# Patient Record
Sex: FEMALE | Race: WHITE | Employment: OTHER | URBAN - NONMETROPOLITAN AREA
[De-identification: names, ages, dates, MRNs, and addresses within clinical notes are randomized per-mention and may not be internally consistent; named-entity substitution may affect disease eponyms.]

---

## 2017-01-03 RX ORDER — IBUPROFEN 400 MG/1
400 TABLET ORAL EVERY 6 HOURS PRN
Qty: 120 TABLET | Refills: 3 | Status: SHIPPED | OUTPATIENT
Start: 2017-01-03 | End: 2017-01-03 | Stop reason: SDUPTHER

## 2017-01-03 RX ORDER — IBUPROFEN 400 MG/1
400 TABLET ORAL EVERY 6 HOURS PRN
Qty: 120 TABLET | Refills: 3 | Status: SHIPPED | OUTPATIENT
Start: 2017-01-03 | End: 2017-05-26 | Stop reason: SDUPTHER

## 2017-01-11 DIAGNOSIS — G62.9 PERIPHERAL POLYNEUROPATHY: ICD-10-CM

## 2017-01-11 DIAGNOSIS — Z79.4 TYPE 2 DIABETES MELLITUS WITH DIABETIC POLYNEUROPATHY, WITH LONG-TERM CURRENT USE OF INSULIN (HCC): Primary | ICD-10-CM

## 2017-01-11 DIAGNOSIS — I25.83 CORONARY ARTERY DISEASE DUE TO LIPID RICH PLAQUE: ICD-10-CM

## 2017-01-11 DIAGNOSIS — I25.10 CORONARY ARTERY DISEASE DUE TO LIPID RICH PLAQUE: ICD-10-CM

## 2017-01-11 DIAGNOSIS — R53.82 CHRONIC FATIGUE: ICD-10-CM

## 2017-01-11 DIAGNOSIS — E11.42 TYPE 2 DIABETES MELLITUS WITH DIABETIC POLYNEUROPATHY, WITH LONG-TERM CURRENT USE OF INSULIN (HCC): Primary | ICD-10-CM

## 2017-01-11 PROCEDURE — G0179 MD RECERTIFICATION HHA PT: HCPCS | Performed by: INTERNAL MEDICINE

## 2017-01-11 RX ORDER — ALBUTEROL SULFATE 90 UG/1
2 AEROSOL, METERED RESPIRATORY (INHALATION) EVERY 4 HOURS PRN
Qty: 3 INHALER | Refills: 3 | Status: SHIPPED | OUTPATIENT
Start: 2017-01-11 | End: 2017-01-24 | Stop reason: CLARIF

## 2017-01-11 RX ORDER — ZINC GLUCONATE 50 MG
50 TABLET ORAL DAILY
COMMUNITY

## 2017-01-12 ENCOUNTER — OFFICE VISIT (OUTPATIENT)
Dept: INTERNAL MEDICINE | Age: 67
End: 2017-01-12

## 2017-01-12 VITALS
HEART RATE: 56 BPM | DIASTOLIC BLOOD PRESSURE: 66 MMHG | HEIGHT: 64 IN | WEIGHT: 265 LBS | RESPIRATION RATE: 20 BRPM | BODY MASS INDEX: 45.24 KG/M2 | SYSTOLIC BLOOD PRESSURE: 100 MMHG

## 2017-01-12 DIAGNOSIS — I25.10 CORONARY ARTERY DISEASE DUE TO LIPID RICH PLAQUE: ICD-10-CM

## 2017-01-12 DIAGNOSIS — Z23 NEED FOR PNEUMOCOCCAL VACCINATION: ICD-10-CM

## 2017-01-12 DIAGNOSIS — Z79.4 TYPE 2 DIABETES MELLITUS WITH DIABETIC POLYNEUROPATHY, WITH LONG-TERM CURRENT USE OF INSULIN (HCC): Primary | ICD-10-CM

## 2017-01-12 DIAGNOSIS — M25.551 PAIN OF RIGHT HIP JOINT: ICD-10-CM

## 2017-01-12 DIAGNOSIS — E11.42 TYPE 2 DIABETES MELLITUS WITH DIABETIC POLYNEUROPATHY, WITH LONG-TERM CURRENT USE OF INSULIN (HCC): Primary | ICD-10-CM

## 2017-01-12 DIAGNOSIS — M79.10 MUSCLE ACHE: ICD-10-CM

## 2017-01-12 DIAGNOSIS — Z11.59 NEED FOR HEPATITIS C SCREENING TEST: ICD-10-CM

## 2017-01-12 DIAGNOSIS — I25.83 CORONARY ARTERY DISEASE DUE TO LIPID RICH PLAQUE: ICD-10-CM

## 2017-01-12 DIAGNOSIS — R60.0 BILATERAL LEG EDEMA: ICD-10-CM

## 2017-01-12 DIAGNOSIS — G47.33 OBSTRUCTIVE SLEEP APNEA: ICD-10-CM

## 2017-01-12 PROCEDURE — 99214 OFFICE O/P EST MOD 30 MIN: CPT | Performed by: INTERNAL MEDICINE

## 2017-01-12 PROCEDURE — G0009 ADMIN PNEUMOCOCCAL VACCINE: HCPCS | Performed by: INTERNAL MEDICINE

## 2017-01-12 PROCEDURE — 90670 PCV13 VACCINE IM: CPT | Performed by: INTERNAL MEDICINE

## 2017-01-12 ASSESSMENT — ENCOUNTER SYMPTOMS
SHORTNESS OF BREATH: 0
DIARRHEA: 0
EYE PAIN: 0
BACK PAIN: 0
BLOOD IN STOOL: 0
CONSTIPATION: 0
COUGH: 0
VOMITING: 0
NAUSEA: 0
ABDOMINAL PAIN: 0

## 2017-01-13 ENCOUNTER — TELEPHONE (OUTPATIENT)
Dept: INTERNAL MEDICINE | Age: 67
End: 2017-01-13

## 2017-01-13 DIAGNOSIS — E66.01 MORBID OBESITY, UNSPECIFIED OBESITY TYPE (HCC): ICD-10-CM

## 2017-01-13 DIAGNOSIS — R53.82 CHRONIC FATIGUE: ICD-10-CM

## 2017-01-13 DIAGNOSIS — G47.33 OBSTRUCTIVE SLEEP APNEA: Primary | ICD-10-CM

## 2017-01-16 ENCOUNTER — TELEPHONE (OUTPATIENT)
Dept: INTERNAL MEDICINE | Age: 67
End: 2017-01-16

## 2017-01-23 ENCOUNTER — EVALUATION (OUTPATIENT)
Dept: PHYSICAL THERAPY | Age: 67
End: 2017-01-23

## 2017-01-23 DIAGNOSIS — M25.551 RIGHT HIP PAIN: Primary | ICD-10-CM

## 2017-01-23 DIAGNOSIS — M79.10 MUSCLE ACHE: ICD-10-CM

## 2017-01-23 PROCEDURE — 97110 THERAPEUTIC EXERCISES: CPT | Performed by: PHYSICAL THERAPIST

## 2017-01-23 ASSESSMENT — ACTIVITIES OF DAILY LIVING (ADL): EFFECT OF PAIN ON DAILY ACTIVITIES: DECREASED EASE WITH AMBULATION AND ADL

## 2017-01-23 ASSESSMENT — PAIN DESCRIPTION - PROGRESSION: CLINICAL_PROGRESSION: GRADUALLY IMPROVING

## 2017-01-23 ASSESSMENT — PAIN DESCRIPTION - PAIN TYPE: TYPE: CHRONIC PAIN

## 2017-01-23 ASSESSMENT — PAIN DESCRIPTION - DESCRIPTORS: DESCRIPTORS: ACHING;BURNING;DULL;SHARP

## 2017-01-23 ASSESSMENT — PAIN DESCRIPTION - FREQUENCY: FREQUENCY: INTERMITTENT

## 2017-01-23 ASSESSMENT — PAIN DESCRIPTION - ONSET: ONSET: ON-GOING

## 2017-01-23 ASSESSMENT — PAIN SCALES - GENERAL: PAINLEVEL_OUTOF10: 6

## 2017-01-23 ASSESSMENT — PAIN DESCRIPTION - LOCATION: LOCATION: BACK;HIP

## 2017-01-30 ENCOUNTER — TREATMENT (OUTPATIENT)
Dept: PHYSICAL THERAPY | Age: 67
End: 2017-01-30

## 2017-01-30 DIAGNOSIS — M79.10 MUSCLE ACHE: ICD-10-CM

## 2017-01-30 DIAGNOSIS — M25.551 RIGHT HIP PAIN: Primary | ICD-10-CM

## 2017-01-30 PROCEDURE — 97110 THERAPEUTIC EXERCISES: CPT | Performed by: PHYSICAL THERAPIST

## 2017-02-01 ENCOUNTER — OFFICE VISIT (OUTPATIENT)
Dept: PODIATRY | Age: 67
End: 2017-02-01

## 2017-02-01 VITALS
HEART RATE: 80 BPM | BODY MASS INDEX: 45.24 KG/M2 | SYSTOLIC BLOOD PRESSURE: 128 MMHG | RESPIRATION RATE: 20 BRPM | HEIGHT: 64 IN | WEIGHT: 265 LBS | DIASTOLIC BLOOD PRESSURE: 60 MMHG

## 2017-02-01 DIAGNOSIS — Z79.4 TYPE 2 DIABETES MELLITUS WITH DIABETIC POLYNEUROPATHY, WITH LONG-TERM CURRENT USE OF INSULIN (HCC): Primary | ICD-10-CM

## 2017-02-01 DIAGNOSIS — E11.42 TYPE 2 DIABETES MELLITUS WITH DIABETIC POLYNEUROPATHY, WITH LONG-TERM CURRENT USE OF INSULIN (HCC): Primary | ICD-10-CM

## 2017-02-01 PROCEDURE — 11720 DEBRIDE NAIL 1-5: CPT | Performed by: PODIATRIST

## 2017-02-01 PROCEDURE — 1036F TOBACCO NON-USER: CPT | Performed by: PODIATRIST

## 2017-02-02 ENCOUNTER — TREATMENT (OUTPATIENT)
Dept: PHYSICAL THERAPY | Age: 67
End: 2017-02-02

## 2017-02-02 DIAGNOSIS — M79.10 MUSCLE ACHE: ICD-10-CM

## 2017-02-02 DIAGNOSIS — M25.551 RIGHT HIP PAIN: Primary | ICD-10-CM

## 2017-02-02 PROCEDURE — 97110 THERAPEUTIC EXERCISES: CPT | Performed by: PHYSICAL THERAPIST

## 2017-02-07 ENCOUNTER — TREATMENT (OUTPATIENT)
Dept: PHYSICAL THERAPY | Age: 67
End: 2017-02-07

## 2017-02-07 DIAGNOSIS — M79.10 MUSCLE ACHE: ICD-10-CM

## 2017-02-07 DIAGNOSIS — M25.551 RIGHT HIP PAIN: Primary | ICD-10-CM

## 2017-02-07 PROCEDURE — 97112 NEUROMUSCULAR REEDUCATION: CPT | Performed by: PHYSICAL THERAPIST

## 2017-02-07 PROCEDURE — 97110 THERAPEUTIC EXERCISES: CPT | Performed by: PHYSICAL THERAPIST

## 2017-02-08 ENCOUNTER — TREATMENT (OUTPATIENT)
Dept: PHYSICAL THERAPY | Age: 67
End: 2017-02-08

## 2017-02-08 DIAGNOSIS — M79.10 MUSCLE ACHE: ICD-10-CM

## 2017-02-08 DIAGNOSIS — M25.551 RIGHT HIP PAIN: Primary | ICD-10-CM

## 2017-02-08 PROCEDURE — 97110 THERAPEUTIC EXERCISES: CPT | Performed by: PHYSICAL THERAPIST

## 2017-02-08 PROCEDURE — 97112 NEUROMUSCULAR REEDUCATION: CPT | Performed by: PHYSICAL THERAPIST

## 2017-02-09 ENCOUNTER — HOSPITAL ENCOUNTER (OUTPATIENT)
Dept: SLEEP CENTER | Age: 67
Discharge: HOME | End: 2017-02-09
Payer: MEDICARE

## 2017-02-09 VITALS
DIASTOLIC BLOOD PRESSURE: 68 MMHG | BODY MASS INDEX: 44.56 KG/M2 | TEMPERATURE: 98.5 F | SYSTOLIC BLOOD PRESSURE: 125 MMHG | HEIGHT: 64 IN | HEART RATE: 62 BPM | WEIGHT: 261 LBS | RESPIRATION RATE: 16 BRPM

## 2017-02-09 DIAGNOSIS — G47.33 OBSTRUCTIVE SLEEP APNEA: Primary | ICD-10-CM

## 2017-02-09 DIAGNOSIS — G47.33 OSA ON CPAP: Primary | ICD-10-CM

## 2017-02-09 DIAGNOSIS — R53.82 CHRONIC FATIGUE: ICD-10-CM

## 2017-02-09 DIAGNOSIS — Z99.89 OSA ON CPAP: Primary | ICD-10-CM

## 2017-02-09 PROCEDURE — 95811 POLYSOM 6/>YRS CPAP 4/> PARM: CPT

## 2017-02-13 ENCOUNTER — TREATMENT (OUTPATIENT)
Dept: PHYSICAL THERAPY | Age: 67
End: 2017-02-13

## 2017-02-13 DIAGNOSIS — M79.10 MUSCLE ACHE: ICD-10-CM

## 2017-02-13 DIAGNOSIS — M25.551 RIGHT HIP PAIN: Primary | ICD-10-CM

## 2017-02-15 DIAGNOSIS — R53.82 CHRONIC FATIGUE: ICD-10-CM

## 2017-02-15 DIAGNOSIS — G47.33 OBSTRUCTIVE SLEEP APNEA: ICD-10-CM

## 2017-02-15 RX ORDER — LANCETS 28 GAUGE
1 EACH MISCELLANEOUS 4 TIMES DAILY
COMMUNITY
End: 2018-03-06 | Stop reason: SDUPTHER

## 2017-02-15 RX ORDER — LANCETS 28 GAUGE
1 EACH MISCELLANEOUS 3 TIMES DAILY
Qty: 100 EACH | Refills: 11 | Status: SHIPPED | OUTPATIENT
Start: 2017-02-15 | End: 2017-02-15

## 2017-03-06 RX ORDER — BLOOD SUGAR DIAGNOSTIC
1 STRIP MISCELLANEOUS 4 TIMES DAILY
Qty: 200 EACH | Refills: 3 | Status: SHIPPED | OUTPATIENT
Start: 2017-03-06 | End: 2017-10-03 | Stop reason: SDUPTHER

## 2017-03-08 ENCOUNTER — TELEPHONE (OUTPATIENT)
Dept: INTERNAL MEDICINE | Age: 67
End: 2017-03-08

## 2017-03-15 DIAGNOSIS — K74.60 CIRRHOSIS OF LIVER WITHOUT ASCITES, UNSPECIFIED HEPATIC CIRRHOSIS TYPE (HCC): Primary | ICD-10-CM

## 2017-03-15 DIAGNOSIS — G47.33 OBSTRUCTIVE SLEEP APNEA: ICD-10-CM

## 2017-03-15 DIAGNOSIS — E11.42 TYPE 2 DIABETES MELLITUS WITH DIABETIC POLYNEUROPATHY, WITH LONG-TERM CURRENT USE OF INSULIN (HCC): ICD-10-CM

## 2017-03-15 DIAGNOSIS — R60.0 BILATERAL LEG EDEMA: ICD-10-CM

## 2017-03-15 DIAGNOSIS — I25.83 CORONARY ARTERY DISEASE DUE TO LIPID RICH PLAQUE: ICD-10-CM

## 2017-03-15 DIAGNOSIS — G62.9 PERIPHERAL POLYNEUROPATHY: ICD-10-CM

## 2017-03-15 DIAGNOSIS — Z79.4 TYPE 2 DIABETES MELLITUS WITH DIABETIC POLYNEUROPATHY, WITH LONG-TERM CURRENT USE OF INSULIN (HCC): ICD-10-CM

## 2017-03-15 DIAGNOSIS — I25.10 CORONARY ARTERY DISEASE DUE TO LIPID RICH PLAQUE: ICD-10-CM

## 2017-03-15 PROCEDURE — G0179 MD RECERTIFICATION HHA PT: HCPCS | Performed by: INTERNAL MEDICINE

## 2017-03-24 ENCOUNTER — TELEPHONE (OUTPATIENT)
Dept: INTERNAL MEDICINE | Age: 67
End: 2017-03-24

## 2017-03-24 DIAGNOSIS — J34.89 NASAL DRAINAGE: Primary | ICD-10-CM

## 2017-04-05 ENCOUNTER — OFFICE VISIT (OUTPATIENT)
Dept: OTOLARYNGOLOGY | Age: 67
End: 2017-04-05
Payer: MEDICARE

## 2017-04-05 VITALS
TEMPERATURE: 98.3 F | HEIGHT: 64 IN | SYSTOLIC BLOOD PRESSURE: 124 MMHG | DIASTOLIC BLOOD PRESSURE: 78 MMHG | WEIGHT: 264.6 LBS | BODY MASS INDEX: 45.17 KG/M2 | HEART RATE: 64 BPM

## 2017-04-05 DIAGNOSIS — J01.90 SUBACUTE SINUSITIS, UNSPECIFIED LOCATION: Primary | ICD-10-CM

## 2017-04-05 DIAGNOSIS — J30.89 PERENNIAL ALLERGIC RHINITIS, UNSPECIFIED ALLERGIC RHINITIS TRIGGER: ICD-10-CM

## 2017-04-05 PROCEDURE — G8427 DOCREV CUR MEDS BY ELIG CLIN: HCPCS | Performed by: OTOLARYNGOLOGY

## 2017-04-05 PROCEDURE — 99203 OFFICE O/P NEW LOW 30 MIN: CPT | Performed by: OTOLARYNGOLOGY

## 2017-04-05 PROCEDURE — 1036F TOBACCO NON-USER: CPT | Performed by: OTOLARYNGOLOGY

## 2017-04-05 PROCEDURE — G8599 NO ASA/ANTIPLAT THER USE RNG: HCPCS | Performed by: OTOLARYNGOLOGY

## 2017-04-05 PROCEDURE — 1090F PRES/ABSN URINE INCON ASSESS: CPT | Performed by: OTOLARYNGOLOGY

## 2017-04-05 PROCEDURE — 3017F COLORECTAL CA SCREEN DOC REV: CPT | Performed by: OTOLARYNGOLOGY

## 2017-04-05 PROCEDURE — G8400 PT W/DXA NO RESULTS DOC: HCPCS | Performed by: OTOLARYNGOLOGY

## 2017-04-05 PROCEDURE — 3014F SCREEN MAMMO DOC REV: CPT | Performed by: OTOLARYNGOLOGY

## 2017-04-05 PROCEDURE — 1123F ACP DISCUSS/DSCN MKR DOCD: CPT | Performed by: OTOLARYNGOLOGY

## 2017-04-05 PROCEDURE — G8419 CALC BMI OUT NRM PARAM NOF/U: HCPCS | Performed by: OTOLARYNGOLOGY

## 2017-04-05 PROCEDURE — 4040F PNEUMOC VAC/ADMIN/RCVD: CPT | Performed by: OTOLARYNGOLOGY

## 2017-04-12 ENCOUNTER — OFFICE VISIT (OUTPATIENT)
Dept: PODIATRY | Age: 67
End: 2017-04-12
Payer: MEDICARE

## 2017-04-12 VITALS
RESPIRATION RATE: 20 BRPM | BODY MASS INDEX: 44.69 KG/M2 | HEIGHT: 64 IN | WEIGHT: 261.8 LBS | SYSTOLIC BLOOD PRESSURE: 128 MMHG | HEART RATE: 64 BPM | DIASTOLIC BLOOD PRESSURE: 70 MMHG

## 2017-04-12 DIAGNOSIS — B35.1 DERMATOPHYTOSIS OF NAIL: ICD-10-CM

## 2017-04-12 DIAGNOSIS — E11.42 TYPE 2 DIABETES MELLITUS WITH DIABETIC POLYNEUROPATHY, WITH LONG-TERM CURRENT USE OF INSULIN (HCC): Primary | ICD-10-CM

## 2017-04-12 DIAGNOSIS — Z79.4 TYPE 2 DIABETES MELLITUS WITH DIABETIC POLYNEUROPATHY, WITH LONG-TERM CURRENT USE OF INSULIN (HCC): Primary | ICD-10-CM

## 2017-04-12 PROCEDURE — 11720 DEBRIDE NAIL 1-5: CPT | Performed by: PODIATRIST

## 2017-04-12 PROCEDURE — 1036F TOBACCO NON-USER: CPT | Performed by: PODIATRIST

## 2017-04-24 RX ORDER — SPIRONOLACTONE 50 MG/1
50 TABLET, FILM COATED ORAL 2 TIMES DAILY
Qty: 180 TABLET | Refills: 3 | Status: SHIPPED | OUTPATIENT
Start: 2017-04-24 | End: 2017-05-01 | Stop reason: SDUPTHER

## 2017-05-01 ENCOUNTER — OFFICE VISIT (OUTPATIENT)
Dept: CARDIOLOGY | Age: 67
End: 2017-05-01
Payer: MEDICARE

## 2017-05-01 ENCOUNTER — TELEPHONE (OUTPATIENT)
Dept: CARDIOLOGY | Age: 67
End: 2017-05-01

## 2017-05-01 VITALS
SYSTOLIC BLOOD PRESSURE: 84 MMHG | BODY MASS INDEX: 44.22 KG/M2 | DIASTOLIC BLOOD PRESSURE: 42 MMHG | RESPIRATION RATE: 18 BRPM | HEIGHT: 64 IN | WEIGHT: 259 LBS | HEART RATE: 49 BPM

## 2017-05-01 DIAGNOSIS — I25.10 CORONARY ARTERY DISEASE INVOLVING NATIVE CORONARY ARTERY OF NATIVE HEART WITHOUT ANGINA PECTORIS: Primary | ICD-10-CM

## 2017-05-01 DIAGNOSIS — R79.9 ELEVATED BUN: Primary | ICD-10-CM

## 2017-05-01 DIAGNOSIS — E78.5 HYPERLIPIDEMIA, UNSPECIFIED HYPERLIPIDEMIA TYPE: ICD-10-CM

## 2017-05-01 DIAGNOSIS — R79.89 ELEVATED SERUM CREATININE: ICD-10-CM

## 2017-05-01 DIAGNOSIS — R42 DIZZINESS: ICD-10-CM

## 2017-05-01 DIAGNOSIS — I10 ESSENTIAL HYPERTENSION: ICD-10-CM

## 2017-05-01 PROCEDURE — 3017F COLORECTAL CA SCREEN DOC REV: CPT | Performed by: INTERNAL MEDICINE

## 2017-05-01 PROCEDURE — 1036F TOBACCO NON-USER: CPT | Performed by: INTERNAL MEDICINE

## 2017-05-01 PROCEDURE — 1090F PRES/ABSN URINE INCON ASSESS: CPT | Performed by: INTERNAL MEDICINE

## 2017-05-01 PROCEDURE — G8427 DOCREV CUR MEDS BY ELIG CLIN: HCPCS | Performed by: INTERNAL MEDICINE

## 2017-05-01 PROCEDURE — 93000 ELECTROCARDIOGRAM COMPLETE: CPT | Performed by: INTERNAL MEDICINE

## 2017-05-01 PROCEDURE — 4040F PNEUMOC VAC/ADMIN/RCVD: CPT | Performed by: INTERNAL MEDICINE

## 2017-05-01 PROCEDURE — G8599 NO ASA/ANTIPLAT THER USE RNG: HCPCS | Performed by: INTERNAL MEDICINE

## 2017-05-01 PROCEDURE — 1123F ACP DISCUSS/DSCN MKR DOCD: CPT | Performed by: INTERNAL MEDICINE

## 2017-05-01 PROCEDURE — G8417 CALC BMI ABV UP PARAM F/U: HCPCS | Performed by: INTERNAL MEDICINE

## 2017-05-01 PROCEDURE — G8400 PT W/DXA NO RESULTS DOC: HCPCS | Performed by: INTERNAL MEDICINE

## 2017-05-01 PROCEDURE — 3014F SCREEN MAMMO DOC REV: CPT | Performed by: INTERNAL MEDICINE

## 2017-05-01 PROCEDURE — 99213 OFFICE O/P EST LOW 20 MIN: CPT | Performed by: INTERNAL MEDICINE

## 2017-05-01 RX ORDER — SPIRONOLACTONE 50 MG/1
50 TABLET, FILM COATED ORAL DAILY
Qty: 180 TABLET | Refills: 3 | Status: SHIPPED | OUTPATIENT
Start: 2017-05-01 | End: 2017-05-10

## 2017-05-01 RX ORDER — LISINOPRIL 10 MG/1
10 TABLET ORAL DAILY
Qty: 30 TABLET | Refills: 6 | Status: SHIPPED | OUTPATIENT
Start: 2017-05-01 | End: 2017-05-16 | Stop reason: ALTCHOICE

## 2017-05-03 ENCOUNTER — OFFICE VISIT (OUTPATIENT)
Dept: PRIMARY CARE CLINIC | Age: 67
End: 2017-05-03
Payer: MEDICARE

## 2017-05-03 ENCOUNTER — OFFICE VISIT (OUTPATIENT)
Dept: OTOLARYNGOLOGY | Age: 67
End: 2017-05-03
Payer: MEDICARE

## 2017-05-03 VITALS
DIASTOLIC BLOOD PRESSURE: 60 MMHG | SYSTOLIC BLOOD PRESSURE: 110 MMHG | HEIGHT: 64 IN | HEART RATE: 50 BPM | BODY MASS INDEX: 44.8 KG/M2 | OXYGEN SATURATION: 96 % | WEIGHT: 262.4 LBS

## 2017-05-03 VITALS
WEIGHT: 261 LBS | DIASTOLIC BLOOD PRESSURE: 84 MMHG | BODY MASS INDEX: 44.56 KG/M2 | HEIGHT: 64 IN | SYSTOLIC BLOOD PRESSURE: 142 MMHG | TEMPERATURE: 98 F | HEART RATE: 78 BPM

## 2017-05-03 DIAGNOSIS — G44.229 CHRONIC TENSION-TYPE HEADACHE, NOT INTRACTABLE: Primary | ICD-10-CM

## 2017-05-03 DIAGNOSIS — L74.0 HEAT RASH: Primary | ICD-10-CM

## 2017-05-03 DIAGNOSIS — B37.2 SKIN YEAST INFECTION: ICD-10-CM

## 2017-05-03 DIAGNOSIS — R51.9 PERSISTENT HEADACHES: ICD-10-CM

## 2017-05-03 PROCEDURE — G8599 NO ASA/ANTIPLAT THER USE RNG: HCPCS | Performed by: NURSE PRACTITIONER

## 2017-05-03 PROCEDURE — 99213 OFFICE O/P EST LOW 20 MIN: CPT | Performed by: OTOLARYNGOLOGY

## 2017-05-03 PROCEDURE — 3017F COLORECTAL CA SCREEN DOC REV: CPT | Performed by: NURSE PRACTITIONER

## 2017-05-03 PROCEDURE — 1123F ACP DISCUSS/DSCN MKR DOCD: CPT | Performed by: NURSE PRACTITIONER

## 2017-05-03 PROCEDURE — 99213 OFFICE O/P EST LOW 20 MIN: CPT | Performed by: NURSE PRACTITIONER

## 2017-05-03 PROCEDURE — 3014F SCREEN MAMMO DOC REV: CPT | Performed by: NURSE PRACTITIONER

## 2017-05-03 PROCEDURE — G8427 DOCREV CUR MEDS BY ELIG CLIN: HCPCS | Performed by: NURSE PRACTITIONER

## 2017-05-03 PROCEDURE — 3017F COLORECTAL CA SCREEN DOC REV: CPT | Performed by: OTOLARYNGOLOGY

## 2017-05-03 PROCEDURE — G8417 CALC BMI ABV UP PARAM F/U: HCPCS | Performed by: OTOLARYNGOLOGY

## 2017-05-03 PROCEDURE — 4040F PNEUMOC VAC/ADMIN/RCVD: CPT | Performed by: NURSE PRACTITIONER

## 2017-05-03 PROCEDURE — 1123F ACP DISCUSS/DSCN MKR DOCD: CPT | Performed by: OTOLARYNGOLOGY

## 2017-05-03 PROCEDURE — 1036F TOBACCO NON-USER: CPT | Performed by: OTOLARYNGOLOGY

## 2017-05-03 PROCEDURE — G8417 CALC BMI ABV UP PARAM F/U: HCPCS | Performed by: NURSE PRACTITIONER

## 2017-05-03 PROCEDURE — G8400 PT W/DXA NO RESULTS DOC: HCPCS | Performed by: OTOLARYNGOLOGY

## 2017-05-03 PROCEDURE — 4040F PNEUMOC VAC/ADMIN/RCVD: CPT | Performed by: OTOLARYNGOLOGY

## 2017-05-03 PROCEDURE — 3014F SCREEN MAMMO DOC REV: CPT | Performed by: OTOLARYNGOLOGY

## 2017-05-03 PROCEDURE — 1090F PRES/ABSN URINE INCON ASSESS: CPT | Performed by: NURSE PRACTITIONER

## 2017-05-03 PROCEDURE — G8427 DOCREV CUR MEDS BY ELIG CLIN: HCPCS | Performed by: OTOLARYNGOLOGY

## 2017-05-03 PROCEDURE — 1090F PRES/ABSN URINE INCON ASSESS: CPT | Performed by: OTOLARYNGOLOGY

## 2017-05-03 PROCEDURE — G8400 PT W/DXA NO RESULTS DOC: HCPCS | Performed by: NURSE PRACTITIONER

## 2017-05-03 PROCEDURE — G8599 NO ASA/ANTIPLAT THER USE RNG: HCPCS | Performed by: OTOLARYNGOLOGY

## 2017-05-03 PROCEDURE — 1036F TOBACCO NON-USER: CPT | Performed by: NURSE PRACTITIONER

## 2017-05-03 RX ORDER — FLUCONAZOLE 150 MG/1
150 TABLET ORAL ONCE
Qty: 1 TABLET | Refills: 0 | Status: SHIPPED | OUTPATIENT
Start: 2017-05-03 | End: 2017-05-03

## 2017-05-03 ASSESSMENT — ENCOUNTER SYMPTOMS
SINUS PRESSURE: 0
DIARRHEA: 0
NAUSEA: 0
ABDOMINAL PAIN: 0
TROUBLE SWALLOWING: 0
VOMITING: 0
COUGH: 0
EYES NEGATIVE: 1
SHORTNESS OF BREATH: 0
ALLERGIC/IMMUNOLOGIC NEGATIVE: 1
GASTROINTESTINAL NEGATIVE: 1
CHEST TIGHTNESS: 0
RESPIRATORY NEGATIVE: 1
CONSTIPATION: 0

## 2017-05-05 ENCOUNTER — TELEPHONE (OUTPATIENT)
Dept: INTERNAL MEDICINE | Age: 67
End: 2017-05-05

## 2017-05-05 DIAGNOSIS — R51.9 SINUS HEADACHE: Primary | ICD-10-CM

## 2017-05-10 ENCOUNTER — OFFICE VISIT (OUTPATIENT)
Dept: CARDIOLOGY | Age: 67
End: 2017-05-10
Payer: MEDICARE

## 2017-05-10 VITALS
SYSTOLIC BLOOD PRESSURE: 104 MMHG | HEIGHT: 67 IN | BODY MASS INDEX: 41.91 KG/M2 | HEART RATE: 56 BPM | DIASTOLIC BLOOD PRESSURE: 60 MMHG | WEIGHT: 267 LBS

## 2017-05-10 DIAGNOSIS — E11.42 TYPE 2 DIABETES MELLITUS WITH DIABETIC POLYNEUROPATHY, WITH LONG-TERM CURRENT USE OF INSULIN (HCC): ICD-10-CM

## 2017-05-10 DIAGNOSIS — Z79.4 TYPE 2 DIABETES MELLITUS WITH DIABETIC POLYNEUROPATHY, WITH LONG-TERM CURRENT USE OF INSULIN (HCC): ICD-10-CM

## 2017-05-10 DIAGNOSIS — R53.82 CHRONIC FATIGUE: ICD-10-CM

## 2017-05-10 DIAGNOSIS — I25.83 CORONARY ARTERY DISEASE DUE TO LIPID RICH PLAQUE: ICD-10-CM

## 2017-05-10 DIAGNOSIS — G62.9 PERIPHERAL POLYNEUROPATHY: ICD-10-CM

## 2017-05-10 DIAGNOSIS — G47.33 OBSTRUCTIVE SLEEP APNEA: Primary | ICD-10-CM

## 2017-05-10 DIAGNOSIS — I25.10 CORONARY ARTERY DISEASE DUE TO LIPID RICH PLAQUE: ICD-10-CM

## 2017-05-10 PROCEDURE — G8428 CUR MEDS NOT DOCUMENT: HCPCS | Performed by: INTERNAL MEDICINE

## 2017-05-10 PROCEDURE — 1036F TOBACCO NON-USER: CPT | Performed by: INTERNAL MEDICINE

## 2017-05-10 PROCEDURE — 99213 OFFICE O/P EST LOW 20 MIN: CPT | Performed by: INTERNAL MEDICINE

## 2017-05-10 PROCEDURE — G8417 CALC BMI ABV UP PARAM F/U: HCPCS | Performed by: INTERNAL MEDICINE

## 2017-05-10 PROCEDURE — 1090F PRES/ABSN URINE INCON ASSESS: CPT | Performed by: INTERNAL MEDICINE

## 2017-05-10 PROCEDURE — 4040F PNEUMOC VAC/ADMIN/RCVD: CPT | Performed by: INTERNAL MEDICINE

## 2017-05-10 PROCEDURE — 3017F COLORECTAL CA SCREEN DOC REV: CPT | Performed by: INTERNAL MEDICINE

## 2017-05-10 PROCEDURE — G8599 NO ASA/ANTIPLAT THER USE RNG: HCPCS | Performed by: INTERNAL MEDICINE

## 2017-05-10 PROCEDURE — G8400 PT W/DXA NO RESULTS DOC: HCPCS | Performed by: INTERNAL MEDICINE

## 2017-05-10 PROCEDURE — 1123F ACP DISCUSS/DSCN MKR DOCD: CPT | Performed by: INTERNAL MEDICINE

## 2017-05-10 PROCEDURE — 3014F SCREEN MAMMO DOC REV: CPT | Performed by: INTERNAL MEDICINE

## 2017-05-10 PROCEDURE — 3044F HG A1C LEVEL LT 7.0%: CPT | Performed by: INTERNAL MEDICINE

## 2017-05-12 DIAGNOSIS — I25.83 CORONARY ARTERY DISEASE DUE TO LIPID RICH PLAQUE: ICD-10-CM

## 2017-05-12 DIAGNOSIS — Z79.4 TYPE 2 DIABETES MELLITUS WITH DIABETIC POLYNEUROPATHY, WITH LONG-TERM CURRENT USE OF INSULIN (HCC): Primary | ICD-10-CM

## 2017-05-12 DIAGNOSIS — R53.82 CHRONIC FATIGUE: ICD-10-CM

## 2017-05-12 DIAGNOSIS — I25.10 CORONARY ARTERY DISEASE DUE TO LIPID RICH PLAQUE: ICD-10-CM

## 2017-05-12 DIAGNOSIS — G47.33 OBSTRUCTIVE SLEEP APNEA: ICD-10-CM

## 2017-05-12 DIAGNOSIS — E11.42 TYPE 2 DIABETES MELLITUS WITH DIABETIC POLYNEUROPATHY, WITH LONG-TERM CURRENT USE OF INSULIN (HCC): Primary | ICD-10-CM

## 2017-05-12 PROCEDURE — G0179 MD RECERTIFICATION HHA PT: HCPCS | Performed by: INTERNAL MEDICINE

## 2017-05-16 ENCOUNTER — HOSPITAL ENCOUNTER (OUTPATIENT)
Age: 67
Setting detail: SPECIMEN
Discharge: HOME | End: 2017-05-16
Payer: MEDICARE

## 2017-05-16 ENCOUNTER — OFFICE VISIT (OUTPATIENT)
Dept: INTERNAL MEDICINE | Age: 67
End: 2017-05-16
Payer: MEDICARE

## 2017-05-16 VITALS
WEIGHT: 262 LBS | BODY MASS INDEX: 44.73 KG/M2 | RESPIRATION RATE: 20 BRPM | SYSTOLIC BLOOD PRESSURE: 96 MMHG | HEIGHT: 64 IN | HEART RATE: 50 BPM | DIASTOLIC BLOOD PRESSURE: 64 MMHG

## 2017-05-16 DIAGNOSIS — Z12.31 ENCOUNTER FOR SCREENING MAMMOGRAM FOR MALIGNANT NEOPLASM OF BREAST: ICD-10-CM

## 2017-05-16 DIAGNOSIS — Z79.4 TYPE 2 DIABETES MELLITUS WITH DIABETIC NEUROPATHY, WITH LONG-TERM CURRENT USE OF INSULIN (HCC): Primary | ICD-10-CM

## 2017-05-16 DIAGNOSIS — I25.83 CORONARY ARTERY DISEASE DUE TO LIPID RICH PLAQUE: ICD-10-CM

## 2017-05-16 DIAGNOSIS — E55.9 VITAMIN D DEFICIENCY: ICD-10-CM

## 2017-05-16 DIAGNOSIS — R11.0 NAUSEA: ICD-10-CM

## 2017-05-16 DIAGNOSIS — I25.10 CORONARY ARTERY DISEASE DUE TO LIPID RICH PLAQUE: ICD-10-CM

## 2017-05-16 DIAGNOSIS — E11.40 TYPE 2 DIABETES MELLITUS WITH DIABETIC NEUROPATHY, WITH LONG-TERM CURRENT USE OF INSULIN (HCC): Primary | ICD-10-CM

## 2017-05-16 DIAGNOSIS — D64.9 ANEMIA, UNSPECIFIED TYPE: ICD-10-CM

## 2017-05-16 DIAGNOSIS — R60.0 BILATERAL LEG EDEMA: ICD-10-CM

## 2017-05-16 DIAGNOSIS — R53.82 CHRONIC FATIGUE: ICD-10-CM

## 2017-05-16 PROCEDURE — G8417 CALC BMI ABV UP PARAM F/U: HCPCS | Performed by: INTERNAL MEDICINE

## 2017-05-16 PROCEDURE — 1036F TOBACCO NON-USER: CPT | Performed by: INTERNAL MEDICINE

## 2017-05-16 PROCEDURE — G8599 NO ASA/ANTIPLAT THER USE RNG: HCPCS | Performed by: INTERNAL MEDICINE

## 2017-05-16 PROCEDURE — 3014F SCREEN MAMMO DOC REV: CPT | Performed by: INTERNAL MEDICINE

## 2017-05-16 PROCEDURE — G8427 DOCREV CUR MEDS BY ELIG CLIN: HCPCS | Performed by: INTERNAL MEDICINE

## 2017-05-16 PROCEDURE — 3017F COLORECTAL CA SCREEN DOC REV: CPT | Performed by: INTERNAL MEDICINE

## 2017-05-16 PROCEDURE — 3044F HG A1C LEVEL LT 7.0%: CPT | Performed by: INTERNAL MEDICINE

## 2017-05-16 PROCEDURE — 1090F PRES/ABSN URINE INCON ASSESS: CPT | Performed by: INTERNAL MEDICINE

## 2017-05-16 PROCEDURE — 99214 OFFICE O/P EST MOD 30 MIN: CPT | Performed by: INTERNAL MEDICINE

## 2017-05-16 PROCEDURE — 1123F ACP DISCUSS/DSCN MKR DOCD: CPT | Performed by: INTERNAL MEDICINE

## 2017-05-16 PROCEDURE — 4040F PNEUMOC VAC/ADMIN/RCVD: CPT | Performed by: INTERNAL MEDICINE

## 2017-05-16 PROCEDURE — G8400 PT W/DXA NO RESULTS DOC: HCPCS | Performed by: INTERNAL MEDICINE

## 2017-05-16 RX ORDER — ASCORBIC ACID 500 MG
500 TABLET ORAL 2 TIMES DAILY
Qty: 30 TABLET | Refills: 11 | Status: SHIPPED | OUTPATIENT
Start: 2017-05-16 | End: 2018-05-29 | Stop reason: SDUPTHER

## 2017-05-16 RX ORDER — LISINOPRIL 10 MG/1
10 TABLET ORAL DAILY
COMMUNITY
End: 2017-08-16 | Stop reason: SDUPTHER

## 2017-05-16 RX ORDER — ONDANSETRON 4 MG/1
4 TABLET, FILM COATED ORAL EVERY 8 HOURS PRN
Qty: 30 TABLET | Refills: 3 | Status: SHIPPED | OUTPATIENT
Start: 2017-05-16 | End: 2019-04-18 | Stop reason: SDUPTHER

## 2017-05-16 RX ORDER — MAGNESIUM OXIDE 400 MG/1
400 TABLET ORAL DAILY
Qty: 30 TABLET | Refills: 11 | Status: SHIPPED | OUTPATIENT
Start: 2017-05-16 | End: 2018-05-29 | Stop reason: SDUPTHER

## 2017-05-16 ASSESSMENT — ENCOUNTER SYMPTOMS
VOMITING: 0
ABDOMINAL PAIN: 0
CONSTIPATION: 0
BACK PAIN: 0
COUGH: 0
EYE PAIN: 0
BLOOD IN STOOL: 0
NAUSEA: 0
SHORTNESS OF BREATH: 0
DIARRHEA: 0

## 2017-05-16 ASSESSMENT — PATIENT HEALTH QUESTIONNAIRE - PHQ9
SUM OF ALL RESPONSES TO PHQ9 QUESTIONS 1 & 2: 1
2. FEELING DOWN, DEPRESSED OR HOPELESS: 0
1. LITTLE INTEREST OR PLEASURE IN DOING THINGS: 1
SUM OF ALL RESPONSES TO PHQ QUESTIONS 1-9: 1

## 2017-05-17 LAB — VITAMIN B-12: >2000 PG/ML (ref 211–946)

## 2017-05-18 ENCOUNTER — TELEPHONE (OUTPATIENT)
Dept: CARDIOLOGY | Age: 67
End: 2017-05-18

## 2017-05-23 ENCOUNTER — OFFICE VISIT (OUTPATIENT)
Dept: CARDIOLOGY | Age: 67
End: 2017-05-23
Payer: MEDICARE

## 2017-05-23 VITALS
HEART RATE: 52 BPM | HEIGHT: 64 IN | SYSTOLIC BLOOD PRESSURE: 98 MMHG | WEIGHT: 262 LBS | DIASTOLIC BLOOD PRESSURE: 60 MMHG | BODY MASS INDEX: 44.73 KG/M2

## 2017-05-23 DIAGNOSIS — R60.0 BILATERAL LEG EDEMA: Primary | ICD-10-CM

## 2017-05-23 DIAGNOSIS — I25.10 CORONARY ARTERY DISEASE DUE TO LIPID RICH PLAQUE: ICD-10-CM

## 2017-05-23 DIAGNOSIS — I25.83 CORONARY ARTERY DISEASE DUE TO LIPID RICH PLAQUE: ICD-10-CM

## 2017-05-23 PROCEDURE — 4040F PNEUMOC VAC/ADMIN/RCVD: CPT | Performed by: INTERNAL MEDICINE

## 2017-05-23 PROCEDURE — G8427 DOCREV CUR MEDS BY ELIG CLIN: HCPCS | Performed by: INTERNAL MEDICINE

## 2017-05-23 PROCEDURE — 1123F ACP DISCUSS/DSCN MKR DOCD: CPT | Performed by: INTERNAL MEDICINE

## 2017-05-23 PROCEDURE — G8599 NO ASA/ANTIPLAT THER USE RNG: HCPCS | Performed by: INTERNAL MEDICINE

## 2017-05-23 PROCEDURE — G8417 CALC BMI ABV UP PARAM F/U: HCPCS | Performed by: INTERNAL MEDICINE

## 2017-05-23 PROCEDURE — G8400 PT W/DXA NO RESULTS DOC: HCPCS | Performed by: INTERNAL MEDICINE

## 2017-05-23 PROCEDURE — 99212 OFFICE O/P EST SF 10 MIN: CPT | Performed by: INTERNAL MEDICINE

## 2017-05-23 PROCEDURE — 3014F SCREEN MAMMO DOC REV: CPT | Performed by: INTERNAL MEDICINE

## 2017-05-23 PROCEDURE — 3017F COLORECTAL CA SCREEN DOC REV: CPT | Performed by: INTERNAL MEDICINE

## 2017-05-23 PROCEDURE — 1090F PRES/ABSN URINE INCON ASSESS: CPT | Performed by: INTERNAL MEDICINE

## 2017-05-23 PROCEDURE — 1036F TOBACCO NON-USER: CPT | Performed by: INTERNAL MEDICINE

## 2017-05-23 RX ORDER — FUROSEMIDE 40 MG/1
40 TABLET ORAL DAILY
COMMUNITY
Start: 2017-03-11 | End: 2017-07-19 | Stop reason: SDUPTHER

## 2017-05-26 RX ORDER — IBUPROFEN 400 MG/1
TABLET ORAL
Qty: 120 TABLET | Refills: 5 | Status: SHIPPED | OUTPATIENT
Start: 2017-05-26 | End: 2017-12-18 | Stop reason: SDUPTHER

## 2017-05-31 ENCOUNTER — TELEPHONE (OUTPATIENT)
Dept: CARDIOLOGY | Age: 67
End: 2017-05-31

## 2017-06-07 ENCOUNTER — OFFICE VISIT (OUTPATIENT)
Dept: PULMONOLOGY | Age: 67
End: 2017-06-07
Payer: MEDICARE

## 2017-06-07 VITALS
SYSTOLIC BLOOD PRESSURE: 128 MMHG | OXYGEN SATURATION: 93 % | DIASTOLIC BLOOD PRESSURE: 80 MMHG | HEIGHT: 64 IN | RESPIRATION RATE: 18 BRPM | WEIGHT: 264 LBS | HEART RATE: 48 BPM | BODY MASS INDEX: 45.07 KG/M2

## 2017-06-07 DIAGNOSIS — J47.9 BRONCHIECTASIS WITHOUT COMPLICATION (HCC): Primary | ICD-10-CM

## 2017-06-07 DIAGNOSIS — G47.33 OBSTRUCTIVE SLEEP APNEA SYNDROME, MILD: ICD-10-CM

## 2017-06-07 DIAGNOSIS — R51.9 HEADACHE, UNSPECIFIED HEADACHE TYPE: ICD-10-CM

## 2017-06-07 PROCEDURE — G8400 PT W/DXA NO RESULTS DOC: HCPCS | Performed by: INTERNAL MEDICINE

## 2017-06-07 PROCEDURE — G8417 CALC BMI ABV UP PARAM F/U: HCPCS | Performed by: INTERNAL MEDICINE

## 2017-06-07 PROCEDURE — G8599 NO ASA/ANTIPLAT THER USE RNG: HCPCS | Performed by: INTERNAL MEDICINE

## 2017-06-07 PROCEDURE — 3017F COLORECTAL CA SCREEN DOC REV: CPT | Performed by: INTERNAL MEDICINE

## 2017-06-07 PROCEDURE — 1036F TOBACCO NON-USER: CPT | Performed by: INTERNAL MEDICINE

## 2017-06-07 PROCEDURE — 3014F SCREEN MAMMO DOC REV: CPT | Performed by: INTERNAL MEDICINE

## 2017-06-07 PROCEDURE — 1090F PRES/ABSN URINE INCON ASSESS: CPT | Performed by: INTERNAL MEDICINE

## 2017-06-07 PROCEDURE — G8427 DOCREV CUR MEDS BY ELIG CLIN: HCPCS | Performed by: INTERNAL MEDICINE

## 2017-06-07 PROCEDURE — 4040F PNEUMOC VAC/ADMIN/RCVD: CPT | Performed by: INTERNAL MEDICINE

## 2017-06-07 PROCEDURE — 1123F ACP DISCUSS/DSCN MKR DOCD: CPT | Performed by: INTERNAL MEDICINE

## 2017-06-07 PROCEDURE — 99214 OFFICE O/P EST MOD 30 MIN: CPT | Performed by: INTERNAL MEDICINE

## 2017-06-13 RX ORDER — SPIRONOLACTONE 50 MG/1
50 TABLET, FILM COATED ORAL 2 TIMES DAILY
COMMUNITY
End: 2018-01-29 | Stop reason: SDUPTHER

## 2017-06-19 ENCOUNTER — TELEPHONE (OUTPATIENT)
Dept: INTERNAL MEDICINE | Age: 67
End: 2017-06-19

## 2017-06-21 ENCOUNTER — HOSPITAL ENCOUNTER (OUTPATIENT)
Age: 67
Setting detail: SPECIMEN
Discharge: HOME | End: 2017-06-21
Payer: MEDICARE

## 2017-06-21 ENCOUNTER — OFFICE VISIT (OUTPATIENT)
Dept: OBGYN | Age: 67
End: 2017-06-21
Payer: MEDICARE

## 2017-06-21 ENCOUNTER — OFFICE VISIT (OUTPATIENT)
Dept: PODIATRY | Age: 67
End: 2017-06-21
Payer: MEDICARE

## 2017-06-21 VITALS
BODY MASS INDEX: 45.24 KG/M2 | DIASTOLIC BLOOD PRESSURE: 76 MMHG | WEIGHT: 265 LBS | SYSTOLIC BLOOD PRESSURE: 120 MMHG | HEART RATE: 66 BPM | HEIGHT: 64 IN

## 2017-06-21 VITALS
WEIGHT: 263 LBS | BODY MASS INDEX: 44.9 KG/M2 | DIASTOLIC BLOOD PRESSURE: 76 MMHG | HEART RATE: 66 BPM | HEIGHT: 64 IN | SYSTOLIC BLOOD PRESSURE: 120 MMHG

## 2017-06-21 DIAGNOSIS — Z85.41 HISTORY OF CERVICAL CANCER: Primary | ICD-10-CM

## 2017-06-21 DIAGNOSIS — Z12.72 ENCOUNTER FOR SCREENING FOR MALIGNANT NEOPLASM OF VAGINA: ICD-10-CM

## 2017-06-21 DIAGNOSIS — E11.49 DM TYPE 2 CAUSING NEUROLOGICAL DISEASE (HCC): Primary | ICD-10-CM

## 2017-06-21 DIAGNOSIS — B35.1 DERMATOPHYTOSIS OF NAIL: ICD-10-CM

## 2017-06-21 DIAGNOSIS — Z92.89 PERSONAL HISTORY OF OTHER MEDICAL TREATMENT: ICD-10-CM

## 2017-06-21 DIAGNOSIS — N95.0 POSTMENOPAUSAL VAGINAL BLEEDING: ICD-10-CM

## 2017-06-21 PROCEDURE — 1036F TOBACCO NON-USER: CPT | Performed by: PODIATRIST

## 2017-06-21 PROCEDURE — 11720 DEBRIDE NAIL 1-5: CPT | Performed by: PODIATRIST

## 2017-06-21 PROCEDURE — 3017F COLORECTAL CA SCREEN DOC REV: CPT | Performed by: OBSTETRICS & GYNECOLOGY

## 2017-06-21 PROCEDURE — 4040F PNEUMOC VAC/ADMIN/RCVD: CPT | Performed by: OBSTETRICS & GYNECOLOGY

## 2017-06-21 PROCEDURE — 99213 OFFICE O/P EST LOW 20 MIN: CPT | Performed by: OBSTETRICS & GYNECOLOGY

## 2017-06-21 PROCEDURE — G8599 NO ASA/ANTIPLAT THER USE RNG: HCPCS | Performed by: OBSTETRICS & GYNECOLOGY

## 2017-06-21 PROCEDURE — 1123F ACP DISCUSS/DSCN MKR DOCD: CPT | Performed by: OBSTETRICS & GYNECOLOGY

## 2017-06-21 PROCEDURE — G8400 PT W/DXA NO RESULTS DOC: HCPCS | Performed by: OBSTETRICS & GYNECOLOGY

## 2017-06-21 PROCEDURE — 1090F PRES/ABSN URINE INCON ASSESS: CPT | Performed by: OBSTETRICS & GYNECOLOGY

## 2017-06-21 PROCEDURE — 1036F TOBACCO NON-USER: CPT | Performed by: OBSTETRICS & GYNECOLOGY

## 2017-06-21 PROCEDURE — G8427 DOCREV CUR MEDS BY ELIG CLIN: HCPCS | Performed by: OBSTETRICS & GYNECOLOGY

## 2017-06-21 PROCEDURE — 3014F SCREEN MAMMO DOC REV: CPT | Performed by: OBSTETRICS & GYNECOLOGY

## 2017-06-21 PROCEDURE — G8417 CALC BMI ABV UP PARAM F/U: HCPCS | Performed by: OBSTETRICS & GYNECOLOGY

## 2017-06-22 LAB — CYTOLOGY REPORT: NORMAL

## 2017-07-17 DIAGNOSIS — Z79.4 TYPE 2 DIABETES MELLITUS WITH DIABETIC POLYNEUROPATHY, WITH LONG-TERM CURRENT USE OF INSULIN (HCC): ICD-10-CM

## 2017-07-17 DIAGNOSIS — I25.10 ATHEROSCLEROSIS OF NATIVE CORONARY ARTERY OF NATIVE HEART WITHOUT ANGINA PECTORIS: ICD-10-CM

## 2017-07-17 DIAGNOSIS — E11.42 TYPE 2 DIABETES MELLITUS WITH DIABETIC POLYNEUROPATHY, WITH LONG-TERM CURRENT USE OF INSULIN (HCC): ICD-10-CM

## 2017-07-17 DIAGNOSIS — R60.0 BILATERAL LEG EDEMA: ICD-10-CM

## 2017-07-17 DIAGNOSIS — I25.83 CORONARY ARTERY DISEASE DUE TO LIPID RICH PLAQUE: ICD-10-CM

## 2017-07-17 DIAGNOSIS — K74.60 HEPATIC CIRRHOSIS, UNSPECIFIED HEPATIC CIRRHOSIS TYPE (HCC): Primary | ICD-10-CM

## 2017-07-17 DIAGNOSIS — G62.9 PERIPHERAL POLYNEUROPATHY: ICD-10-CM

## 2017-07-17 DIAGNOSIS — I25.10 CORONARY ARTERY DISEASE DUE TO LIPID RICH PLAQUE: ICD-10-CM

## 2017-07-17 PROCEDURE — G0179 MD RECERTIFICATION HHA PT: HCPCS | Performed by: INTERNAL MEDICINE

## 2017-07-19 DIAGNOSIS — J01.00 ACUTE NON-RECURRENT MAXILLARY SINUSITIS: ICD-10-CM

## 2017-07-19 RX ORDER — PEN NEEDLE, DIABETIC 31 GX5/16"
NEEDLE, DISPOSABLE MISCELLANEOUS
Qty: 180 EACH | Refills: 3 | Status: SHIPPED | OUTPATIENT
Start: 2017-07-19 | End: 2018-09-12 | Stop reason: SDUPTHER

## 2017-07-19 RX ORDER — PANTOPRAZOLE SODIUM 40 MG/1
TABLET, DELAYED RELEASE ORAL
Qty: 90 TABLET | Refills: 3 | Status: SHIPPED | OUTPATIENT
Start: 2017-07-19 | End: 2018-09-12 | Stop reason: SDUPTHER

## 2017-07-19 RX ORDER — FLUTICASONE PROPIONATE 50 MCG
SPRAY, SUSPENSION (ML) NASAL
Qty: 48 G | Refills: 3 | Status: SHIPPED | OUTPATIENT
Start: 2017-07-19 | End: 2018-09-12 | Stop reason: SDUPTHER

## 2017-07-19 RX ORDER — LISINOPRIL 20 MG/1
TABLET ORAL
Qty: 90 TABLET | Refills: 3 | Status: SHIPPED | OUTPATIENT
Start: 2017-07-19 | End: 2017-07-24 | Stop reason: DRUGHIGH

## 2017-07-19 RX ORDER — FUROSEMIDE 40 MG/1
TABLET ORAL
Qty: 180 TABLET | Refills: 3 | Status: SHIPPED | OUTPATIENT
Start: 2017-07-19 | End: 2019-01-28

## 2017-07-19 RX ORDER — INSULIN ASPART 100 [IU]/ML
INJECTION, SOLUTION INTRAVENOUS; SUBCUTANEOUS
Qty: 15 ML | Refills: 3 | Status: SHIPPED | OUTPATIENT
Start: 2017-07-19 | End: 2018-09-12 | Stop reason: SDUPTHER

## 2017-07-19 RX ORDER — BUPROPION HYDROCHLORIDE 75 MG/1
TABLET ORAL
Qty: 180 TABLET | Refills: 3 | Status: SHIPPED | OUTPATIENT
Start: 2017-07-19 | End: 2018-05-30 | Stop reason: SDUPTHER

## 2017-07-19 RX ORDER — GABAPENTIN 300 MG/1
CAPSULE ORAL
Qty: 360 CAPSULE | Refills: 2 | Status: SHIPPED | OUTPATIENT
Start: 2017-07-19 | End: 2018-09-12 | Stop reason: SDUPTHER

## 2017-07-24 ENCOUNTER — OFFICE VISIT (OUTPATIENT)
Dept: NEUROLOGY | Age: 67
End: 2017-07-24
Payer: MEDICARE

## 2017-07-24 ENCOUNTER — HOSPITAL ENCOUNTER (OUTPATIENT)
Age: 67
Setting detail: SPECIMEN
Discharge: HOME | End: 2017-07-24
Payer: MEDICARE

## 2017-07-24 VITALS
SYSTOLIC BLOOD PRESSURE: 130 MMHG | WEIGHT: 260.4 LBS | HEART RATE: 59 BPM | BODY MASS INDEX: 44.46 KG/M2 | HEIGHT: 64 IN | DIASTOLIC BLOOD PRESSURE: 74 MMHG

## 2017-07-24 DIAGNOSIS — G45.9 TRANSIENT CEREBRAL ISCHEMIA, UNSPECIFIED TYPE: ICD-10-CM

## 2017-07-24 DIAGNOSIS — G44.211 INTRACTABLE EPISODIC TENSION-TYPE HEADACHE: ICD-10-CM

## 2017-07-24 DIAGNOSIS — F32.A ANXIETY AND DEPRESSION: ICD-10-CM

## 2017-07-24 DIAGNOSIS — E11.42 TYPE 2 DIABETES MELLITUS WITH DIABETIC POLYNEUROPATHY, WITH LONG-TERM CURRENT USE OF INSULIN (HCC): ICD-10-CM

## 2017-07-24 DIAGNOSIS — R26.9 GAIT ABNORMALITY: ICD-10-CM

## 2017-07-24 DIAGNOSIS — R42 DIZZINESS: Primary | ICD-10-CM

## 2017-07-24 DIAGNOSIS — R26.89 BALANCE PROBLEM: ICD-10-CM

## 2017-07-24 DIAGNOSIS — I25.10 CORONARY ARTERY DISEASE DUE TO LIPID RICH PLAQUE: ICD-10-CM

## 2017-07-24 DIAGNOSIS — G47.33 OBSTRUCTIVE SLEEP APNEA: ICD-10-CM

## 2017-07-24 DIAGNOSIS — R41.3 MEMORY PROBLEM: ICD-10-CM

## 2017-07-24 DIAGNOSIS — I63.6 CEREBRAL INFARCTION DUE TO CEREBRAL VENOUS THROMBOSIS, NONPYOGENIC (HCC): ICD-10-CM

## 2017-07-24 DIAGNOSIS — G25.0 ESSENTIAL TREMOR: ICD-10-CM

## 2017-07-24 DIAGNOSIS — F41.9 ANXIETY AND DEPRESSION: ICD-10-CM

## 2017-07-24 DIAGNOSIS — I67.82 CHRONIC CEREBRAL ISCHEMIA: ICD-10-CM

## 2017-07-24 DIAGNOSIS — Z79.4 TYPE 2 DIABETES MELLITUS WITH DIABETIC POLYNEUROPATHY, WITH LONG-TERM CURRENT USE OF INSULIN (HCC): ICD-10-CM

## 2017-07-24 DIAGNOSIS — I25.83 CORONARY ARTERY DISEASE DUE TO LIPID RICH PLAQUE: ICD-10-CM

## 2017-07-24 DIAGNOSIS — G62.9 PERIPHERAL POLYNEUROPATHY: ICD-10-CM

## 2017-07-24 DIAGNOSIS — M79.10 MUSCLE ACHE: ICD-10-CM

## 2017-07-24 DIAGNOSIS — R60.0 BILATERAL LEG EDEMA: ICD-10-CM

## 2017-07-24 DIAGNOSIS — Z91.81 H/O FALLING: ICD-10-CM

## 2017-07-24 DIAGNOSIS — R53.82 CHRONIC FATIGUE: ICD-10-CM

## 2017-07-24 DIAGNOSIS — Z86.69 HX OF BELL'S PALSY: ICD-10-CM

## 2017-07-24 DIAGNOSIS — R73.09 ELEVATED HEMOGLOBIN A1C: ICD-10-CM

## 2017-07-24 DIAGNOSIS — G45.0 VBI (VERTEBROBASILAR INSUFFICIENCY): ICD-10-CM

## 2017-07-24 DIAGNOSIS — E55.9 VITAMIN D DEFICIENCY: ICD-10-CM

## 2017-07-24 DIAGNOSIS — M25.551 RIGHT HIP PAIN: ICD-10-CM

## 2017-07-24 PROCEDURE — 1123F ACP DISCUSS/DSCN MKR DOCD: CPT | Performed by: PSYCHIATRY & NEUROLOGY

## 2017-07-24 PROCEDURE — G8598 ASA/ANTIPLAT THER USED: HCPCS | Performed by: PSYCHIATRY & NEUROLOGY

## 2017-07-24 PROCEDURE — G8427 DOCREV CUR MEDS BY ELIG CLIN: HCPCS | Performed by: PSYCHIATRY & NEUROLOGY

## 2017-07-24 PROCEDURE — 3014F SCREEN MAMMO DOC REV: CPT | Performed by: PSYCHIATRY & NEUROLOGY

## 2017-07-24 PROCEDURE — 4040F PNEUMOC VAC/ADMIN/RCVD: CPT | Performed by: PSYCHIATRY & NEUROLOGY

## 2017-07-24 PROCEDURE — 3046F HEMOGLOBIN A1C LEVEL >9.0%: CPT | Performed by: PSYCHIATRY & NEUROLOGY

## 2017-07-24 PROCEDURE — 99205 OFFICE O/P NEW HI 60 MIN: CPT | Performed by: PSYCHIATRY & NEUROLOGY

## 2017-07-24 PROCEDURE — G8417 CALC BMI ABV UP PARAM F/U: HCPCS | Performed by: PSYCHIATRY & NEUROLOGY

## 2017-07-24 PROCEDURE — 1036F TOBACCO NON-USER: CPT | Performed by: PSYCHIATRY & NEUROLOGY

## 2017-07-24 PROCEDURE — 3017F COLORECTAL CA SCREEN DOC REV: CPT | Performed by: PSYCHIATRY & NEUROLOGY

## 2017-07-24 PROCEDURE — G8400 PT W/DXA NO RESULTS DOC: HCPCS | Performed by: PSYCHIATRY & NEUROLOGY

## 2017-07-24 PROCEDURE — 1090F PRES/ABSN URINE INCON ASSESS: CPT | Performed by: PSYCHIATRY & NEUROLOGY

## 2017-07-24 RX ORDER — CLOPIDOGREL BISULFATE 75 MG/1
75 TABLET ORAL DAILY
Qty: 30 TABLET | Refills: 2 | Status: SHIPPED | OUTPATIENT
Start: 2017-07-24 | End: 2017-10-13 | Stop reason: SDUPTHER

## 2017-07-24 ASSESSMENT — ENCOUNTER SYMPTOMS
FACIAL SWEATING: 0
SINUS PRESSURE: 1
VOICE CHANGE: 0
ABDOMINAL DISTENTION: 0
COLOR CHANGE: 0
NAUSEA: 0
SWOLLEN GLANDS: 0
BLURRED VISION: 0
RHINORRHEA: 0
CONSTIPATION: 0
CHEST TIGHTNESS: 0
EYE PAIN: 0
BACK PAIN: 1
ABDOMINAL PAIN: 0
EYE REDNESS: 0
EYE WATERING: 0
VISUAL CHANGE: 0
SHORTNESS OF BREATH: 1
APNEA: 0
SORE THROAT: 0
BOWEL INCONTINENCE: 0
DIARRHEA: 0
VOMITING: 0
EYE DISCHARGE: 0
FACIAL SWELLING: 0
EYE ITCHING: 0
BLOOD IN STOOL: 0
TROUBLE SWALLOWING: 0
WHEEZING: 0
COUGH: 0
CHOKING: 0
PHOTOPHOBIA: 1
SCALP TENDERNESS: 0

## 2017-07-25 ENCOUNTER — TELEPHONE (OUTPATIENT)
Dept: GENERAL RADIOLOGY | Age: 67
End: 2017-07-25

## 2017-07-25 DIAGNOSIS — R41.3 MEMORY PROBLEM: ICD-10-CM

## 2017-07-25 DIAGNOSIS — R42 DIZZINESS: ICD-10-CM

## 2017-07-25 DIAGNOSIS — I25.10 CORONARY ARTERY DISEASE DUE TO LIPID RICH PLAQUE: Primary | ICD-10-CM

## 2017-07-25 DIAGNOSIS — Z91.81 H/O FALLING: ICD-10-CM

## 2017-07-25 DIAGNOSIS — R26.89 BALANCE PROBLEM: ICD-10-CM

## 2017-07-25 DIAGNOSIS — G44.211 INTRACTABLE EPISODIC TENSION-TYPE HEADACHE: ICD-10-CM

## 2017-07-25 DIAGNOSIS — R26.9 GAIT ABNORMALITY: ICD-10-CM

## 2017-07-25 DIAGNOSIS — G45.0 VBI (VERTEBROBASILAR INSUFFICIENCY): ICD-10-CM

## 2017-07-25 DIAGNOSIS — Z86.69 HX OF BELL'S PALSY: ICD-10-CM

## 2017-07-25 DIAGNOSIS — G25.0 ESSENTIAL TREMOR: ICD-10-CM

## 2017-07-25 DIAGNOSIS — G45.9 TRANSIENT CEREBRAL ISCHEMIA, UNSPECIFIED TYPE: ICD-10-CM

## 2017-07-25 DIAGNOSIS — I25.83 CORONARY ARTERY DISEASE DUE TO LIPID RICH PLAQUE: Primary | ICD-10-CM

## 2017-07-26 ENCOUNTER — OFFICE VISIT (OUTPATIENT)
Dept: PRIMARY CARE CLINIC | Age: 67
End: 2017-07-26
Payer: MEDICARE

## 2017-07-26 VITALS
WEIGHT: 257 LBS | RESPIRATION RATE: 16 BRPM | BODY MASS INDEX: 43.87 KG/M2 | DIASTOLIC BLOOD PRESSURE: 78 MMHG | HEART RATE: 50 BPM | TEMPERATURE: 98 F | HEIGHT: 64 IN | OXYGEN SATURATION: 97 % | SYSTOLIC BLOOD PRESSURE: 130 MMHG

## 2017-07-26 DIAGNOSIS — R51.9 CHRONIC NONINTRACTABLE HEADACHE, UNSPECIFIED HEADACHE TYPE: ICD-10-CM

## 2017-07-26 DIAGNOSIS — G89.29 CHRONIC NONINTRACTABLE HEADACHE, UNSPECIFIED HEADACHE TYPE: ICD-10-CM

## 2017-07-26 DIAGNOSIS — L60.0 INGROWN LEFT BIG TOENAIL: ICD-10-CM

## 2017-07-26 DIAGNOSIS — L03.032 PARONYCHIA OF GREAT TOE OF LEFT FOOT: Primary | ICD-10-CM

## 2017-07-26 LAB
FOLATE: >20 NG/ML
VITAMIN B-12: >2000 PG/ML (ref 211–946)

## 2017-07-26 PROCEDURE — 99214 OFFICE O/P EST MOD 30 MIN: CPT | Performed by: FAMILY MEDICINE

## 2017-07-26 PROCEDURE — 1036F TOBACCO NON-USER: CPT | Performed by: FAMILY MEDICINE

## 2017-07-26 PROCEDURE — G8417 CALC BMI ABV UP PARAM F/U: HCPCS | Performed by: FAMILY MEDICINE

## 2017-07-26 PROCEDURE — G8598 ASA/ANTIPLAT THER USED: HCPCS | Performed by: FAMILY MEDICINE

## 2017-07-26 PROCEDURE — 1123F ACP DISCUSS/DSCN MKR DOCD: CPT | Performed by: FAMILY MEDICINE

## 2017-07-26 PROCEDURE — G8400 PT W/DXA NO RESULTS DOC: HCPCS | Performed by: FAMILY MEDICINE

## 2017-07-26 PROCEDURE — 4040F PNEUMOC VAC/ADMIN/RCVD: CPT | Performed by: FAMILY MEDICINE

## 2017-07-26 PROCEDURE — 1090F PRES/ABSN URINE INCON ASSESS: CPT | Performed by: FAMILY MEDICINE

## 2017-07-26 PROCEDURE — 3014F SCREEN MAMMO DOC REV: CPT | Performed by: FAMILY MEDICINE

## 2017-07-26 PROCEDURE — 3017F COLORECTAL CA SCREEN DOC REV: CPT | Performed by: FAMILY MEDICINE

## 2017-07-26 PROCEDURE — G8427 DOCREV CUR MEDS BY ELIG CLIN: HCPCS | Performed by: FAMILY MEDICINE

## 2017-07-26 RX ORDER — CEPHALEXIN 500 MG/1
500 CAPSULE ORAL 3 TIMES DAILY
Qty: 21 CAPSULE | Refills: 0 | Status: SHIPPED | OUTPATIENT
Start: 2017-07-26 | End: 2017-09-14 | Stop reason: ALTCHOICE

## 2017-07-26 RX ORDER — AMITRIPTYLINE HYDROCHLORIDE 25 MG/1
25 TABLET, FILM COATED ORAL NIGHTLY
Qty: 30 TABLET | Refills: 2 | Status: SHIPPED | OUTPATIENT
Start: 2017-07-26 | End: 2017-10-25 | Stop reason: SDUPTHER

## 2017-07-26 ASSESSMENT — ENCOUNTER SYMPTOMS
RESPIRATORY NEGATIVE: 1
VISUAL CHANGE: 0
EYES NEGATIVE: 1
NAUSEA: 0
COLOR CHANGE: 1
GASTROINTESTINAL NEGATIVE: 1
VOMITING: 0
DIARRHEA: 0

## 2017-08-08 RX ORDER — POTASSIUM CHLORIDE 750 MG/1
CAPSULE, EXTENDED RELEASE ORAL
Qty: 180 CAPSULE | Refills: 3 | Status: SHIPPED | OUTPATIENT
Start: 2017-08-08 | End: 2018-11-15 | Stop reason: SDUPTHER

## 2017-08-08 RX ORDER — CITALOPRAM 10 MG/1
TABLET ORAL
Qty: 90 TABLET | Refills: 3 | Status: SHIPPED | OUTPATIENT
Start: 2017-08-08 | End: 2018-09-12 | Stop reason: SDUPTHER

## 2017-08-16 ENCOUNTER — TELEPHONE (OUTPATIENT)
Dept: INTERNAL MEDICINE | Age: 67
End: 2017-08-16

## 2017-08-16 RX ORDER — POTASSIUM CHLORIDE 750 MG/1
CAPSULE, EXTENDED RELEASE ORAL
Qty: 180 CAPSULE | Refills: 3 | Status: CANCELLED | OUTPATIENT
Start: 2017-08-16

## 2017-08-17 ENCOUNTER — HOSPITAL ENCOUNTER (OUTPATIENT)
Dept: NEUROLOGY | Age: 67
Discharge: HOME | End: 2017-08-17
Payer: MEDICARE

## 2017-08-17 DIAGNOSIS — R41.3 MEMORY PROBLEM: ICD-10-CM

## 2017-08-17 DIAGNOSIS — Z91.81 H/O FALLING: ICD-10-CM

## 2017-08-17 DIAGNOSIS — G25.0 ESSENTIAL TREMOR: ICD-10-CM

## 2017-08-17 DIAGNOSIS — R26.9 GAIT ABNORMALITY: ICD-10-CM

## 2017-08-17 DIAGNOSIS — I63.6 CEREBRAL INFARCTION DUE TO CEREBRAL VENOUS THROMBOSIS, NONPYOGENIC (HCC): ICD-10-CM

## 2017-08-17 DIAGNOSIS — I25.10 CORONARY ARTERY DISEASE DUE TO LIPID RICH PLAQUE: ICD-10-CM

## 2017-08-17 DIAGNOSIS — Z86.69 HX OF BELL'S PALSY: ICD-10-CM

## 2017-08-17 DIAGNOSIS — R73.09 ELEVATED HEMOGLOBIN A1C: ICD-10-CM

## 2017-08-17 DIAGNOSIS — G62.9 PERIPHERAL POLYNEUROPATHY: ICD-10-CM

## 2017-08-17 DIAGNOSIS — R53.82 CHRONIC FATIGUE: ICD-10-CM

## 2017-08-17 DIAGNOSIS — M25.551 RIGHT HIP PAIN: ICD-10-CM

## 2017-08-17 DIAGNOSIS — E11.42 TYPE 2 DIABETES MELLITUS WITH DIABETIC POLYNEUROPATHY, WITH LONG-TERM CURRENT USE OF INSULIN (HCC): ICD-10-CM

## 2017-08-17 DIAGNOSIS — R42 DIZZINESS: ICD-10-CM

## 2017-08-17 DIAGNOSIS — I25.83 CORONARY ARTERY DISEASE DUE TO LIPID RICH PLAQUE: ICD-10-CM

## 2017-08-17 DIAGNOSIS — G47.33 OBSTRUCTIVE SLEEP APNEA: ICD-10-CM

## 2017-08-17 DIAGNOSIS — Z79.4 TYPE 2 DIABETES MELLITUS WITH DIABETIC POLYNEUROPATHY, WITH LONG-TERM CURRENT USE OF INSULIN (HCC): ICD-10-CM

## 2017-08-17 DIAGNOSIS — M79.10 MUSCLE ACHE: ICD-10-CM

## 2017-08-17 DIAGNOSIS — F41.9 ANXIETY AND DEPRESSION: ICD-10-CM

## 2017-08-17 DIAGNOSIS — G44.211 INTRACTABLE EPISODIC TENSION-TYPE HEADACHE: ICD-10-CM

## 2017-08-17 DIAGNOSIS — E55.9 VITAMIN D DEFICIENCY: ICD-10-CM

## 2017-08-17 DIAGNOSIS — F32.A ANXIETY AND DEPRESSION: ICD-10-CM

## 2017-08-17 DIAGNOSIS — R26.89 BALANCE PROBLEM: ICD-10-CM

## 2017-08-17 DIAGNOSIS — R60.0 BILATERAL LEG EDEMA: ICD-10-CM

## 2017-08-17 PROCEDURE — 95813 EEG EXTND MNTR 61-119 MIN: CPT

## 2017-08-17 PROCEDURE — 93886 INTRACRANIAL COMPLETE STUDY: CPT

## 2017-08-17 RX ORDER — AMLODIPINE BESYLATE 5 MG/1
5 TABLET ORAL DAILY
Qty: 90 TABLET | Refills: 3 | Status: SHIPPED | OUTPATIENT
Start: 2017-08-17 | End: 2018-09-12 | Stop reason: SDUPTHER

## 2017-08-17 RX ORDER — LISINOPRIL 10 MG/1
10 TABLET ORAL DAILY
Qty: 30 TABLET | Refills: 11 | Status: SHIPPED | OUTPATIENT
Start: 2017-08-17 | End: 2018-09-12 | Stop reason: SDUPTHER

## 2017-08-29 ENCOUNTER — TELEPHONE (OUTPATIENT)
Dept: INTERNAL MEDICINE | Age: 67
End: 2017-08-29

## 2017-08-29 RX ORDER — TRIAMCINOLONE ACETONIDE 1 MG/G
CREAM TOPICAL
Qty: 80 G | Refills: 5 | Status: SHIPPED | OUTPATIENT
Start: 2017-08-29 | End: 2018-09-18 | Stop reason: SDUPTHER

## 2017-08-30 ENCOUNTER — PROCEDURE VISIT (OUTPATIENT)
Dept: PODIATRY | Age: 67
End: 2017-08-30
Payer: MEDICARE

## 2017-08-30 VITALS
DIASTOLIC BLOOD PRESSURE: 70 MMHG | WEIGHT: 260.4 LBS | SYSTOLIC BLOOD PRESSURE: 128 MMHG | HEART RATE: 68 BPM | BODY MASS INDEX: 44.46 KG/M2 | HEIGHT: 64 IN

## 2017-08-30 DIAGNOSIS — B35.1 DERMATOPHYTOSIS OF NAIL: ICD-10-CM

## 2017-08-30 DIAGNOSIS — Z79.4 TYPE 2 DIABETES MELLITUS WITH DIABETIC POLYNEUROPATHY, WITH LONG-TERM CURRENT USE OF INSULIN (HCC): Primary | ICD-10-CM

## 2017-08-30 DIAGNOSIS — E11.42 TYPE 2 DIABETES MELLITUS WITH DIABETIC POLYNEUROPATHY, WITH LONG-TERM CURRENT USE OF INSULIN (HCC): Primary | ICD-10-CM

## 2017-08-30 PROCEDURE — 11720 DEBRIDE NAIL 1-5: CPT | Performed by: PODIATRIST

## 2017-08-30 PROCEDURE — 1036F TOBACCO NON-USER: CPT | Performed by: PODIATRIST

## 2017-09-13 ENCOUNTER — TELEPHONE (OUTPATIENT)
Dept: INTERNAL MEDICINE | Age: 67
End: 2017-09-13

## 2017-09-13 RX ORDER — SYRING-NEEDL,DISP,INSUL,0.3 ML 30 GX5/16"
SYRINGE, EMPTY DISPOSABLE MISCELLANEOUS
Qty: 1 DEVICE | Refills: 0 | Status: SHIPPED | OUTPATIENT
Start: 2017-09-13 | End: 2017-10-13 | Stop reason: SDUPTHER

## 2017-09-14 DIAGNOSIS — K74.60 HEPATIC CIRRHOSIS, UNSPECIFIED HEPATIC CIRRHOSIS TYPE (HCC): Primary | ICD-10-CM

## 2017-09-14 DIAGNOSIS — E11.42 TYPE 2 DIABETES MELLITUS WITH DIABETIC POLYNEUROPATHY, WITH LONG-TERM CURRENT USE OF INSULIN (HCC): ICD-10-CM

## 2017-09-14 DIAGNOSIS — I25.10 ATHEROSCLEROSIS OF NATIVE CORONARY ARTERY OF NATIVE HEART WITHOUT ANGINA PECTORIS: ICD-10-CM

## 2017-09-14 DIAGNOSIS — G62.9 PERIPHERAL POLYNEUROPATHY: ICD-10-CM

## 2017-09-14 DIAGNOSIS — F41.9 ANXIETY AND DEPRESSION: ICD-10-CM

## 2017-09-14 DIAGNOSIS — F32.A ANXIETY AND DEPRESSION: ICD-10-CM

## 2017-09-14 DIAGNOSIS — Z79.4 TYPE 2 DIABETES MELLITUS WITH DIABETIC POLYNEUROPATHY, WITH LONG-TERM CURRENT USE OF INSULIN (HCC): ICD-10-CM

## 2017-09-14 DIAGNOSIS — R60.0 BILATERAL LEG EDEMA: ICD-10-CM

## 2017-09-14 PROCEDURE — G0179 MD RECERTIFICATION HHA PT: HCPCS | Performed by: INTERNAL MEDICINE

## 2017-09-18 ENCOUNTER — TELEPHONE (OUTPATIENT)
Dept: INTERNAL MEDICINE | Age: 67
End: 2017-09-18

## 2017-09-18 ENCOUNTER — HOSPITAL ENCOUNTER (OUTPATIENT)
Dept: LAB | Age: 67
Setting detail: SPECIMEN
Discharge: HOME | End: 2017-09-18
Payer: MEDICARE

## 2017-09-18 DIAGNOSIS — Z79.4 TYPE 2 DIABETES MELLITUS WITH DIABETIC POLYNEUROPATHY, WITH LONG-TERM CURRENT USE OF INSULIN (HCC): Primary | ICD-10-CM

## 2017-09-18 DIAGNOSIS — E11.40 TYPE 2 DIABETES MELLITUS WITH DIABETIC NEUROPATHY, WITH LONG-TERM CURRENT USE OF INSULIN (HCC): ICD-10-CM

## 2017-09-18 DIAGNOSIS — I25.10 CORONARY ARTERY DISEASE DUE TO LIPID RICH PLAQUE: ICD-10-CM

## 2017-09-18 DIAGNOSIS — R53.82 CHRONIC FATIGUE: ICD-10-CM

## 2017-09-18 DIAGNOSIS — E55.9 VITAMIN D DEFICIENCY: ICD-10-CM

## 2017-09-18 DIAGNOSIS — I25.83 CORONARY ARTERY DISEASE DUE TO LIPID RICH PLAQUE: ICD-10-CM

## 2017-09-18 DIAGNOSIS — E11.42 TYPE 2 DIABETES MELLITUS WITH DIABETIC POLYNEUROPATHY, WITH LONG-TERM CURRENT USE OF INSULIN (HCC): Primary | ICD-10-CM

## 2017-09-18 DIAGNOSIS — R60.0 BILATERAL LEG EDEMA: ICD-10-CM

## 2017-09-18 DIAGNOSIS — Z79.4 TYPE 2 DIABETES MELLITUS WITH DIABETIC NEUROPATHY, WITH LONG-TERM CURRENT USE OF INSULIN (HCC): ICD-10-CM

## 2017-09-18 DIAGNOSIS — D64.9 ANEMIA, UNSPECIFIED TYPE: ICD-10-CM

## 2017-09-18 LAB
ABSOLUTE EOS #: 0.2 K/UL (ref 0–0.4)
ABSOLUTE LYMPH #: 1.2 K/UL (ref 1–4.8)
ABSOLUTE MONO #: 0.8 K/UL (ref 0.1–1.2)
ALBUMIN SERPL-MCNC: 4.1 G/DL (ref 3.5–5.2)
ALBUMIN/GLOBULIN RATIO: 1.2 (ref 1–2.5)
ALP BLD-CCNC: 77 U/L (ref 35–104)
ALT SERPL-CCNC: 33 U/L (ref 5–33)
ANION GAP SERPL CALCULATED.3IONS-SCNC: 13 MMOL/L (ref 9–17)
AST SERPL-CCNC: 37 U/L
BASOPHILS # BLD: 1 % (ref 0–1)
BASOPHILS ABSOLUTE: 0.1 K/UL (ref 0–0.2)
BILIRUB SERPL-MCNC: 0.7 MG/DL (ref 0.3–1.2)
BUN BLDV-MCNC: 25 MG/DL (ref 8–23)
BUN/CREAT BLD: 28 (ref 9–20)
CALCIUM SERPL-MCNC: 9.5 MG/DL (ref 8.6–10.4)
CHLORIDE BLD-SCNC: 98 MMOL/L (ref 98–107)
CHOLESTEROL/HDL RATIO: 2.7
CHOLESTEROL: 162 MG/DL
CO2: 26 MMOL/L (ref 20–31)
CREAT SERPL-MCNC: 0.9 MG/DL (ref 0.5–0.9)
DIFFERENTIAL TYPE: ABNORMAL
EOSINOPHILS RELATIVE PERCENT: 3 % (ref 1–7)
ESTIMATED AVERAGE GLUCOSE: 123 MG/DL
GFR AFRICAN AMERICAN: >60 ML/MIN
GFR NON-AFRICAN AMERICAN: >60 ML/MIN
GFR SERPL CREATININE-BSD FRML MDRD: ABNORMAL ML/MIN/{1.73_M2}
GFR SERPL CREATININE-BSD FRML MDRD: ABNORMAL ML/MIN/{1.73_M2}
GLUCOSE BLD-MCNC: 138 MG/DL (ref 70–99)
HBA1C MFR BLD: 5.9 % (ref 4.8–5.9)
HCT VFR BLD CALC: 37.5 % (ref 36–46)
HDLC SERPL-MCNC: 59 MG/DL
HEMOGLOBIN: 12.1 G/DL (ref 12–16)
IRON SATURATION: 38 % (ref 20–55)
IRON: 115 UG/DL (ref 37–145)
LDL CHOLESTEROL: 80 MG/DL (ref 0–130)
LYMPHOCYTES # BLD: 17 % (ref 16–46)
MCH RBC QN AUTO: 30.3 PG (ref 26–34)
MCHC RBC AUTO-ENTMCNC: 32.3 G/DL (ref 31–37)
MCV RBC AUTO: 93.8 FL (ref 80–100)
MONOCYTES # BLD: 11 % (ref 4–11)
PDW BLD-RTO: 14.2 % (ref 11–14.5)
PLATELET # BLD: 153 K/UL (ref 140–450)
PLATELET ESTIMATE: ABNORMAL
PMV BLD AUTO: 8.7 FL (ref 6–12)
POTASSIUM SERPL-SCNC: 4.4 MMOL/L (ref 3.7–5.3)
RBC # BLD: 3.99 M/UL (ref 4–5.2)
RBC # BLD: ABNORMAL 10*6/UL
SEG NEUTROPHILS: 68 % (ref 43–77)
SEGMENTED NEUTROPHILS ABSOLUTE COUNT: 5 K/UL (ref 1.8–7.7)
SODIUM BLD-SCNC: 137 MMOL/L (ref 135–144)
THYROXINE, FREE: 1.14 NG/DL (ref 0.93–1.7)
TOTAL IRON BINDING CAPACITY: 305 UG/DL (ref 250–450)
TOTAL PROTEIN: 7.5 G/DL (ref 6.4–8.3)
TRIGL SERPL-MCNC: 115 MG/DL
TSH SERPL DL<=0.05 MIU/L-ACNC: 2.52 MIU/L (ref 0.3–5)
UNSATURATED IRON BINDING CAPACITY: 190 UG/DL (ref 112–347)
VITAMIN B-12: >2000 PG/ML (ref 211–946)
VITAMIN D 25-HYDROXY: 27.6 NG/ML (ref 30–100)
VLDLC SERPL CALC-MCNC: NORMAL MG/DL (ref 1–30)
WBC # BLD: 7.3 K/UL (ref 3.5–11)
WBC # BLD: ABNORMAL 10*3/UL

## 2017-09-18 PROCEDURE — 82607 VITAMIN B-12: CPT

## 2017-09-18 PROCEDURE — 83550 IRON BINDING TEST: CPT

## 2017-09-18 PROCEDURE — 80053 COMPREHEN METABOLIC PANEL: CPT

## 2017-09-18 PROCEDURE — 83036 HEMOGLOBIN GLYCOSYLATED A1C: CPT

## 2017-09-18 PROCEDURE — 84439 ASSAY OF FREE THYROXINE: CPT

## 2017-09-18 PROCEDURE — 84443 ASSAY THYROID STIM HORMONE: CPT

## 2017-09-18 PROCEDURE — 83540 ASSAY OF IRON: CPT

## 2017-09-18 PROCEDURE — 36415 COLL VENOUS BLD VENIPUNCTURE: CPT

## 2017-09-18 PROCEDURE — 80061 LIPID PANEL: CPT

## 2017-09-18 PROCEDURE — 85025 COMPLETE CBC W/AUTO DIFF WBC: CPT

## 2017-09-18 PROCEDURE — 82306 VITAMIN D 25 HYDROXY: CPT

## 2017-09-19 ENCOUNTER — TELEPHONE (OUTPATIENT)
Dept: INTERNAL MEDICINE | Age: 67
End: 2017-09-19

## 2017-09-19 DIAGNOSIS — E55.9 VITAMIN D DEFICIENCY: Primary | ICD-10-CM

## 2017-09-19 RX ORDER — GLUCOSAMINE HCL 500 MG
3000 TABLET ORAL 2 TIMES DAILY
Qty: 60 TABLET | Refills: 11 | Status: SHIPPED | OUTPATIENT
Start: 2017-09-19 | End: 2018-12-20 | Stop reason: SDUPTHER

## 2017-09-25 ENCOUNTER — OFFICE VISIT (OUTPATIENT)
Dept: NEUROLOGY | Age: 67
End: 2017-09-25
Payer: MEDICARE

## 2017-09-25 ENCOUNTER — HOSPITAL ENCOUNTER (OUTPATIENT)
Age: 67
Setting detail: SPECIMEN
Discharge: HOME | End: 2017-09-25
Payer: MEDICARE

## 2017-09-25 ENCOUNTER — OFFICE VISIT (OUTPATIENT)
Dept: INTERNAL MEDICINE | Age: 67
End: 2017-09-25
Payer: MEDICARE

## 2017-09-25 VITALS
HEART RATE: 56 BPM | WEIGHT: 259 LBS | DIASTOLIC BLOOD PRESSURE: 64 MMHG | HEIGHT: 64 IN | BODY MASS INDEX: 44.22 KG/M2 | SYSTOLIC BLOOD PRESSURE: 100 MMHG

## 2017-09-25 VITALS
WEIGHT: 259 LBS | SYSTOLIC BLOOD PRESSURE: 100 MMHG | BODY MASS INDEX: 44.22 KG/M2 | HEART RATE: 56 BPM | RESPIRATION RATE: 20 BRPM | HEIGHT: 64 IN | DIASTOLIC BLOOD PRESSURE: 64 MMHG

## 2017-09-25 DIAGNOSIS — I67.82 CHRONIC CEREBRAL ISCHEMIA: ICD-10-CM

## 2017-09-25 DIAGNOSIS — R53.82 CHRONIC FATIGUE: ICD-10-CM

## 2017-09-25 DIAGNOSIS — F41.9 ANXIETY AND DEPRESSION: ICD-10-CM

## 2017-09-25 DIAGNOSIS — R60.0 BILATERAL LEG EDEMA: ICD-10-CM

## 2017-09-25 DIAGNOSIS — R42 DIZZINESS: Primary | ICD-10-CM

## 2017-09-25 DIAGNOSIS — Z79.4 TYPE 2 DIABETES MELLITUS WITH DIABETIC POLYNEUROPATHY, WITH LONG-TERM CURRENT USE OF INSULIN (HCC): Primary | ICD-10-CM

## 2017-09-25 DIAGNOSIS — E11.42 TYPE 2 DIABETES MELLITUS WITH DIABETIC POLYNEUROPATHY, WITH LONG-TERM CURRENT USE OF INSULIN (HCC): Primary | ICD-10-CM

## 2017-09-25 DIAGNOSIS — E11.42 TYPE 2 DIABETES MELLITUS WITH DIABETIC POLYNEUROPATHY, WITH LONG-TERM CURRENT USE OF INSULIN (HCC): ICD-10-CM

## 2017-09-25 DIAGNOSIS — Z79.4 TYPE 2 DIABETES MELLITUS WITH DIABETIC POLYNEUROPATHY, WITH LONG-TERM CURRENT USE OF INSULIN (HCC): ICD-10-CM

## 2017-09-25 DIAGNOSIS — Z86.69 HX OF BELL'S PALSY: ICD-10-CM

## 2017-09-25 DIAGNOSIS — I25.10 CORONARY ARTERY DISEASE DUE TO LIPID RICH PLAQUE: ICD-10-CM

## 2017-09-25 DIAGNOSIS — R26.9 GAIT ABNORMALITY: ICD-10-CM

## 2017-09-25 DIAGNOSIS — F32.A ANXIETY AND DEPRESSION: ICD-10-CM

## 2017-09-25 DIAGNOSIS — I25.83 CORONARY ARTERY DISEASE DUE TO LIPID RICH PLAQUE: ICD-10-CM

## 2017-09-25 DIAGNOSIS — G44.211 INTRACTABLE EPISODIC TENSION-TYPE HEADACHE: ICD-10-CM

## 2017-09-25 DIAGNOSIS — G25.0 ESSENTIAL TREMOR: ICD-10-CM

## 2017-09-25 DIAGNOSIS — Z91.81 H/O FALLING: ICD-10-CM

## 2017-09-25 DIAGNOSIS — R73.09 ELEVATED HEMOGLOBIN A1C: ICD-10-CM

## 2017-09-25 DIAGNOSIS — G45.0 VBI (VERTEBROBASILAR INSUFFICIENCY): ICD-10-CM

## 2017-09-25 DIAGNOSIS — R41.3 MEMORY PROBLEM: ICD-10-CM

## 2017-09-25 DIAGNOSIS — R26.89 BALANCE PROBLEM: ICD-10-CM

## 2017-09-25 DIAGNOSIS — G62.9 PERIPHERAL POLYNEUROPATHY: ICD-10-CM

## 2017-09-25 DIAGNOSIS — L03.90 CELLULITIS, UNSPECIFIED CELLULITIS SITE: ICD-10-CM

## 2017-09-25 DIAGNOSIS — G47.33 OBSTRUCTIVE SLEEP APNEA: ICD-10-CM

## 2017-09-25 DIAGNOSIS — I63.6 CEREBRAL INFARCTION DUE TO CEREBRAL VENOUS THROMBOSIS, NONPYOGENIC (HCC): ICD-10-CM

## 2017-09-25 DIAGNOSIS — G45.9 TRANSIENT CEREBRAL ISCHEMIA, UNSPECIFIED TYPE: ICD-10-CM

## 2017-09-25 LAB
CREATININE URINE: 89.1 MG/DL (ref 28–217)
MICROALBUMIN/CREAT 24H UR: <12 MG/L
MICROALBUMIN/CREAT UR-RTO: NORMAL MCG/MG CREAT

## 2017-09-25 PROCEDURE — G8598 ASA/ANTIPLAT THER USED: HCPCS | Performed by: INTERNAL MEDICINE

## 2017-09-25 PROCEDURE — 99214 OFFICE O/P EST MOD 30 MIN: CPT | Performed by: PSYCHIATRY & NEUROLOGY

## 2017-09-25 PROCEDURE — 1123F ACP DISCUSS/DSCN MKR DOCD: CPT | Performed by: INTERNAL MEDICINE

## 2017-09-25 PROCEDURE — 3046F HEMOGLOBIN A1C LEVEL >9.0%: CPT | Performed by: PSYCHIATRY & NEUROLOGY

## 2017-09-25 PROCEDURE — 3014F SCREEN MAMMO DOC REV: CPT | Performed by: INTERNAL MEDICINE

## 2017-09-25 PROCEDURE — 1123F ACP DISCUSS/DSCN MKR DOCD: CPT | Performed by: PSYCHIATRY & NEUROLOGY

## 2017-09-25 PROCEDURE — G8417 CALC BMI ABV UP PARAM F/U: HCPCS | Performed by: INTERNAL MEDICINE

## 2017-09-25 PROCEDURE — 82570 ASSAY OF URINE CREATININE: CPT

## 2017-09-25 PROCEDURE — G8427 DOCREV CUR MEDS BY ELIG CLIN: HCPCS | Performed by: INTERNAL MEDICINE

## 2017-09-25 PROCEDURE — 3017F COLORECTAL CA SCREEN DOC REV: CPT | Performed by: PSYCHIATRY & NEUROLOGY

## 2017-09-25 PROCEDURE — 1036F TOBACCO NON-USER: CPT | Performed by: PSYCHIATRY & NEUROLOGY

## 2017-09-25 PROCEDURE — G8427 DOCREV CUR MEDS BY ELIG CLIN: HCPCS | Performed by: PSYCHIATRY & NEUROLOGY

## 2017-09-25 PROCEDURE — G8400 PT W/DXA NO RESULTS DOC: HCPCS | Performed by: INTERNAL MEDICINE

## 2017-09-25 PROCEDURE — 3014F SCREEN MAMMO DOC REV: CPT | Performed by: PSYCHIATRY & NEUROLOGY

## 2017-09-25 PROCEDURE — 1036F TOBACCO NON-USER: CPT | Performed by: INTERNAL MEDICINE

## 2017-09-25 PROCEDURE — 4040F PNEUMOC VAC/ADMIN/RCVD: CPT | Performed by: PSYCHIATRY & NEUROLOGY

## 2017-09-25 PROCEDURE — 82043 UR ALBUMIN QUANTITATIVE: CPT

## 2017-09-25 PROCEDURE — 3046F HEMOGLOBIN A1C LEVEL >9.0%: CPT | Performed by: INTERNAL MEDICINE

## 2017-09-25 PROCEDURE — 3017F COLORECTAL CA SCREEN DOC REV: CPT | Performed by: INTERNAL MEDICINE

## 2017-09-25 PROCEDURE — 1090F PRES/ABSN URINE INCON ASSESS: CPT | Performed by: PSYCHIATRY & NEUROLOGY

## 2017-09-25 PROCEDURE — 99214 OFFICE O/P EST MOD 30 MIN: CPT | Performed by: INTERNAL MEDICINE

## 2017-09-25 PROCEDURE — 1090F PRES/ABSN URINE INCON ASSESS: CPT | Performed by: INTERNAL MEDICINE

## 2017-09-25 PROCEDURE — G8598 ASA/ANTIPLAT THER USED: HCPCS | Performed by: PSYCHIATRY & NEUROLOGY

## 2017-09-25 PROCEDURE — G8417 CALC BMI ABV UP PARAM F/U: HCPCS | Performed by: PSYCHIATRY & NEUROLOGY

## 2017-09-25 PROCEDURE — 4040F PNEUMOC VAC/ADMIN/RCVD: CPT | Performed by: INTERNAL MEDICINE

## 2017-09-25 PROCEDURE — 96116 NUBHVL XM PHYS/QHP 1ST HR: CPT | Performed by: PSYCHIATRY & NEUROLOGY

## 2017-09-25 PROCEDURE — G8400 PT W/DXA NO RESULTS DOC: HCPCS | Performed by: PSYCHIATRY & NEUROLOGY

## 2017-09-25 RX ORDER — AMOXICILLIN AND CLAVULANATE POTASSIUM 875; 125 MG/1; MG/1
1 TABLET, FILM COATED ORAL 2 TIMES DAILY
Qty: 20 TABLET | Refills: 0 | Status: SHIPPED | OUTPATIENT
Start: 2017-09-25 | End: 2017-10-05

## 2017-09-25 RX ORDER — DONEPEZIL HYDROCHLORIDE 10 MG/1
TABLET, FILM COATED ORAL
Qty: 72 TABLET | Refills: 2 | OUTPATIENT
Start: 2017-09-25

## 2017-09-25 RX ORDER — DONEPEZIL HYDROCHLORIDE 10 MG/1
TABLET, FILM COATED ORAL
Qty: 30 TABLET | Refills: 2 | Status: SHIPPED | OUTPATIENT
Start: 2017-09-25 | End: 2017-12-21 | Stop reason: SINTOL

## 2017-09-25 ASSESSMENT — ENCOUNTER SYMPTOMS
VOMITING: 0
SHORTNESS OF BREATH: 0
SINUS PRESSURE: 1
TROUBLE SWALLOWING: 0
CHOKING: 0
ABDOMINAL DISTENTION: 0
BLURRED VISION: 0
VISUAL CHANGE: 0
NAUSEA: 0
ABDOMINAL PAIN: 0
BACK PAIN: 1
COLOR CHANGE: 0
SHORTNESS OF BREATH: 1
BLOOD IN STOOL: 0
CHEST TIGHTNESS: 0
COUGH: 0
EYE ITCHING: 0
COUGH: 0
EYE DISCHARGE: 0
CONSTIPATION: 0
EYE WATERING: 0
WHEEZING: 0
SCALP TENDERNESS: 0
DIARRHEA: 0
NAUSEA: 0
RHINORRHEA: 0
BACK PAIN: 0
EYE PAIN: 0
SWOLLEN GLANDS: 0
CONSTIPATION: 0
ABDOMINAL PAIN: 0
DIARRHEA: 0
EYE REDNESS: 0
FACIAL SWELLING: 0
BLOOD IN STOOL: 0
APNEA: 0
SORE THROAT: 0
PHOTOPHOBIA: 1
BOWEL INCONTINENCE: 0
FACIAL SWEATING: 0
VOMITING: 0
VOICE CHANGE: 0
EYE PAIN: 0

## 2017-10-03 RX ORDER — BLOOD SUGAR DIAGNOSTIC
STRIP MISCELLANEOUS
Qty: 200 STRIP | Refills: 3 | Status: SHIPPED | OUTPATIENT
Start: 2017-10-03 | End: 2018-04-19 | Stop reason: SDUPTHER

## 2017-10-13 RX ORDER — BLOOD-GLUCOSE CONTROL, LOW
EACH MISCELLANEOUS
Qty: 1 EACH | Refills: 0 | Status: SHIPPED | OUTPATIENT
Start: 2017-10-13 | End: 2019-08-22

## 2017-10-13 RX ORDER — CLOPIDOGREL BISULFATE 75 MG/1
75 TABLET ORAL DAILY
Qty: 30 TABLET | Refills: 2 | Status: SHIPPED | OUTPATIENT
Start: 2017-10-13 | End: 2018-01-10 | Stop reason: SDUPTHER

## 2017-10-13 RX ORDER — SENNA-LAX 8.6 MG/1
TABLET ORAL
Qty: 180 TABLET | Refills: 3 | Status: SHIPPED | OUTPATIENT
Start: 2017-10-13 | End: 2018-11-16 | Stop reason: SDUPTHER

## 2017-10-19 ENCOUNTER — TELEPHONE (OUTPATIENT)
Dept: NEUROLOGY | Age: 67
End: 2017-10-19

## 2017-10-19 NOTE — TELEPHONE ENCOUNTER
Pt called stating she is having blurry vision and feels like a zombie since starting Aricept. Please advise what pt should do. Writer told pt that doctor Roque Edge is not in office until Monday and to contact PCP.  Last ov 9/25/17 Next ov 12/28/17

## 2017-10-23 ENCOUNTER — TELEPHONE (OUTPATIENT)
Dept: NEUROLOGY | Age: 67
End: 2017-10-23

## 2017-10-23 NOTE — TELEPHONE ENCOUNTER
Pt wants to know if you want to prescribe her something else, See previous notes. Do you want pt to be seen sooner.  Last oV 9/25/17  Next Ov 12/28/17

## 2017-10-23 NOTE — TELEPHONE ENCOUNTER
STOP  ARICEPT   DUE  TO  SIDE  EFFECTS. FOLLOW  UP   WITH  HER  PCP  ,   EYE  DOCTORS.     NEEDS  FOLLOW  UP

## 2017-10-25 RX ORDER — ALENDRONATE SODIUM 70 MG/1
70 TABLET ORAL
Qty: 12 TABLET | Refills: 3 | Status: SHIPPED | OUTPATIENT
Start: 2017-10-25 | End: 2018-07-23 | Stop reason: SDUPTHER

## 2017-10-25 RX ORDER — AMITRIPTYLINE HYDROCHLORIDE 25 MG/1
TABLET, FILM COATED ORAL
Qty: 30 TABLET | Refills: 5 | Status: SHIPPED | OUTPATIENT
Start: 2017-10-25 | End: 2018-05-01 | Stop reason: SDUPTHER

## 2017-10-25 NOTE — TELEPHONE ENCOUNTER
Dr Frederick Gentile patient, Dr Hari Kumari gave this medication for headache prevention when she seen her in urgent care 7/26/2017.

## 2017-11-08 ENCOUNTER — OFFICE VISIT (OUTPATIENT)
Dept: PODIATRY | Age: 67
End: 2017-11-08
Payer: MEDICARE

## 2017-11-08 VITALS
BODY MASS INDEX: 44.9 KG/M2 | SYSTOLIC BLOOD PRESSURE: 122 MMHG | HEIGHT: 64 IN | DIASTOLIC BLOOD PRESSURE: 70 MMHG | HEART RATE: 54 BPM | WEIGHT: 263 LBS

## 2017-11-08 DIAGNOSIS — E11.42 TYPE 2 DIABETES MELLITUS WITH DIABETIC POLYNEUROPATHY, WITH LONG-TERM CURRENT USE OF INSULIN (HCC): Primary | ICD-10-CM

## 2017-11-08 DIAGNOSIS — B35.1 DERMATOPHYTOSIS OF NAIL: ICD-10-CM

## 2017-11-08 DIAGNOSIS — Z79.4 TYPE 2 DIABETES MELLITUS WITH DIABETIC POLYNEUROPATHY, WITH LONG-TERM CURRENT USE OF INSULIN (HCC): Primary | ICD-10-CM

## 2017-11-08 PROCEDURE — 11720 DEBRIDE NAIL 1-5: CPT | Performed by: PODIATRIST

## 2017-11-08 NOTE — PROGRESS NOTES
Foot Care Worksheet  PCP: Wilfred Salmeron MD  Last visit: 9/25/17    Nail description:  Thick , Yellow , Crumbly , Marked limitation of ambulation     Pain resulting from thickened and dystrophy of nail plate No    Nails involved  Right   1  (T5-T9)  Left     1  (TA-T4)    Q7 1 Class A Finding - Non traumatic amputation of foot No    Q8 2 Class B Findings - Absent DP pulse No, Absent PT pulse No, Advanced tropic changes (3 required) Yes    Decrease hair growth Yes, Nail changes/thickening Yes, Pigmented changes/discoloration Yes, Skin texture (thin, shiny) No, Skin color (rubor/redness) No    Q9 1 Class B and 2 Class C Findings  Claudication No, Temperature change Yes, Paresthesia Yes, Burning No, Edema Yes
MD Stephany   amLODIPine (NORVASC) 5 MG tablet Take 1 tablet by mouth daily 8/17/17  Yes Clarence Alicea MD   potassium chloride (MICRO-K) 10 MEQ extended release capsule TAKE 1 CAPSULE TWICE DAILY 8/8/17  Yes Clarence Alicea MD   citalopram (CELEXA) 10 MG tablet TAKE 1 TABLET EVERY DAY 8/8/17  Yes Clarence Alicea MD   B-D ULTRAFINE III SHORT PEN 31G X 8 MM MISC USE TWICE DAILY 7/19/17  Yes Clarence Alicea MD   gabapentin (NEURONTIN) 300 MG capsule TAKE 2 CAPSULES TWICE DAILY 7/19/17  Yes Clarence Alicea MD   NOVOLOG FLEXPEN 100 UNIT/ML injection pen INJECT 4 UNITS AT LUNCH AND  6 UNITS AT SUPPER [facility] PEN EXPIRES 28 DAYS AFTER 1ST USE) 7/19/17  Yes Clarence Alicea MD   pantoprazole (PROTONIX) 40 MG tablet TAKE 1 TABLET EVERY DAY 7/19/17  Yes Clarence Alicea MD   fluticasone (FLONASE) 50 MCG/ACT nasal spray USE 2 SPRAYS NASALLY EVERY DAY 7/19/17  Yes Clarence Alicea MD   furosemide (LASIX) 40 MG tablet TAKE 1 TABLET TWICE DAILY 7/19/17  Yes Clarence Alicea MD   buPROPion (WELLBUTRIN) 75 MG tablet TAKE 1 TABLET TWICE DAILY 7/19/17  Yes Clarence Alicea MD   spironolactone (ALDACTONE) 50 MG tablet Take 50 mg by mouth 2 times daily   Yes Marce Barrios MD   ibuprofen (ADVIL;MOTRIN) 400 MG tablet TAKE 1 TABLET EVERY 6 HOURS AS NEEDED FOR PAIN 5/26/17  Yes Clarence Alicea MD   ondansetron (ZOFRAN) 4 MG tablet Take 1 tablet by mouth every 8 hours as needed for Nausea or Vomiting 5/16/17  Yes Clarence Alicea MD   magnesium oxide (MAG-OX) 400 MG tablet Take 1 tablet by mouth daily 5/16/17  Yes Clarence Alicea MD   Ascorbic Acid (VITAMIN C) 500 MG tablet Take 1 tablet by mouth 2 times daily 5/16/17  Yes Clarence Alicea MD   Compression Stockings MISC by Does not apply route 15-20mmHg  Knee high  Open tow  With assist device to help put them on 5/10/17  Yes Gray Bains DO   PRODIGY TWIST TOP LANCETS 28G MISC 1 each by Does not apply route 4 times daily DX: E 11.42   Yes Historical Provider,

## 2017-11-09 ENCOUNTER — TELEPHONE (OUTPATIENT)
Dept: INTERNAL MEDICINE | Age: 67
End: 2017-11-09
Payer: MEDICARE

## 2017-11-09 DIAGNOSIS — F41.9 ANXIETY AND DEPRESSION: ICD-10-CM

## 2017-11-09 DIAGNOSIS — I25.10 ATHEROSCLEROSIS OF NATIVE CORONARY ARTERY OF NATIVE HEART WITHOUT ANGINA PECTORIS: ICD-10-CM

## 2017-11-09 DIAGNOSIS — E11.42 TYPE 2 DIABETES MELLITUS WITH DIABETIC POLYNEUROPATHY, WITH LONG-TERM CURRENT USE OF INSULIN (HCC): ICD-10-CM

## 2017-11-09 DIAGNOSIS — Z79.4 TYPE 2 DIABETES MELLITUS WITH DIABETIC POLYNEUROPATHY, WITH LONG-TERM CURRENT USE OF INSULIN (HCC): ICD-10-CM

## 2017-11-09 DIAGNOSIS — Z91.81 H/O FALLING: ICD-10-CM

## 2017-11-09 DIAGNOSIS — R60.0 BILATERAL LEG EDEMA: ICD-10-CM

## 2017-11-09 DIAGNOSIS — K74.60 CIRRHOSIS OF LIVER WITHOUT ASCITES, UNSPECIFIED HEPATIC CIRRHOSIS TYPE (HCC): Primary | ICD-10-CM

## 2017-11-09 DIAGNOSIS — F32.A ANXIETY AND DEPRESSION: ICD-10-CM

## 2017-11-09 DIAGNOSIS — G45.9 TRANSIENT CEREBRAL ISCHEMIA, UNSPECIFIED TYPE: ICD-10-CM

## 2017-11-09 DIAGNOSIS — G62.9 PERIPHERAL POLYNEUROPATHY: ICD-10-CM

## 2017-11-09 PROCEDURE — G0179 MD RECERTIFICATION HHA PT: HCPCS | Performed by: INTERNAL MEDICINE

## 2017-11-14 ENCOUNTER — HOSPITAL ENCOUNTER (OUTPATIENT)
Dept: MAMMOGRAPHY | Age: 67
Discharge: HOME | End: 2017-11-14
Payer: MEDICARE

## 2017-11-14 DIAGNOSIS — Z12.31 ENCOUNTER FOR SCREENING MAMMOGRAM FOR MALIGNANT NEOPLASM OF BREAST: ICD-10-CM

## 2017-11-14 PROCEDURE — G0202 SCR MAMMO BI INCL CAD: HCPCS

## 2017-11-21 LAB — DIABETIC RETINOPATHY: NORMAL

## 2017-11-27 ENCOUNTER — TELEPHONE (OUTPATIENT)
Dept: OBGYN | Age: 67
End: 2017-11-27

## 2017-11-30 ENCOUNTER — TELEPHONE (OUTPATIENT)
Dept: INTERNAL MEDICINE | Age: 67
End: 2017-11-30

## 2017-11-30 NOTE — TELEPHONE ENCOUNTER
[facility] pharmacist called, patient has an order for test strips but an invalid diagnosis code was entered for the script. Pharmacist states Dr. Juan F Barbosa should call [phone]-[email or web address] to give a verbal consent for the strip order.

## 2017-12-01 NOTE — TELEPHONE ENCOUNTER
Called Medicare Provider line [employer] [facility])- unable to change or add diagnosis without knowing if claim has been processed and paid. Will need to check with pharmacy. Spoke with Gurmeet Candelario. She will call Medicare and try to get a direct line to speak with a representative directly.

## 2017-12-04 ENCOUNTER — TELEPHONE (OUTPATIENT)
Dept: INTERNAL MEDICINE | Age: 67
End: 2017-12-04

## 2017-12-04 NOTE — TELEPHONE ENCOUNTER
Spoke with Leobardo Merlin at [facility]- she checked the claim for test strips. It is being blocked because pt is in need of a flu shot. They just need to talk with pt re: this and have them call us if they have any problems. Spoke with Loren Jara at [facility]- she ran claim again and it is still being denied. She will call them again.

## 2017-12-18 ENCOUNTER — OFFICE VISIT (OUTPATIENT)
Dept: CARDIOLOGY | Age: 67
End: 2017-12-18
Payer: MEDICARE

## 2017-12-18 VITALS
BODY MASS INDEX: 45.62 KG/M2 | DIASTOLIC BLOOD PRESSURE: 64 MMHG | HEIGHT: 64 IN | SYSTOLIC BLOOD PRESSURE: 114 MMHG | WEIGHT: 267.2 LBS

## 2017-12-18 DIAGNOSIS — G47.33 OBSTRUCTIVE SLEEP APNEA: ICD-10-CM

## 2017-12-18 DIAGNOSIS — E11.42 TYPE 2 DIABETES MELLITUS WITH DIABETIC POLYNEUROPATHY, WITH LONG-TERM CURRENT USE OF INSULIN (HCC): ICD-10-CM

## 2017-12-18 DIAGNOSIS — I25.83 CORONARY ARTERY DISEASE DUE TO LIPID RICH PLAQUE: ICD-10-CM

## 2017-12-18 DIAGNOSIS — I25.10 CORONARY ARTERY DISEASE DUE TO LIPID RICH PLAQUE: ICD-10-CM

## 2017-12-18 DIAGNOSIS — R00.2 PALPITATIONS: ICD-10-CM

## 2017-12-18 DIAGNOSIS — I10 HYPERTENSION, UNSPECIFIED TYPE: Primary | ICD-10-CM

## 2017-12-18 DIAGNOSIS — I95.1 ORTHOSTATIC HYPOTENSION: ICD-10-CM

## 2017-12-18 DIAGNOSIS — Z79.4 TYPE 2 DIABETES MELLITUS WITH DIABETIC POLYNEUROPATHY, WITH LONG-TERM CURRENT USE OF INSULIN (HCC): ICD-10-CM

## 2017-12-18 PROCEDURE — 99213 OFFICE O/P EST LOW 20 MIN: CPT | Performed by: INTERNAL MEDICINE

## 2017-12-18 PROCEDURE — G8427 DOCREV CUR MEDS BY ELIG CLIN: HCPCS | Performed by: INTERNAL MEDICINE

## 2017-12-18 PROCEDURE — 1090F PRES/ABSN URINE INCON ASSESS: CPT | Performed by: INTERNAL MEDICINE

## 2017-12-18 PROCEDURE — G8598 ASA/ANTIPLAT THER USED: HCPCS | Performed by: INTERNAL MEDICINE

## 2017-12-18 PROCEDURE — 3017F COLORECTAL CA SCREEN DOC REV: CPT | Performed by: INTERNAL MEDICINE

## 2017-12-18 PROCEDURE — 1036F TOBACCO NON-USER: CPT | Performed by: INTERNAL MEDICINE

## 2017-12-18 PROCEDURE — G8417 CALC BMI ABV UP PARAM F/U: HCPCS | Performed by: INTERNAL MEDICINE

## 2017-12-18 PROCEDURE — 1123F ACP DISCUSS/DSCN MKR DOCD: CPT | Performed by: INTERNAL MEDICINE

## 2017-12-18 PROCEDURE — 3014F SCREEN MAMMO DOC REV: CPT | Performed by: INTERNAL MEDICINE

## 2017-12-18 PROCEDURE — 4040F PNEUMOC VAC/ADMIN/RCVD: CPT | Performed by: INTERNAL MEDICINE

## 2017-12-18 PROCEDURE — G8400 PT W/DXA NO RESULTS DOC: HCPCS | Performed by: INTERNAL MEDICINE

## 2017-12-18 PROCEDURE — 3044F HG A1C LEVEL LT 7.0%: CPT | Performed by: INTERNAL MEDICINE

## 2017-12-18 PROCEDURE — G8484 FLU IMMUNIZE NO ADMIN: HCPCS | Performed by: INTERNAL MEDICINE

## 2017-12-18 PROCEDURE — 93000 ELECTROCARDIOGRAM COMPLETE: CPT | Performed by: INTERNAL MEDICINE

## 2017-12-18 RX ORDER — IBUPROFEN 400 MG/1
TABLET ORAL
Qty: 120 TABLET | Refills: 5 | Status: SHIPPED | OUTPATIENT
Start: 2017-12-18 | End: 2017-12-22 | Stop reason: SDUPTHER

## 2017-12-18 NOTE — PROGRESS NOTES
Cardiology Consultation  [facility]    Patient is here for follow up:    HPI and Chief Complaint:  Tracy Houston is here for follow up. Admits to some palpitations and occasional SOB. Denies any chest pains, le edema, orthopnea, pnd.     Past Medical:  Past Medical History:   Diagnosis Date    CAD (coronary artery disease) 44    Cancer ([facility] [address] 1971    cervical    Coronary artery disease     Hx of Bell's palsy     Mild right facial paralysis    Neuropathy ([facility] [address]     Sleep apnea 2014    sleep study done in [place]    Type 2 diabetes mellitus with hyperglycemia ([facility] [address]        Past Surgical:  Past Surgical History:   Procedure Laterality Date    APPENDECTOMY  1968    BARIATRIC SURGERY      COLONOSCOPY  2012    HYSTERECTOMY      INDUCED   1970    TONSILLECTOMY         Family History:  Family History   Problem Relation Age of Onset    Heart Disease Mother     High Blood Pressure Mother     Stroke Mother     Glaucoma Mother     Early Death Father     Cancer Father      stomach    Miscarriages / Stillbirths Maternal Uncle        Social History:  Social History     Tobacco History     Smoking Status  Former Smoker Quit date  2001 Smoking Frequency  1 pack/day for 35 years (35 pk yrs)    Smokeless Tobacco Use  Never Used          Alcohol History     Alcohol Use Status  Yes Comment  occasional beer with food          Drug Use     Drug Use Status  No          Sexual Activity     Sexually Active  Not Asked                REVIEW OF SYSTEMS:    · Constitutional: there has been no unanticipated weight loss. There's been No change in energy level, No change in activity level. · Eyes: No visual changes or diplopia. No scleral icterus. · ENT: No Headaches, hearing loss or vertigo. No mouth sores or sore throat. · Cardiovascular: AS HPI  · Respiratory: AS HPI  · Gastrointestinal: No abdominal pain, appetite loss, blood in stools.  No change in bowel or bladder 2,500 mcg by mouth daily      B Complex-C (SUPER B COMPLEX PO) Take by mouth daily      brimonidine-timolol (COMBIGAN) 0.2-0.5 % ophthalmic solution Place 1 drop into both eyes daily 3 Bottle 3    travoprost, benzalkonium, (TRAVATAN) 0.004 % ophthalmic solution Place 1 drop into both eyes daily 3 Bottle 3    Insulin Syringe-Needle U-100 (INSULIN SYRINGE .5CC/31GX5/16\") 31G X 5/16\" 0.5 ML MISC 1 each by Does not apply route daily 200 each 3    insulin glargine (LANTUS) 100 UNIT/ML injection vial Inject 10 Units into the skin nightly 3 vial 3    umeclidinium-vilanterol (ANORO ELLIPTA) 62.5-25 MCG/INH AEPB inhaler Inhale 1 puff into the lungs daily 3 each 3    CPAP Machine MISC by Does not apply route Pressure of 13 (AirSense 10)  [employer].      albuterol (PROVENTIL) (2.5 MG/3ML) 0.083% nebulizer solution Take 2.5 mg by nebulization every 6 hours as needed for Wheezing      ferrous sulfate 325 (65 FE) MG EC tablet Take 325 mg by mouth daily (with breakfast)      Multiple Vitamin (MULTI VITAMIN DAILY) TABS Take by mouth daily      nitroGLYCERIN (NITROSTAT) 0.4 MG SL tablet Place 0.4 mg under the tongue every 5 minutes as needed for Chest pain Dissolve 1 tab under tongue at first sign of chest pain. May repeat every 5 minutes until relief is obtained. If pain persists after taking 3 tabs in a 15-minute period, or the pain is different than is typically experienced, call 9-1-1 immediately. No current facility-administered medications for this visit. Physical Exam:   Vitals: /64   Ht 5' 4.02\" (1.626 m)   Wt 267 lb 3.2 oz (121.2 kg)   LMP 12/07/1995 (Approximate)   BMI 45.84 kg/m²   General appearance: alert and cooperative with exam  HEENT: Head: Normocephalic, no lesions, without obvious abnormality.   Neck: no carotid bruit, no JVD  Lungs: clear to auscultation bilaterally  Heart: regular rate and rhythm, S1, S2 normal, no murmur, click, rub or gallop  Abdomen: soft, non-tender; bowel sounds normal; no masses,  no organomegaly  Extremities: extremities normal, atraumatic, no cyanosis or edema  Neurologic: Mental status: Alert, oriented, thought content appropriate    Labs:  Lab Results   Component Value Date    CHOL 162 09/18/2017    CHOL 164 10/13/2016    CHOL 186 05/19/2016     Lab Results   Component Value Date    TRIG 115 09/18/2017    TRIG 105 10/13/2016     Lab Results   Component Value Date    HDL 59 09/18/2017    HDL 64 10/13/2016    HDL 83 (A) 05/19/2016     Lab Results   Component Value Date    LDLCHOLESTEROL 80 09/18/2017    LDLCHOLESTEROL 79 10/13/2016    LDLCALC 80 05/19/2016    LDLCALC 80 09/23/2014     Lab Results   Component Value Date    VLDL NOT REPORTED 09/18/2017    VLDL 21 10/13/2016     Lab Results   Component Value Date    CHOLHDLRATIO 2.7 09/18/2017    CHOLHDLRATIO 2.6 10/13/2016       Lab Results   Component Value Date     09/18/2017    K 4.4 09/18/2017    CL 98 09/18/2017    CO2 26 09/18/2017    BUN 25 (H) 09/18/2017    CREATININE 0.90 09/18/2017    GLUCOSE 138 (H) 09/18/2017    CALCIUM 9.5 09/18/2017    PROT 7.5 09/18/2017    LABALBU 4.1 09/18/2017    BILITOT 0.70 09/18/2017    ALKPHOS 77 09/18/2017    AST 37 (H) 09/18/2017    ALT 33 09/18/2017    LABGLOM >60 09/18/2017    GFRAA >60 09/18/2017       EKG: sinus lynette, low voltage, inferior infart    LAST ECHO:3/2015-  EF 65%, LVH, LAD, mild MR. LAST CATH 6/15- minimal non obstructive CAD,  mRCA 5%, EF 65%    Holter 11/2016- Sinus, Ave HR 54, lowest 41, Infrequent PVCs, 4 beat run NSVT, in frequent PACs, 14 runts PAT. The CVD Risk score (KALIN'Agostino, et al., 2008) failed to calculate for the following reasons: The patient has a prior MI, stroke, CHF, or peripheral vascular disease diagnosis    Past Medical and Surgical History, Problem List, Allergies, Medications, Labs, Imaging, all reviewed extensively in EMR and with the patient. Assessment and Plan:     1.  Dyspnea- maybe pulmonary in origin, was diagnosed with bronchiectasis. Will check 2d echo to eval for structural heart disease  2. Palpitations- will check holter to assess for arrhythmia  3. Sinus lynette on ecg- not on medications to account for this. Maybe due to LEANDRO  4. H/o CAD s/p PCI to M RCA in 2002- stable on current medications   5. Orthostatic hypotension. Wear compression stockings for leg edema. Ok to add back lasix. 6. Chronic diastolic HF- stable on lasix PO  7. LEANDRO  8. Follow up in 3 months- if above testing not revealing will need possible stress testing if agreeable. Thank you for allowing me to participate in the care of this patient, please do not hesitate to call if you have any questions.     Gomez Trevino, DO [address]

## 2017-12-21 ENCOUNTER — OFFICE VISIT (OUTPATIENT)
Dept: NEUROLOGY | Age: 67
End: 2017-12-21
Payer: MEDICARE

## 2017-12-21 VITALS
WEIGHT: 265 LBS | OXYGEN SATURATION: 95 % | HEART RATE: 50 BPM | DIASTOLIC BLOOD PRESSURE: 70 MMHG | SYSTOLIC BLOOD PRESSURE: 128 MMHG | HEIGHT: 64 IN | BODY MASS INDEX: 45.24 KG/M2

## 2017-12-21 DIAGNOSIS — Z91.81 H/O FALLING: ICD-10-CM

## 2017-12-21 DIAGNOSIS — Z86.69 HX OF BELL'S PALSY: ICD-10-CM

## 2017-12-21 DIAGNOSIS — I67.82 CHRONIC CEREBRAL ISCHEMIA: ICD-10-CM

## 2017-12-21 DIAGNOSIS — G44.221 CHRONIC TENSION-TYPE HEADACHE, INTRACTABLE: ICD-10-CM

## 2017-12-21 DIAGNOSIS — E55.9 VITAMIN D DEFICIENCY: ICD-10-CM

## 2017-12-21 DIAGNOSIS — I63.6 CEREBRAL INFARCTION DUE TO CEREBRAL VENOUS THROMBOSIS, NONPYOGENIC (HCC): ICD-10-CM

## 2017-12-21 DIAGNOSIS — F41.9 ANXIETY AND DEPRESSION: ICD-10-CM

## 2017-12-21 DIAGNOSIS — R60.0 BILATERAL LEG EDEMA: ICD-10-CM

## 2017-12-21 DIAGNOSIS — F32.A ANXIETY AND DEPRESSION: ICD-10-CM

## 2017-12-21 DIAGNOSIS — Z79.4 TYPE 2 DIABETES MELLITUS WITH DIABETIC POLYNEUROPATHY, WITH LONG-TERM CURRENT USE OF INSULIN (HCC): ICD-10-CM

## 2017-12-21 DIAGNOSIS — G47.33 OBSTRUCTIVE SLEEP APNEA: ICD-10-CM

## 2017-12-21 DIAGNOSIS — G45.0 VBI (VERTEBROBASILAR INSUFFICIENCY): ICD-10-CM

## 2017-12-21 DIAGNOSIS — G45.8 OTHER SPECIFIED TRANSIENT CEREBRAL ISCHEMIAS: ICD-10-CM

## 2017-12-21 DIAGNOSIS — G62.9 PERIPHERAL POLYNEUROPATHY: ICD-10-CM

## 2017-12-21 DIAGNOSIS — G44.211 INTRACTABLE EPISODIC TENSION-TYPE HEADACHE: ICD-10-CM

## 2017-12-21 DIAGNOSIS — R41.3 MEMORY PROBLEM: Primary | ICD-10-CM

## 2017-12-21 DIAGNOSIS — G25.0 ESSENTIAL TREMOR: ICD-10-CM

## 2017-12-21 DIAGNOSIS — E11.42 TYPE 2 DIABETES MELLITUS WITH DIABETIC POLYNEUROPATHY, WITH LONG-TERM CURRENT USE OF INSULIN (HCC): ICD-10-CM

## 2017-12-21 DIAGNOSIS — E66.01 MORBID OBESITY WITH BMI OF 45.0-49.9, ADULT (HCC): ICD-10-CM

## 2017-12-21 DIAGNOSIS — R26.89 BALANCE PROBLEM: ICD-10-CM

## 2017-12-21 DIAGNOSIS — M79.10 MUSCLE ACHE: ICD-10-CM

## 2017-12-21 DIAGNOSIS — R42 DIZZINESS: ICD-10-CM

## 2017-12-21 DIAGNOSIS — R26.9 GAIT ABNORMALITY: ICD-10-CM

## 2017-12-21 DIAGNOSIS — R73.09 ELEVATED HEMOGLOBIN A1C: ICD-10-CM

## 2017-12-21 PROBLEM — G44.229 CHRONIC TENSION HEADACHE: Status: ACTIVE | Noted: 2017-12-21

## 2017-12-21 PROCEDURE — G8484 FLU IMMUNIZE NO ADMIN: HCPCS | Performed by: PSYCHIATRY & NEUROLOGY

## 2017-12-21 PROCEDURE — G8400 PT W/DXA NO RESULTS DOC: HCPCS | Performed by: PSYCHIATRY & NEUROLOGY

## 2017-12-21 PROCEDURE — G8417 CALC BMI ABV UP PARAM F/U: HCPCS | Performed by: PSYCHIATRY & NEUROLOGY

## 2017-12-21 PROCEDURE — G8427 DOCREV CUR MEDS BY ELIG CLIN: HCPCS | Performed by: PSYCHIATRY & NEUROLOGY

## 2017-12-21 PROCEDURE — 3014F SCREEN MAMMO DOC REV: CPT | Performed by: PSYCHIATRY & NEUROLOGY

## 2017-12-21 PROCEDURE — 4040F PNEUMOC VAC/ADMIN/RCVD: CPT | Performed by: PSYCHIATRY & NEUROLOGY

## 2017-12-21 PROCEDURE — G8598 ASA/ANTIPLAT THER USED: HCPCS | Performed by: PSYCHIATRY & NEUROLOGY

## 2017-12-21 PROCEDURE — 1036F TOBACCO NON-USER: CPT | Performed by: PSYCHIATRY & NEUROLOGY

## 2017-12-21 PROCEDURE — 3044F HG A1C LEVEL LT 7.0%: CPT | Performed by: PSYCHIATRY & NEUROLOGY

## 2017-12-21 PROCEDURE — 1090F PRES/ABSN URINE INCON ASSESS: CPT | Performed by: PSYCHIATRY & NEUROLOGY

## 2017-12-21 PROCEDURE — 1123F ACP DISCUSS/DSCN MKR DOCD: CPT | Performed by: PSYCHIATRY & NEUROLOGY

## 2017-12-21 PROCEDURE — 99214 OFFICE O/P EST MOD 30 MIN: CPT | Performed by: PSYCHIATRY & NEUROLOGY

## 2017-12-21 PROCEDURE — 3017F COLORECTAL CA SCREEN DOC REV: CPT | Performed by: PSYCHIATRY & NEUROLOGY

## 2017-12-21 RX ORDER — MEMANTINE HYDROCHLORIDE 10 MG/1
TABLET ORAL
Qty: 60 TABLET | Refills: 3 | Status: SHIPPED | OUTPATIENT
Start: 2017-12-21 | End: 2018-01-17 | Stop reason: SDUPTHER

## 2017-12-21 ASSESSMENT — ENCOUNTER SYMPTOMS
APNEA: 0
SCALP TENDERNESS: 0
SINUS PRESSURE: 1
DIARRHEA: 0
EYE REDNESS: 0
SHORTNESS OF BREATH: 1
COUGH: 0
CHOKING: 0
COLOR CHANGE: 0
TROUBLE SWALLOWING: 0
CONSTIPATION: 0
PHOTOPHOBIA: 1
CHEST TIGHTNESS: 0
FACIAL SWELLING: 0
EYE WATERING: 0
WHEEZING: 0
NAUSEA: 0
FACIAL SWEATING: 0
VOMITING: 0
ABDOMINAL PAIN: 0
ABDOMINAL DISTENTION: 0
RHINORRHEA: 0
BLURRED VISION: 0
VISUAL CHANGE: 0
BOWEL INCONTINENCE: 0
BACK PAIN: 1
EYE ITCHING: 0
EYE DISCHARGE: 0
SWOLLEN GLANDS: 0
VOICE CHANGE: 0
EYE PAIN: 0
SORE THROAT: 0
BLOOD IN STOOL: 0

## 2017-12-21 NOTE — PROGRESS NOTES
[facility]  Neurology  [address] [address]  Phone: [phone]   Fax: [phone]    SUBJECTIVE:     PATIENT ID:  Lauryn Sanders is a  RIGHTHANDED 69 y.o. female. Dizziness   This is a chronic problem. Episode onset: FOR  MORE  THAN  ONE  YEAR. The problem occurs intermittently. The problem has been unchanged. Associated symptoms include chest pain, headaches, neck pain, numbness, vertigo and weakness. Pertinent negatives include no abdominal pain, anorexia, arthralgias, chills, congestion, coughing, fatigue, fever, joint swelling, myalgias, nausea, rash, sore throat, swollen glands, visual change or vomiting. Nothing aggravates the symptoms. She has tried drinking and rest for the symptoms. The treatment provided mild relief. Headache    This is a chronic problem. Episode onset: FOR  MORE  THAN    5  YEARS. The problem occurs daily. The problem has been unchanged. The pain is located in the bilateral, vertex and occipital region. The pain does not radiate. The pain quality is similar to prior headaches. The quality of the pain is described as dull and throbbing. The pain is at a severity of 5/10. The pain is moderate. Associated symptoms include back pain, dizziness, a loss of balance, muscle aches, neck pain, numbness, phonophobia, photophobia, sinus pressure, tingling, weakness and weight loss. Pertinent negatives include no abdominal pain, abnormal behavior, anorexia, blurred vision, coughing, drainage, ear pain, eye pain, eye redness, eye watering, facial sweating, fever, hearing loss, insomnia, nausea, rhinorrhea, scalp tenderness, seizures, sore throat, swollen glands, tinnitus, visual change or vomiting. Nothing aggravates the symptoms. Her past medical history is significant for hypertension, migraine headaches, obesity and sinus disease. There is no history of cancer, cluster headaches, pseudotumor cerebri, recent head traumas or TMJ.    Neurologic Problem Illness   Was   Reviewed   In   Chronological   [facility]. PATIENT'S  CURRENT  MAIN  NEUROLOGICAL  CONCERNS INCLUDE :                   1)    Chronic   Headaches     Since   1993                               Fairly  stable                   2)     Memory problems     Worse       Since   2016                    3)     Gait  And  Balance   Problems        Since    2012                    4)     Chronic    dizziness     Since    2014                                      Multi factorial    In nature                   5)      History of    Falls  In  The  Past                   6)   Diabetic  Peripheral  Neuropathy    -    Stable                               On   Neurontin  For  Symptomatic  relief                                 On  Insulin   For    Type  II  DM                  7)      Concern   For  TIA,     Cerebral ischemia,  orthostatic  Intolerance  And  Autonomic  neuropathy                          Started   On  plavix   In  July 2017    And  Tolerating  The  Same. 8)    Patient  Denies  Any  History of  Seizures.    No  Family  History  Of  Seizure  disorder                   9)    History  Of  Anxiety  And  Depression     -      Stable                               On  Celexa,  wellbutrin                   10)   Essential  Tremor  Involving  Hands     For  More  Than   3  Years                                 stable                 11)   Mild  Dementia   For  More  Than  One  Year                            Intolerance  To   Aricept   Due  To  Dizziness  And  Visual symptoms   And  The  Same  Is  stopped                   12)   Obesity,   OSAS    -   On  cpap                   13)  Multiple  Co  morbid  Medical  conditions                                  PRECIPITATING  FACTORS: including  fever/infection, exertion/relaxation, position change, stress, weather change, medications/alcohol, time of day/darkness/light  Are    present MODIFYING  FACTORS:  fever/infection, exertion/relaxation, position change, stress, weather change, medications/alcohol, time of day/darkness/light     Are  present         Patient   Indicates   The  Presence   And  The  Absence  Of  The  Following  Associated  And   Additional  Neurological    Symptoms:                                Balance  And coordination problems  present           Gait problems     present            Headaches      present              Migraines           present           Memory problems        present           Confusion        present            Paresthesia numbness          present           Seizures  And  Starring  Episodes           absent           Syncope,  Near  syncopal episodes         absent           Speech problems           absent             Swallowing  Problems      absent            Dizziness,  Light headedness           present                        Vertigo        present             Generalized   Weakness    absent              focal  Weakness     present             Tremors         absent              Sleep  Problems     absent             History  Of   Recent   Head  Injury     absent             History  Of   Recent  TIA     absent             History  Of   Recent    Stroke     absent             Neck  Pain and  Neck muscle  Spasms  present             Radiating  down   And   Weakness           present            Lower back   Pain  And     Spasms  present            Radiating    Down   And   Weakness          present                H/O   FALLS        present               History  Of   Visual  Symptoms    absent                Associated   Diplopia       absent                                Also   Additional   Symptoms   Present    As  Documented    In   The detailed    Review  Of  Systems   And    Please   Refer   To    Them for   Additional  Information.                 Any components  That are either  Unobtainable  Or  Limited  In 11    triamcinolone (KENALOG) 0.1 % cream Apply topically 2 times daily.  80 g 5    lisinopril (PRINIVIL;ZESTRIL) 10 MG tablet Take 1 tablet by mouth daily 30 tablet 11    amLODIPine (NORVASC) 5 MG tablet Take 1 tablet by mouth daily 90 tablet 3    potassium chloride (MICRO-K) 10 MEQ extended release capsule TAKE 1 CAPSULE TWICE DAILY 180 capsule 3    citalopram (CELEXA) 10 MG tablet TAKE 1 TABLET EVERY DAY 90 tablet 3    B-D ULTRAFINE III SHORT PEN 31G X 8 MM MISC USE TWICE DAILY 180 each 3    gabapentin (NEURONTIN) 300 MG capsule TAKE 2 CAPSULES TWICE DAILY 360 capsule 2    NOVOLOG FLEXPEN 100 UNIT/ML injection pen INJECT 4 UNITS AT LUNCH AND  6 UNITS AT SUPPER (EACH PEN EXPIRES 28 DAYS AFTER 1ST USE) 15 mL 3    pantoprazole (PROTONIX) 40 MG tablet TAKE 1 TABLET EVERY DAY 90 tablet 3    fluticasone (FLONASE) 50 MCG/ACT nasal spray USE 2 SPRAYS NASALLY EVERY DAY 48 g 3    furosemide (LASIX) 40 MG tablet TAKE 1 TABLET TWICE DAILY 180 tablet 3    buPROPion (WELLBUTRIN) 75 MG tablet TAKE 1 TABLET TWICE DAILY 180 tablet 3    spironolactone (ALDACTONE) 50 MG tablet Take 50 mg by mouth 2 times daily      ondansetron (ZOFRAN) 4 MG tablet Take 1 tablet by mouth every 8 hours as needed for Nausea or Vomiting 30 tablet 3    magnesium oxide (MAG-OX) 400 MG tablet Take 1 tablet by mouth daily 30 tablet 11    Ascorbic Acid (VITAMIN C) 500 MG tablet Take 1 tablet by mouth 2 times daily 30 tablet 11    Compression Stockings MISC by Does not apply route 15-20mmHg  Knee high  Open tow  With assist device to help put them on 1 each 0    PRODIGY TWIST TOP LANCETS 28G MISC 1 each by Does not apply route 4 times daily DX: E 11.42      VENTOLIN  (90 BASE) MCG/ACT inhaler Inhale 2 puffs into the lungs every 4 hours as needed for Wheezing      Zinc 50 MG TABS Take 50 mg by mouth daily      loratadine (CLARITIN) 10 MG tablet Take 1 tablet by mouth daily 30 tablet 5    Diabetic Shoe MISC by Does not apply route 1 each 0    niacin 500 MG extended release capsule Take 500 mg by mouth daily      folic acid (FOLVITE) 326 MCG tablet Take 800 mcg by mouth daily      Cyanocobalamin (VITAMIN B12 PO) Take 2,500 mcg by mouth daily      B Complex-C (SUPER B COMPLEX PO) Take by mouth daily      brimonidine-timolol (COMBIGAN) 0.2-0.5 % ophthalmic solution Place 1 drop into both eyes daily 3 Bottle 3    travoprost, benzalkonium, (TRAVATAN) 0.004 % ophthalmic solution Place 1 drop into both eyes daily 3 Bottle 3    Insulin Syringe-Needle U-100 (INSULIN SYRINGE .5CC/31GX5/16\") 31G X 5/16\" 0.5 ML MISC 1 each by Does not apply route daily 200 each 3    insulin glargine (LANTUS) 100 UNIT/ML injection vial Inject 10 Units into the skin nightly 3 vial 3    umeclidinium-vilanterol (ANORO ELLIPTA) 62.5-25 MCG/INH AEPB inhaler Inhale 1 puff into the lungs daily 3 each 3    CPAP Machine MISC by Does not apply route Pressure of 13 (AirSense 10)  [employer].      albuterol (PROVENTIL) (2.5 MG/3ML) 0.083% nebulizer solution Take 2.5 mg by nebulization every 6 hours as needed for Wheezing      ferrous sulfate 325 (65 FE) MG EC tablet Take 325 mg by mouth daily (with breakfast)      Multiple Vitamin (MULTI VITAMIN DAILY) TABS Take by mouth daily      nitroGLYCERIN (NITROSTAT) 0.4 MG SL tablet Place 0.4 mg under the tongue every 5 minutes as needed for Chest pain Dissolve 1 tab under tongue at first sign of chest pain. May repeat every 5 minutes until relief is obtained. If pain persists after taking 3 tabs in a 15-minute period, or the pain is different than is typically experienced, call 9-1-1 immediately. No current facility-administered medications for this visit.           No Known Allergies      Family History   Problem Relation Age of Onset    Heart Disease Mother     High Blood Pressure Mother     Stroke Mother     Glaucoma Mother     Early Death Father     Cancer Father      stomach    Lee Mai / Herberth  Lab Results   Component Value Date    [id number] >2000 09/18/2017       Review of Systems   Constitutional: Positive for weight loss. Negative for appetite change, chills, fatigue, fever and unexpected weight change. HENT: Positive for sinus pressure. Negative for congestion, dental problem, drooling, ear discharge, ear pain, facial swelling, hearing loss, mouth sores, nosebleeds, postnasal drip, rhinorrhea, sore throat, tinnitus, trouble swallowing and voice change. Eyes: Positive for photophobia. Negative for blurred vision, pain, discharge, redness, itching and visual disturbance. Respiratory: Positive for shortness of breath. Negative for apnea, cough, choking, chest tightness and wheezing. Cardiovascular: Positive for chest pain. Negative for palpitations, leg swelling and near-syncope. Gastrointestinal: Negative for abdominal distention, abdominal pain, anorexia, blood in stool, bowel incontinence, constipation, diarrhea, nausea and vomiting. Endocrine: Negative for cold intolerance, heat intolerance, polydipsia, polyphagia and polyuria. Genitourinary: Negative for bladder incontinence. Musculoskeletal: Positive for back pain and neck pain. Negative for arthralgias, gait problem, joint swelling, myalgias and neck stiffness. Skin: Negative for color change, pallor, rash and wound. Allergic/Immunologic: Negative for environmental allergies, food allergies and immunocompromised state. Neurological: Positive for dizziness, vertigo, tingling, focal weakness, weakness, numbness, headaches and loss of balance. Negative for tremors, seizures, syncope, facial asymmetry, speech difficulty and light-headedness. Hematological: Negative for adenopathy. Does not bruise/bleed easily. Psychiatric/Behavioral: Positive for confusion and memory loss.  Negative for agitation, behavioral problems, decreased concentration, dysphoric mood, hallucinations, self-injury, sleep disturbance and suicidal and  Gait  abnormal - SLOW  UNSTEADY                                      Romberg's test positive                        Ataxia negative      ASSESSMENT:    Patient Active Problem List   Diagnosis    Obstructive sleep apnea    Chronic fatigue    Coronary artery disease due to lipid rich plaque    Vitamin D deficiency    Type 2 diabetes mellitus with diabetic polyneuropathy, with long-term current use of insulin (HCC)    Peripheral polyneuropathy (HCC)    Bilateral leg edema    Right hip pain    Muscle ache    Hx of Bell's palsy    Memory problem    Gait abnormality    Balance problem    H/O falling    Dizziness    Intractable episodic tension-type headache    Essential tremor    Anxiety and depression    Elevated hemoglobin A1c    VBI (vertebrobasilar insufficiency)    Chronic cerebral ischemia    TIA (transient ischemic attack)    Chronic tension headache     CT OF THE HEAD WITHOUT CONTRAST  7/26/2017 3:29 pm       TECHNIQUE:   CT of the head was performed without the administration of intravenous   contrast. Dose modulation, iterative reconstruction, and/or weight based   adjustment of the mA/kV was utilized to reduce the radiation dose to as low   as reasonably achievable.       COMPARISON:   04/13/2017       HISTORY:   ORDERING SYSTEM PROVIDED HISTORY: Right hip pain   TECHNOLOGIST PROVIDED HISTORY:   Ordering Physician Provided Reason for Exam: Frontal headaches travel into   posterior head x 2 years. Acuity: Chronic   Type of Exam: Ongoing       FINDINGS:   BRAIN/VENTRICLES: There is no acute intracranial hemorrhage, mass effect or   midline shift.  No abnormal extra-axial fluid collection.  The gray-white   differentiation is maintained without evidence of an acute infarct.  There is   no evidence of hydrocephalus.  Mild calcific plaque of intracranial vessels.       ORBITS: The visualized portion of the orbits demonstrate no acute abnormality.       SINUSES: The visualized paranasal sinuses and mastoid air cells demonstrate   no acute abnormality.       SOFT TISSUES/SKULL:  No acute abnormality of the visualized skull or soft   tissues.           Impression   No acute intracranial abnormality.         ULTRASOUND EVALUATION OF THE CAROTID ARTERIES       7/26/2017       COMPARISON:   None.       HISTORY:   ORDERING SYSTEM PROVIDED HISTORY: Coronary artery disease due to lipid rich   plaque   TECHNOLOGIST PROVIDED HISTORY:   Ordering Physician Provided Reason for Exam: CAD,MEMORY ISSUES, DIZZINESS   Acuity: Acute   Type of Exam: Initial       FINDINGS:   RIGHT:       The right common carotid artery demonstrates peak systolic velocities of 69,   45 cm/sec in the proximal and distal segments respectively.       The right internal carotid artery demonstrates the systolic velocities of 75,   63, 77 cm/sec in the proximal, mid and distal segments respectively.       The external carotid artery is patent.  The vertebral artery demonstrates   normal antegrade flow.       No evidence of focal atherosclerotic plaque.       ICA/CCA ratio of 1.1.  High bifurcation is noted.       LEFT:       The left common carotid artery demonstrates peak systolic velocities of 77,   43 cm/sec in the proximal and distal segments respectively.       The left internal carotid artery demonstrates the systolic velocities of 38,   37, 44 cm/sec in the proximal, mid and distal segments respectively.       The external carotid artery is patent.  The vertebral artery demonstrates   normal antegrade flow.       Mild heterogeneous irregular plaque is noted in the carotid bulb with   extension into the left internal carotid artery with estimated   cross-sectional stenosis at the bulb of 35% in at the origin of the left   internal carotid artery is 39%.       ICA/CCA ratio of 0.6.  High bifurcation is noted.           Impression   The right internal carotid artery demonstrates 0-50% stenosis .       The left internal carotid artery demonstrates 0-50% stenosis .       Bilateral vertebral arteries are patent with flow in the normal direction. VISITING DIAGNOSIS:      ICD-10-CM ICD-9-CM    1. Memory problem R41.3 780.93    2. Hx of Bell's palsy Z86.69 V12.49    3. Gait abnormality R26.9 781.2    4. Dizziness R42 780.4    5. Anxiety and depression F41.8 300.00      311    6. Chronic cerebral ischemia I67.82 437.1    7. Muscle ache M79.1 729.1    8. Bilateral leg edema R60.0 782.3    9. Peripheral polyneuropathy ([facility]) G62.9 356.9    10. VBI (vertebrobasilar insufficiency) G45.0 435.3    11. Essential tremor G25.0 333.1    12. H/O falling Z91.81 V15.88    13. Balance problem R26.89 781.99    14. Intractable episodic tension-type headache G44.211 339.11    15. Elevated hemoglobin A1c R73.09 790.29    16. Other specified transient cerebral ischemias G45.8 435.8    17. Obstructive sleep apnea G47.33 327.23    18. Cerebral infarction due to cerebral venous thrombosis, nonpyogenic ([facility])  I63.6 434.01    19. Type 2 diabetes mellitus with diabetic polyneuropathy, with long-term current use of insulin ([facility]) E11.42 250.60     Z79.4 357.2      V58.67    20. Vitamin D deficiency E55.9 268.9    21. Chronic tension-type headache, intractable G44.221 339.12               CONCERNS   &   INCREASED   RISK   FOR         * TIA,  CEREBRO  VASCULAR  ISCHEMIA, STROKE        *  Poorly  Controlled Chronic  Headaches &  Migraines      *   COGNITIVE  &   MEMORY PROBLEMS  AND  DEMENTIAS         *   PERIPHERAL  NEUROPATHY          *  GAIT  DIFFICULTIES  &   BALANCE PROBLMES   AND  FALL            VARIOUS  RISK   FACTORS   WERE  REVIEWED   AND   DISCUSSED. *  PATIENT   HAS  MULTIPLE   MEDICAL,  NEUROLOGICAL  AND   MENTAL  HEALTH  PROBLEMS . PATIENT'S   MANAGEMENT  IS  CHALLENGING. PLAN:       Emigdio Davis  Of  The  Diagnoses,  The  Management & Treatment  Options           AND    Care  plan  Were        Reviewed and   Discussed   With  patient.          * MEDICATIONS. WITHOUT  ANY  SIGNIFICANT  SIDE  EFFECTS   &  GETTING BENEFIT. *  May   Use  Pill  Box,    If  Needed          *    To  Continue  The    Antiplatelet  therapy    As   Recommended  Was   Discussed      *    Prophylactic  Use   Of     Vitamin   B   Complex,  Folic  Acid,    Vitamin  B12    Multivitamin   Tablet  Daily    Supplementations   Over  The  Counter  Discussed         *  FOOT  CARE, DAILY  INSPECTION  OF  FEET   AND       PERIODIC  PODIATRY EVALUATIONS . *  PATIENT  IS  ALSO   COUNSELED   TO  KEEP    ACTIVITIES       A)   SIMPLE      B)  ORGANIZED      C)  WRITE   DOWN                 *  EVALUATIONS  AND  FOLLOW UP:                                          * CARDIOLOGY              *   OPTHALMOLOGY  /  For  Detailed   Fundi  &   Visual  fields  evaluation                          Orders Placed This Encounter   Medications    memantine (NAMENDA) 10 MG tablet     Si/2   TABLET  TWICE  DAILY   WITH  FOOD  IN   THE  MORNING  AND  EVENING   FOR    4   WEEKS,    THEN   ONE  TABLET  TWICE  DAILY  WITH  FOOD. Dispense:  60 tablet     Refill:  3                                                                           *PATIENT   TO  FOLLOW  UP  WITH   PRIMARY  CARE   AND   OTHER  CONSULTANTS  AS  BEFORE.           *TO  FOLLOW  WITH   MENTAL  HEALTH  PROFESSIONALS ,  INCLUDING          PSYCHOLOGICAL  COUNSELING   AND  PSYCHIATRIC  EVALUTIONS      *  Maintain   Healthy  Life Style    With   Periodic  Monitoring  Of    Any  Medical  Conditions  Including   Elevated  Blood  Pressure,  Lipid  Profile,   Blood  Sugar levels  And   Heart  Disease. *   Period   Screening  For  Cancers  Involving  Breast,  Colon,  Prostate, lungs  And  Other  Organs  As  Applicable,  In consultation   With  Your  Primary Care Providers.              *  If  The  Patient remains  Neurologically  Stable   Return   To  [facility] Neurology Department   IN  2-3   MONTHS  TIME   FOR FURTHER  FOLLOW UP.                 *  If   There is  Any  Significant  Worsening   Of  Current  Symptoms  And  Or  If patient  Develops   Any additional  New  Neurological  Symptoms  Or  Significant  Concerns   Should  Call  911 or  Go  To  Emergency  Department  For  Further  Immediate  Evaluation. *   The  Neurological   Findings,  Possible  Diagnosis,  Differential diagnoses   And  Options  For    Further   Investigations   And  management   Are  Discussed  Comprehensively. Medications   And  Prescription   Risks  And  Side effects  Are   Also  Discussed. The  Above  Were  Reviewed  With  patient and   questions  Answered  In  Detail. More   Than   50% of face  To face Time   Was  Spent  On  Counseling   And   Coordination  Of  Care   Of   Patient's multiple   Neurological  Problems   And   Comorbid  Medical   Conditions. Electronically signed by Ruben Knight MD   Board Certified in  Neurology &  In  [facility] of Psychiatry and Neurology ([facility])      DISCLAIMER:   Although every effort was made to ensure the accuracy of this  electronic transcription, some errors in transcription may have occurred. GENERAL PATIENT INSTRUCTIONS:     A Healthy Lifestyle: Care Instructions  Your Care Instructions  A healthy lifestyle can help you feel good, stay at a healthy weight, and have plenty of energy for both work and play. A healthy lifestyle is something you can share with your whole family. A healthy lifestyle also can lower your risk for serious health problems, such as high blood pressure, heart disease, and diabetes. You can follow a few steps listed below to improve your health and the health of your family. Follow-up care is a key part of your treatment and safety. Be sure to make and go to all appointments, and call your doctor if you are having problems.  Its also a good idea to know your test results and keep a list of the using tobacco. If you have shortness of breath or asthma symptoms, they will likely get better within a few weeks after you quit. Limit how much alcohol you drink. Moderate amounts of alcohol (up to 2 drinks a day for men, 1 drink a day for women) are okay. But drinking too much can lead to liver problems, high blood pressure, and other health problems. Family health  If you have a family, there are many things you can do together to improve your health. Eat meals together as a family as often as possible. Eat healthy foods. This includes fruits, vegetables, lean meats and dairy, and whole grains. Include your family in your fitness plan. Most people think of activities such as jogging or tennis as the way to fitness, but there are many ways you and your family can be more active. Anything that makes you breathe hard and gets your heart pumping is exercise. Here are some tips:  Walk to do errands or to take your child to school or the bus. Go for a family bike ride after dinner instead of watching TV. Where can you learn more? Go to https://[email or web address]fan[employer]. org and sign in to your [employer] account. Enter K611 in the [facility] box to learn more about \"A Healthy Lifestyle: Care Instructions. \"     If you do not have an account, please click on the \"Sign Up Now\" link. Current as of: July 26, 2016  Content Version: 11.2  © 2051-0549 [employer]. Care instructions adapted under license by [facility] ([facility]). If you have questions about a medical condition or this instruction, always ask your healthcare professional. [address] any warranty or liability for your use of this information.

## 2017-12-22 RX ORDER — IBUPROFEN 400 MG/1
TABLET ORAL
Qty: 360 TABLET | Refills: 3 | Status: SHIPPED | OUTPATIENT
Start: 2017-12-22 | End: 2018-10-10 | Stop reason: SDUPTHER

## 2018-01-02 RX ORDER — BRIMONIDINE TARTRATE/TIMOLOL 0.2%-0.5%
1 DROPS OPHTHALMIC (EYE) DAILY
Qty: 15 ML | Refills: 3 | Status: SHIPPED | OUTPATIENT
Start: 2018-01-02

## 2018-01-09 ENCOUNTER — HOSPITAL ENCOUNTER (OUTPATIENT)
Dept: NON INVASIVE DIAGNOSTICS | Age: 68
Discharge: HOME | End: 2018-01-09
Payer: MEDICARE

## 2018-01-09 DIAGNOSIS — E11.42 TYPE 2 DIABETES MELLITUS WITH DIABETIC POLYNEUROPATHY, WITH LONG-TERM CURRENT USE OF INSULIN (HCC): ICD-10-CM

## 2018-01-09 DIAGNOSIS — Z79.4 TYPE 2 DIABETES MELLITUS WITH DIABETIC POLYNEUROPATHY, WITH LONG-TERM CURRENT USE OF INSULIN (HCC): ICD-10-CM

## 2018-01-09 DIAGNOSIS — I95.1 ORTHOSTATIC HYPOTENSION: ICD-10-CM

## 2018-01-09 DIAGNOSIS — I25.10 CORONARY ARTERY DISEASE DUE TO LIPID RICH PLAQUE: ICD-10-CM

## 2018-01-09 DIAGNOSIS — I10 HYPERTENSION, UNSPECIFIED TYPE: ICD-10-CM

## 2018-01-09 DIAGNOSIS — G47.33 OBSTRUCTIVE SLEEP APNEA: ICD-10-CM

## 2018-01-09 DIAGNOSIS — R00.2 PALPITATIONS: ICD-10-CM

## 2018-01-09 DIAGNOSIS — I25.83 CORONARY ARTERY DISEASE DUE TO LIPID RICH PLAQUE: ICD-10-CM

## 2018-01-09 PROCEDURE — 93226 XTRNL ECG REC<48 HR SCAN A/R: CPT

## 2018-01-09 PROCEDURE — 93306 TTE W/DOPPLER COMPLETE: CPT

## 2018-01-09 PROCEDURE — 93225 XTRNL ECG REC<48 HRS REC: CPT

## 2018-01-09 NOTE — PROCEDURES
[address]                   [address] [place] [id number]                                  ECHOCARDIOGRAM    PATIENT NAME: Michelle Delgado                   :        1950  MED REC NO:   [id number]                             ROOM:  ACCOUNT NO:   [de-identified]                           ADMIT DATE: 2018  PROVIDER:     Luis Tyler. Herson    DATE OF PROCEDURE:  2018    ORDERING PROVIDER:  Richi Culver MD    PRIMARY CARE PROVIDER:  Harmony Bernal MD    CLINICAL DIAGNOSIS:  Palpitations, shortness of breath. M-MODE/SECTOR MEASUREMENTS:   (*Measurement made in subxiphoid view)    1. LVEDD (3.7-5.6cm<3.2cm/m2)     4.7  2. LVESD (2.2-4.0cm)    3.0  3. MAS (24-42%)    36%  4. MESS (<9cm)  5. LVIVS thickness (.6-1.2cm)     1.2  6. LVPW thickness (.5-1.0cm) 1.2  7. Estimated mitral valve are by Planimetry  8. Blood pressure  9. LA (1.9-4.0cm<2.2cm/m2)   4.1  10.  RVIDD (.7-2.6cm<1.4cm/m2)  11.  RVW (.3-.9cm)  12.  Pulmonary artery  13. LVOT diameter  14. Ao Root (2.0-3.7<2.2cm/m2)    3.1  15. Heart rate  16. LV Mass - Diastolic (683DU)  17. LV Mass - Systolic (588VV)  18. Estimated Ejection Fraction (Maldonado's) 65%  19. IVC Collapse   Unknown  20. Diameter (0.9-1.5 cm)  21. LA Volume Index  22. LA Volume  23. RVSP 24    CONCLUSIONS:  1. Normal LV size and function with ejection fraction of 65%. 2.  Grade 1 diastolic dysfunction. 3.  Mild concentric LVH. 4.  Mild biatrial enlargement. 5.  No significant valvulopathies. 6.  No effusion. 7.  Mildly dilated RV with normal function.         Atul Hensley    D: 2018 13:12:01       T: 2018 13:42:02     [email or web address]  Job#: [id number]     Doc#: [id number]

## 2018-01-11 ENCOUNTER — HOSPITAL ENCOUNTER (OUTPATIENT)
Dept: NON INVASIVE DIAGNOSTICS | Age: 68
Discharge: HOME | End: 2018-01-11
Payer: MEDICARE

## 2018-01-15 ENCOUNTER — TELEPHONE (OUTPATIENT)
Dept: INTERNAL MEDICINE | Age: 68
End: 2018-01-15
Payer: MEDICARE

## 2018-01-15 DIAGNOSIS — R53.82 CHRONIC FATIGUE: ICD-10-CM

## 2018-01-15 DIAGNOSIS — R60.0 BILATERAL LEG EDEMA: ICD-10-CM

## 2018-01-15 DIAGNOSIS — I25.83 CORONARY ARTERY DISEASE DUE TO LIPID RICH PLAQUE: ICD-10-CM

## 2018-01-15 DIAGNOSIS — G47.33 OBSTRUCTIVE SLEEP APNEA: ICD-10-CM

## 2018-01-15 DIAGNOSIS — F41.9 ANXIETY AND DEPRESSION: ICD-10-CM

## 2018-01-15 DIAGNOSIS — I25.10 CORONARY ARTERY DISEASE DUE TO LIPID RICH PLAQUE: ICD-10-CM

## 2018-01-15 DIAGNOSIS — E11.42 TYPE 2 DIABETES MELLITUS WITH DIABETIC POLYNEUROPATHY, WITH LONG-TERM CURRENT USE OF INSULIN (HCC): Primary | ICD-10-CM

## 2018-01-15 DIAGNOSIS — R26.9 GAIT ABNORMALITY: ICD-10-CM

## 2018-01-15 DIAGNOSIS — Z79.4 TYPE 2 DIABETES MELLITUS WITH DIABETIC POLYNEUROPATHY, WITH LONG-TERM CURRENT USE OF INSULIN (HCC): Primary | ICD-10-CM

## 2018-01-15 DIAGNOSIS — F32.A ANXIETY AND DEPRESSION: ICD-10-CM

## 2018-01-15 DIAGNOSIS — G62.9 PERIPHERAL POLYNEUROPATHY: ICD-10-CM

## 2018-01-15 PROCEDURE — G0179 MD RECERTIFICATION HHA PT: HCPCS | Performed by: INTERNAL MEDICINE

## 2018-01-17 ENCOUNTER — OFFICE VISIT (OUTPATIENT)
Dept: PRIMARY CARE CLINIC | Age: 68
End: 2018-01-17
Payer: MEDICARE

## 2018-01-17 ENCOUNTER — OFFICE VISIT (OUTPATIENT)
Dept: PODIATRY | Age: 68
End: 2018-01-17
Payer: MEDICARE

## 2018-01-17 VITALS
BODY MASS INDEX: 45.07 KG/M2 | SYSTOLIC BLOOD PRESSURE: 118 MMHG | HEIGHT: 64 IN | HEART RATE: 80 BPM | DIASTOLIC BLOOD PRESSURE: 70 MMHG | WEIGHT: 264 LBS

## 2018-01-17 VITALS
HEART RATE: 54 BPM | SYSTOLIC BLOOD PRESSURE: 94 MMHG | WEIGHT: 262.4 LBS | BODY MASS INDEX: 44.8 KG/M2 | HEIGHT: 64 IN | OXYGEN SATURATION: 98 % | DIASTOLIC BLOOD PRESSURE: 68 MMHG

## 2018-01-17 DIAGNOSIS — B35.1 DERMATOPHYTOSIS OF NAIL: ICD-10-CM

## 2018-01-17 DIAGNOSIS — Z79.4 TYPE 2 DIABETES MELLITUS WITH DIABETIC POLYNEUROPATHY, WITH LONG-TERM CURRENT USE OF INSULIN (HCC): Primary | ICD-10-CM

## 2018-01-17 DIAGNOSIS — L02.212 CUTANEOUS ABSCESS OF BACK (ANY PART, EXCEPT BUTTOCK): Primary | ICD-10-CM

## 2018-01-17 DIAGNOSIS — E11.42 TYPE 2 DIABETES MELLITUS WITH DIABETIC POLYNEUROPATHY, WITH LONG-TERM CURRENT USE OF INSULIN (HCC): Primary | ICD-10-CM

## 2018-01-17 PROCEDURE — 1123F ACP DISCUSS/DSCN MKR DOCD: CPT | Performed by: NURSE PRACTITIONER

## 2018-01-17 PROCEDURE — 11720 DEBRIDE NAIL 1-5: CPT | Performed by: PODIATRIST

## 2018-01-17 PROCEDURE — 1090F PRES/ABSN URINE INCON ASSESS: CPT | Performed by: NURSE PRACTITIONER

## 2018-01-17 PROCEDURE — G8417 CALC BMI ABV UP PARAM F/U: HCPCS | Performed by: NURSE PRACTITIONER

## 2018-01-17 PROCEDURE — G8400 PT W/DXA NO RESULTS DOC: HCPCS | Performed by: NURSE PRACTITIONER

## 2018-01-17 PROCEDURE — G8484 FLU IMMUNIZE NO ADMIN: HCPCS | Performed by: NURSE PRACTITIONER

## 2018-01-17 PROCEDURE — 4040F PNEUMOC VAC/ADMIN/RCVD: CPT | Performed by: NURSE PRACTITIONER

## 2018-01-17 PROCEDURE — G8427 DOCREV CUR MEDS BY ELIG CLIN: HCPCS | Performed by: NURSE PRACTITIONER

## 2018-01-17 PROCEDURE — 3014F SCREEN MAMMO DOC REV: CPT | Performed by: NURSE PRACTITIONER

## 2018-01-17 PROCEDURE — G8598 ASA/ANTIPLAT THER USED: HCPCS | Performed by: NURSE PRACTITIONER

## 2018-01-17 PROCEDURE — 99213 OFFICE O/P EST LOW 20 MIN: CPT | Performed by: NURSE PRACTITIONER

## 2018-01-17 PROCEDURE — 1036F TOBACCO NON-USER: CPT | Performed by: NURSE PRACTITIONER

## 2018-01-17 PROCEDURE — 3017F COLORECTAL CA SCREEN DOC REV: CPT | Performed by: NURSE PRACTITIONER

## 2018-01-17 RX ORDER — SULFAMETHOXAZOLE AND TRIMETHOPRIM 800; 160 MG/1; MG/1
1 TABLET ORAL 2 TIMES DAILY
Qty: 20 TABLET | Refills: 0 | Status: SHIPPED | OUTPATIENT
Start: 2018-01-17 | End: 2018-01-27

## 2018-01-17 RX ORDER — MEMANTINE HYDROCHLORIDE 10 MG/1
10 TABLET ORAL 2 TIMES DAILY
Qty: 60 TABLET | Refills: 2 | Status: SHIPPED | OUTPATIENT
Start: 2018-01-17 | End: 2018-03-26 | Stop reason: SDUPTHER

## 2018-01-17 ASSESSMENT — ENCOUNTER SYMPTOMS: RESPIRATORY NEGATIVE: 1

## 2018-01-17 NOTE — PROGRESS NOTES
(LANTUS) 100 UNIT/ML injection vial Inject 10 Units into the skin nightly 3 vial 3    umeclidinium-vilanterol (ANORO ELLIPTA) 62.5-25 MCG/INH AEPB inhaler Inhale 1 puff into the lungs daily 3 each 3    CPAP Machine MISC by Does not apply route Pressure of 13 (AirSense 10)  [employer].      albuterol (PROVENTIL) (2.5 MG/3ML) 0.083% nebulizer solution Take 2.5 mg by nebulization every 6 hours as needed for Wheezing      ferrous sulfate 325 (65 FE) MG EC tablet Take 325 mg by mouth daily (with breakfast)      Multiple Vitamin (MULTI VITAMIN DAILY) TABS Take by mouth daily      nitroGLYCERIN (NITROSTAT) 0.4 MG SL tablet Place 0.4 mg under the tongue every 5 minutes as needed for Chest pain Dissolve 1 tab under tongue at first sign of chest pain. May repeat every 5 minutes until relief is obtained. If pain persists after taking 3 tabs in a 15-minute period, or the pain is different than is typically experienced, call 9-1-1 immediately. No current facility-administered medications for this visit.

## 2018-01-17 NOTE — PROGRESS NOTES
each by Does not apply route 4 times daily DX: E 11.42   Yes Historical Provider, MD   Zinc 50 MG TABS Take 50 mg by mouth daily   Yes Historical Provider, MD   loratadine (CLARITIN) 10 MG tablet Take 1 tablet by mouth daily 11/30/16  Yes Harmony Bernal MD   Diabetic Shoe MISC by Does not apply route 11/4/16  Yes Harmony Bernal MD   niacin 500 MG extended release capsule Take 500 mg by mouth daily   Yes Historical Provider, MD   folic acid (FOLVITE) 656 MCG tablet Take 800 mcg by mouth daily   Yes Historical Provider, MD   Cyanocobalamin (VITAMIN B12 PO) Take 2,500 mcg by mouth daily   Yes Historical Provider, MD   B Complex-C (SUPER B COMPLEX PO) Take by mouth daily   Yes Historical Provider, MD   travoprost, benzalkonium, (TRAVATAN) 0.004 % ophthalmic solution Place 1 drop into both eyes daily 10/7/16  Yes Harmony Bernal MD   Insulin Syringe-Needle U-100 (INSULIN SYRINGE .5CC/31GX5/16\") 31G X 5/16\" 0.5 ML MISC 1 each by Does not apply route daily 10/7/16  Yes Harmony Bernal MD   insulin glargine (LANTUS) 100 UNIT/ML injection vial Inject 10 Units into the skin nightly 10/7/16  Yes Harmony Bernal MD   umeclidinium-vilanterol [facility] ELLIPTA) 62.5-25 MCG/INH AEPB inhaler Inhale 1 puff into the lungs daily 10/7/16  Yes Harmony Bernal MD   CPAP Machine MISC by Does not apply route Pressure of 13 (AirSense 10)  [employer].   Yes Historical Provider, MD   albuterol (PROVENTIL) (2.5 MG/3ML) 0.083% nebulizer solution Take 2.5 mg by nebulization every 6 hours as needed for Wheezing   Yes Historical Provider, MD   ferrous sulfate 325 (65 FE) MG EC tablet Take 325 mg by mouth daily (with breakfast)   Yes Historical Provider, MD   Multiple Vitamin (MULTI VITAMIN DAILY) TABS Take by mouth daily   Yes Historical Provider, MD   nitroGLYCERIN (NITROSTAT) 0.4 MG SL tablet Place 0.4 mg under the tongue every 5 minutes as needed for Chest pain Dissolve 1 tab under tongue at first sign of chest pain.  May repeat every 5 minutes until relief is obtained. If pain persists after taking 3 tabs in a 15-minute period, or the pain is different than is typically experienced, call 9-1-1 immediately. Yes Historical Provider, MD       Social History   Substance Use Topics    Smoking status: Former Smoker     Packs/day: 1.00     Years: 35.00     Quit date: 9/30/2001    Smokeless tobacco: Never Used    Alcohol use Yes      Comment: occasional beer with food     Review of Systems: All 12 systems reviewed and pertinent positives noted above. Objective:  Vascular: DP and PT pulses palpable 2/4, bilateral.  CFT <3 seconds, bilateral.  Hair growth present to the level of the digits, bilateral.  Edema present, bilateral.  Varicosities present, bilateral. Erythema absent, bilateral. Distal Rubor absent bilateral.  Temperature within normal limits bilateral. Hyperpigmentation present bilateral. Atrophic skin no    Neurological: Sensation Impaired to light touch to level of digits, bilateral.  Protective sensation intact  8/10 sites via 5.07/10g [place] Monofilament, bilateral.  negative Tinel's, bilateral.  negative Valleix sign, bilateral.  Vibratory abnormal  bilateral.  Reflexes Decreased bilateral.  Paresthesias positive. Dysthesias negative. Sharp/dull abnormal bilateral.    Musculoskeletal: Muscle strength 5/5, bilateral.  Pain absent upon palpation bilateral. Normal medial longitudinal arch, bilateral.  Ankle ROM within normal limits,bilateral.  1st MPJ ROM within normal limits, bilateral.  Dorsally contracted digits absent. No other foot deformities. Integument: Warm, dry, supple, Bilateral.  Open lesion absent, Bilateral.  Interdigital maceration absent to web spaces,absent Bilateral.  Nails left 1 and right 1 thickened, dystrophic and crumbly, discolored with subungual debris. Fissures absent, Bilateral. Hyperkeratotic tissue is absent. Assessment:   1.  Type 2 diabetes mellitus with diabetic polyneuropathy, with long-term current use of insulin ([facility] [address]     2. Dermatophytosis of nail         Plan:  Diabetic foot education and exam.  Nails as mentioned above debrided in length and thickness. Patient advised about OTC treatments for nails and callous. Patient will follow up in 10 weeks for routine foot care or PRN if any further problems arise.

## 2018-01-22 ENCOUNTER — HOSPITAL ENCOUNTER (OUTPATIENT)
Dept: LAB | Age: 68
Setting detail: SPECIMEN
Discharge: HOME | End: 2018-01-22
Payer: MEDICARE

## 2018-01-22 ENCOUNTER — OFFICE VISIT (OUTPATIENT)
Dept: INTERNAL MEDICINE | Age: 68
End: 2018-01-22
Payer: MEDICARE

## 2018-01-22 VITALS
RESPIRATION RATE: 20 BRPM | BODY MASS INDEX: 45.41 KG/M2 | WEIGHT: 266 LBS | DIASTOLIC BLOOD PRESSURE: 60 MMHG | HEIGHT: 64 IN | HEART RATE: 60 BPM | SYSTOLIC BLOOD PRESSURE: 100 MMHG

## 2018-01-22 DIAGNOSIS — I25.10 CORONARY ARTERY DISEASE DUE TO LIPID RICH PLAQUE: ICD-10-CM

## 2018-01-22 DIAGNOSIS — E11.42 TYPE 2 DIABETES MELLITUS WITH DIABETIC POLYNEUROPATHY, WITH LONG-TERM CURRENT USE OF INSULIN (HCC): ICD-10-CM

## 2018-01-22 DIAGNOSIS — I25.83 CORONARY ARTERY DISEASE DUE TO LIPID RICH PLAQUE: ICD-10-CM

## 2018-01-22 DIAGNOSIS — E11.42 TYPE 2 DIABETES MELLITUS WITH DIABETIC POLYNEUROPATHY, WITH LONG-TERM CURRENT USE OF INSULIN (HCC): Primary | ICD-10-CM

## 2018-01-22 DIAGNOSIS — Z79.4 TYPE 2 DIABETES MELLITUS WITH DIABETIC POLYNEUROPATHY, WITH LONG-TERM CURRENT USE OF INSULIN (HCC): Primary | ICD-10-CM

## 2018-01-22 DIAGNOSIS — E55.9 VITAMIN D DEFICIENCY: ICD-10-CM

## 2018-01-22 DIAGNOSIS — R53.82 CHRONIC FATIGUE: ICD-10-CM

## 2018-01-22 DIAGNOSIS — Z79.4 TYPE 2 DIABETES MELLITUS WITH DIABETIC POLYNEUROPATHY, WITH LONG-TERM CURRENT USE OF INSULIN (HCC): ICD-10-CM

## 2018-01-22 LAB
CREATININE URINE: 43 MG/DL (ref 28–217)
ESTIMATED AVERAGE GLUCOSE: 123 MG/DL
HBA1C MFR BLD: 5.9 % (ref 4.8–5.9)
HEMOGLOBIN: 12 G/DL (ref 12–16)
IRON SATURATION: 45 % (ref 20–55)
IRON: 130 UG/DL (ref 37–145)
MICROALBUMIN/CREAT 24H UR: 14 MG/L
MICROALBUMIN/CREAT UR-RTO: 33 MCG/MG CREAT
TOTAL IRON BINDING CAPACITY: 292 UG/DL (ref 250–450)
UNSATURATED IRON BINDING CAPACITY: 162 UG/DL (ref 112–347)

## 2018-01-22 PROCEDURE — 1036F TOBACCO NON-USER: CPT | Performed by: INTERNAL MEDICINE

## 2018-01-22 PROCEDURE — G8484 FLU IMMUNIZE NO ADMIN: HCPCS | Performed by: INTERNAL MEDICINE

## 2018-01-22 PROCEDURE — 83540 ASSAY OF IRON: CPT

## 2018-01-22 PROCEDURE — 4040F PNEUMOC VAC/ADMIN/RCVD: CPT | Performed by: INTERNAL MEDICINE

## 2018-01-22 PROCEDURE — G8400 PT W/DXA NO RESULTS DOC: HCPCS | Performed by: INTERNAL MEDICINE

## 2018-01-22 PROCEDURE — 82570 ASSAY OF URINE CREATININE: CPT

## 2018-01-22 PROCEDURE — 82043 UR ALBUMIN QUANTITATIVE: CPT

## 2018-01-22 PROCEDURE — G8417 CALC BMI ABV UP PARAM F/U: HCPCS | Performed by: INTERNAL MEDICINE

## 2018-01-22 PROCEDURE — 1090F PRES/ABSN URINE INCON ASSESS: CPT | Performed by: INTERNAL MEDICINE

## 2018-01-22 PROCEDURE — 99214 OFFICE O/P EST MOD 30 MIN: CPT | Performed by: INTERNAL MEDICINE

## 2018-01-22 PROCEDURE — 3044F HG A1C LEVEL LT 7.0%: CPT | Performed by: INTERNAL MEDICINE

## 2018-01-22 PROCEDURE — 83550 IRON BINDING TEST: CPT

## 2018-01-22 PROCEDURE — 3014F SCREEN MAMMO DOC REV: CPT | Performed by: INTERNAL MEDICINE

## 2018-01-22 PROCEDURE — 3017F COLORECTAL CA SCREEN DOC REV: CPT | Performed by: INTERNAL MEDICINE

## 2018-01-22 PROCEDURE — G8427 DOCREV CUR MEDS BY ELIG CLIN: HCPCS | Performed by: INTERNAL MEDICINE

## 2018-01-22 PROCEDURE — 85018 HEMOGLOBIN: CPT

## 2018-01-22 PROCEDURE — 1123F ACP DISCUSS/DSCN MKR DOCD: CPT | Performed by: INTERNAL MEDICINE

## 2018-01-22 PROCEDURE — G8598 ASA/ANTIPLAT THER USED: HCPCS | Performed by: INTERNAL MEDICINE

## 2018-01-22 PROCEDURE — 83036 HEMOGLOBIN GLYCOSYLATED A1C: CPT

## 2018-01-22 PROCEDURE — 36415 COLL VENOUS BLD VENIPUNCTURE: CPT

## 2018-01-22 RX ORDER — MELATONIN
Refills: 11 | COMMUNITY
Start: 2018-01-13 | End: 2018-05-04 | Stop reason: SDUPTHER

## 2018-01-22 ASSESSMENT — ENCOUNTER SYMPTOMS
BACK PAIN: 0
EYE PAIN: 0
CONSTIPATION: 0
ABDOMINAL PAIN: 0
DIARRHEA: 0
VOMITING: 0
NAUSEA: 0
COUGH: 0
SHORTNESS OF BREATH: 0
BLOOD IN STOOL: 0

## 2018-01-22 NOTE — PROGRESS NOTES
[address] [facility] INTERNAL MED  [address] [id number]  Dept: [phone]  Dept Fax: [phone]  Loc: [phone]    Annika Fitzgerald is a 69 y.o. female who presents today for her medical conditions/complaints as noted below. Annika Fitzgerald is c/o of   Chief Complaint   Patient presents with    Diabetes     4 month    Coronary Artery Disease     4 month    Fatigue     4 month         HPI:     Diabetes   She presents for her follow-up diabetic visit. She has type 2 (With neuropathy and with long-term insulin use) diabetes mellitus. Her disease course has been fluctuating. Pertinent negatives for hypoglycemia include no confusion, dizziness, headaches, nervousness/anxiousness or pallor. Associated symptoms include fatigue. Pertinent negatives for diabetes include no chest pain, no polydipsia, no polyuria and no weakness. Coronary Artery Disease   Presents for follow-up visit. Pertinent negatives include no chest pain, chest pressure, dizziness, leg swelling, palpitations or shortness of breath. The symptoms have been stable. Compliance with diet is good. Compliance with exercise is good. Compliance with medications is good. Fatigue   This is a recurrent problem. The current episode started more than 1 month ago. The problem occurs intermittently. The problem has been waxing and waning. Associated symptoms include fatigue. Pertinent negatives include no abdominal pain, arthralgias, chest pain, chills, coughing, fever, headaches, nausea, neck pain, numbness, rash, vomiting or weakness. Other   This is a chronic (4) BMI>=40) problem. The current episode started more than 1 month ago. The problem occurs constantly. The problem has been waxing and waning. Associated symptoms include fatigue. Pertinent negatives include no abdominal pain, arthralgias, chest pain, chills, coughing, fever, headaches, nausea, neck pain, numbness, rash, vomiting or weakness.        Hemoglobin A1C adenopathy. Does not bruise/bleed easily. Psychiatric/Behavioral: Negative for confusion. The patient is not nervous/anxious. Objective:     Physical Exam   Constitutional: She is oriented to person, place, and time. She appears well-developed and well-nourished. HENT:   Head: Normocephalic and atraumatic. Eyes: EOM are normal. Pupils are equal, round, and reactive to light. Neck: Neck supple. Cardiovascular: Normal rate and regular rhythm. Exam reveals no gallop and no friction rub. No murmur heard. Pulmonary/Chest: Effort normal and breath sounds normal. She has no wheezes. She has no rales. Abdominal: Soft. She exhibits no distension and no mass. There is no tenderness. There is no rebound. Musculoskeletal: Normal range of motion. She exhibits no edema. Lymphadenopathy:     She has no cervical adenopathy. Neurological: She is alert and oriented to person, place, and time. No cranial nerve deficit (grossly). Skin: Skin is warm and dry. No rash noted. Psychiatric: She has a normal mood and affect. Thought content normal.   Vitals reviewed. /60 (Site: Left Arm, Position: Sitting, Cuff Size: Large Adult)   Pulse 60   Resp 20   Ht 5' 4\" (1.626 m)   Wt 266 lb (120.7 kg)   LMP 12/07/1995 (Approximate)   BMI 45.66 kg/m²     Assessment:      1. Type 2 diabetes mellitus with diabetic polyneuropathy, with long-term current use of insulin ([facility])  Comprehensive Metabolic Panel    Lipid Panel    Vitamin B12    Hemoglobin A1C    Iron And TIBC   2. Coronary artery disease due to lipid rich plaque  Comprehensive Metabolic Panel    Lipid Panel    Vitamin B12    Hemoglobin A1C    Iron And TIBC   3. Chronic fatigue  TSH    T4, Free    Comprehensive Metabolic Panel    Lipid Panel    Vitamin B12    Hemoglobin A1C    Iron And TIBC   4. Vitamin D deficiency  Vitamin D 25 Hydroxy   5.  BMI 40.0-44.9, adult [facility])               Plan:      Return in about 6 months (around 7/22/2018) for Diabetes, CAD, Fatigue. Orders Placed This Encounter   Procedures    TSH     Standing Status:   Future     Standing Expiration Date:   1/22/2019    T4, Free     Standing Status:   Future     Standing Expiration Date:   1/22/2019    Comprehensive Metabolic Panel     Standing Status:   Future     Standing Expiration Date:   1/22/2019    Lipid Panel     Standing Status:   Future     Standing Expiration Date:   1/22/2019     Order Specific Question:   Is Patient Fasting?/# of Hours     Answer:   12    Vitamin B12     Standing Status:   Future     Standing Expiration Date:   1/22/2019    Vitamin D 25 Hydroxy     Standing Status:   Future     Standing Expiration Date:   1/22/2019    Hemoglobin A1C     Standing Status:   Future     Standing Expiration Date:   1/22/2019    Iron And TIBC     Standing Status:   Future     Standing Expiration Date:   1/22/2019     Order Specific Question:   Is Patient Fasting? Answer:   no     Order Specific Question:   No of Hours? Answer:   no     No orders of the defined types were placed in this encounter. Patient given educational materials - see patient instructions. Discussed use, benefit, and side effects of prescribed medications. All patient questions answered. Pt voiced understanding. Reviewed health maintenance. Instructed to continue current medications, diet and exercise. Patient agreed with treatment plan. Follow up as directed.      Electronically signed by Vihba Huerta MD on 1/22/2018 at 9:53 AM

## 2018-01-22 NOTE — PROGRESS NOTES
Chronic Disease Visit Information    BP Readings from Last 3 Encounters:   01/22/18 100/60   01/17/18 94/68   01/17/18 118/70          Hemoglobin A1C (%)   Date Value   09/18/2017 5.9   05/03/2017 6.1 (H)   10/13/2016 6.9 (H)     Microalb/Crt. Ratio (mcg/mg creat)   Date Value   09/25/2017 CANNOT BE CALCULATED     LDL Cholesterol (mg/dL)   Date Value   09/18/2017 80     LDL Calculated (mg/dL)   Date Value   05/19/2016 80     HDL (mg/dL)   Date Value   09/18/2017 59     BUN (mg/dL)   Date Value   09/18/2017 25 (H)     CREATININE (mg/dL)   Date Value   09/18/2017 0.90     Glucose (mg/dL)   Date Value   09/18/2017 138 (H)            Have you changed or started any medications since your last visit including any over-the-counter medicines, vitamins, or herbal medicines? yes - Antibiotic, Namenda  Are you having any side effects from any of your medications? -  no  Have you stopped taking any of your medications? Is so, why? -  yes - Aricept    Have you seen any other physician or provider since your last visit? Yes - Records Obtained  Have you had any other diagnostic tests since your last visit? Yes - Records Obtained  Have you been seen in the emergency room and/or had an admission to a hospital since we last saw you? No  Have you had your annual diabetic retinal (eye) exam? Yes - Records Obtained  Have you had your routine dental cleaning in the past 6 months? no    Have you activated your [employer] account? If not, what are your barriers?  No: declines     Patient Care Team:  Justina Clayton MD as PCP - General (Internal Medicine)  Julio Lopez DO as PCP - S Attributed Provider         Medical History Review  Past Medical, Family, and Social History reviewed and does contribute to the patient presenting condition    Health Maintenance   Topic Date Due    Pneumococcal low/med risk (2 of 2 - PPSV23) 08/05/2018    A1C test (Diabetic or Prediabetic)  09/18/2018    Lipid screen  09/18/2018    Potassium monitoring  09/18/2018    Creatinine monitoring  09/18/2018    Diabetic microalbuminuria test  09/25/2018    Diabetic retinal exam  11/21/2018    Diabetic foot exam  01/17/2019    Breast cancer screen  11/14/2019    Colon cancer screen colonoscopy  09/20/2022    DTaP/Tdap/Td vaccine (2 - Td) 06/18/2027    Zostavax vaccine  Completed    DEXA (modify frequency per FRAX score)  Completed    Flu vaccine  Completed    Hepatitis C screen  Completed

## 2018-01-29 RX ORDER — SPIRONOLACTONE 50 MG/1
TABLET, FILM COATED ORAL
Qty: 180 TABLET | Refills: 3 | Status: SHIPPED | OUTPATIENT
Start: 2018-01-29 | End: 2019-01-28 | Stop reason: SDUPTHER

## 2018-01-30 ENCOUNTER — OFFICE VISIT (OUTPATIENT)
Dept: SURGERY | Age: 68
End: 2018-01-30
Payer: MEDICARE

## 2018-01-30 ENCOUNTER — HOSPITAL ENCOUNTER (OUTPATIENT)
Dept: LAB | Age: 68
Setting detail: SPECIMEN
Discharge: HOME | End: 2018-01-30
Payer: MEDICARE

## 2018-01-30 ENCOUNTER — TELEPHONE (OUTPATIENT)
Dept: SURGERY | Age: 68
End: 2018-01-30

## 2018-01-30 VITALS
WEIGHT: 265 LBS | SYSTOLIC BLOOD PRESSURE: 100 MMHG | TEMPERATURE: 97 F | BODY MASS INDEX: 45.24 KG/M2 | HEART RATE: 60 BPM | DIASTOLIC BLOOD PRESSURE: 60 MMHG | HEIGHT: 64 IN

## 2018-01-30 DIAGNOSIS — R22.2 MASS OF SKIN OF BACK: ICD-10-CM

## 2018-01-30 DIAGNOSIS — Z01.811 PRE-OP CHEST EXAM: ICD-10-CM

## 2018-01-30 DIAGNOSIS — N60.82 SEBACEOUS CYST OF SKIN OF LEFT BREAST: Primary | ICD-10-CM

## 2018-01-30 DIAGNOSIS — N60.82 SEBACEOUS CYST OF SKIN OF LEFT BREAST: ICD-10-CM

## 2018-01-30 LAB
ABSOLUTE EOS #: 0.4 K/UL (ref 0–0.4)
ABSOLUTE IMMATURE GRANULOCYTE: ABNORMAL K/UL (ref 0–0.3)
ABSOLUTE LYMPH #: 2.1 K/UL (ref 1–4.8)
ABSOLUTE MONO #: 1.4 K/UL (ref 0.1–1.2)
ALBUMIN SERPL-MCNC: 4.1 G/DL (ref 3.5–5.2)
ALBUMIN/GLOBULIN RATIO: 1.2 (ref 1–2.5)
ALP BLD-CCNC: 77 U/L (ref 35–104)
ALT SERPL-CCNC: 30 U/L (ref 5–33)
ANION GAP SERPL CALCULATED.3IONS-SCNC: 15 MMOL/L (ref 9–17)
AST SERPL-CCNC: 32 U/L
BASOPHILS # BLD: 1 % (ref 0–1)
BASOPHILS ABSOLUTE: 0.1 K/UL (ref 0–0.2)
BILIRUB SERPL-MCNC: 0.42 MG/DL (ref 0.3–1.2)
BUN BLDV-MCNC: 29 MG/DL (ref 8–23)
BUN/CREAT BLD: 32 (ref 9–20)
CALCIUM SERPL-MCNC: 9.2 MG/DL (ref 8.6–10.4)
CHLORIDE BLD-SCNC: 103 MMOL/L (ref 98–107)
CO2: 21 MMOL/L (ref 20–31)
CREAT SERPL-MCNC: 0.92 MG/DL (ref 0.5–0.9)
DIFFERENTIAL TYPE: ABNORMAL
EOSINOPHILS RELATIVE PERCENT: 4 % (ref 1–7)
GFR AFRICAN AMERICAN: >60 ML/MIN
GFR NON-AFRICAN AMERICAN: >60 ML/MIN
GFR SERPL CREATININE-BSD FRML MDRD: ABNORMAL ML/MIN/{1.73_M2}
GFR SERPL CREATININE-BSD FRML MDRD: ABNORMAL ML/MIN/{1.73_M2}
GLUCOSE BLD-MCNC: 98 MG/DL (ref 70–99)
HCT VFR BLD CALC: 39.1 % (ref 36–46)
HEMOGLOBIN: 12.7 G/DL (ref 12–16)
IMMATURE GRANULOCYTES: ABNORMAL %
LYMPHOCYTES # BLD: 19 % (ref 16–46)
MCH RBC QN AUTO: 31.1 PG (ref 26–34)
MCHC RBC AUTO-ENTMCNC: 32.6 G/DL (ref 31–37)
MCV RBC AUTO: 95.4 FL (ref 80–100)
MONOCYTES # BLD: 13 % (ref 4–11)
NRBC AUTOMATED: ABNORMAL PER 100 WBC
PDW BLD-RTO: 14.3 % (ref 11–14.5)
PLATELET # BLD: 188 K/UL (ref 140–450)
PLATELET ESTIMATE: ABNORMAL
PMV BLD AUTO: 9 FL (ref 6–12)
POTASSIUM SERPL-SCNC: 4.9 MMOL/L (ref 3.7–5.3)
RBC # BLD: 4.1 M/UL (ref 4–5.2)
RBC # BLD: ABNORMAL 10*6/UL
SEG NEUTROPHILS: 63 % (ref 43–77)
SEGMENTED NEUTROPHILS ABSOLUTE COUNT: 6.8 K/UL (ref 1.8–7.7)
SODIUM BLD-SCNC: 139 MMOL/L (ref 135–144)
TOTAL PROTEIN: 7.6 G/DL (ref 6.4–8.3)
WBC # BLD: 10.8 K/UL (ref 3.5–11)
WBC # BLD: ABNORMAL 10*3/UL

## 2018-01-30 PROCEDURE — G8417 CALC BMI ABV UP PARAM F/U: HCPCS | Performed by: SURGERY

## 2018-01-30 PROCEDURE — G8400 PT W/DXA NO RESULTS DOC: HCPCS | Performed by: SURGERY

## 2018-01-30 PROCEDURE — G8484 FLU IMMUNIZE NO ADMIN: HCPCS | Performed by: SURGERY

## 2018-01-30 PROCEDURE — 1090F PRES/ABSN URINE INCON ASSESS: CPT | Performed by: SURGERY

## 2018-01-30 PROCEDURE — 3017F COLORECTAL CA SCREEN DOC REV: CPT | Performed by: SURGERY

## 2018-01-30 PROCEDURE — G8428 CUR MEDS NOT DOCUMENT: HCPCS | Performed by: SURGERY

## 2018-01-30 PROCEDURE — 99213 OFFICE O/P EST LOW 20 MIN: CPT | Performed by: SURGERY

## 2018-01-30 PROCEDURE — 85025 COMPLETE CBC W/AUTO DIFF WBC: CPT

## 2018-01-30 PROCEDURE — 80053 COMPREHEN METABOLIC PANEL: CPT

## 2018-01-30 PROCEDURE — 36415 COLL VENOUS BLD VENIPUNCTURE: CPT

## 2018-01-30 PROCEDURE — 4040F PNEUMOC VAC/ADMIN/RCVD: CPT | Performed by: SURGERY

## 2018-01-30 PROCEDURE — 1123F ACP DISCUSS/DSCN MKR DOCD: CPT | Performed by: SURGERY

## 2018-01-30 PROCEDURE — 1036F TOBACCO NON-USER: CPT | Performed by: SURGERY

## 2018-01-30 PROCEDURE — G8598 ASA/ANTIPLAT THER USED: HCPCS | Performed by: SURGERY

## 2018-01-30 PROCEDURE — 3014F SCREEN MAMMO DOC REV: CPT | Performed by: SURGERY

## 2018-01-30 NOTE — TELEPHONE ENCOUNTER
Smoker     Packs/day: 1.00     Years: 35.00     Quit date: 9/30/2001    Smokeless tobacco: Never Used    Alcohol use Yes      Comment: occasional beer with food     Medications:  Current Outpatient Prescriptions   Medication Sig Dispense Refill    spironolactone (ALDACTONE) 50 MG tablet TAKE 1 TABLET TWICE DAILY 180 tablet 3    Cholecalciferol (VITAMIN D3) 1000 units TABS TK 3 TS PO BID  11    umeclidinium-vilanterol (ANORO ELLIPTA) 62.5-25 MCG/INH AEPB inhaler Inhale 1 puff into the lungs daily 3 each 3    memantine (NAMENDA) 10 MG tablet Take 1 tablet by mouth 2 times daily 60 tablet 2    clopidogrel (PLAVIX) 75 MG tablet TAKE 1 TABLET BY MOUTH EVERY DAY 30 tablet 2    VENTOLIN  (90 Base) MCG/ACT inhaler INHALE 2 PUFFS INTO THE LUNGS EVERY 4 HOURS AS NEEDED FOR WHEEZING 1 Inhaler 5    COMBIGAN 0.2-0.5 % ophthalmic solution PLACE 1 DROP INTO BOTH EYES DAILY 15 mL 3    ibuprofen (ADVIL;MOTRIN) 400 MG tablet TAKE 1 TABLET EVERY 6 HOURS AS NEEDED FOR PAIN 360 tablet 3    alendronate (FOSAMAX) 70 MG tablet TAKE 1 TABLET BY MOUTH EVERY 7 DAYS 12 tablet 3    amitriptyline (ELAVIL) 25 MG tablet take 1 tablet by mouth at bedtime 30 tablet 5    [employer] (PRODIGY LANCING DEVICE) MISC USE TO TEST BLOOD SUGAR FOUR TIMES DAILY AS DIRECTED 1 each 0    SENNA LAX 8.6 MG tablet TAKE 1 TABLET BY MOUTH TWICE DAILY 180 tablet 3    PRODIGY NO CODING BLOOD GLUC strip USE AS DIRECTED FOUR TIMES DAILY 200 strip 3    Cholecalciferol (VITAMIN D3) 3000 units TABS Take 3,000 Units by mouth 2 times daily 60 tablet 11    triamcinolone (KENALOG) 0.1 % cream Apply topically 2 times daily.  80 g 5    lisinopril (PRINIVIL;ZESTRIL) 10 MG tablet Take 1 tablet by mouth daily 30 tablet 11    amLODIPine (NORVASC) 5 MG tablet Take 1 tablet by mouth daily 90 tablet 3    potassium chloride (MICRO-K) 10 MEQ extended release capsule TAKE 1 CAPSULE TWICE DAILY 180 capsule 3    citalopram (CELEXA) 10 MG tablet TAKE 1 TABLET Provider, MD   Multiple Vitamin (MULTI VITAMIN DAILY) TABS Take by mouth daily    Historical Provider, MD   nitroGLYCERIN (NITROSTAT) 0.4 MG SL tablet Place 0.4 mg under the tongue every 5 minutes as needed for Chest pain Dissolve 1 tab under tongue at first sign of chest pain. May repeat every 5 minutes until relief is obtained. If pain persists after taking 3 tabs in a 15-minute period, or the pain is different than is typically experienced, call 9-1-1 immediately. Historical Provider, MD     Vital Signs (Current) [unfilled]    Weight:   Wt Readings from Last 1 Encounters:   01/30/18 265 lb (120.2 kg)     Height:   Ht Readings from Last 1 Encounters:   01/30/18 5' 4\" (1.626 m)      BMI:  There is no height or weight on file to calculate BMI. Estimated body mass index is 45.49 kg/m² as calculated from the following:    Height as of an earlier encounter on 1/30/18: 5' 4\" (1.626 m). Weight as of an earlier encounter on 1/30/18: 265 lb (120.2 kg). body mass index is unknown because there is no height or weight on file. Cardiac Clearance: None   Medical Clearance:None   Appointment for surgery Clearance scheduled for:None     Preoperative Testing: These are the current and completed labs:  CBC:   Lab Results   Component Value Date    WBC 10.8 01/30/2018    RBC 4.10 01/30/2018    HGB 12.7 01/30/2018    HCT 39.1 01/30/2018    MCV 95.4 01/30/2018    RDW 14.3 01/30/2018     01/30/2018     CMP:   Lab Results   Component Value Date     01/30/2018    K 4.9 01/30/2018     01/30/2018    CO2 21 01/30/2018    BUN 29 01/30/2018    CREATININE 0.92 01/30/2018    GFRAA >60 01/30/2018    LABGLOM >60 01/30/2018    GLUCOSE 98 01/30/2018    PROT 7.6 01/30/2018    CALCIUM 9.2 01/30/2018    BILITOT 0.42 01/30/2018    ALKPHOS 77 01/30/2018    AST 32 01/30/2018    ALT 30 01/30/2018     POC Tests: No results for input(s): POCGLU, POCNA, POCK, POCCL, POCBUN, POCHEMO, POCHCT in the last 72 hours.   Coags  No results found for: PROTIME, INR, APTT  HCG (If Applicable) No results found for: PREGTESTUR, PREGSERUM, HCG, HCGQUANT   ABGs No results found for: PHART, PO2ART, VDV6JCO, HNP1RHT, BEART, U2COZZKG   Type & Screen (If Applicable)  No results found for: [facility]    Additional ordered pre-operative testing:  [x]CBC    []ABG      [] BMP   []URINALYSIS   [x]CMP    []HCG   []COAGS PT/INR  []T&C  []LFTs   []TYPE AND SCREEN    [] EKG  [] Chest X-Ray  [] Other Radiology    [] Sent to Hospitalist None  [x] Sent to Anesthesia for your review: yes   [] Additional Orders: None     Comments:None   Requests: No Special requests    Signed: Barbara Edge LPN 4/58/4838 6:62 PM

## 2018-01-30 NOTE — PROGRESS NOTES
Kush Donovan is a 69 y.o. female          CC:    . Cyst (cyst on right back and upper chest)      HISTORY OF PRESENT ILLNESS:      Here to have 2 seb cyst removed. They are getting bigger and one on the back has drained and been infected. Review of Systems:    General:  Fever: Negative  Weight Change:Negative  Night Sweats: Negative    Eye:  Blurry Vision:Positive for occasional  Double Vision: Positive for occasional    Ent:  Headaches: Positive  Sore throat: Negative    Allergy/Immunology:  Hives: Negative  Latex allergy: Negative    Hematology/Lymphatic:  Bleeding Problems: Negative  Blood Clots: positive for cardiac clot s/p stneting  Swollen Lymph Nodes: Negative    Lungs:  Cough: Negative  SOB: Negative  Wheezing:Negative    Cardiovascular:  Chest Pain: positive for occasional  Palpitations:Positive    GI:   Decreased Appetite: Negative  Heartburn: Negative  Dysphagia: Negative  Nausea/Vomiting: Positive for nausea  Abdominal Pain: Positive for occasional  Change in Bowels:Negative  Constipation: Negative  Diarrhea: Negative  Rectal Bleeding: Negative    :   Dysuria: Negative  Increase Urinary Frequency/Urgency: positive for urinary urgency with diuretics    Neuro:  Seizures: Negative  Confusion: Negative        PAST MEDICAL HISTORY:        Family History   Problem Relation Age of Onset    Heart Disease Mother     High Blood Pressure Mother     Stroke Mother     Glaucoma Mother     Early Death Father     Cancer Father      stomach    Miscarriages / Stillbirths Maternal Uncle      Social History     Social History    Marital status:      Spouse name: N/A    Number of children: N/A    Years of education: N/A     Occupational History    Not on file.      Social History Main Topics    Smoking status: Former Smoker     Packs/day: 1.00     Years: 35.00     Quit date: 9/30/2001    Smokeless tobacco: Never Used    Alcohol use Yes      Comment: occasional beer with food    Drug use: mouth daily      folic acid (FOLVITE) 023 MCG tablet Take 800 mcg by mouth daily      Cyanocobalamin (VITAMIN B12 PO) Take 2,500 mcg by mouth daily      B Complex-C (SUPER B COMPLEX PO) Take by mouth daily      travoprost, benzalkonium, (TRAVATAN) 0.004 % ophthalmic solution Place 1 drop into both eyes daily 3 Bottle 3    Insulin Syringe-Needle U-100 (INSULIN SYRINGE .5CC/31GX5/16\") 31G X 5/16\" 0.5 ML MISC 1 each by Does not apply route daily 200 each 3    insulin glargine (LANTUS) 100 UNIT/ML injection vial Inject 10 Units into the skin nightly 3 vial 3    CPAP Machine MISC by Does not apply route Pressure of 13 (AirSense 10)  [employer].      albuterol (PROVENTIL) (2.5 MG/3ML) 0.083% nebulizer solution Take 2.5 mg by nebulization every 6 hours as needed for Wheezing      ferrous sulfate 325 (65 FE) MG EC tablet Take 325 mg by mouth daily (with breakfast)      Multiple Vitamin (MULTI VITAMIN DAILY) TABS Take by mouth daily      nitroGLYCERIN (NITROSTAT) 0.4 MG SL tablet Place 0.4 mg under the tongue every 5 minutes as needed for Chest pain Dissolve 1 tab under tongue at first sign of chest pain. May repeat every 5 minutes until relief is obtained. If pain persists after taking 3 tabs in a 15-minute period, or the pain is different than is typically experienced, call 9-1-1 immediately. No current facility-administered medications on file prior to visit. Allergies as of 01/30/2018    (No Known Allergies)         PHYSICAL EXAM:    Blood pressure 100/60, pulse 60, temperature 97 °F (36.1 °C), temperature source Tympanic, height 5' 4\" (1.626 m), weight 265 lb (120.2 kg), last menstrual period 12/07/1995, not currently breastfeeding.     Gen:  A and O x 3, NAD, well nourished  Eyes:  Sclera non icterus, PERRL  Head:  Normocephalic, non-tender  Neck:  Supple, no adenopathy, thyroid non tender and no masses,no carotid bruits  Lungs:  CTA, symmetrical  Chest:  RRR, no murmurs  Abd:  Soft, NT, ND, no HSM, no hernias, no bruits  Ext:  No edema, no cyanosis  Psych: reveals appropriate mood, memory and judgment,  Neuro:  Reveals no gross motor or sensory deficits,   Msk:  5/5 strength all 4 extremities, no joint tenderness    Back:  2 cm seb cyst with punctate  Left breast:  1 cm seb cyst with punctate    ASSESS MENT:    1.  seb cyst left breast 1 cm,   2.  seb cyst back 2 cm      PLAN:    1.   Will plan excision in OR

## 2018-01-30 NOTE — TELEPHONE ENCOUNTER
JOSE Wells-Blaine            :1950           Surgical/Procedure Planned: excision of cysts from middle back and chest  Date & Location: 18 MD       Outpatient   Planned Length of OR: 1 hr    Sedation: local with IV sedation    This is notification of the scheduled procedure only: no    Estimated Cardiac Risk for Non-Cardiac Surgery/Procedure     Low______ Moderate______ High______    Medication Instructions - Clarification needed by this date:   -Insulin:  -Novolog            Hold____   Lantus               Hold____  Plavix     Hold ___ Days      Resume medications:     Lovenox indicated: _____Yes _____NO    Provider:Regino      Signature of Provider Giving Orders for Medication holds:    _____________________________________________

## 2018-01-31 ENCOUNTER — OFFICE VISIT (OUTPATIENT)
Dept: PULMONOLOGY | Age: 68
End: 2018-01-31
Payer: MEDICARE

## 2018-01-31 ENCOUNTER — TELEPHONE (OUTPATIENT)
Dept: INTERNAL MEDICINE | Age: 68
End: 2018-01-31

## 2018-01-31 VITALS
OXYGEN SATURATION: 97 % | HEART RATE: 72 BPM | WEIGHT: 262 LBS | HEIGHT: 64 IN | BODY MASS INDEX: 44.73 KG/M2 | DIASTOLIC BLOOD PRESSURE: 70 MMHG | SYSTOLIC BLOOD PRESSURE: 126 MMHG

## 2018-01-31 DIAGNOSIS — J47.9 BRONCHIECTASIS WITHOUT COMPLICATION (HCC): ICD-10-CM

## 2018-01-31 DIAGNOSIS — G47.33 OBSTRUCTIVE SLEEP APNEA SYNDROME, MILD: Primary | ICD-10-CM

## 2018-01-31 PROCEDURE — G8598 ASA/ANTIPLAT THER USED: HCPCS | Performed by: INTERNAL MEDICINE

## 2018-01-31 PROCEDURE — 1090F PRES/ABSN URINE INCON ASSESS: CPT | Performed by: INTERNAL MEDICINE

## 2018-01-31 PROCEDURE — 1036F TOBACCO NON-USER: CPT | Performed by: INTERNAL MEDICINE

## 2018-01-31 PROCEDURE — G8417 CALC BMI ABV UP PARAM F/U: HCPCS | Performed by: INTERNAL MEDICINE

## 2018-01-31 PROCEDURE — 4040F PNEUMOC VAC/ADMIN/RCVD: CPT | Performed by: INTERNAL MEDICINE

## 2018-01-31 PROCEDURE — 99213 OFFICE O/P EST LOW 20 MIN: CPT | Performed by: INTERNAL MEDICINE

## 2018-01-31 PROCEDURE — 3014F SCREEN MAMMO DOC REV: CPT | Performed by: INTERNAL MEDICINE

## 2018-01-31 PROCEDURE — G8400 PT W/DXA NO RESULTS DOC: HCPCS | Performed by: INTERNAL MEDICINE

## 2018-01-31 PROCEDURE — 1123F ACP DISCUSS/DSCN MKR DOCD: CPT | Performed by: INTERNAL MEDICINE

## 2018-01-31 PROCEDURE — 3017F COLORECTAL CA SCREEN DOC REV: CPT | Performed by: INTERNAL MEDICINE

## 2018-01-31 PROCEDURE — G8427 DOCREV CUR MEDS BY ELIG CLIN: HCPCS | Performed by: INTERNAL MEDICINE

## 2018-01-31 PROCEDURE — G8484 FLU IMMUNIZE NO ADMIN: HCPCS | Performed by: INTERNAL MEDICINE

## 2018-01-31 ASSESSMENT — SLEEP AND FATIGUE QUESTIONNAIRES
ESS TOTAL SCORE: 7
HOW LIKELY ARE YOU TO NOD OFF OR FALL ASLEEP WHEN YOU ARE A PASSENGER IN A CAR FOR AN HOUR WITHOUT A BREAK: 0
HOW LIKELY ARE YOU TO NOD OFF OR FALL ASLEEP WHILE LYING DOWN TO REST IN THE AFTERNOON WHEN CIRCUMSTANCES PERMIT: 2
HOW LIKELY ARE YOU TO NOD OFF OR FALL ASLEEP IN A CAR, WHILE STOPPED FOR A FEW MINUTES IN TRAFFIC: 0
HOW LIKELY ARE YOU TO NOD OFF OR FALL ASLEEP WHILE SITTING AND TALKING TO SOMEONE: 0
HOW LIKELY ARE YOU TO NOD OFF OR FALL ASLEEP WHILE SITTING AND READING: 1
HOW LIKELY ARE YOU TO NOD OFF OR FALL ASLEEP WHILE SITTING QUIETLY AFTER LUNCH WITHOUT ALCOHOL: 1
HOW LIKELY ARE YOU TO NOD OFF OR FALL ASLEEP WHILE WATCHING TV: 2
HOW LIKELY ARE YOU TO NOD OFF OR FALL ASLEEP WHILE SITTING INACTIVE IN A PUBLIC PLACE: 1

## 2018-01-31 NOTE — TELEPHONE ENCOUNTER
Patient called into the clinic with questions before her surgery with Dr. Rossy Antony. She has cyst excision of left chest on 2/7/18. Patient would like to clarify what medications she needs to stop taking and when. Please call her back at [phone].

## 2018-01-31 NOTE — PROGRESS NOTES
Yes Historical Provider, MD   nitroGLYCERIN (NITROSTAT) 0.4 MG SL tablet Place 0.4 mg under the tongue every 5 minutes as needed for Chest pain Dissolve 1 tab under tongue at first sign of chest pain. May repeat every 5 minutes until relief is obtained. If pain persists after taking 3 tabs in a 15-minute period, or the pain is different than is typically experienced, call 9-1-1 immediately. Yes Historical Provider, MD        Review of Systems -  General ROS: negative for - chills, fatigue, fever or weight loss  ENT ROS: negative for - headaches, oral lesions or sore throat  Cardiovascular ROS: no chest pain , orthopnea or pnd   Gastrointestinal ROS: no abdominal pain, change in bowel habits, or black or bloody stools  Skin - no rash   Neuro - no blurry vision , no loc . No focal weakness   msk - no jt tenderness or swelling    Vascular - no claudication , rest completed and negative   Lymphatic - complete and negative   Hematology - oncology - complete and negative   Allergy immunology - complete and negative    no burning or hematuria    Vitals:  /70 (Site: Right Arm, Position: Sitting, Cuff Size: Medium Adult)   Pulse 72   Ht 5' 4\" (1.626 m)   Wt 262 lb (118.8 kg)   LMP 12/07/1995 (Approximate)   SpO2 97%   BMI 44.97 kg/m²     PHYSICAL EXAM:mps 1 , obese   Head and neck atraumatic, normocephalic    Lymph nodes-no cervical, supraclavicular lymphadenopathy    Neck-no JVP elevation    Lungs - Ventilating all lobes without any rales, rhonchi, wheezes or dullness. No bronchial breath sounds. Chest expansion equal bilaterally    CVS- S1, S2 regular. No S3 no S4, no murmurs    Abdomen-nontender, nondistended. Bowel sounds are present. No organomegaly    Lower extremity-no edema    Upper extremity-no edema    Neurological-grossly normal cranial nerves.   No overt motor deficit     Thyroid:   Lab Results   Component Value Date    TSH 2.52 09/18/2017          compliance report, .-1/30/2018 more

## 2018-02-01 NOTE — TELEPHONE ENCOUNTER
Also please remove Slime Edwards from the list of providers to send evals to and ADD Mray Meyers CRNA to the list

## 2018-02-07 ENCOUNTER — ANESTHESIA EVENT (OUTPATIENT)
Dept: OPERATING ROOM | Age: 68
End: 2018-02-07
Payer: MEDICARE

## 2018-02-07 ENCOUNTER — HOSPITAL ENCOUNTER (OUTPATIENT)
Age: 68
Setting detail: OUTPATIENT SURGERY
Discharge: HOME | End: 2018-02-07
Attending: SURGERY | Admitting: SURGERY
Payer: MEDICARE

## 2018-02-07 ENCOUNTER — ANESTHESIA (OUTPATIENT)
Dept: OPERATING ROOM | Age: 68
End: 2018-02-07
Payer: MEDICARE

## 2018-02-07 VITALS
DIASTOLIC BLOOD PRESSURE: 46 MMHG | SYSTOLIC BLOOD PRESSURE: 91 MMHG | RESPIRATION RATE: 8 BRPM | OXYGEN SATURATION: 96 %

## 2018-02-07 VITALS
WEIGHT: 268 LBS | DIASTOLIC BLOOD PRESSURE: 58 MMHG | TEMPERATURE: 98.3 F | SYSTOLIC BLOOD PRESSURE: 121 MMHG | HEIGHT: 64 IN | RESPIRATION RATE: 16 BRPM | OXYGEN SATURATION: 96 % | BODY MASS INDEX: 45.75 KG/M2 | HEART RATE: 50 BPM

## 2018-02-07 DIAGNOSIS — Z09 POSTOP CHECK: Primary | ICD-10-CM

## 2018-02-07 LAB — GLUCOSE BLD-MCNC: 128 MG/DL (ref 65–105)

## 2018-02-07 PROCEDURE — A6402 STERILE GAUZE <= 16 SQ IN: HCPCS | Performed by: SURGERY

## 2018-02-07 PROCEDURE — 82947 ASSAY GLUCOSE BLOOD QUANT: CPT

## 2018-02-07 PROCEDURE — 3600000012 HC SURGERY LEVEL 2 ADDTL 15MIN: Performed by: SURGERY

## 2018-02-07 PROCEDURE — 2500000003 HC RX 250 WO HCPCS: Performed by: NURSE ANESTHETIST, CERTIFIED REGISTERED

## 2018-02-07 PROCEDURE — 2580000003 HC RX 258: Performed by: SURGERY

## 2018-02-07 PROCEDURE — 7100000011 HC PHASE II RECOVERY - ADDTL 15 MIN: Performed by: SURGERY

## 2018-02-07 PROCEDURE — 00400 ANES INTEGUMENTARY SYS NOS: CPT | Performed by: NURSE ANESTHETIST, CERTIFIED REGISTERED

## 2018-02-07 PROCEDURE — 21931 EXC BACK LES SC 3 CM/>: CPT | Performed by: SURGERY

## 2018-02-07 PROCEDURE — 2500000003 HC RX 250 WO HCPCS

## 2018-02-07 PROCEDURE — 7100000010 HC PHASE II RECOVERY - FIRST 15 MIN: Performed by: SURGERY

## 2018-02-07 PROCEDURE — 6360000002 HC RX W HCPCS: Performed by: NURSE ANESTHETIST, CERTIFIED REGISTERED

## 2018-02-07 PROCEDURE — 6360000002 HC RX W HCPCS: Performed by: SURGERY

## 2018-02-07 PROCEDURE — 2500000003 HC RX 250 WO HCPCS: Performed by: SURGERY

## 2018-02-07 PROCEDURE — A6258 TRANSPARENT FILM >16<=48 IN: HCPCS | Performed by: SURGERY

## 2018-02-07 PROCEDURE — 3600000002 HC SURGERY LEVEL 2 BASE: Performed by: SURGERY

## 2018-02-07 PROCEDURE — 6360000002 HC RX W HCPCS

## 2018-02-07 PROCEDURE — 3700000001 HC ADD 15 MINUTES (ANESTHESIA): Performed by: SURGERY

## 2018-02-07 PROCEDURE — 3700000000 HC ANESTHESIA ATTENDED CARE: Performed by: SURGERY

## 2018-02-07 PROCEDURE — A6257 TRANSPARENT FILM <= 16 SQ IN: HCPCS | Performed by: SURGERY

## 2018-02-07 PROCEDURE — 88304 TISSUE EXAM BY PATHOLOGIST: CPT

## 2018-02-07 PROCEDURE — 23071 EXC SHOULDER LES SC 3 CM/>: CPT | Performed by: SURGERY

## 2018-02-07 RX ORDER — FENTANYL CITRATE 50 UG/ML
INJECTION, SOLUTION INTRAMUSCULAR; INTRAVENOUS PRN
Status: DISCONTINUED | OUTPATIENT
Start: 2018-02-07 | End: 2018-02-07 | Stop reason: SDUPTHER

## 2018-02-07 RX ORDER — PROPOFOL 10 MG/ML
INJECTION, EMULSION INTRAVENOUS PRN
Status: DISCONTINUED | OUTPATIENT
Start: 2018-02-07 | End: 2018-02-07 | Stop reason: SDUPTHER

## 2018-02-07 RX ORDER — CEFAZOLIN SODIUM 1 G/3ML
INJECTION, POWDER, FOR SOLUTION INTRAMUSCULAR; INTRAVENOUS PRN
Status: DISCONTINUED | OUTPATIENT
Start: 2018-02-07 | End: 2018-02-07 | Stop reason: SDUPTHER

## 2018-02-07 RX ORDER — LIDOCAINE HYDROCHLORIDE 20 MG/ML
INJECTION, SOLUTION INFILTRATION; PERINEURAL PRN
Status: DISCONTINUED | OUTPATIENT
Start: 2018-02-07 | End: 2018-02-07 | Stop reason: SDUPTHER

## 2018-02-07 RX ORDER — MIDAZOLAM HYDROCHLORIDE 1 MG/ML
INJECTION INTRAMUSCULAR; INTRAVENOUS PRN
Status: DISCONTINUED | OUTPATIENT
Start: 2018-02-07 | End: 2018-02-07 | Stop reason: SDUPTHER

## 2018-02-07 RX ORDER — SODIUM CHLORIDE 0.9 % (FLUSH) 0.9 %
10 SYRINGE (ML) INJECTION PRN
Status: DISCONTINUED | OUTPATIENT
Start: 2018-02-07 | End: 2018-02-07 | Stop reason: HOSPADM

## 2018-02-07 RX ORDER — HYDROCODONE BITARTRATE AND ACETAMINOPHEN 5; 325 MG/1; MG/1
1 TABLET ORAL EVERY 6 HOURS PRN
Qty: 25 TABLET | Refills: 0 | Status: SHIPPED | OUTPATIENT
Start: 2018-02-07 | End: 2018-02-14

## 2018-02-07 RX ORDER — CEPHALEXIN 500 MG/1
500 CAPSULE ORAL 4 TIMES DAILY
Qty: 20 CAPSULE | Refills: 0 | Status: SHIPPED | OUTPATIENT
Start: 2018-02-07 | End: 2018-02-12

## 2018-02-07 RX ORDER — SODIUM CHLORIDE, SODIUM LACTATE, POTASSIUM CHLORIDE, CALCIUM CHLORIDE 600; 310; 30; 20 MG/100ML; MG/100ML; MG/100ML; MG/100ML
INJECTION, SOLUTION INTRAVENOUS CONTINUOUS
Status: DISCONTINUED | OUTPATIENT
Start: 2018-02-07 | End: 2018-02-07 | Stop reason: HOSPADM

## 2018-02-07 RX ORDER — SODIUM CHLORIDE 0.9 % (FLUSH) 0.9 %
10 SYRINGE (ML) INJECTION EVERY 12 HOURS SCHEDULED
Status: DISCONTINUED | OUTPATIENT
Start: 2018-02-07 | End: 2018-02-07 | Stop reason: HOSPADM

## 2018-02-07 RX ADMIN — FENTANYL CITRATE 25 MCG: 50 INJECTION, SOLUTION INTRAMUSCULAR; INTRAVENOUS at 11:17

## 2018-02-07 RX ADMIN — SODIUM CHLORIDE, POTASSIUM CHLORIDE, SODIUM LACTATE AND CALCIUM CHLORIDE: 600; 310; 30; 20 INJECTION, SOLUTION INTRAVENOUS at 11:14

## 2018-02-07 RX ADMIN — PROPOFOL 50 MG: 10 INJECTION, EMULSION INTRAVENOUS at 11:17

## 2018-02-07 RX ADMIN — LIDOCAINE HYDROCHLORIDE 10 MG: 20 INJECTION, SOLUTION INFILTRATION; PERINEURAL at 11:36

## 2018-02-07 RX ADMIN — FENTANYL CITRATE 25 MCG: 50 INJECTION, SOLUTION INTRAMUSCULAR; INTRAVENOUS at 12:00

## 2018-02-07 RX ADMIN — PROPOFOL 30 MG: 10 INJECTION, EMULSION INTRAVENOUS at 11:36

## 2018-02-07 RX ADMIN — FENTANYL CITRATE 25 MCG: 50 INJECTION, SOLUTION INTRAMUSCULAR; INTRAVENOUS at 11:52

## 2018-02-07 RX ADMIN — Medication 2 G: at 11:13

## 2018-02-07 RX ADMIN — CEFAZOLIN 2000 MG: 1 INJECTION, POWDER, FOR SOLUTION INTRAMUSCULAR; INTRAVENOUS at 11:05

## 2018-02-07 RX ADMIN — MIDAZOLAM HYDROCHLORIDE 2 MG: 1 INJECTION, SOLUTION INTRAMUSCULAR; INTRAVENOUS at 11:17

## 2018-02-07 RX ADMIN — SODIUM CHLORIDE, POTASSIUM CHLORIDE, SODIUM LACTATE AND CALCIUM CHLORIDE: 600; 310; 30; 20 INJECTION, SOLUTION INTRAVENOUS at 09:09

## 2018-02-07 RX ADMIN — CEFAZOLIN 1000 MG: 1 INJECTION, POWDER, FOR SOLUTION INTRAMUSCULAR; INTRAVENOUS at 11:25

## 2018-02-07 RX ADMIN — PROPOFOL 20 MG: 10 INJECTION, EMULSION INTRAVENOUS at 12:00

## 2018-02-07 RX ADMIN — LIDOCAINE HYDROCHLORIDE 10 MG: 20 INJECTION, SOLUTION INFILTRATION; PERINEURAL at 11:17

## 2018-02-07 RX ADMIN — FENTANYL CITRATE 25 MCG: 50 INJECTION, SOLUTION INTRAMUSCULAR; INTRAVENOUS at 11:36

## 2018-02-07 ASSESSMENT — PAIN SCALES - GENERAL
PAINLEVEL_OUTOF10: 0
PAINLEVEL_OUTOF10: 0

## 2018-02-07 ASSESSMENT — PAIN - FUNCTIONAL ASSESSMENT: PAIN_FUNCTIONAL_ASSESSMENT: 0-10

## 2018-02-07 NOTE — ANESTHESIA POSTPROCEDURE EVALUATION
Department of Anesthesiology  Postprocedure Note    Patient: Bernardo Coyle  MRN: [id number]  YOB: 1950  Date of evaluation: 2/7/2018  Time:  12:15 PM     Procedure Summary     Date:  02/07/18 Room / Location:  [address] [place] [address] 02 / [facility] OR    Anesthesia Start:  1114 Anesthesia Stop:  2730    Procedure:  Excision Cyst Left Chest, Middle Back (Left ) Diagnosis:  (Sebaceous Cyst Left Chest, Middle Back)    Surgeon:  Garrick Syed MD Responsible Provider:  oSfía Maxwell CRNA    Anesthesia Type:  Not recorded ASA Status:  Not recorded          Anesthesia Type: No value filed. Vicky Phase I: Vicky Score: 10    Vicky Phase II:      Last vitals: Reviewed and per EMR flowsheets.        Anesthesia Post Evaluation    Patient location during evaluation: bedside  Patient participation: complete - patient participated  Level of consciousness: awake and alert  Pain score: 0  Airway patency: patent  Nausea & Vomiting: no vomiting  Complications: no  Cardiovascular status: hemodynamically stable  Respiratory status: acceptable  Hydration status: euvolemic

## 2018-02-07 NOTE — H&P
LANCING DEVICE) MISC USE TO TEST BLOOD SUGAR FOUR TIMES DAILY AS DIRECTED 1 each 0    SENNA LAX 8.6 MG tablet TAKE 1 TABLET BY MOUTH TWICE DAILY 180 tablet 3    PRODIGY NO CODING BLOOD GLUC strip USE AS DIRECTED FOUR TIMES DAILY 200 strip 3    Cholecalciferol (VITAMIN D3) 3000 units TABS Take 3,000 Units by mouth 2 times daily 60 tablet 11    triamcinolone (KENALOG) 0.1 % cream Apply topically 2 times daily.  80 g 5    lisinopril (PRINIVIL;ZESTRIL) 10 MG tablet Take 1 tablet by mouth daily 30 tablet 11    amLODIPine (NORVASC) 5 MG tablet Take 1 tablet by mouth daily 90 tablet 3    potassium chloride (MICRO-K) 10 MEQ extended release capsule TAKE 1 CAPSULE TWICE DAILY 180 capsule 3    citalopram (CELEXA) 10 MG tablet TAKE 1 TABLET EVERY DAY 90 tablet 3    B-D ULTRAFINE III SHORT PEN 31G X 8 MM MISC USE TWICE DAILY 180 each 3    gabapentin (NEURONTIN) 300 MG capsule TAKE 2 CAPSULES TWICE DAILY 360 capsule 2    NOVOLOG FLEXPEN 100 UNIT/ML injection pen INJECT 4 UNITS AT LUNCH AND  6 UNITS AT SUPPER (EACH PEN EXPIRES 28 DAYS AFTER 1ST USE) 15 mL 3    pantoprazole (PROTONIX) 40 MG tablet TAKE 1 TABLET EVERY DAY 90 tablet 3    fluticasone (FLONASE) 50 MCG/ACT nasal spray USE 2 SPRAYS NASALLY EVERY DAY 48 g 3    furosemide (LASIX) 40 MG tablet TAKE 1 TABLET TWICE DAILY 180 tablet 3    buPROPion (WELLBUTRIN) 75 MG tablet TAKE 1 TABLET TWICE DAILY 180 tablet 3    ondansetron (ZOFRAN) 4 MG tablet Take 1 tablet by mouth every 8 hours as needed for Nausea or Vomiting 30 tablet 3    magnesium oxide (MAG-OX) 400 MG tablet Take 1 tablet by mouth daily 30 tablet 11    Ascorbic Acid (VITAMIN C) 500 MG tablet Take 1 tablet by mouth 2 times daily 30 tablet 11    Compression Stockings MISC by Does not apply route 15-20mmHg  Knee high  Open tow  With assist device to help put them on 1 each 0    PRODIGY TWIST TOP LANCETS 28G MISC 1 each by Does not apply route 4 times daily DX: E 11.42        Zinc 50 MG TABS Take 50 mg by mouth daily        loratadine (CLARITIN) 10 MG tablet Take 1 tablet by mouth daily 30 tablet 5    Diabetic Shoe MISC by Does not apply route 1 each 0    niacin 500 MG extended release capsule Take 500 mg by mouth daily        folic acid (FOLVITE) 691 MCG tablet Take 800 mcg by mouth daily        Cyanocobalamin (VITAMIN B12 PO) Take 2,500 mcg by mouth daily        B Complex-C (SUPER B COMPLEX PO) Take by mouth daily        travoprost, benzalkonium, (TRAVATAN) 0.004 % ophthalmic solution Place 1 drop into both eyes daily 3 Bottle 3    Insulin Syringe-Needle U-100 (INSULIN SYRINGE .5CC/31GX5/16\") 31G X 5/16\" 0.5 ML MISC 1 each by Does not apply route daily 200 each 3    insulin glargine (LANTUS) 100 UNIT/ML injection vial Inject 10 Units into the skin nightly 3 vial 3    CPAP Machine MISC by Does not apply route Pressure of 13 (AirSense 10)  [employer].        albuterol (PROVENTIL) (2.5 MG/3ML) 0.083% nebulizer solution Take 2.5 mg by nebulization every 6 hours as needed for Wheezing        ferrous sulfate 325 (65 FE) MG EC tablet Take 325 mg by mouth daily (with breakfast)        Multiple Vitamin (MULTI VITAMIN DAILY) TABS Take by mouth daily        nitroGLYCERIN (NITROSTAT) 0.4 MG SL tablet Place 0.4 mg under the tongue every 5 minutes as needed for Chest pain Dissolve 1 tab under tongue at first sign of chest pain. May repeat every 5 minutes until relief is obtained. If pain persists after taking 3 tabs in a 15-minute period, or the pain is different than is typically experienced, call 9-1-1 immediately. No current facility-administered medications on file prior to visit. Allergies as of 01/30/2018    (No Known Allergies)     PHYSICAL EXAM:    Blood pressure (!) 108/53, pulse 50, temperature 98.3 °F (36.8 °C), temperature source Oral, resp.  rate 16, height 5' 4\" (1.626 m), weight 268 lb (121.6 kg), last menstrual period 12/07/1995, SpO2 95 %, not currently breastfeeding. Gen:  A and O x 3, NAD, well nourished  Eyes:  Sclera non icterus, PERRL  Lungs:  CTA, symmetrical  Chest:  RRR, no murmurs  Abd:  Soft, NT, ND, no HSM, no bruits       Back:  2 cm seb cyst with punctate  Left breast:  1 cm seb cyst with punctate     ASSESS MENT:     1.  seb cyst left breast 1 cm,   2.  seb cyst back 2 cm        PLAN:     1.   Will plan excision in OR

## 2018-02-07 NOTE — ANESTHESIA PRE PROCEDURE
eyes daily 10/7/16  Yes Mirella Desai MD   insulin glargine (LANTUS) 100 UNIT/ML injection vial Inject 10 Units into the skin nightly 10/7/16  Yes Mirella Desai MD   albuterol (PROVENTIL) (2.5 MG/3ML) 0.083% nebulizer solution Take 2.5 mg by nebulization every 6 hours as needed for Wheezing   Yes Historical Provider, MD   ferrous sulfate 325 (65 FE) MG EC tablet Take 325 mg by mouth daily (with breakfast)   Yes Historical Provider, MD   Multiple Vitamin (MULTI VITAMIN DAILY) TABS Take by mouth daily   Yes Historical Provider, MD   clopidogrel (PLAVIX) 75 MG tablet TAKE 1 TABLET BY MOUTH EVERY DAY 2/52/65   Nathalia Trevino MD   Lancet Devices (PRODIGY LANCING DEVICE) MISC USE TO TEST BLOOD SUGAR FOUR TIMES DAILY AS DIRECTED 10/13/17   MD TORRES Amor NO CODING BLOOD GLUC strip USE AS DIRECTED FOUR TIMES DAILY 10/3/17   Mirella Desai MD   B-D ULTRAFINE III SHORT PEN 31G X 8 MM MISC USE TWICE DAILY 7/19/17   Mirella Desai MD   Compression Stockings MISC by Does not apply route 15-20mmHg  Knee high  Open tow  With assist device to help put them on 5/10/17   DO TORRES Malone TWIST TOP LANCETS 28G MISC 1 each by Does not apply route 4 times daily DX: E 11.42    Historical Provider, MD   Diabetic Shoe MISC by Does not apply route 11/4/16   Mirella Desai MD   Insulin Syringe-Needle U-100 (INSULIN SYRINGE .5CC/31GX5/16\") 31G X 5/16\" 0.5 ML MISC 1 each by Does not apply route daily 10/7/16   Mirella Desai MD   CPAP Machine MISC by Does not apply route Pressure of 13 (AirSense 10)  [employer].    Historical Provider, MD   nitroGLYCERIN (NITROSTAT) 0.4 MG SL tablet Place 0.4 mg under the tongue every 5 minutes as needed for Chest pain Dissolve 1 tab under tongue at first sign of chest pain. May repeat every 5 minutes until relief is obtained.  If pain persists after taking 3 tabs in a 15-minute period, or the pain is different than is typically experienced, call 9-1-1 results found for: PROTIME, INR, APTT    HCG (If Applicable): No results found for: PREGTESTUR, PREGSERUM, HCG, HCGQUANT     ABGs: No results found for: PHART, PO2ART, HMV9VNB, GEI4EJP, BEART, W5WEURJF     Type & Screen (If Applicable):  No results found for: DEBORAH [facility]    Anesthesia Evaluation  Patient summary reviewed  Airway: Mallampati: I  TM distance: >3 FB   Neck ROM: full  Mouth opening: > = 3 FB Dental:    (+) other      Pulmonary:normal exam    (+) sleep apnea: on CPAP,                             Cardiovascular:  Exercise tolerance: good (>4 METS),   (+) CAD: no interval change,             Echocardiogram reviewed                  Neuro/Psych:   (+) neuromuscular disease:, TIA,             GI/Hepatic/Renal:             Endo/Other:    (+) Type II DM, poorly controlled, , .                 Abdominal:   (+) obese,     Abdomen: soft.     Vascular:                                        Anesthesia Plan        Simba Peguero CRNA   2/7/2018

## 2018-02-09 LAB — SURGICAL PATHOLOGY REPORT: NORMAL

## 2018-02-12 ENCOUNTER — OFFICE VISIT (OUTPATIENT)
Dept: OBGYN | Age: 68
End: 2018-02-12

## 2018-02-12 DIAGNOSIS — Z01.419 WELL FEMALE EXAM WITH ROUTINE GYNECOLOGICAL EXAM: Primary | ICD-10-CM

## 2018-02-15 ENCOUNTER — OFFICE VISIT (OUTPATIENT)
Dept: SURGERY | Age: 68
End: 2018-02-15

## 2018-02-15 VITALS
HEIGHT: 64 IN | TEMPERATURE: 97.7 F | BODY MASS INDEX: 44.9 KG/M2 | HEART RATE: 76 BPM | SYSTOLIC BLOOD PRESSURE: 130 MMHG | DIASTOLIC BLOOD PRESSURE: 70 MMHG | WEIGHT: 263 LBS

## 2018-02-15 DIAGNOSIS — Z09 POSTOP CHECK: Primary | ICD-10-CM

## 2018-02-15 PROCEDURE — 99024 POSTOP FOLLOW-UP VISIT: CPT | Performed by: SURGERY

## 2018-02-15 NOTE — PROGRESS NOTES
Condition is here to follow-up her excision of a cyst on the left breast and the upper back. They were both epidermal inclusion cyst and she is doing quite well. The incisions are healing nicely and she has no complaints.   We will see her on a when necessary basis

## 2018-03-06 RX ORDER — LANCETS 28 GAUGE
EACH MISCELLANEOUS
Qty: 400 EACH | Refills: 3 | Status: SHIPPED | OUTPATIENT
Start: 2018-03-06 | End: 2018-04-20 | Stop reason: SDUPTHER

## 2018-03-09 ENCOUNTER — TELEPHONE (OUTPATIENT)
Dept: INTERNAL MEDICINE | Age: 68
End: 2018-03-09
Payer: MEDICARE

## 2018-03-09 DIAGNOSIS — G62.9 PERIPHERAL POLYNEUROPATHY: ICD-10-CM

## 2018-03-09 DIAGNOSIS — Z79.4 TYPE 2 DIABETES MELLITUS WITH DIABETIC POLYNEUROPATHY, WITH LONG-TERM CURRENT USE OF INSULIN (HCC): Primary | ICD-10-CM

## 2018-03-09 DIAGNOSIS — I25.10 CORONARY ARTERY DISEASE DUE TO LIPID RICH PLAQUE: ICD-10-CM

## 2018-03-09 DIAGNOSIS — G45.8 OTHER SPECIFIED TRANSIENT CEREBRAL ISCHEMIAS: ICD-10-CM

## 2018-03-09 DIAGNOSIS — E55.9 VITAMIN D DEFICIENCY: ICD-10-CM

## 2018-03-09 DIAGNOSIS — G47.33 OSA (OBSTRUCTIVE SLEEP APNEA): ICD-10-CM

## 2018-03-09 DIAGNOSIS — E11.42 TYPE 2 DIABETES MELLITUS WITH DIABETIC POLYNEUROPATHY, WITH LONG-TERM CURRENT USE OF INSULIN (HCC): Primary | ICD-10-CM

## 2018-03-09 DIAGNOSIS — I25.83 CORONARY ARTERY DISEASE DUE TO LIPID RICH PLAQUE: ICD-10-CM

## 2018-03-09 DIAGNOSIS — R60.0 BILATERAL LEG EDEMA: ICD-10-CM

## 2018-03-09 PROCEDURE — G0179 MD RECERTIFICATION HHA PT: HCPCS | Performed by: INTERNAL MEDICINE

## 2018-03-09 RX ORDER — VITAMIN E 268 MG
400 CAPSULE ORAL DAILY
COMMUNITY

## 2018-03-09 RX ORDER — AMOXICILLIN 500 MG
1200 CAPSULE ORAL DAILY
COMMUNITY
End: 2019-08-22 | Stop reason: ALTCHOICE

## 2018-03-26 ENCOUNTER — OFFICE VISIT (OUTPATIENT)
Dept: NEUROLOGY | Age: 68
End: 2018-03-26
Payer: MEDICARE

## 2018-03-26 VITALS
DIASTOLIC BLOOD PRESSURE: 60 MMHG | HEIGHT: 64 IN | RESPIRATION RATE: 16 BRPM | BODY MASS INDEX: 45.52 KG/M2 | SYSTOLIC BLOOD PRESSURE: 106 MMHG | WEIGHT: 266.6 LBS

## 2018-03-26 DIAGNOSIS — G45.0 VBI (VERTEBROBASILAR INSUFFICIENCY): ICD-10-CM

## 2018-03-26 DIAGNOSIS — R26.9 GAIT ABNORMALITY: ICD-10-CM

## 2018-03-26 DIAGNOSIS — G62.9 PERIPHERAL POLYNEUROPATHY: ICD-10-CM

## 2018-03-26 DIAGNOSIS — F32.A ANXIETY AND DEPRESSION: ICD-10-CM

## 2018-03-26 DIAGNOSIS — G45.8 OTHER SPECIFIED TRANSIENT CEREBRAL ISCHEMIAS: ICD-10-CM

## 2018-03-26 DIAGNOSIS — R42 DIZZINESS: ICD-10-CM

## 2018-03-26 DIAGNOSIS — Z91.81 H/O FALLING: ICD-10-CM

## 2018-03-26 DIAGNOSIS — G44.229 CHRONIC TENSION-TYPE HEADACHE, NOT INTRACTABLE: ICD-10-CM

## 2018-03-26 DIAGNOSIS — G44.211 INTRACTABLE EPISODIC TENSION-TYPE HEADACHE: ICD-10-CM

## 2018-03-26 DIAGNOSIS — R60.0 BILATERAL LEG EDEMA: ICD-10-CM

## 2018-03-26 DIAGNOSIS — G47.33 OBSTRUCTIVE SLEEP APNEA: ICD-10-CM

## 2018-03-26 DIAGNOSIS — R73.09 ELEVATED HEMOGLOBIN A1C: ICD-10-CM

## 2018-03-26 DIAGNOSIS — G25.0 ESSENTIAL TREMOR: ICD-10-CM

## 2018-03-26 DIAGNOSIS — R53.82 CHRONIC FATIGUE: ICD-10-CM

## 2018-03-26 DIAGNOSIS — I67.82 CHRONIC CEREBRAL ISCHEMIA: ICD-10-CM

## 2018-03-26 DIAGNOSIS — Z79.4 TYPE 2 DIABETES MELLITUS WITH DIABETIC POLYNEUROPATHY, WITH LONG-TERM CURRENT USE OF INSULIN (HCC): ICD-10-CM

## 2018-03-26 DIAGNOSIS — F41.9 ANXIETY AND DEPRESSION: ICD-10-CM

## 2018-03-26 DIAGNOSIS — M25.551 RIGHT HIP PAIN: ICD-10-CM

## 2018-03-26 DIAGNOSIS — R41.3 MEMORY PROBLEM: Primary | ICD-10-CM

## 2018-03-26 DIAGNOSIS — R26.89 BALANCE PROBLEM: ICD-10-CM

## 2018-03-26 DIAGNOSIS — E11.42 TYPE 2 DIABETES MELLITUS WITH DIABETIC POLYNEUROPATHY, WITH LONG-TERM CURRENT USE OF INSULIN (HCC): ICD-10-CM

## 2018-03-26 PROCEDURE — 1123F ACP DISCUSS/DSCN MKR DOCD: CPT | Performed by: PSYCHIATRY & NEUROLOGY

## 2018-03-26 PROCEDURE — G8417 CALC BMI ABV UP PARAM F/U: HCPCS | Performed by: PSYCHIATRY & NEUROLOGY

## 2018-03-26 PROCEDURE — 3017F COLORECTAL CA SCREEN DOC REV: CPT | Performed by: PSYCHIATRY & NEUROLOGY

## 2018-03-26 PROCEDURE — G8427 DOCREV CUR MEDS BY ELIG CLIN: HCPCS | Performed by: PSYCHIATRY & NEUROLOGY

## 2018-03-26 PROCEDURE — 1090F PRES/ABSN URINE INCON ASSESS: CPT | Performed by: PSYCHIATRY & NEUROLOGY

## 2018-03-26 PROCEDURE — 3014F SCREEN MAMMO DOC REV: CPT | Performed by: PSYCHIATRY & NEUROLOGY

## 2018-03-26 PROCEDURE — G8598 ASA/ANTIPLAT THER USED: HCPCS | Performed by: PSYCHIATRY & NEUROLOGY

## 2018-03-26 PROCEDURE — G8482 FLU IMMUNIZE ORDER/ADMIN: HCPCS | Performed by: PSYCHIATRY & NEUROLOGY

## 2018-03-26 PROCEDURE — G8400 PT W/DXA NO RESULTS DOC: HCPCS | Performed by: PSYCHIATRY & NEUROLOGY

## 2018-03-26 PROCEDURE — 3044F HG A1C LEVEL LT 7.0%: CPT | Performed by: PSYCHIATRY & NEUROLOGY

## 2018-03-26 PROCEDURE — 99215 OFFICE O/P EST HI 40 MIN: CPT | Performed by: PSYCHIATRY & NEUROLOGY

## 2018-03-26 PROCEDURE — 4040F PNEUMOC VAC/ADMIN/RCVD: CPT | Performed by: PSYCHIATRY & NEUROLOGY

## 2018-03-26 PROCEDURE — 1036F TOBACCO NON-USER: CPT | Performed by: PSYCHIATRY & NEUROLOGY

## 2018-03-26 RX ORDER — MEMANTINE HYDROCHLORIDE 10 MG/1
10 TABLET ORAL 2 TIMES DAILY
Qty: 60 TABLET | Refills: 2 | Status: SHIPPED | OUTPATIENT
Start: 2018-03-26 | End: 2018-05-15 | Stop reason: SDUPTHER

## 2018-03-26 RX ORDER — DONEPEZIL HYDROCHLORIDE 10 MG/1
10 TABLET, FILM COATED ORAL DAILY
Refills: 2 | COMMUNITY
Start: 2018-01-17 | End: 2018-03-26 | Stop reason: SINTOL

## 2018-03-26 RX ORDER — CLOPIDOGREL BISULFATE 75 MG/1
TABLET ORAL
Qty: 30 TABLET | Refills: 2 | Status: SHIPPED | OUTPATIENT
Start: 2018-03-26 | End: 2018-05-04 | Stop reason: SDUPTHER

## 2018-03-26 ASSESSMENT — ENCOUNTER SYMPTOMS
CHEST TIGHTNESS: 0
BLURRED VISION: 0
VISUAL CHANGE: 0
ABDOMINAL PAIN: 0
EYE REDNESS: 0
CHOKING: 0
SCALP TENDERNESS: 0
VOMITING: 0
BLOOD IN STOOL: 0
BOWEL INCONTINENCE: 0
EYE DISCHARGE: 0
SWOLLEN GLANDS: 0
DIARRHEA: 0
RHINORRHEA: 0
APNEA: 0
SORE THROAT: 0
COUGH: 0
EYE WATERING: 0
FACIAL SWEATING: 0
TROUBLE SWALLOWING: 0
NAUSEA: 0
EYE PAIN: 0
CONSTIPATION: 0
COLOR CHANGE: 0
SHORTNESS OF BREATH: 1
WHEEZING: 0
CHANGE IN BOWEL HABIT: 0
SINUS PRESSURE: 1
PHOTOPHOBIA: 1
FACIAL SWELLING: 0
ABDOMINAL DISTENTION: 0
EYE ITCHING: 0
VOICE CHANGE: 0
BACK PAIN: 1

## 2018-03-26 NOTE — PROGRESS NOTES
360 capsule 2    NOVOLOG FLEXPEN 100 UNIT/ML injection pen INJECT 4 UNITS AT LUNCH AND  6 UNITS AT SUPPER (EACH PEN EXPIRES 28 DAYS AFTER 1ST USE) 15 mL 3    pantoprazole (PROTONIX) 40 MG tablet TAKE 1 TABLET EVERY DAY 90 tablet 3    fluticasone (FLONASE) 50 MCG/ACT nasal spray USE 2 SPRAYS NASALLY EVERY DAY 48 g 3    furosemide (LASIX) 40 MG tablet TAKE 1 TABLET TWICE DAILY 180 tablet 3    buPROPion (WELLBUTRIN) 75 MG tablet TAKE 1 TABLET TWICE DAILY 180 tablet 3    magnesium oxide (MAG-OX) 400 MG tablet Take 1 tablet by mouth daily 30 tablet 11    Ascorbic Acid (VITAMIN C) 500 MG tablet Take 1 tablet by mouth 2 times daily 30 tablet 11    Compression Stockings MISC by Does not apply route 15-20mmHg  Knee high  Open tow  With assist device to help put them on 1 each 0    Zinc 50 MG TABS Take 50 mg by mouth daily      loratadine (CLARITIN) 10 MG tablet Take 1 tablet by mouth daily 30 tablet 5    Diabetic Shoe MISC by Does not apply route 1 each 0    niacin 500 MG extended release capsule Take 500 mg by mouth daily      folic acid (FOLVITE) 513 MCG tablet Take 800 mcg by mouth daily      Cyanocobalamin (VITAMIN B12 PO) Take 2,500 mcg by mouth daily      B Complex-C (SUPER B COMPLEX PO) Take by mouth daily      travoprost, benzalkonium, (TRAVATAN) 0.004 % ophthalmic solution Place 1 drop into both eyes daily 3 Bottle 3    Insulin Syringe-Needle U-100 (INSULIN SYRINGE .5CC/31GX5/16\") 31G X 5/16\" 0.5 ML MISC 1 each by Does not apply route daily 200 each 3    insulin glargine (LANTUS) 100 UNIT/ML injection vial Inject 10 Units into the skin nightly 3 vial 3    CPAP Machine MISC by Does not apply route Pressure of 13 (AirSense 10)  [employer].      albuterol (PROVENTIL) (2.5 MG/3ML) 0.083% nebulizer solution Take 2.5 mg by nebulization every 6 hours as needed for Wheezing      ferrous sulfate 325 (65 FE) MG EC tablet Take 325 mg by mouth daily (with breakfast)      Multiple Vitamin (MULTI VITAMIN No tongue   Fasciculations or atrophy     C) MOTOR  EXAM:                 Strength  In upper  And  Lower extremities   abnormal - DECREASED   HAND               No  Drift. No  Atrophy             Rapid alternating  And  repetitions  Movements  decreased               Muscle  Tone  In upper  And  Lower  Extremities  within normal limits              No rigidity. No  Spasticity. Bradykinesia   present               No  Asterixis. ACTION  Tremor   PRESENT IN  BOTH  HANDS. NO Resting  Tremor,               No other  Abnormal  Movements noted         D) SENSORY :             light touch, pinprick, position  And  Vibration  decreased      E) REFLEXES:                 Deep  Tendon  Reflexes decreased                  No pathological  Reflexes  Bilaterally.                                 F) COORDINATION  AND  GAIT :                              Station and  Gait  abnormal - SLOW  UNSTEADY                                      Romberg's test positive                        Ataxia negative      ASSESSMENT:    Patient Active Problem List   Diagnosis    Obstructive sleep apnea    Chronic fatigue    Coronary artery disease due to lipid rich plaque    Vitamin D deficiency    Type 2 diabetes mellitus with diabetic polyneuropathy, with long-term current use of insulin (HCC)    Peripheral polyneuropathy (HCC)    Bilateral leg edema    Right hip pain    Muscle ache    Hx of Bell's palsy    Memory problem    Gait abnormality    Balance problem    H/O falling    Dizziness    Intractable episodic tension-type headache    Essential tremor    Anxiety and depression    Elevated hemoglobin A1c    VBI (vertebrobasilar insufficiency)    Chronic cerebral ischemia    TIA (transient ischemic attack)    Chronic tension headache     CT OF THE HEAD WITHOUT CONTRAST  7/26/2017 3:29 pm       TECHNIQUE:   CT of the head was performed without the administration of intravenous (PLAVIX) 75 MG tablet     Sig: TAKE 1 TABLET BY MOUTH EVERY DAY     Dispense:  30 tablet     Refill:  2                       *PATIENT   TO  FOLLOW  UP  WITH   PRIMARY  CARE   AND   OTHER  CONSULTANTS  AS  BEFORE.           *TO  FOLLOW  WITH   MENTAL  HEALTH  PROFESSIONALS ,  INCLUDING          PSYCHOLOGICAL  COUNSELING   AND  PSYCHIATRIC  EVALUTIONS      *  Maintain   Healthy  Life Style    With   Periodic  Monitoring  Of    Any  Medical  Conditions  Including   Elevated  Blood  Pressure,  Lipid  Profile,   Blood  Sugar levels  And   Heart  Disease. *   Period   Screening  For  Cancers  Involving  Breast,  Colon,  Prostate, lungs  And  Other  Organs  As  Applicable,  In consultation   With  Your  Primary Care Providers. *  If  The  Patient remains  Neurologically  Stable   Return   To  [facility] Neurology Department   IN  3-4   MONTHS  TIME   FOR  FURTHER  FOLLOW UP.                 *  If   There is  Any  Significant  Worsening   Of  Current  Symptoms  And  Or  If patient  Develops   Any additional  New  Neurological  Symptoms  Or  Significant  Concerns   Should  Call  911 or  Go  To  Emergency  Department  For  Further  Immediate  Evaluation. *   The  Neurological   Findings,  Possible  Diagnosis,  Differential diagnoses   And  Options  For    Further   Investigations   And  management   Are  Discussed  Comprehensively. Medications   And  Prescription   Risks  And  Side effects  Are   Also  Discussed. The  Above  Were  Reviewed  With  patient and   questions  Answered  In  Detail. More   Than   50% of face  To face Time   Was  Spent  On  Counseling   And   Coordination  Of  Care   Of   Patient's multiple   Neurological  Problems   And   Comorbid  Medical   Conditions.         Electronically signed by Alana Ham MD   Board Certified in  Neurology &  In  [employer] ([employer])      DISCLAIMER:   Although every effort was made to ensure the accuracy of this  electronic transcription, some errors in transcription may have occurred. GENERAL PATIENT INSTRUCTIONS:     A Healthy Lifestyle: Care Instructions  Your Care Instructions  A healthy lifestyle can help you feel good, stay at a healthy weight, and have plenty of energy for both work and play. A healthy lifestyle is something you can share with your whole family. A healthy lifestyle also can lower your risk for serious health problems, such as high blood pressure, heart disease, and diabetes. You can follow a few steps listed below to improve your health and the health of your family. Follow-up care is a key part of your treatment and safety. Be sure to make and go to all appointments, and call your doctor if you are having problems. Its also a good idea to know your test results and keep a list of the medicines you take. How can you care for yourself at home? Do not eat too much sugar, fat, or fast foods. You can still have dessert and treats now and then. The goal is moderation. Start small to improve your eating habits. Pay attention to portion sizes, drink less juice and soda pop, and eat more fruits and vegetables. Eat a healthy amount of food. A 3-ounce serving of meat, for example, is about the size of a deck of cards. Fill the rest of your plate with vegetables and whole grains. Limit the amount of soda and sports drinks you have every day. Drink more water when you are thirsty. Eat at least 5 servings of fruits and vegetables every day. It may seem like a lot, but it is not hard to reach this goal. A serving or helping is 1 piece of fruit, 1 cup of vegetables, or 2 cups of leafy, raw vegetables. Have an apple or some carrot sticks as an afternoon snack instead of a candy bar. Try to have fruits and/or vegetables at every meal.  Make exercise part of your daily routine.  You may want to start with simple activities, such as walking, bicycling, or slow swimming. Try to be active 30 to 60 minutes every day. You do not need to do all 30 to 60 minutes all at once. For example, you can exercise 3 times a day for 10 or 20 minutes. Moderate exercise is safe for most people, but it is always a good idea to talk to your doctor before starting an exercise program.  Keep moving. Karen Collet the lawn, work in the garden, or [employer]. Take the stairs instead of the elevator at work. If you smoke, quit. People who smoke have an increased risk for heart attack, stroke, cancer, and other lung illnesses. Quitting is hard, but there are ways to boost your chance of quitting tobacco for good. Use nicotine gum, patches, or lozenges. Ask your doctor about stop-smoking programs and medicines. Keep trying. In addition to reducing your risk of diseases in the future, you will notice some benefits soon after you stop using tobacco. If you have shortness of breath or asthma symptoms, they will likely get better within a few weeks after you quit. Limit how much alcohol you drink. Moderate amounts of alcohol (up to 2 drinks a day for men, 1 drink a day for women) are okay. But drinking too much can lead to liver problems, high blood pressure, and other health problems. Family health  If you have a family, there are many things you can do together to improve your health. Eat meals together as a family as often as possible. Eat healthy foods. This includes fruits, vegetables, lean meats and dairy, and whole grains. Include your family in your fitness plan. Most people think of activities such as jogging or tennis as the way to fitness, but there are many ways you and your family can be more active. Anything that makes you breathe hard and gets your heart pumping is exercise. Here are some tips:  Walk to do errands or to take your child to school or the bus. Go for a family bike ride after dinner instead of watching TV.   Where can you

## 2018-03-28 ENCOUNTER — OFFICE VISIT (OUTPATIENT)
Dept: PODIATRY | Age: 68
End: 2018-03-28
Payer: MEDICARE

## 2018-03-28 VITALS
RESPIRATION RATE: 20 BRPM | HEART RATE: 64 BPM | SYSTOLIC BLOOD PRESSURE: 124 MMHG | DIASTOLIC BLOOD PRESSURE: 64 MMHG | BODY MASS INDEX: 45.27 KG/M2 | WEIGHT: 265.2 LBS | HEIGHT: 64 IN

## 2018-03-28 DIAGNOSIS — E11.42 TYPE 2 DIABETES MELLITUS WITH DIABETIC POLYNEUROPATHY, WITH LONG-TERM CURRENT USE OF INSULIN (HCC): Primary | ICD-10-CM

## 2018-03-28 DIAGNOSIS — B35.1 DERMATOPHYTOSIS OF NAIL: ICD-10-CM

## 2018-03-28 DIAGNOSIS — Z79.4 TYPE 2 DIABETES MELLITUS WITH DIABETIC POLYNEUROPATHY, WITH LONG-TERM CURRENT USE OF INSULIN (HCC): Primary | ICD-10-CM

## 2018-03-28 PROCEDURE — 11720 DEBRIDE NAIL 1-5: CPT | Performed by: PODIATRIST

## 2018-03-28 NOTE — PROGRESS NOTES
Subjective:  Patient presents to [facility] today for routine diabetic foot care. Patient denies any new problems with their feet. Patient's diabetic control has been not changed. No Known Allergies    Past Medical History:   Diagnosis Date    CAD (coronary artery disease) [facility]    Cancer ([facility] [address] 1971    cervical    Coronary artery disease     Hx of Bell's palsy     Mild right facial paralysis    Mild dementia     Neuropathy ([facility] [address]     Sleep apnea 11/07/2014    sleep study done in [place]    Type 2 diabetes mellitus with hyperglycemia ([facility] [address]        Prior to Admission medications    Medication Sig Start Date End Date Taking?  Authorizing Provider   memantine (NAMENDA) 10 MG tablet Take 1 tablet by mouth 2 times daily 3/26/18  Yes Love Puri MD   clopidogrel (PLAVIX) 75 MG tablet TAKE 1 TABLET BY MOUTH EVERY DAY 3/26/18  Yes Love Puri MD   vitamin E 400 UNIT capsule Take 400 Units by mouth daily   Yes Historical Provider, MD   Omega-3 Fatty Acids (FISH OIL) 1200 MG CAPS Take 1,200 mg by mouth daily   Yes Historical Provider, MD   PRODIGY TWIST TOP LANCETS 28G MISC USE AS DIRECTED FOUR TIMES DAILY 3/6/18  Yes Justina Clayton MD   spironolactone (ALDACTONE) 50 MG tablet TAKE 1 TABLET TWICE DAILY 1/29/18  Yes Justina Clayton MD   Cholecalciferol (VITAMIN D3) 1000 units TABS TK 3 TS PO BID 1/13/18  Yes Historical Provider, MD   umeclidinium-vilanterol (ANORO ELLIPTA) 62.5-25 MCG/INH AEPB inhaler Inhale 1 puff into the lungs daily 1/19/18  Yes Justina Clayton MD   VENTOLIN  (90 Base) MCG/ACT inhaler INHALE 2 PUFFS INTO THE LUNGS EVERY 4 HOURS AS NEEDED FOR WHEEZING 1/5/18  Yes Justina Clayton MD   COMBIGAN 0.2-0.5 % ophthalmic solution PLACE 1 DROP INTO BOTH EYES DAILY 1/2/18  Yes Justina Clayton MD   ibuprofen (ADVIL;MOTRIN) 400 MG tablet TAKE 1 TABLET EVERY 6 HOURS AS NEEDED FOR PAIN 12/22/17  Yes Justina Clayton MD   alendronate (FOSAMAX) 70 MG tablet TAKE 1 TABLET BY MOUTH EVERY 7 DAYS 10/25/17  Yes Saturnino Colon DO   amitriptyline (ELAVIL) 25 MG tablet take 1 tablet by mouth at bedtime 10/25/17  Yes Saturnino Colon DO   Lancet Devices (PRODIGY LANCING DEVICE) MISC USE TO TEST BLOOD SUGAR FOUR TIMES DAILY AS DIRECTED 10/13/17  Yes Rex Almazan MD   SENNA LAX 8.6 MG tablet TAKE 1 TABLET BY MOUTH TWICE DAILY 10/13/17  Yes MD TORRES Carnes NO CODING BLOOD GLUC strip USE AS DIRECTED FOUR TIMES DAILY 10/3/17  Yes Rex Almazan MD   Cholecalciferol (VITAMIN D3) 3000 units TABS Take 3,000 Units by mouth 2 times daily 9/19/17  Yes Rex Almazan MD   triamcinolone (KENALOG) 0.1 % cream Apply topically 2 times daily.  8/29/17  Yes Rex Almazan MD   lisinopril (PRINIVIL;ZESTRIL) 10 MG tablet Take 1 tablet by mouth daily 8/17/17  Yes Rex Almazan MD   amLODIPine (NORVASC) 5 MG tablet Take 1 tablet by mouth daily 8/17/17  Yes Rex Almazan MD   potassium chloride (MICRO-K) 10 MEQ extended release capsule TAKE 1 CAPSULE TWICE DAILY 8/8/17  Yes Rex Almazan MD   citalopram (CELEXA) 10 MG tablet TAKE 1 TABLET EVERY DAY 8/8/17  Yes Rex Almazan MD   B-D ULTRAFINE III SHORT PEN 31G X 8 MM MISC USE TWICE DAILY 7/19/17  Yes Rex Almazan MD   gabapentin (NEURONTIN) 300 MG capsule TAKE 2 CAPSULES TWICE DAILY 7/19/17  Yes Rex Almazan MD   NOVOLOG FLEXPEN 100 UNIT/ML injection pen INJECT 4 UNITS AT LUNCH AND  6 UNITS AT SUPPER [facility] PEN EXPIRES 28 DAYS AFTER 1ST USE) 7/19/17  Yes Rex Almazan MD   pantoprazole (PROTONIX) 40 MG tablet TAKE 1 TABLET EVERY DAY 7/19/17  Yes Rex Almazan MD   fluticasone (FLONASE) 50 MCG/ACT nasal spray USE 2 SPRAYS NASALLY EVERY DAY 7/19/17  Yes Rex Almazan MD   furosemide (LASIX) 40 MG tablet TAKE 1 TABLET TWICE DAILY 7/19/17  Yes Rex Almazan MD   buPROPion (WELLBUTRIN) 75 MG tablet TAKE 1 TABLET TWICE DAILY 7/19/17  Yes Rex Almazan MD   ondansetron (ZOFRAN) 4 MG VITAMIN DAILY) TABS Take by mouth daily   Yes Historical Provider, MD   nitroGLYCERIN (NITROSTAT) 0.4 MG SL tablet Place 0.4 mg under the tongue every 5 minutes as needed for Chest pain Dissolve 1 tab under tongue at first sign of chest pain. May repeat every 5 minutes until relief is obtained. If pain persists after taking 3 tabs in a 15-minute period, or the pain is different than is typically experienced, call 9-1-1 immediately. Yes Historical Provider, MD       Social History   Substance Use Topics    Smoking status: Former Smoker     Packs/day: 1.00     Years: 35.00     Quit date: 9/30/2001    Smokeless tobacco: Never Used    Alcohol use Yes      Comment: occasional beer with food     Review of Systems: All 12 systems reviewed and pertinent positives noted above. Objective:  Vascular: DP and PT pulses palpable 2/4, bilateral.  CFT <3 seconds, bilateral.  Hair growth present to the level of the digits, bilateral.  Edema present, bilateral.  Varicosities present, bilateral. Erythema absent, bilateral. Distal Rubor absent bilateral.  Temperature within normal limits bilateral. Hyperpigmentation present bilateral. Atrophic skin no    Neurological: Sensation Impaired to light touch to level of digits, bilateral.  Protective sensation intact  8/10 sites via 5.07/10g [place] Monofilament, bilateral.  negative Tinel's, bilateral.  negative Valleix sign, bilateral.  Vibratory abnormal  bilateral.  Reflexes Decreased bilateral.  Paresthesias positive. Dysthesias negative. Sharp/dull abnormal bilateral.    Musculoskeletal: Muscle strength 5/5, bilateral.  Pain absent upon palpation bilateral. Normal medial longitudinal arch, bilateral.  Ankle ROM within normal limits,bilateral.  1st MPJ ROM within normal limits, bilateral.  Dorsally contracted digits absent. No other foot deformities.     Integument: Warm, dry, supple, Bilateral.  Open lesion absent, Bilateral.  Interdigital maceration absent to web

## 2018-04-16 RX ORDER — CLOPIDOGREL BISULFATE 75 MG/1
TABLET ORAL
Qty: 90 TABLET | Refills: 1 | Status: SHIPPED | OUTPATIENT
Start: 2018-04-16 | End: 2018-09-18 | Stop reason: SDUPTHER

## 2018-04-19 RX ORDER — BLOOD SUGAR DIAGNOSTIC
STRIP MISCELLANEOUS
Qty: 400 STRIP | Refills: 3 | Status: SHIPPED | OUTPATIENT
Start: 2018-04-19 | End: 2018-04-20 | Stop reason: SDUPTHER

## 2018-04-20 ENCOUNTER — TELEPHONE (OUTPATIENT)
Dept: INTERNAL MEDICINE | Age: 68
End: 2018-04-20

## 2018-04-20 RX ORDER — LANCETS 28 GAUGE
EACH MISCELLANEOUS
Qty: 400 EACH | Refills: 3 | Status: SHIPPED | OUTPATIENT
Start: 2018-04-20 | End: 2019-07-23 | Stop reason: SDUPTHER

## 2018-04-20 RX ORDER — BLOOD SUGAR DIAGNOSTIC
STRIP MISCELLANEOUS
Qty: 400 STRIP | Refills: 3 | Status: SHIPPED | OUTPATIENT
Start: 2018-04-20 | End: 2019-08-26 | Stop reason: ALTCHOICE

## 2018-05-01 RX ORDER — AMITRIPTYLINE HYDROCHLORIDE 25 MG/1
TABLET, FILM COATED ORAL
Qty: 30 TABLET | Refills: 11 | Status: SHIPPED | OUTPATIENT
Start: 2018-05-01 | End: 2019-01-28 | Stop reason: SDUPTHER

## 2018-05-04 ENCOUNTER — TELEPHONE (OUTPATIENT)
Dept: INTERNAL MEDICINE | Age: 68
End: 2018-05-04
Payer: MEDICARE

## 2018-05-04 DIAGNOSIS — I25.83 CORONARY ARTERY DISEASE DUE TO LIPID RICH PLAQUE: ICD-10-CM

## 2018-05-04 DIAGNOSIS — R60.0 BILATERAL LEG EDEMA: ICD-10-CM

## 2018-05-04 DIAGNOSIS — Z91.81 H/O FALLING: ICD-10-CM

## 2018-05-04 DIAGNOSIS — E11.42 TYPE 2 DIABETES MELLITUS WITH DIABETIC POLYNEUROPATHY, WITH LONG-TERM CURRENT USE OF INSULIN (HCC): Primary | ICD-10-CM

## 2018-05-04 DIAGNOSIS — G62.9 PERIPHERAL POLYNEUROPATHY: ICD-10-CM

## 2018-05-04 DIAGNOSIS — F32.A ANXIETY AND DEPRESSION: ICD-10-CM

## 2018-05-04 DIAGNOSIS — G45.8 OTHER SPECIFIED TRANSIENT CEREBRAL ISCHEMIAS: ICD-10-CM

## 2018-05-04 DIAGNOSIS — I25.10 CORONARY ARTERY DISEASE DUE TO LIPID RICH PLAQUE: ICD-10-CM

## 2018-05-04 DIAGNOSIS — Z79.4 TYPE 2 DIABETES MELLITUS WITH DIABETIC POLYNEUROPATHY, WITH LONG-TERM CURRENT USE OF INSULIN (HCC): Primary | ICD-10-CM

## 2018-05-04 DIAGNOSIS — F41.9 ANXIETY AND DEPRESSION: ICD-10-CM

## 2018-05-04 PROCEDURE — G0179 MD RECERTIFICATION HHA PT: HCPCS | Performed by: INTERNAL MEDICINE

## 2018-05-07 ENCOUNTER — HOSPITAL ENCOUNTER (OUTPATIENT)
Dept: LAB | Age: 68
Setting detail: SPECIMEN
Discharge: HOME | End: 2018-05-07
Payer: MEDICARE

## 2018-05-07 ENCOUNTER — OFFICE VISIT (OUTPATIENT)
Dept: CARDIOLOGY | Age: 68
End: 2018-05-07
Payer: MEDICARE

## 2018-05-07 VITALS
DIASTOLIC BLOOD PRESSURE: 76 MMHG | HEART RATE: 74 BPM | WEIGHT: 261.2 LBS | HEIGHT: 64 IN | SYSTOLIC BLOOD PRESSURE: 112 MMHG | BODY MASS INDEX: 44.59 KG/M2

## 2018-05-07 DIAGNOSIS — I25.10 CORONARY ARTERY DISEASE INVOLVING NATIVE CORONARY ARTERY OF NATIVE HEART WITHOUT ANGINA PECTORIS: Primary | ICD-10-CM

## 2018-05-07 DIAGNOSIS — I50.32 CHRONIC DIASTOLIC CHF (CONGESTIVE HEART FAILURE) (HCC): ICD-10-CM

## 2018-05-07 LAB
ANION GAP SERPL CALCULATED.3IONS-SCNC: 14 MMOL/L (ref 9–17)
BUN BLDV-MCNC: 20 MG/DL (ref 8–23)
BUN/CREAT BLD: 27 (ref 9–20)
CALCIUM SERPL-MCNC: 10.1 MG/DL (ref 8.6–10.4)
CHLORIDE BLD-SCNC: 99 MMOL/L (ref 98–107)
CO2: 27 MMOL/L (ref 20–31)
CREAT SERPL-MCNC: 0.75 MG/DL (ref 0.5–0.9)
GFR AFRICAN AMERICAN: >60 ML/MIN
GFR NON-AFRICAN AMERICAN: >60 ML/MIN
GFR SERPL CREATININE-BSD FRML MDRD: ABNORMAL ML/MIN/{1.73_M2}
GFR SERPL CREATININE-BSD FRML MDRD: ABNORMAL ML/MIN/{1.73_M2}
GLUCOSE BLD-MCNC: 167 MG/DL (ref 70–99)
POTASSIUM SERPL-SCNC: 4.8 MMOL/L (ref 3.7–5.3)
SODIUM BLD-SCNC: 140 MMOL/L (ref 135–144)

## 2018-05-07 PROCEDURE — 36415 COLL VENOUS BLD VENIPUNCTURE: CPT

## 2018-05-07 PROCEDURE — 99213 OFFICE O/P EST LOW 20 MIN: CPT | Performed by: INTERNAL MEDICINE

## 2018-05-07 PROCEDURE — 93000 ELECTROCARDIOGRAM COMPLETE: CPT | Performed by: INTERNAL MEDICINE

## 2018-05-07 PROCEDURE — 80048 BASIC METABOLIC PNL TOTAL CA: CPT

## 2018-05-15 RX ORDER — MEMANTINE HYDROCHLORIDE 10 MG/1
TABLET ORAL
Qty: 180 TABLET | Refills: 2 | Status: SHIPPED | OUTPATIENT
Start: 2018-05-15 | End: 2018-07-23 | Stop reason: SDUPTHER

## 2018-05-15 RX ORDER — MEMANTINE HYDROCHLORIDE 10 MG/1
TABLET ORAL
Qty: 60 TABLET | Refills: 2 | Status: SHIPPED | OUTPATIENT
Start: 2018-05-15 | End: 2018-05-15 | Stop reason: SDUPTHER

## 2018-05-29 DIAGNOSIS — R53.82 CHRONIC FATIGUE: ICD-10-CM

## 2018-05-30 RX ORDER — ASCORBIC ACID 500 MG
TABLET ORAL
Qty: 60 TABLET | Refills: 11 | Status: SHIPPED | OUTPATIENT
Start: 2018-05-30 | End: 2019-06-14 | Stop reason: SDUPTHER

## 2018-05-30 RX ORDER — BUPROPION HYDROCHLORIDE 75 MG/1
TABLET ORAL
Qty: 180 TABLET | Refills: 3 | Status: SHIPPED | OUTPATIENT
Start: 2018-05-30 | End: 2019-01-28 | Stop reason: SDUPTHER

## 2018-06-19 RX ORDER — PENICILLIN V POTASSIUM 500 MG/1
500 TABLET ORAL 4 TIMES DAILY
COMMUNITY
End: 2018-07-23 | Stop reason: ALTCHOICE

## 2018-06-28 ENCOUNTER — TELEPHONE (OUTPATIENT)
Dept: INTERNAL MEDICINE | Age: 68
End: 2018-06-28
Payer: MEDICARE

## 2018-06-28 DIAGNOSIS — I25.83 CORONARY ARTERY DISEASE DUE TO LIPID RICH PLAQUE: ICD-10-CM

## 2018-06-28 DIAGNOSIS — I25.10 CORONARY ARTERY DISEASE DUE TO LIPID RICH PLAQUE: ICD-10-CM

## 2018-06-28 DIAGNOSIS — Z91.81 H/O FALLING: ICD-10-CM

## 2018-06-28 DIAGNOSIS — Z79.4 TYPE 2 DIABETES MELLITUS WITH DIABETIC POLYNEUROPATHY, WITH LONG-TERM CURRENT USE OF INSULIN (HCC): Primary | ICD-10-CM

## 2018-06-28 DIAGNOSIS — G45.8 OTHER SPECIFIED TRANSIENT CEREBRAL ISCHEMIAS: ICD-10-CM

## 2018-06-28 DIAGNOSIS — G62.9 PERIPHERAL POLYNEUROPATHY: ICD-10-CM

## 2018-06-28 DIAGNOSIS — E11.42 TYPE 2 DIABETES MELLITUS WITH DIABETIC POLYNEUROPATHY, WITH LONG-TERM CURRENT USE OF INSULIN (HCC): Primary | ICD-10-CM

## 2018-06-28 DIAGNOSIS — F32.A ANXIETY AND DEPRESSION: ICD-10-CM

## 2018-06-28 DIAGNOSIS — F41.9 ANXIETY AND DEPRESSION: ICD-10-CM

## 2018-06-28 DIAGNOSIS — G47.33 OBSTRUCTIVE SLEEP APNEA: ICD-10-CM

## 2018-06-28 PROCEDURE — G0179 MD RECERTIFICATION HHA PT: HCPCS | Performed by: INTERNAL MEDICINE

## 2018-07-11 ENCOUNTER — OFFICE VISIT (OUTPATIENT)
Dept: OBGYN | Age: 68
End: 2018-07-11
Payer: MEDICARE

## 2018-07-11 ENCOUNTER — TELEPHONE (OUTPATIENT)
Dept: CARDIOLOGY | Age: 68
End: 2018-07-11

## 2018-07-11 VITALS
HEIGHT: 64 IN | WEIGHT: 264 LBS | SYSTOLIC BLOOD PRESSURE: 122 MMHG | HEART RATE: 64 BPM | RESPIRATION RATE: 16 BRPM | DIASTOLIC BLOOD PRESSURE: 78 MMHG | BODY MASS INDEX: 45.07 KG/M2

## 2018-07-11 DIAGNOSIS — N89.8 VAGINAL DRYNESS: Primary | ICD-10-CM

## 2018-07-11 DIAGNOSIS — B37.2 MONILIAL RASH: ICD-10-CM

## 2018-07-11 DIAGNOSIS — Z12.31 VISIT FOR SCREENING MAMMOGRAM: ICD-10-CM

## 2018-07-11 PROCEDURE — 1036F TOBACCO NON-USER: CPT | Performed by: NURSE PRACTITIONER

## 2018-07-11 PROCEDURE — 1090F PRES/ABSN URINE INCON ASSESS: CPT | Performed by: NURSE PRACTITIONER

## 2018-07-11 PROCEDURE — G8417 CALC BMI ABV UP PARAM F/U: HCPCS | Performed by: NURSE PRACTITIONER

## 2018-07-11 PROCEDURE — 3017F COLORECTAL CA SCREEN DOC REV: CPT | Performed by: NURSE PRACTITIONER

## 2018-07-11 PROCEDURE — 1123F ACP DISCUSS/DSCN MKR DOCD: CPT | Performed by: NURSE PRACTITIONER

## 2018-07-11 PROCEDURE — G8400 PT W/DXA NO RESULTS DOC: HCPCS | Performed by: NURSE PRACTITIONER

## 2018-07-11 PROCEDURE — 99214 OFFICE O/P EST MOD 30 MIN: CPT | Performed by: NURSE PRACTITIONER

## 2018-07-11 PROCEDURE — 4040F PNEUMOC VAC/ADMIN/RCVD: CPT | Performed by: NURSE PRACTITIONER

## 2018-07-11 PROCEDURE — G8598 ASA/ANTIPLAT THER USED: HCPCS | Performed by: NURSE PRACTITIONER

## 2018-07-11 PROCEDURE — G8427 DOCREV CUR MEDS BY ELIG CLIN: HCPCS | Performed by: NURSE PRACTITIONER

## 2018-07-11 PROCEDURE — 1101F PT FALLS ASSESS-DOCD LE1/YR: CPT | Performed by: NURSE PRACTITIONER

## 2018-07-11 RX ORDER — NYSTATIN 100000 [USP'U]/G
POWDER TOPICAL
Qty: 60 G | Refills: 5 | Status: ON HOLD | OUTPATIENT
Start: 2018-07-11 | End: 2019-09-30 | Stop reason: ALTCHOICE

## 2018-07-11 NOTE — PROGRESS NOTES
Suhail Serna  2018              69 y.o. Chief Complaint   Patient presents with    Gynecologic Exam     last pap 17         Patient's last menstrual period was 1995 (approximate). No referring provider defined for this encounter. HPI :Annual Exam  Patient presents for annual exam.  Counseling on healthy lifestyle reviewed, as well as the need for self breast exam. We reviewed the need for [place] exercises. We discussed and reviewed the need for routine screenings and immunization updates when appropriate. Rare urinary incontinence. Does have vaginal dryness and irritation. Many years ago she underwent HAILE-BSO and XRT for cervical cancer. Performs monthly self breast exams. Discussed bone health and balance exercise. The patient is not sexually active. last pap: was normal, last mammogram: was normal  The patient has regular exercise: yes .  We did review the need for and frequency of both weight bearing and strengthening as tolerated by the patient. ________________________________________________________________________  Obstetric History       T1      L1     SAB0   TAB0   Ectopic0   Molar0   Multiple0   Live Births0       # Outcome Date GA Lbr Rashard/2nd Weight Sex Delivery Anes PTL Lv   1 Term                 Past Medical History:   Diagnosis Date    CAD (coronary artery disease) 44    Cancer ([facility] [address] 1971    cervical    Coronary artery disease     Hx of Bell's palsy     Mild right facial paralysis    Mild dementia     Neuropathy ([facility] [address]     Sleep apnea 2014    sleep study done in [place]    Type 2 diabetes mellitus with hyperglycemia [facility])                                                                    Past Surgical History:   Procedure Laterality Date    APPENDECTOMY  1968    BARIATRIC SURGERY      COLONOSCOPY  2012    HYSTERECTOMY      INDUCED   1970    PA EXC SKIN BENIG 1.1-2CM FACE,FACIAL Left 2018    Excision spironolactone (ALDACTONE) 50 MG tablet TAKE 1 TABLET TWICE DAILY 180 tablet 3    umeclidinium-vilanterol (ANORO ELLIPTA) 62.5-25 MCG/INH AEPB inhaler Inhale 1 puff into the lungs daily 3 each 3    VENTOLIN  (90 Base) MCG/ACT inhaler INHALE 2 PUFFS INTO THE LUNGS EVERY 4 HOURS AS NEEDED FOR WHEEZING 1 Inhaler 5    COMBIGAN 0.2-0.5 % ophthalmic solution PLACE 1 DROP INTO BOTH EYES DAILY 15 mL 3    ibuprofen (ADVIL;MOTRIN) 400 MG tablet TAKE 1 TABLET EVERY 6 HOURS AS NEEDED FOR PAIN 360 tablet 3    alendronate (FOSAMAX) 70 MG tablet TAKE 1 TABLET BY MOUTH EVERY 7 DAYS 12 tablet 3    Lancet Devices (PRODIGY LANCING DEVICE) MISC USE TO TEST BLOOD SUGAR FOUR TIMES DAILY AS DIRECTED 1 each 0    SENNA LAX 8.6 MG tablet TAKE 1 TABLET BY MOUTH TWICE DAILY 180 tablet 3    Cholecalciferol (VITAMIN D3) 3000 units TABS Take 3,000 Units by mouth 2 times daily 60 tablet 11    triamcinolone (KENALOG) 0.1 % cream Apply topically 2 times daily.  80 g 5    lisinopril (PRINIVIL;ZESTRIL) 10 MG tablet Take 1 tablet by mouth daily 30 tablet 11    amLODIPine (NORVASC) 5 MG tablet Take 1 tablet by mouth daily 90 tablet 3    potassium chloride (MICRO-K) 10 MEQ extended release capsule TAKE 1 CAPSULE TWICE DAILY 180 capsule 3    citalopram (CELEXA) 10 MG tablet TAKE 1 TABLET EVERY DAY 90 tablet 3    B-D ULTRAFINE III SHORT PEN 31G X 8 MM MISC USE TWICE DAILY 180 each 3    gabapentin (NEURONTIN) 300 MG capsule TAKE 2 CAPSULES TWICE DAILY 360 capsule 2    NOVOLOG FLEXPEN 100 UNIT/ML injection pen INJECT 4 UNITS AT LUNCH AND  6 UNITS AT SUPPER (EACH PEN EXPIRES 28 DAYS AFTER 1ST USE) 15 mL 3    pantoprazole (PROTONIX) 40 MG tablet TAKE 1 TABLET EVERY DAY 90 tablet 3    fluticasone (FLONASE) 50 MCG/ACT nasal spray USE 2 SPRAYS NASALLY EVERY DAY 48 g 3    furosemide (LASIX) 40 MG tablet TAKE 1 TABLET TWICE DAILY 180 tablet 3    ondansetron (ZOFRAN) 4 MG tablet Take 1 tablet by mouth every 8 hours as needed for Nausea or Vomiting 30 tablet 3    Compression Stockings MISC by Does not apply route 15-20mmHg  Knee high  Open tow  With assist device to help put them on 1 each 0    Zinc 50 MG TABS Take 50 mg by mouth daily      loratadine (CLARITIN) 10 MG tablet Take 1 tablet by mouth daily 30 tablet 5    Diabetic Shoe MISC by Does not apply route 1 each 0    niacin 500 MG extended release capsule Take 500 mg by mouth daily      folic acid (FOLVITE) 324 MCG tablet Take 800 mcg by mouth daily      Cyanocobalamin (VITAMIN B12 PO) Take 2,500 mcg by mouth daily      B Complex-C (SUPER B COMPLEX PO) Take by mouth daily      travoprost, benzalkonium, (TRAVATAN) 0.004 % ophthalmic solution Place 1 drop into both eyes daily 3 Bottle 3    Insulin Syringe-Needle U-100 (INSULIN SYRINGE .5CC/31GX5/16\") 31G X 5/16\" 0.5 ML MISC 1 each by Does not apply route daily 200 each 3    insulin glargine (LANTUS) 100 UNIT/ML injection vial Inject 10 Units into the skin nightly 3 vial 3    CPAP Machine MISC by Does not apply route Pressure of 13 (AirSense 10)  [employer].      albuterol (PROVENTIL) (2.5 MG/3ML) 0.083% nebulizer solution Take 2.5 mg by nebulization every 6 hours as needed for Wheezing      ferrous sulfate 325 (65 FE) MG EC tablet Take 325 mg by mouth daily (with breakfast)      Multiple Vitamin (MULTI VITAMIN DAILY) TABS Take by mouth daily      nitroGLYCERIN (NITROSTAT) 0.4 MG SL tablet Place 0.4 mg under the tongue every 5 minutes as needed for Chest pain Dissolve 1 tab under tongue at first sign of chest pain. May repeat every 5 minutes until relief is obtained. If pain persists after taking 3 tabs in a 15-minute period, or the pain is different than is typically experienced, call 9-1-1 immediately. No current facility-administered medications for this visit.         ALLERGIES:  Allergies as of 07/11/2018    (No Known Allergies)           Gynecologic History:  Menarche: 2 yo  Menopause at hysterectomy-surgical menopause effort. Cardiovascular: The heart was in a regular rate and rhythm. . No S3 or S4. There was no murmur appreciated. Extremities: The patients extremities were without calf tenderness, edema, or varicosities. There was full range of motion in all four extremities. Pulses in all four extremities    Abdomen: The abdomen was soft and non-tender. There were good bowel sounds in all quadrants and there was no guarding, rebound or rigidity. On evaluation there was no evidence of hepatosplenomegaly and there was no costal vertebral yajaira tenderness bilaterally. No hernias were appreciated. Psych: The patient had a normal Orientation to: Time, Place, Person, and Situation  Mood and affect appropriate    Breast:  (Chest)  normal appearance, no masses or tenderness, Inspection negative, No nipple retraction or dimpling, No nipple discharge or bleeding, No axillary or supraclavicular adenopathy, Normal to palpation without dominant masses, negative findings: normal in size and symmetry, normal contour with no evidence of flattening or dimpling, skin normal, nipples everted without rashes or discharge, palpation negative for masses or nodules, no palpable axillary lymphadenopathy      Pelvic Exam:  External genitalia: normal general appearance  Urinary system: urethral meatus normal  Vaginal: atrophic mucosa and changes consistent with radiation  Cervix: absent and removed surgically  Adnexa: normal bimanual exam and non palpable  Uterus: absent and removed surgically    Musculosk:  Normal Gait and station was noted. Digits were evaluated without abnormal findings. Range of motion, stability and strength were evaluated and found to be appropriate for the patients age. ASSESSMENT:      69 y.o. Annual   Diagnosis Orders   1. Vaginal dryness     2.  Visit for screening mammogram  MASOOD DIGITAL SCREEN W CAD BILATERAL        Chief Complaint   Patient presents with    Gynecologic Exam     last pap 6/21/17 done elsewhere, please request any previous mammogram films from those organizations prior to your appointment.      Order Specific Question:   Reason for exam:     Answer:   SCREENING MAMMOGRAM       Lanny Grissom Karen  7/11/2018      (Please note that portions of this note were completed with a voice-recognition program. Efforts were made to edit the dictations but occasionally words are mis-transcribed.)

## 2018-07-11 NOTE — TELEPHONE ENCOUNTER
Pt stopped to drop off her BP readings and her glucose levels for Dr Soto Common to review. Pt is having a tooth pulled on 7/17/18 and needs to know if she needs a note for the dentist and to be sure that she is ok to have this done. Dentist is Dr Nalini Gonzalez in [facility]  Please call pt to advise. Copies of the BP readings are at the desk for Dr angelo.

## 2018-07-16 ENCOUNTER — HOSPITAL ENCOUNTER (OUTPATIENT)
Dept: LAB | Age: 68
Setting detail: SPECIMEN
Discharge: HOME | End: 2018-07-16
Payer: MEDICARE

## 2018-07-16 ENCOUNTER — TELEPHONE (OUTPATIENT)
Dept: CARDIOLOGY | Age: 68
End: 2018-07-16

## 2018-07-16 DIAGNOSIS — Z79.4 TYPE 2 DIABETES MELLITUS WITH DIABETIC POLYNEUROPATHY, WITH LONG-TERM CURRENT USE OF INSULIN (HCC): ICD-10-CM

## 2018-07-16 DIAGNOSIS — I25.83 CORONARY ARTERY DISEASE DUE TO LIPID RICH PLAQUE: ICD-10-CM

## 2018-07-16 DIAGNOSIS — R53.82 CHRONIC FATIGUE: ICD-10-CM

## 2018-07-16 DIAGNOSIS — I25.10 CORONARY ARTERY DISEASE DUE TO LIPID RICH PLAQUE: ICD-10-CM

## 2018-07-16 DIAGNOSIS — E55.9 VITAMIN D DEFICIENCY: ICD-10-CM

## 2018-07-16 DIAGNOSIS — E11.42 TYPE 2 DIABETES MELLITUS WITH DIABETIC POLYNEUROPATHY, WITH LONG-TERM CURRENT USE OF INSULIN (HCC): ICD-10-CM

## 2018-07-16 LAB
ALBUMIN SERPL-MCNC: 3.8 G/DL (ref 3.5–5.2)
ALBUMIN/GLOBULIN RATIO: 1.1 (ref 1–2.5)
ALP BLD-CCNC: 72 U/L (ref 35–104)
ALT SERPL-CCNC: 39 U/L (ref 5–33)
ANION GAP SERPL CALCULATED.3IONS-SCNC: 12 MMOL/L (ref 9–17)
AST SERPL-CCNC: 38 U/L
BILIRUB SERPL-MCNC: 0.35 MG/DL (ref 0.3–1.2)
BUN BLDV-MCNC: 15 MG/DL (ref 8–23)
BUN/CREAT BLD: 20 (ref 9–20)
CALCIUM SERPL-MCNC: 9.2 MG/DL (ref 8.6–10.4)
CHLORIDE BLD-SCNC: 101 MMOL/L (ref 98–107)
CHOLESTEROL/HDL RATIO: 2.9
CHOLESTEROL: 149 MG/DL
CO2: 26 MMOL/L (ref 20–31)
CREAT SERPL-MCNC: 0.76 MG/DL (ref 0.5–0.9)
ESTIMATED AVERAGE GLUCOSE: 134 MG/DL
GFR AFRICAN AMERICAN: >60 ML/MIN
GFR NON-AFRICAN AMERICAN: >60 ML/MIN
GFR SERPL CREATININE-BSD FRML MDRD: ABNORMAL ML/MIN/{1.73_M2}
GFR SERPL CREATININE-BSD FRML MDRD: ABNORMAL ML/MIN/{1.73_M2}
GLUCOSE BLD-MCNC: 140 MG/DL (ref 70–99)
HBA1C MFR BLD: 6.3 % (ref 4.8–5.9)
HDLC SERPL-MCNC: 52 MG/DL
IRON SATURATION: 36 % (ref 20–55)
IRON: 104 UG/DL (ref 37–145)
LDL CHOLESTEROL: 75 MG/DL (ref 0–130)
POTASSIUM SERPL-SCNC: 4.4 MMOL/L (ref 3.7–5.3)
SODIUM BLD-SCNC: 139 MMOL/L (ref 135–144)
THYROXINE, FREE: 1.08 NG/DL (ref 0.93–1.7)
TOTAL IRON BINDING CAPACITY: 291 UG/DL (ref 250–450)
TOTAL PROTEIN: 7.4 G/DL (ref 6.4–8.3)
TRIGL SERPL-MCNC: 108 MG/DL
TSH SERPL DL<=0.05 MIU/L-ACNC: 1.25 MIU/L (ref 0.3–5)
UNSATURATED IRON BINDING CAPACITY: 187 UG/DL (ref 112–347)
VITAMIN B-12: 1650 PG/ML (ref 232–1245)
VLDLC SERPL CALC-MCNC: NORMAL MG/DL (ref 1–30)

## 2018-07-16 PROCEDURE — 83550 IRON BINDING TEST: CPT

## 2018-07-16 PROCEDURE — 36415 COLL VENOUS BLD VENIPUNCTURE: CPT

## 2018-07-16 PROCEDURE — 80061 LIPID PANEL: CPT

## 2018-07-16 PROCEDURE — 84443 ASSAY THYROID STIM HORMONE: CPT

## 2018-07-16 PROCEDURE — 82607 VITAMIN B-12: CPT

## 2018-07-16 PROCEDURE — 82306 VITAMIN D 25 HYDROXY: CPT

## 2018-07-16 PROCEDURE — 83036 HEMOGLOBIN GLYCOSYLATED A1C: CPT

## 2018-07-16 PROCEDURE — 84439 ASSAY OF FREE THYROXINE: CPT

## 2018-07-16 PROCEDURE — 83540 ASSAY OF IRON: CPT

## 2018-07-16 PROCEDURE — 80053 COMPREHEN METABOLIC PANEL: CPT

## 2018-07-16 NOTE — TELEPHONE ENCOUNTER
[address] officecalled stating pt needs to have extractions done. Wanting to know if Dr thinks she can tolerate procedure, what meds she may need to hold and if she needs another ound of ATB first, She took PenVK 500 x40 starting June 11.   Please call back and advise [phone]

## 2018-07-17 LAB — VITAMIN D 25-HYDROXY: 41.8 NG/ML (ref 30–100)

## 2018-07-23 ENCOUNTER — OFFICE VISIT (OUTPATIENT)
Dept: NEUROLOGY | Age: 68
End: 2018-07-23
Payer: MEDICARE

## 2018-07-23 ENCOUNTER — OFFICE VISIT (OUTPATIENT)
Dept: INTERNAL MEDICINE | Age: 68
End: 2018-07-23
Payer: MEDICARE

## 2018-07-23 VITALS
OXYGEN SATURATION: 96 % | BODY MASS INDEX: 45.07 KG/M2 | WEIGHT: 264 LBS | DIASTOLIC BLOOD PRESSURE: 74 MMHG | SYSTOLIC BLOOD PRESSURE: 126 MMHG | HEIGHT: 64 IN | HEART RATE: 62 BPM

## 2018-07-23 VITALS
RESPIRATION RATE: 16 BRPM | BODY MASS INDEX: 45.07 KG/M2 | DIASTOLIC BLOOD PRESSURE: 60 MMHG | WEIGHT: 264 LBS | HEIGHT: 64 IN | SYSTOLIC BLOOD PRESSURE: 128 MMHG

## 2018-07-23 DIAGNOSIS — I25.10 CORONARY ARTERY DISEASE DUE TO LIPID RICH PLAQUE: ICD-10-CM

## 2018-07-23 DIAGNOSIS — E55.9 VITAMIN D DEFICIENCY: ICD-10-CM

## 2018-07-23 DIAGNOSIS — G45.0 VBI (VERTEBROBASILAR INSUFFICIENCY): ICD-10-CM

## 2018-07-23 DIAGNOSIS — F32.A ANXIETY AND DEPRESSION: ICD-10-CM

## 2018-07-23 DIAGNOSIS — Z86.69 HX OF BELL'S PALSY: ICD-10-CM

## 2018-07-23 DIAGNOSIS — Z79.4 TYPE 2 DIABETES MELLITUS WITH DIABETIC POLYNEUROPATHY, WITH LONG-TERM CURRENT USE OF INSULIN (HCC): ICD-10-CM

## 2018-07-23 DIAGNOSIS — G45.8 OTHER SPECIFIED TRANSIENT CEREBRAL ISCHEMIAS: ICD-10-CM

## 2018-07-23 DIAGNOSIS — R53.82 CHRONIC FATIGUE: ICD-10-CM

## 2018-07-23 DIAGNOSIS — Z91.81 H/O FALLING: ICD-10-CM

## 2018-07-23 DIAGNOSIS — E66.01 MORBID OBESITY WITH BMI OF 45.0-49.9, ADULT (HCC): ICD-10-CM

## 2018-07-23 DIAGNOSIS — Z79.4 TYPE 2 DIABETES MELLITUS WITH DIABETIC POLYNEUROPATHY, WITH LONG-TERM CURRENT USE OF INSULIN (HCC): Primary | ICD-10-CM

## 2018-07-23 DIAGNOSIS — I25.83 CORONARY ARTERY DISEASE DUE TO LIPID RICH PLAQUE: ICD-10-CM

## 2018-07-23 DIAGNOSIS — E11.42 TYPE 2 DIABETES MELLITUS WITH DIABETIC POLYNEUROPATHY, WITH LONG-TERM CURRENT USE OF INSULIN (HCC): Primary | ICD-10-CM

## 2018-07-23 DIAGNOSIS — I63.6 CEREBRAL INFARCTION DUE TO CEREBRAL VENOUS THROMBOSIS, NONPYOGENIC (HCC): ICD-10-CM

## 2018-07-23 DIAGNOSIS — G47.33 OBSTRUCTIVE SLEEP APNEA: ICD-10-CM

## 2018-07-23 DIAGNOSIS — R42 DIZZINESS: ICD-10-CM

## 2018-07-23 DIAGNOSIS — G44.211 INTRACTABLE EPISODIC TENSION-TYPE HEADACHE: ICD-10-CM

## 2018-07-23 DIAGNOSIS — M54.2 NECK PAIN: ICD-10-CM

## 2018-07-23 DIAGNOSIS — F41.9 ANXIETY AND DEPRESSION: ICD-10-CM

## 2018-07-23 DIAGNOSIS — E11.42 TYPE 2 DIABETES MELLITUS WITH DIABETIC POLYNEUROPATHY, WITH LONG-TERM CURRENT USE OF INSULIN (HCC): ICD-10-CM

## 2018-07-23 DIAGNOSIS — R73.09 ELEVATED HEMOGLOBIN A1C: ICD-10-CM

## 2018-07-23 DIAGNOSIS — G44.229 CHRONIC TENSION-TYPE HEADACHE, NOT INTRACTABLE: Primary | ICD-10-CM

## 2018-07-23 DIAGNOSIS — R26.9 GAIT ABNORMALITY: ICD-10-CM

## 2018-07-23 DIAGNOSIS — R26.89 BALANCE PROBLEM: ICD-10-CM

## 2018-07-23 DIAGNOSIS — G25.0 ESSENTIAL TREMOR: ICD-10-CM

## 2018-07-23 DIAGNOSIS — R41.3 MEMORY PROBLEM: ICD-10-CM

## 2018-07-23 DIAGNOSIS — G62.9 PERIPHERAL POLYNEUROPATHY: ICD-10-CM

## 2018-07-23 DIAGNOSIS — I67.82 CHRONIC CEREBRAL ISCHEMIA: ICD-10-CM

## 2018-07-23 PROCEDURE — 3044F HG A1C LEVEL LT 7.0%: CPT | Performed by: PSYCHIATRY & NEUROLOGY

## 2018-07-23 PROCEDURE — 3288F FALL RISK ASSESSMENT DOCD: CPT | Performed by: PSYCHIATRY & NEUROLOGY

## 2018-07-23 PROCEDURE — 4040F PNEUMOC VAC/ADMIN/RCVD: CPT | Performed by: INTERNAL MEDICINE

## 2018-07-23 PROCEDURE — G8598 ASA/ANTIPLAT THER USED: HCPCS | Performed by: INTERNAL MEDICINE

## 2018-07-23 PROCEDURE — G8427 DOCREV CUR MEDS BY ELIG CLIN: HCPCS | Performed by: PSYCHIATRY & NEUROLOGY

## 2018-07-23 PROCEDURE — 2022F DILAT RTA XM EVC RTNOPTHY: CPT | Performed by: INTERNAL MEDICINE

## 2018-07-23 PROCEDURE — G8400 PT W/DXA NO RESULTS DOC: HCPCS | Performed by: PSYCHIATRY & NEUROLOGY

## 2018-07-23 PROCEDURE — 1090F PRES/ABSN URINE INCON ASSESS: CPT | Performed by: PSYCHIATRY & NEUROLOGY

## 2018-07-23 PROCEDURE — 1123F ACP DISCUSS/DSCN MKR DOCD: CPT | Performed by: INTERNAL MEDICINE

## 2018-07-23 PROCEDURE — 1100F PTFALLS ASSESS-DOCD GE2>/YR: CPT | Performed by: PSYCHIATRY & NEUROLOGY

## 2018-07-23 PROCEDURE — 3044F HG A1C LEVEL LT 7.0%: CPT | Performed by: INTERNAL MEDICINE

## 2018-07-23 PROCEDURE — 1100F PTFALLS ASSESS-DOCD GE2>/YR: CPT | Performed by: INTERNAL MEDICINE

## 2018-07-23 PROCEDURE — 3017F COLORECTAL CA SCREEN DOC REV: CPT | Performed by: INTERNAL MEDICINE

## 2018-07-23 PROCEDURE — 3017F COLORECTAL CA SCREEN DOC REV: CPT | Performed by: PSYCHIATRY & NEUROLOGY

## 2018-07-23 PROCEDURE — G8417 CALC BMI ABV UP PARAM F/U: HCPCS | Performed by: INTERNAL MEDICINE

## 2018-07-23 PROCEDURE — 4040F PNEUMOC VAC/ADMIN/RCVD: CPT | Performed by: PSYCHIATRY & NEUROLOGY

## 2018-07-23 PROCEDURE — 1036F TOBACCO NON-USER: CPT | Performed by: INTERNAL MEDICINE

## 2018-07-23 PROCEDURE — G8417 CALC BMI ABV UP PARAM F/U: HCPCS | Performed by: PSYCHIATRY & NEUROLOGY

## 2018-07-23 PROCEDURE — 1036F TOBACCO NON-USER: CPT | Performed by: PSYCHIATRY & NEUROLOGY

## 2018-07-23 PROCEDURE — G8427 DOCREV CUR MEDS BY ELIG CLIN: HCPCS | Performed by: INTERNAL MEDICINE

## 2018-07-23 PROCEDURE — 1123F ACP DISCUSS/DSCN MKR DOCD: CPT | Performed by: PSYCHIATRY & NEUROLOGY

## 2018-07-23 PROCEDURE — 2022F DILAT RTA XM EVC RTNOPTHY: CPT | Performed by: PSYCHIATRY & NEUROLOGY

## 2018-07-23 PROCEDURE — 3288F FALL RISK ASSESSMENT DOCD: CPT | Performed by: INTERNAL MEDICINE

## 2018-07-23 PROCEDURE — G8400 PT W/DXA NO RESULTS DOC: HCPCS | Performed by: INTERNAL MEDICINE

## 2018-07-23 PROCEDURE — 1090F PRES/ABSN URINE INCON ASSESS: CPT | Performed by: INTERNAL MEDICINE

## 2018-07-23 PROCEDURE — 99215 OFFICE O/P EST HI 40 MIN: CPT | Performed by: PSYCHIATRY & NEUROLOGY

## 2018-07-23 PROCEDURE — G8598 ASA/ANTIPLAT THER USED: HCPCS | Performed by: PSYCHIATRY & NEUROLOGY

## 2018-07-23 PROCEDURE — 99214 OFFICE O/P EST MOD 30 MIN: CPT | Performed by: INTERNAL MEDICINE

## 2018-07-23 PROCEDURE — G8510 SCR DEP NEG, NO PLAN REQD: HCPCS | Performed by: INTERNAL MEDICINE

## 2018-07-23 RX ORDER — ALENDRONATE SODIUM 70 MG/1
70 TABLET ORAL
Qty: 12 TABLET | Refills: 3 | Status: SHIPPED | OUTPATIENT
Start: 2018-07-23 | End: 2018-10-15 | Stop reason: SDUPTHER

## 2018-07-23 RX ORDER — MEMANTINE HYDROCHLORIDE 10 MG/1
10 TABLET ORAL 2 TIMES DAILY
Qty: 180 TABLET | Refills: 1 | Status: SHIPPED | OUTPATIENT
Start: 2018-07-23 | End: 2019-01-14 | Stop reason: ALTCHOICE

## 2018-07-23 ASSESSMENT — ENCOUNTER SYMPTOMS
BACK PAIN: 0
CONSTIPATION: 0
ABDOMINAL PAIN: 0
BLOOD IN STOOL: 0
SWOLLEN GLANDS: 0
BLURRED VISION: 0
SHORTNESS OF BREATH: 0
BLOOD IN STOOL: 0
FACIAL SWELLING: 0
TROUBLE SWALLOWING: 0
NAUSEA: 0
CHEST TIGHTNESS: 0
DIARRHEA: 0
SCALP TENDERNESS: 0
CHANGE IN BOWEL HABIT: 0
EYE PAIN: 0
FACIAL SWEATING: 0
SHORTNESS OF BREATH: 1
ABDOMINAL DISTENTION: 0
ABDOMINAL PAIN: 0
NAUSEA: 0
APNEA: 0
VOMITING: 0
CONSTIPATION: 0
PHOTOPHOBIA: 1
WHEEZING: 0
EYE WATERING: 0
CHOKING: 0
RHINORRHEA: 0
COUGH: 0
COUGH: 0
BACK PAIN: 1
COLOR CHANGE: 0
EYE PAIN: 0
BOWEL INCONTINENCE: 0
DIARRHEA: 0
VOICE CHANGE: 0
VOMITING: 0
EYE REDNESS: 0
EYE ITCHING: 0
SINUS PRESSURE: 1
SORE THROAT: 0
EYE DISCHARGE: 0
VISUAL CHANGE: 0

## 2018-07-23 ASSESSMENT — PATIENT HEALTH QUESTIONNAIRE - PHQ9
2. FEELING DOWN, DEPRESSED OR HOPELESS: 1
SUM OF ALL RESPONSES TO PHQ QUESTIONS 1-9: 2
SUM OF ALL RESPONSES TO PHQ9 QUESTIONS 1 & 2: 2
1. LITTLE INTEREST OR PLEASURE IN DOING THINGS: 1

## 2018-07-23 NOTE — PROGRESS NOTES
perianal numbness. Risk factors include lack of exercise, history of cancer, obesity, poor posture and sedentary lifestyle. She has tried home exercises, bed rest and heat for the symptoms. The treatment provided mild relief. History obtained from  The patient   and other  available medical records were  Also  reviewed. The  Duration,  Quality,  Severity,  Location,  Timing,  Context,  Modifying  Factors   Of   The   Chief   Complaint   And  Present  Illness   Was   Reviewed   In   Chronological   Manner. PATIENT'S  CURRENT  MAIN  NEUROLOGICAL  CONCERNS INCLUDE :                     1)    Chronic   Headaches     Since   1993                               Fairly  Stable     /  On  Ibuprofen   As   Needed                     2)     Memory problems     Worse       Since   2016                        3)    Chronic     Gait  And  Balance   Problems        Since    2012                                Patient  Uses  Cane                        4)     Chronic    dizziness     Since    2014                                      Multi factorial    In nature                             Improved    After    Being  On  plavix. 5)      History of    Falls  In  The  Past                       6)   Diabetic  Peripheral  Neuropathy    -    Stable                               On   Neurontin  For  Symptomatic  relief                                 On  Insulin   For    Type  II  DM                      7)      Concern   For  TIA,     Cerebral ischemia,                       orthostatic  Intolerance  And  Autonomic  neuropathy                          Started   On  plavix   In  July 2017    And  Tolerating  The  Same. 8)    Patient  Denies  Any  History of  Seizures.                          No  Family  History  Of  Seizure  disorder                         9)    History  Of  Anxiety  And  Depression     -      Stable On  Celexa,  wellbutrin                           Patient     To   Follow   With  Mental health professionals                         10)   Essential  Tremor  Involving  Hands     For  More  Than   3  Years                                 Stable                     11)     Moderate    Dementia   For  More  Than  Two   Years                            Intolerance  To   Aricept                              Currently  Patient  Is  Namenda                               -   Tolerating  The  Same    And  Getting  Benefit. 12)   Obesity,   OSAS    -   On  cpap                       13)  Multiple  Co  morbid  Medical  Conditions                            Being  Followed  By  Her     PCP                      14)    H/o   Recurrent  Falls  In   May   2018                       15)     Worsening  Of  Headaches,    Dizziness     Since      May   2018                                               16)     IN  VIEW  OF  THE  PATIENT'S    MULTIPLE   NEUROLOGICAL                           SYMPTOMS  AND  CONCERNS    FOR  PROLONGED   DURATION,                           AND    MULTIPLE   CO MORBID  MEDICAL  CONDITIONS,                           PATIENT    NEEDS  NEURO  DIAGNOSTIC  EVALUATIONS                      FOR   ANY   NEUROLOGICAL  PATHOLOGIES    AND  OTHER                        CORRECTABLE   ETIOLOGIES;     AND                              PATIENT  REQUEST  THE  SAME.                                         PRECIPITATING  FACTORS: including  fever/infection, exertion/relaxation, position change, stress, weather change, medications/alcohol, time of day/darkness/light  Are    present                                                             MODIFYING  FACTORS:  fever/infection, exertion/relaxation, position change, stress, weather change, medications/alcohol, time of day/darkness/light     Are  present         Patient   Indicates   The  Presence   And  The  Absence  Of  The  Following  Associated And   Additional  Neurological    Symptoms:                                Balance  And coordination problems  present           Gait problems     present            Headaches      present              Migraines           present           Memory problems        present           Confusion        present            Paresthesia numbness          present           Seizures  And  Starring  Episodes           absent           Syncope,  Near  syncopal episodes         absent           Speech problems           absent             Swallowing  Problems      absent            Dizziness,  Light headedness           present                        Vertigo        present             Generalized   Weakness    absent              focal  Weakness     present             Tremors         absent              Sleep  Problems     absent             History  Of   Recent   Head  Injury     absent             History  Of   Recent  TIA     absent             History  Of   Recent    Stroke     absent             Neck  Pain and  Neck muscle  Spasms  present             Radiating  down   And   Weakness           present            Lower back   Pain  And     Spasms  present            Radiating    Down   And   Weakness          present                H/O   FALLS        present               History  Of   Visual  Symptoms    absent                Associated   Diplopia       absent                                Also   Additional   Symptoms   Present    As  Documented    In   The detailed    Review  Of  Systems   And    Please   Refer   To    Them for   Additional  Information. Any components  That are either  Unobtainable  Or  Limited  In   HPI, ROS  And/or PFSH   Are   Due   To   Patient's  Medical  Problems,  Clinical  Condition and/or lack of other  Alternate resources.           RECORDS   REVIEWED:  historical medical records     INFORMATION   REVIEWED:     MEDICAL   HISTORY,     SURGICAL   HISTORY,   MEDICATIONS   LIST, F) COORDINATION  AND  GAIT :                              Station and  Gait  abnormal - SLOW  UNSTEADY                                      Romberg's test positive                        Ataxia negative      ASSESSMENT:    Patient Active Problem List   Diagnosis    Obstructive sleep apnea    Chronic fatigue    Coronary artery disease due to lipid rich plaque    Vitamin D deficiency    Type 2 diabetes mellitus with diabetic polyneuropathy, with long-term current use of insulin ([facility])    Peripheral polyneuropathy ([facility])    Bilateral leg edema    Right hip pain    Muscle ache    Hx of Bell's palsy    Memory problem    Gait abnormality    Balance problem    H/O falling    Dizziness    Intractable episodic tension-type headache    Essential tremor    Anxiety and depression    Elevated hemoglobin A1c    VBI (vertebrobasilar insufficiency)    Chronic cerebral ischemia    TIA (transient ischemic attack)    Chronic tension headache    Morbid obesity with BMI of 45.0-49.9, adult ([facility])     CT OF THE HEAD WITHOUT CONTRAST  7/26/2017 3:29 pm       TECHNIQUE:   CT of the head was performed without the administration of intravenous   contrast. Dose modulation, iterative reconstruction, and/or weight based   adjustment of the mA/kV was utilized to reduce the radiation dose to as low   as reasonably achievable.       COMPARISON:   04/13/2017       HISTORY:   ORDERING SYSTEM PROVIDED HISTORY: Right hip pain   TECHNOLOGIST PROVIDED HISTORY:   Ordering Physician Provided Reason for Exam: Frontal headaches travel into   posterior head x 2 years. Acuity: Chronic   Type of Exam: Ongoing       FINDINGS:   BRAIN/VENTRICLES: There is no acute intracranial hemorrhage, mass effect or   midline shift.  No abnormal extra-axial fluid collection.  The gray-white   differentiation is maintained without evidence of an acute infarct.  There is   no evidence of hydrocephalus.  Mild calcific plaque of intracranial vessels.       ORBITS: The visualized portion of the orbits demonstrate no acute abnormality.       SINUSES: The visualized paranasal sinuses and mastoid air cells demonstrate   no acute abnormality.       SOFT TISSUES/SKULL:  No acute abnormality of the visualized skull or soft   tissues.           Impression   No acute intracranial abnormality.         ULTRASOUND EVALUATION OF THE CAROTID ARTERIES       7/26/2017       COMPARISON:   None.       HISTORY:   ORDERING SYSTEM PROVIDED HISTORY: Coronary artery disease due to lipid rich   plaque   TECHNOLOGIST PROVIDED HISTORY:   Ordering Physician Provided Reason for Exam: CAD,MEMORY ISSUES, DIZZINESS   Acuity: Acute   Type of Exam: Initial       FINDINGS:   RIGHT:       The right common carotid artery demonstrates peak systolic velocities of 69,   45 cm/sec in the proximal and distal segments respectively.       The right internal carotid artery demonstrates the systolic velocities of 75,   63, 77 cm/sec in the proximal, mid and distal segments respectively.       The external carotid artery is patent.  The vertebral artery demonstrates   normal antegrade flow.       No evidence of focal atherosclerotic plaque.       ICA/CCA ratio of 1.1.  High bifurcation is noted.       LEFT:       The left common carotid artery demonstrates peak systolic velocities of 77,   43 cm/sec in the proximal and distal segments respectively.       The left internal carotid artery demonstrates the systolic velocities of 38,   37, 44 cm/sec in the proximal, mid and distal segments respectively.       The external carotid artery is patent.  The vertebral artery demonstrates   normal antegrade flow.       Mild heterogeneous irregular plaque is noted in the carotid bulb with   extension into the left internal carotid artery with estimated   cross-sectional stenosis at the bulb of 35% in at the origin of the left   internal carotid artery is 39%.       ICA/CCA ratio of 0.6. Kristin Castro bifurcation is noted.           Impression   The right internal carotid artery demonstrates 0-50% stenosis .       The left internal carotid artery demonstrates 0-50% stenosis .       Bilateral vertebral arteries are patent with flow in the normal direction. VISITING DIAGNOSIS:      ICD-10-CM ICD-9-CM    1. Chronic tension-type headache, not intractable G44.229 339.12    2. Morbid obesity with BMI of 45.0-49.9, adult [facility] E66.01 278.01     Z68.42 V85.42    3. Intractable episodic tension-type headache G44.211 339.11    4. Peripheral polyneuropathy ([facility]) G62.9 356.9    5. Type 2 diabetes mellitus with diabetic polyneuropathy, with long-term current use of insulin ([facility]) E11.42 250.60     Z79.4 357.2      V58.67    6. Balance problem R26.89 781.99    7. H/O falling Z91.81 V15.88    8. Dizziness R42 780.4    9. Obstructive sleep apnea G47.33 327.23    10. Hx of Bell's palsy Z86.69 V12.49    11. VBI (vertebrobasilar insufficiency) G45.0 435.3    12. Anxiety and depression F41.8 300.00      311    13. Chronic fatigue R53.82 780.79    14. Chronic cerebral ischemia I67.82 437.1    15. Memory problem R41.3 780.93    16. Elevated hemoglobin A1c R73.09 790.29    17. Coronary artery disease due to lipid rich plaque I25.10 414.00     I25.83 414.3    18. Other specified transient cerebral ischemias G45.8 435.8    19. Vitamin D deficiency E55.9 268.9    20. Essential tremor G25.0 333.1    21. Cerebral infarction due to cerebral venous thrombosis, nonpyogenic ([facility])  I63.6 434.01    22. Gait abnormality R26.9 781.2               CONCERNS   &   INCREASED   RISK   FOR         * TIA,  CEREBRO  VASCULAR  ISCHEMIA, STROKE        *  Poorly  Controlled Chronic  Headaches &  Migraines      *   COGNITIVE  &   MEMORY PROBLEMS  AND  DEMENTIAS         *   PERIPHERAL  NEUROPATHY          *  GAIT  DIFFICULTIES  &   BALANCE PROBLMES   AND  FALL            VARIOUS  RISK   FACTORS   WERE  REVIEWED   AND   DISCUSSED.         *  PATIENT   HAS MULTIPLE   MEDICAL,  NEUROLOGICAL          AND   MENTAL  HEALTH  PROBLEMS . PATIENT'S   MANAGEMENT  IS  CHALLENGING. PLAN:       Espinoza Pearceer  Of  The  Diagnoses,  The  Management & Treatment  Options           AND    Care  plan  Were        Reviewed and   Discussed   With  patient. * Goals  And  Expectations  Of  The  Therapy  Discussed   And  Reviewed. *   Benefits   And   Side  Effect  Profile  Of  Medication/s   Were   Discussed             * Need   For  Further   Follow up For  The  Various  Problems  Were discussed. * Results  Of  The  Previous  Diagnostic tests were reviewed and questions answered. patient  understand the same. Medical  Decision  Making  Was  Made  Based on the   Complexity  Of  Above  Mentioned  Diagnoses,    Data reviewed   & diagnostic  Tests  Reviewed,  Risk  Of  Significant   Co morbidities and complicating   Factors. Medical  Decision  Was   High  Complexity  Due   To  The  Patient's  Multiple  Symptoms,  Advancing   Disease,  Complex  Treatment  Regimen,  Multiple medications and   Risk  Of   Side  Effects,  Difficulty  In  Medication  Management  And  Diagnostic  Challenges   In  View  Of  The  Associated   Co  Morbid  Conditions   And  Problems. * FALL   PRECAUTIONS. THESE  REVIEWED   AND  DISCUSSED    *  USE   WALKING  ASSISTANCE  DEVICES     QUAD  CANE      *   BE  CAREFUL  WITH  ACTIVITIES   INCLUDING  DRIVING. *   AVOID   NECK  AND/ BACK  STRAINING  ACTIVITIES    *   LOCAL  HEAT   AND MUSCLE  RELAXANTS   EXTERNALLY   AS  TOLERATED         * KEEP  DAIRY  OF   THE  NEUROLOGICAL  SYMPTOMS      RECORDING THE    DURATION  AND  FREQUENCY. *  AVOID    CONDITIONS  AND  FACTORS   THAT  MAKE   NEUROLOGICAL  SYMPTOMS  WORSE. *  TO  MAINTAIN  REGULAR  SLEEP  WAKE  CYCLES.      *   TO  HAVE  ADEQUATE  REST  AND   SLEEP    HOURS.          *    TO   AVOID   TO  SLEEP  IN   SUPINE POSITION. *      WEIGHT   LOSS. *    AVOID  ANY USAGE OF                 TOBACCO,  EXCESSIVE  ALCOHOL & ILLEGAL   SUBSTANCES      *  CONTINUE MEDICATIONS PRESCRIBED BY NEUROLOGIST AS    RECOMMENDED     *   Compliance   With  Medications   And  Instructions      * CURRENTLY  TOLERATING  THE  PRESCRIBED   MEDICATIONS. WITHOUT  ANY  SIGNIFICANT  SIDE  EFFECTS   &  GETTING BENEFIT. *  May   Use  Pill  Box,    If  Needed          *    To  Continue  The    Antiplatelet  therapy    As   Recommended  Was   Discussed      *    Prophylactic  Use   Of     Vitamin   B   Complex,  Folic  Acid,    Vitamin  B12    Multivitamin   Tablet  Daily    Supplementations   Over  The  Counter  Discussed         *  FOOT  CARE, DAILY  INSPECTION  OF  FEET   AND       PERIODIC  PODIATRY EVALUATIONS . *  PATIENT  IS  ALSO   COUNSELED   TO  KEEP    ACTIVITIES       A)   SIMPLE      B)  ORGANIZED      C)  WRITE   DOWN                 *  EVALUATIONS  AND  FOLLOW UP:                                          * CARDIOLOGY              *   OPTHALMOLOGY  /  For  Detailed   Fundi  &   Visual  fields  evaluation               *  PT / OT                 Controlled Substances Monitoring: Attestation: The Prescription Monitoring Report for this patient was reviewed today.  Eugenia Cortes MD)            Orders Placed This Encounter   Procedures    CT Head WO Contrast    VL DUP CAROTID BILATERAL    US Transcranial Doppler Complete    XR CERVICAL SPINE (4-5 VIEWS)    [facility] [place]    EEG    EMG       Orders Placed This Encounter   Medications    memantine (NAMENDA) 10 MG tablet     Sig: Take 1 tablet by mouth 2 times daily     Dispense:  180 tablet     Refill:  1     **Patient requests 90 days supply**                   *PATIENT   TO  FOLLOW  UP  WITH   PRIMARY  CARE   AND   OTHER  CONSULTANTS  AS  BEFORE.           *TO  [facility] PROFESSIONALS ,  INCLUDING          PSYCHOLOGICAL  COUNSELING   AND  PSYCHIATRIC  EVALUTIONS      *  Maintain   Healthy  Life Style    With   Periodic  Monitoring  Of    Any  Medical  Conditions  Including   Elevated  Blood  Pressure,  Lipid  Profile,   Blood  Sugar levels  And   Heart  Disease. *   Period   Screening  For  Cancers  Involving  Breast,  Colon,  Prostate, lungs  And  Other  Organs  As  Applicable,  In consultation   With  Your  Primary Care Providers. *  If  The  Patient remains  Neurologically  Stable   Return   To  [facility] Neurology Department   IN  3-4   MONTHS  TIME   FOR  FURTHER  FOLLOW UP.                 *  If   There is  Any  Significant  Worsening   Of  Current  Symptoms  And  Or  If patient  Develops   Any additional  New  Neurological  Symptoms  Or  Significant  Concerns   Should  Call  911 or  Go  To  Emergency  Department  For  Further  Immediate  Evaluation. *   The  Neurological   Findings,  Possible  Diagnosis,  Differential diagnoses   And  Options  For    Further   Investigations   And  management   Are  Discussed  Comprehensively. Medications   And  Prescription   Risks  And  Side effects  Are   Also  Discussed. The  Above  Were  Reviewed  With  patient and   questions  Answered  In  Detail. More   Than   50% of face  To face Time   Was  Spent  On  Counseling   And   Coordination  Of  Care   Of   Patient's multiple   Neurological  Problems   And   Comorbid  Medical   Conditions. Electronically signed by Harsha Oakley MD   Board Certified in  Neurology &  In  [facility] [facility] [employer] [employer] [facility] ([employer])      DISCLAIMER:   Although every effort was made to ensure the accuracy of this  electronic transcription, some errors in transcription may have occurred.       GENERAL PATIENT INSTRUCTIONS:     A Healthy Lifestyle: Care Instructions  Your Care Instructions  A healthy

## 2018-07-23 NOTE — PATIENT INSTRUCTIONS
[facility] NEUROLOGY    Due to the complex nature of our neurological testing, It is the policy of the Neurology Department not to release the results of your testing over the phone. Once all testing is completed and we have all the results back, Dr. Lemon Scheuermann will then personally go over all the results with you and answer any questions that you may have during a follow up appointment. If you are unable to keep this appointment, please notify the [address] [place] [address] @ [phone], as soon as possible. Please bring your prescription bottles to all appointments. * FALL   PRECAUTIONS. *  USE   WALKING  ASSISTANCE  DEVICES     QUAD  CANE  /   [employer]      *    TO   AVOID   TO  SLEEP  IN   SUPINE  POSITION. *      WEIGHT   LOSS. *   ADEQUATE   FLUID  INTAKE   AND  ELECTROLYTE  BALANCE           * KEEP  DAIRY  OF   THE  NEUROLOGICAL  SYMPTOMS        *  TO  MAINTAIN  REGULAR  SLEEP  WAKE  CYCLES. *   TO  HAVE  ADEQUATE  REST  AND   SLEEP    HOURS.    *    AVOID  USAGE OF            TOBACCO,  EXCESSIVE  ALCOHOL  AND   ILLEGAL   SUBSTANCES,  IF  ANY        *  Maintain   Healthy  Life Style    With   Periodic  Monitoring  Of    Any  Medical  Conditions  Including   Elevated  Blood  Pressure,  Lipid  Profile,   Blood  Sugar levels  And   Heart  Disease. *   Period   Screening  For  Cancers  Involving  Breast,  Colon,   lungs  And  Other  Organs  As  Applicable,  In consultation   With  Your  Primary Care Providers. *  If   There is  Any  Significant  Worsening   Of  Current  Symptoms  And  Or  If    Any additional  New  Neurological  Symptoms  Or  Significant  Concerns   Should  Call  911 or  Go  To  Emergency  Department  For  Further  Immediate  Evaluation.

## 2018-07-23 NOTE — PROGRESS NOTES
[address] [facility] INTERNAL MED  [id number]  Dept: [phone]  Dept Fax: [phone]  Loc: [phone]    Gloria Tidwell is a 69 y.o. female who presents today for her medical conditions/complaints as noted below. Gloria Tidwell is c/o of   Chief Complaint   Patient presents with    Diabetes     6 month appt    Coronary Artery Disease    Fatigue         HPI:     Diabetes   She presents for her follow-up diabetic visit. She has type 2 (With neuropathy and with insulin use) diabetes mellitus. Her disease course has been fluctuating. Pertinent negatives for hypoglycemia include no confusion, dizziness, headaches, nervousness/anxiousness or pallor. Associated symptoms include fatigue. Pertinent negatives for diabetes include no chest pain, no polydipsia, no polyuria and no weakness. Coronary Artery Disease   Presents for follow-up visit. Pertinent negatives include no chest pain, chest pressure, dizziness, leg swelling, palpitations or shortness of breath. The symptoms have been stable. Compliance with diet is good. Compliance with exercise is good. Compliance with medications is good. Fatigue   This is a recurrent problem. The current episode started more than 1 month ago. The problem occurs intermittently. The problem has been waxing and waning. Associated symptoms include fatigue. Pertinent negatives include no abdominal pain, arthralgias, chest pain, chills, coughing, fever, headaches, nausea, neck pain, numbness, rash, vomiting or weakness. Other   This is a chronic (4-BMI 45.0-49.9) problem. The current episode started more than 1 month ago. The problem occurs constantly. The problem has been waxing and waning. Associated symptoms include fatigue. Pertinent negatives include no abdominal pain, arthralgias, chest pain, chills, coughing, fever, headaches, nausea, neck pain, numbness, rash, vomiting or weakness.        Hemoglobin A1C (%)   Date Value daily. 60 g 5    VITAMIN A PO Take 2,400 mcg by mouth daily      vitamin C (ASCORBIC ACID) 500 MG tablet TAKE 1 TABLET BY MOUTH TWICE DAILY 60 tablet 11    MAGNESIUM-OXIDE 400 (241.3 Mg) MG TABS tablet TAKE 1 TABLET BY MOUTH DAILY 30 tablet 11    buPROPion (WELLBUTRIN) 75 MG tablet TAKE 1 TABLET TWICE DAILY 180 tablet 3    memantine (NAMENDA) 10 MG tablet TAKE 1 TABLET BY MOUTH TWICE DAILY 180 tablet 2    amitriptyline (ELAVIL) 25 MG tablet take 1 tablet by mouth at bedtime 30 tablet 11    PRODIGY NO CODING BLOOD GLUC strip TEST FOUR TIMES DAILY AS DIRECTED 400 strip 3    PRODIGY TWIST TOP LANCETS 28G MISC USE AS DIRECTED FOUR TIMES DAILY 400 each 3    clopidogrel (PLAVIX) 75 MG tablet TAKE 1 TABLET BY MOUTH EVERY DAY 90 tablet 1    vitamin E 400 UNIT capsule Take 400 Units by mouth daily      Omega-3 Fatty Acids (FISH OIL) 1200 MG CAPS Take 1,200 mg by mouth daily      spironolactone (ALDACTONE) 50 MG tablet TAKE 1 TABLET TWICE DAILY 180 tablet 3    umeclidinium-vilanterol (ANORO ELLIPTA) 62.5-25 MCG/INH AEPB inhaler Inhale 1 puff into the lungs daily 3 each 3    VENTOLIN  (90 Base) MCG/ACT inhaler INHALE 2 PUFFS INTO THE LUNGS EVERY 4 HOURS AS NEEDED FOR WHEEZING 1 Inhaler 5    COMBIGAN 0.2-0.5 % ophthalmic solution PLACE 1 DROP INTO BOTH EYES DAILY 15 mL 3    ibuprofen (ADVIL;MOTRIN) 400 MG tablet TAKE 1 TABLET EVERY 6 HOURS AS NEEDED FOR PAIN 360 tablet 3    Lancet Devices (PRODIGY LANCING DEVICE) MISC USE TO TEST BLOOD SUGAR FOUR TIMES DAILY AS DIRECTED 1 each 0    SENNA LAX 8.6 MG tablet TAKE 1 TABLET BY MOUTH TWICE DAILY 180 tablet 3    Cholecalciferol (VITAMIN D3) 3000 units TABS Take 3,000 Units by mouth 2 times daily 60 tablet 11    triamcinolone (KENALOG) 0.1 % cream Apply topically 2 times daily.  80 g 5    lisinopril (PRINIVIL;ZESTRIL) 10 MG tablet Take 1 tablet by mouth daily 30 tablet 11    amLODIPine (NORVASC) 5 MG tablet Take 1 tablet by mouth daily 90 tablet 3    potassium chloride (MICRO-K) 10 MEQ extended release capsule TAKE 1 CAPSULE TWICE DAILY 180 capsule 3    citalopram (CELEXA) 10 MG tablet TAKE 1 TABLET EVERY DAY 90 tablet 3    B-D ULTRAFINE III SHORT PEN 31G X 8 MM MISC USE TWICE DAILY 180 each 3    gabapentin (NEURONTIN) 300 MG capsule TAKE 2 CAPSULES TWICE DAILY 360 capsule 2    NOVOLOG FLEXPEN 100 UNIT/ML injection pen INJECT 4 UNITS AT LUNCH AND  6 UNITS AT SUPPER (EACH PEN EXPIRES 28 DAYS AFTER 1ST USE) 15 mL 3    pantoprazole (PROTONIX) 40 MG tablet TAKE 1 TABLET EVERY DAY 90 tablet 3    fluticasone (FLONASE) 50 MCG/ACT nasal spray USE 2 SPRAYS NASALLY EVERY DAY 48 g 3    furosemide (LASIX) 40 MG tablet TAKE 1 TABLET TWICE DAILY 180 tablet 3    ondansetron (ZOFRAN) 4 MG tablet Take 1 tablet by mouth every 8 hours as needed for Nausea or Vomiting 30 tablet 3    Compression Stockings MISC by Does not apply route 15-20mmHg  Knee high  Open tow  With assist device to help put them on 1 each 0    Zinc 50 MG TABS Take 50 mg by mouth daily      loratadine (CLARITIN) 10 MG tablet Take 1 tablet by mouth daily 30 tablet 5    Diabetic Shoe MISC by Does not apply route 1 each 0    niacin 500 MG extended release capsule Take 500 mg by mouth daily      folic acid (FOLVITE) 592 MCG tablet Take 800 mcg by mouth daily      Cyanocobalamin (VITAMIN B12 PO) Take 2,500 mcg by mouth daily      B Complex-C (SUPER B COMPLEX PO) Take by mouth daily      travoprost, benzalkonium, (TRAVATAN) 0.004 % ophthalmic solution Place 1 drop into both eyes daily 3 Bottle 3    Insulin Syringe-Needle U-100 (INSULIN SYRINGE .5CC/31GX5/16\") 31G X 5/16\" 0.5 ML MISC 1 each by Does not apply route daily 200 each 3    insulin glargine (LANTUS) 100 UNIT/ML injection vial Inject 10 Units into the skin nightly 3 vial 3    CPAP Machine MISC by Does not apply route Pressure of 13 (AirSense 10)  [employer].      albuterol (PROVENTIL) (2.5 MG/3ML) 0.083% nebulizer solution Take 2.5 mg by arthralgias, back pain, gait problem and neck pain. Skin: Negative for pallor and rash. Neurological: Negative for dizziness, weakness, numbness and headaches. Hematological: Negative for adenopathy. Does not bruise/bleed easily. Psychiatric/Behavioral: Negative for confusion. The patient is not nervous/anxious. Objective:     Physical Exam   Constitutional: She is oriented to person, place, and time. She appears well-developed and well-nourished. HENT:   Head: Normocephalic and atraumatic. Eyes: EOM are normal. Pupils are equal, round, and reactive to light. Neck: Neck supple. Cardiovascular: Normal rate and regular rhythm. Exam reveals no gallop and no friction rub. No murmur heard. Pulmonary/Chest: Effort normal and breath sounds normal. She has no wheezes. She has no rales. Abdominal: Soft. She exhibits no distension and no mass. There is no tenderness. There is no rebound. Musculoskeletal: Normal range of motion. She exhibits no edema. Lymphadenopathy:     She has no cervical adenopathy. Neurological: She is alert and oriented to person, place, and time. No cranial nerve deficit (grossly). Skin: Skin is warm and dry. No rash noted. Psychiatric: She has a normal mood and affect. Thought content normal.   Vitals reviewed. /60 (Site: Left Arm, Position: Sitting, Cuff Size: Large Adult)   Resp 16   Ht 5' 4\" (1.626 m)   Wt 264 lb (119.7 kg)   LMP 12/07/1995 (Approximate)   BMI 45.32 kg/m²     Assessment:       Diagnosis Orders   1. Type 2 diabetes mellitus with diabetic polyneuropathy, with long-term current use of insulin ([facility])  Hemoglobin A1C    Microalbumin, Ur    Iron And TIBC    Hemoglobin   2. Coronary artery disease due to lipid rich plaque  Hemoglobin A1C    Microalbumin, Ur   3. Chronic fatigue  Hemoglobin A1C    Microalbumin, Ur    Iron And TIBC    Hemoglobin   4.  Vitamin D deficiency  Hemoglobin A1C    Microalbumin, Ur    Iron And TIBC    Hemoglobin 5. Morbid obesity with BMI of 45.0-49.9, adult ([facility] [address]               Plan:      Return in about 6 months (around 1/23/2019) for Diabetes, CAD, Fatigue. Orders Placed This Encounter   Procedures    Hemoglobin A1C     Standing Status:   Future     Standing Expiration Date:   7/23/2019    Microalbumin, Ur     Standing Status:   Future     Standing Expiration Date:   7/23/2019    Iron And TIBC     Standing Status:   Future     Standing Expiration Date:   7/23/2019     Order Specific Question:   Is Patient Fasting? Answer:   no     Order Specific Question:   No of Hours? Answer:   no    Hemoglobin     Standing Status:   Future     Standing Expiration Date:   7/23/2019     Orders Placed This Encounter   Medications    alendronate (FOSAMAX) 70 MG tablet     Sig: Take 1 tablet by mouth every 7 days     Dispense:  12 tablet     Refill:  3       Patient given educational materials - see patient instructions. Discussed use, benefit, and side effects of prescribed medications. All patient questions answered. Pt voiced understanding. Reviewed health maintenance. Instructed to continue current medications, diet and exercise. Patient agreed with treatment plan. Follow up as directed.      Electronically signed by Faby Scott MD on 7/23/2018 at 10:30 AM

## 2018-08-01 ENCOUNTER — HOSPITAL ENCOUNTER (OUTPATIENT)
Dept: PHYSICAL THERAPY | Age: 68
Setting detail: THERAPIES SERIES
Discharge: HOME | End: 2018-08-01
Payer: MEDICARE

## 2018-08-01 PROCEDURE — G8978 MOBILITY CURRENT STATUS: HCPCS

## 2018-08-01 PROCEDURE — 97162 PT EVAL MOD COMPLEX 30 MIN: CPT

## 2018-08-01 PROCEDURE — G8979 MOBILITY GOAL STATUS: HCPCS

## 2018-08-01 PROCEDURE — 97112 NEUROMUSCULAR REEDUCATION: CPT

## 2018-08-01 ASSESSMENT — PAIN DESCRIPTION - ORIENTATION: ORIENTATION: RIGHT

## 2018-08-01 ASSESSMENT — PAIN SCALES - GENERAL: PAINLEVEL_OUTOF10: 0

## 2018-08-01 ASSESSMENT — PAIN DESCRIPTION - PAIN TYPE: TYPE: CHRONIC PAIN

## 2018-08-01 ASSESSMENT — PAIN DESCRIPTION - LOCATION: LOCATION: HIP;BACK

## 2018-08-01 NOTE — PROGRESS NOTES
balance;Decreased endurance  Assessment: Patient has + VOR testing, BPPV testing-patient has no nystagmus, but has incresaed dizziness noted. Poor balance as per the Tinetti with LE weakness and deficits. Prognosis: Good  Decision Making: Medium Complexity  REQUIRES PT FOLLOW UP: Yes  Activity Tolerance  Activity Tolerance: Patient Tolerated treatment well         Plan   Plan  Times per week: 2-3x/week   Plan weeks: 6weeks  Current Treatment Recommendations: Strengthening, ROM, Balance Training, Transfer Training, Stair training, Gait Training, Neuromuscular Re-education, Manual Therapy - Soft Tissue Mobilization, Pain Management, Safety Education & Training, Home Exercise Program, Integrated Dry Needling    G-Code  PT G-Codes  Functional Assessment Tool Used: Tinetti  Score: 14/28=50% Disability   Functional Limitation: Mobility: Walking and moving around  Mobility: Walking and Moving Around Current Status (): At least 40 percent but less than 60 percent impaired, limited or restricted  Mobility: Walking and Moving Around Goal Status (): At least 1 percent but less than 20 percent impaired, limited or restricted    OutComes Score                                           Goals  Short term goals  Time Frame for Short term goals: 3 weeks  Short term goal 1: Initiate HEP   Long term goals  Time Frame for Long term goals : 6weeks  Long term goal 1: Independent in HEP   Long term goal 2: Improve LE Strength to 4+/5 to improve stanidng/amb tolerance   Long term goal 3: Improve Standing balance with EC on Foam to no loss of balance.     Long term goal 4: Reduce Dizziness by 50%   Long term goal 5: Tinetti 20/28 or greater to decrease risk for falls  Patient Goals   Patient goals : Improve Balance and Decrease Dizziness       Therapy Time   Individual Concurrent Group Co-treatment   Time In 1015         Time Out 1105         Minutes 50         Timed Code Treatment Minutes: 15 [facility], PT

## 2018-08-01 NOTE — PLAN OF CARE
[facility]    [x] [place]  Phone: [phone]  Fax: [phone]      [] [place]  Phone: [phone]  Fax: [phone]        To: Referring Practitioner: Ladan Pettit MD      Patient: Shaylee Michael  : 1950   MRN: [id number]  Evaluation Date: 2018      Diagnosis Information:  · Diagnosis: G62.9 (ICD-10-CM) - Peripheral polyneuropathy ([facility] [address] R26.89 (ICD-10-CM) - Balance problem, Z91.81 (ICD-10-CM) - H/O falling, G25.0 (ICD-10-CM) - Essential tremor, I63.6 (ICD-10-CM) - Cerebral infarction due to cerebral venous thrombosis, nonpyogenic ([facility] [address] R26.9 (ICD-10-CM) - Gait abnormality   ·       Physical Therapy Certification  Dear Ladan Pettit MD  The following patient has been evaluated for physical therapy services and for therapy to continue, Medicare requires monthly physician review of the treatment plan. Please review the attached evaluation and/or summary of the patient's plan of care, and verify that you agree therapy should continue by signing the attached document and sending it back to our office. Plan of Care/Treatment to date:  [x] Therapeutic Exercise    [x] Modalities:  [x] Therapeutic Activity     [] Ultrasound  [] Electrical Stimulation  [x] Gait Training      [] Cervical Traction [] Lumbar Traction  [x] Neuromuscular Re-education    [] Cold/hotpack [] Iontophoresis   [x] Instruction in HEP     Other:  [x] Manual Therapy/IDN      []             [x] Aquatic Therapy      []           ? Goals:  Short term goals  Time Frame for Short term goals: 3 weeks  Short term goal 1: Initiate HEP     Long term goals  Time Frame for Long term goals : 6weeks  Long term goal 1: Independent in HEP   Long term goal 2: Improve LE Strength to 4+/5 to improve stanidng/amb tolerance   Long term goal 3: Improve Standing balance with EC on Foam to no loss of balance.     Long term goal 4: Reduce Dizziness by 50%   Long term goal 5:

## 2018-08-01 NOTE — FLOWSHEET NOTE
Therapeutic activities, direct (one-on-one) patient contact (use of dynamic activities to improve functional performance). (67419)    Gait:   [] Provided training and instruction to the patient for ambulation re-education. (50824)    Self-Care/ADL's  [] Self-care/home management training and compensatory training, meal preparation, safety procedures, and instructions in use of assistive technology devices/adaptive equipment, direct one-on-one contact. (01708)    Home Exercise Program:     [] Reviewed/Progressed HEP activities related to strengthening, flexibility, endurance, ROM. (93336)  [] Reviewed/Progressed HEP activities related to improving balance, coordination, kinesthetic sense, posture, motor skill, proprioception.  (15524)    Manual Treatments:    [] Provided manual therapy to mobilize soft tissue/joints for the purpose of modulating pain, promoting relaxation,  increasing ROM, reducing/eliminating soft tissue swelling/inflammation/restriction, improving soft tissue extensibility. (61790)    Service Based Modalities:      Timed Code Treatment Minutes:   10' neuro     Total Treatment Minutes:   48'    Treatment/Activity Tolerance:  [x] Patient tolerated treatment well [] Patient limited by fatique  [] Patient limited by pain  [] Patient limited by other medical complications  [] Other:     Prognosis: [] Good [x] Fair  [] Poor    Patient Requires Follow-up: [x] Yes  [] No      Goals:  Short term goals  Time Frame for Short term goals: 3 weeks  Short term goal 1: Initiate HEP     Long term goals  Time Frame for Long term goals : 6weeks  Long term goal 1: Independent in HEP   Long term goal 2: Improve LE Strength to 4+/5 to improve stanidng/amb tolerance   Long term goal 3: Improve Standing balance with EC on Foam to no loss of balance.     Long term goal 4: Reduce Dizziness by 50%   Long term goal 5: Tinetti 20/28 or greater to decrease risk for falls          Plan:   [] Continue per plan of care [] Arnoldo Shannon

## 2018-08-06 ENCOUNTER — HOSPITAL ENCOUNTER (OUTPATIENT)
Dept: INTERVENTIONAL RADIOLOGY/VASCULAR | Age: 68
Discharge: HOME | End: 2018-08-08
Payer: MEDICARE

## 2018-08-06 ENCOUNTER — HOSPITAL ENCOUNTER (OUTPATIENT)
Dept: CT IMAGING | Age: 68
Discharge: HOME | End: 2018-08-08
Payer: MEDICARE

## 2018-08-06 ENCOUNTER — HOSPITAL ENCOUNTER (OUTPATIENT)
Dept: GENERAL RADIOLOGY | Age: 68
Discharge: HOME | End: 2018-08-08
Payer: MEDICARE

## 2018-08-06 DIAGNOSIS — E55.9 VITAMIN D DEFICIENCY: ICD-10-CM

## 2018-08-06 DIAGNOSIS — R26.9 GAIT ABNORMALITY: ICD-10-CM

## 2018-08-06 DIAGNOSIS — R42 DIZZINESS: ICD-10-CM

## 2018-08-06 DIAGNOSIS — R73.09 ELEVATED HEMOGLOBIN A1C: ICD-10-CM

## 2018-08-06 DIAGNOSIS — Z86.69 HX OF BELL'S PALSY: ICD-10-CM

## 2018-08-06 DIAGNOSIS — I25.83 CORONARY ARTERY DISEASE DUE TO LIPID RICH PLAQUE: ICD-10-CM

## 2018-08-06 DIAGNOSIS — G44.229 CHRONIC TENSION-TYPE HEADACHE, NOT INTRACTABLE: ICD-10-CM

## 2018-08-06 DIAGNOSIS — M54.2 NECK PAIN: ICD-10-CM

## 2018-08-06 DIAGNOSIS — G45.0 VBI (VERTEBROBASILAR INSUFFICIENCY): ICD-10-CM

## 2018-08-06 DIAGNOSIS — R53.82 CHRONIC FATIGUE: ICD-10-CM

## 2018-08-06 DIAGNOSIS — F32.A ANXIETY AND DEPRESSION: ICD-10-CM

## 2018-08-06 DIAGNOSIS — G44.211 INTRACTABLE EPISODIC TENSION-TYPE HEADACHE: ICD-10-CM

## 2018-08-06 DIAGNOSIS — E11.42 TYPE 2 DIABETES MELLITUS WITH DIABETIC POLYNEUROPATHY, WITH LONG-TERM CURRENT USE OF INSULIN (HCC): ICD-10-CM

## 2018-08-06 DIAGNOSIS — R41.3 MEMORY PROBLEM: ICD-10-CM

## 2018-08-06 DIAGNOSIS — G45.8 OTHER SPECIFIED TRANSIENT CEREBRAL ISCHEMIAS: ICD-10-CM

## 2018-08-06 DIAGNOSIS — Z79.4 TYPE 2 DIABETES MELLITUS WITH DIABETIC POLYNEUROPATHY, WITH LONG-TERM CURRENT USE OF INSULIN (HCC): ICD-10-CM

## 2018-08-06 DIAGNOSIS — Z91.81 H/O FALLING: ICD-10-CM

## 2018-08-06 DIAGNOSIS — G25.0 ESSENTIAL TREMOR: ICD-10-CM

## 2018-08-06 DIAGNOSIS — I67.82 CHRONIC CEREBRAL ISCHEMIA: ICD-10-CM

## 2018-08-06 DIAGNOSIS — I25.10 CORONARY ARTERY DISEASE DUE TO LIPID RICH PLAQUE: ICD-10-CM

## 2018-08-06 DIAGNOSIS — I63.6 CEREBRAL INFARCTION DUE TO CEREBRAL VENOUS THROMBOSIS, NONPYOGENIC (HCC): ICD-10-CM

## 2018-08-06 DIAGNOSIS — F41.9 ANXIETY AND DEPRESSION: ICD-10-CM

## 2018-08-06 DIAGNOSIS — R26.89 BALANCE PROBLEM: ICD-10-CM

## 2018-08-06 PROCEDURE — 93880 EXTRACRANIAL BILAT STUDY: CPT

## 2018-08-06 PROCEDURE — 72050 X-RAY EXAM NECK SPINE 4/5VWS: CPT

## 2018-08-06 PROCEDURE — 70450 CT HEAD/BRAIN W/O DYE: CPT

## 2018-08-08 ENCOUNTER — HOSPITAL ENCOUNTER (OUTPATIENT)
Dept: PHYSICAL THERAPY | Age: 68
Setting detail: THERAPIES SERIES
Discharge: HOME | End: 2018-08-08
Payer: MEDICARE

## 2018-08-08 PROCEDURE — 97110 THERAPEUTIC EXERCISES: CPT

## 2018-08-08 NOTE — FLOWSHEET NOTE
Physical Therapy Daily Treatment Note    Date:  2018    Patient Name:  Giuliana Pichardo    :  1950  MRN: [id number]  Restrictions/Precautions:     Medical/Treatment Diagnosis Information:   · Diagnosis: G62.9 (ICD-10-CM) - [facility] R26.89 (ICD-10-CM) - Balance problem, Z91.81 (ICD-10-CM) - H/O falling, G25.0 (ICD-10-CM) - Essential tremor, I63.6 (ICD-10-CM) - Cerebral infarction due to cerebral venous thrombosis, nonpyogenic ([facility] [address] R26.9 (ICD-10-CM) - Gait abnormality     Insurance/Certification information:  PT Insurance Information: Medicare/MEDICA[place]  Physician Information:  Referring Practitioner: Tyrell Nolan MD  Plan of care signed (Y/N):  n  Visit# / total visits:  1/10  Pain level: /10     G-Code (if applicable):      Date G-Code Applied:  18  PT G-Codes  Functional Assessment Tool Used: Tinetti  Score: 14/28=50% Disability   Functional Limitation: Mobility: Walking and moving around  Mobility: Walking and Moving Around Current Status (): At least 40 percent but less than 60 percent impaired, limited or restricted  Mobility: Walking and Moving Around Goal Status (): At least 1 percent but less than 20 percent impaired, limited or restricted    Time In:1110  Time Out:1148  Progress Note: [x]  Yes  []  No  Next due by: Visit #10      Subjective:   Patient reporting completing HEP approx 1 x per day, at times does not have symptoms. Objective: Earle per flow sheet to improve LE strength, gait and balance. Patient required 1 seated recovery break due to fatigue. Patient requiring verbal cueing for sequencing and technique with exs.     Observation:    Test measurements:       Exercises:   Exercise/Equipment Resistance/Repetitions Other comments        Evens Barrett  Right 2x2     nustep  5'    Counter exs 15x    Steps     Squats     EO/EC on Foam      Sidestepping            Bridging  15x    SLR  10x    Sidelying Hip Abd  10x Strength to 4+/5 to improve stanidng/amb tolerance   Long term goal 3: Improve Standing balance with EC on Foam to no loss of balance.     Long term goal 4: Reduce Dizziness by 50%   Long term goal 5: Tinetti 20/28 or greater to decrease risk for falls          Plan:   [] Continue per plan of care [] Alter current plan (see comments)  [x] Plan of care initiated [] Hold pending MD visit [] Discharge  Plan for Next Session:  Assess Bailey Jay, Progress LE strength and Balance     Electronically signed by:  Maliha Bai

## 2018-08-10 ENCOUNTER — HOSPITAL ENCOUNTER (OUTPATIENT)
Dept: PHYSICAL THERAPY | Age: 68
Setting detail: THERAPIES SERIES
Discharge: HOME | End: 2018-08-10
Payer: MEDICARE

## 2018-08-10 PROCEDURE — 97110 THERAPEUTIC EXERCISES: CPT | Performed by: PHYSICAL THERAPY ASSISTANT

## 2018-08-10 NOTE — FLOWSHEET NOTE
nustep  5'    Counter exs 15x    Steps     Squats 15 x    Lunges  10 x fwd and lat Initiated 8/10/18   EO/EC on Foam  (EO NBS 20\" 2 x)  (EC shoulder width 20\" 2 x) Initiated 8/10/18   Sidestepping  10' down and back 2 x Initiated 8/10/18         Bridging  10x feet together  10 x feet apart    SLR  15x    Sidelying Hip Abd  10x              [] Provided verbal/tactile cueing for activities related to strengthening, flexibility, endurance, ROM. (85669)  [x] Provided verbal/tactile cueing for activities related to improving balance, coordination, kinesthetic sense, posture, motor skill, proprioception. (91449)    Therapeutic Activities:     [] Therapeutic activities, direct (one-on-one) patient contact (use of dynamic activities to improve functional performance). (48037)    Gait:   [] Provided training and instruction to the patient for ambulation re-education. (36764)    Self-Care/ADL's  [] Self-care/home management training and compensatory training, meal preparation, safety procedures, and instructions in use of assistive technology devices/adaptive equipment, direct one-on-one contact. (20335)    Home Exercise Program:     [] Reviewed/Progressed HEP activities related to strengthening, flexibility, endurance, ROM. (67588)  [] Reviewed/Progressed HEP activities related to improving balance, coordination, kinesthetic sense, posture, motor skill, proprioception.  (17094)    Manual Treatments:    [] Provided manual therapy to mobilize soft tissue/joints for the purpose of modulating pain, promoting relaxation,  increasing ROM, reducing/eliminating soft tissue swelling/inflammation/restriction, improving soft tissue extensibility.  (65449)    Service Based Modalities:      Timed Code Treatment Minutes:   36' ex     Total Treatment Minutes:   36'    Treatment/Activity Tolerance:  [x] Patient tolerated treatment well [] Patient limited by fatique  [] Patient limited by pain  [] Patient limited by other medical complications  [] Other:     Prognosis: [] Good [x] Fair  [] Poor    Patient Requires Follow-up: [x] Yes  [] No      Goals:  Short term goals  Time Frame for Short term goals: 3 weeks  Short term goal 1: Initiate HEP     Long term goals  Time Frame for Long term goals : 6weeks  Long term goal 1: Independent in HEP   Long term goal 2: Improve LE Strength to 4+/5 to improve stanidng/amb tolerance   Long term goal 3: Improve Standing balance with EC on Foam to no loss of balance.     Long term goal 4: Reduce Dizziness by 50%   Long term goal 5: Tinetti 20/28 or greater to decrease risk for falls          Plan:   [x] Continue per plan of care [] Alter current plan (see comments)  [] Plan of care initiated [] Hold pending MD visit [] Discharge  Plan for Next Session:  Assess Krysten Cassidy, Progress LE strength and Balance     Electronically signed by:  Marlee Rivas

## 2018-08-14 ENCOUNTER — HOSPITAL ENCOUNTER (OUTPATIENT)
Dept: PHYSICAL THERAPY | Age: 68
Setting detail: THERAPIES SERIES
Discharge: HOME | End: 2018-08-14
Payer: MEDICARE

## 2018-08-14 PROCEDURE — 97110 THERAPEUTIC EXERCISES: CPT

## 2018-08-14 NOTE — FLOWSHEET NOTE
Physical Therapy Daily Treatment Note    Date:  2018    Patient Name:  Roger Jimenez    :  1950  MRN: [id number]  Restrictions/Precautions:     Medical/Treatment Diagnosis Information:   · Diagnosis: G62.9 (ICD-10-CM) - [facility] ([facility] R26.89 (ICD-10-CM) - Balance problem, Z91.81 (ICD-10-CM) - H/O falling, G25.0 (ICD-10-CM) - Essential tremor, I63.6 (ICD-10-CM) - Cerebral infarction due to cerebral venous thrombosis, nonpyogenic ([facility] [address] R26.9 (ICD-10-CM) - Gait abnormality     Insurance/Certification information:  PT Insurance Information: Medicare/MEDICA[place]  Physician Information:  Referring Practitioner: Shanti Barnett MD  Plan of care signed (Y/N):  Y  Visit# / total visits:  4/10  Pain level: /10     G-Code (if applicable):      Date G-Code Applied:  18  PT G-Codes  Functional Assessment Tool Used: Tinetti  Score: 14/28=50% Disability   Functional Limitation: Mobility: Walking and moving around  Mobility: Walking and Moving Around Current Status (): At least 40 percent but less than 60 percent impaired, limited or restricted  Mobility: Walking and Moving Around Goal Status (): At least 1 percent but less than 20 percent impaired, limited or restricted    Time In: 338 Time Out: 420  Progress Note: []  Yes  [x]  No  Next due by: Visit #10      Subjective:  Pt reporting feeling fatigued this date, states fatigued after each session. Objective: Earle per flow sheet to improve LE strength, gait and balance. Patient required 1 seated recovery break due to fatigue. Patient requiring verbal cueing for sequencing and technique with exs. Patient has moderate fatigue with steps, squats and lunges. Observation: Enters clinic with straight cane. Test measurements:     Reports 1 x daily HEP follow through. Denies issues with sleep  Denies change with dizziness.     Exercises:   Exercise/Equipment Resistance/Repetitions Other comments        Bebeto Garza limited by other medical complications  [] Other:     Prognosis: [] Good [x] Fair  [] Poor    Patient Requires Follow-up: [x] Yes  [] No      Goals:  Short term goals  Time Frame for Short term goals: 3 weeks  Short term goal 1: Initiate HEP     Long term goals  Time Frame for Long term goals : 6weeks  Long term goal 1: Independent in HEP   Long term goal 2: Improve LE Strength to 4+/5 to improve stanidng/amb tolerance   Long term goal 3: Improve Standing balance with EC on Foam to no loss of balance.     Long term goal 4: Reduce Dizziness by 50%   Long term goal 5: Tinetti 20/28 or greater to decrease risk for falls          Plan:   [x] Continue per plan of care [] Alter current plan (see comments)  [] Plan of care initiated [] Hold pending MD visit [] Discharge  Plan for Next Session:Progress LE strength and Balance     Electronically signed by:  Eddy Ray

## 2018-08-15 ENCOUNTER — APPOINTMENT (OUTPATIENT)
Dept: PHYSICAL THERAPY | Age: 68
End: 2018-08-15
Payer: MEDICARE

## 2018-08-17 ENCOUNTER — HOSPITAL ENCOUNTER (OUTPATIENT)
Dept: PHYSICAL THERAPY | Age: 68
Setting detail: THERAPIES SERIES
Discharge: HOME | End: 2018-08-17
Payer: MEDICARE

## 2018-08-17 PROCEDURE — 97110 THERAPEUTIC EXERCISES: CPT

## 2018-08-17 PROCEDURE — 97150 GROUP THERAPEUTIC PROCEDURES: CPT

## 2018-08-17 NOTE — FLOWSHEET NOTE
HEP    nustep  10' Arms#7, Seat # 7, Load #4    Counter exs 20x    Steps X 15ea Fwd/Lat 4\" Initiated    Squats 15 x    Lunges  10 x fwd and lat Initiated 8/10/18   EO/EC on Foam    EC shoulder width 30\" 2 x Adv   Sidestepping  4x     Tandem Walking at rail 4x      Bridging   Adv   SLR      Sidelying Hip Abd  10x              [] Provided verbal/tactile cueing for activities related to strengthening, flexibility, endurance, ROM. (45152)  [x] Provided verbal/tactile cueing for activities related to improving balance, coordination, kinesthetic sense, posture, motor skill, proprioception. (88256)    Therapeutic Activities:     [] Therapeutic activities, direct (one-on-one) patient contact (use of dynamic activities to improve functional performance). (80571)    Gait:   [] Provided training and instruction to the patient for ambulation re-education. (56466)    Self-Care/ADL's  [] Self-care/home management training and compensatory training, meal preparation, safety procedures, and instructions in use of assistive technology devices/adaptive equipment, direct one-on-one contact. (24604)    Home Exercise Program:     [] Reviewed/Progressed HEP activities related to strengthening, flexibility, endurance, ROM. (92853)  [] Reviewed/Progressed HEP activities related to improving balance, coordination, kinesthetic sense, posture, motor skill, proprioception.  (63169)    Manual Treatments:    [] Provided manual therapy to mobilize soft tissue/joints for the purpose of modulating pain, promoting relaxation,  increasing ROM, reducing/eliminating soft tissue swelling/inflammation/restriction, improving soft tissue extensibility.  (30852)    Service Based Modalities:  8' groupex     Timed Code Treatment Minutes:   28' ex     Total Treatment Minutes:   37'    Treatment/Activity Tolerance:  [x] Patient tolerated treatment well [] Patient limited by fatique  [] Patient limited by pain  [] Patient limited by other medical

## 2018-08-20 ENCOUNTER — HOSPITAL ENCOUNTER (OUTPATIENT)
Dept: PHYSICAL THERAPY | Age: 68
Setting detail: THERAPIES SERIES
Discharge: HOME | End: 2018-08-20
Payer: MEDICARE

## 2018-08-20 PROCEDURE — 97110 THERAPEUTIC EXERCISES: CPT | Performed by: PHYSICAL THERAPY ASSISTANT

## 2018-08-20 NOTE — FLOWSHEET NOTE
for the purpose of modulating pain, promoting relaxation,  increasing ROM, reducing/eliminating soft tissue swelling/inflammation/restriction, improving soft tissue extensibility. (25550)    Service Based Modalities:      Timed Code Treatment Minutes:   54' ex     Total Treatment Minutes:   54'    Treatment/Activity Tolerance:  [x] Patient tolerated treatment well [] Patient limited by fatique  [] Patient limited by pain  [] Patient limited by other medical complications  [] Other:     Prognosis: [] Good [x] Fair  [] Poor    Patient Requires Follow-up: [x] Yes  [] No      Goals:  Short term goals  Time Frame for Short term goals: 3 weeks  Short term goal 1: Initiate HEP     Long term goals  Time Frame for Long term goals : 6weeks  Long term goal 1: Independent in HEP   Long term goal 2: Improve LE Strength to 4+/5 to improve stanidng/amb tolerance   Long term goal 3: Improve Standing balance with EC on Foam to no loss of balance.     Long term goal 4: Reduce Dizziness by 50%   Long term goal 5: Tinetti 20/28 or greater to decrease risk for falls          Plan:   [x] Continue per plan of care [] Alter current plan (see comments)  [] Plan of care initiated [] Hold pending MD visit [] Discharge  Plan for Next Session:Progress LE strength and Balance     Electronically signed by:  Ruby Weinstein

## 2018-08-22 ENCOUNTER — HOSPITAL ENCOUNTER (OUTPATIENT)
Dept: PHYSICAL THERAPY | Age: 68
Setting detail: THERAPIES SERIES
Discharge: HOME | End: 2018-08-22
Payer: MEDICARE

## 2018-08-22 ENCOUNTER — APPOINTMENT (OUTPATIENT)
Dept: PHYSICAL THERAPY | Age: 68
End: 2018-08-22
Payer: MEDICARE

## 2018-08-22 PROCEDURE — 97110 THERAPEUTIC EXERCISES: CPT

## 2018-08-22 NOTE — FLOWSHEET NOTE
supervision. Held exercises per log due to LBP. Exercise/Equipment Resistance/Repetitions Other comments   TM 3' . 9mph    Zaira Barrett   HEP    nustep  10' Arms#7, Seat # 7, Load #5   Counter exs 20x    Steps X 15 ea Fwd/Lat/Back 4\" Initiated-back    Squats 15 x Wide/narrow   Lunges  10 x fwd and lat    EO/EC on Foam    readd    Sidestepping  4x     Tandem Walking at rail 4x     Bridging   Adv   SLR      Sidelying Hip Abd                [x] Provided verbal/tactile cueing for activities related to strengthening, flexibility, endurance, ROM. (61667)  [] Provided verbal/tactile cueing for activities related to improving balance, coordination, kinesthetic sense, posture, motor skill, proprioception. (48148)    Therapeutic Activities:     [] Therapeutic activities, direct (one-on-one) patient contact (use of dynamic activities to improve functional performance). (68723)    Gait:   [] Provided training and instruction to the patient for ambulation re-education. (46848)    Self-Care/ADL's  [] Self-care/home management training and compensatory training, meal preparation, safety procedures, and instructions in use of assistive technology devices/adaptive equipment, direct one-on-one contact. (35474)    Home Exercise Program:     [] Reviewed/Progressed HEP activities related to strengthening, flexibility, endurance, ROM. (05232)  [] Reviewed/Progressed HEP activities related to improving balance, coordination, kinesthetic sense, posture, motor skill, proprioception.  (16533)    Manual Treatments:    [] Provided manual therapy to mobilize soft tissue/joints for the purpose of modulating pain, promoting relaxation,  increasing ROM, reducing/eliminating soft tissue swelling/inflammation/restriction, improving soft tissue extensibility.  (00062)    Service Based Modalities:      Timed Code Treatment Minutes:   39' ex     Total Treatment Minutes:   39'    Treatment/Activity Tolerance:  [x] Patient tolerated treatment well [] Patient limited by fatique  [] Patient limited by pain  [] Patient limited by other medical complications  [] Other:     Prognosis: [] Good [x] Fair  [] Poor    Patient Requires Follow-up: [x] Yes  [] No      Goals:  Short term goals  Time Frame for Short term goals: 3 weeks  Short term goal 1: Initiate HEP     Long term goals  Time Frame for Long term goals : 6weeks  Long term goal 1: Independent in HEP   Long term goal 2: Improve LE Strength to 4+/5 to improve stanidng/amb tolerance   Long term goal 3: Improve Standing balance with EC on Foam to no loss of balance.     Long term goal 4: Reduce Dizziness by 50%   Long term goal 5: Tinetti 20/28 or greater to decrease risk for falls          Plan:   [x] Continue per plan of care [] Alter current plan (see comments)  [] Plan of care initiated [] Hold pending MD visit [] Discharge  Plan for Next Session:Progress LE strength and Balance     Electronically signed by:  Araceli Hernandez

## 2018-08-22 NOTE — PROGRESS NOTES
I have reviewed and agree to the content of the note written by the PTA.   Electronically signed by Marojrie Juarez PT [id number]

## 2018-08-28 ENCOUNTER — HOSPITAL ENCOUNTER (OUTPATIENT)
Dept: PHYSICAL THERAPY | Age: 68
Setting detail: THERAPIES SERIES
Discharge: HOME | End: 2018-08-28
Payer: MEDICARE

## 2018-08-28 PROCEDURE — 97110 THERAPEUTIC EXERCISES: CPT

## 2018-08-29 ENCOUNTER — HOSPITAL ENCOUNTER (OUTPATIENT)
Dept: NEUROLOGY | Age: 68
Discharge: HOME | End: 2018-08-29
Payer: MEDICARE

## 2018-08-29 ENCOUNTER — HOSPITAL ENCOUNTER (OUTPATIENT)
Dept: PHYSICAL THERAPY | Age: 68
Setting detail: THERAPIES SERIES
Discharge: HOME | End: 2018-08-29
Payer: MEDICARE

## 2018-08-29 DIAGNOSIS — G62.9 PERIPHERAL POLYNEUROPATHY: ICD-10-CM

## 2018-08-29 DIAGNOSIS — Z91.81 H/O FALLING: ICD-10-CM

## 2018-08-29 DIAGNOSIS — R26.9 GAIT ABNORMALITY: ICD-10-CM

## 2018-08-29 DIAGNOSIS — R26.89 BALANCE PROBLEM: ICD-10-CM

## 2018-08-29 PROCEDURE — 95911 NRV CNDJ TEST 9-10 STUDIES: CPT

## 2018-08-29 PROCEDURE — 95886 MUSC TEST DONE W/N TEST COMP: CPT

## 2018-08-29 PROCEDURE — 97110 THERAPEUTIC EXERCISES: CPT

## 2018-08-29 NOTE — FLOWSHEET NOTE
medical complications  [] Other:     Prognosis: [] Good [x] Fair  [] Poor    Patient Requires Follow-up: [x] Yes  [] No      Goals:  Short term goals  Time Frame for Short term goals: 3 weeks  Short term goal 1: Initiate HEP     Long term goals  Time Frame for Long term goals : 6weeks  Long term goal 1: Independent in HEP   Long term goal 2: Improve LE Strength to 4+/5 to improve stanidng/amb tolerance   Long term goal 3: Improve Standing balance with EC on Foam to no loss of balance.     Long term goal 4: Reduce Dizziness by 50%   Long term goal 5: Tinetti 20/28 or greater to decrease risk for falls          Plan:   [x] Continue per plan of care [] Alter current plan (see comments)  [] Plan of care initiated [] Hold pending MD visit [] Discharge  Plan for Next Session:Progress LE strength and Balance     Electronically signed by:  Sai Chance

## 2018-08-30 ENCOUNTER — TELEPHONE (OUTPATIENT)
Dept: INTERNAL MEDICINE | Age: 68
End: 2018-08-30
Payer: MEDICARE

## 2018-08-30 DIAGNOSIS — F32.A ANXIETY AND DEPRESSION: ICD-10-CM

## 2018-08-30 DIAGNOSIS — E55.9 VITAMIN D DEFICIENCY: ICD-10-CM

## 2018-08-30 DIAGNOSIS — F41.9 ANXIETY AND DEPRESSION: ICD-10-CM

## 2018-08-30 DIAGNOSIS — G62.9 PERIPHERAL POLYNEUROPATHY: ICD-10-CM

## 2018-08-30 DIAGNOSIS — Z79.4 TYPE 2 DIABETES MELLITUS WITH DIABETIC POLYNEUROPATHY, WITH LONG-TERM CURRENT USE OF INSULIN (HCC): Primary | ICD-10-CM

## 2018-08-30 DIAGNOSIS — E11.42 TYPE 2 DIABETES MELLITUS WITH DIABETIC POLYNEUROPATHY, WITH LONG-TERM CURRENT USE OF INSULIN (HCC): Primary | ICD-10-CM

## 2018-08-30 DIAGNOSIS — Z91.81 H/O FALLING: ICD-10-CM

## 2018-08-30 DIAGNOSIS — I25.83 CORONARY ARTERY DISEASE DUE TO LIPID RICH PLAQUE: ICD-10-CM

## 2018-08-30 DIAGNOSIS — G45.8 OTHER SPECIFIED TRANSIENT CEREBRAL ISCHEMIAS: ICD-10-CM

## 2018-08-30 DIAGNOSIS — I25.10 CORONARY ARTERY DISEASE DUE TO LIPID RICH PLAQUE: ICD-10-CM

## 2018-08-30 PROCEDURE — G0179 MD RECERTIFICATION HHA PT: HCPCS | Performed by: INTERNAL MEDICINE

## 2018-08-30 NOTE — PROCEDURES
[facility] [address]                   [address] [place] [id number]                        EMG/NERVE CONDUCTION STUDIES REPORT    PATIENT NAME: Sahra Isaacs                   :        1950  MED REC NO:   [id number]                             ROOM:  ACCOUNT NO:   [de-identified]                           ADMIT DATE: 2018  PROVIDER:     Sacha Scott MD      DATE OF EM2018    REFERRING PROVIDER:  Sacha Scott MD - Neurology    PRIMARY CARE PROVIDER:  Cheryl Correa MD    TECHNICAL SUMMARY:  The nature, purpose, goals, expectations and process  involved in the procedures of nerve conduction studies and needle  electromyography were reviewed, discussed, explained and verbal consent was  obtained from the patient. The patient's questions were answered with  reference to the above processes and procedures. There were no significant technical difficulties encountered during this  study and nerve conduction studies were performed under temperature  monitoring. CLINICAL DATA:      The patient is 69years old with history of chronic gait  and balance problem with episodes of dizziness, weakness involving lower  extremities, history of falling. The patient has known history of  insulin-requiring type 2 diabetes. The patient has numbness, tingling,  paresthesias, weakness involving lower extremities. The patient also has  weakness involving both lower extremities. The patient also has chronic  lower back pain. The purpose of the study is to evaluate for mononeuropathy, radiculopathy,  plexopathy, peripheral polyneuropathy. SUMMARY:      The sensory nerve action potentials of the right and left sural  and superficial peroneal nerves were not recordable bilaterally.     Compound muscle action potentials of the right and left peroneal nerves  were recorded at ankle and EDB show significantly low amplitudes and  borderline distal extremities. Further clinical correlation and follow up recommended.         Carlo Stearns MD  Board Certified Neurologist    D: 08/29/2018 11:09:24       T: 08/29/2018 11:56:31     [id number]  Job#: [id number]     Doc#: [id number]    CC:  Sara Falcon

## 2018-08-31 ENCOUNTER — TELEPHONE (OUTPATIENT)
Dept: INTERNAL MEDICINE | Age: 68
End: 2018-08-31

## 2018-08-31 DIAGNOSIS — M54.41 CHRONIC BILATERAL LOW BACK PAIN WITH RIGHT-SIDED SCIATICA: Primary | ICD-10-CM

## 2018-08-31 DIAGNOSIS — R94.131 ABNORMAL EMG: ICD-10-CM

## 2018-08-31 DIAGNOSIS — G89.29 CHRONIC BILATERAL LOW BACK PAIN WITH RIGHT-SIDED SCIATICA: Primary | ICD-10-CM

## 2018-08-31 NOTE — TELEPHONE ENCOUNTER
Spoke with pt re: EMG results (\"non-recordalbe F anf H responses bilaterally are suggestive of involvement of L4-L5 and L5-S1\"). She does confirm back pain with right side sciatic pain. Pt would like referral to Dr. Irma Noble. Referral pended.

## 2018-09-04 ENCOUNTER — HOSPITAL ENCOUNTER (OUTPATIENT)
Dept: PHYSICAL THERAPY | Age: 68
Setting detail: THERAPIES SERIES
Discharge: HOME | End: 2018-09-04
Payer: MEDICARE

## 2018-09-04 PROCEDURE — 97110 THERAPEUTIC EXERCISES: CPT

## 2018-09-04 PROCEDURE — G8979 MOBILITY GOAL STATUS: HCPCS

## 2018-09-04 PROCEDURE — G8980 MOBILITY D/C STATUS: HCPCS

## 2018-09-04 NOTE — DISCHARGE SUMMARY
[facility]    [x] [place]  Phone: [phone]  Fax: [phone]      [] [place]  Phone: [phone]  Fax: [phone]    Physical Therapy Discharge Note  Date: 2018        Patient Name:  Guy Mcgill    :  1950  MRN: [id number]  Restrictions/Precautions:     Medical/Treatment Diagnosis Information:   · Diagnosis: G62.9 (ICD-10-CM) - Peripheral polyneuropathy ([facility] [address] R26.89 (ICD-10-CM) - Balance problem, Z91.81 (ICD-10-CM) - H/O falling, G25.0 (ICD-10-CM) - Essential tremor, I63.6 (ICD-10-CM) - Cerebral infarction due to cerebral venous thrombosis, nonpyogenic ([facility] [address] R26.9 (ICD-10-CM) - Gait abnormality  ·   Insurance/Certification information:  PT Insurance Information: Medicare/[employer] [employer]  Physician Information:  Referring Practitioner: Quintin Calabrese MD  Plan of care signed (Y/N):  Y  Visit# / total visits:  10/10  Pain level:          G-Code (if applicable):      Date G-Code Applied:  2018  PT G-Codes  Functional Assessment Tool Used: Tinetti  Score: 21/28=25% Disbility  Functional Limitation: Mobility: Walking and moving around  Mobility: Walking and Moving Around Goal Status (): At least 1 percent but less than 20 percent impaired, limited or restricted  Mobility: Walking and Moving Around Discharge Status (): At least 20 percent but less than 40 percent impaired, limited or restricted     Time Period for Report: 18-18   Cancels/No-shows to date:  0    Plan of Care/Treatment to date:  [x] Therapeutic Exercise    [] Modalities:  [x] Therapeutic Activity     [] Ultrasound  [] Electrical Stimulation  [x] Gait Training      [] Cervical Traction    [] Lumbar Traction  [x] Neuromuscular Re-education  [] Cold/hotpack [] Iontophoresis  [x] Instruction in HEP      Other:  [] Manual Therapy       []    [] Aquatic Therapy       []                    ?           Subjective:  ·   Pt reporting feeling tired and

## 2018-09-04 NOTE — FLOWSHEET NOTE
Physical Therapy Daily Treatment Note    Date:  2018    Patient Name:  Vibha Robles    :  1950  MRN: [id number]  Restrictions/Precautions:     Medical/Treatment Diagnosis Information:   · Diagnosis: G62.9 (ICD-10-CM) - Peripheral polyneuropathy ([facility] R26.89 (ICD-10-CM) - Balance problem, Z91.81 (ICD-10-CM) - H/O falling, G25.0 (ICD-10-CM) - Essential tremor, I63.6 (ICD-10-CM) - Cerebral infarction due to cerebral venous thrombosis, nonpyogenic ([facility] [address] R26.9 (ICD-10-CM) - Gait abnormality     Insurance/Certification information:  PT Insurance Information: Medicare/[employer] [employer]  Physician Information:  Referring Practitioner: Stiven Gonzalez MD  Plan of care signed (Y/N):  Y  Visit# / total visits:  10/10  Pain level:   G-Code (if applicable):      Date G-Code Applied:  18  PT G-Codes  Functional Assessment Tool Used: Tinetti  Score: 21/28=25% Disability   Functional Limitation: Mobility: Walking and moving around  Mobility: Walking and Moving Around Current Status (): At least 20 percent but less than 40 percent impaired, limited or restricted  Mobility: Walking and Moving Around Goal Status (): At least 1 percent but less than 20 percent impaired, limited or restricted    Time In: 1108 Time Out:  1152  Progress Note: [x]  Yes  [x]  No  Next due by: Visit #10      Subjective:  Pt reporting feeling tired and complaining of dizziness. Patient noting improvement in strength, but continues to have balance issues and reporting no change. Objective: All goals assessed this date, improvement noted with LE strength and with Tinetti. Patient had most improvements with static balance, less gains with balance with gait. Earle per flow sheet to improve LE strength, gait and balance. Patient requiring verbal cueing for sequencing and technique with exs. Progressed HEP this date.   Improvement with TInetti by 25% this date since eval.   Observation: Enters clinic with

## 2018-09-06 DIAGNOSIS — M54.41 RIGHT-SIDED LOW BACK PAIN WITH RIGHT-SIDED SCIATICA, UNSPECIFIED CHRONICITY: Primary | ICD-10-CM

## 2018-09-10 ENCOUNTER — HOSPITAL ENCOUNTER (OUTPATIENT)
Dept: NEUROLOGY | Age: 68
Discharge: HOME | End: 2018-09-10
Payer: MEDICARE

## 2018-09-10 DIAGNOSIS — Z91.81 H/O FALLING: ICD-10-CM

## 2018-09-10 DIAGNOSIS — G45.0 VBI (VERTEBROBASILAR INSUFFICIENCY): ICD-10-CM

## 2018-09-10 DIAGNOSIS — G44.229 CHRONIC TENSION-TYPE HEADACHE, NOT INTRACTABLE: ICD-10-CM

## 2018-09-10 DIAGNOSIS — G45.8 OTHER SPECIFIED TRANSIENT CEREBRAL ISCHEMIAS: ICD-10-CM

## 2018-09-10 DIAGNOSIS — R42 DIZZINESS: ICD-10-CM

## 2018-09-10 DIAGNOSIS — I25.83 CORONARY ARTERY DISEASE DUE TO LIPID RICH PLAQUE: ICD-10-CM

## 2018-09-10 DIAGNOSIS — R26.89 BALANCE PROBLEM: ICD-10-CM

## 2018-09-10 DIAGNOSIS — I67.82 CHRONIC CEREBRAL ISCHEMIA: ICD-10-CM

## 2018-09-10 DIAGNOSIS — I63.6 CEREBRAL INFARCTION DUE TO CEREBRAL VENOUS THROMBOSIS, NONPYOGENIC (HCC): ICD-10-CM

## 2018-09-10 DIAGNOSIS — G44.211 INTRACTABLE EPISODIC TENSION-TYPE HEADACHE: ICD-10-CM

## 2018-09-10 DIAGNOSIS — R26.9 GAIT ABNORMALITY: ICD-10-CM

## 2018-09-10 DIAGNOSIS — I25.10 CORONARY ARTERY DISEASE DUE TO LIPID RICH PLAQUE: ICD-10-CM

## 2018-09-10 DIAGNOSIS — R41.3 MEMORY PROBLEM: ICD-10-CM

## 2018-09-10 PROCEDURE — 95957 EEG DIGITAL ANALYSIS: CPT

## 2018-09-10 PROCEDURE — 93886 INTRACRANIAL COMPLETE STUDY: CPT

## 2018-09-10 PROCEDURE — 95813 EEG EXTND MNTR 61-119 MIN: CPT

## 2018-09-10 RX ORDER — LORATADINE 10 MG/1
10 TABLET ORAL DAILY PRN
Status: ON HOLD | COMMUNITY
End: 2019-09-30 | Stop reason: ALTCHOICE

## 2018-09-10 NOTE — PROGRESS NOTES
EXTENDED EEG Completed with Anibal Analysis. PCP: Dick Alfonso MD    Ordering: Anu Briones Neurologist    Interpreting Physician: Soco Nguyen Neurologist    Technician: Lucille Simms RN

## 2018-09-11 NOTE — PROCEDURES
[facility] [address]                   [address] [place] [id number]                         ELECTROENCEPHALOGRAM (EEG) REPORT    PATIENT NAME: Laurie Goodrich                   :        1950  MED REC NO:   [id number]                             ROOM:  ACCOUNT NO:   [de-identified]                           ADMIT DATE: 09/10/2018  PROVIDER:     Tanika Thomas MD    DATE OF STUDY:  09/10/2018    TECHNIQUE:  23 channels of EEG, 2 channels of EOG, 2 channel of EKG and 1  channel ground and 1 channel reference were recorded with Wow[employer]  Software 32 Channel System utilizing the International 10/20 System  Protocol. Anibal detection and seizure analysis protocols were utilized and the study  was reviewed using the comprehensive EEG monitoring. Anibal detection trending allows to see at a glance timing of detected  seizure in the context of other changes in the EEG. Anibal detection  analysis automatically notes the most types of electrode artifacts making  trends easier to review and more representative of cerebral activity. Anibal detection analysis also provides collection of trends for monitoring  and review including seizure probability, rhythmic spectrogram left and  right hemispheres, peak envelope, amplitude, relative symmetry and  suppression ratio. This is an extended EEG recorded for more than one hour. CLINICAL DATA:      The patient is 69years old with a history of type 2  diabetes, chronic fatigues, sleep apnea, memory problem, gait difficulty,  balance problem, dizziness, falling, tremulousness, anxiety, depression,  chronic cerebral ischemia, TIA. The purpose of the study is to evaluate for associated seizure activity. BACKGROUND ACTIVITY:      While the patient was awake, the background activity  consisted of poor to fairly regulated, high amplitude 8 Hz waveform seen  over both cerebral hemispheres.     Intermittent slow sharp waves, sharp waves noted diffusely throughout this  recording. ACTIVATION PROCEDURES:    HYPERVENTILATION:  Could not be performed due to the patient's clinical  condition. PHOTIC STIMULATION:  Photic stimulation was performed at the following  frequencies: 1 Hz, 3 Hz, 6 Hz, 9 Hz, 12 Hz, 15 Hz, 18 Hz, 21 Hz, 25 Hz, 30  Hz and patient tolerated well. Photic stimulation:  Unremarkable. SLEEP:  Stage I and stage II sleep were noted. EKG:  EKG showed normal sinus rhythm without any significant abnormality. IMPRESSION:      There is moderate irritable diffuse cerebral dysfunction that  is potentially epileptogenic in nature. Further clinical correlation, followup recommended.           Jaye Greenberg MD  Board Certified Neurologist    D: 09/10/2018 17:17:25       T: 09/10/2018 18:19:18     [id number]  Job#: [id number]     Doc#: [id number]    CC:  Keegan Casey

## 2018-09-12 DIAGNOSIS — J01.00 ACUTE NON-RECURRENT MAXILLARY SINUSITIS: ICD-10-CM

## 2018-09-12 DIAGNOSIS — Z79.4 TYPE 2 DIABETES MELLITUS WITH DIABETIC POLYNEUROPATHY, WITH LONG-TERM CURRENT USE OF INSULIN (HCC): Primary | ICD-10-CM

## 2018-09-12 DIAGNOSIS — E11.42 TYPE 2 DIABETES MELLITUS WITH DIABETIC POLYNEUROPATHY, WITH LONG-TERM CURRENT USE OF INSULIN (HCC): Primary | ICD-10-CM

## 2018-09-12 RX ORDER — INSULIN ASPART 100 [IU]/ML
INJECTION, SOLUTION INTRAVENOUS; SUBCUTANEOUS
Qty: 15 ML | Refills: 3 | Status: SHIPPED | OUTPATIENT
Start: 2018-09-12 | End: 2019-08-27 | Stop reason: SDUPTHER

## 2018-09-12 RX ORDER — AMLODIPINE BESYLATE 5 MG/1
TABLET ORAL
Qty: 90 TABLET | Refills: 3 | Status: SHIPPED | OUTPATIENT
Start: 2018-09-12 | End: 2019-01-28 | Stop reason: SDUPTHER

## 2018-09-12 RX ORDER — CITALOPRAM 10 MG/1
TABLET ORAL
Qty: 90 TABLET | Refills: 3 | Status: SHIPPED | OUTPATIENT
Start: 2018-09-12 | End: 2019-01-28 | Stop reason: SDUPTHER

## 2018-09-12 RX ORDER — FLUTICASONE PROPIONATE 50 MCG
SPRAY, SUSPENSION (ML) NASAL
Qty: 48 G | Refills: 3 | Status: SHIPPED | OUTPATIENT
Start: 2018-09-12 | End: 2019-11-26 | Stop reason: SDUPTHER

## 2018-09-12 RX ORDER — PANTOPRAZOLE SODIUM 40 MG/1
TABLET, DELAYED RELEASE ORAL
Qty: 90 TABLET | Refills: 3 | Status: SHIPPED | OUTPATIENT
Start: 2018-09-12 | End: 2019-01-28 | Stop reason: SDUPTHER

## 2018-09-12 RX ORDER — LISINOPRIL 10 MG/1
TABLET ORAL
Qty: 90 TABLET | Refills: 3 | Status: SHIPPED | OUTPATIENT
Start: 2018-09-12 | End: 2019-01-28 | Stop reason: SDUPTHER

## 2018-09-12 RX ORDER — PEN NEEDLE, DIABETIC 31 GX5/16"
NEEDLE, DISPOSABLE MISCELLANEOUS
Qty: 180 EACH | Refills: 3 | Status: SHIPPED | OUTPATIENT
Start: 2018-09-12 | End: 2019-07-23 | Stop reason: SDUPTHER

## 2018-09-12 RX ORDER — GABAPENTIN 300 MG/1
CAPSULE ORAL
Qty: 360 CAPSULE | Refills: 0 | Status: SHIPPED | OUTPATIENT
Start: 2018-09-12 | End: 2018-09-15 | Stop reason: SDUPTHER

## 2018-09-14 ENCOUNTER — HOSPITAL ENCOUNTER (OUTPATIENT)
Dept: GENERAL RADIOLOGY | Age: 68
Discharge: HOME | End: 2018-09-16
Payer: MEDICARE

## 2018-09-14 ENCOUNTER — OFFICE VISIT (OUTPATIENT)
Dept: ORTHOPEDIC SURGERY | Age: 68
End: 2018-09-14
Payer: MEDICARE

## 2018-09-14 VITALS
WEIGHT: 264 LBS | HEART RATE: 58 BPM | HEIGHT: 64 IN | SYSTOLIC BLOOD PRESSURE: 129 MMHG | DIASTOLIC BLOOD PRESSURE: 84 MMHG | BODY MASS INDEX: 45.07 KG/M2

## 2018-09-14 DIAGNOSIS — M54.50 LUMBAR SPINE PAIN: Primary | ICD-10-CM

## 2018-09-14 DIAGNOSIS — M43.16 SPONDYLOLISTHESIS OF LUMBAR REGION: ICD-10-CM

## 2018-09-14 DIAGNOSIS — M51.36 DDD (DEGENERATIVE DISC DISEASE), LUMBAR: ICD-10-CM

## 2018-09-14 DIAGNOSIS — M54.41 RIGHT-SIDED LOW BACK PAIN WITH RIGHT-SIDED SCIATICA, UNSPECIFIED CHRONICITY: ICD-10-CM

## 2018-09-14 DIAGNOSIS — E66.01 MORBID OBESITY WITH BMI OF 45.0-49.9, ADULT (HCC): ICD-10-CM

## 2018-09-14 PROCEDURE — 1123F ACP DISCUSS/DSCN MKR DOCD: CPT | Performed by: ORTHOPAEDIC SURGERY

## 2018-09-14 PROCEDURE — 4040F PNEUMOC VAC/ADMIN/RCVD: CPT | Performed by: ORTHOPAEDIC SURGERY

## 2018-09-14 PROCEDURE — 3288F FALL RISK ASSESSMENT DOCD: CPT | Performed by: ORTHOPAEDIC SURGERY

## 2018-09-14 PROCEDURE — 1036F TOBACCO NON-USER: CPT | Performed by: ORTHOPAEDIC SURGERY

## 2018-09-14 PROCEDURE — 3017F COLORECTAL CA SCREEN DOC REV: CPT | Performed by: ORTHOPAEDIC SURGERY

## 2018-09-14 PROCEDURE — 72100 X-RAY EXAM L-S SPINE 2/3 VWS: CPT

## 2018-09-14 PROCEDURE — 1100F PTFALLS ASSESS-DOCD GE2>/YR: CPT | Performed by: ORTHOPAEDIC SURGERY

## 2018-09-14 PROCEDURE — G8417 CALC BMI ABV UP PARAM F/U: HCPCS | Performed by: ORTHOPAEDIC SURGERY

## 2018-09-14 PROCEDURE — 99203 OFFICE O/P NEW LOW 30 MIN: CPT | Performed by: ORTHOPAEDIC SURGERY

## 2018-09-14 PROCEDURE — G8598 ASA/ANTIPLAT THER USED: HCPCS | Performed by: ORTHOPAEDIC SURGERY

## 2018-09-14 PROCEDURE — 1090F PRES/ABSN URINE INCON ASSESS: CPT | Performed by: ORTHOPAEDIC SURGERY

## 2018-09-14 PROCEDURE — G8400 PT W/DXA NO RESULTS DOC: HCPCS | Performed by: ORTHOPAEDIC SURGERY

## 2018-09-14 PROCEDURE — G8427 DOCREV CUR MEDS BY ELIG CLIN: HCPCS | Performed by: ORTHOPAEDIC SURGERY

## 2018-09-14 ASSESSMENT — ENCOUNTER SYMPTOMS: BACK PAIN: 1

## 2018-09-14 NOTE — PROGRESS NOTES
Subjective:      Patient ID: Sabrina Echeverria is a 69 y.o. female. Back Pain   This is a chronic problem. The current episode started more than 1 year ago. The problem occurs constantly. The problem is unchanged. The pain is present in the gluteal and lumbar spine. The quality of the pain is described as aching and burning. The pain radiates to the right knee. The pain is severe. The symptoms are aggravated by bending, position and twisting. Stiffness is present all day. Risk factors include obesity and history of osteoporosis. The treatment provided no relief. Patient presents with low back right radicular leg pain with neurogenic claudication qualities    Worse with standing worse with walking worse with bending improved with sitting    Patient walks with assistive aids      Review of Systems   Musculoskeletal: Positive for back pain. All other systems reviewed and are negative. Objective:   Physical Exam   Constitutional: She is oriented to person, place, and time. She appears well-developed and well-nourished. HENT:   Head: Normocephalic and atraumatic. Eyes: Conjunctivae and EOM are normal.   Neck: Normal range of motion. Pulmonary/Chest: Effort normal. No respiratory distress. Neurological: She is alert and oriented to person, place, and time. She has normal strength. No sensory deficit. Normal gait   Skin: Skin is warm and dry. Psychiatric: Her behavior is normal. Thought content normal.   Nursing note and vitals reviewed. Diagnostic x-rays lumbar spine reveal osteopenia grade 1 spondylolisthesis L4 5  Assessment:      Encounter Diagnoses   Name Primary?     Lumbar spine pain Yes    Spondylolisthesis of lumbar region     DDD (degenerative disc disease), lumbar     Morbid obesity with BMI of 45.0-49.9, adult (HCC)      No relief with physical therapy      Plan:      Referral for lumbar epidural steroid injection    If fails consider CT myelogram lumbar spine as MRI will be extremely limited in this patient        Hermila Diaz MD

## 2018-09-15 ENCOUNTER — TELEPHONE (OUTPATIENT)
Dept: PRIMARY CARE CLINIC | Age: 68
End: 2018-09-15

## 2018-09-15 RX ORDER — GABAPENTIN 300 MG/1
CAPSULE ORAL
Qty: 28 CAPSULE | Refills: 0 | Status: SHIPPED | OUTPATIENT
Start: 2018-09-15 | End: 2018-11-09 | Stop reason: SDUPTHER

## 2018-09-18 RX ORDER — CLOPIDOGREL BISULFATE 75 MG/1
TABLET ORAL
Qty: 90 TABLET | Refills: 0 | Status: SHIPPED | OUTPATIENT
Start: 2018-09-18 | End: 2018-12-20 | Stop reason: SDUPTHER

## 2018-09-18 RX ORDER — MELATONIN
Qty: 180 TABLET | Refills: 11 | Status: ON HOLD | OUTPATIENT
Start: 2018-09-18 | End: 2018-10-26 | Stop reason: SDUPTHER

## 2018-09-18 RX ORDER — TRIAMCINOLONE ACETONIDE 1 MG/G
CREAM TOPICAL
Qty: 80 G | Refills: 5 | Status: SHIPPED | OUTPATIENT
Start: 2018-09-18 | End: 2022-06-24 | Stop reason: SDUPTHER

## 2018-09-24 ENCOUNTER — OFFICE VISIT (OUTPATIENT)
Dept: NEUROLOGY | Age: 68
End: 2018-09-24
Payer: MEDICARE

## 2018-09-24 VITALS
HEIGHT: 64 IN | WEIGHT: 267 LBS | SYSTOLIC BLOOD PRESSURE: 132 MMHG | DIASTOLIC BLOOD PRESSURE: 70 MMHG | BODY MASS INDEX: 45.58 KG/M2 | OXYGEN SATURATION: 97 % | HEART RATE: 52 BPM

## 2018-09-24 DIAGNOSIS — R73.09 ELEVATED HEMOGLOBIN A1C: ICD-10-CM

## 2018-09-24 DIAGNOSIS — I63.6 CEREBRAL INFARCTION DUE TO CEREBRAL VENOUS THROMBOSIS, NONPYOGENIC (HCC): ICD-10-CM

## 2018-09-24 DIAGNOSIS — R42 DIZZINESS: ICD-10-CM

## 2018-09-24 DIAGNOSIS — R26.89 BALANCE PROBLEM: ICD-10-CM

## 2018-09-24 DIAGNOSIS — R41.3 MEMORY PROBLEM: ICD-10-CM

## 2018-09-24 DIAGNOSIS — Z91.81 H/O FALLING: ICD-10-CM

## 2018-09-24 DIAGNOSIS — Z86.69 HX OF BELL'S PALSY: ICD-10-CM

## 2018-09-24 DIAGNOSIS — E55.9 VITAMIN D DEFICIENCY: ICD-10-CM

## 2018-09-24 DIAGNOSIS — G62.9 PERIPHERAL POLYNEUROPATHY: ICD-10-CM

## 2018-09-24 DIAGNOSIS — R26.9 GAIT ABNORMALITY: ICD-10-CM

## 2018-09-24 DIAGNOSIS — F32.A ANXIETY AND DEPRESSION: ICD-10-CM

## 2018-09-24 DIAGNOSIS — G44.229 CHRONIC TENSION-TYPE HEADACHE, NOT INTRACTABLE: Primary | ICD-10-CM

## 2018-09-24 DIAGNOSIS — G47.33 OBSTRUCTIVE SLEEP APNEA: ICD-10-CM

## 2018-09-24 DIAGNOSIS — R53.82 CHRONIC FATIGUE: ICD-10-CM

## 2018-09-24 DIAGNOSIS — M79.10 MUSCLE ACHE: ICD-10-CM

## 2018-09-24 DIAGNOSIS — I67.82 CHRONIC CEREBRAL ISCHEMIA: ICD-10-CM

## 2018-09-24 DIAGNOSIS — G45.0 VBI (VERTEBROBASILAR INSUFFICIENCY): ICD-10-CM

## 2018-09-24 DIAGNOSIS — R60.0 BILATERAL LEG EDEMA: ICD-10-CM

## 2018-09-24 DIAGNOSIS — G45.9 TRANSIENT CEREBRAL ISCHEMIA, UNSPECIFIED TYPE: ICD-10-CM

## 2018-09-24 DIAGNOSIS — I25.83 CORONARY ARTERY DISEASE DUE TO LIPID RICH PLAQUE: ICD-10-CM

## 2018-09-24 DIAGNOSIS — Z79.4 TYPE 2 DIABETES MELLITUS WITH DIABETIC POLYNEUROPATHY, WITH LONG-TERM CURRENT USE OF INSULIN (HCC): ICD-10-CM

## 2018-09-24 DIAGNOSIS — E11.42 TYPE 2 DIABETES MELLITUS WITH DIABETIC POLYNEUROPATHY, WITH LONG-TERM CURRENT USE OF INSULIN (HCC): ICD-10-CM

## 2018-09-24 DIAGNOSIS — F41.9 ANXIETY AND DEPRESSION: ICD-10-CM

## 2018-09-24 DIAGNOSIS — G25.0 ESSENTIAL TREMOR: ICD-10-CM

## 2018-09-24 DIAGNOSIS — G45.8 OTHER SPECIFIED TRANSIENT CEREBRAL ISCHEMIAS: ICD-10-CM

## 2018-09-24 DIAGNOSIS — G44.211 INTRACTABLE EPISODIC TENSION-TYPE HEADACHE: ICD-10-CM

## 2018-09-24 DIAGNOSIS — E66.01 MORBID OBESITY WITH BMI OF 45.0-49.9, ADULT (HCC): ICD-10-CM

## 2018-09-24 DIAGNOSIS — G44.221 CHRONIC TENSION-TYPE HEADACHE, INTRACTABLE: ICD-10-CM

## 2018-09-24 DIAGNOSIS — I25.10 CORONARY ARTERY DISEASE DUE TO LIPID RICH PLAQUE: ICD-10-CM

## 2018-09-24 DIAGNOSIS — M54.2 NECK PAIN: ICD-10-CM

## 2018-09-24 PROCEDURE — G8400 PT W/DXA NO RESULTS DOC: HCPCS | Performed by: PSYCHIATRY & NEUROLOGY

## 2018-09-24 PROCEDURE — 3017F COLORECTAL CA SCREEN DOC REV: CPT | Performed by: PSYCHIATRY & NEUROLOGY

## 2018-09-24 PROCEDURE — G8598 ASA/ANTIPLAT THER USED: HCPCS | Performed by: PSYCHIATRY & NEUROLOGY

## 2018-09-24 PROCEDURE — G8427 DOCREV CUR MEDS BY ELIG CLIN: HCPCS | Performed by: PSYCHIATRY & NEUROLOGY

## 2018-09-24 PROCEDURE — G8417 CALC BMI ABV UP PARAM F/U: HCPCS | Performed by: PSYCHIATRY & NEUROLOGY

## 2018-09-24 PROCEDURE — 99215 OFFICE O/P EST HI 40 MIN: CPT | Performed by: PSYCHIATRY & NEUROLOGY

## 2018-09-24 PROCEDURE — 3044F HG A1C LEVEL LT 7.0%: CPT | Performed by: PSYCHIATRY & NEUROLOGY

## 2018-09-24 PROCEDURE — 1036F TOBACCO NON-USER: CPT | Performed by: PSYCHIATRY & NEUROLOGY

## 2018-09-24 PROCEDURE — 1100F PTFALLS ASSESS-DOCD GE2>/YR: CPT | Performed by: PSYCHIATRY & NEUROLOGY

## 2018-09-24 PROCEDURE — 1090F PRES/ABSN URINE INCON ASSESS: CPT | Performed by: PSYCHIATRY & NEUROLOGY

## 2018-09-24 PROCEDURE — 2022F DILAT RTA XM EVC RTNOPTHY: CPT | Performed by: PSYCHIATRY & NEUROLOGY

## 2018-09-24 PROCEDURE — 1123F ACP DISCUSS/DSCN MKR DOCD: CPT | Performed by: PSYCHIATRY & NEUROLOGY

## 2018-09-24 PROCEDURE — 4040F PNEUMOC VAC/ADMIN/RCVD: CPT | Performed by: PSYCHIATRY & NEUROLOGY

## 2018-09-24 PROCEDURE — 3288F FALL RISK ASSESSMENT DOCD: CPT | Performed by: PSYCHIATRY & NEUROLOGY

## 2018-09-24 ASSESSMENT — ENCOUNTER SYMPTOMS
EYE DISCHARGE: 0
NAUSEA: 0
PHOTOPHOBIA: 1
EYE ITCHING: 0
FACIAL SWEATING: 0
COUGH: 0
EYE REDNESS: 0
FACIAL SWELLING: 0
CHEST TIGHTNESS: 0
ABDOMINAL DISTENTION: 0
CHOKING: 0
ABDOMINAL PAIN: 0
DIARRHEA: 0
CHANGE IN BOWEL HABIT: 0
APNEA: 0
COLOR CHANGE: 0
RHINORRHEA: 0
BLOOD IN STOOL: 0
SWOLLEN GLANDS: 0
BLURRED VISION: 0
SCALP TENDERNESS: 0
BOWEL INCONTINENCE: 0
SINUS PRESSURE: 1
SHORTNESS OF BREATH: 1
TROUBLE SWALLOWING: 0
VOMITING: 0
EYE WATERING: 0
VOICE CHANGE: 0
BACK PAIN: 1
SORE THROAT: 0
WHEEZING: 0
VISUAL CHANGE: 0
EYE PAIN: 0
CONSTIPATION: 0

## 2018-09-24 NOTE — PROGRESS NOTES
On  Celexa,  wellbutrin                           Patient     To   Follow   With  Mental health professionals                         10)   Essential  Tremor  Involving  Hands     For  More  Than   3  Years                                 Stable                     11)     Moderate    Dementia   For  More  Than  Two   Years                            Intolerance  To   Aricept                              Currently  Patient  Is  N[place]                               -   Tolerating  The  Same    And  Getting  Benefit. 12)   Obesity,   OSAS    -   On  cpap                       13)  Multiple  Co  morbid  Medical  Conditions                            Being  Followed  By  Her     PCP                      14)    H/o   Recurrent  Falls  In   May   2018                       15)     Worsening  Of  Headaches,    Dizziness     Since      May   2018                                    CURRENTLY    HEADACHES   ARE  BETTER. 16)     IN  VIEW  OF  THE  PATIENT'S    MULTIPLE   NEUROLOGICAL                           SYMPTOMS  AND  CONCERNS    FOR  PROLONGED   DURATION,                           AND    MULTIPLE   CO MORBID  MEDICAL  CONDITIONS,                             PATIENT    HAD    A)   CT  HEAD   IN     AUGUST 2018    SHOWED                                     NO  ACUTE    PATHOLOGY .                           B)     CAROTID  DOPPLER  SHOWED     50   %    STENOSIS,                               NO    HEMODYNAMICALLY  SIGNIFICANT.                                            -  NEEDS  MONITORING                         C)    EEG     MODERATE  DIFFUSE   CEREBRAL   DYSFUNCTION                                          -  NEEDS  MONITOING                            17)    PATIENT  COMPLAINS    OF                              A)   EXCESSIVE  DAY  TIME  SLEEPINESS                              B)    FATIGUE      &   TIREDNESS                              C)    IN SPITE  OF  SLEEPING   9  HOURS   AT  NIGHT                                                AND    DAY  TIME  NAPS                             C)    DIZZINESS,  HEADACHES    AND  MEMORY PROBLEMS                              D)    SYMPTOMS   ARE   PRESENT    FOR   5  YEARS                               IN   THE  SETTING OF  MULTIPLE  CO  MORBID  MEDICAL  CONDITIONS                                             AND  MORBID  OBESITY.                                            -     WORSENING    OBSTRUCTIVE  SLEEP  APNEA                                                NEEDS    CPAP /  BIPAP      RE TITRATION                                                                  PRECIPITATING  FACTORS: including  fever/infection, exertion/relaxation, position change, stress, weather change, medications/alcohol, time of day/darkness/light  Are    present                                                             MODIFYING  FACTORS:  fever/infection, exertion/relaxation, position change, stress, weather change, medications/alcohol, time of day/darkness/light     Are  present         Patient   Indicates   The  Presence   And  The  Absence  Of  The  Following  Associated  And   Additional  Neurological    Symptoms:                                Balance  And coordination problems  present           Gait problems     present            Headaches      present              Migraines           present           Memory problems        present           Confusion        present            Paresthesia numbness          present           Seizures  And  Starring  Episodes           absent           Syncope,  Near  syncopal episodes         absent           Speech problems           absent             Swallowing  Problems      absent            Dizziness,  Light headedness           present                        Vertigo        present             Generalized   Weakness    absent              focal  Weakness     present             Tremors MD at [facility]         Current Outpatient Prescriptions   Medication Sig Dispense Refill    triamcinolone (KENALOG) 0.1 % cream APPLY TOPICALLY TWICE DAILY 80 g 5    clopidogrel (PLAVIX) 75 MG tablet TAKE 1 TABLET BY MOUTH EVERY DAY 90 tablet 0    Cholecalciferol (VITAMIN D3) 1000 units TABS TAKE 3 TABLETS BY MOUTH TWICE DAILY 180 tablet 11    B-D ULTRAFINE III SHORT PEN 31G X 8 MM MISC USE TWICE DAILY 180 each 3    NOVOLOG FLEXPEN 100 UNIT/ML injection pen INJECT 4 UNITS AT LUNCH AND  6 UNITS AT SUPPER (EACH PEN EXPIRES 28 DAYS AFTER 1ST USE) 15 mL 3    pantoprazole (PROTONIX) 40 MG tablet TAKE 1 TABLET EVERY DAY 90 tablet 3    fluticasone (FLONASE) 50 MCG/ACT nasal spray USE 2 SPRAYS NASALLY EVERY DAY 48 g 3    lisinopril (PRINIVIL;ZESTRIL) 10 MG tablet TAKE 1 TABLET EVERY DAY 90 tablet 3    amLODIPine (NORVASC) 5 MG tablet TAKE 1 TABLET EVERY DAY 90 tablet 3    citalopram (CELEXA) 10 MG tablet TAKE 1 TABLET EVERY DAY 90 tablet 3    loratadine (CLARITIN) 10 MG tablet Take 10 mg by mouth daily as needed      alendronate (FOSAMAX) 70 MG tablet Take 1 tablet by mouth every 7 days 12 tablet 3    memantine (NAMENDA) 10 MG tablet Take 1 tablet by mouth 2 times daily 180 tablet 1    nystatin (MYCOSTATIN) 274877 UNIT/GM powder Apply 3 times daily.  60 g 5    VITAMIN A PO Take 2,400 mcg by mouth daily      vitamin C (ASCORBIC ACID) 500 MG tablet TAKE 1 TABLET BY MOUTH TWICE DAILY 60 tablet 11    MAGNESIUM-OXIDE 400 (241.3 Mg) MG TABS tablet TAKE 1 TABLET BY MOUTH DAILY 30 tablet 11    buPROPion (WELLBUTRIN) 75 MG tablet TAKE 1 TABLET TWICE DAILY 180 tablet 3    amitriptyline (ELAVIL) 25 MG tablet take 1 tablet by mouth at bedtime 30 tablet 11    PRODIGY NO CODING BLOOD GLUC strip TEST FOUR TIMES DAILY AS DIRECTED 400 strip 3    PRODIGY TWIST TOP LANCETS 28G MISC USE AS DIRECTED FOUR TIMES DAILY 400 each 3    vitamin E 400 UNIT capsule Take 400 Units by mouth daily      Omega-3 glargine (LANTUS) 100 UNIT/ML injection vial Inject 10 Units into the skin nightly 3 vial 3    CPAP Machine MISC by Does not apply route Pressure of 13 (AirSense 10)  [employer].      albuterol (PROVENTIL) (2.5 MG/3ML) 0.083% nebulizer solution Take 2.5 mg by nebulization every 6 hours as needed for Wheezing      ferrous sulfate 325 (65 FE) MG EC tablet Take 325 mg by mouth daily (with breakfast)      Multiple Vitamin (MULTI VITAMIN DAILY) TABS Take by mouth daily      nitroGLYCERIN (NITROSTAT) 0.4 MG SL tablet Place 0.4 mg under the tongue every 5 minutes as needed for Chest pain Dissolve 1 tab under tongue at first sign of chest pain. May repeat every 5 minutes until relief is obtained. If pain persists after taking 3 tabs in a 15-minute period, or the pain is different than is typically experienced, call 9-1-1 immediately.  gabapentin (NEURONTIN) 300 MG capsule TAKE 2 CAPSULES TWICE DAILY. 28 capsule 0     No current facility-administered medications for this visit. No Known Allergies      Family History   Problem Relation Age of Onset    Heart Disease Mother     High Blood Pressure Mother    [facility] Stroke Mother     Glaucoma Mother     Early Death Father     Cancer Father         stomach    Miscarriages / Stillbirths Maternal Uncle          Social History     Social History    Marital status:      Spouse name: N/A    Number of children: N/A    Years of education: N/A     Occupational History    Not on file.      Social History Main Topics    Smoking status: Former Smoker     Packs/day: 1.00     Years: 35.00     Quit date: 9/30/2001    Smokeless tobacco: Never Used    Alcohol use Yes      Comment: occasional beer with food    Drug use: No    Sexual activity: No     Other Topics Concern    Not on file     Social History Narrative    No narrative on file       Vitals:    09/24/18 1020   BP: 132/70   Pulse: 52   SpO2: 97%         Wt Readings from Last 3 Encounters:   09/24/18 267 lb (121.1 kg)   09/14/18 264 lb (119.7 kg)   07/23/18 264 lb (119.7 kg)         BP Readings from Last 3 Encounters:   09/24/18 132/70   09/14/18 129/84   07/23/18 126/74       Hematology and Coagulation  Lab Results   Component Value Date    WBC 10.8 01/30/2018    RBC 4.10 01/30/2018    HGB 12.7 01/30/2018    HCT 39.1 01/30/2018    MCV 95.4 01/30/2018    MCH 31.1 01/30/2018    MCHC 32.6 01/30/2018    RDW 14.3 01/30/2018     01/30/2018    MPV 9.0 01/30/2018     Chemistries  Lab Results   Component Value Date     07/16/2018    K 4.4 07/16/2018     07/16/2018    CO2 26 07/16/2018    BUN 15 07/16/2018    CREATININE 0.76 07/16/2018    CALCIUM 9.2 07/16/2018    PROT 7.4 07/16/2018    LABALBU 3.8 07/16/2018    BILITOT 0.35 07/16/2018    ALKPHOS 72 07/16/2018    AST 38 07/16/2018    ALT 39 07/16/2018     Lab Results   Component Value Date    ALKPHOS 72 07/16/2018    ALT 39 07/16/2018    AST 38 07/16/2018    PROT 7.4 07/16/2018    BILITOT 0.35 07/16/2018    LABALBU 3.8 07/16/2018     Lab Results   Component Value Date    BUN 15 07/16/2018    CREATININE 0.76 07/16/2018     Lab Results   Component Value Date    CALCIUM 9.2 07/16/2018     Lab Results   Component Value Date    AST 38 07/16/2018    ALT 39 07/16/2018     Lab Results   Component Value Date    Dwight Reilly 3848 07/16/2018       Review of Systems   Constitutional: Positive for weight loss. Negative for appetite change, chills, diaphoresis, fatigue, fever and unexpected weight change. HENT: Positive for sinus pressure. Negative for congestion, dental problem, drooling, ear discharge, ear pain, facial swelling, hearing loss, mouth sores, nosebleeds, postnasal drip, rhinorrhea, sore throat, tinnitus, trouble swallowing and voice change. Eyes: Positive for photophobia. Negative for blurred vision, pain, discharge, redness, itching and visual disturbance. Respiratory: Positive for shortness of breath.  Negative for apnea, cough, choking, chest tightness and wheezing. Cardiovascular: Positive for chest pain. Negative for palpitations, leg swelling and near-syncope. Gastrointestinal: Negative for abdominal distention, abdominal pain, anorexia, blood in stool, bowel incontinence, change in bowel habit, constipation, diarrhea, nausea and vomiting. Endocrine: Negative for cold intolerance, heat intolerance, polydipsia, polyphagia and polyuria. Genitourinary: Negative for bladder incontinence, dysuria and pelvic pain. Musculoskeletal: Positive for back pain and neck pain. Negative for arthralgias, gait problem, joint swelling, myalgias and neck stiffness. Skin: Negative for color change, pallor, rash and wound. Allergic/Immunologic: Negative for environmental allergies, food allergies and immunocompromised state. Neurological: Positive for dizziness, vertigo, tingling, focal weakness, weakness, numbness, headaches, paresthesias and loss of balance. Negative for tremors, seizures, syncope, facial asymmetry, speech difficulty and light-headedness. Hematological: Negative for adenopathy. Does not bruise/bleed easily. Psychiatric/Behavioral: Positive for confusion and memory loss. Negative for agitation, behavioral problems, decreased concentration, dysphoric mood, hallucinations, self-injury, sleep disturbance and suicidal ideas. The patient is not nervous/anxious, does not have insomnia and is not hyperactive. OBJECTIVE:    Physical Exam   Constitutional: She appears well-developed and well-nourished. She is cooperative. HENT:   Head: Normocephalic and atraumatic. Head is without raccoon's eyes and without Ruiz's sign. Right Ear: External ear normal.   Left Ear: External ear normal.   Nose: Nose normal.   Mouth/Throat: Oropharynx is clear and moist.   Eyes: Conjunctivae are normal.   Neck: Normal range of motion. Neck supple. No muscular tenderness present. Carotid bruit is not present. No neck rigidity.  No tracheal deviation and normal range of motion present. No Brudzinski's sign and no Kernig's sign noted. No thyroid mass and no thyromegaly present. Cardiovascular: Normal rate and regular rhythm. Pulmonary/Chest: Effort normal.   Musculoskeletal: She exhibits no edema or tenderness. Skin: Skin is warm. No rash noted. No cyanosis or erythema. No pallor. Nails show no clubbing. Psychiatric: Her mood appears not anxious. Her affect is blunt. Her affect is not labile and not inappropriate. She is slowed. She is not agitated, not aggressive, not hyperactive, not withdrawn, not actively hallucinating and not combative. Thought content is not paranoid and not delusional. Cognition and memory are impaired. She does not express impulsivity or inappropriate judgment. She does not exhibit a depressed mood. She expresses no homicidal and no suicidal ideation. She expresses no suicidal plans and no homicidal plans. She exhibits abnormal recent memory. She exhibits normal remote memory. She is attentive. Neurologic Exam                   NEUROLOGICAL EXAMINATION :    A) MENTAL STATUS:                   Alert and  oriented  To time, place  And  Person. No Aphasia. No  Dysarthria. Able   To  Follow  SIMPLE  Step commands without   Any  Difficulty. No right  To left confusion. Speech  And language function. decreased                 Insight and  Judgment ,Fund  Of  Knowledge   decreased              Recent  And  Remote memory  decreased              Attention &Concentration are decreased                                                 B) CRANIAL NERVES :             2 CN : Visual  Acuity  And  Visual fields  within normal limits                      Fundi  Could  Not  Be  Could  Not  Be  Evaluated. 3,4,6 CN : Both  Pupils are   Reactive and  Equal.                          Extraocular   Movements  Are  Intact. No  Nystagmus. No  FESTUS.   No radiation dose to as low   as reasonably achievable.       COMPARISON:   04/13/2017       HISTORY:   ORDERING SYSTEM PROVIDED HISTORY: Right hip pain   TECHNOLOGIST PROVIDED HISTORY:   Ordering Physician Provided Reason for Exam: Frontal headaches travel into   posterior head x 2 years. Acuity: Chronic   Type of Exam: Ongoing       FINDINGS:   BRAIN/VENTRICLES: There is no acute intracranial hemorrhage, mass effect or   midline shift.  No abnormal extra-axial fluid collection.  The gray-white   differentiation is maintained without evidence of an acute infarct.  There is   no evidence of hydrocephalus.  Mild calcific plaque of intracranial vessels.       ORBITS: The visualized portion of the orbits demonstrate no acute abnormality.       SINUSES: The visualized paranasal sinuses and mastoid air cells demonstrate   no acute abnormality.       SOFT TISSUES/SKULL:  No acute abnormality of the visualized skull or soft   tissues.           Impression   No acute intracranial abnormality.         ULTRASOUND EVALUATION OF THE CAROTID ARTERIES       8/6/2018       COMPARISON:   26 July 2007       HISTORY:   ORDERING SYSTEM PROVIDED HISTORY: Chronic tension-type headache, not   intractable       FINDINGS:   RIGHT:       The right common carotid artery demonstrates peak systolic velocities of 636,   76 cm/sec in the proximal and distal segments respectively.       The right internal carotid artery demonstrates the systolic velocities of 87,   46, 112 cm/sec in the proximal, mid and distal segments respectively.       The external carotid artery is patent.  The vertebral artery demonstrates   normal antegrade flow.       Heterogeneous plaque formation is noted at the origin of the right internal   carotid artery with estimated stenosis by cross-sectional analysis of 54%.       ICA/CCA ratio of 1.1.       LEFT:       The left common carotid artery demonstrates peak systolic velocities of 87,   46 cm/sec in the proximal and distal segments respectively.       The left internal carotid artery demonstrates the systolic velocities of 54,   47, 78 cm/sec in the proximal, mid and distal segments respectively.       The external carotid artery is patent.  The vertebral artery demonstrates   normal antegrade flow.       Heterogeneous plaque is noted at the origin of the left internal carotid   artery with estimated cross-sectional stenosis of 40%.        ICA/CCA ratio of 0.6.           Impression   The right internal carotid artery demonstrates 0-50% stenosis by peak   velocity Doppler analysis.       The left internal carotid artery demonstrates 0-50% stenosis by peak velocity   Doppler analysis.       Bilateral vertebral arteries are patent with flow in the normal direction.       Heterogeneous plaque is noted at the origin of both internal carotid   arteries, moderate on the right and mild on the left.  Cross-sectional   stenosis at the origin of the right internal carotid artery is estimated at   54% and that in the left at 40%.  Visible plaque at the origin of the right   internal carotid artery is slightly more prominent than noted on prior exam.           ULTRASOUND EVALUATION OF THE CAROTID ARTERIES       7/26/2017       COMPARISON:   None.       HISTORY:   ORDERING SYSTEM PROVIDED HISTORY: Coronary artery disease due to lipid rich   plaque   TECHNOLOGIST PROVIDED HISTORY:   Ordering Physician Provided Reason for Exam: CAD,MEMORY ISSUES, DIZZINESS   Acuity: Acute   Type of Exam: Initial       FINDINGS:   RIGHT:       The right common carotid artery demonstrates peak systolic velocities of 69,   45 cm/sec in the proximal and distal segments respectively.       The right internal carotid artery demonstrates the systolic velocities of 75,   63, 77 cm/sec in the proximal, mid and distal segments respectively.       The external carotid artery is patent.  The vertebral artery demonstrates   normal antegrade flow.       No evidence of focal atherosclerotic plaque.       ICA/CCA ratio of 1.1.  High bifurcation is noted.       LEFT:       The left common carotid artery demonstrates peak systolic velocities of 77,   43 cm/sec in the proximal and distal segments respectively.       The left internal carotid artery demonstrates the systolic velocities of 38,   37, 44 cm/sec in the proximal, mid and distal segments respectively.       The external carotid artery is patent.  The vertebral artery demonstrates   normal antegrade flow.       Mild heterogeneous irregular plaque is noted in the carotid bulb with   extension into the left internal carotid artery with estimated   cross-sectional stenosis at the bulb of 35% in at the origin of the left   internal carotid artery is 39%.       ICA/CCA ratio of 0.6.  High bifurcation is noted.           Impression   The right internal carotid artery demonstrates 0-50% stenosis .       The left internal carotid artery demonstrates 0-50% stenosis .       Bilateral vertebral arteries are patent with flow in the normal direction. VISITING DIAGNOSIS:      ICD-10-CM ICD-9-CM    1. Chronic tension-type headache, not intractable G44.229 339.12    2. Essential tremor G25.0 333.1    3. Peripheral polyneuropathy G62.9 356.9    4. Morbid obesity with BMI of 45.0-49.9, adult ([facility]) E66.01 278.01     Z68.42 V85.42    5. Balance problem R26.89 781.99    6. Elevated hemoglobin A1c R73.09 790.29    7. Bilateral leg edema R60.0 782.3    8. Vitamin D deficiency E55.9 268.9    9. Coronary artery disease due to lipid rich plaque I25.10 414.00     I25.83 414.3    10. Anxiety and depression F41.9 300.00     F32.9 311    11. Gait abnormality R26.9 781.2    12. Muscle ache M79.1 729.1    13. Dizziness R42 780.4    14. Memory problem R41.3 780.93    15. Type 2 diabetes mellitus with diabetic polyneuropathy, with long-term current use of insulin ([facility]) E11.42 250.60     Z79.4 357.2      V58.67    16.  Intractable episodic tension-type headache G44.211 339.11    17. Chronic tension-type headache, intractable G44.221 339.12    18. Transient cerebral ischemia, unspecified type G45.9 435.9    19. Chronic cerebral ischemia I67.82 437.1    20. Chronic fatigue R53.82 780.79    21. Obstructive sleep apnea G47.33 327.23    22. VBI (vertebrobasilar insufficiency) G45.0 435.3    23. H/O falling Z91.81 V15.88    24. Other specified transient cerebral ischemias G45.8 435.8    25. Cerebral infarction due to cerebral venous thrombosis, nonpyogenic (HCC)  I63.6 434.01    26. Hx of Bell's palsy Z86.69 V12.49    27. Neck pain M54.2 723.1               CONCERNS   &   INCREASED   RISK   FOR         * TIA,  CEREBRO  VASCULAR  ISCHEMIA, STROKE        *  Poorly  Controlled Chronic  Headaches &  Migraines      *   COGNITIVE  &   MEMORY PROBLEMS  AND  DEMENTIAS         *   PERIPHERAL  NEUROPATHY        *  GAIT  DIFFICULTIES  &   BALANCE PROBLMES   AND  FALL                VARIOUS  RISK   FACTORS   WERE  REVIEWED   AND   DISCUSSED. *  PATIENT   HAS  MULTIPLE   MEDICAL,  NEUROLOGICAL          AND   MENTAL  HEALTH  PROBLEMS . PATIENT'S   MANAGEMENT  IS  CHALLENGING. PLAN:       Danae Rios  Of  The  Diagnoses,  The  Management & Treatment  Options           AND    Care  plan  Were        Reviewed and   Discussed   With  patient. * Goals  And  Expectations  Of  The  Therapy  Discussed   And  Reviewed. *   Benefits   And   Side  Effect  Profile  Of  Medication/s   Were   Discussed             * Need   For  Further   Follow up For  The  Various  Problems  Were discussed. * Results  Of  The  Previous  Diagnostic tests were reviewed and questions answered. patient  understand the same. Medical  Decision  Making  Was  Made  Based on the   Complexity  Of  Above  Mentioned  Diagnoses,    Data reviewed   & diagnostic  Tests  Reviewed,  Risk  Of  Significant   Co morbidities and complicating   Factors. *  EVALUATIONS  AND  FOLLOW UP:                                          * CARDIOLOGY              *   OPTHALMOLOGY  /  For  Detailed   Fundi  &   Visual  fields  evaluation               *  PT / OT                   Orders Placed This Encounter   Procedures    Ambulatory referral to Sleep Medicine    Sleep Study with PAP Titration                     *  PATIENT'S  MULTIPLE  QUESTIONS  ANSWERED    IN   DETAIL     AND                                   COUNSELED               *PATIENT   TO  FOLLOW  UP  WITH   PRIMARY  CARE   AND   OTHER  CONSULTANTS  AS  BEFORE.           *TO  FOLLOW  WITH   MENTAL  HEALTH  PROFESSIONALS ,  INCLUDING          PSYCHOLOGICAL  COUNSELING   AND  PSYCHIATRIC  EVALUTIONS      *  Maintain   Healthy  Life Style    With   Periodic  Monitoring  Of    Any  Medical  Conditions  Including   Elevated  Blood  Pressure,  Lipid  Profile,   Blood  Sugar levels  And   Heart  Disease. *   Period   Screening  For  Cancers  Involving  Breast,  Colon,  Prostate, lungs  And  Other  Organs  As  Applicable,  In consultation   With  Your  Primary Care Providers. *  If  The  Patient remains  Neurologically  Stable   Return   To  [facility] Neurology Department   IN  3-4   MONTHS  TIME   FOR  FURTHER  FOLLOW UP.                 *  If   There is  Any  Significant  Worsening   Of  Current  Symptoms  And  Or  If patient  Develops   Any additional  New  Neurological  Symptoms  Or  Significant  Concerns   Should  Call  911 or  Go  To  Emergency  Department  For  Further  Immediate  Evaluation. *   The  Neurological   Findings,  Possible  Diagnosis,  Differential diagnoses   And  Options  For    Further   Investigations   And  management   Are  Discussed  Comprehensively. Medications   And  Prescription   Risks  And  Side effects  Are   Also  Discussed. The  Above  Were  Reviewed  With  patient and   questions  Answered  In  Detail. but it is not hard to reach this goal. A serving or helping is 1 piece of fruit, 1 cup of vegetables, or 2 cups of leafy, raw vegetables. Have an apple or some carrot sticks as an afternoon snack instead of a candy bar. Try to have fruits and/or vegetables at every meal.  Make exercise part of your daily routine. You may want to start with simple activities, such as walking, bicycling, or slow swimming. Try to be active 30 to 60 minutes every day. You do not need to do all 30 to 60 minutes all at once. For example, you can exercise 3 times a day for 10 or 20 minutes. Moderate exercise is safe for most people, but it is always a good idea to talk to your doctor before starting an exercise program.  Keep moving. Kobe Moyers the lawn, work in the garden, or Mr. Number. Take the stairs instead of the elevator at work. If you smoke, quit. People who smoke have an increased risk for heart attack, stroke, cancer, and other lung illnesses. Quitting is hard, but there are ways to boost your chance of quitting tobacco for good. Use nicotine gum, patches, or lozenges. Ask your doctor about stop-smoking programs and medicines. Keep trying. In addition to reducing your risk of diseases in the future, you will notice some benefits soon after you stop using tobacco. If you have shortness of breath or asthma symptoms, they will likely get better within a few weeks after you quit. Limit how much alcohol you drink. Moderate amounts of alcohol (up to 2 drinks a day for men, 1 drink a day for women) are okay. But drinking too much can lead to liver problems, high blood pressure, and other health problems. Family health  If you have a family, there are many things you can do together to improve your health. Eat meals together as a family as often as possible. Eat healthy foods. This includes fruits, vegetables, lean meats and dairy, and whole grains. Include your family in your fitness plan.  Most people think of activities such as jogging or tennis as the way to fitness, but there are many ways you and your family can be more active. Anything that makes you breathe hard and gets your heart pumping is exercise. Here are some tips:  Walk to do errands or to take your child to school or the bus. Go for a family bike ride after dinner instead of watching TV. Where can you learn more? Go to https://[employer]Iumeweb.TimeLab. org and sign in to your [employer] account. Enter H133 in the [employer] box to learn more about \"A Healthy Lifestyle: Care Instructions. \"     If you do not have an account, please click on the \"Sign Up Now\" link. Current as of: July 26, 2016  Content Version: 11.2  © 5357-5790 [employer]. Care instructions adapted under license by [facility] [address] If you have questions about a medical condition or this instruction, always ask your healthcare professional. [place] [address] any warranty or liability for your use of this information.

## 2018-10-01 ENCOUNTER — TELEPHONE (OUTPATIENT)
Dept: PAIN MANAGEMENT | Age: 68
End: 2018-10-01

## 2018-10-01 ENCOUNTER — OFFICE VISIT (OUTPATIENT)
Dept: PAIN MANAGEMENT | Age: 68
End: 2018-10-01
Payer: MEDICARE

## 2018-10-01 VITALS
SYSTOLIC BLOOD PRESSURE: 122 MMHG | RESPIRATION RATE: 16 BRPM | OXYGEN SATURATION: 94 % | BODY MASS INDEX: 44.32 KG/M2 | HEART RATE: 56 BPM | WEIGHT: 266 LBS | DIASTOLIC BLOOD PRESSURE: 74 MMHG | HEIGHT: 65 IN

## 2018-10-01 DIAGNOSIS — M54.41 CHRONIC RIGHT-SIDED LOW BACK PAIN WITH RIGHT-SIDED SCIATICA: Primary | ICD-10-CM

## 2018-10-01 DIAGNOSIS — G89.29 CHRONIC RIGHT-SIDED LOW BACK PAIN WITH RIGHT-SIDED SCIATICA: Primary | ICD-10-CM

## 2018-10-01 DIAGNOSIS — M54.16 LUMBAR RADICULOPATHY: ICD-10-CM

## 2018-10-01 DIAGNOSIS — M51.36 DDD (DEGENERATIVE DISC DISEASE), LUMBAR: ICD-10-CM

## 2018-10-01 PROCEDURE — G8400 PT W/DXA NO RESULTS DOC: HCPCS | Performed by: NURSE PRACTITIONER

## 2018-10-01 PROCEDURE — G8428 CUR MEDS NOT DOCUMENT: HCPCS | Performed by: NURSE PRACTITIONER

## 2018-10-01 PROCEDURE — G8484 FLU IMMUNIZE NO ADMIN: HCPCS | Performed by: NURSE PRACTITIONER

## 2018-10-01 PROCEDURE — 1123F ACP DISCUSS/DSCN MKR DOCD: CPT | Performed by: NURSE PRACTITIONER

## 2018-10-01 PROCEDURE — 99214 OFFICE O/P EST MOD 30 MIN: CPT | Performed by: NURSE PRACTITIONER

## 2018-10-01 PROCEDURE — G8417 CALC BMI ABV UP PARAM F/U: HCPCS | Performed by: NURSE PRACTITIONER

## 2018-10-01 PROCEDURE — 1100F PTFALLS ASSESS-DOCD GE2>/YR: CPT | Performed by: NURSE PRACTITIONER

## 2018-10-01 PROCEDURE — 4040F PNEUMOC VAC/ADMIN/RCVD: CPT | Performed by: NURSE PRACTITIONER

## 2018-10-01 PROCEDURE — 3017F COLORECTAL CA SCREEN DOC REV: CPT | Performed by: NURSE PRACTITIONER

## 2018-10-01 PROCEDURE — G8598 ASA/ANTIPLAT THER USED: HCPCS | Performed by: NURSE PRACTITIONER

## 2018-10-01 PROCEDURE — 1036F TOBACCO NON-USER: CPT | Performed by: NURSE PRACTITIONER

## 2018-10-01 PROCEDURE — 1090F PRES/ABSN URINE INCON ASSESS: CPT | Performed by: NURSE PRACTITIONER

## 2018-10-01 PROCEDURE — 3288F FALL RISK ASSESSMENT DOCD: CPT | Performed by: NURSE PRACTITIONER

## 2018-10-01 ASSESSMENT — ENCOUNTER SYMPTOMS
BACK PAIN: 1
ABDOMINAL PAIN: 1

## 2018-10-01 NOTE — TELEPHONE ENCOUNTER
10/1/18                                                                                                                                                                         To: Carmen Navarro MD   [facility] / [address] [address]       From:  Meghana Serrano MD   Interventional Pain Mgmt Physician, [facility]    Re: Medication Hiatus for Interventional Pain/Spine Procedure for [facility]    Dear Carmen Navarro MD                   I am recommending an injection for [facility] hospital to decrease their spine related pain. In order to perform this procedure, antiplatelet/anticoagulant medications will need to be held prior to the procedure. Please respond with one of the options below if you feel that it is safe for [facility] hospital to hold this medication and return this letter to me. [facility] is taking: Plavix    Medications that must be held prior to injections:     Antiplatelets Aspirin, Effient, Plavix,Ticlid,  Brilinta, Savaysa for 3-5 days   Anticoagulant: Coumadin, Eliquis, Lovenox, Heparin, Pradaxa, Xarelto for 5-7 days. All antiplatelets/anticoagulants must be held for 7 days for a spinal cord stimulator. .    Sincerely,       Electronically signed by DAVID Fitch CNP on 10/1/2018 at 1:53 PM    Meghana Serrano MD      ·  I agree with holding this medication for the time described above. ·  I agree with holding this medication for ____ days only. ·  I agree with holding this medication for ____ days, however pt must be on another anticoagulant, _______________.   · I  DO NOT agree with holding this medication

## 2018-10-01 NOTE — PROGRESS NOTES
Medications to hold have been reviewed with patient. Blood thinners must be held with permission from your cardiologist or primary care physician. A letter is  required to besent to PCP/Cardiologist regarding holding medications for procedure to decrease bleeding risk.            Rebel Raza, APRN - CNP

## 2018-10-05 ENCOUNTER — APPOINTMENT (OUTPATIENT)
Dept: GENERAL RADIOLOGY | Age: 68
End: 2018-10-05
Attending: PHYSICAL MEDICINE & REHABILITATION
Payer: MEDICARE

## 2018-10-05 ENCOUNTER — HOSPITAL ENCOUNTER (OUTPATIENT)
Age: 68
Setting detail: OUTPATIENT SURGERY
Discharge: HOME | End: 2018-10-05
Attending: PHYSICAL MEDICINE & REHABILITATION | Admitting: PHYSICAL MEDICINE & REHABILITATION
Payer: MEDICARE

## 2018-10-05 VITALS
OXYGEN SATURATION: 95 % | HEART RATE: 45 BPM | HEIGHT: 64 IN | RESPIRATION RATE: 16 BRPM | SYSTOLIC BLOOD PRESSURE: 120 MMHG | DIASTOLIC BLOOD PRESSURE: 70 MMHG | BODY MASS INDEX: 45.24 KG/M2 | WEIGHT: 265 LBS | TEMPERATURE: 98.1 F

## 2018-10-05 LAB — GLUCOSE BLD-MCNC: 139 MG/DL (ref 65–105)

## 2018-10-05 PROCEDURE — 3600000054 HC PAIN LEVEL 3 BASE: Performed by: PHYSICAL MEDICINE & REHABILITATION

## 2018-10-05 PROCEDURE — 3600000055 HC PAIN LEVEL 3 ADDL 15 MIN: Performed by: PHYSICAL MEDICINE & REHABILITATION

## 2018-10-05 PROCEDURE — 7100000011 HC PHASE II RECOVERY - ADDTL 15 MIN: Performed by: PHYSICAL MEDICINE & REHABILITATION

## 2018-10-05 PROCEDURE — 2500000003 HC RX 250 WO HCPCS: Performed by: PHYSICAL MEDICINE & REHABILITATION

## 2018-10-05 PROCEDURE — 82947 ASSAY GLUCOSE BLOOD QUANT: CPT

## 2018-10-05 PROCEDURE — 99152 MOD SED SAME PHYS/QHP 5/>YRS: CPT | Performed by: PHYSICAL MEDICINE & REHABILITATION

## 2018-10-05 PROCEDURE — 7100000010 HC PHASE II RECOVERY - FIRST 15 MIN: Performed by: PHYSICAL MEDICINE & REHABILITATION

## 2018-10-05 PROCEDURE — 64483 NJX AA&/STRD TFRM EPI L/S 1: CPT | Performed by: PHYSICAL MEDICINE & REHABILITATION

## 2018-10-05 PROCEDURE — 6360000002 HC RX W HCPCS: Performed by: PHYSICAL MEDICINE & REHABILITATION

## 2018-10-05 PROCEDURE — 2709999900 HC NON-CHARGEABLE SUPPLY: Performed by: PHYSICAL MEDICINE & REHABILITATION

## 2018-10-05 PROCEDURE — 64484 NJX AA&/STRD TFRM EPI L/S EA: CPT | Performed by: PHYSICAL MEDICINE & REHABILITATION

## 2018-10-05 PROCEDURE — 2580000003 HC RX 258: Performed by: PHYSICAL MEDICINE & REHABILITATION

## 2018-10-05 PROCEDURE — 6360000004 HC RX CONTRAST MEDICATION: Performed by: PHYSICAL MEDICINE & REHABILITATION

## 2018-10-05 PROCEDURE — 3209999900 FLUORO FOR SURGICAL PROCEDURES

## 2018-10-05 RX ORDER — BUPIVACAINE HYDROCHLORIDE 5 MG/ML
INJECTION, SOLUTION EPIDURAL; INTRACAUDAL PRN
Status: DISCONTINUED | OUTPATIENT
Start: 2018-10-05 | End: 2018-10-05 | Stop reason: HOSPADM

## 2018-10-05 RX ORDER — SODIUM CHLORIDE 0.9 % (FLUSH) 0.9 %
10 SYRINGE (ML) INJECTION PRN
Status: DISCONTINUED | OUTPATIENT
Start: 2018-10-05 | End: 2018-10-05 | Stop reason: HOSPADM

## 2018-10-05 RX ORDER — 0.9 % SODIUM CHLORIDE 0.9 %
VIAL (ML) INJECTION PRN
Status: DISCONTINUED | OUTPATIENT
Start: 2018-10-05 | End: 2018-10-05 | Stop reason: HOSPADM

## 2018-10-05 RX ORDER — SODIUM CHLORIDE, SODIUM LACTATE, POTASSIUM CHLORIDE, CALCIUM CHLORIDE 600; 310; 30; 20 MG/100ML; MG/100ML; MG/100ML; MG/100ML
INJECTION, SOLUTION INTRAVENOUS CONTINUOUS
Status: DISCONTINUED | OUTPATIENT
Start: 2018-10-05 | End: 2018-10-05 | Stop reason: HOSPADM

## 2018-10-05 RX ORDER — MIDAZOLAM HYDROCHLORIDE 1 MG/ML
INJECTION INTRAMUSCULAR; INTRAVENOUS PRN
Status: DISCONTINUED | OUTPATIENT
Start: 2018-10-05 | End: 2018-10-05 | Stop reason: HOSPADM

## 2018-10-05 RX ORDER — SODIUM CHLORIDE 0.9 % (FLUSH) 0.9 %
10 SYRINGE (ML) INJECTION EVERY 12 HOURS SCHEDULED
Status: DISCONTINUED | OUTPATIENT
Start: 2018-10-05 | End: 2018-10-05 | Stop reason: HOSPADM

## 2018-10-05 RX ORDER — FENTANYL CITRATE 50 UG/ML
INJECTION, SOLUTION INTRAMUSCULAR; INTRAVENOUS PRN
Status: DISCONTINUED | OUTPATIENT
Start: 2018-10-05 | End: 2018-10-05 | Stop reason: HOSPADM

## 2018-10-05 RX ORDER — DEXAMETHASONE SODIUM PHOSPHATE 10 MG/ML
INJECTION INTRAMUSCULAR; INTRAVENOUS PRN
Status: DISCONTINUED | OUTPATIENT
Start: 2018-10-05 | End: 2018-10-05 | Stop reason: HOSPADM

## 2018-10-05 RX ADMIN — SODIUM CHLORIDE, POTASSIUM CHLORIDE, SODIUM LACTATE AND CALCIUM CHLORIDE: 600; 310; 30; 20 INJECTION, SOLUTION INTRAVENOUS at 08:30

## 2018-10-05 ASSESSMENT — PAIN SCALES - GENERAL
PAINLEVEL_OUTOF10: 0
PAINLEVEL_OUTOF10: 0

## 2018-10-05 ASSESSMENT — PAIN DESCRIPTION - PAIN TYPE: TYPE: CHRONIC PAIN

## 2018-10-05 ASSESSMENT — PAIN - FUNCTIONAL ASSESSMENT: PAIN_FUNCTIONAL_ASSESSMENT: 0-10

## 2018-10-05 NOTE — PRE SEDATION
(PROTONIX) 40 MG tablet TAKE 1 TABLET EVERY DAY 9/12/18  Yes Dhruv Almanzar MD   fluticasone (FLONASE) 50 MCG/ACT nasal spray USE 2 SPRAYS NASALLY EVERY DAY 9/12/18  Yes Dhruv Almanzar MD   lisinopril (PRINIVIL;ZESTRIL) 10 MG tablet TAKE 1 TABLET EVERY DAY 9/12/18  Yes Dhruv Almanzar MD   amLODIPine (NORVASC) 5 MG tablet TAKE 1 TABLET EVERY DAY 9/12/18  Yes Dhruv Almanzar MD   citalopram (CELEXA) 10 MG tablet TAKE 1 TABLET EVERY DAY 9/12/18  Yes Dhruv Almanzar MD   loratadine (CLARITIN) 10 MG tablet Take 10 mg by mouth daily as needed   Yes Historical Provider, MD   alendronate (FOSAMAX) 70 MG tablet Take 1 tablet by mouth every 7 days 7/23/18  Yes Dhruv Almanzar MD   memantine (NAMENDA) 10 MG tablet Take 1 tablet by mouth 2 times daily 7/23/18  Yes Charly Hudson MD   nystatin (MYCOSTATIN) 417617 UNIT/GM powder Apply 3 times daily.  7/11/18  Yes Jacque Jones, APRN - CNP   VITAMIN A PO Take 2,400 mcg by mouth daily   Yes Historical Provider, MD   vitamin C (ASCORBIC ACID) 500 MG tablet TAKE 1 TABLET BY MOUTH TWICE DAILY 5/30/18  Yes Dhruv Almanzar MD   MAGNESIUM-OXIDE 400 (241.3 Mg) MG TABS tablet TAKE 1 TABLET BY MOUTH DAILY 5/30/18  Yes Dhruv Almanzar MD   buPROPion (WELLBUTRIN) 75 MG tablet TAKE 1 TABLET TWICE DAILY 5/30/18  Yes Dhruv Almanzar MD   amitriptyline (ELAVIL) 25 MG tablet take 1 tablet by mouth at bedtime 5/1/18  Yes Dhruv Almanzar MD   vitamin E 400 UNIT capsule Take 400 Units by mouth daily   Yes Historical Provider, MD   Omega-3 Fatty Acids (FISH OIL) 1200 MG CAPS Take 1,200 mg by mouth daily   Yes Historical Provider, MD   spironolactone (ALDACTONE) 50 MG tablet TAKE 1 TABLET TWICE DAILY 1/29/18  Yes Dhruv Almanzar MD   umeclidinium-vilanterol (ANORO ELLIPTA) 62.5-25 MCG/INH AEPB inhaler Inhale 1 puff into the lungs daily 1/19/18  Yes Dhruv Almanzar MD   VENTOLIN  (90 Base) MCG/ACT inhaler INHALE 2 PUFFS INTO THE LUNGS EVERY 4 HOURS AS NEEDED FOR

## 2018-10-05 NOTE — H&P
10 MG tablet TAKE 1 TABLET EVERY DAY 90 tablet 3    amLODIPine (NORVASC) 5 MG tablet TAKE 1 TABLET EVERY DAY 90 tablet 3    citalopram (CELEXA) 10 MG tablet TAKE 1 TABLET EVERY DAY 90 tablet 3    loratadine (CLARITIN) 10 MG tablet Take 10 mg by mouth daily as needed        alendronate (FOSAMAX) 70 MG tablet Take 1 tablet by mouth every 7 days 12 tablet 3    memantine (NAMENDA) 10 MG tablet Take 1 tablet by mouth 2 times daily 180 tablet 1    nystatin (MYCOSTATIN) 953226 UNIT/GM powder Apply 3 times daily.  60 g 5    VITAMIN A PO Take 2,400 mcg by mouth daily        vitamin C (ASCORBIC ACID) 500 MG tablet TAKE 1 TABLET BY MOUTH TWICE DAILY 60 tablet 11    MAGNESIUM-OXIDE 400 (241.3 Mg) MG TABS tablet TAKE 1 TABLET BY MOUTH DAILY 30 tablet 11    buPROPion (WELLBUTRIN) 75 MG tablet TAKE 1 TABLET TWICE DAILY 180 tablet 3    amitriptyline (ELAVIL) 25 MG tablet take 1 tablet by mouth at bedtime 30 tablet 11    PRODIGY NO CODING BLOOD GLUC strip TEST FOUR TIMES DAILY AS DIRECTED 400 strip 3    PRODIGY TWIST TOP LANCETS 28G MISC USE AS DIRECTED FOUR TIMES DAILY 400 each 3    vitamin E 400 UNIT capsule Take 400 Units by mouth daily        Omega-3 Fatty Acids (FISH OIL) 1200 MG CAPS Take 1,200 mg by mouth daily        spironolactone (ALDACTONE) 50 MG tablet TAKE 1 TABLET TWICE DAILY 180 tablet 3    umeclidinium-vilanterol (ANORO ELLIPTA) 62.5-25 MCG/INH AEPB inhaler Inhale 1 puff into the lungs daily 3 each 3    VENTOLIN  (90 Base) MCG/ACT inhaler INHALE 2 PUFFS INTO THE LUNGS EVERY 4 HOURS AS NEEDED FOR WHEEZING 1 Inhaler 5    COMBIGAN 0.2-0.5 % ophthalmic solution PLACE 1 DROP INTO BOTH EYES DAILY 15 mL 3    ibuprofen (ADVIL;MOTRIN) 400 MG tablet TAKE 1 TABLET EVERY 6 HOURS AS NEEDED FOR PAIN 360 tablet 3    Lancet Devices (PRODIGY LANCING DEVICE) MISC USE TO TEST BLOOD SUGAR FOUR TIMES DAILY AS DIRECTED 1 each 0    SENNA LAX 8.6 MG tablet TAKE 1 TABLET BY MOUTH TWICE DAILY 180 tablet 3    reviewed. Assessment:   1. Chronic right-sided low back pain with right-sided sciatica    2. Lumbar radiculopathy    3. DDD (degenerative disc disease), lumbar                     Plan:   Schedule right L2, L3 TFE     Informed consent has not been obtained for procedure. Penelope Guevaras  on blood thinners. Medications to hold have been reviewed with patient. Blood thinners must be held with permission from your cardiologist or primary care physician. A letter is  required to besent to PCP/Cardiologist regarding holding medications for procedure to decrease bleeding risk.                        Isi Drew, APRN - CNP

## 2018-10-10 ENCOUNTER — TELEPHONE (OUTPATIENT)
Dept: INTERNAL MEDICINE | Age: 68
End: 2018-10-10

## 2018-10-10 RX ORDER — IBUPROFEN 400 MG/1
TABLET ORAL
Qty: 360 TABLET | Refills: 3 | Status: SHIPPED | OUTPATIENT
Start: 2018-10-10 | End: 2019-01-29 | Stop reason: SDUPTHER

## 2018-10-15 RX ORDER — ALENDRONATE SODIUM 70 MG/1
70 TABLET ORAL
Qty: 12 TABLET | Refills: 3 | Status: SHIPPED | OUTPATIENT
Start: 2018-10-15 | End: 2019-01-28 | Stop reason: SDUPTHER

## 2018-10-19 ENCOUNTER — TELEPHONE (OUTPATIENT)
Dept: PAIN MANAGEMENT | Age: 68
End: 2018-10-19

## 2018-10-19 ENCOUNTER — OFFICE VISIT (OUTPATIENT)
Dept: PAIN MANAGEMENT | Age: 68
End: 2018-10-19
Payer: MEDICARE

## 2018-10-19 VITALS
SYSTOLIC BLOOD PRESSURE: 135 MMHG | DIASTOLIC BLOOD PRESSURE: 72 MMHG | BODY MASS INDEX: 46.13 KG/M2 | HEIGHT: 64 IN | WEIGHT: 270.2 LBS | RESPIRATION RATE: 16 BRPM

## 2018-10-19 DIAGNOSIS — G89.29 CHRONIC RIGHT-SIDED LOW BACK PAIN WITH RIGHT-SIDED SCIATICA: Primary | ICD-10-CM

## 2018-10-19 DIAGNOSIS — M54.16 LUMBAR RADICULOPATHY: ICD-10-CM

## 2018-10-19 DIAGNOSIS — M54.41 CHRONIC RIGHT-SIDED LOW BACK PAIN WITH RIGHT-SIDED SCIATICA: Primary | ICD-10-CM

## 2018-10-19 PROCEDURE — G8417 CALC BMI ABV UP PARAM F/U: HCPCS | Performed by: NURSE PRACTITIONER

## 2018-10-19 PROCEDURE — 1123F ACP DISCUSS/DSCN MKR DOCD: CPT | Performed by: NURSE PRACTITIONER

## 2018-10-19 PROCEDURE — 3288F FALL RISK ASSESSMENT DOCD: CPT | Performed by: NURSE PRACTITIONER

## 2018-10-19 PROCEDURE — G8427 DOCREV CUR MEDS BY ELIG CLIN: HCPCS | Performed by: NURSE PRACTITIONER

## 2018-10-19 PROCEDURE — 3017F COLORECTAL CA SCREEN DOC REV: CPT | Performed by: NURSE PRACTITIONER

## 2018-10-19 PROCEDURE — G8484 FLU IMMUNIZE NO ADMIN: HCPCS | Performed by: NURSE PRACTITIONER

## 2018-10-19 PROCEDURE — 4040F PNEUMOC VAC/ADMIN/RCVD: CPT | Performed by: NURSE PRACTITIONER

## 2018-10-19 PROCEDURE — 1090F PRES/ABSN URINE INCON ASSESS: CPT | Performed by: NURSE PRACTITIONER

## 2018-10-19 PROCEDURE — 1100F PTFALLS ASSESS-DOCD GE2>/YR: CPT | Performed by: NURSE PRACTITIONER

## 2018-10-19 PROCEDURE — G8400 PT W/DXA NO RESULTS DOC: HCPCS | Performed by: NURSE PRACTITIONER

## 2018-10-19 PROCEDURE — 1036F TOBACCO NON-USER: CPT | Performed by: NURSE PRACTITIONER

## 2018-10-19 PROCEDURE — 99214 OFFICE O/P EST MOD 30 MIN: CPT | Performed by: NURSE PRACTITIONER

## 2018-10-19 PROCEDURE — G8598 ASA/ANTIPLAT THER USED: HCPCS | Performed by: NURSE PRACTITIONER

## 2018-10-19 ASSESSMENT — ENCOUNTER SYMPTOMS
ABDOMINAL PAIN: 1
EYES NEGATIVE: 1
BACK PAIN: 1
RESPIRATORY NEGATIVE: 1

## 2018-10-19 NOTE — PROGRESS NOTES
Vomiting 30 tablet 3    Compression Stockings MISC by Does not apply route 15-20mmHg  Knee high  Open tow  With assist device to help put them on 1 each 0    Zinc 50 MG TABS Take 50 mg by mouth daily      Diabetic Shoe MISC by Does not apply route 1 each 0    niacin 500 MG extended release capsule Take 500 mg by mouth daily      folic acid (FOLVITE) 306 MCG tablet Take 800 mcg by mouth daily      Cyanocobalamin (VITAMIN B12 PO) Take 2,500 mcg by mouth daily      B Complex-C (SUPER B COMPLEX PO) Take by mouth daily      travoprost, benzalkonium, (TRAVATAN) 0.004 % ophthalmic solution Place 1 drop into both eyes daily 3 Bottle 3    Insulin Syringe-Needle U-100 (INSULIN SYRINGE .5CC/31GX5/16\") 31G X 5/16\" 0.5 ML MISC 1 each by Does not apply route daily 200 each 3    insulin glargine (LANTUS) 100 UNIT/ML injection vial Inject 10 Units into the skin nightly 3 vial 3    CPAP Machine MISC by Does not apply route Pressure of 13 (AirSense 10)  [employer].      albuterol (PROVENTIL) (2.5 MG/3ML) 0.083% nebulizer solution Take 2.5 mg by nebulization every 6 hours as needed for Wheezing      ferrous sulfate 325 (65 FE) MG EC tablet Take 325 mg by mouth daily (with breakfast)      Multiple Vitamin (MULTI VITAMIN DAILY) TABS Take by mouth daily      nitroGLYCERIN (NITROSTAT) 0.4 MG SL tablet Place 0.4 mg under the tongue every 5 minutes as needed for Chest pain Dissolve 1 tab under tongue at first sign of chest pain. May repeat every 5 minutes until relief is obtained. If pain persists after taking 3 tabs in a 15-minute period, or the pain is different than is typically experienced, call 9-1-1 immediately.          Past Medical History:   Diagnosis Date    CAD (coronary artery disease) 46    Cancer ([facility] [address] 1971    cervical    Coronary artery disease     Hx of Bell's palsy     Mild right facial paralysis    Mild dementia     Neuropathy     Sleep apnea 11/07/2014    sleep study done in [place]    Type 2 atraumatic. Cardiovascular: Normal rate. Pulmonary/Chest: Effort normal.   Musculoskeletal:        Lumbar back: She exhibits decreased range of motion and pain. EXAMINATION:  2 XRAY VIEWS OF THE LUMBAR SPINE, 9/14/2018 10:39 am     COMPARISON:  None     HISTORY:  ORDERING SYSTEM PROVIDED HISTORY: Right-sided low back pain with right-sided  sciatica, unspecified chronicity  TECHNOLOGIST PROVIDED HISTORY:  pain  Ordering Physician Provided Reason for Exam: No injury; chronic rt sided low  back pain  Acuity: Chronic  Type of Exam: Unknown     FINDINGS:  Osseous structures are diffusely demineralized.  There is grade 1  anterolisthesis at L4-L5, measuring 0.4 cm, likely secondary to facet  arthropathy.  Minimal anterolisthesis at L3-L4 is also probably secondary to  facet arthropathy.  Alignment in the lumbar spine is otherwise normal.  The  vertebral body heights are preserved.  There is mild disc space narrowing at  L5-S1.  Mild-to-moderate multilevel facet arthropathy is noted.  There are  scattered vascular calcifications at the aortic bifurcation.  Cholecystectomy  clips are noted.        Impression  1. No evidence of acute compression fracture. 2. Minimal to mild anterolisthesis at L3-L4 and L4-L5, likely secondary to  facet arthropathy. 3. Minimal degenerative disc disease and mild-to-moderate facet arthropathy. Neurological: She is alert and oriented to person, place, and time. No cranial nerve deficit. GCS eye subscore is 4. GCS verbal subscore is 5. GCS motor subscore is 6. Skin: Skin is warm, dry and intact. She is not diaphoretic. No cyanosis. No pallor. Nails show no clubbing. Psychiatric: She has a normal mood and affect. Her speech is normal.   Vitals reviewed. Assessment:      1. Chronic right-sided low back pain with right-sided sciatica    2.  Lumbar radiculopathy          Plan:    Due to only receiving minimal pain relief, will schedule second right L2, L3 TFE, follow up in

## 2018-10-19 NOTE — PATIENT INSTRUCTIONS
prior to surgery; patients under the age of 25 must have a parent or legal guardian sign the permit to be able to do the procedure. 9.  You must have finished any antibiotic prescribed for recent infections. If required, please take pre-procedure antibiotic or other pre-procedure medications as instructed. 10. Bring inhalers and pain medications with you to your procedure. 11. Bring your MRI/CT films if they were done outside of the [facility]. 12. If you should develop a cold, sore throat, cough, fever or other new indication of illness or infection, or are started on antibiotics within 2 weeks of the scheduled procedure, please notify the [facility] office as early as possible at [phone].

## 2018-10-22 ENCOUNTER — TELEPHONE (OUTPATIENT)
Dept: INTERNAL MEDICINE | Age: 68
End: 2018-10-22
Payer: MEDICARE

## 2018-10-22 DIAGNOSIS — E11.9 TYPE 2 DIABETES MELLITUS WITHOUT COMPLICATION, UNSPECIFIED WHETHER LONG TERM INSULIN USE (HCC): ICD-10-CM

## 2018-10-22 DIAGNOSIS — K74.60 HEPATIC CIRRHOSIS, UNSPECIFIED HEPATIC CIRRHOSIS TYPE, UNSPECIFIED WHETHER ASCITES PRESENT (HCC): Primary | ICD-10-CM

## 2018-10-22 DIAGNOSIS — I25.10 ATHEROSCLEROSIS OF NATIVE CORONARY ARTERY WITHOUT ANGINA PECTORIS, UNSPECIFIED WHETHER NATIVE OR TRANSPLANTED HEART: ICD-10-CM

## 2018-10-22 PROCEDURE — G0179 MD RECERTIFICATION HHA PT: HCPCS | Performed by: INTERNAL MEDICINE

## 2018-10-22 NOTE — TELEPHONE ENCOUNTER
Okay to hold Plavix ×5 days before procedure.     Also, avoid aspirin and ibuprofen ×7 days before procedure

## 2018-10-22 NOTE — TELEPHONE ENCOUNTER
Called and LM for pt to call the office to discuss. Her procedure is scheduled for 10/26/18, unsure if she has started to hold any medications yet.

## 2018-10-26 ENCOUNTER — HOSPITAL ENCOUNTER (OUTPATIENT)
Age: 68
Setting detail: OUTPATIENT SURGERY
Discharge: HOME | End: 2018-10-26
Attending: PHYSICAL MEDICINE & REHABILITATION | Admitting: PHYSICAL MEDICINE & REHABILITATION
Payer: MEDICARE

## 2018-10-26 ENCOUNTER — APPOINTMENT (OUTPATIENT)
Dept: GENERAL RADIOLOGY | Age: 68
End: 2018-10-26
Attending: PHYSICAL MEDICINE & REHABILITATION
Payer: MEDICARE

## 2018-10-26 VITALS
BODY MASS INDEX: 45.82 KG/M2 | DIASTOLIC BLOOD PRESSURE: 65 MMHG | HEART RATE: 52 BPM | HEIGHT: 64 IN | OXYGEN SATURATION: 95 % | WEIGHT: 268.4 LBS | TEMPERATURE: 97.9 F | SYSTOLIC BLOOD PRESSURE: 108 MMHG | RESPIRATION RATE: 16 BRPM

## 2018-10-26 PROCEDURE — 6360000004 HC RX CONTRAST MEDICATION: Performed by: PHYSICAL MEDICINE & REHABILITATION

## 2018-10-26 PROCEDURE — 99152 MOD SED SAME PHYS/QHP 5/>YRS: CPT | Performed by: PHYSICAL MEDICINE & REHABILITATION

## 2018-10-26 PROCEDURE — 3209999900 FLUORO FOR SURGICAL PROCEDURES

## 2018-10-26 PROCEDURE — 2500000003 HC RX 250 WO HCPCS: Performed by: PHYSICAL MEDICINE & REHABILITATION

## 2018-10-26 PROCEDURE — 3600000056 HC PAIN LEVEL 4 BASE: Performed by: PHYSICAL MEDICINE & REHABILITATION

## 2018-10-26 PROCEDURE — 6360000002 HC RX W HCPCS: Performed by: PHYSICAL MEDICINE & REHABILITATION

## 2018-10-26 PROCEDURE — 7100000010 HC PHASE II RECOVERY - FIRST 15 MIN: Performed by: PHYSICAL MEDICINE & REHABILITATION

## 2018-10-26 PROCEDURE — 2709999900 HC NON-CHARGEABLE SUPPLY: Performed by: PHYSICAL MEDICINE & REHABILITATION

## 2018-10-26 PROCEDURE — 2580000003 HC RX 258: Performed by: PHYSICAL MEDICINE & REHABILITATION

## 2018-10-26 PROCEDURE — 64483 NJX AA&/STRD TFRM EPI L/S 1: CPT | Performed by: PHYSICAL MEDICINE & REHABILITATION

## 2018-10-26 PROCEDURE — 64484 NJX AA&/STRD TFRM EPI L/S EA: CPT | Performed by: PHYSICAL MEDICINE & REHABILITATION

## 2018-10-26 PROCEDURE — 3600000057 HC PAIN LEVEL 4 ADDL 15 MIN: Performed by: PHYSICAL MEDICINE & REHABILITATION

## 2018-10-26 PROCEDURE — 7100000011 HC PHASE II RECOVERY - ADDTL 15 MIN: Performed by: PHYSICAL MEDICINE & REHABILITATION

## 2018-10-26 RX ORDER — BUPIVACAINE HYDROCHLORIDE 5 MG/ML
INJECTION, SOLUTION EPIDURAL; INTRACAUDAL PRN
Status: DISCONTINUED | OUTPATIENT
Start: 2018-10-26 | End: 2018-10-26 | Stop reason: HOSPADM

## 2018-10-26 RX ORDER — MIDAZOLAM HYDROCHLORIDE 1 MG/ML
INJECTION INTRAMUSCULAR; INTRAVENOUS PRN
Status: DISCONTINUED | OUTPATIENT
Start: 2018-10-26 | End: 2018-10-26 | Stop reason: HOSPADM

## 2018-10-26 RX ORDER — SODIUM CHLORIDE, SODIUM LACTATE, POTASSIUM CHLORIDE, CALCIUM CHLORIDE 600; 310; 30; 20 MG/100ML; MG/100ML; MG/100ML; MG/100ML
INJECTION, SOLUTION INTRAVENOUS CONTINUOUS
Status: DISCONTINUED | OUTPATIENT
Start: 2018-10-26 | End: 2018-10-26 | Stop reason: HOSPADM

## 2018-10-26 RX ORDER — 0.9 % SODIUM CHLORIDE 0.9 %
VIAL (ML) INJECTION PRN
Status: DISCONTINUED | OUTPATIENT
Start: 2018-10-26 | End: 2018-10-26 | Stop reason: HOSPADM

## 2018-10-26 RX ORDER — DEXAMETHASONE SODIUM PHOSPHATE 10 MG/ML
INJECTION INTRAMUSCULAR; INTRAVENOUS PRN
Status: DISCONTINUED | OUTPATIENT
Start: 2018-10-26 | End: 2018-10-26 | Stop reason: HOSPADM

## 2018-10-26 RX ORDER — FENTANYL CITRATE 50 UG/ML
INJECTION, SOLUTION INTRAMUSCULAR; INTRAVENOUS PRN
Status: DISCONTINUED | OUTPATIENT
Start: 2018-10-26 | End: 2018-10-26 | Stop reason: HOSPADM

## 2018-10-26 RX ADMIN — SODIUM CHLORIDE, POTASSIUM CHLORIDE, SODIUM LACTATE AND CALCIUM CHLORIDE: 600; 310; 30; 20 INJECTION, SOLUTION INTRAVENOUS at 12:13

## 2018-10-26 ASSESSMENT — PAIN DESCRIPTION - DESCRIPTORS: DESCRIPTORS: BURNING;SHARP

## 2018-10-26 ASSESSMENT — PAIN SCALES - GENERAL
PAINLEVEL_OUTOF10: 0

## 2018-10-26 ASSESSMENT — PAIN - FUNCTIONAL ASSESSMENT: PAIN_FUNCTIONAL_ASSESSMENT: 0-10

## 2018-10-26 NOTE — H&P
Cholecalciferol (VITAMIN D3) 3000 units TABS Take 3,000 Units by mouth 2 times daily 60 tablet 11    potassium chloride (MICRO-K) 10 MEQ extended release capsule TAKE 1 CAPSULE TWICE DAILY 180 capsule 3    furosemide (LASIX) 40 MG tablet TAKE 1 TABLET TWICE DAILY 180 tablet 3    ondansetron (ZOFRAN) 4 MG tablet Take 1 tablet by mouth every 8 hours as needed for Nausea or Vomiting 30 tablet 3    Compression Stockings MISC by Does not apply route 15-20mmHg  Knee high  Open tow  With assist device to help put them on 1 each 0    Zinc 50 MG TABS Take 50 mg by mouth daily        Diabetic Shoe MISC by Does not apply route 1 each 0    niacin 500 MG extended release capsule Take 500 mg by mouth daily        folic acid (FOLVITE) 876 MCG tablet Take 800 mcg by mouth daily        Cyanocobalamin (VITAMIN B12 PO) Take 2,500 mcg by mouth daily        B Complex-C (SUPER B COMPLEX PO) Take by mouth daily        travoprost, benzalkonium, (TRAVATAN) 0.004 % ophthalmic solution Place 1 drop into both eyes daily 3 Bottle 3    Insulin Syringe-Needle U-100 (INSULIN SYRINGE .5CC/31GX5/16\") 31G X 5/16\" 0.5 ML MISC 1 each by Does not apply route daily 200 each 3    insulin glargine (LANTUS) 100 UNIT/ML injection vial Inject 10 Units into the skin nightly 3 vial 3    CPAP Machine MISC by Does not apply route Pressure of 13 (AirSense 10)  [employer].        albuterol (PROVENTIL) (2.5 MG/3ML) 0.083% nebulizer solution Take 2.5 mg by nebulization every 6 hours as needed for Wheezing        ferrous sulfate 325 (65 FE) MG EC tablet Take 325 mg by mouth daily (with breakfast)        Multiple Vitamin (MULTI VITAMIN DAILY) TABS Take by mouth daily        nitroGLYCERIN (NITROSTAT) 0.4 MG SL tablet Place 0.4 mg under the tongue every 5 minutes as needed for Chest pain Dissolve 1 tab under tongue at first sign of chest pain. May repeat every 5 minutes until relief is obtained.  If pain persists after taking 3 tabs in a 15-minute period, or the pain is different than is typically experienced, call 9-1-1 immediately. Past Medical History        Past Medical History:   Diagnosis Date    CAD (coronary artery disease) 46    Cancer ([facility] [address] 1971     cervical    Coronary artery disease      Hx of Bell's palsy       Mild right facial paralysis    Mild dementia      Neuropathy      Sleep apnea 2014     sleep study done in [place]    Type 2 diabetes mellitus with hyperglycemia [facility])              Past Surgical History         Past Surgical History:   Procedure Laterality Date    APPENDECTOMY   1968    BARIATRIC SURGERY       COLONOSCOPY   2012    HYSTERECTOMY       INDUCED    1970    HI EXC SKIN BENIG 1.1-2CM FACE,FACIAL Left 2018     Excision Cyst Left Chest, Middle Back performed by Mame Plata [facility] at [facility]            Family History         Family History   Problem Relation Age of Onset    Heart Disease Mother      High Blood Pressure Mother      Stroke Mother      Glaucoma Mother      Early Death Father      Cancer Father           stomach    Miscarriages / Stillbirths Maternal Uncle              Social History   Social History            Social History    Marital status:        Spouse name: N/A    Number of children: N/A    Years of education: N/A             Social History Main Topics    Smoking status: Former Smoker       Packs/day: 1.00       Years: 35.00       Quit date: 2001    Smokeless tobacco: Never Used    Alcohol use Yes         Comment: occasional beer with food    Drug use: No    Sexual activity: No           Other Topics Concern    Not on file          Social History Narrative    No narrative on file         Review of Systems   Constitutional: Positive for activity change. Gastrointestinal: Positive for abdominal pain. Musculoskeletal: Positive for arthralgias, back pain and myalgias.    Psychiatric/Behavioral: Positive

## 2018-10-31 ENCOUNTER — OFFICE VISIT (OUTPATIENT)
Dept: PULMONOLOGY | Age: 68
End: 2018-10-31
Payer: MEDICARE

## 2018-10-31 VITALS
WEIGHT: 265 LBS | HEART RATE: 57 BPM | BODY MASS INDEX: 45.24 KG/M2 | HEIGHT: 64 IN | OXYGEN SATURATION: 96 % | SYSTOLIC BLOOD PRESSURE: 110 MMHG | DIASTOLIC BLOOD PRESSURE: 58 MMHG | RESPIRATION RATE: 16 BRPM

## 2018-10-31 DIAGNOSIS — J47.9 BRONCHIECTASIS WITHOUT COMPLICATION (HCC): ICD-10-CM

## 2018-10-31 DIAGNOSIS — G47.33 OBSTRUCTIVE SLEEP APNEA SYNDROME, MILD: Primary | ICD-10-CM

## 2018-10-31 DIAGNOSIS — E66.01 MORBID OBESITY WITH BMI OF 45.0-49.9, ADULT (HCC): ICD-10-CM

## 2018-10-31 PROCEDURE — G8598 ASA/ANTIPLAT THER USED: HCPCS | Performed by: INTERNAL MEDICINE

## 2018-10-31 PROCEDURE — 3017F COLORECTAL CA SCREEN DOC REV: CPT | Performed by: INTERNAL MEDICINE

## 2018-10-31 PROCEDURE — G8417 CALC BMI ABV UP PARAM F/U: HCPCS | Performed by: INTERNAL MEDICINE

## 2018-10-31 PROCEDURE — 1090F PRES/ABSN URINE INCON ASSESS: CPT | Performed by: INTERNAL MEDICINE

## 2018-10-31 PROCEDURE — G8482 FLU IMMUNIZE ORDER/ADMIN: HCPCS | Performed by: INTERNAL MEDICINE

## 2018-10-31 PROCEDURE — 3288F FALL RISK ASSESSMENT DOCD: CPT | Performed by: INTERNAL MEDICINE

## 2018-10-31 PROCEDURE — 1123F ACP DISCUSS/DSCN MKR DOCD: CPT | Performed by: INTERNAL MEDICINE

## 2018-10-31 PROCEDURE — 1100F PTFALLS ASSESS-DOCD GE2>/YR: CPT | Performed by: INTERNAL MEDICINE

## 2018-10-31 PROCEDURE — G8427 DOCREV CUR MEDS BY ELIG CLIN: HCPCS | Performed by: INTERNAL MEDICINE

## 2018-10-31 PROCEDURE — 4040F PNEUMOC VAC/ADMIN/RCVD: CPT | Performed by: INTERNAL MEDICINE

## 2018-10-31 PROCEDURE — 1036F TOBACCO NON-USER: CPT | Performed by: INTERNAL MEDICINE

## 2018-10-31 PROCEDURE — G8400 PT W/DXA NO RESULTS DOC: HCPCS | Performed by: INTERNAL MEDICINE

## 2018-10-31 PROCEDURE — 99214 OFFICE O/P EST MOD 30 MIN: CPT | Performed by: INTERNAL MEDICINE

## 2018-10-31 ASSESSMENT — SLEEP AND FATIGUE QUESTIONNAIRES
ESS TOTAL SCORE: 7
HOW LIKELY ARE YOU TO NOD OFF OR FALL ASLEEP WHILE LYING DOWN TO REST IN THE AFTERNOON WHEN CIRCUMSTANCES PERMIT: 2
HOW LIKELY ARE YOU TO NOD OFF OR FALL ASLEEP WHILE SITTING AND TALKING TO SOMEONE: 0
HOW LIKELY ARE YOU TO NOD OFF OR FALL ASLEEP IN A CAR, WHILE STOPPED FOR A FEW MINUTES IN TRAFFIC: 0
HOW LIKELY ARE YOU TO NOD OFF OR FALL ASLEEP WHEN YOU ARE A PASSENGER IN A CAR FOR AN HOUR WITHOUT A BREAK: 0
HOW LIKELY ARE YOU TO NOD OFF OR FALL ASLEEP WHILE SITTING AND READING: 0
HOW LIKELY ARE YOU TO NOD OFF OR FALL ASLEEP WHILE WATCHING TV: 2
HOW LIKELY ARE YOU TO NOD OFF OR FALL ASLEEP WHILE SITTING QUIETLY AFTER LUNCH WITHOUT ALCOHOL: 3
HOW LIKELY ARE YOU TO NOD OFF OR FALL ASLEEP WHILE SITTING INACTIVE IN A PUBLIC PLACE: 0

## 2018-10-31 NOTE — PROGRESS NOTES
REASON FOR follow up   Bronchiectasis, susan    HISTORY OF PRESENT ILLNESS:    Joel Riedr is a 77y.o. year old female   Brooke Fahad is compliant with her CPAP. She does not have any problems with leaking sinus congestion. She is using it 8 hours and 51 minutes, however, feels fatigued and tired throughout the day and easily goes to sleep. She scheduled for a repeat titration sleep study  Sleep Medicine 10/31/2018 1/31/2018   Sitting and reading 0 1   Watching TV 2 2   Sitting, inactive in a public place (e.g. a theatre or a meeting) 0 1   As a passenger in a car for an hour without a break 0 0   Lying down to rest in the afternoon when circumstances permit 2 2   Sitting and talking to someone 0 0   Sitting quietly after a lunch without alcohol 3 1   In a car, while stopped for a few minutes in traffic 0 0   Total score 7 7      LUNG CANCER SCREENING     1. CRITERIA MET    []     CT ORDERED  []      2. CRITERIA NOT MET   [x]      3. REFUSED                    []        REASON CRITERIA NOT MET     1. SMOKING LESS THAN 30 PY  []      2. AGE LESS THAN 55 or GREATER 77 YEARS  []      3. QUIT SMOKING 15 YEARS OR GREATER   [x]      4. RECENT CT WITH IN 11 MONTHS    []      5. LIFE EXPECTANCY < 5 YEARS   []      6.  SIGNS  AND SYMPTOMS OF LUNG CANCER   []             PAST MEDICAL HISTORY:       Diagnosis Date    CAD (coronary artery disease) 1989    Cancer ([facility] 1971    cervical    Coronary artery disease     Hx of Bell's palsy     Mild right facial paralysis    Mild dementia     Neuropathy     Sleep apnea 11/07/2014    sleep study done in [place]    Type 2 diabetes mellitus with hyperglycemia ([facility]          Family History:   Family History   Problem Relation Age of Onset    Heart Disease Mother     High Blood Pressure Mother     Stroke Mother     Glaucoma Mother     Early Death Father     Cancer Father         stomach    Miscarriages / Stillbirths Maternal Uncle        SURGICAL HISTORY:   Past Surgical by mouth every 7 days 10/15/18  Yes Saturnino Colon DO   ibuprofen (ADVIL;MOTRIN) 400 MG tablet TAKE 1 TABLET EVERY 6 HOURS AS NEEDED FOR PAIN 10/10/18  Yes Aniceto Almonte MD   triamcinolone (KENALOG) 0.1 % cream APPLY TOPICALLY TWICE DAILY 9/18/18  Yes Aniceto Almonte MD   clopidogrel (PLAVIX) 75 MG tablet TAKE 1 TABLET BY MOUTH EVERY DAY 9/18/18  Yes Lorrie Carrero MD   B-D ULTRAFINE III SHORT PEN 31G X 8 MM MISC USE TWICE DAILY 9/12/18  Yes Aniceto Almonte MD   NOVOLOG FLEXPEN 100 UNIT/ML injection pen INJECT 4 UNITS AT LUNCH AND  6 UNITS AT SUPPER [facility] PEN EXPIRES 28 DAYS AFTER 1ST USE) 9/12/18  Yes Aniceto Almonte MD   pantoprazole (PROTONIX) 40 MG tablet TAKE 1 TABLET EVERY DAY 9/12/18  Yes Aniceto Almonte MD   fluticasone (FLONASE) 50 MCG/ACT nasal spray USE 2 SPRAYS NASALLY EVERY DAY 9/12/18  Yes Aniceto Almonte MD   lisinopril (PRINIVIL;ZESTRIL) 10 MG tablet TAKE 1 TABLET EVERY DAY 9/12/18  Yes Aniceto Almonte MD   amLODIPine (NORVASC) 5 MG tablet TAKE 1 TABLET EVERY DAY 9/12/18  Yes Aniceto Almonte MD   citalopram (CELEXA) 10 MG tablet TAKE 1 TABLET EVERY DAY 9/12/18  Yes Aniceto Almonte MD   loratadine (CLARITIN) 10 MG tablet Take 10 mg by mouth daily as needed   Yes Historical Provider, MD   memantine (NAMENDA) 10 MG tablet Take 1 tablet by mouth 2 times daily 7/23/18  Yes Lorrie Carrero MD   nystatin (MYCOSTATIN) 850423 UNIT/GM powder Apply 3 times daily.  7/11/18  Yes Jacque Jones APRN - CNP   VITAMIN A PO Take 2,400 mcg by mouth daily   Yes Historical Provider, MD   vitamin C (ASCORBIC ACID) 500 MG tablet TAKE 1 TABLET BY MOUTH TWICE DAILY 5/30/18  Yes Aniceto Almonte MD   MAGNESIUM-OXIDE 400 (241.3 Mg) MG TABS tablet TAKE 1 TABLET BY MOUTH DAILY 5/30/18  Yes Aniceto Almonte MD   buPROPion (WELLBUTRIN) 75 MG tablet TAKE 1 TABLET TWICE DAILY 5/30/18  Yes Aniceto Almonte MD   amitriptyline (ELAVIL) 25 MG tablet take 1 tablet by mouth at bedtime 5/1/18 500 MG extended release capsule Take 500 mg by mouth daily   Yes Historical Provider, MD   folic acid (FOLVITE) 150 MCG tablet Take 800 mcg by mouth daily   Yes Historical Provider, MD   Cyanocobalamin (VITAMIN B12 PO) Take 2,500 mcg by mouth daily   Yes Historical Provider, MD   B Complex-C (SUPER B COMPLEX PO) Take by mouth daily   Yes Historical Provider, MD   travoprost, benzalkonium, (TRAVATAN) 0.004 % ophthalmic solution Place 1 drop into both eyes daily 10/7/16  Yes Arlyn Calero MD   Insulin Syringe-Needle U-100 (INSULIN SYRINGE .5CC/31GX5/16\") 31G X 5/16\" 0.5 ML MISC 1 each by Does not apply route daily 10/7/16  Yes Arlyn Calero MD   insulin glargine (LANTUS) 100 UNIT/ML injection vial Inject 10 Units into the skin nightly 10/7/16  Yes Arlyn Calero MD   CPAP Machine MISC by Does not apply route Pressure of 13 (AirSense 10)  [employer].   Yes Historical Provider, MD   albuterol (PROVENTIL) (2.5 MG/3ML) 0.083% nebulizer solution Take 2.5 mg by nebulization every 6 hours as needed for Wheezing   Yes Historical Provider, MD   ferrous sulfate 325 (65 FE) MG EC tablet Take 325 mg by mouth daily (with breakfast)   Yes Historical Provider, MD   Multiple Vitamin (MULTI VITAMIN DAILY) TABS Take by mouth daily   Yes Historical Provider, MD   nitroGLYCERIN (NITROSTAT) 0.4 MG SL tablet Place 0.4 mg under the tongue every 5 minutes as needed for Chest pain Dissolve 1 tab under tongue at first sign of chest pain. May repeat every 5 minutes until relief is obtained. If pain persists after taking 3 tabs in a 15-minute period, or the pain is different than is typically experienced, call 9-1-1 immediately. Yes Historical Provider, MD   gabapentin (NEURONTIN) 300 MG capsule TAKE 2 CAPSULES TWICE DAILY.  9/15/18 9/22/18  Erinn Chiang, DO      Review of Systems -  General ROS: negative for - chills, fatigue, fever or weight loss  ENT ROS: negative for - headaches, oral lesions or sore throat  Cardiovascular ROS: Diagnosis Orders   1. Obstructive sleep apnea syndrome, mild AHI - 9 on cpap 8 cwp      2. Bronchiectasis without complication ([facility]     3. Morbid obesity with BMI of 45.0-49.9, adult ([facility]          :                PLAN:      Patient is compliant with her CPAP. However, she feels fatigued and tired throughout the day and has no energy, so her neurologist has ordered a repeat titration study. Patient is scheduled on 13 November and we'll follow her up in December. Encourage weight loss  Continue Spiriva      I hope this updates you on my evaluation and clinical thinking. Thank you for allowing me to participate in his care.      Sincerely,      Electronically signed by Mert Rahman MD on 10/31/2018 at 10:55 AM

## 2018-11-05 ENCOUNTER — OFFICE VISIT (OUTPATIENT)
Dept: CARDIOLOGY | Age: 68
End: 2018-11-05
Payer: MEDICARE

## 2018-11-05 VITALS
HEART RATE: 55 BPM | BODY MASS INDEX: 45.52 KG/M2 | WEIGHT: 266.6 LBS | SYSTOLIC BLOOD PRESSURE: 110 MMHG | HEIGHT: 64 IN | DIASTOLIC BLOOD PRESSURE: 64 MMHG

## 2018-11-05 DIAGNOSIS — G47.33 OBSTRUCTIVE SLEEP APNEA: ICD-10-CM

## 2018-11-05 DIAGNOSIS — I67.82 CHRONIC CEREBRAL ISCHEMIA: ICD-10-CM

## 2018-11-05 DIAGNOSIS — I25.10 CORONARY ARTERY DISEASE DUE TO LIPID RICH PLAQUE: Primary | ICD-10-CM

## 2018-11-05 DIAGNOSIS — I25.83 CORONARY ARTERY DISEASE DUE TO LIPID RICH PLAQUE: Primary | ICD-10-CM

## 2018-11-05 PROCEDURE — G8417 CALC BMI ABV UP PARAM F/U: HCPCS | Performed by: INTERNAL MEDICINE

## 2018-11-05 PROCEDURE — 4040F PNEUMOC VAC/ADMIN/RCVD: CPT | Performed by: INTERNAL MEDICINE

## 2018-11-05 PROCEDURE — 1123F ACP DISCUSS/DSCN MKR DOCD: CPT | Performed by: INTERNAL MEDICINE

## 2018-11-05 PROCEDURE — 93000 ELECTROCARDIOGRAM COMPLETE: CPT | Performed by: INTERNAL MEDICINE

## 2018-11-05 PROCEDURE — 1090F PRES/ABSN URINE INCON ASSESS: CPT | Performed by: INTERNAL MEDICINE

## 2018-11-05 PROCEDURE — 3288F FALL RISK ASSESSMENT DOCD: CPT | Performed by: INTERNAL MEDICINE

## 2018-11-05 PROCEDURE — 1036F TOBACCO NON-USER: CPT | Performed by: INTERNAL MEDICINE

## 2018-11-05 PROCEDURE — G8482 FLU IMMUNIZE ORDER/ADMIN: HCPCS | Performed by: INTERNAL MEDICINE

## 2018-11-05 PROCEDURE — 3017F COLORECTAL CA SCREEN DOC REV: CPT | Performed by: INTERNAL MEDICINE

## 2018-11-05 PROCEDURE — 1100F PTFALLS ASSESS-DOCD GE2>/YR: CPT | Performed by: INTERNAL MEDICINE

## 2018-11-05 PROCEDURE — G8400 PT W/DXA NO RESULTS DOC: HCPCS | Performed by: INTERNAL MEDICINE

## 2018-11-05 PROCEDURE — G8427 DOCREV CUR MEDS BY ELIG CLIN: HCPCS | Performed by: INTERNAL MEDICINE

## 2018-11-05 PROCEDURE — G8598 ASA/ANTIPLAT THER USED: HCPCS | Performed by: INTERNAL MEDICINE

## 2018-11-05 PROCEDURE — 99213 OFFICE O/P EST LOW 20 MIN: CPT | Performed by: INTERNAL MEDICINE

## 2018-11-05 RX ORDER — ATORVASTATIN CALCIUM 40 MG/1
40 TABLET, FILM COATED ORAL DAILY
Qty: 90 TABLET | Refills: 2 | Status: SHIPPED | OUTPATIENT
Start: 2018-11-05 | End: 2019-01-28 | Stop reason: SDUPTHER

## 2018-11-05 NOTE — PROGRESS NOTES
mouth daily, Disp: , Rfl:     Diabetic Shoe MISC, by Does not apply route, Disp: 1 each, Rfl: 0    niacin 500 MG extended release capsule, Take 500 mg by mouth daily, Disp: , Rfl:     folic acid (FOLVITE) 303 MCG tablet, Take 800 mcg by mouth daily, Disp: , Rfl:     Cyanocobalamin (VITAMIN B12 PO), Take 2,500 mcg by mouth daily, Disp: , Rfl:     B Complex-C (SUPER B COMPLEX PO), Take by mouth daily, Disp: , Rfl:     travoprost, benzalkonium, (TRAVATAN) 0.004 % ophthalmic solution, Place 1 drop into both eyes daily, Disp: 3 Bottle, Rfl: 3    Insulin Syringe-Needle U-100 (INSULIN SYRINGE .5CC/31GX5/16\") 31G X 5/16\" 0.5 ML MISC, 1 each by Does not apply route daily, Disp: 200 each, Rfl: 3    insulin glargine (LANTUS) 100 UNIT/ML injection vial, Inject 10 Units into the skin nightly, Disp: 3 vial, Rfl: 3    CPAP Machine MISC, by Does not apply route Pressure of 13 (AirSense 10) [employer] Disp: , Rfl:     albuterol (PROVENTIL) (2.5 MG/3ML) 0.083% nebulizer solution, Take 2.5 mg by nebulization every 6 hours as needed for Wheezing, Disp: , Rfl:     ferrous sulfate 325 (65 FE) MG EC tablet, Take 325 mg by mouth daily (with breakfast), Disp: , Rfl:     Multiple Vitamin (MULTI VITAMIN DAILY) TABS, Take by mouth daily, Disp: , Rfl:     nitroGLYCERIN (NITROSTAT) 0.4 MG SL tablet, Place 0.4 mg under the tongue every 5 minutes as needed for Chest pain Dissolve 1 tab under tongue at first sign of chest pain. May repeat every 5 minutes until relief is obtained. If pain persists after taking 3 tabs in a 15-minute period, or the pain is different than is typically experienced, call 9-1-1 immediately. , Disp: , Rfl:     gabapentin (NEURONTIN) 300 MG capsule, TAKE 2 CAPSULES TWICE DAILY. , Disp: 28 capsule, Rfl: 0    LAST CATH 6/15- minimal non obstructive CAD,  mRCA 5%, EF 65%     Holter 1/9/18- 7 beat run of PAT     TTE 1/9/18  CONCLUSIONS:  1. Normal LV size and function with ejection fraction of 65%.   2.  Grade 1

## 2018-11-09 ENCOUNTER — OFFICE VISIT (OUTPATIENT)
Dept: PAIN MANAGEMENT | Age: 68
End: 2018-11-09
Payer: MEDICARE

## 2018-11-09 VITALS
WEIGHT: 265 LBS | BODY MASS INDEX: 45.24 KG/M2 | HEART RATE: 60 BPM | SYSTOLIC BLOOD PRESSURE: 110 MMHG | DIASTOLIC BLOOD PRESSURE: 60 MMHG | HEIGHT: 64 IN

## 2018-11-09 DIAGNOSIS — M54.41 CHRONIC RIGHT-SIDED LOW BACK PAIN WITH RIGHT-SIDED SCIATICA: ICD-10-CM

## 2018-11-09 DIAGNOSIS — M54.41 CHRONIC RIGHT-SIDED LOW BACK PAIN WITH RIGHT-SIDED SCIATICA: Primary | ICD-10-CM

## 2018-11-09 DIAGNOSIS — I25.10 CORONARY ARTERY DISEASE DUE TO LIPID RICH PLAQUE: Primary | ICD-10-CM

## 2018-11-09 DIAGNOSIS — I25.83 CORONARY ARTERY DISEASE DUE TO LIPID RICH PLAQUE: Primary | ICD-10-CM

## 2018-11-09 DIAGNOSIS — G89.29 ENCOUNTER FOR CHRONIC PAIN MANAGEMENT: ICD-10-CM

## 2018-11-09 DIAGNOSIS — G89.29 CHRONIC RIGHT-SIDED LOW BACK PAIN WITH RIGHT-SIDED SCIATICA: Primary | ICD-10-CM

## 2018-11-09 DIAGNOSIS — G89.29 CHRONIC RIGHT-SIDED LOW BACK PAIN WITH RIGHT-SIDED SCIATICA: ICD-10-CM

## 2018-11-09 DIAGNOSIS — M54.16 LUMBAR RADICULOPATHY: Primary | ICD-10-CM

## 2018-11-09 PROCEDURE — 4040F PNEUMOC VAC/ADMIN/RCVD: CPT | Performed by: NURSE PRACTITIONER

## 2018-11-09 PROCEDURE — G8482 FLU IMMUNIZE ORDER/ADMIN: HCPCS | Performed by: NURSE PRACTITIONER

## 2018-11-09 PROCEDURE — 1123F ACP DISCUSS/DSCN MKR DOCD: CPT | Performed by: NURSE PRACTITIONER

## 2018-11-09 PROCEDURE — G8427 DOCREV CUR MEDS BY ELIG CLIN: HCPCS | Performed by: NURSE PRACTITIONER

## 2018-11-09 PROCEDURE — 3017F COLORECTAL CA SCREEN DOC REV: CPT | Performed by: NURSE PRACTITIONER

## 2018-11-09 PROCEDURE — 1090F PRES/ABSN URINE INCON ASSESS: CPT | Performed by: NURSE PRACTITIONER

## 2018-11-09 PROCEDURE — 99214 OFFICE O/P EST MOD 30 MIN: CPT | Performed by: NURSE PRACTITIONER

## 2018-11-09 PROCEDURE — 1036F TOBACCO NON-USER: CPT | Performed by: NURSE PRACTITIONER

## 2018-11-09 PROCEDURE — G8598 ASA/ANTIPLAT THER USED: HCPCS | Performed by: NURSE PRACTITIONER

## 2018-11-09 PROCEDURE — 1100F PTFALLS ASSESS-DOCD GE2>/YR: CPT | Performed by: NURSE PRACTITIONER

## 2018-11-09 PROCEDURE — G8400 PT W/DXA NO RESULTS DOC: HCPCS | Performed by: NURSE PRACTITIONER

## 2018-11-09 PROCEDURE — G8417 CALC BMI ABV UP PARAM F/U: HCPCS | Performed by: NURSE PRACTITIONER

## 2018-11-09 PROCEDURE — 3288F FALL RISK ASSESSMENT DOCD: CPT | Performed by: NURSE PRACTITIONER

## 2018-11-09 ASSESSMENT — ENCOUNTER SYMPTOMS
BACK PAIN: 1
RESPIRATORY NEGATIVE: 1
ABDOMINAL PAIN: 1
EYES NEGATIVE: 1

## 2018-11-09 NOTE — PROGRESS NOTES
Subjective:      Patient ID: Espinoza Rush is a 77 y.o. female. Chief Complaint   Patient presents with    Back Pain     follow up post TFE     Back Pain   Associated symptoms include abdominal pain. Follow up after second lumbar TFE, very pleased with the results. Pain well controlled with epidurals and motrin prn. No complaints. Pain Assessment  Location of Pain: Back  Severity of Pain: 7  Quality of Pain: Dull  Duration of Pain: Persistent  Frequency of Pain: Intermittent  Aggravating Factors: Standing, Walking  Limiting Behavior: Yes  Relieving Factors: Rest, Nsaids (elevation and stretching)  Are there other pain locations you wish to document?: No    No Known Allergies    Outpatient Prescriptions Marked as Taking for the 11/9/18 encounter (Office Visit) with DAVID Caban CNP   Medication Sig Dispense Refill    atorvastatin (LIPITOR) 40 MG tablet Take 1 tablet by mouth daily 90 tablet 2    alendronate (FOSAMAX) 70 MG tablet Take 1 tablet by mouth every 7 days 12 tablet 3    ibuprofen (ADVIL;MOTRIN) 400 MG tablet TAKE 1 TABLET EVERY 6 HOURS AS NEEDED FOR PAIN 360 tablet 3    triamcinolone (KENALOG) 0.1 % cream APPLY TOPICALLY TWICE DAILY 80 g 5    clopidogrel (PLAVIX) 75 MG tablet TAKE 1 TABLET BY MOUTH EVERY DAY 90 tablet 0    gabapentin (NEURONTIN) 300 MG capsule TAKE 2 CAPSULES TWICE DAILY.  28 capsule 0    B-D ULTRAFINE III SHORT PEN 31G X 8 MM MISC USE TWICE DAILY 180 each 3    NOVOLOG FLEXPEN 100 UNIT/ML injection pen INJECT 4 UNITS AT LUNCH AND  6 UNITS AT SUPPER (EACH PEN EXPIRES 28 DAYS AFTER 1ST USE) 15 mL 3    pantoprazole (PROTONIX) 40 MG tablet TAKE 1 TABLET EVERY DAY 90 tablet 3    fluticasone (FLONASE) 50 MCG/ACT nasal spray USE 2 SPRAYS NASALLY EVERY DAY 48 g 3    lisinopril (PRINIVIL;ZESTRIL) 10 MG tablet TAKE 1 TABLET EVERY DAY 90 tablet 3    amLODIPine (NORVASC) 5 MG tablet TAKE 1 TABLET EVERY DAY 90 tablet 3    citalopram (CELEXA) 10 MG tablet TAKE 1 TABLET Vomiting 30 tablet 3    Compression Stockings MISC by Does not apply route 15-20mmHg  Knee high  Open tow  With assist device to help put them on 1 each 0    Zinc 50 MG TABS Take 50 mg by mouth daily      Diabetic Shoe MISC by Does not apply route 1 each 0    niacin 500 MG extended release capsule Take 500 mg by mouth daily      folic acid (FOLVITE) 209 MCG tablet Take 800 mcg by mouth daily      Cyanocobalamin (VITAMIN B12 PO) Take 2,500 mcg by mouth daily      B Complex-C (SUPER B COMPLEX PO) Take by mouth daily      travoprost, benzalkonium, (TRAVATAN) 0.004 % ophthalmic solution Place 1 drop into both eyes daily 3 Bottle 3    Insulin Syringe-Needle U-100 (INSULIN SYRINGE .5CC/31GX5/16\") 31G X 5/16\" 0.5 ML MISC 1 each by Does not apply route daily 200 each 3    insulin glargine (LANTUS) 100 UNIT/ML injection vial Inject 10 Units into the skin nightly 3 vial 3    CPAP Machine MISC by Does not apply route Pressure of 13 (AirSense 10)  [employer]      albuterol (PROVENTIL) (2.5 MG/3ML) 0.083% nebulizer solution Take 2.5 mg by nebulization every 6 hours as needed for Wheezing      ferrous sulfate 325 (65 FE) MG EC tablet Take 325 mg by mouth daily (with breakfast)      Multiple Vitamin (MULTI VITAMIN DAILY) TABS Take by mouth daily      nitroGLYCERIN (NITROSTAT) 0.4 MG SL tablet Place 0.4 mg under the tongue every 5 minutes as needed for Chest pain Dissolve 1 tab under tongue at first sign of chest pain. May repeat every 5 minutes until relief is obtained. If pain persists after taking 3 tabs in a 15-minute period, or the pain is different than is typically experienced, call 9-1-1 immediately.          Past Medical History:   Diagnosis Date    CAD (coronary artery disease) 46    Cancer ([facility] [address] 1971    cervical    Coronary artery disease     Hx of Bell's palsy     Mild right facial paralysis    Mild dementia     Neuropathy     Sleep apnea 11/07/2014    sleep study done in [place]    Type 2

## 2018-11-12 RX ORDER — GABAPENTIN 300 MG/1
CAPSULE ORAL
Qty: 360 CAPSULE | Refills: 0 | Status: SHIPPED | OUTPATIENT
Start: 2018-11-12 | End: 2019-03-15 | Stop reason: SDUPTHER

## 2018-11-13 ENCOUNTER — HOSPITAL ENCOUNTER (OUTPATIENT)
Dept: SLEEP CENTER | Age: 68
Discharge: HOME | End: 2018-11-15
Payer: MEDICARE

## 2018-11-13 LAB — GLUCOSE BLD-MCNC: 140 MG/DL (ref 65–105)

## 2018-11-13 PROCEDURE — 82947 ASSAY GLUCOSE BLOOD QUANT: CPT

## 2018-11-13 PROCEDURE — 95811 POLYSOM 6/>YRS CPAP 4/> PARM: CPT

## 2018-11-15 RX ORDER — POTASSIUM CHLORIDE 750 MG/1
CAPSULE, EXTENDED RELEASE ORAL
Qty: 180 CAPSULE | Refills: 3 | Status: SHIPPED | OUTPATIENT
Start: 2018-11-15 | End: 2019-01-28 | Stop reason: SDUPTHER

## 2018-11-16 RX ORDER — SENNOSIDES 8.6 MG
TABLET ORAL
Qty: 180 TABLET | Refills: 3 | Status: SHIPPED | OUTPATIENT
Start: 2018-11-16 | End: 2019-12-04 | Stop reason: SDUPTHER

## 2018-11-16 RX ORDER — POTASSIUM CHLORIDE 750 MG/1
CAPSULE, EXTENDED RELEASE ORAL
Qty: 180 CAPSULE | Refills: 3 | OUTPATIENT
Start: 2018-11-16

## 2018-11-19 ENCOUNTER — HOSPITAL ENCOUNTER (OUTPATIENT)
Dept: MAMMOGRAPHY | Age: 68
Discharge: HOME | End: 2018-11-21
Payer: MEDICARE

## 2018-11-19 DIAGNOSIS — Z12.31 VISIT FOR SCREENING MAMMOGRAM: ICD-10-CM

## 2018-11-19 PROCEDURE — 77063 BREAST TOMOSYNTHESIS BI: CPT

## 2018-11-20 NOTE — PROGRESS NOTES
patient is referred  for CPAP re-titration. SLEEP PARAMETERS:  Overall measures (minutes)    Sleep Onset:  10:37. Wake up time:  5:55 a.m. Time recording time:  438 minutes  Total sleep time:  303 minutes    Sleep Efficiency:  69%. Sleep Latency:  13.5. Staging Measures:   Percent:  Stage 1:  12%  Stage 2:  83%  Stage 3:  1%  REM: 4%    The patient has 1 REM period with REM latency of 381 minutes. This is CPAP titration study. The patient was titrated with various CPAP pressures starting at 4 cm of  H2O with gradual upward increments. At final CPAP pressure of 13 cm of H2O, the patient's respiratory events  were resolved with a mean O2 saturation of more than 94%. There is increased leg movement index of 29.5 per hour. No REM behavioral abnormalities noted. The patient has a mean cardiac rate of 45 per minute. No cardiac  arrhythmia noted. The patient slept on supine position during the current study. The patient tolerated the procedure well and indicated that she is more  alert during the post-test questionnaire. IMPRESSION:    1. Final CPAP pressure of 13 cm of H2O was effective controlling  respiratory events causing obstructive sleep apnea while maintaining the  mean O2 saturation more than 94%. 2.  The patient tolerated the procedure well with good post-test  subjective evaluation. 3.  Increased leg movements during the current CPAP titration study. RECOMMENDATIONS:    1. Weight loss. 2.  To avoid sleep in supine position. 3.  Home usage of CPAP at 13 cm of H2O.    4.  Clinical followup.         Colt Somers MD    D: 25/85/6864 17:38:15       T: 11/20/2018 11:39:55     [id number]  Job#: [id number]     Doc#: [id number]    CC:  Verified Person

## 2018-11-29 ENCOUNTER — TELEPHONE (OUTPATIENT)
Dept: PAIN MANAGEMENT | Age: 68
End: 2018-11-29

## 2018-11-29 ENCOUNTER — OFFICE VISIT (OUTPATIENT)
Dept: VASCULAR SURGERY | Age: 68
End: 2018-11-29
Payer: MEDICARE

## 2018-11-29 VITALS
BODY MASS INDEX: 46.2 KG/M2 | SYSTOLIC BLOOD PRESSURE: 118 MMHG | OXYGEN SATURATION: 95 % | HEART RATE: 60 BPM | DIASTOLIC BLOOD PRESSURE: 78 MMHG | WEIGHT: 270.6 LBS | HEIGHT: 64 IN

## 2018-11-29 DIAGNOSIS — I65.23 CAROTID STENOSIS, ASYMPTOMATIC, BILATERAL: Primary | ICD-10-CM

## 2018-11-29 PROCEDURE — G8417 CALC BMI ABV UP PARAM F/U: HCPCS | Performed by: SURGERY

## 2018-11-29 PROCEDURE — 3017F COLORECTAL CA SCREEN DOC REV: CPT | Performed by: SURGERY

## 2018-11-29 PROCEDURE — 3288F FALL RISK ASSESSMENT DOCD: CPT | Performed by: SURGERY

## 2018-11-29 PROCEDURE — 99203 OFFICE O/P NEW LOW 30 MIN: CPT | Performed by: SURGERY

## 2018-11-29 PROCEDURE — G8482 FLU IMMUNIZE ORDER/ADMIN: HCPCS | Performed by: SURGERY

## 2018-11-29 PROCEDURE — G8427 DOCREV CUR MEDS BY ELIG CLIN: HCPCS | Performed by: SURGERY

## 2018-11-29 PROCEDURE — 1100F PTFALLS ASSESS-DOCD GE2>/YR: CPT | Performed by: SURGERY

## 2018-11-29 PROCEDURE — 1123F ACP DISCUSS/DSCN MKR DOCD: CPT | Performed by: SURGERY

## 2018-11-29 PROCEDURE — G8598 ASA/ANTIPLAT THER USED: HCPCS | Performed by: SURGERY

## 2018-11-29 PROCEDURE — 1090F PRES/ABSN URINE INCON ASSESS: CPT | Performed by: SURGERY

## 2018-11-29 PROCEDURE — 1036F TOBACCO NON-USER: CPT | Performed by: SURGERY

## 2018-11-29 PROCEDURE — 4040F PNEUMOC VAC/ADMIN/RCVD: CPT | Performed by: SURGERY

## 2018-11-29 PROCEDURE — G8400 PT W/DXA NO RESULTS DOC: HCPCS | Performed by: SURGERY

## 2018-11-29 NOTE — PROGRESS NOTES
[address] [facility]  [facility]  [address] [address] [address] [facility]  1950  77 y. o.female       Chief Complaint:  Chief Complaint   Patient presents with    Other     Coronary artery disease       History of present Illness:  Pt is here today for evaluation and treatment of Bilateral carotid stenosis. Cirrhosis 42-year-old female who had a workup for neck pain and a history of Bell's palsy which included a carotid duplex. This showed less than 50% stenosis bilaterally. She was concerned that she was at significant risk for stroke and I help reassure her that her risk of stroke with this degree of stenosis is far less than 1%. She does have a history of coronary artery disease with a cardiac stent. She is a nonsmoker. He does currently take Lipitor. We discussed the symptoms of stroke which include slurred speech, hemiparesis, as well as monocular blindness. She has not had any of these episodes. If this should occur she should go to the emergency department. Past Medical History:  has a past medical history of CAD (coronary artery disease); Cancer ([address] Coronary artery disease; Hx of Bell's palsy; Mild dementia; Neuropathy; Sleep apnea; and Type 2 diabetes mellitus with hyperglycemia ([address]     Past Surgical History:   Past Surgical History:   Procedure Laterality Date    APPENDECTOMY  1968    BARIATRIC SURGERY      COLONOSCOPY  2012    HYSTERECTOMY  1987    INDUCED   1970    [place] EXC SKIN BENIG 1.1-2CM FACE,FACIAL Left 2018    Excision Cyst Left Chest, Middle Back performed by Magda Aguiar MD at [address] [place] [address] ANES/STEROID FORAMEN LUMBAR/SACRAL [address] [place] ADD LEVEL Right 10/5/2018    Right L2 & L3 TRANSFORAMINAL performed by Fidelia Guerrero MD at [address] [place] [address] ANES/STEROID FORAMEN LUMBAR/SACRAL [address] [place] ADD LEVEL Right 10/26/2018    Right L2 L3 TRANSFORAMINAL performed by Alxeander Morelos MD at [address] [facility] History:  reports that she quit smoking about 17 years ago. She has a 35.00 pack-year smoking history. She has never used smokeless tobacco. She reports that she drinks alcohol. She reports that she does not use drugs. Family History: family history includes Cancer in her father; Early Death in her father; Glaucoma in her mother; Heart Disease in her mother; High Blood Pressure in her mother; Jed Durbin / Anrdew Bowers in her maternal uncle; Stroke in her mother. Review of Systems:   Constitutional:  negative for  fevers, chills, sweats, fatigue, and weight loss  HEENT:  negative for vision or hearing changes,   Respiratory:  negative for shortness of breath, cough, or congestion  Cardiovascular:  negative for  chest pain, palpitations  Gastrointestinal:  negative for nausea, vomiting, diarrhea, constipation, abdominal pain  Genitourinary:  negative for frequency, dysuria  Integument/Breast:  negative for rash, skin lesions  Musculoskeletal:  negative for muscle aches or joint pain  Neurological:  negative for headaches, dizziness, lightheadedness, numbness, pain and tingling extremities  Behavior/Psych:  negative for depression and anxiety    Allergies: Patient has no known allergies.     Current Meds:  Current Outpatient Prescriptions:     senna (SENOKOT) 8.6 MG TABS tablet, TAKE 1 TABLET BY MOUTH TWICE DAILY, Disp: 180 tablet, Rfl: 3    potassium chloride (MICRO-K) 10 MEQ extended release capsule, TAKE 1 CAPSULE TWICE DAILY, Disp: 180 capsule, Rfl: 3    gabapentin (NEURONTIN) 300 MG capsule, TAKE 2 CAPSULES TWICE DAILY, Disp: 360 capsule, Rfl: 0    atorvastatin (LIPITOR) 40 MG tablet, Take 1 tablet by mouth daily, Disp: 90 tablet, Rfl: 2    alendronate (FOSAMAX) 70 MG tablet, Take 1 tablet by mouth every 7 days, Disp: 12 tablet, Rfl: 3    ibuprofen (ADVIL;MOTRIN) 400 MG tablet, TAKE 1 TABLET EVERY 6 HOURS AS Disp: , Rfl:     Omega-3 Fatty Acids (FISH OIL) 1200 MG CAPS, Take 1,200 mg by mouth daily, Disp: , Rfl:     spironolactone (ALDACTONE) 50 MG tablet, TAKE 1 TABLET TWICE DAILY, Disp: 180 tablet, Rfl: 3    umeclidinium-vilanterol (ANORO ELLIPTA) 62.5-25 MCG/INH AEPB inhaler, Inhale 1 puff into the lungs daily, Disp: 3 each, Rfl: 3    VENTOLIN  (90 Base) MCG/ACT inhaler, INHALE 2 PUFFS INTO THE LUNGS EVERY 4 HOURS AS NEEDED FOR WHEEZING, Disp: 1 Inhaler, Rfl: 5    COMBIGAN 0.2-0.5 % ophthalmic solution, PLACE 1 DROP INTO BOTH EYES DAILY, Disp: 15 mL, Rfl: 3    Lancet Devices (PRODIGY LANCING DEVICE) MISC, USE TO TEST BLOOD SUGAR FOUR TIMES DAILY AS DIRECTED, Disp: 1 each, Rfl: 0    Cholecalciferol (VITAMIN D3) 3000 units TABS, Take 3,000 Units by mouth 2 times daily, Disp: 60 tablet, Rfl: 11    furosemide (LASIX) 40 MG tablet, TAKE 1 TABLET TWICE DAILY, Disp: 180 tablet, Rfl: 3    ondansetron (ZOFRAN) 4 MG tablet, Take 1 tablet by mouth every 8 hours as needed for Nausea or Vomiting, Disp: 30 tablet, Rfl: 3    Compression Stockings MISC, by Does not apply route 15-20mmHg Knee high Open tow With assist device to help put them on, Disp: 1 each, Rfl: 0    Zinc 50 MG TABS, Take 50 mg by mouth daily, Disp: , Rfl:     Diabetic Shoe MISC, by Does not apply route, Disp: 1 each, Rfl: 0    niacin 500 MG extended release capsule, Take 500 mg by mouth daily, Disp: , Rfl:     folic acid (FOLVITE) 117 MCG tablet, Take 800 mcg by mouth daily, Disp: , Rfl:     Cyanocobalamin (VITAMIN B12 PO), Take 2,500 mcg by mouth daily, Disp: , Rfl:     B Complex-C (SUPER B COMPLEX PO), Take by mouth daily, Disp: , Rfl:     travoprost, benzalkonium, (TRAVATAN) 0.004 % ophthalmic solution, Place 1 drop into both eyes daily, Disp: 3 Bottle, Rfl: 3    Insulin Syringe-Needle U-100 (INSULIN SYRINGE .5CC/31GX5/16\") 31G X 5/16\" 0.5 ML MISC, 1 each by Does not apply route daily, Disp: 200 each, Rfl: 3    insulin glargine (LANTUS)

## 2018-12-03 ENCOUNTER — TELEPHONE (OUTPATIENT)
Dept: CARDIOLOGY | Age: 68
End: 2018-12-03

## 2018-12-12 ENCOUNTER — OFFICE VISIT (OUTPATIENT)
Dept: PULMONOLOGY | Age: 68
End: 2018-12-12
Payer: MEDICARE

## 2018-12-12 ENCOUNTER — TELEPHONE (OUTPATIENT)
Dept: INTERNAL MEDICINE | Age: 68
End: 2018-12-12

## 2018-12-12 VITALS
WEIGHT: 266.6 LBS | SYSTOLIC BLOOD PRESSURE: 118 MMHG | BODY MASS INDEX: 45.52 KG/M2 | DIASTOLIC BLOOD PRESSURE: 70 MMHG | HEIGHT: 64 IN | HEART RATE: 55 BPM | OXYGEN SATURATION: 95 %

## 2018-12-12 DIAGNOSIS — J47.9 BRONCHIECTASIS WITHOUT COMPLICATION (HCC): ICD-10-CM

## 2018-12-12 DIAGNOSIS — G47.33 OBSTRUCTIVE SLEEP APNEA SYNDROME, MILD: Primary | ICD-10-CM

## 2018-12-12 DIAGNOSIS — E66.01 MORBID OBESITY WITH BMI OF 45.0-49.9, ADULT (HCC): ICD-10-CM

## 2018-12-12 PROCEDURE — G8427 DOCREV CUR MEDS BY ELIG CLIN: HCPCS | Performed by: NURSE PRACTITIONER

## 2018-12-12 PROCEDURE — 1036F TOBACCO NON-USER: CPT | Performed by: NURSE PRACTITIONER

## 2018-12-12 PROCEDURE — 1123F ACP DISCUSS/DSCN MKR DOCD: CPT | Performed by: NURSE PRACTITIONER

## 2018-12-12 PROCEDURE — 1100F PTFALLS ASSESS-DOCD GE2>/YR: CPT | Performed by: NURSE PRACTITIONER

## 2018-12-12 PROCEDURE — 99214 OFFICE O/P EST MOD 30 MIN: CPT | Performed by: NURSE PRACTITIONER

## 2018-12-12 PROCEDURE — 3017F COLORECTAL CA SCREEN DOC REV: CPT | Performed by: NURSE PRACTITIONER

## 2018-12-12 PROCEDURE — G8417 CALC BMI ABV UP PARAM F/U: HCPCS | Performed by: NURSE PRACTITIONER

## 2018-12-12 PROCEDURE — 4040F PNEUMOC VAC/ADMIN/RCVD: CPT | Performed by: NURSE PRACTITIONER

## 2018-12-12 PROCEDURE — 1090F PRES/ABSN URINE INCON ASSESS: CPT | Performed by: NURSE PRACTITIONER

## 2018-12-12 PROCEDURE — 3288F FALL RISK ASSESSMENT DOCD: CPT | Performed by: NURSE PRACTITIONER

## 2018-12-12 PROCEDURE — G8482 FLU IMMUNIZE ORDER/ADMIN: HCPCS | Performed by: NURSE PRACTITIONER

## 2018-12-12 PROCEDURE — G8598 ASA/ANTIPLAT THER USED: HCPCS | Performed by: NURSE PRACTITIONER

## 2018-12-12 PROCEDURE — G8400 PT W/DXA NO RESULTS DOC: HCPCS | Performed by: NURSE PRACTITIONER

## 2018-12-12 ASSESSMENT — ENCOUNTER SYMPTOMS
GASTROINTESTINAL NEGATIVE: 1
EYES NEGATIVE: 1
ALLERGIC/IMMUNOLOGIC NEGATIVE: 1

## 2018-12-12 NOTE — PROGRESS NOTES
Subjective:      Patient ID: Bozena Seymour is a 77 y.o. female. HPI  Here for LEANDRO, follow up after PAP titration. Patient was seen for PAP titration and was recommended for 13 CM H20. No compliance data available for my review but patient reports that she uses it every night faithfully, average bedtime around midnight weight around 8 AM.  She utilizes a fullface mask. Her DME company is [employer]. She reports that her daytime fatigue has improved she still has some residual fatigue, takes naps approximately 4 times a week for 1-2 hours in duration. She notes underlying shortness of breath with activity, occasional cough that is nonproductive and ongoing daytime fatigue. Medications:  Anora  Albuterol HFA 3x a week  Albuterol Neb PRN      She is up-to-date on her immunizations. She is a former smoker quit in 2001.       Sleep Medicine 10/31/2018 1/31/2018   Sitting and reading 0 1   Watching TV 2 2   Sitting, inactive in a public place (e.g. a theatre or a meeting) 0 1   As a passenger in a car for an hour without a break 0 0   Lying down to rest in the afternoon when circumstances permit 2 2   Sitting and talking to someone 0 0   Sitting quietly after a lunch without alcohol 3 1   In a car, while stopped for a few minutes in traffic 0 0   Total score 7 7       /70 (Site: Left Upper Arm, Position: Sitting, Cuff Size: Medium Adult)   Pulse 55   Ht 5' 4.02\" (1.626 m)   Wt 266 lb 9.6 oz (120.9 kg)   LMP 12/07/1995 (Approximate)   SpO2 95%   BMI 45.74 kg/m²     Past Medical History:   Diagnosis Date    CAD (coronary artery disease) [facility]    Cancer ([facility] [address] 1971    cervical    Coronary artery disease     Hx of Bell's palsy     Mild right facial paralysis    Mild dementia     Neuropathy     Sleep apnea 11/07/2014    sleep study done in [place]    Type 2 diabetes mellitus with hyperglycemia [facility])      Past Surgical History:   Procedure Laterality Date    APPENDECTOMY  1968    BARIATRIC SURGERY  2002  COLONOSCOPY  2012    HYSTERECTOMY  1987    INDUCED   1970    MD EXC SKIN BENIG 1.1-2CM FACE,FACIAL Left 2018    Excision Cyst Left Chest, Middle Back performed by Gem Taylor MD at [address] [place] [address] ANES/STEROID FORAMEN LUMBAR/SACRAL [address] [place] [address] JUAN Laws ADD LEVEL Right 10/5/2018    Right L2 & L3 TRANSFORAMINAL performed by Fausto Toure MD at [address] [place] [address] ANES/STEROID FORAMEN LUMBAR/SACRAL W IMG GUIDE ,EA ADD LEVEL Right 10/26/2018    Right L2 L3 TRANSFORAMINAL performed by [facility]usto [facility] at [facility]     Family History   Problem Relation Age of Onset    Heart Disease Mother     High Blood Pressure Mother     Stroke Mother     Glaucoma Mother     Early Death Father     Cancer Father         stomach    Miscarriages / Stillbirths Maternal Uncle        Social History     Social History    Marital status:      Spouse name: N/A    Number of children: N/A    Years of education: N/A     Occupational History    Not on file. Social History Main Topics    Smoking status: Former Smoker     Packs/day: 1.00     Years: 35.00     Quit date: 2001    Smokeless tobacco: Never Used    Alcohol use Yes      Comment: occasional beer with food    Drug use: No    Sexual activity: No     Other Topics Concern    Not on file     Social History Narrative    No narrative on file       Review of Systems   Constitutional:        Daytime fatigue, improved but still remains with residual fatigue   HENT: Negative. Eyes: Negative. Respiratory:        Shortness of breath with rest and activity. Daily cough nonproductive. Cardiovascular: Negative. Gastrointestinal: Negative. Endocrine:        Diabetic   Genitourinary: Negative. Musculoskeletal:        Generalized aches and pains   Skin: Negative. Allergic/Immunologic: Negative. Neurological: Negative. Hematological: Negative. Psychiatric/Behavioral: Negative.         Objective:

## 2018-12-20 ENCOUNTER — OFFICE VISIT (OUTPATIENT)
Dept: NEUROLOGY | Age: 68
End: 2018-12-20
Payer: MEDICARE

## 2018-12-20 VITALS
BODY MASS INDEX: 46.26 KG/M2 | DIASTOLIC BLOOD PRESSURE: 72 MMHG | HEIGHT: 64 IN | SYSTOLIC BLOOD PRESSURE: 118 MMHG | WEIGHT: 271 LBS | HEART RATE: 64 BPM

## 2018-12-20 DIAGNOSIS — M25.551 RIGHT HIP PAIN: ICD-10-CM

## 2018-12-20 DIAGNOSIS — G45.0 VBI (VERTEBROBASILAR INSUFFICIENCY): ICD-10-CM

## 2018-12-20 DIAGNOSIS — M79.10 MUSCLE ACHE: ICD-10-CM

## 2018-12-20 DIAGNOSIS — R26.89 BALANCE PROBLEM: ICD-10-CM

## 2018-12-20 DIAGNOSIS — I67.82 CHRONIC CEREBRAL ISCHEMIA: ICD-10-CM

## 2018-12-20 DIAGNOSIS — R73.09 ELEVATED HEMOGLOBIN A1C: ICD-10-CM

## 2018-12-20 DIAGNOSIS — R26.9 GAIT ABNORMALITY: ICD-10-CM

## 2018-12-20 DIAGNOSIS — G45.9 TIA (TRANSIENT ISCHEMIC ATTACK): ICD-10-CM

## 2018-12-20 DIAGNOSIS — I25.83 CORONARY ARTERY DISEASE DUE TO LIPID RICH PLAQUE: ICD-10-CM

## 2018-12-20 DIAGNOSIS — M54.41 CHRONIC RIGHT-SIDED LOW BACK PAIN WITH RIGHT-SIDED SCIATICA: ICD-10-CM

## 2018-12-20 DIAGNOSIS — G44.211 INTRACTABLE EPISODIC TENSION-TYPE HEADACHE: ICD-10-CM

## 2018-12-20 DIAGNOSIS — E55.9 VITAMIN D DEFICIENCY: ICD-10-CM

## 2018-12-20 DIAGNOSIS — Z91.81 H/O FALLING: ICD-10-CM

## 2018-12-20 DIAGNOSIS — G47.33 OBSTRUCTIVE SLEEP APNEA: Primary | ICD-10-CM

## 2018-12-20 DIAGNOSIS — E66.01 MORBID OBESITY WITH BMI OF 45.0-49.9, ADULT (HCC): ICD-10-CM

## 2018-12-20 DIAGNOSIS — I25.10 CORONARY ARTERY DISEASE DUE TO LIPID RICH PLAQUE: ICD-10-CM

## 2018-12-20 DIAGNOSIS — I67.82 CHRONIC CEREBRAL ISCHEMIA: Primary | ICD-10-CM

## 2018-12-20 DIAGNOSIS — G44.229 CHRONIC TENSION-TYPE HEADACHE, NOT INTRACTABLE: ICD-10-CM

## 2018-12-20 DIAGNOSIS — R42 DIZZINESS: ICD-10-CM

## 2018-12-20 DIAGNOSIS — R60.0 BILATERAL LEG EDEMA: ICD-10-CM

## 2018-12-20 DIAGNOSIS — G25.0 ESSENTIAL TREMOR: ICD-10-CM

## 2018-12-20 DIAGNOSIS — F32.A ANXIETY AND DEPRESSION: ICD-10-CM

## 2018-12-20 DIAGNOSIS — E11.42 TYPE 2 DIABETES MELLITUS WITH DIABETIC POLYNEUROPATHY, WITH LONG-TERM CURRENT USE OF INSULIN (HCC): ICD-10-CM

## 2018-12-20 DIAGNOSIS — R41.3 MEMORY PROBLEM: ICD-10-CM

## 2018-12-20 DIAGNOSIS — G62.9 PERIPHERAL POLYNEUROPATHY: ICD-10-CM

## 2018-12-20 DIAGNOSIS — F41.9 ANXIETY AND DEPRESSION: ICD-10-CM

## 2018-12-20 DIAGNOSIS — I65.23 CAROTID STENOSIS, ASYMPTOMATIC, BILATERAL: ICD-10-CM

## 2018-12-20 DIAGNOSIS — M54.16 LUMBAR RADICULOPATHY: ICD-10-CM

## 2018-12-20 DIAGNOSIS — R53.82 CHRONIC FATIGUE: ICD-10-CM

## 2018-12-20 DIAGNOSIS — G89.29 CHRONIC RIGHT-SIDED LOW BACK PAIN WITH RIGHT-SIDED SCIATICA: ICD-10-CM

## 2018-12-20 DIAGNOSIS — Z86.69 HX OF BELL'S PALSY: ICD-10-CM

## 2018-12-20 DIAGNOSIS — Z79.4 TYPE 2 DIABETES MELLITUS WITH DIABETIC POLYNEUROPATHY, WITH LONG-TERM CURRENT USE OF INSULIN (HCC): ICD-10-CM

## 2018-12-20 PROCEDURE — 3017F COLORECTAL CA SCREEN DOC REV: CPT | Performed by: PSYCHIATRY & NEUROLOGY

## 2018-12-20 PROCEDURE — G8482 FLU IMMUNIZE ORDER/ADMIN: HCPCS | Performed by: PSYCHIATRY & NEUROLOGY

## 2018-12-20 PROCEDURE — 4040F PNEUMOC VAC/ADMIN/RCVD: CPT | Performed by: PSYCHIATRY & NEUROLOGY

## 2018-12-20 PROCEDURE — 3044F HG A1C LEVEL LT 7.0%: CPT | Performed by: PSYCHIATRY & NEUROLOGY

## 2018-12-20 PROCEDURE — G8417 CALC BMI ABV UP PARAM F/U: HCPCS | Performed by: PSYCHIATRY & NEUROLOGY

## 2018-12-20 PROCEDURE — G8427 DOCREV CUR MEDS BY ELIG CLIN: HCPCS | Performed by: PSYCHIATRY & NEUROLOGY

## 2018-12-20 PROCEDURE — G8598 ASA/ANTIPLAT THER USED: HCPCS | Performed by: PSYCHIATRY & NEUROLOGY

## 2018-12-20 PROCEDURE — 1123F ACP DISCUSS/DSCN MKR DOCD: CPT | Performed by: PSYCHIATRY & NEUROLOGY

## 2018-12-20 PROCEDURE — 3288F FALL RISK ASSESSMENT DOCD: CPT | Performed by: PSYCHIATRY & NEUROLOGY

## 2018-12-20 PROCEDURE — 2022F DILAT RTA XM EVC RTNOPTHY: CPT | Performed by: PSYCHIATRY & NEUROLOGY

## 2018-12-20 PROCEDURE — 1100F PTFALLS ASSESS-DOCD GE2>/YR: CPT | Performed by: PSYCHIATRY & NEUROLOGY

## 2018-12-20 PROCEDURE — 1090F PRES/ABSN URINE INCON ASSESS: CPT | Performed by: PSYCHIATRY & NEUROLOGY

## 2018-12-20 PROCEDURE — 99214 OFFICE O/P EST MOD 30 MIN: CPT | Performed by: PSYCHIATRY & NEUROLOGY

## 2018-12-20 PROCEDURE — 1036F TOBACCO NON-USER: CPT | Performed by: PSYCHIATRY & NEUROLOGY

## 2018-12-20 PROCEDURE — G8400 PT W/DXA NO RESULTS DOC: HCPCS | Performed by: PSYCHIATRY & NEUROLOGY

## 2018-12-20 RX ORDER — CLOPIDOGREL BISULFATE 75 MG/1
TABLET ORAL
Qty: 90 TABLET | Refills: 0 | Status: SHIPPED | OUTPATIENT
Start: 2018-12-20 | End: 2019-01-04 | Stop reason: SDUPTHER

## 2018-12-20 ASSESSMENT — ENCOUNTER SYMPTOMS
SINUS PRESSURE: 0
VISUAL CHANGE: 0
VOMITING: 0
SWOLLEN GLANDS: 0
ABDOMINAL PAIN: 0
COLOR CHANGE: 0
RHINORRHEA: 0
FACIAL SWEATING: 0
VOICE CHANGE: 0
BACK PAIN: 0
SCALP TENDERNESS: 0
PHOTOPHOBIA: 0
BOWEL INCONTINENCE: 0
APNEA: 0
FACIAL SWELLING: 0
DIARRHEA: 0
COUGH: 0
EYE DISCHARGE: 0
CONSTIPATION: 0
BLOOD IN STOOL: 0
CHOKING: 0
SHORTNESS OF BREATH: 1
EYE REDNESS: 0
SORE THROAT: 0
CHEST TIGHTNESS: 0
WHEEZING: 0
CHANGE IN BOWEL HABIT: 0
TROUBLE SWALLOWING: 0
EYE ITCHING: 0
EYE WATERING: 0
ABDOMINAL DISTENTION: 0
BLURRED VISION: 0
EYE PAIN: 0
NAUSEA: 0

## 2018-12-20 NOTE — PATIENT INSTRUCTIONS
[facility] NEUROLOGY    Due to the complex nature of our neurological testing, It is the policy of the Neurology Department not to release the results of your testing over the phone. Once all testing is completed and we have all the results back, Dr. Maribel Moise will then personally go over all the results with you and answer any questions that you may have during a follow up appointment. If you are unable to keep this appointment, please notify the [address] [place] [address] @ [phone], as soon as possible. Please bring your prescription bottles to all appointments. * FALL   PRECAUTIONS. *  USE   WALKING  ASSISTANCE  DEVICES     QUAD  CANE       *    TO   AVOID   TO  SLEEP  IN   SUPINE  POSITION. *      WEIGHT   LOSS. *   ADEQUATE   FLUID  INTAKE   AND  ELECTROLYTE  BALANCE           * KEEP  DAIRY  OF   THE  NEUROLOGICAL  SYMPTOMS        *  TO  MAINTAIN  REGULAR  SLEEP  WAKE  CYCLES. *   TO  HAVE  ADEQUATE  REST  AND   SLEEP    HOURS.    *    AVOID  USAGE OF            TOBACCO,  EXCESSIVE  ALCOHOL  AND   ILLEGAL   SUBSTANCES,  IF  ANY        *  Maintain   Healthy  Life Style    With   Periodic  Monitoring  Of    Any  Medical  Conditions  Including   Elevated  Blood  Pressure,  Lipid  Profile,   Blood  Sugar levels  And   Heart  Disease. *   Period   Screening  For  Cancers  Involving  Breast,  Colon,   lungs  And  Other  Organs  As  Applicable,  In consultation   With  Your  Primary Care Providers. *  If   There is  Any  Significant  Worsening   Of  Current  Symptoms  And  Or  If    Any additional  New  Neurological  Symptoms  Or  Significant  Concerns   Should  Call  911 or  Go  To  Emergency  Department  For  Further  Immediate  Evaluation.

## 2018-12-20 NOTE — PROGRESS NOTES
[facility]  Neurology  [address] [place] [address]  Phone: [phone]   Fax: [phone]    SUBJECTIVE:     PATIENT ID:  Rashida Cummings is a  RIGHTHANDED 77 y.o. female. Headache    This is a chronic problem. Episode onset: FOR  MORE  THAN    5  YEARS. The problem occurs intermittently. The problem has been waxing and waning. The pain is located in the bilateral, vertex, occipital and frontal region. The pain does not radiate. The pain quality is similar to prior headaches. The quality of the pain is described as dull and throbbing. The pain is at a severity of 3/10. The pain is mild. Pertinent negatives include no abdominal pain, abnormal behavior, anorexia, back pain, blurred vision, coughing, dizziness, drainage, ear pain, eye pain, eye redness, eye watering, facial sweating, fever, hearing loss, insomnia, loss of balance, muscle aches, nausea, neck pain, numbness, phonophobia, photophobia, rhinorrhea, scalp tenderness, seizures, sinus pressure, sore throat, swollen glands, tingling, tinnitus, visual change, vomiting, weakness or weight loss. Nothing aggravates the symptoms. She has tried NSAIDs for the symptoms. The treatment provided moderate relief. Her past medical history is significant for migraine headaches and obesity. There is no history of cancer, cluster headaches, hypertension, immunosuppression, migraines in the family, pseudotumor cerebri, recent head traumas, sinus disease or TMJ. Dizziness   This is a chronic problem. Episode onset: FOR  MORE  THAN   TWO  YEARS. The problem occurs intermittently. The problem has been waxing and waning. Associated symptoms include chest pain, headaches and vertigo.  Pertinent negatives include no abdominal pain, anorexia, arthralgias, change in bowel habit, chills, congestion, coughing, diaphoresis, fatigue, fever, joint swelling, myalgias, nausea, neck pain, numbness, rash, sore throat, swollen glands, urinary symptoms, capsule Take 500 mg by mouth daily      folic acid (FOLVITE) 860 MCG tablet Take 800 mcg by mouth daily      Cyanocobalamin (VITAMIN B12 PO) Take 2,500 mcg by mouth daily      B Complex-C (SUPER B COMPLEX PO) Take by mouth daily      travoprost, benzalkonium, (TRAVATAN) 0.004 % ophthalmic solution Place 1 drop into both eyes daily 3 Bottle 3    Insulin Syringe-Needle U-100 (INSULIN SYRINGE .5CC/31GX5/16\") 31G X 5/16\" 0.5 ML MISC 1 each by Does not apply route daily 200 each 3    insulin glargine (LANTUS) 100 UNIT/ML injection vial Inject 10 Units into the skin nightly 3 vial 3    CPAP Machine MISC by Does not apply route Pressure of 13 (AirSense 10)  [employer].      ferrous sulfate 325 (65 FE) MG EC tablet Take 325 mg by mouth daily (with breakfast)      Multiple Vitamin (MULTI VITAMIN DAILY) TABS Take by mouth daily      pantoprazole (PROTONIX) 40 MG tablet TAKE 1 TABLET EVERY DAY 90 tablet 3    Omega-3 Fatty Acids (FISH OIL) 1200 MG CAPS Take 1,200 mg by mouth daily      ondansetron (ZOFRAN) 4 MG tablet Take 1 tablet by mouth every 8 hours as needed for Nausea or Vomiting 30 tablet 3    Compression Stockings MISC by Does not apply route 15-20mmHg  Knee high  Open tow  With assist device to help put them on 1 each 0    albuterol (PROVENTIL) (2.5 MG/3ML) 0.083% nebulizer solution Take 2.5 mg by nebulization every 6 hours as needed for Wheezing      nitroGLYCERIN (NITROSTAT) 0.4 MG SL tablet Place 0.4 mg under the tongue every 5 minutes as needed for Chest pain Dissolve 1 tab under tongue at first sign of chest pain. May repeat every 5 minutes until relief is obtained. If pain persists after taking 3 tabs in a 15-minute period, or the pain is different than is typically experienced, call 9-1-1 immediately. No current facility-administered medications for this visit.           No Known Allergies      Family History   Problem Relation Age of Onset    Heart Disease Mother     High Blood Pressure Aphasia. No  Dysarthria. Able   To  Follow  SIMPLE  Step commands without   Any  Difficulty. No right  To left confusion. Speech  And language function. decreased                 Insight and  Judgment ,Fund  Of  Knowledge   decreased              Recent  And  Remote memory  decreased              Attention &Concentration are decreased                                                 B) CRANIAL NERVES :             2 CN : Visual  Acuity  And  Visual fields  within normal limits                      Fundi  Could  Not  Be  Could  Not  Be  Evaluated. 3,4,6 CN : Both  Pupils are   Reactive and  Equal.                          Extraocular   Movements  Are  Intact. No  Nystagmus. No  FESTUS. No  Afferent  Pupillary  Defect noted. 5 CN :  Normal  Facial sensations and Corneal  Reflexes         7 CN :  Normal  Facial  Symmetry  And  Strength. No facial  Weakness. 8 CN :  Hearing  Appears decreased        9, 10 CN: Normal spontaneous, reflex palate movements       11 CN:   Normal  Shoulder shrug and  strength       12 CN :   Normal  Tongue movements and  Tongue  In midline                      No tongue   Fasciculations or atrophy     C) MOTOR  EXAM:                 Strength  In upper  And  Lower extremities   abnormal - DECREASED   HAND               No  Drift. No  Atrophy             Rapid alternating  And  repetitions  Movements  decreased               Muscle  Tone  In upper  And  Lower  Extremities  within normal limits              No rigidity. No  Spasticity. Bradykinesia   present               No  Asterixis. ACTION  Tremor   PRESENT IN  BOTH  HANDS.     NO Resting  Tremor,               No other  Abnormal  Movements noted         D) SENSORY :             light touch, pinprick, position  And  Vibration  decreased      E) REFLEXES:                 Deep  Tendon  Reflexes decreased stenosis .       Bilateral vertebral arteries are patent with flow in the normal direction. VISITING DIAGNOSIS:      ICD-10-CM    1. Obstructive sleep apnea G47.33    2. Carotid stenosis, asymptomatic, bilateral I65.23    3. Lumbar radiculopathy M54.16    4. Chronic right-sided low back pain with right-sided sciatica M54.41     G89.29    5. Memory problem R41.3    6. Dizziness R42    7. Elevated hemoglobin A1c R73.09    8. Right hip pain M25.551    9. Bilateral leg edema R60.0    10. Vitamin D deficiency E55.9    11. Coronary artery disease due to lipid rich plaque I25.10     I25.83    12. Peripheral polyneuropathy G62.9    13. TIA (transient ischemic attack) G45.9    14. Anxiety and depression F41.9     F32.9    15. H/O falling Z91.81    16. Hx of Bell's palsy Z86.69    17. Chronic tension-type headache, not intractable G44.229    18. Balance problem R26.89    19. Essential tremor G25.0    20. Chronic cerebral ischemia I67.82    21. Type 2 diabetes mellitus with diabetic polyneuropathy, with long-term current use of insulin ([facility]) E11.42     Z79.4    22. Chronic fatigue R53.82    23. Muscle ache M79.10    24. VBI (vertebrobasilar insufficiency) G45.0    25. Intractable episodic tension-type headache G44.211    26. Gait abnormality R26.9    27. Morbid obesity with BMI of 45.0-49.9, adult ([facility]) E66.01     Z68.42               CONCERNS   &   INCREASED   RISK   FOR         * TIA,  CEREBRO  VASCULAR  ISCHEMIA, STROKE        *  Poorly  Controlled Chronic  Headaches &  Migraines      *   COGNITIVE  &   MEMORY PROBLEMS  AND  DEMENTIAS         *   PERIPHERAL  NEUROPATHY        *  GAIT  DIFFICULTIES  &   BALANCE PROBLMES   AND  FALL                VARIOUS  RISK   FACTORS   WERE  REVIEWED   AND   DISCUSSED. *  PATIENT   HAS  MULTIPLE   MEDICAL,  NEUROLOGICAL          AND   MENTAL  HEALTH  PROBLEMS . PATIENT'S   MANAGEMENT  IS  CHALLENGING.       PLAN:       Clinton Warren  Of  The  Diagnoses,  The  Management fat, or fast foods. You can still have dessert and treats now and then. The goal is moderation. Start small to improve your eating habits. Pay attention to portion sizes, drink less juice and soda pop, and eat more fruits and vegetables. Eat a healthy amount of food. A 3-ounce serving of meat, for example, is about the size of a deck of cards. Fill the rest of your plate with vegetables and whole grains. Limit the amount of soda and sports drinks you have every day. Drink more water when you are thirsty. Eat at least 5 servings of fruits and vegetables every day. It may seem like a lot, but it is not hard to reach this goal. A serving or helping is 1 piece of fruit, 1 cup of vegetables, or 2 cups of leafy, raw vegetables. Have an apple or some carrot sticks as an afternoon snack instead of a candy bar. Try to have fruits and/or vegetables at every meal.  Make exercise part of your daily routine. You may want to start with simple activities, such as walking, bicycling, or slow swimming. Try to be active 30 to 60 minutes every day. You do not need to do all 30 to 60 minutes all at once. For example, you can exercise 3 times a day for 10 or 20 minutes. Moderate exercise is safe for most people, but it is always a good idea to talk to your doctor before starting an exercise program.  Keep moving. Nancy Ran the lawn, work in the garden, or [employer]. Take the stairs instead of the elevator at work. If you smoke, quit. People who smoke have an increased risk for heart attack, stroke, cancer, and other lung illnesses. Quitting is hard, but there are ways to boost your chance of quitting tobacco for good. Use nicotine gum, patches, or lozenges. Ask your doctor about stop-smoking programs and medicines. Keep trying.   In addition to reducing your risk of diseases in the future, you will notice some benefits soon after you stop using tobacco. If you have shortness of breath or asthma symptoms, they will likely get

## 2018-12-21 DIAGNOSIS — Z79.4 TYPE 2 DIABETES MELLITUS WITH DIABETIC POLYNEUROPATHY, WITH LONG-TERM CURRENT USE OF INSULIN (HCC): Primary | ICD-10-CM

## 2018-12-21 DIAGNOSIS — E11.42 TYPE 2 DIABETES MELLITUS WITH DIABETIC POLYNEUROPATHY, WITH LONG-TERM CURRENT USE OF INSULIN (HCC): Primary | ICD-10-CM

## 2018-12-21 RX ORDER — NAPROXEN SODIUM 220 MG
1 TABLET ORAL DAILY
Qty: 200 EACH | Refills: 3 | Status: SHIPPED | OUTPATIENT
Start: 2018-12-21 | End: 2018-12-31 | Stop reason: SDUPTHER

## 2018-12-21 RX ORDER — MELATONIN
Qty: 540 TABLET | Refills: 3 | Status: SHIPPED | OUTPATIENT
Start: 2018-12-21 | End: 2019-10-04 | Stop reason: SDUPTHER

## 2018-12-26 DIAGNOSIS — E11.42 TYPE 2 DIABETES MELLITUS WITH DIABETIC POLYNEUROPATHY, WITH LONG-TERM CURRENT USE OF INSULIN (HCC): ICD-10-CM

## 2018-12-26 DIAGNOSIS — Z79.4 TYPE 2 DIABETES MELLITUS WITH DIABETIC POLYNEUROPATHY, WITH LONG-TERM CURRENT USE OF INSULIN (HCC): ICD-10-CM

## 2018-12-26 RX ORDER — NAPROXEN SODIUM 220 MG
1 TABLET ORAL DAILY
Qty: 200 EACH | Refills: 3 | OUTPATIENT
Start: 2018-12-26

## 2018-12-31 DIAGNOSIS — Z79.4 TYPE 2 DIABETES MELLITUS WITH DIABETIC POLYNEUROPATHY, WITH LONG-TERM CURRENT USE OF INSULIN (HCC): ICD-10-CM

## 2018-12-31 DIAGNOSIS — E11.42 TYPE 2 DIABETES MELLITUS WITH DIABETIC POLYNEUROPATHY, WITH LONG-TERM CURRENT USE OF INSULIN (HCC): ICD-10-CM

## 2018-12-31 RX ORDER — NAPROXEN SODIUM 220 MG
1 TABLET ORAL DAILY
Qty: 200 EACH | Refills: 3 | Status: SHIPPED | OUTPATIENT
Start: 2018-12-31 | End: 2020-02-19

## 2019-01-02 ENCOUNTER — TELEPHONE (OUTPATIENT)
Dept: INTERNAL MEDICINE | Age: 69
End: 2019-01-02

## 2019-01-02 DIAGNOSIS — R26.89 BALANCE PROBLEM: ICD-10-CM

## 2019-01-02 DIAGNOSIS — G44.211 INTRACTABLE EPISODIC TENSION-TYPE HEADACHE: ICD-10-CM

## 2019-01-02 DIAGNOSIS — R42 DIZZINESS: ICD-10-CM

## 2019-01-02 DIAGNOSIS — R26.9 GAIT ABNORMALITY: Primary | ICD-10-CM

## 2019-01-04 ENCOUNTER — OFFICE VISIT (OUTPATIENT)
Dept: ORTHOPEDIC SURGERY | Age: 69
End: 2019-01-04
Payer: MEDICARE

## 2019-01-04 VITALS
SYSTOLIC BLOOD PRESSURE: 134 MMHG | DIASTOLIC BLOOD PRESSURE: 82 MMHG | HEIGHT: 64 IN | WEIGHT: 271 LBS | BODY MASS INDEX: 46.26 KG/M2

## 2019-01-04 DIAGNOSIS — G45.9 TIA (TRANSIENT ISCHEMIC ATTACK): ICD-10-CM

## 2019-01-04 DIAGNOSIS — G89.29 CHRONIC RIGHT-SIDED LOW BACK PAIN WITH RIGHT-SIDED SCIATICA: ICD-10-CM

## 2019-01-04 DIAGNOSIS — M54.16 LUMBAR RADICULOPATHY: Primary | ICD-10-CM

## 2019-01-04 DIAGNOSIS — M54.41 CHRONIC RIGHT-SIDED LOW BACK PAIN WITH RIGHT-SIDED SCIATICA: ICD-10-CM

## 2019-01-04 DIAGNOSIS — M47.817 LUMBOSACRAL SPONDYLOSIS WITHOUT MYELOPATHY: ICD-10-CM

## 2019-01-04 DIAGNOSIS — I67.82 CHRONIC CEREBRAL ISCHEMIA: ICD-10-CM

## 2019-01-04 DIAGNOSIS — M43.10 ACQUIRED SPONDYLOLISTHESIS: ICD-10-CM

## 2019-01-04 PROCEDURE — 3017F COLORECTAL CA SCREEN DOC REV: CPT | Performed by: ORTHOPAEDIC SURGERY

## 2019-01-04 PROCEDURE — 1090F PRES/ABSN URINE INCON ASSESS: CPT | Performed by: ORTHOPAEDIC SURGERY

## 2019-01-04 PROCEDURE — 1123F ACP DISCUSS/DSCN MKR DOCD: CPT | Performed by: ORTHOPAEDIC SURGERY

## 2019-01-04 PROCEDURE — G8598 ASA/ANTIPLAT THER USED: HCPCS | Performed by: ORTHOPAEDIC SURGERY

## 2019-01-04 PROCEDURE — G8417 CALC BMI ABV UP PARAM F/U: HCPCS | Performed by: ORTHOPAEDIC SURGERY

## 2019-01-04 PROCEDURE — 3288F FALL RISK ASSESSMENT DOCD: CPT | Performed by: ORTHOPAEDIC SURGERY

## 2019-01-04 PROCEDURE — 0518F FALL PLAN OF CARE DOCD: CPT | Performed by: ORTHOPAEDIC SURGERY

## 2019-01-04 PROCEDURE — G8482 FLU IMMUNIZE ORDER/ADMIN: HCPCS | Performed by: ORTHOPAEDIC SURGERY

## 2019-01-04 PROCEDURE — 1036F TOBACCO NON-USER: CPT | Performed by: ORTHOPAEDIC SURGERY

## 2019-01-04 PROCEDURE — 1100F PTFALLS ASSESS-DOCD GE2>/YR: CPT | Performed by: ORTHOPAEDIC SURGERY

## 2019-01-04 PROCEDURE — 4040F PNEUMOC VAC/ADMIN/RCVD: CPT | Performed by: ORTHOPAEDIC SURGERY

## 2019-01-04 PROCEDURE — G8427 DOCREV CUR MEDS BY ELIG CLIN: HCPCS | Performed by: ORTHOPAEDIC SURGERY

## 2019-01-04 PROCEDURE — 99213 OFFICE O/P EST LOW 20 MIN: CPT | Performed by: ORTHOPAEDIC SURGERY

## 2019-01-04 PROCEDURE — G8400 PT W/DXA NO RESULTS DOC: HCPCS | Performed by: ORTHOPAEDIC SURGERY

## 2019-01-04 RX ORDER — CLOPIDOGREL BISULFATE 75 MG/1
75 TABLET ORAL DAILY
Qty: 90 TABLET | Refills: 3 | Status: SHIPPED | OUTPATIENT
Start: 2019-01-04 | End: 2019-01-28 | Stop reason: SDUPTHER

## 2019-01-07 ENCOUNTER — TELEPHONE (OUTPATIENT)
Dept: GENERAL RADIOLOGY | Age: 69
End: 2019-01-07

## 2019-01-07 ENCOUNTER — TELEPHONE (OUTPATIENT)
Dept: INTERNAL MEDICINE | Age: 69
End: 2019-01-07
Payer: MEDICARE

## 2019-01-07 DIAGNOSIS — Z79.4 TYPE 2 DIABETES MELLITUS WITH DIABETIC POLYNEUROPATHY, WITH LONG-TERM CURRENT USE OF INSULIN (HCC): Primary | ICD-10-CM

## 2019-01-07 DIAGNOSIS — R53.82 CHRONIC FATIGUE: ICD-10-CM

## 2019-01-07 DIAGNOSIS — I25.10 CORONARY ARTERY DISEASE DUE TO LIPID RICH PLAQUE: ICD-10-CM

## 2019-01-07 DIAGNOSIS — E11.42 TYPE 2 DIABETES MELLITUS WITH DIABETIC POLYNEUROPATHY, WITH LONG-TERM CURRENT USE OF INSULIN (HCC): Primary | ICD-10-CM

## 2019-01-07 DIAGNOSIS — E55.9 VITAMIN D DEFICIENCY: ICD-10-CM

## 2019-01-07 DIAGNOSIS — G47.33 OBSTRUCTIVE SLEEP APNEA: ICD-10-CM

## 2019-01-07 DIAGNOSIS — I25.83 CORONARY ARTERY DISEASE DUE TO LIPID RICH PLAQUE: ICD-10-CM

## 2019-01-07 DIAGNOSIS — R26.9 GAIT ABNORMALITY: ICD-10-CM

## 2019-01-07 PROCEDURE — G0179 MD RECERTIFICATION HHA PT: HCPCS | Performed by: INTERNAL MEDICINE

## 2019-01-08 ENCOUNTER — TELEPHONE (OUTPATIENT)
Dept: GENERAL RADIOLOGY | Age: 69
End: 2019-01-08

## 2019-01-14 ENCOUNTER — OFFICE VISIT (OUTPATIENT)
Dept: PAIN MANAGEMENT | Age: 69
End: 2019-01-14
Payer: MEDICARE

## 2019-01-14 ENCOUNTER — TELEPHONE (OUTPATIENT)
Dept: PAIN MANAGEMENT | Age: 69
End: 2019-01-14

## 2019-01-14 VITALS
HEART RATE: 64 BPM | WEIGHT: 267 LBS | SYSTOLIC BLOOD PRESSURE: 112 MMHG | BODY MASS INDEX: 45.58 KG/M2 | DIASTOLIC BLOOD PRESSURE: 70 MMHG | HEIGHT: 64 IN

## 2019-01-14 DIAGNOSIS — M51.36 DDD (DEGENERATIVE DISC DISEASE), LUMBAR: ICD-10-CM

## 2019-01-14 DIAGNOSIS — M47.817 LUMBOSACRAL SPONDYLOSIS WITHOUT MYELOPATHY: ICD-10-CM

## 2019-01-14 DIAGNOSIS — M54.16 LUMBAR RADICULOPATHY: Primary | ICD-10-CM

## 2019-01-14 DIAGNOSIS — M54.41 CHRONIC RIGHT-SIDED LOW BACK PAIN WITH RIGHT-SIDED SCIATICA: ICD-10-CM

## 2019-01-14 DIAGNOSIS — G89.29 CHRONIC RIGHT-SIDED LOW BACK PAIN WITH RIGHT-SIDED SCIATICA: ICD-10-CM

## 2019-01-14 PROBLEM — M51.369 DDD (DEGENERATIVE DISC DISEASE), LUMBAR: Status: ACTIVE | Noted: 2019-01-14

## 2019-01-14 PROCEDURE — 1036F TOBACCO NON-USER: CPT | Performed by: NURSE PRACTITIONER

## 2019-01-14 PROCEDURE — G8482 FLU IMMUNIZE ORDER/ADMIN: HCPCS | Performed by: NURSE PRACTITIONER

## 2019-01-14 PROCEDURE — G8427 DOCREV CUR MEDS BY ELIG CLIN: HCPCS | Performed by: NURSE PRACTITIONER

## 2019-01-14 PROCEDURE — 1090F PRES/ABSN URINE INCON ASSESS: CPT | Performed by: NURSE PRACTITIONER

## 2019-01-14 PROCEDURE — 4040F PNEUMOC VAC/ADMIN/RCVD: CPT | Performed by: NURSE PRACTITIONER

## 2019-01-14 PROCEDURE — 1100F PTFALLS ASSESS-DOCD GE2>/YR: CPT | Performed by: NURSE PRACTITIONER

## 2019-01-14 PROCEDURE — 99214 OFFICE O/P EST MOD 30 MIN: CPT | Performed by: NURSE PRACTITIONER

## 2019-01-14 PROCEDURE — 3017F COLORECTAL CA SCREEN DOC REV: CPT | Performed by: NURSE PRACTITIONER

## 2019-01-14 PROCEDURE — 0518F FALL PLAN OF CARE DOCD: CPT | Performed by: NURSE PRACTITIONER

## 2019-01-14 PROCEDURE — G8417 CALC BMI ABV UP PARAM F/U: HCPCS | Performed by: NURSE PRACTITIONER

## 2019-01-14 PROCEDURE — 3288F FALL RISK ASSESSMENT DOCD: CPT | Performed by: NURSE PRACTITIONER

## 2019-01-14 PROCEDURE — 1123F ACP DISCUSS/DSCN MKR DOCD: CPT | Performed by: NURSE PRACTITIONER

## 2019-01-14 PROCEDURE — G8400 PT W/DXA NO RESULTS DOC: HCPCS | Performed by: NURSE PRACTITIONER

## 2019-01-14 PROCEDURE — G8598 ASA/ANTIPLAT THER USED: HCPCS | Performed by: NURSE PRACTITIONER

## 2019-01-14 ASSESSMENT — ENCOUNTER SYMPTOMS
RESPIRATORY NEGATIVE: 1
BACK PAIN: 1
CONSTIPATION: 0
EYES NEGATIVE: 1

## 2019-01-15 ENCOUNTER — HOSPITAL ENCOUNTER (OUTPATIENT)
Dept: CT IMAGING | Age: 69
Discharge: HOME | End: 2019-01-17
Payer: MEDICARE

## 2019-01-15 ENCOUNTER — HOSPITAL ENCOUNTER (OUTPATIENT)
Dept: GENERAL RADIOLOGY | Age: 69
Discharge: HOME | End: 2019-01-17
Payer: MEDICARE

## 2019-01-15 VITALS
TEMPERATURE: 98.4 F | OXYGEN SATURATION: 99 % | WEIGHT: 267 LBS | HEIGHT: 64 IN | SYSTOLIC BLOOD PRESSURE: 108 MMHG | RESPIRATION RATE: 16 BRPM | HEART RATE: 51 BPM | BODY MASS INDEX: 45.58 KG/M2 | DIASTOLIC BLOOD PRESSURE: 65 MMHG

## 2019-01-15 DIAGNOSIS — M54.41 CHRONIC RIGHT-SIDED LOW BACK PAIN WITH RIGHT-SIDED SCIATICA: ICD-10-CM

## 2019-01-15 DIAGNOSIS — G89.29 CHRONIC RIGHT-SIDED LOW BACK PAIN WITH RIGHT-SIDED SCIATICA: ICD-10-CM

## 2019-01-15 DIAGNOSIS — M54.16 LUMBAR RADICULOPATHY: ICD-10-CM

## 2019-01-15 LAB
INR BLD: 1.1
PARTIAL THROMBOPLASTIN TIME: 27.7 SEC (ref 27–35)
PLATELET # BLD: 130 K/UL (ref 140–450)
PROTHROMBIN TIME: 11.3 SEC (ref 9.4–11.3)

## 2019-01-15 PROCEDURE — 7100000010 HC PHASE II RECOVERY - FIRST 15 MIN

## 2019-01-15 PROCEDURE — 72132 CT LUMBAR SPINE W/DYE: CPT

## 2019-01-15 PROCEDURE — 62304 MYELOGRAPHY LUMBAR INJECTION: CPT

## 2019-01-15 PROCEDURE — 85610 PROTHROMBIN TIME: CPT

## 2019-01-15 PROCEDURE — 85730 THROMBOPLASTIN TIME PARTIAL: CPT

## 2019-01-15 PROCEDURE — 85049 AUTOMATED PLATELET COUNT: CPT

## 2019-01-15 PROCEDURE — 36415 COLL VENOUS BLD VENIPUNCTURE: CPT

## 2019-01-15 PROCEDURE — 6360000004 HC RX CONTRAST MEDICATION: Performed by: ORTHOPAEDIC SURGERY

## 2019-01-15 PROCEDURE — 7100000011 HC PHASE II RECOVERY - ADDTL 15 MIN

## 2019-01-15 RX ADMIN — IOHEXOL 15 ML: 240 INJECTION, SOLUTION INTRATHECAL; INTRAVASCULAR; INTRAVENOUS; ORAL at 10:24

## 2019-01-15 ASSESSMENT — PAIN - FUNCTIONAL ASSESSMENT: PAIN_FUNCTIONAL_ASSESSMENT: 0-10

## 2019-01-17 PROBLEM — M43.10 ACQUIRED SPONDYLOLISTHESIS: Status: ACTIVE | Noted: 2019-01-17

## 2019-01-18 ENCOUNTER — OFFICE VISIT (OUTPATIENT)
Dept: ORTHOPEDIC SURGERY | Age: 69
End: 2019-01-18
Payer: MEDICARE

## 2019-01-18 ENCOUNTER — HOSPITAL ENCOUNTER (OUTPATIENT)
Dept: GENERAL RADIOLOGY | Age: 69
Discharge: HOME | End: 2019-01-20
Payer: MEDICARE

## 2019-01-18 VITALS
HEIGHT: 64 IN | WEIGHT: 267 LBS | SYSTOLIC BLOOD PRESSURE: 114 MMHG | BODY MASS INDEX: 45.58 KG/M2 | HEART RATE: 64 BPM | DIASTOLIC BLOOD PRESSURE: 60 MMHG

## 2019-01-18 DIAGNOSIS — M54.41 CHRONIC RIGHT-SIDED LOW BACK PAIN WITH RIGHT-SIDED SCIATICA: ICD-10-CM

## 2019-01-18 DIAGNOSIS — M54.50 LUMBAR SPINE PAIN: ICD-10-CM

## 2019-01-18 DIAGNOSIS — G89.29 CHRONIC RIGHT-SIDED LOW BACK PAIN WITH RIGHT-SIDED SCIATICA: ICD-10-CM

## 2019-01-18 DIAGNOSIS — M47.817 LUMBOSACRAL SPONDYLOSIS WITHOUT MYELOPATHY: ICD-10-CM

## 2019-01-18 DIAGNOSIS — M81.0 AGE-RELATED OSTEOPOROSIS WITHOUT CURRENT PATHOLOGICAL FRACTURE: ICD-10-CM

## 2019-01-18 DIAGNOSIS — M43.16 SPONDYLOLISTHESIS OF LUMBAR REGION: ICD-10-CM

## 2019-01-18 DIAGNOSIS — M47.817 LUMBOSACRAL SPONDYLOSIS WITHOUT MYELOPATHY: Primary | ICD-10-CM

## 2019-01-18 DIAGNOSIS — M48.061 SPINAL STENOSIS OF LUMBAR REGION WITHOUT NEUROGENIC CLAUDICATION: ICD-10-CM

## 2019-01-18 PROCEDURE — 0518F FALL PLAN OF CARE DOCD: CPT | Performed by: ORTHOPAEDIC SURGERY

## 2019-01-18 PROCEDURE — 1123F ACP DISCUSS/DSCN MKR DOCD: CPT | Performed by: ORTHOPAEDIC SURGERY

## 2019-01-18 PROCEDURE — 3288F FALL RISK ASSESSMENT DOCD: CPT | Performed by: ORTHOPAEDIC SURGERY

## 2019-01-18 PROCEDURE — 99213 OFFICE O/P EST LOW 20 MIN: CPT | Performed by: ORTHOPAEDIC SURGERY

## 2019-01-18 PROCEDURE — 1100F PTFALLS ASSESS-DOCD GE2>/YR: CPT | Performed by: ORTHOPAEDIC SURGERY

## 2019-01-18 PROCEDURE — 72120 X-RAY BEND ONLY L-S SPINE: CPT

## 2019-01-18 PROCEDURE — G8482 FLU IMMUNIZE ORDER/ADMIN: HCPCS | Performed by: ORTHOPAEDIC SURGERY

## 2019-01-18 PROCEDURE — G8417 CALC BMI ABV UP PARAM F/U: HCPCS | Performed by: ORTHOPAEDIC SURGERY

## 2019-01-18 PROCEDURE — G8400 PT W/DXA NO RESULTS DOC: HCPCS | Performed by: ORTHOPAEDIC SURGERY

## 2019-01-18 PROCEDURE — G8427 DOCREV CUR MEDS BY ELIG CLIN: HCPCS | Performed by: ORTHOPAEDIC SURGERY

## 2019-01-18 PROCEDURE — 1090F PRES/ABSN URINE INCON ASSESS: CPT | Performed by: ORTHOPAEDIC SURGERY

## 2019-01-18 PROCEDURE — 4040F PNEUMOC VAC/ADMIN/RCVD: CPT | Performed by: ORTHOPAEDIC SURGERY

## 2019-01-18 PROCEDURE — 1036F TOBACCO NON-USER: CPT | Performed by: ORTHOPAEDIC SURGERY

## 2019-01-18 PROCEDURE — G8598 ASA/ANTIPLAT THER USED: HCPCS | Performed by: ORTHOPAEDIC SURGERY

## 2019-01-18 PROCEDURE — 3017F COLORECTAL CA SCREEN DOC REV: CPT | Performed by: ORTHOPAEDIC SURGERY

## 2019-01-21 ENCOUNTER — HOSPITAL ENCOUNTER (OUTPATIENT)
Dept: LAB | Age: 69
Discharge: HOME | End: 2019-01-21
Payer: MEDICARE

## 2019-01-21 DIAGNOSIS — E11.42 TYPE 2 DIABETES MELLITUS WITH DIABETIC POLYNEUROPATHY, WITH LONG-TERM CURRENT USE OF INSULIN (HCC): ICD-10-CM

## 2019-01-21 DIAGNOSIS — I25.10 CORONARY ARTERY DISEASE DUE TO LIPID RICH PLAQUE: ICD-10-CM

## 2019-01-21 DIAGNOSIS — I25.83 CORONARY ARTERY DISEASE DUE TO LIPID RICH PLAQUE: ICD-10-CM

## 2019-01-21 DIAGNOSIS — R53.82 CHRONIC FATIGUE: ICD-10-CM

## 2019-01-21 DIAGNOSIS — Z79.4 TYPE 2 DIABETES MELLITUS WITH DIABETIC POLYNEUROPATHY, WITH LONG-TERM CURRENT USE OF INSULIN (HCC): ICD-10-CM

## 2019-01-21 DIAGNOSIS — E55.9 VITAMIN D DEFICIENCY: ICD-10-CM

## 2019-01-21 LAB
CREATININE URINE: 14 MG/DL (ref 28–217)
ESTIMATED AVERAGE GLUCOSE: 151 MG/DL
HBA1C MFR BLD: 6.9 % (ref 4.8–5.9)
HEMOGLOBIN: 13.2 G/DL (ref 12–16)
IRON SATURATION: 31 % (ref 20–55)
IRON: 102 UG/DL (ref 37–145)
MICROALBUMIN/CREAT 24H UR: <12 MG/L
MICROALBUMIN/CREAT UR-RTO: ABNORMAL MCG/MG CREAT
TOTAL IRON BINDING CAPACITY: 334 UG/DL (ref 250–450)
UNSATURATED IRON BINDING CAPACITY: 232 UG/DL (ref 112–347)

## 2019-01-21 PROCEDURE — 82570 ASSAY OF URINE CREATININE: CPT

## 2019-01-21 PROCEDURE — 83540 ASSAY OF IRON: CPT

## 2019-01-21 PROCEDURE — 83550 IRON BINDING TEST: CPT

## 2019-01-21 PROCEDURE — 85018 HEMOGLOBIN: CPT

## 2019-01-21 PROCEDURE — 82043 UR ALBUMIN QUANTITATIVE: CPT

## 2019-01-21 PROCEDURE — 83036 HEMOGLOBIN GLYCOSYLATED A1C: CPT

## 2019-01-21 PROCEDURE — 36415 COLL VENOUS BLD VENIPUNCTURE: CPT

## 2019-01-25 RX ORDER — UMECLIDINIUM BROMIDE AND VILANTEROL TRIFENATATE 62.5; 25 UG/1; UG/1
POWDER RESPIRATORY (INHALATION)
Qty: 3 EACH | Refills: 3 | Status: SHIPPED | OUTPATIENT
Start: 2019-01-25 | End: 2019-01-28 | Stop reason: SDUPTHER

## 2019-01-28 ENCOUNTER — OFFICE VISIT (OUTPATIENT)
Dept: INTERNAL MEDICINE | Age: 69
End: 2019-01-28
Payer: MEDICARE

## 2019-01-28 VITALS
BODY MASS INDEX: 45.75 KG/M2 | HEART RATE: 60 BPM | WEIGHT: 268 LBS | SYSTOLIC BLOOD PRESSURE: 110 MMHG | RESPIRATION RATE: 20 BRPM | HEIGHT: 64 IN | DIASTOLIC BLOOD PRESSURE: 70 MMHG

## 2019-01-28 DIAGNOSIS — Z00.00 ROUTINE GENERAL MEDICAL EXAMINATION AT A HEALTH CARE FACILITY: Primary | ICD-10-CM

## 2019-01-28 DIAGNOSIS — F32.A DEPRESSION, UNSPECIFIED DEPRESSION TYPE: ICD-10-CM

## 2019-01-28 DIAGNOSIS — I25.83 CORONARY ARTERY DISEASE DUE TO LIPID RICH PLAQUE: ICD-10-CM

## 2019-01-28 DIAGNOSIS — R53.83 FATIGUE, UNSPECIFIED TYPE: ICD-10-CM

## 2019-01-28 DIAGNOSIS — I67.82 CHRONIC CEREBRAL ISCHEMIA: ICD-10-CM

## 2019-01-28 DIAGNOSIS — E11.42 TYPE 2 DIABETES MELLITUS WITH DIABETIC POLYNEUROPATHY, WITH LONG-TERM CURRENT USE OF INSULIN (HCC): ICD-10-CM

## 2019-01-28 DIAGNOSIS — Z00.00 MEDICARE ANNUAL WELLNESS VISIT, SUBSEQUENT: ICD-10-CM

## 2019-01-28 DIAGNOSIS — I25.10 CORONARY ARTERY DISEASE DUE TO LIPID RICH PLAQUE: ICD-10-CM

## 2019-01-28 DIAGNOSIS — G45.9 TIA (TRANSIENT ISCHEMIC ATTACK): ICD-10-CM

## 2019-01-28 DIAGNOSIS — E55.9 VITAMIN D DEFICIENCY: ICD-10-CM

## 2019-01-28 DIAGNOSIS — Z79.4 TYPE 2 DIABETES MELLITUS WITH DIABETIC POLYNEUROPATHY, WITH LONG-TERM CURRENT USE OF INSULIN (HCC): ICD-10-CM

## 2019-01-28 PROCEDURE — 3044F HG A1C LEVEL LT 7.0%: CPT | Performed by: INTERNAL MEDICINE

## 2019-01-28 PROCEDURE — G8482 FLU IMMUNIZE ORDER/ADMIN: HCPCS | Performed by: INTERNAL MEDICINE

## 2019-01-28 PROCEDURE — G0439 PPPS, SUBSEQ VISIT: HCPCS | Performed by: INTERNAL MEDICINE

## 2019-01-28 PROCEDURE — 0518F FALL PLAN OF CARE DOCD: CPT | Performed by: INTERNAL MEDICINE

## 2019-01-28 PROCEDURE — 1123F ACP DISCUSS/DSCN MKR DOCD: CPT | Performed by: INTERNAL MEDICINE

## 2019-01-28 PROCEDURE — 99214 OFFICE O/P EST MOD 30 MIN: CPT | Performed by: INTERNAL MEDICINE

## 2019-01-28 PROCEDURE — 1100F PTFALLS ASSESS-DOCD GE2>/YR: CPT | Performed by: INTERNAL MEDICINE

## 2019-01-28 PROCEDURE — 3017F COLORECTAL CA SCREEN DOC REV: CPT | Performed by: INTERNAL MEDICINE

## 2019-01-28 PROCEDURE — 3288F FALL RISK ASSESSMENT DOCD: CPT | Performed by: INTERNAL MEDICINE

## 2019-01-28 PROCEDURE — 2022F DILAT RTA XM EVC RTNOPTHY: CPT | Performed by: INTERNAL MEDICINE

## 2019-01-28 PROCEDURE — G8598 ASA/ANTIPLAT THER USED: HCPCS | Performed by: INTERNAL MEDICINE

## 2019-01-28 PROCEDURE — 1090F PRES/ABSN URINE INCON ASSESS: CPT | Performed by: INTERNAL MEDICINE

## 2019-01-28 PROCEDURE — G8400 PT W/DXA NO RESULTS DOC: HCPCS | Performed by: INTERNAL MEDICINE

## 2019-01-28 PROCEDURE — 4040F PNEUMOC VAC/ADMIN/RCVD: CPT | Performed by: INTERNAL MEDICINE

## 2019-01-28 PROCEDURE — 1036F TOBACCO NON-USER: CPT | Performed by: INTERNAL MEDICINE

## 2019-01-28 PROCEDURE — G8417 CALC BMI ABV UP PARAM F/U: HCPCS | Performed by: INTERNAL MEDICINE

## 2019-01-28 PROCEDURE — G8427 DOCREV CUR MEDS BY ELIG CLIN: HCPCS | Performed by: INTERNAL MEDICINE

## 2019-01-28 RX ORDER — CITALOPRAM 10 MG/1
TABLET ORAL
Qty: 90 TABLET | Refills: 3 | Status: SHIPPED | OUTPATIENT
Start: 2019-01-28 | End: 2020-02-03 | Stop reason: SDUPTHER

## 2019-01-28 RX ORDER — FUROSEMIDE 40 MG/1
40 TABLET ORAL DAILY
COMMUNITY
End: 2019-01-28 | Stop reason: SDUPTHER

## 2019-01-28 RX ORDER — ALBUTEROL SULFATE 90 UG/1
2 AEROSOL, METERED RESPIRATORY (INHALATION) EVERY 4 HOURS PRN
Qty: 1 INHALER | Refills: 5 | Status: SHIPPED | OUTPATIENT
Start: 2019-01-28 | End: 2019-07-23 | Stop reason: SDUPTHER

## 2019-01-28 RX ORDER — BUPROPION HYDROCHLORIDE 75 MG/1
TABLET ORAL
Qty: 180 TABLET | Refills: 3 | Status: SHIPPED | OUTPATIENT
Start: 2019-01-28 | End: 2019-10-30 | Stop reason: SDUPTHER

## 2019-01-28 RX ORDER — POTASSIUM CHLORIDE 750 MG/1
CAPSULE, EXTENDED RELEASE ORAL
Qty: 180 CAPSULE | Refills: 3 | Status: SHIPPED | OUTPATIENT
Start: 2019-01-28 | End: 2020-01-10

## 2019-01-28 RX ORDER — LISINOPRIL 10 MG/1
TABLET ORAL
Qty: 90 TABLET | Refills: 3 | Status: SHIPPED | OUTPATIENT
Start: 2019-01-28 | End: 2020-02-03 | Stop reason: SDUPTHER

## 2019-01-28 RX ORDER — ALENDRONATE SODIUM 70 MG/1
70 TABLET ORAL
Qty: 12 TABLET | Refills: 3 | Status: SHIPPED | OUTPATIENT
Start: 2019-01-28 | End: 2020-01-09

## 2019-01-28 RX ORDER — FUROSEMIDE 40 MG/1
40 TABLET ORAL DAILY
Qty: 90 TABLET | Refills: 3 | Status: SHIPPED | OUTPATIENT
Start: 2019-01-28 | End: 2020-02-03 | Stop reason: SDUPTHER

## 2019-01-28 RX ORDER — AMLODIPINE BESYLATE 5 MG/1
TABLET ORAL
Qty: 90 TABLET | Refills: 3 | Status: SHIPPED | OUTPATIENT
Start: 2019-01-28 | End: 2020-02-03 | Stop reason: SDUPTHER

## 2019-01-28 RX ORDER — PANTOPRAZOLE SODIUM 40 MG/1
TABLET, DELAYED RELEASE ORAL
Qty: 90 TABLET | Refills: 3 | Status: SHIPPED | OUTPATIENT
Start: 2019-01-28 | End: 2020-02-03 | Stop reason: SDUPTHER

## 2019-01-28 RX ORDER — SPIRONOLACTONE 50 MG/1
TABLET, FILM COATED ORAL
Qty: 180 TABLET | Refills: 3 | Status: SHIPPED | OUTPATIENT
Start: 2019-01-28 | End: 2020-02-03 | Stop reason: SDUPTHER

## 2019-01-28 RX ORDER — ATORVASTATIN CALCIUM 40 MG/1
40 TABLET, FILM COATED ORAL DAILY
Qty: 90 TABLET | Refills: 2 | Status: SHIPPED | OUTPATIENT
Start: 2019-01-28 | End: 2019-10-24 | Stop reason: SDUPTHER

## 2019-01-28 RX ORDER — AMITRIPTYLINE HYDROCHLORIDE 25 MG/1
TABLET, FILM COATED ORAL
Qty: 90 TABLET | Refills: 3 | Status: SHIPPED | OUTPATIENT
Start: 2019-01-28 | End: 2019-10-30 | Stop reason: SDUPTHER

## 2019-01-28 RX ORDER — CLOPIDOGREL BISULFATE 75 MG/1
75 TABLET ORAL DAILY
Qty: 90 TABLET | Refills: 3 | Status: SHIPPED | OUTPATIENT
Start: 2019-01-28 | End: 2019-11-20 | Stop reason: SDUPTHER

## 2019-01-28 ASSESSMENT — ENCOUNTER SYMPTOMS
BLOOD IN STOOL: 0
BACK PAIN: 0
COUGH: 0
EYE PAIN: 0
CONSTIPATION: 0
VOMITING: 0
ABDOMINAL PAIN: 0
SHORTNESS OF BREATH: 0
DIARRHEA: 0
NAUSEA: 0

## 2019-01-28 ASSESSMENT — LIFESTYLE VARIABLES: HOW OFTEN DO YOU HAVE A DRINK CONTAINING ALCOHOL: 0

## 2019-01-28 ASSESSMENT — PATIENT HEALTH QUESTIONNAIRE - PHQ9
SUM OF ALL RESPONSES TO PHQ QUESTIONS 1-9: 0
SUM OF ALL RESPONSES TO PHQ QUESTIONS 1-9: 0

## 2019-01-28 ASSESSMENT — ANXIETY QUESTIONNAIRES
GAD7 TOTAL SCORE: 0
GAD7 TOTAL SCORE: 0

## 2019-01-30 RX ORDER — IBUPROFEN 400 MG/1
TABLET ORAL
Qty: 360 TABLET | Refills: 3 | Status: SHIPPED | OUTPATIENT
Start: 2019-01-30 | End: 2019-11-20 | Stop reason: SDUPTHER

## 2019-02-08 ENCOUNTER — APPOINTMENT (OUTPATIENT)
Dept: GENERAL RADIOLOGY | Age: 69
End: 2019-02-08
Attending: PHYSICAL MEDICINE & REHABILITATION
Payer: MEDICARE

## 2019-02-08 ENCOUNTER — HOSPITAL ENCOUNTER (OUTPATIENT)
Age: 69
Setting detail: OUTPATIENT SURGERY
Discharge: HOME | End: 2019-02-08
Attending: PHYSICAL MEDICINE & REHABILITATION | Admitting: PHYSICAL MEDICINE & REHABILITATION
Payer: MEDICARE

## 2019-02-08 VITALS
OXYGEN SATURATION: 93 % | TEMPERATURE: 97.3 F | RESPIRATION RATE: 16 BRPM | WEIGHT: 268 LBS | HEIGHT: 64 IN | DIASTOLIC BLOOD PRESSURE: 69 MMHG | SYSTOLIC BLOOD PRESSURE: 107 MMHG | HEART RATE: 49 BPM | BODY MASS INDEX: 45.75 KG/M2

## 2019-02-08 PROCEDURE — 99152 MOD SED SAME PHYS/QHP 5/>YRS: CPT | Performed by: PHYSICAL MEDICINE & REHABILITATION

## 2019-02-08 PROCEDURE — 3209999900 FLUORO FOR SURGICAL PROCEDURES

## 2019-02-08 PROCEDURE — 2709999900 HC NON-CHARGEABLE SUPPLY: Performed by: PHYSICAL MEDICINE & REHABILITATION

## 2019-02-08 PROCEDURE — 6360000002 HC RX W HCPCS: Performed by: PHYSICAL MEDICINE & REHABILITATION

## 2019-02-08 PROCEDURE — 2580000003 HC RX 258: Performed by: PHYSICAL MEDICINE & REHABILITATION

## 2019-02-08 PROCEDURE — 7100000011 HC PHASE II RECOVERY - ADDTL 15 MIN: Performed by: PHYSICAL MEDICINE & REHABILITATION

## 2019-02-08 PROCEDURE — 7100000010 HC PHASE II RECOVERY - FIRST 15 MIN: Performed by: PHYSICAL MEDICINE & REHABILITATION

## 2019-02-08 PROCEDURE — 64483 NJX AA&/STRD TFRM EPI L/S 1: CPT | Performed by: PHYSICAL MEDICINE & REHABILITATION

## 2019-02-08 PROCEDURE — 2500000003 HC RX 250 WO HCPCS: Performed by: PHYSICAL MEDICINE & REHABILITATION

## 2019-02-08 PROCEDURE — 3600000056 HC PAIN LEVEL 4 BASE: Performed by: PHYSICAL MEDICINE & REHABILITATION

## 2019-02-08 RX ORDER — SODIUM CHLORIDE, SODIUM LACTATE, POTASSIUM CHLORIDE, CALCIUM CHLORIDE 600; 310; 30; 20 MG/100ML; MG/100ML; MG/100ML; MG/100ML
INJECTION, SOLUTION INTRAVENOUS CONTINUOUS
Status: DISCONTINUED | OUTPATIENT
Start: 2019-02-08 | End: 2019-02-08 | Stop reason: HOSPADM

## 2019-02-08 RX ORDER — SODIUM CHLORIDE 0.9 % (FLUSH) 0.9 %
10 SYRINGE (ML) INJECTION EVERY 12 HOURS SCHEDULED
Status: DISCONTINUED | OUTPATIENT
Start: 2019-02-08 | End: 2019-02-08 | Stop reason: HOSPADM

## 2019-02-08 RX ORDER — MIDAZOLAM HYDROCHLORIDE 1 MG/ML
INJECTION INTRAMUSCULAR; INTRAVENOUS PRN
Status: DISCONTINUED | OUTPATIENT
Start: 2019-02-08 | End: 2019-02-08 | Stop reason: ALTCHOICE

## 2019-02-08 RX ORDER — SODIUM CHLORIDE 0.9 % (FLUSH) 0.9 %
10 SYRINGE (ML) INJECTION PRN
Status: DISCONTINUED | OUTPATIENT
Start: 2019-02-08 | End: 2019-02-08 | Stop reason: HOSPADM

## 2019-02-08 RX ORDER — 0.9 % SODIUM CHLORIDE 0.9 %
VIAL (ML) INJECTION PRN
Status: DISCONTINUED | OUTPATIENT
Start: 2019-02-08 | End: 2019-02-08 | Stop reason: ALTCHOICE

## 2019-02-08 RX ORDER — DEXAMETHASONE SODIUM PHOSPHATE 10 MG/ML
INJECTION INTRAMUSCULAR; INTRAVENOUS PRN
Status: DISCONTINUED | OUTPATIENT
Start: 2019-02-08 | End: 2019-02-08 | Stop reason: ALTCHOICE

## 2019-02-08 RX ORDER — FENTANYL CITRATE 50 UG/ML
INJECTION, SOLUTION INTRAMUSCULAR; INTRAVENOUS PRN
Status: DISCONTINUED | OUTPATIENT
Start: 2019-02-08 | End: 2019-02-08 | Stop reason: ALTCHOICE

## 2019-02-08 RX ORDER — BUPIVACAINE HYDROCHLORIDE 5 MG/ML
INJECTION, SOLUTION EPIDURAL; INTRACAUDAL PRN
Status: DISCONTINUED | OUTPATIENT
Start: 2019-02-08 | End: 2019-02-08 | Stop reason: ALTCHOICE

## 2019-02-08 RX ADMIN — SODIUM CHLORIDE, POTASSIUM CHLORIDE, SODIUM LACTATE AND CALCIUM CHLORIDE: 600; 310; 30; 20 INJECTION, SOLUTION INTRAVENOUS at 09:15

## 2019-02-08 ASSESSMENT — PAIN SCALES - GENERAL
PAINLEVEL_OUTOF10: 0
PAINLEVEL_OUTOF10: 0

## 2019-02-08 ASSESSMENT — PAIN DESCRIPTION - DESCRIPTORS: DESCRIPTORS: DULL;BURNING

## 2019-02-08 ASSESSMENT — PAIN - FUNCTIONAL ASSESSMENT: PAIN_FUNCTIONAL_ASSESSMENT: 0-10

## 2019-02-11 ENCOUNTER — OFFICE VISIT (OUTPATIENT)
Dept: NEUROLOGY | Age: 69
End: 2019-02-11
Payer: MEDICARE

## 2019-02-11 VITALS
HEART RATE: 59 BPM | BODY MASS INDEX: 44.9 KG/M2 | DIASTOLIC BLOOD PRESSURE: 71 MMHG | HEIGHT: 64 IN | SYSTOLIC BLOOD PRESSURE: 113 MMHG | WEIGHT: 263 LBS

## 2019-02-11 DIAGNOSIS — G62.9 PERIPHERAL POLYNEUROPATHY: ICD-10-CM

## 2019-02-11 DIAGNOSIS — R42 DIZZINESS: ICD-10-CM

## 2019-02-11 DIAGNOSIS — R00.1 BRADYCARDIA: Primary | ICD-10-CM

## 2019-02-11 DIAGNOSIS — M54.50 BACK PAIN AT L4-L5 LEVEL: ICD-10-CM

## 2019-02-11 DIAGNOSIS — R29.898 LEG WEAKNESS, BILATERAL: ICD-10-CM

## 2019-02-11 DIAGNOSIS — G47.33 OSA (OBSTRUCTIVE SLEEP APNEA): ICD-10-CM

## 2019-02-11 DIAGNOSIS — R51.9 HEADACHE DISORDER: ICD-10-CM

## 2019-02-11 DIAGNOSIS — R53.82 CHRONIC FATIGUE: ICD-10-CM

## 2019-02-11 PROCEDURE — 3288F FALL RISK ASSESSMENT DOCD: CPT | Performed by: PSYCHIATRY & NEUROLOGY

## 2019-02-11 PROCEDURE — 3017F COLORECTAL CA SCREEN DOC REV: CPT | Performed by: PSYCHIATRY & NEUROLOGY

## 2019-02-11 PROCEDURE — G8427 DOCREV CUR MEDS BY ELIG CLIN: HCPCS | Performed by: PSYCHIATRY & NEUROLOGY

## 2019-02-11 PROCEDURE — G8482 FLU IMMUNIZE ORDER/ADMIN: HCPCS | Performed by: PSYCHIATRY & NEUROLOGY

## 2019-02-11 PROCEDURE — 1100F PTFALLS ASSESS-DOCD GE2>/YR: CPT | Performed by: PSYCHIATRY & NEUROLOGY

## 2019-02-11 PROCEDURE — 1090F PRES/ABSN URINE INCON ASSESS: CPT | Performed by: PSYCHIATRY & NEUROLOGY

## 2019-02-11 PROCEDURE — G8417 CALC BMI ABV UP PARAM F/U: HCPCS | Performed by: PSYCHIATRY & NEUROLOGY

## 2019-02-11 PROCEDURE — 4040F PNEUMOC VAC/ADMIN/RCVD: CPT | Performed by: PSYCHIATRY & NEUROLOGY

## 2019-02-11 PROCEDURE — 0518F FALL PLAN OF CARE DOCD: CPT | Performed by: PSYCHIATRY & NEUROLOGY

## 2019-02-11 PROCEDURE — 99205 OFFICE O/P NEW HI 60 MIN: CPT | Performed by: PSYCHIATRY & NEUROLOGY

## 2019-02-11 PROCEDURE — G8598 ASA/ANTIPLAT THER USED: HCPCS | Performed by: PSYCHIATRY & NEUROLOGY

## 2019-02-11 PROCEDURE — 1036F TOBACCO NON-USER: CPT | Performed by: PSYCHIATRY & NEUROLOGY

## 2019-02-11 PROCEDURE — 1123F ACP DISCUSS/DSCN MKR DOCD: CPT | Performed by: PSYCHIATRY & NEUROLOGY

## 2019-02-11 PROCEDURE — G8400 PT W/DXA NO RESULTS DOC: HCPCS | Performed by: PSYCHIATRY & NEUROLOGY

## 2019-02-25 ENCOUNTER — TELEPHONE (OUTPATIENT)
Dept: INTERNAL MEDICINE | Age: 69
End: 2019-02-25

## 2019-02-25 DIAGNOSIS — I25.10 CORONARY ARTERY DISEASE DUE TO LIPID RICH PLAQUE: ICD-10-CM

## 2019-02-25 DIAGNOSIS — E11.42 TYPE 2 DIABETES MELLITUS WITH DIABETIC POLYNEUROPATHY, WITH LONG-TERM CURRENT USE OF INSULIN (HCC): Primary | ICD-10-CM

## 2019-02-25 DIAGNOSIS — I25.83 CORONARY ARTERY DISEASE DUE TO LIPID RICH PLAQUE: ICD-10-CM

## 2019-02-25 DIAGNOSIS — M43.10 ACQUIRED SPONDYLOLISTHESIS: ICD-10-CM

## 2019-02-25 DIAGNOSIS — R53.82 CHRONIC FATIGUE: ICD-10-CM

## 2019-02-25 DIAGNOSIS — Z79.4 TYPE 2 DIABETES MELLITUS WITH DIABETIC POLYNEUROPATHY, WITH LONG-TERM CURRENT USE OF INSULIN (HCC): Primary | ICD-10-CM

## 2019-02-25 DIAGNOSIS — M54.16 LUMBAR RADICULOPATHY: ICD-10-CM

## 2019-02-25 DIAGNOSIS — M47.817 LUMBOSACRAL SPONDYLOSIS WITHOUT MYELOPATHY: ICD-10-CM

## 2019-02-25 RX ORDER — MEMANTINE HYDROCHLORIDE 10 MG/1
10 TABLET ORAL 2 TIMES DAILY
COMMUNITY
End: 2019-04-08

## 2019-03-15 DIAGNOSIS — G89.29 CHRONIC RIGHT-SIDED LOW BACK PAIN WITH RIGHT-SIDED SCIATICA: ICD-10-CM

## 2019-03-15 DIAGNOSIS — M54.41 CHRONIC RIGHT-SIDED LOW BACK PAIN WITH RIGHT-SIDED SCIATICA: ICD-10-CM

## 2019-03-15 RX ORDER — GABAPENTIN 300 MG/1
CAPSULE ORAL
Qty: 360 CAPSULE | Refills: 3 | Status: ON HOLD | OUTPATIENT
Start: 2019-03-15 | End: 2020-01-20

## 2019-04-08 ENCOUNTER — OFFICE VISIT (OUTPATIENT)
Dept: NEUROLOGY | Age: 69
End: 2019-04-08
Payer: MEDICARE

## 2019-04-08 VITALS
HEART RATE: 54 BPM | DIASTOLIC BLOOD PRESSURE: 63 MMHG | BODY MASS INDEX: 45.07 KG/M2 | HEIGHT: 64 IN | SYSTOLIC BLOOD PRESSURE: 108 MMHG | WEIGHT: 264 LBS

## 2019-04-08 DIAGNOSIS — R00.1 BRADYCARDIA: ICD-10-CM

## 2019-04-08 DIAGNOSIS — M54.50 BACK PAIN AT L4-L5 LEVEL: ICD-10-CM

## 2019-04-08 DIAGNOSIS — E11.65 POORLY CONTROLLED DIABETES MELLITUS (HCC): ICD-10-CM

## 2019-04-08 DIAGNOSIS — R51.9 HEADACHE DISORDER: Primary | ICD-10-CM

## 2019-04-08 DIAGNOSIS — R25.2 MUSCLE CRAMP: ICD-10-CM

## 2019-04-08 DIAGNOSIS — E66.01 OBESITY, CLASS III, BMI 40-49.9 (MORBID OBESITY) (HCC): ICD-10-CM

## 2019-04-08 DIAGNOSIS — G62.9 PERIPHERAL POLYNEUROPATHY: ICD-10-CM

## 2019-04-08 PROCEDURE — G8400 PT W/DXA NO RESULTS DOC: HCPCS | Performed by: PSYCHIATRY & NEUROLOGY

## 2019-04-08 PROCEDURE — 0518F FALL PLAN OF CARE DOCD: CPT | Performed by: PSYCHIATRY & NEUROLOGY

## 2019-04-08 PROCEDURE — 3044F HG A1C LEVEL LT 7.0%: CPT | Performed by: PSYCHIATRY & NEUROLOGY

## 2019-04-08 PROCEDURE — 99215 OFFICE O/P EST HI 40 MIN: CPT | Performed by: PSYCHIATRY & NEUROLOGY

## 2019-04-08 PROCEDURE — 2022F DILAT RTA XM EVC RTNOPTHY: CPT | Performed by: PSYCHIATRY & NEUROLOGY

## 2019-04-08 PROCEDURE — G8598 ASA/ANTIPLAT THER USED: HCPCS | Performed by: PSYCHIATRY & NEUROLOGY

## 2019-04-08 PROCEDURE — 3288F FALL RISK ASSESSMENT DOCD: CPT | Performed by: PSYCHIATRY & NEUROLOGY

## 2019-04-08 PROCEDURE — 1123F ACP DISCUSS/DSCN MKR DOCD: CPT | Performed by: PSYCHIATRY & NEUROLOGY

## 2019-04-08 PROCEDURE — 3017F COLORECTAL CA SCREEN DOC REV: CPT | Performed by: PSYCHIATRY & NEUROLOGY

## 2019-04-08 PROCEDURE — G8427 DOCREV CUR MEDS BY ELIG CLIN: HCPCS | Performed by: PSYCHIATRY & NEUROLOGY

## 2019-04-08 PROCEDURE — 4040F PNEUMOC VAC/ADMIN/RCVD: CPT | Performed by: PSYCHIATRY & NEUROLOGY

## 2019-04-08 PROCEDURE — 1036F TOBACCO NON-USER: CPT | Performed by: PSYCHIATRY & NEUROLOGY

## 2019-04-08 PROCEDURE — 1090F PRES/ABSN URINE INCON ASSESS: CPT | Performed by: PSYCHIATRY & NEUROLOGY

## 2019-04-08 PROCEDURE — G8417 CALC BMI ABV UP PARAM F/U: HCPCS | Performed by: PSYCHIATRY & NEUROLOGY

## 2019-04-08 NOTE — PATIENT INSTRUCTIONS
1. Please discuss with your primary about taking off one of Elavil and gabapentin (side effects of gaining weight and increasing blood sugar)  2. Consider topamax for overweight, neuropathic pain and headache  3. Need good blood sugar control   4. Follow with your cardiology for bradycardia. 5. Fall precaution  6.  Check vitamin D level    Return in 6-8 weeks    Tricia Roberts MD, MS
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
## 2019-04-15 ENCOUNTER — TELEPHONE (OUTPATIENT)
Dept: PAIN MANAGEMENT | Age: 69
End: 2019-04-15

## 2019-04-15 ENCOUNTER — OFFICE VISIT (OUTPATIENT)
Dept: PAIN MANAGEMENT | Age: 69
End: 2019-04-15
Payer: MEDICARE

## 2019-04-15 ENCOUNTER — OFFICE VISIT (OUTPATIENT)
Dept: INTERNAL MEDICINE | Age: 69
End: 2019-04-15
Payer: MEDICARE

## 2019-04-15 VITALS
BODY MASS INDEX: 45.07 KG/M2 | HEART RATE: 72 BPM | HEIGHT: 64 IN | DIASTOLIC BLOOD PRESSURE: 60 MMHG | SYSTOLIC BLOOD PRESSURE: 100 MMHG | WEIGHT: 264 LBS

## 2019-04-15 VITALS
HEART RATE: 60 BPM | WEIGHT: 264 LBS | DIASTOLIC BLOOD PRESSURE: 74 MMHG | HEIGHT: 64 IN | BODY MASS INDEX: 45.07 KG/M2 | SYSTOLIC BLOOD PRESSURE: 110 MMHG | RESPIRATION RATE: 16 BRPM

## 2019-04-15 DIAGNOSIS — R53.83 FATIGUE, UNSPECIFIED TYPE: ICD-10-CM

## 2019-04-15 DIAGNOSIS — E11.42 TYPE 2 DIABETES MELLITUS WITH DIABETIC POLYNEUROPATHY, WITH LONG-TERM CURRENT USE OF INSULIN (HCC): ICD-10-CM

## 2019-04-15 DIAGNOSIS — M51.36 DDD (DEGENERATIVE DISC DISEASE), LUMBAR: ICD-10-CM

## 2019-04-15 DIAGNOSIS — I25.83 CORONARY ARTERY DISEASE DUE TO LIPID RICH PLAQUE: Primary | ICD-10-CM

## 2019-04-15 DIAGNOSIS — M54.16 LUMBAR RADICULOPATHY: ICD-10-CM

## 2019-04-15 DIAGNOSIS — F32.A DEPRESSION, UNSPECIFIED DEPRESSION TYPE: ICD-10-CM

## 2019-04-15 DIAGNOSIS — G89.29 CHRONIC RIGHT-SIDED LOW BACK PAIN WITH RIGHT-SIDED SCIATICA: ICD-10-CM

## 2019-04-15 DIAGNOSIS — I25.10 CORONARY ARTERY DISEASE DUE TO LIPID RICH PLAQUE: Primary | ICD-10-CM

## 2019-04-15 DIAGNOSIS — Z79.4 TYPE 2 DIABETES MELLITUS WITH DIABETIC POLYNEUROPATHY, WITH LONG-TERM CURRENT USE OF INSULIN (HCC): ICD-10-CM

## 2019-04-15 DIAGNOSIS — M54.41 CHRONIC RIGHT-SIDED LOW BACK PAIN WITH RIGHT-SIDED SCIATICA: ICD-10-CM

## 2019-04-15 DIAGNOSIS — M47.817 LUMBOSACRAL SPONDYLOSIS WITHOUT MYELOPATHY: Primary | ICD-10-CM

## 2019-04-15 PROCEDURE — 1036F TOBACCO NON-USER: CPT | Performed by: NURSE PRACTITIONER

## 2019-04-15 PROCEDURE — 1090F PRES/ABSN URINE INCON ASSESS: CPT | Performed by: NURSE PRACTITIONER

## 2019-04-15 PROCEDURE — 1123F ACP DISCUSS/DSCN MKR DOCD: CPT | Performed by: NURSE PRACTITIONER

## 2019-04-15 PROCEDURE — 3288F FALL RISK ASSESSMENT DOCD: CPT | Performed by: NURSE PRACTITIONER

## 2019-04-15 PROCEDURE — 99214 OFFICE O/P EST MOD 30 MIN: CPT | Performed by: NURSE PRACTITIONER

## 2019-04-15 PROCEDURE — 1123F ACP DISCUSS/DSCN MKR DOCD: CPT | Performed by: INTERNAL MEDICINE

## 2019-04-15 PROCEDURE — G8427 DOCREV CUR MEDS BY ELIG CLIN: HCPCS | Performed by: INTERNAL MEDICINE

## 2019-04-15 PROCEDURE — G8400 PT W/DXA NO RESULTS DOC: HCPCS | Performed by: INTERNAL MEDICINE

## 2019-04-15 PROCEDURE — 1036F TOBACCO NON-USER: CPT | Performed by: INTERNAL MEDICINE

## 2019-04-15 PROCEDURE — G8598 ASA/ANTIPLAT THER USED: HCPCS | Performed by: INTERNAL MEDICINE

## 2019-04-15 PROCEDURE — 3288F FALL RISK ASSESSMENT DOCD: CPT | Performed by: INTERNAL MEDICINE

## 2019-04-15 PROCEDURE — G8598 ASA/ANTIPLAT THER USED: HCPCS | Performed by: NURSE PRACTITIONER

## 2019-04-15 PROCEDURE — 0518F FALL PLAN OF CARE DOCD: CPT | Performed by: NURSE PRACTITIONER

## 2019-04-15 PROCEDURE — 0518F FALL PLAN OF CARE DOCD: CPT | Performed by: INTERNAL MEDICINE

## 2019-04-15 PROCEDURE — 4040F PNEUMOC VAC/ADMIN/RCVD: CPT | Performed by: INTERNAL MEDICINE

## 2019-04-15 PROCEDURE — 4040F PNEUMOC VAC/ADMIN/RCVD: CPT | Performed by: NURSE PRACTITIONER

## 2019-04-15 PROCEDURE — 99214 OFFICE O/P EST MOD 30 MIN: CPT | Performed by: INTERNAL MEDICINE

## 2019-04-15 PROCEDURE — G8400 PT W/DXA NO RESULTS DOC: HCPCS | Performed by: NURSE PRACTITIONER

## 2019-04-15 PROCEDURE — 1090F PRES/ABSN URINE INCON ASSESS: CPT | Performed by: INTERNAL MEDICINE

## 2019-04-15 PROCEDURE — G8417 CALC BMI ABV UP PARAM F/U: HCPCS | Performed by: NURSE PRACTITIONER

## 2019-04-15 PROCEDURE — 2022F DILAT RTA XM EVC RTNOPTHY: CPT | Performed by: INTERNAL MEDICINE

## 2019-04-15 PROCEDURE — G8427 DOCREV CUR MEDS BY ELIG CLIN: HCPCS | Performed by: NURSE PRACTITIONER

## 2019-04-15 PROCEDURE — 3017F COLORECTAL CA SCREEN DOC REV: CPT | Performed by: NURSE PRACTITIONER

## 2019-04-15 PROCEDURE — 3044F HG A1C LEVEL LT 7.0%: CPT | Performed by: INTERNAL MEDICINE

## 2019-04-15 PROCEDURE — 3017F COLORECTAL CA SCREEN DOC REV: CPT | Performed by: INTERNAL MEDICINE

## 2019-04-15 PROCEDURE — G8417 CALC BMI ABV UP PARAM F/U: HCPCS | Performed by: INTERNAL MEDICINE

## 2019-04-15 ASSESSMENT — ENCOUNTER SYMPTOMS
CONSTIPATION: 0
NAUSEA: 0
VOMITING: 0
ABDOMINAL PAIN: 0
RESPIRATORY NEGATIVE: 1
COUGH: 0
EYE PAIN: 0
BLOOD IN STOOL: 0
BACK PAIN: 0
DIARRHEA: 0
EYES NEGATIVE: 1
BACK PAIN: 1
CONSTIPATION: 0
SHORTNESS OF BREATH: 0

## 2019-04-15 NOTE — PROGRESS NOTES
[address] [facility]  [facility] [address]  Dept: [phone]  Dept Fax: [phone]  Loc: [phone]     Cody Baker is a 77 y.o. female who presents today for her medical conditions/complaintsas noted below. Cody Baker is c/o of   Chief Complaint   Patient presents with    Coronary Artery Disease    Diabetes    Fatigue    Other     pt wants to discuss Dr. Winchester Bittinger recomendations to stop Elavil or taper Gabapentin. Pt wants to lose weight         HPI:     HPI    Hemoglobin A1C (%)   Date Value   01/21/2019 6.9 (H)   07/16/2018 6.3 (H)   01/22/2018 5.9            Microalb/Crt.  Ratio (mcg/mg creat)   Date Value   01/21/2019 CANNOT BE CALCULATED     LDL Cholesterol (mg/dL)   Date Value   07/16/2018 75   09/18/2017 80   10/13/2016 79     LDL Calculated (mg/dL)   Date Value   05/19/2016 80   09/23/2014 80         AST (U/L)   Date Value   07/16/2018 38 (H)     ALT (U/L)   Date Value   07/16/2018 39 (H)     BUN (mg/dL)   Date Value   07/16/2018 15     BP Readings from Last 3 Encounters:   04/15/19 110/74   04/15/19 100/60   04/08/19 108/63              Past Medical History:   Diagnosis Date    Arthritis     Asthma     CAD (coronary artery disease) 1989    Cancer ([facility] [address] 1971    cervical    Carotid artery disease (HCC)     Cataracts, bilateral     COPD (chronic obstructive pulmonary disease) (HCC)     Coronary artery disease     Eczema     Fibromyalgia     Glaucoma     H/O blood clots     Headache     Heart attack ([address]     Hx of Bell's palsy     Mild right facial paralysis    Hypertension     Liver disease     Mild dementia     Neuropathy     Osteoporosis     Sleep ap[facility] 11/07/2014    [facility]     Type 2 diabetes mellitus with hyperglycemia ([facility] [address]       Past Surgical History:   Procedure Laterality Date    APPENDECTOMY  1968    BARIATRIC SURGERY  2002    COLONOSCOPY  09/2012    CORONARY ANGIOPLASTY WITH STENT [facility]    INDUCED   1970    LUMBAR SPINE SURGERY Right 2019    Right L2 L3 TRANSFORAMINAL performed by Antonia Brown MD [address] 1[address]1-2CM FACE,FACIAL Left 2018    Excision Cyst Left Chest, Middle Back performed by Jamie Waterman MD at [address] [facility] ANES/STEROID FORAMEN LUMBAR/SACRAL  [address] [facility] [place] ADD LEVEL Right 10/5/2018    Right L2 & L3 TRANSFORAMINAL performed by Antonia Brown MD at [address] [facility] ANES/STEROID FORAMEN LUMBAR/SACRAL  [address] [facility] [place] ADD LEVEL Right 10/26/2018    Right L2 L3 TRANSFORAMINAL performed by [facility] MD at [facility]       Family History   Problem Relation Age of Onset    Heart Disease Mother     High Blood Pressure Mother     Stroke Mother     Glaucoma Mother     Early Death Father     Cancer Father         stomach    Miscarriages / Stillbirths Maternal Uncle           Social History     Tobacco Use    Smoking status: Former Smoker     Packs/day: 1.00     Years: 35.00     Pack years: 35.00     Last attempt to quit: 2001     Years since quittin.5    Smokeless tobacco: Never Used   Substance Use Topics    Alcohol use: No         Current Outpatient Medications   Medication Sig Dispense Refill    gabapentin (NEURONTIN) 300 MG capsule TAKE 2 CAPSULES TWICE DAILY 360 capsule 3    magnesium oxide (MAGNESIUM-OXIDE) 400 (241.3 Mg) MG TABS tablet Take 1 tablet by mouth 2 times daily 60 tablet 11    ibuprofen (ADVIL;MOTRIN) 400 MG tablet TAKE 1 TABLET EVERY 6 HOURS AS NEEDED FOR PAIN 360 tablet 3    furosemide (LASIX) 40 MG tablet Take 1 tablet by mouth daily 90 tablet 3    umeclidinium-vilanterol (ANORO ELLIPTA) 62.5-25 MCG/INH AEPB inhaler INHALE 1 PUFF INTO LUNGS EVERY DAY 3 each 3    clopidogrel (PLAVIX) 75 MG tablet Take 1 tablet by mouth daily 90 tablet 3    potassium chloride (MICRO-K) 10 MEQ extended release capsule TAKE 1 CAPSULE TWICE DAILY 180 DIRECTED 400 strip 3    PRODIGY TWIST TOP LANCETS 28G MISC USE AS DIRECTED FOUR TIMES DAILY 400 each 3    vitamin E 400 UNIT capsule Take 400 Units by mouth daily      Omega-3 Fatty Acids (FISH OIL) 1200 MG CAPS Take 1,200 mg by mouth daily       COMBIGAN 0.2-0.5 % ophthalmic solution PLACE 1 DROP INTO BOTH EYES DAILY 15 mL 3    Lancet Devices (PRODIGY LANCING DEVICE) MISC USE TO TEST BLOOD SUGAR FOUR TIMES DAILY AS DIRECTED 1 each 0    ondansetron (ZOFRAN) 4 MG tablet Take 1 tablet by mouth every 8 hours as needed for Nausea or Vomiting 30 tablet 3    Compression Stockings MISC by Does not apply route 15-20mmHg  Knee high  Open tow  With assist device to help put them on 1 each 0    Zinc 50 MG TABS Take 50 mg by mouth daily      Diabetic Shoe MISC by Does not apply route 1 each 0    niacin 500 MG extended release capsule Take 500 mg by mouth daily      folic acid (FOLVITE) 381 MCG tablet Take 800 mcg by mouth daily      Cyanocobalamin (VITAMIN B12 PO) Take 2,500 mcg by mouth daily      B Complex-C (SUPER B COMPLEX PO) Take by mouth daily      travoprost, benzalkonium, (TRAVATAN) 0.004 % ophthalmic solution Place 1 drop into both eyes daily 3 Bottle 3    insulin glargine (LANTUS) 100 UNIT/ML injection vial Inject 10 Units into the skin nightly 3 vial 3    CPAP Machine MISC by Does not apply route Pressure of 13 (AirSense 10)  [employer].      albuterol (PROVENTIL) (2.5 MG/3ML) 0.083% nebulizer solution Take 2.5 mg by nebulization every 6 hours as needed for Wheezing      ferrous sulfate 325 (65 FE) MG EC tablet Take 325 mg by mouth daily (with breakfast)      Multiple Vitamin (MULTI VITAMIN DAILY) TABS Take by mouth daily      nitroGLYCERIN (NITROSTAT) 0.4 MG SL tablet Place 0.4 mg under the tongue every 5 minutes as needed for Chest pain Dissolve 1 tab under tongue at first sign of chest pain. May repeat every 5 minutes until relief is obtained.  If pain persists after taking 3 tabs in a 15-minute period, or the pain is different than is typically experienced, call 9-1-1 immediately. No current facility-administered medications for this visit. Allergies   Allergen Reactions    Seasonal        Health Maintenance   Topic Date Due    Hepatitis A vaccine (1 of 2 - Risk 2-dose series) 10/05/1951    Hepatitis B Vaccine (1 of 3 - Risk 3-dose series) 10/05/1967    Diabetic retinal exam  11/21/2018    Diabetic foot exam  03/28/2019    Lipid screen  07/16/2019    Potassium monitoring  07/16/2019    Creatinine monitoring  07/16/2019    A1C test (Diabetic or Prediabetic)  01/21/2020    Diabetic microalbuminuria test  01/21/2020    Breast cancer screen  11/19/2020    Colon cancer screen colonoscopy  09/20/2022    DTaP/Tdap/Td vaccine (2 - Td) 06/18/2027    DEXA (modify frequency per FRAX score)  Completed    Flu vaccine  Completed    Shingles Vaccine  Completed    Pneumococcal 65+ years Vaccine  Completed    Hepatitis C screen  Completed       Subjective:      Review of Systems    Objective:     Physical Exam   /74 (Site: Right Upper Arm, Position: Sitting, Cuff Size: Large Adult)   Pulse 60   Resp 16   Ht 5' 4\" (1.626 m)   Wt 264 lb (119.7 kg)   LMP 12/07/1995 (Approximate)   BMI 45.32 kg/m²     Assessment:       Diagnosis Orders   1. Coronary artery disease due to lipid rich plaque     2. Depression, unspecified depression type     3. Type 2 diabetes mellitus with diabetic polyneuropathy, with long-term current use of insulin ([facility] [address]     4. Fatigue, unspecified type               Plan:       Return if symptoms worsen or fail to improve, for CAD, Diabetes, Fatigue. No orders of the defined types were placed in this encounter. No orders of the defined types were placed in this encounter. Patientgiven educational materials - see patient instructions. Discussed use, benefit,and side effects of prescribed medications. All patient questions answered.  Ptvoiced

## 2019-04-15 NOTE — PROGRESS NOTES
tablet 3    amLODIPine (NORVASC) 5 MG tablet TAKE 1 TABLET EVERY DAY 90 tablet 3    citalopram (CELEXA) 10 MG tablet TAKE 1 TABLET EVERY DAY 90 tablet 3    buPROPion (WELLBUTRIN) 75 MG tablet TAKE 1 TABLET TWICE DAILY 180 tablet 3    amitriptyline (ELAVIL) 25 MG tablet take 1 tablet by mouth at bedtime 90 tablet 3    spironolactone (ALDACTONE) 50 MG tablet TAKE 1 TABLET TWICE DAILY 180 tablet 3    albuterol sulfate HFA (VENTOLIN HFA) 108 (90 Base) MCG/ACT inhaler Inhale 2 puffs into the lungs every 4 hours as needed for Wheezing 1 Inhaler 5    Insulin Syringe-Needle U-100 (INSULIN SYRINGE .5CC/31GX5/16\") 31G X 5/16\" 0.5 ML MISC 1 each by Does not apply route daily 200 each 3    Cholecalciferol (VITAMIN D3) 1000 units TABS TAKE 3 TABLETS BY MOUTH TWICE DAILY 540 tablet 3    senna (SENOKOT) 8.6 MG TABS tablet TAKE 1 TABLET BY MOUTH TWICE DAILY 180 tablet 3    triamcinolone (KENALOG) 0.1 % cream APPLY TOPICALLY TWICE DAILY 80 g 5    B-D ULTRAFINE III SHORT PEN 31G X 8 MM MISC USE TWICE DAILY 180 each 3    NOVOLOG FLEXPEN 100 UNIT/ML injection pen INJECT 4 UNITS AT LUNCH AND  6 UNITS AT SUPPER (EACH PEN EXPIRES 28 DAYS AFTER 1ST USE) 15 mL 3    fluticasone (FLONASE) 50 MCG/ACT nasal spray USE 2 SPRAYS NASALLY EVERY DAY 48 g 3    loratadine (CLARITIN) 10 MG tablet Take 10 mg by mouth daily as needed      nystatin (MYCOSTATIN) 760180 UNIT/GM powder Apply 3 times daily.  60 g 5    VITAMIN A PO Take 2,400 mcg by mouth daily      vitamin C (ASCORBIC ACID) 500 MG tablet TAKE 1 TABLET BY MOUTH TWICE DAILY 60 tablet 11    PRODIGY NO CODING BLOOD GLUC strip TEST FOUR TIMES DAILY AS DIRECTED 400 strip 3    PRODIGY TWIST TOP LANCETS 28G MISC USE AS DIRECTED FOUR TIMES DAILY 400 each 3    vitamin E 400 UNIT capsule Take 400 Units by mouth daily      Omega-3 Fatty Acids (FISH OIL) 1200 MG CAPS Take 1,200 mg by mouth daily       COMBIGAN 0.2-0.5 % ophthalmic solution PLACE 1 DROP INTO BOTH EYES DAILY 15 mL 3    Lancet Devices (PRODIGY LANCING DEVICE) MISC USE TO TEST BLOOD SUGAR FOUR TIMES DAILY AS DIRECTED 1 each 0    ondansetron (ZOFRAN) 4 MG tablet Take 1 tablet by mouth every 8 hours as needed for Nausea or Vomiting 30 tablet 3    Compression Stockings MISC by Does not apply route 15-20mmHg  Knee high  Open tow  With assist device to help put them on 1 each 0    Zinc 50 MG TABS Take 50 mg by mouth daily      Diabetic Shoe MISC by Does not apply route 1 each 0    niacin 500 MG extended release capsule Take 500 mg by mouth daily      folic acid (FOLVITE) 451 MCG tablet Take 800 mcg by mouth daily      Cyanocobalamin (VITAMIN B12 PO) Take 2,500 mcg by mouth daily      B Complex-C (SUPER B COMPLEX PO) Take by mouth daily      travoprost, benzalkonium, (TRAVATAN) 0.004 % ophthalmic solution Place 1 drop into both eyes daily 3 Bottle 3    insulin glargine (LANTUS) 100 UNIT/ML injection vial Inject 10 Units into the skin nightly 3 vial 3    CPAP Machine MISC by Does not apply route Pressure of 13 (AirSense 10)  [employer].      albuterol (PROVENTIL) (2.5 MG/3ML) 0.083% nebulizer solution Take 2.5 mg by nebulization every 6 hours as needed for Wheezing      ferrous sulfate 325 (65 FE) MG EC tablet Take 325 mg by mouth daily (with breakfast)      Multiple Vitamin (MULTI VITAMIN DAILY) TABS Take by mouth daily      nitroGLYCERIN (NITROSTAT) 0.4 MG SL tablet Place 0.4 mg under the tongue every 5 minutes as needed for Chest pain Dissolve 1 tab under tongue at first sign of chest pain. May repeat every 5 minutes until relief is obtained. If pain persists after taking 3 tabs in a 15-minute period, or the pain is different than is typically experienced, call 9-1-1 immediately.          Past Medical History:   Diagnosis Date    Arthritis     Asthma     CAD (coronary artery disease) 1989    Cancer ([facility] [address] 1971    cervical    Carotid artery disease (HCC)     Cataracts, bilateral     COPD [facility]chronic obstructive pulmonary disease) [facility] [address]     Coronary artery disease     Eczema     Fibromyalgia     Glaucoma     H/O blood clots     Headache     Heart attack ([facility] [address]     Hx of Bell's palsy     Mild right facial paralysis    Hypertension     Liver disease     Mild dementia     Neuropathy     Osteoporosis     Sleep apnea 2014    sleep study done in [place]    Tremor     Type 2 diabetes mellitus with hyperglycemia [facility])        Past Surgical History:   Procedure Laterality Date    APPENDECTOMY  1968    BARIATRIC SURGERY      COLONOSCOPY  2012    CORONARY ANGIOPLASTY WITH STENT PLACEMENT      HYSTERECTOMY  1987    INDUCED   1970    LUMBAR SPINE SURGERY Right 2019    Right L2 L3 TRANSFORAMINAL performed by Emile Sauer MD at [address] [facility] 1.1-2CM FACE,FACIAL Left 2018    Excision Cyst Left Chest, Middle Back performed by Lee Orantes MD at [address] [place] [address] ANES/STEROID FORAMEN LUMBAR/SACRAL W IMG GUIDE ,EA ADD LEVEL Right 10/5/2018    Right L2 & L3 TRANSFORAMINAL performed by Emile Sauer MD at [address] [place] [address] ANES/STEROID FORAMEN LUMBAR/SACRAL W IMG GUIDE ,EA ADD LEVEL Right 10/26/2018    Right L2 L3 TRANSFORAMINAL performed by [facility]auer MD at [facility]       Family History   Problem Relation Age of Onset    Heart Disease Mother     High Blood Pressure Mother     Stroke Mother     Glaucoma Mother     Early Death Father     Cancer Father         stomach    Miscarriages / Stillbirths Maternal Uncle        Social History     Socioeconomic History    Marital status:      Spouse name: None    Number of children: None    Years of education: None    Highest education level: None   Occupational History    None   Social Needs    Financial resource strain: None    Food insecurity:     Worry: None     Inability: None    Transportation needs:     Medical: None     Non-medical: None   Tobacco Use    Smoking status: Exam: No injury; chronic rt sided low  back pain  Acuity: Chronic  Type of Exam: Unknown     FINDINGS:  Osseous structures are diffusely demineralized.  There is grade 1  anterolisthesis at L4-L5, measuring 0.4 cm, likely secondary to facet  arthropathy.  Minimal anterolisthesis at L3-L4 is also probably secondary to  facet arthropathy.  Alignment in the lumbar spine is otherwise normal.  The  vertebral body heights are preserved.  There is mild disc space narrowing at  L5-S1.  Mild-to-moderate multilevel facet arthropathy is noted.  There are  scattered vascular calcifications at the aortic bifurcation.  Cholecystectomy  clips are noted.        Impression  1. No evidence of acute compression fracture. 2. Minimal to mild anterolisthesis at L3-L4 and L4-L5, likely secondary to  facet arthropathy. 3. Minimal degenerative disc disease and mild-to-moderate facet arthropathy. Neurological: She is alert and oriented to person, place, and time. No cranial nerve deficit. GCS eye subscore is 4. GCS verbal subscore is 5. GCS motor subscore is 6. Skin: Skin is warm, dry and intact. She is not diaphoretic. No cyanosis. No pallor. Nails show no clubbing. Psychiatric: She has a normal mood and affect. Her speech is normal.   Vitals reviewed. Assessment:      1. Lumbosacral spondylosis without myelopathy    2. DDD (degenerative disc disease), lumbar    3. Lumbar radiculopathy    4. Chronic right-sided low back pain with right-sided sciatica          Plan: Will schedule right L2, L3 transforaminal    Informed consent has not been obtained for procedure. Penelope Russell  on blood thinners. Medications to hold have been reviewed with patient. Blood thinners must be held with permission from your cardiologist or primary care physician. A letter is  required to besent to PCP/Cardiologist regarding holding medications for procedure to decrease bleeding risk.            Angely Capellan, APRN - CNP

## 2019-04-15 NOTE — PROGRESS NOTES
[address] [facility]  [place] [address] [id number]  Dept: [phone]  Dept Fax: [phone]  Loc: [phone]     Nalini Mo is a 77 y.o. female who presents today for her medical conditions/complaintsas noted below. Nalini Mo is c/o of   Chief Complaint   Patient presents with    Coronary Artery Disease    Diabetes    Fatigue    Other     pt wants to discuss Dr. Shy Restrepo recomendations to stop Elavil or taper Gabapentin. Pt wants to lose weight         HPI:     Coronary Artery Disease   Presents for follow-up (CAD due to lipid rich placque) visit. Pertinent negatives include no chest pain, chest pressure, dizziness, leg swelling, palpitations or shortness of breath. The symptoms have been stable. Compliance with diet is good. Compliance with exercise is good. Compliance with medications is good. Diabetes   She presents for her follow-up diabetic visit. She has type 2 diabetes mellitus. Her disease course has been fluctuating. Pertinent negatives for hypoglycemia include no confusion, dizziness, headaches, nervousness/anxiousness or pallor. Pertinent negatives for diabetes include no chest pain, no polydipsia, no polyuria and no weakness. Fatigue   This is a chronic problem. The current episode started more than 1 month ago. The problem occurs intermittently. The problem has been waxing and waning. Pertinent negatives include no abdominal pain, arthralgias, chest pain, chills, coughing, fever, headaches, nausea, neck pain, numbness, rash, vomiting or weakness. Other   This is a chronic (4-depression, better now) problem. The current episode started more than 1 month ago. The problem occurs intermittently. The problem has been gradually improving. Pertinent negatives include no abdominal pain, arthralgias, chest pain, chills, coughing, fever, headaches, nausea, neck pain, numbness, rash, vomiting or weakness.        Hemoglobin A1C (%)   Date Value 2019 6.9 (H)   2018 6.3 (H)   2018 5.9            Microalb/Crt.  Ratio (mcg/mg creat)   Date Value   2019 CANNOT BE CALCULATED     LDL Cholesterol (mg/dL)   Date Value   2018 75   2017 80   10/13/2016 79     LDL Calculated (mg/dL)   Date Value   2016 80   2014 80         AST (U/L)   Date Value   2018 38 (H)     ALT (U/L)   Date Value   2018 39 (H)     BUN (mg/dL)   Date Value   2018 15     BP Readings from Last 3 Encounters:   04/15/19 110/74   04/15/19 100/60   19 108/63              Past Medical History:   Diagnosis Date    Arthritis     Asthma     CAD (coronary artery disease)     Cancer ([address]     cervical    Carotid artery disease ([address]     Cataracts, bilateral     COPD (chronic obstructive pulmonary disease) (HCC)     Coronary artery disease     Eczema     Fibromyalgia     Glaucoma     H/O blood clots     Headache     Heart attack ([address]     Hx of Bell's palsy     Mild right facial paralysis    Hypertension     Liver disease     Mild dementia     Neuropathy     Osteoporosis     Sleep apnea 2014    [facility]     Type 2 diabetes mellitus with hyperglycemia ([address]       Past Surgical History:   Procedure Laterality Date    APPENDECTOMY  1968    BARIATRIC SURGERY      COLONOSCOPY  2012    CORONARY ANGIOPLASTY WITH STENT PLACEMENT      HYSTERECTOMY  1987    INDUCED   1970    LUMBAR SPINE SURGERY Right 2019    Right L2 L3 TRANSFORAMINAL performed by Jerry Churchill MD at [address] [facility] 1.1-2CM FACE,FACIAL Left 2018    Excision Cyst Left Chest, Middle Back performed by Ana Handley MD at [address] [facility] ANES/STEROID FORAMEN LUMBAR/SACRAL [address] [place] [address] JUAN Laws ADD LEVEL Right 10/5/2018    Right L2 & L3 TRANSFORAMINAL performed by Jerry Churchill MD at [address] [facility] ANES/STEROID FORAMEN LUMBAR/SACRAL W IMG GUIDE ,EA ADD LEVEL Right 10/26/2018    Right L2 L3 TRANSFORAMINAL performed by Junito Bradley MD at [facility]       Family History   Problem Relation Age of Onset    Heart Disease Mother     High Blood Pressure Mother    Iraheta Sos Stroke Mother     Glaucoma Mother     Early Death Father     Cancer Father         stomach    Miscarriages / Stillbirths Maternal Uncle           Social History     Tobacco Use    Smoking status: Former Smoker     Packs/day: 1.00     Years: 35.00     Pack years: 35.00     Last attempt to quit: 2001     Years since quittin.5    Smokeless tobacco: Never Used   Substance Use Topics    Alcohol use: No         Current Outpatient Medications   Medication Sig Dispense Refill    gabapentin (NEURONTIN) 300 MG capsule TAKE 2 CAPSULES TWICE DAILY 360 capsule 3    magnesium oxide (MAGNESIUM-OXIDE) 400 (241.3 Mg) MG TABS tablet Take 1 tablet by mouth 2 times daily 60 tablet 11    ibuprofen (ADVIL;MOTRIN) 400 MG tablet TAKE 1 TABLET EVERY 6 HOURS AS NEEDED FOR PAIN 360 tablet 3    furosemide (LASIX) 40 MG tablet Take 1 tablet by mouth daily 90 tablet 3    umeclidinium-vilanterol (ANORO ELLIPTA) 62.5-25 MCG/INH AEPB inhaler INHALE 1 PUFF INTO LUNGS EVERY DAY 3 each 3    clopidogrel (PLAVIX) 75 MG tablet Take 1 tablet by mouth daily 90 tablet 3    potassium chloride (MICRO-K) 10 MEQ extended release capsule TAKE 1 CAPSULE TWICE DAILY 180 capsule 3    atorvastatin (LIPITOR) 40 MG tablet Take 1 tablet by mouth daily 90 tablet 2    alendronate (FOSAMAX) 70 MG tablet Take 1 tablet by mouth every 7 days 12 tablet 3    pantoprazole (PROTONIX) 40 MG tablet TAKE 1 TABLET EVERY DAY 90 tablet 3    lisinopril (PRINIVIL;ZESTRIL) 10 MG tablet TAKE 1 TABLET EVERY DAY 90 tablet 3    amLODIPine (NORVASC) 5 MG tablet TAKE 1 TABLET EVERY DAY 90 tablet 3    citalopram (CELEXA) 10 MG tablet TAKE 1 TABLET EVERY DAY 90 tablet 3    buPROPion (WELLBUTRIN) 75 MG tablet TAKE 1 TABLET TWICE DAILY 180 tablet 3  amitriptyline (ELAVIL) 25 MG tablet take 1 tablet by mouth at bedtime 90 tablet 3    spironolactone (ALDACTONE) 50 MG tablet TAKE 1 TABLET TWICE DAILY 180 tablet 3    albuterol sulfate HFA (VENTOLIN HFA) 108 (90 Base) MCG/ACT inhaler Inhale 2 puffs into the lungs every 4 hours as needed for Wheezing 1 Inhaler 5    Insulin Syringe-Needle U-100 (INSULIN SYRINGE .5CC/31GX5/16\") 31G X 5/16\" 0.5 ML MISC 1 each by Does not apply route daily 200 each 3    Cholecalciferol (VITAMIN D3) 1000 units TABS TAKE 3 TABLETS BY MOUTH TWICE DAILY 540 tablet 3    senna (SENOKOT) 8.6 MG TABS tablet TAKE 1 TABLET BY MOUTH TWICE DAILY 180 tablet 3    triamcinolone (KENALOG) 0.1 % cream APPLY TOPICALLY TWICE DAILY 80 g 5    B-D ULTRAFINE III SHORT PEN 31G X 8 MM MISC USE TWICE DAILY 180 each 3    NOVOLOG FLEXPEN 100 UNIT/ML injection pen INJECT 4 UNITS AT LUNCH AND  6 UNITS AT SUPPER (EACH PEN EXPIRES 28 DAYS AFTER 1ST USE) 15 mL 3    fluticasone (FLONASE) 50 MCG/ACT nasal spray USE 2 SPRAYS NASALLY EVERY DAY 48 g 3    loratadine (CLARITIN) 10 MG tablet Take 10 mg by mouth daily as needed      nystatin (MYCOSTATIN) 722137 UNIT/GM powder Apply 3 times daily.  60 g 5    VITAMIN A PO Take 2,400 mcg by mouth daily      vitamin C (ASCORBIC ACID) 500 MG tablet TAKE 1 TABLET BY MOUTH TWICE DAILY 60 tablet 11    PRODIGY NO CODING BLOOD GLUC strip TEST FOUR TIMES DAILY AS DIRECTED 400 strip 3    PRODIGY TWIST TOP LANCETS 28G MISC USE AS DIRECTED FOUR TIMES DAILY 400 each 3    vitamin E 400 UNIT capsule Take 400 Units by mouth daily      Omega-3 Fatty Acids (FISH OIL) 1200 MG CAPS Take 1,200 mg by mouth daily       COMBIGAN 0.2-0.5 % ophthalmic solution PLACE 1 DROP INTO BOTH EYES DAILY 15 mL 3    Lancet Devices ([employer] LANCING [employer]) MISC USE TO TEST BLOOD SUGAR FOUR TIMES DAILY AS DIRECTED 1 each 0    ondansetron (ZOFRAN) 4 MG tablet Take 1 tablet by mouth every 8 hours as needed for Nausea or Vomiting 30 tablet 3    Compression Stockings MISC by Does not apply route 15-20mmHg  Knee high  Open tow  With assist device to help put them on 1 each 0    Zinc 50 MG TABS Take 50 mg by mouth daily      Diabetic Shoe MISC by Does not apply route 1 each 0    niacin 500 MG extended release capsule Take 500 mg by mouth daily      folic acid (FOLVITE) 710 MCG tablet Take 800 mcg by mouth daily      Cyanocobalamin (VITAMIN B12 PO) Take 2,500 mcg by mouth daily      B Complex-C (SUPER B COMPLEX PO) Take by mouth daily      travoprost, benzalkonium, (TRAVATAN) 0.004 % ophthalmic solution Place 1 drop into both eyes daily 3 Bottle 3    insulin glargine (LANTUS) 100 UNIT/ML injection vial Inject 10 Units into the skin nightly 3 vial 3    CPAP Machine MISC by Does not apply route Pressure of 13 (AirSense 10)  [employer].      albuterol (PROVENTIL) (2.5 MG/3ML) 0.083% nebulizer solution Take 2.5 mg by nebulization every 6 hours as needed for Wheezing      ferrous sulfate 325 (65 FE) MG EC tablet Take 325 mg by mouth daily (with breakfast)      Multiple Vitamin (MULTI VITAMIN DAILY) TABS Take by mouth daily      nitroGLYCERIN (NITROSTAT) 0.4 MG SL tablet Place 0.4 mg under the tongue every 5 minutes as needed for Chest pain Dissolve 1 tab under tongue at first sign of chest pain. May repeat every 5 minutes until relief is obtained. If pain persists after taking 3 tabs in a 15-minute period, or the pain is different than is typically experienced, call 9-1-1 immediately. No current facility-administered medications for this visit.       Allergies   Allergen Reactions    Seasonal        Health Maintenance   Topic Date Due    Hepatitis A vaccine (1 of 2 - Risk 2-dose series) 10/05/1951    Hepatitis B Vaccine (1 of 3 - Risk 3-dose series) 10/05/1967    Diabetic retinal exam  11/21/2018    Diabetic foot exam  03/28/2019    Lipid screen  07/16/2019    Potassium monitoring  07/16/2019    Creatinine monitoring  07/16/2019    A1C test (Diabetic or Prediabetic)  01/21/2020    Diabetic microalbuminuria test  01/21/2020    Breast cancer screen  11/19/2020    Colon cancer screen colonoscopy  09/20/2022    DTaP/Tdap/Td vaccine (2 - Td) 06/18/2027    DEXA (modify frequency per FRAX score)  Completed    Flu vaccine  Completed    Shingles Vaccine  Completed    Pneumococcal 65+ years Vaccine  Completed    Hepatitis C screen  Completed       Subjective:      Review of Systems   Constitutional: Negative for chills and fever. HENT: Negative for hearing loss. Eyes: Negative for pain and visual disturbance. Respiratory: Negative for cough and shortness of breath. Cardiovascular: Negative for chest pain, palpitations and leg swelling. Gastrointestinal: Negative for abdominal pain, blood in stool, constipation, diarrhea, nausea and vomiting. Endocrine: Negative for cold intolerance, polydipsia and polyuria. Genitourinary: Negative for difficulty urinating, dysuria and hematuria. Musculoskeletal: Negative for arthralgias, back pain, gait problem and neck pain. Skin: Negative for pallor and rash. Neurological: Negative for dizziness, weakness, numbness and headaches. Hematological: Negative for adenopathy. Does not bruise/bleed easily. Psychiatric/Behavioral: Negative for confusion. The patient is not nervous/anxious. Objective:     Physical Exam   Constitutional: She is oriented to person, place, and time. She appears well-developed and well-nourished. HENT:   Head: Normocephalic and atraumatic. Eyes: Pupils are equal, round, and reactive to light. EOM are normal.   Neck: Neck supple. Cardiovascular: Normal rate and regular rhythm. Exam reveals no gallop and no friction rub. No murmur heard. Pulmonary/Chest: Effort normal and breath sounds normal. She has no wheezes. She has no rales. Abdominal: Soft. She exhibits no distension and no mass. There is no tenderness. There is no rebound.    Musculoskeletal: Normal range of motion. She exhibits no edema. Lymphadenopathy:     She has no cervical adenopathy. Neurological: She is alert and oriented to person, place, and time. No cranial nerve deficit (grossly). Skin: Skin is warm and dry. No rash noted. Psychiatric: She has a normal mood and affect. Thought content normal.   Vitals reviewed. /74 (Site: Right Upper Arm, Position: Sitting, Cuff Size: Large Adult)   Pulse 60   Resp 16   Ht 5' 4\" (1.626 m)   Wt 264 lb (119.7 kg)   LMP 12/07/1995 (Approximate)   BMI 45.32 kg/m²     Assessment:       Diagnosis Orders   1. Coronary artery disease due to lipid rich plaque     2. Depression, unspecified depression type     3. Type 2 diabetes mellitus with diabetic polyneuropathy, with long-term current use of insulin ([facility] [address]     4. Fatigue, unspecified type               Plan:       Return if symptoms worsen or fail to improve, for CAD, Diabetes, Fatigue. No orders of the defined types were placed in this encounter. No orders of the defined types were placed in this encounter. Patientgiven educational materials - see patient instructions. Discussed use, benefit,and side effects of prescribed medications. All patient questions answered. Ptvoiced understanding. Reviewed health maintenance. Instructed to continue currentmedications, diet and exercise. Patient agreed with treatment plan. Follow up asdirected.      Electronically signed by Kalie Hunter MD on 4/15/2019 at 10:53 AM

## 2019-04-15 NOTE — PATIENT INSTRUCTIONS
[place] [id number] [place] [address]  Telephone [phone]  Fax [phone]    PROCEDURE INSTRUCTIONS FOR  PAIN MANAGEMENT PROCEDURES WITH ANESTHESIA/ IV SEDATION    Yohana Brumfield is scheduled to see  [facility] to undergo the following procedure:   Right L2,L3 Transforaminal Epidural steroid Injeciton     Procedure Date: ____5/7/19____  Arrival Time: _____8:15am_____     Report to the [place] [id number], Registration office on the 1st floor in the hospital, after check in and signing of paper work you will then go to the second floor to the surgery center. 1. Stop the following medications prior to the procedure:  Plavix  Stop blood thinners as directed before the injection, with permission from your cardiologist or primary care physician. We will send a letter to them requesting permission to hold the blood thinners. · Medication___Plavix___ held for __3-5__ days    If you take Warfarin (Coumadin), you must have your blood drawn for an INR the day before the procedure. INR must be less than 1.5.    2.  Take all routine medications unless otherwise instructed. Ok to take vitamins and antiinflammatory medications    3. EATING & DRINKING:  YOUR PROCEDURE REQUIRES ANESTHESIA, NO FOOD OR LIQUIDS FOR 8 HOURS PRIOR TO THE PROCEDURE    4. If you are allergic to contrast or iodine, you must take benadryl and prednisone prior to the injection to prevent an allergic reaction. Follow the directions on the prescription for the times to take the medication. 5.  Wear simple loose clothing, which can be easily changed. 6.  Leave jewelry (including rings) and other valuables at home. 7.  Make arrangements for a family member or friend to drive you to the surgery center. Your ride must stay in the hospital while you are having the injection done. If they cannot stay, the injection will be rescheduled.  The sedation may affect your judgment following the procedure and driving a vehicle within 24 hours after the sedation could be dangerous. 8.  You will be asked to sign several forms prior to surgery; patients under the age of 25 must have a parent or legal guardian sign the permit to be able to do the procedure. 9.  You must have finished any antibiotic prescribed for recent infections. If required, please take pre-procedure antibiotic or other pre-procedure medications as instructed. 10. Bring inhalers and pain medications with you to your procedure. 11. Bring your MRI/CT films if they were done outside of the [facility]. 12. If you should develop a cold, sore throat, cough, fever or other new indication of illness or infection, or are started on antibiotics within 2 weeks of the scheduled procedure, please notify the [facility] office as early as possible at [phone].

## 2019-04-15 NOTE — PROGRESS NOTES
[address] [facility] INTERNAL MED  [facility] [id number]  Dept: [phone]  Dept Fax: [phone]  Loc: [phone]     Luis A Bran is a 77 y.o. female who presents today for her medical conditions/complaintsas noted below. Luis A Bran is c/o of   Chief Complaint   Patient presents with    Coronary Artery Disease    Diabetes    Fatigue    Other     pt wants to discuss Dr. Elena Delgado recomendations to stop Elavil or taper Gabapentin. Pt wants to lose weight         HPI:     HPI    Hemoglobin A1C (%)   Date Value   01/21/2019 6.9 (H)   07/16/2018 6.3 (H)   01/22/2018 5.9            Microalb/Crt.  Ratio (mcg/mg creat)   Date Value   01/21/2019 CANNOT BE CALCULATED     LDL Cholesterol (mg/dL)   Date Value   07/16/2018 75   09/18/2017 80   10/13/2016 79     LDL Calculated (mg/dL)   Date Value   05/19/2016 80   09/23/2014 80         AST (U/L)   Date Value   07/16/2018 38 (H)     ALT (U/L)   Date Value   07/16/2018 39 (H)     BUN (mg/dL)   Date Value   07/16/2018 15     BP Readings from Last 3 Encounters:   04/15/19 110/74   04/15/19 100/60   04/08/19 108/63              Past Medical History:   Diagnosis Date    Arthritis     Asthma     CAD (coronary artery disease) 1989    Cancer ([facility] [address] 1971    cervical    Carotid artery disease (HCC)     Cataracts, bilateral     COPD (chronic obstructive pulmonary disease) (HCC)     Coronary artery disease     Eczema     Fibromyalgia     Glaucoma     H/O blood clots     Headache     Heart attack ([address]     Hx of Bell's palsy     Mild right facial paralysis    Hypertension     Liver disease     Mild dementia     Neuropathy     Osteoporosis     Sleep apnea 11/07/2014    sleep [facility]     Type 2 diabetes mellitus with hyperglycemia ([facility] [address]       Past Surgical History:   Procedure Laterality Date    APPENDECTOMY  1968    BARIATRIC SURGERY  2002    COLONOSCOPY  09/2012    CORONARY ANGIOPLASTY WITH STENT capsule 3    atorvastatin (LIPITOR) 40 MG tablet Take 1 tablet by mouth daily 90 tablet 2    alendronate (FOSAMAX) 70 MG tablet Take 1 tablet by mouth every 7 days 12 tablet 3    pantoprazole (PROTONIX) 40 MG tablet TAKE 1 TABLET EVERY DAY 90 tablet 3    lisinopril (PRINIVIL;ZESTRIL) 10 MG tablet TAKE 1 TABLET EVERY DAY 90 tablet 3    amLODIPine (NORVASC) 5 MG tablet TAKE 1 TABLET EVERY DAY 90 tablet 3    citalopram (CELEXA) 10 MG tablet TAKE 1 TABLET EVERY DAY 90 tablet 3    buPROPion (WELLBUTRIN) 75 MG tablet TAKE 1 TABLET TWICE DAILY 180 tablet 3    amitriptyline (ELAVIL) 25 MG tablet take 1 tablet by mouth at bedtime 90 tablet 3    spironolactone (ALDACTONE) 50 MG tablet TAKE 1 TABLET TWICE DAILY 180 tablet 3    albuterol sulfate HFA (VENTOLIN HFA) 108 (90 Base) MCG/ACT inhaler Inhale 2 puffs into the lungs every 4 hours as needed for Wheezing 1 Inhaler 5    Insulin Syringe-Needle U-100 (INSULIN SYRINGE .5CC/31GX5/16\") 31G X 5/16\" 0.5 ML MISC 1 each by Does not apply route daily 200 each 3    Cholecalciferol (VITAMIN D3) 1000 units TABS TAKE 3 TABLETS BY MOUTH TWICE DAILY 540 tablet 3    senna (SENOKOT) 8.6 MG TABS tablet TAKE 1 TABLET BY MOUTH TWICE DAILY 180 tablet 3    triamcinolone (KENALOG) 0.1 % cream APPLY TOPICALLY TWICE DAILY 80 g 5    B-D ULTRAFINE III SHORT PEN 31G X 8 MM MISC USE TWICE DAILY 180 each 3    NOVOLOG FLEXPEN 100 UNIT/ML injection pen INJECT 4 UNITS AT LUNCH AND  6 UNITS AT SUPPER (EACH PEN EXPIRES 28 DAYS AFTER 1ST USE) 15 mL 3    fluticasone (FLONASE) 50 MCG/ACT nasal spray USE 2 SPRAYS NASALLY EVERY DAY 48 g 3    loratadine (CLARITIN) 10 MG tablet Take 10 mg by mouth daily as needed      nystatin (MYCOSTATIN) 627166 UNIT/GM powder Apply 3 times daily.  60 g 5    VITAMIN A PO Take 2,400 mcg by mouth daily      vitamin C (ASCORBIC ACID) 500 MG tablet TAKE 1 TABLET BY MOUTH TWICE DAILY 60 tablet 11    PRODIGY NO CODING BLOOD GLUC strip TEST FOUR TIMES DAILY AS DIRECTED 400 strip 3    PRODIGY TWIST TOP LANCETS 28G MISC USE AS DIRECTED FOUR TIMES DAILY 400 each 3    vitamin E 400 UNIT capsule Take 400 Units by mouth daily      Omega-3 Fatty Acids (FISH OIL) 1200 MG CAPS Take 1,200 mg by mouth daily       COMBIGAN 0.2-0.5 % ophthalmic solution PLACE 1 DROP INTO BOTH EYES DAILY 15 mL 3    Lancet Devices (PRODIGY LANCING DEVICE) MISC USE TO TEST BLOOD SUGAR FOUR TIMES DAILY AS DIRECTED 1 each 0    ondansetron (ZOFRAN) 4 MG tablet Take 1 tablet by mouth every 8 hours as needed for Nausea or Vomiting 30 tablet 3    Compression Stockings MISC by Does not apply route 15-20mmHg  Knee high  Open tow  With assist device to help put them on 1 each 0    Zinc 50 MG TABS Take 50 mg by mouth daily      Diabetic Shoe MISC by Does not apply route 1 each 0    niacin 500 MG extended release capsule Take 500 mg by mouth daily      folic acid (FOLVITE) 011 MCG tablet Take 800 mcg by mouth daily      Cyanocobalamin (VITAMIN B12 PO) Take 2,500 mcg by mouth daily      B Complex-C (SUPER B COMPLEX PO) Take by mouth daily      travoprost, benzalkonium, (TRAVATAN) 0.004 % ophthalmic solution Place 1 drop into both eyes daily 3 Bottle 3    insulin glargine (LANTUS) 100 UNIT/ML injection vial Inject 10 Units into the skin nightly 3 vial 3    CPAP Machine MISC by Does not apply route Pressure of 13 (AirSense 10)  [employer]      albuterol (PROVENTIL) (2.5 MG/3ML) 0.083% nebulizer solution Take 2.5 mg by nebulization every 6 hours as needed for Wheezing      ferrous sulfate 325 (65 FE) MG EC tablet Take 325 mg by mouth daily (with breakfast)      Multiple Vitamin (MULTI VITAMIN DAILY) TABS Take by mouth daily      nitroGLYCERIN (NITROSTAT) 0.4 MG SL tablet Place 0.4 mg under the tongue every 5 minutes as needed for Chest pain Dissolve 1 tab under tongue at first sign of chest pain. May repeat every 5 minutes until relief is obtained.  If pain persists after taking 3 tabs in a 15-minute period, or the pain is different than is typically experienced, call 9-1-1 immediately. No current facility-administered medications for this visit. Allergies   Allergen Reactions    Seasonal        Health Maintenance   Topic Date Due    Hepatitis A vaccine (1 of 2 - Risk 2-dose series) 10/05/1951    Hepatitis B Vaccine (1 of 3 - Risk 3-dose series) 10/05/1967    Diabetic retinal exam  11/21/2018    Diabetic foot exam  03/28/2019    Lipid screen  07/16/2019    Potassium monitoring  07/16/2019    Creatinine monitoring  07/16/2019    A1C test (Diabetic or Prediabetic)  01/21/2020    Diabetic microalbuminuria test  01/21/2020    Breast cancer screen  11/19/2020    Colon cancer screen colonoscopy  09/20/2022    DTaP/Tdap/Td vaccine (2 - Td) 06/18/2027    DEXA (modify frequency per FRAX score)  Completed    Flu vaccine  Completed    Shingles Vaccine  Completed    Pneumococcal 65+ years Vaccine  Completed    Hepatitis C screen  Completed       Subjective:      Review of Systems    Objective:     Physical Exam   /74 (Site: Right Upper Arm, Position: Sitting, Cuff Size: Large Adult)   Pulse 60   Resp 16   Ht 5' 4\" (1.626 m)   Wt 264 lb (119.7 kg)   LMP 12/07/1995 (Approximate)   BMI 45.32 kg/m²     Assessment:       Diagnosis Orders   1. Coronary artery disease due to lipid rich plaque     2. Depression, unspecified depression type     3. Type 2 diabetes mellitus with diabetic polyneuropathy, with long-term current use of insulin ([facility] [address]     4. Fatigue, unspecified type        Memory appears to be intact. She is able to recall details of past event extremely well. She answers all questions appropriately, and she does not display any evidence of dementia. Plan:       Return if symptoms worsen or fail to improve, for CAD, Diabetes, Fatigue. No orders of the defined types were placed in this encounter.     No orders of the defined types were placed in this encounter. Patientgiven educational materials - see patient instructions. Discussed use, benefit,and side effects of prescribed medications. All patient questions answered. Ptvoiced understanding. Reviewed health maintenance. Instructed to continue currentmedications, diet and exercise. Patient agreed with treatment plan. Follow up asdirected.      Electronically signed by Ana Summers MD on 4/15/2019 at 11:14 AM

## 2019-04-16 ENCOUNTER — APPOINTMENT (OUTPATIENT)
Dept: GENERAL RADIOLOGY | Age: 69
End: 2019-04-16
Payer: MEDICARE

## 2019-04-16 ENCOUNTER — TELEPHONE (OUTPATIENT)
Dept: INTERNAL MEDICINE | Age: 69
End: 2019-04-16

## 2019-04-16 ENCOUNTER — HOSPITAL ENCOUNTER (EMERGENCY)
Age: 69
Discharge: HOME | End: 2019-04-16
Attending: SPECIALIST
Payer: MEDICARE

## 2019-04-16 VITALS
SYSTOLIC BLOOD PRESSURE: 133 MMHG | OXYGEN SATURATION: 95 % | WEIGHT: 264 LBS | HEART RATE: 54 BPM | RESPIRATION RATE: 17 BRPM | HEIGHT: 64 IN | TEMPERATURE: 99 F | BODY MASS INDEX: 45.07 KG/M2 | DIASTOLIC BLOOD PRESSURE: 63 MMHG

## 2019-04-16 DIAGNOSIS — R07.9 CHEST PAIN, UNSPECIFIED TYPE: Primary | ICD-10-CM

## 2019-04-16 LAB
ABSOLUTE EOS #: 0.2 K/UL (ref 0–0.4)
ABSOLUTE IMMATURE GRANULOCYTE: ABNORMAL K/UL (ref 0–0.3)
ABSOLUTE LYMPH #: 1.3 K/UL (ref 1–4.8)
ABSOLUTE MONO #: 0.7 K/UL (ref 0.1–1.2)
ANION GAP SERPL CALCULATED.3IONS-SCNC: 11 MMOL/L (ref 9–17)
BASOPHILS # BLD: 1 % (ref 0–1)
BASOPHILS ABSOLUTE: 0.1 K/UL (ref 0–0.2)
BNP INTERPRETATION: NORMAL
BUN BLDV-MCNC: 16 MG/DL (ref 8–23)
BUN/CREAT BLD: 23 (ref 9–20)
CALCIUM SERPL-MCNC: 10.1 MG/DL (ref 8.6–10.4)
CHLORIDE BLD-SCNC: 100 MMOL/L (ref 98–107)
CO2: 27 MMOL/L (ref 20–31)
CREAT SERPL-MCNC: 0.7 MG/DL (ref 0.5–0.9)
D DIMER: 224 NG/ML
DIFFERENTIAL TYPE: ABNORMAL
EOSINOPHILS RELATIVE PERCENT: 3 % (ref 1–7)
GFR AFRICAN AMERICAN: >60 ML/MIN
GFR NON-AFRICAN AMERICAN: >60 ML/MIN
GFR SERPL CREATININE-BSD FRML MDRD: ABNORMAL ML/MIN/{1.73_M2}
GFR SERPL CREATININE-BSD FRML MDRD: ABNORMAL ML/MIN/{1.73_M2}
GLUCOSE BLD-MCNC: 164 MG/DL (ref 70–99)
HCT VFR BLD CALC: 38.1 % (ref 36–46)
HEMOGLOBIN: 12.5 G/DL (ref 12–16)
IMMATURE GRANULOCYTES: ABNORMAL %
LYMPHOCYTES # BLD: 22 % (ref 16–46)
MCH RBC QN AUTO: 30.5 PG (ref 26–34)
MCHC RBC AUTO-ENTMCNC: 32.8 G/DL (ref 31–37)
MCV RBC AUTO: 92.9 FL (ref 80–100)
MONOCYTES # BLD: 13 % (ref 4–11)
NRBC AUTOMATED: ABNORMAL PER 100 WBC
PDW BLD-RTO: 13.9 % (ref 11–14.5)
PLATELET # BLD: 147 K/UL (ref 140–450)
PLATELET ESTIMATE: ABNORMAL
PMV BLD AUTO: 8.7 FL (ref 6–12)
POC TROPONIN I: <0.02 NG/ML (ref 0–0.08)
POC TROPONIN I: <0.02 NG/ML (ref 0–0.08)
POC TROPONIN INTERP: NORMAL
POC TROPONIN INTERP: NORMAL
POTASSIUM SERPL-SCNC: 4.6 MMOL/L (ref 3.7–5.3)
PRO-BNP: 136 PG/ML
RBC # BLD: 4.11 M/UL (ref 4–5.2)
RBC # BLD: ABNORMAL 10*6/UL
SEG NEUTROPHILS: 61 % (ref 43–77)
SEGMENTED NEUTROPHILS ABSOLUTE COUNT: 3.4 K/UL (ref 1.8–7.7)
SODIUM BLD-SCNC: 138 MMOL/L (ref 135–144)
WBC # BLD: 5.6 K/UL (ref 3.5–11)
WBC # BLD: ABNORMAL 10*3/UL

## 2019-04-16 PROCEDURE — 93005 ELECTROCARDIOGRAM TRACING: CPT

## 2019-04-16 PROCEDURE — 85379 FIBRIN DEGRADATION QUANT: CPT

## 2019-04-16 PROCEDURE — 6370000000 HC RX 637 (ALT 250 FOR IP): Performed by: SPECIALIST

## 2019-04-16 PROCEDURE — 99285 EMERGENCY DEPT VISIT HI MDM: CPT

## 2019-04-16 PROCEDURE — 71046 X-RAY EXAM CHEST 2 VIEWS: CPT

## 2019-04-16 PROCEDURE — 85025 COMPLETE CBC W/AUTO DIFF WBC: CPT

## 2019-04-16 PROCEDURE — 84484 ASSAY OF TROPONIN QUANT: CPT

## 2019-04-16 PROCEDURE — 83880 ASSAY OF NATRIURETIC PEPTIDE: CPT

## 2019-04-16 PROCEDURE — 80048 BASIC METABOLIC PNL TOTAL CA: CPT

## 2019-04-16 RX ORDER — ASPIRIN 81 MG/1
324 TABLET, CHEWABLE ORAL ONCE
Status: COMPLETED | OUTPATIENT
Start: 2019-04-16 | End: 2019-04-16

## 2019-04-16 RX ADMIN — ASPIRIN 81 MG 324 MG: 81 TABLET ORAL at 16:16

## 2019-04-16 ASSESSMENT — PAIN SCALES - GENERAL: PAINLEVEL_OUTOF10: 5

## 2019-04-16 ASSESSMENT — ENCOUNTER SYMPTOMS
CHEST TIGHTNESS: 1
SHORTNESS OF BREATH: 1
NAUSEA: 1

## 2019-04-16 ASSESSMENT — PAIN DESCRIPTION - LOCATION: LOCATION: CHEST

## 2019-04-16 ASSESSMENT — PAIN DESCRIPTION - PAIN TYPE: TYPE: ACUTE PAIN

## 2019-04-16 ASSESSMENT — PAIN DESCRIPTION - ORIENTATION: ORIENTATION: MID

## 2019-04-16 NOTE — ED NOTES
Patient has multiple complaints and is a poor historian. States CP and SOB \"Like someone is sitting on my chest and squeezing my neck\" on and off x 3 days c/o generalized malaise. Also c/o swelling in bilat legs patient obese trace edema to bilat feet. Patient states has been happening on and off for awhile. Patient saw PCP Dr Leticia Up yesterday states saw him for med management as she states has been trying to lose weight and some of her medications like Neurontin  are making her gain weight. [facility] did not mention any of the above complaints to Dr Leticia Up. States saw home health today advised to go to the ER also called the office advised to the ER. Patient's daughter at bedside. States took 2 Nitro PTA.       Rickey Redmond RN  04/16/19 [id number]

## 2019-04-16 NOTE — ED NOTES
Patient insists on walking to [facility] before triage.  No SOB noted gait steady with raul Clemente RN  04/16/19 [id number]

## 2019-04-16 NOTE — ED PROVIDER NOTES
[facility]  eMERGENCY dEPARTMENT eNCOUnter      Pt Name: Tyrell Julien  MRN: [id number]  [place] 1950  Date of evaluation: 4/16/2019      CHIEF COMPLAINT       Chief Complaint   Patient presents with    Chest Pain     x 3 days    Fatigue     x 3 days         HISTORY OF PRESENT ILLNESS    Tyrell Julien is a 77 y.o. female who presents to the emergency department accompanied by her daughter after patient has been having substernal chest pain associated shortness of breath off-and-on for last 3 days. She has associated nausea and palpitations but denies any vomiting, lightheadedness, dizziness or diaphoresis. Patient also has had mild left arm pain 4 days ago. She has been feeling tired and fatigued. She describes chest pain like someone is sitting on her chest and squeezing her neck. She also complains of swelling in bilateral legs but denies any calf pain. Grades chest pain as 5 out of 10 in intensity. Patient took 2 sublingual nitroglycerin at home prior to coming to the emergency department with some relief. She has history of coronary artery disease and myocardial infarction requiring one coronary stent placement. She is on Plavix at home but does not take aspirin as she has been taking ibuprofen regularly. There are no exacerbating or relieving factors. She denies any recent long travels or prolonged immobilization and no history of DVT or PE in the past.      REVIEW OF SYSTEMS       Review of Systems   Constitutional: Positive for fatigue. Negative for diaphoresis. Respiratory: Positive for chest tightness and shortness of breath. Cardiovascular: Positive for chest pain, palpitations and leg swelling. Gastrointestinal: Positive for nausea. Neurological: Positive for light-headedness. Negative for dizziness and syncope. All other systems reviewed and are negative.        PAST MEDICAL HISTORY    has a past medical history of Arthritis, Asthma, CAD (coronary artery disease), Cancer ([facility] [address] Carotid artery disease ([facility] [address] Cataracts, bilateral, COPD (chronic obstructive pulmonary disease) ([facility] [address] Coronary artery disease, Eczema, Fibromyalgia, Glaucoma, H/O blood clots, Headache, Heart attack ([facility] [address] Hx of Bell's palsy, Hypertension, Liver disease, Mild dementia, Neuropathy, Osteoporosis, Sleep apnea, Tremor, and Type 2 diabetes mellitus with hyperglycemia ([facility] [address] SURGICAL HISTORY      has a past surgical history that includes Appendectomy (); Induced  (); Tonsillectomy (); Hysterectomy (); Bariatric Surgery (); Colonoscopy (2012); pr exc skin benig 1.1-2cm face,facial (Left, 2018); pr inject anes/steroid foramen lumbar/sacral w img guide ,ea add level (Right, 10/5/2018); pr inject anes/steroid foramen lumbar/sacral w img guide ,ea add level (Right, 10/26/2018); Lumbar spine surgery (Right, 2019); and Coronary angioplasty with stent.     CURRENT MEDICATIONS       Previous Medications    ALBUTEROL (PROVENTIL) (2.5 MG/3ML) 0.083% NEBULIZER SOLUTION    Take 2.5 mg by nebulization every 6 hours as needed for Wheezing    ALBUTEROL SULFATE HFA (VENTOLIN HFA) 108 (90 BASE) MCG/ACT INHALER    Inhale 2 puffs into the lungs every 4 hours as needed for Wheezing    ALENDRONATE (FOSAMAX) 70 MG TABLET    Take 1 tablet by mouth every 7 days    AMITRIPTYLINE (ELAVIL) 25 MG TABLET    take 1 tablet by mouth at bedtime    AMLODIPINE (NORVASC) 5 MG TABLET    TAKE 1 TABLET EVERY DAY    ATORVASTATIN (LIPITOR) 40 MG TABLET    Take 1 tablet by mouth daily    B COMPLEX-C (SUPER B COMPLEX PO)    Take by mouth daily    B-D ULTRAFINE III SHORT PEN 31G X 8 MM MISC    USE TWICE DAILY    BUPROPION (WELLBUTRIN) 75 MG TABLET    TAKE 1 TABLET TWICE DAILY    CHOLECALCIFEROL (VITAMIN D3) 1000 UNITS TABS    TAKE 3 TABLETS BY MOUTH TWICE DAILY    CITALOPRAM (CELEXA) 10 MG TABLET    TAKE 1 TABLET EVERY DAY    CLOPIDOGREL (PLAVIX) 75 MG TABLET    Take 1 tablet by mouth daily    COMBIGAN 0.2-0.5 % OPHTHALMIC SOLUTION    PLACE 1 DROP INTO BOTH EYES DAILY    COMPRESSION STOCKINGS MISC    by Does not apply route 15-20mmHg  Knee high  Open tow  With assist device to help put them on    CPAP MACHINE MISC    by Does not apply route Pressure of 13 ([employer]ense 10)  [employer].    CYANOCOBALAMIN (VITAMIN B12 PO)    Take 2,500 mcg by mouth daily    DIABETIC SHOE MISC    by Does not apply route    FERROUS SULFATE 325 (65 FE) MG EC TABLET    Take 325 mg by mouth daily (with breakfast)    FLUTICASONE (FLONASE) 50 MCG/ACT NASAL SPRAY    USE 2 SPRAYS NASALLY EVERY DAY    FOLIC ACID (FOLVITE) 580 MCG TABLET    Take 800 mcg by mouth daily    FUROSEMIDE (LASIX) 40 MG TABLET    Take 1 tablet by mouth daily    GABAPENTIN (NEURONTIN) 300 MG CAPSULE    TAKE 2 CAPSULES TWICE DAILY    IBUPROFEN (ADVIL;MOTRIN) 400 MG TABLET    TAKE 1 TABLET EVERY 6 HOURS AS NEEDED FOR PAIN    INSULIN GLARGINE (LANTUS) 100 UNIT/ML INJECTION VIAL    Inject 10 Units into the skin nightly    INSULIN SYRINGE-NEEDLE U-100 (INSULIN SYRINGE .5CC/31GX5/16\") 31G X 5/16\" 0.5 ML MISC    1 each by Does not apply route daily    LANCET DEVICES (PRODIGY LANCING DEVICE) MISC    USE TO TEST BLOOD SUGAR FOUR TIMES DAILY AS DIRECTED    LISINOPRIL (PRINIVIL;ZESTRIL) 10 MG TABLET    TAKE 1 TABLET EVERY DAY    LORATADINE (CLARITIN) 10 MG TABLET    Take 10 mg by mouth daily as needed    MAGNESIUM OXIDE (MAGNESIUM-OXIDE) 400 (241.3 MG) MG TABS TABLET    Take 1 tablet by mouth 2 times daily    MULTIPLE VITAMIN (MULTI VITAMIN DAILY) TABS    Take by mouth daily    NIACIN 500 MG EXTENDED RELEASE CAPSULE    Take 500 mg by mouth daily    NITROGLYCERIN (NITROSTAT) 0.4 MG SL TABLET    Place 0.4 mg under the tongue every 5 minutes as needed for Chest pain Dissolve 1 tab under tongue at first sign of chest pain. May repeat every 5 minutes until relief is obtained.  If pain persists after taking 3 tabs in a 15-minute period, or the pain is different than is typically experienced, call 9-1-1 immediately. NOVOLOG FLEXPEN 100 UNIT/ML INJECTION PEN    INJECT 4 UNITS AT LUNCH AND  6 UNITS AT SUPPER (EACH PEN EXPIRES 28 DAYS AFTER 1ST USE)    NYSTATIN (MYCOSTATIN) 539686 UNIT/GM POWDER    Apply 3 times daily. OMEGA-3 FATTY ACIDS (FISH OIL) 1200 MG CAPS    Take 1,200 mg by mouth daily     ONDANSETRON (ZOFRAN) 4 MG TABLET    Take 1 tablet by mouth every 8 hours as needed for Nausea or Vomiting    PANTOPRAZOLE (PROTONIX) 40 MG TABLET    TAKE 1 TABLET EVERY DAY    POTASSIUM CHLORIDE (MICRO-K) 10 MEQ EXTENDED RELEASE CAPSULE    TAKE 1 CAPSULE TWICE DAILY    PRODIGY NO CODING BLOOD GLUC STRIP    TEST FOUR TIMES DAILY AS DIRECTED    PRODIGY TWIST TOP LANCETS 28G MISC    USE AS DIRECTED FOUR TIMES DAILY    SENNA (SENOKOT) 8.6 MG TABS TABLET    TAKE 1 TABLET BY MOUTH TWICE DAILY    SPIRONOLACTONE (ALDACTONE) 50 MG TABLET    TAKE 1 TABLET TWICE DAILY    TRAVOPROST, BENZALKONIUM, (TRAVATAN) 0.004 % OPHTHALMIC SOLUTION    Place 1 drop into both eyes daily    TRIAMCINOLONE (KENALOG) 0.1 % CREAM    APPLY TOPICALLY TWICE DAILY    UMECLIDINIUM-VILANTEROL (ANORO ELLIPTA) 62.5-25 MCG/INH AEPB INHALER    INHALE 1 PUFF INTO LUNGS EVERY DAY    VITAMIN A PO    Take 2,400 mcg by mouth daily    VITAMIN C (ASCORBIC ACID) 500 MG TABLET    TAKE 1 TABLET BY MOUTH TWICE DAILY    VITAMIN E 400 UNIT CAPSULE    Take 400 Units by mouth daily    ZINC 50 MG TABS    Take 50 mg by mouth daily       ALLERGIES     is allergic to seasonal.    FAMILY HISTORY     indicated that the status of her mother is unknown. She indicated that her father is alive. She indicated that the status of her maternal uncle is unknown.     family history includes Cancer in her father; Early Death in her father; Glaucoma in her mother; Heart Disease in her mother; High Blood Pressure in her mother; Marney Bharati / [place]in [place] in her maternal uncle; Stroke in her mother.     SOCIAL HISTORY      reports that she quit smoking about 17 years ago. She has a 35.00 pack-year smoking history. She has never used smokeless tobacco. She reports that she does not drink alcohol or use drugs. PHYSICAL EXAM     INITIAL VITALS:  height is 5' 4\" (1.626 m) and weight is 264 lb (119.7 kg). Her tympanic temperature is 99 °F (37.2 °C). Her blood pressure is 133/63 and her pulse is 54. Her respiration is 17 and oxygen saturation is 95%. Physical Exam   Constitutional: She is oriented to person, place, and time. She appears well-developed and well-nourished. HENT:   Head: Normocephalic and atraumatic. Nose: Nose normal.   Mouth/Throat: Oropharynx is clear and moist.   Eyes: Pupils are equal, round, and reactive to light. EOM are normal.   Neck: Normal range of motion. Neck supple. Cardiovascular: Normal rate, regular rhythm, normal heart sounds and intact distal pulses. No murmur heard. Pulmonary/Chest: Effort normal and breath sounds normal. No respiratory distress. Abdominal: Soft. Bowel sounds are normal. She exhibits no distension. There is no tenderness. Neurological: She is alert and oriented to person, place, and time. Skin: Skin is warm and dry. Nursing note and vitals reviewed.         DIFFERENTIAL DIAGNOSIS/ MDM:     Bronchitis, Pneumonia, ACS, PE, Musculoskeletal pain, pneumothorax, thoracic aortic dissection, nonspecific chest pain, gastrointestinal in origin    HEART SCORE    Variable       Score   History  []Highly Suspicious      2     []Moderately Suspicious     1     [x]Slightly Suspicious      0     ECG   []Significant ST-depression     2     []Nonspecific Repolarization     1     [x]Normal       0     Age   [x]>72years old      2    []45-75 years old      3    []<39years old      0     Risk Factors  []>3 risk factors or history of atherosclerotic disease 2     []1 or 2 risk factors      1     []No risk factors      0     Troponin  []>3x normal limit      2     []1-3x normal limit      1     [x]< normal limit      0   Risk Factors: DM, current or recent (<one month) smoker, HTN, hyperlipidemia, family history of CAD or obesity     Total Score = 3    Score 0-3: 2.5% Major Acute Coronary Event over the next 6 weeks -> D/C home  Score 4-6: 20.3% MACE -> Admit for Observation  Score 7-10: 72.7% MACE ->Early Invasive Strategies  Chest Pain in the Emergency Room: A Multicenter Validation of the [address]. Bryan BE, Anibal AJ, Patrice MENA, Mast TP, [employer], Mast EG, Edison SH, [facility]. Crit Pathw Cardiol. 2010 Sep; 9(3): 164-169.     Risk Stratification for Acute Coronary Syndrome   Family history of coronary artery disease - No  History of diabetes - Yes  History of hypertension - Yes  History of hyperlipidemia  - No  History of smoking - No  Cocaine use - No  Known coronary artery disease - Yes  (Patient is classified as low risk if he/she has one or less risk factors excluding diabetes and known coronary artery disease)    Risk Stratification for Thoracic Aortic Dissection (TAD)  Family history of TAD - No  History of connective tissue disorder (i.e. Marfans Syndrome, Johnathon-Danlos Syndrome) - No  Meyers Syndrome - No  History of hypertension - Yes  History of aortic valve disease - No  Pregnancy - No    Risk Stratification for Pulmonary Embolism (PE)  Previous PE - No  Malignancy - No  Obesity - Yes  Trauma - No  Mallika filter - No  Pregnancy/postpartum - No  Smoking - No  Prior DVT - No  Immobilization (i.e. leg cast, travel) - No  Surgery within the last 60 days - No  Coagulation disorder - No  Estrogen medicine - No    DIAGNOSTIC RESULTS     EKG: All EKG's are interpreted by the Emergency Department Physician who either signs or Co-signs this chart in the absence of a cardiologist.    EKG Interpretation    Interpreted by emergency department physician    Rhythm: normal sinus   Rate: normal  Axis: normal  Conduction: normal  ST Segments: no acute change  T Waves: no acute change  Q Waves: no acute change    Clinical Impression: no acute change, but this is a nonspecific EKG. RADIOLOGY:   I directly visualized the following  images and reviewed the radiologist interpretations:    Xr Chest Standard (2 Vw)    Result Date: 4/16/2019  EXAMINATION: TWO VIEWS OF THE CHEST 4/16/2019 4:23 pm COMPARISON: Chest December 7, 2016. HISTORY: ORDERING SYSTEM PROVIDED HISTORY: CHEST PAIN TECHNOLOGIST PROVIDED HISTORY: CHEST PAIN Ordering Physician Provided Reason for Exam: Sob; chest heaviness for 3 days Acuity: Acute Type of Exam: Initial FINDINGS: Heart appears normal in size. Aorta is tortuous. Lungs are clear. No free air. Osseous structures appear grossly intact. No acute cardiopulmonary process. ED BEDSIDE ULTRASOUND:       LABS:  Labs Reviewed   CBC WITH AUTO DIFFERENTIAL - Abnormal; Notable for the following components:       Result Value    Monocytes 13 (*)     All other components within normal limits   BASIC METABOLIC PANEL W/ REFLEX TO MG FOR LOW K - Abnormal; Notable for the following components:    Glucose 164 (*)     Bun/Cre Ratio 23 (*)     All other components within normal limits   D-DIMER, RAPID   BRAIN NATRIURETIC PEPTIDE   POCT TROPONIN   POCT TROPONIN       Reviewed all the lab results, 2 sets of cardiac markers are normal, d-dimer is negative. Blood sugar is 164, rest of the labs are unremarkable    EMERGENCY DEPARTMENT COURSE:   Vitals:    Vitals:    04/16/19 1554 04/16/19 1645 04/16/19 1715   BP: (!) 140/62 131/63 133/63   Pulse: 58 55 54   Resp: 16 17 17   Temp: 99 °F (37.2 °C)     TempSrc: Tympanic     SpO2: 95% 94% 95%   Weight: 264 lb (119.7 kg)     Height: 5' 4\" (1.626 m)       -------------------------  BP: 133/63, Temp: 99 °F (37.2 °C), Pulse: 54, Resp: 17    Orders Placed This Encounter   Medications    aspirin chewable tablet 324 mg       During emergency department course, patient was put on the monitor and saline Hep-Lock was started.   She was given aspirin 324 mg orally. She remained in normal sinus rhythm and is resting comfortably. She prefers to go home. Her blood pressure has come down to 133/63. She will follow-up with her cardiologist and get a stress test as an outpatient. She has been getting echocardiogram on a regular basis and the last cardiac catheterization was in 2016. She is advised to return if chest pain recurs or if she develops any new symptoms. The patient presents with chest pain that is not suggesting in nature of pulmonary emnbolus, aortic dissection, cardiac ischemia, aortic dissection, or other serious etiology. Given the extremely low risk of these diagnoses further testing and evaluation for these possibilites are not indicated at [facility] time. The patient has been instructed to return if the symptpoms change or worsen in any way. I have reviewed the disposition diagnosis with the patient and or their family/guardian. I have answered their questions and given discharge instructions. They voiced understanding of these instructions and did not have any further questions or complaints. Re-evaluation Notes    Patient is resting comfortably and does not appear to be in any pain or distress. CRITICAL CARE:   None        CONSULTS:      PROCEDURES:  None    FINAL IMPRESSION      1.  Chest pain, unspecified type          DISPOSITION/PLAN   DISPOSITION Decision To Discharge    Condition on Disposition    Stable    PATIENT REFERRED TO:  Zoey Aguila MD  [facility]  [address] [phone]    Call in 1 day  For reevaluation of current symptoms    Hazel Segura DO  [address] [phone]    Call in 1 day  For reevaluation of current symptoms    [facility].  [phone]    If symptoms worsen, If chest pain recurs or any new symptoms appear      DISCHARGE MEDICATIONS:  New Prescriptions    No medications on file (Please note that portions of this note were completed with a voice recognition program.  Efforts were made to edit the dictations but occasionally words are mis-transcribed.)    Carbone MD, F.A.C.E.P.   Attending Emergency Physician                         Suzette Sandoval MD  04/16/19 1908

## 2019-04-16 NOTE — TELEPHONE ENCOUNTER
Juli from [facility] called and stated that the patient was in to see you yesterday but, didn't tell you she was having chest pain and heaviness. Patient still experiencing sx, she was given a nitro and that did not help, writer told [facility] nurse to take patient to ER for evaluation, voiced understanding.

## 2019-04-17 ENCOUNTER — TELEPHONE (OUTPATIENT)
Dept: CARDIOLOGY | Age: 69
End: 2019-04-17

## 2019-04-17 ENCOUNTER — TELEPHONE (OUTPATIENT)
Dept: INTERNAL MEDICINE | Age: 69
End: 2019-04-17

## 2019-04-17 LAB
EKG ATRIAL RATE: 51 BPM
EKG ATRIAL RATE: 65 BPM
EKG P AXIS: 41 DEGREES
EKG P AXIS: 43 DEGREES
EKG P-R INTERVAL: 170 MS
EKG P-R INTERVAL: 172 MS
EKG Q-T INTERVAL: 416 MS
EKG Q-T INTERVAL: 460 MS
EKG QRS DURATION: 74 MS
EKG QRS DURATION: 86 MS
EKG QTC CALCULATION (BAZETT): 423 MS
EKG QTC CALCULATION (BAZETT): 432 MS
EKG R AXIS: -24 DEGREES
EKG R AXIS: -25 DEGREES
EKG T AXIS: 36 DEGREES
EKG T AXIS: 42 DEGREES
EKG VENTRICULAR RATE: 51 BPM
EKG VENTRICULAR RATE: 65 BPM

## 2019-04-17 NOTE — TELEPHONE ENCOUNTER
Pt called post er last night for chest pressure and neck pain. Er advised she may need stress test since it has been about 3 years since the last one and advised pt to be seen by cardio before next appt in May. Pt appt moved to 4/22/19 and also advised to go to er if sx worsen or persist. Pt has an note in with PCP as well.   Pt sx included chest pressure-nausea-left arm pain-exhaustion    [phone]    Last Appt:  11/5/2018  Next Appt:   4/22/2019  Med verified in [address]

## 2019-04-17 NOTE — TELEPHONE ENCOUNTER
Patient was in ER yesterday for CP. Patient was instructed to come in for a follow up but, she had just seen you on Monday. The ER is recommending a stress test and she wanted to know if she needs to be seen or if you can just order it? Patient states heaviness in the chest. Pressure more than pain in upper abdomen, base of neck and chest area. Could not take a deep breath. Please advise.

## 2019-04-17 NOTE — TELEPHONE ENCOUNTER
Agree with having patient see cardiology and then having them decide the appropriate type of stress test for this patient

## 2019-04-17 NOTE — TELEPHONE ENCOUNTER
Called and spoke with pt. Reiterated need to use ER for any sx in the meantime, and she accepts. Verified 4/22 ov. Pt understands and accepts.

## 2019-04-17 NOTE — TELEPHONE ENCOUNTER
Pt is already scheduled to see cardiology this Monday. He will order stress test if needed. Spoke with pt to confirm appt.

## 2019-04-18 ENCOUNTER — TELEPHONE (OUTPATIENT)
Dept: INTERNAL MEDICINE | Age: 69
End: 2019-04-18

## 2019-04-18 DIAGNOSIS — R11.0 NAUSEA: ICD-10-CM

## 2019-04-18 DIAGNOSIS — R63.4 WEIGHT LOSS: Primary | ICD-10-CM

## 2019-04-18 RX ORDER — ONDANSETRON 4 MG/1
4 TABLET, FILM COATED ORAL EVERY 8 HOURS PRN
Qty: 30 TABLET | Refills: 3 | Status: CANCELLED | OUTPATIENT
Start: 2019-04-18

## 2019-04-18 RX ORDER — ONDANSETRON 4 MG/1
4 TABLET, FILM COATED ORAL EVERY 8 HOURS PRN
Qty: 30 TABLET | Refills: 3 | Status: SHIPPED | OUTPATIENT
Start: 2019-04-18 | End: 2020-03-16

## 2019-04-18 NOTE — TELEPHONE ENCOUNTER
Patient states at her last visit with Dr. Ambika Mims on 4/15 he had stopped her Gabapentin due to her wanting to lose weight. She is asking if Dr. Ambika Mims was going to replace the Gabapentin with something else due to her neuropathy pain? She states the pain is getting worse. Please advise. Thanks.

## 2019-04-18 NOTE — TELEPHONE ENCOUNTER
Spoke with patient and she said ok to going back on gabapentin. Also she agreed to see the dietitian. Writer is going to make appt with dietitian for patient after referral is completed. Referral for Dietitian is pended.

## 2019-04-22 ENCOUNTER — OFFICE VISIT (OUTPATIENT)
Dept: CARDIOLOGY | Age: 69
End: 2019-04-22
Payer: MEDICARE

## 2019-04-22 VITALS
HEART RATE: 62 BPM | DIASTOLIC BLOOD PRESSURE: 71 MMHG | SYSTOLIC BLOOD PRESSURE: 100 MMHG | WEIGHT: 263.89 LBS | BODY MASS INDEX: 45.05 KG/M2 | HEIGHT: 64 IN

## 2019-04-22 DIAGNOSIS — E78.5 DYSLIPIDEMIA: ICD-10-CM

## 2019-04-22 DIAGNOSIS — R07.9 CHEST PAIN, UNSPECIFIED TYPE: ICD-10-CM

## 2019-04-22 DIAGNOSIS — R07.89 OTHER CHEST PAIN: ICD-10-CM

## 2019-04-22 DIAGNOSIS — R94.31 ABNORMAL ECG: ICD-10-CM

## 2019-04-22 DIAGNOSIS — R06.02 SOB (SHORTNESS OF BREATH): Primary | ICD-10-CM

## 2019-04-22 DIAGNOSIS — I25.10 CORONARY ARTERY DISEASE INVOLVING NATIVE CORONARY ARTERY OF NATIVE HEART WITHOUT ANGINA PECTORIS: ICD-10-CM

## 2019-04-22 PROCEDURE — 99214 OFFICE O/P EST MOD 30 MIN: CPT | Performed by: INTERNAL MEDICINE

## 2019-04-22 PROCEDURE — G8598 ASA/ANTIPLAT THER USED: HCPCS | Performed by: INTERNAL MEDICINE

## 2019-04-22 PROCEDURE — G8428 CUR MEDS NOT DOCUMENT: HCPCS | Performed by: INTERNAL MEDICINE

## 2019-04-22 PROCEDURE — 1123F ACP DISCUSS/DSCN MKR DOCD: CPT | Performed by: INTERNAL MEDICINE

## 2019-04-22 PROCEDURE — 3017F COLORECTAL CA SCREEN DOC REV: CPT | Performed by: INTERNAL MEDICINE

## 2019-04-22 PROCEDURE — G8400 PT W/DXA NO RESULTS DOC: HCPCS | Performed by: INTERNAL MEDICINE

## 2019-04-22 PROCEDURE — 4040F PNEUMOC VAC/ADMIN/RCVD: CPT | Performed by: INTERNAL MEDICINE

## 2019-04-22 PROCEDURE — G8417 CALC BMI ABV UP PARAM F/U: HCPCS | Performed by: INTERNAL MEDICINE

## 2019-04-22 PROCEDURE — 0518F FALL PLAN OF CARE DOCD: CPT | Performed by: INTERNAL MEDICINE

## 2019-04-22 PROCEDURE — 3288F FALL RISK ASSESSMENT DOCD: CPT | Performed by: INTERNAL MEDICINE

## 2019-04-22 PROCEDURE — 1090F PRES/ABSN URINE INCON ASSESS: CPT | Performed by: INTERNAL MEDICINE

## 2019-04-22 PROCEDURE — 1036F TOBACCO NON-USER: CPT | Performed by: INTERNAL MEDICINE

## 2019-04-22 NOTE — PROGRESS NOTES
[facility]    HPI and CC:  Johnson Mcclendon  is here for follow up for CAD    Has had ER visit SOB, Chest pain. Has not been feeling as energetic. Worsening SOb and CP.     Past Medical History:   Diagnosis Date    Arthritis     Asthma     CAD (coronary artery disease)     Cancer ([address] 1971    cervical    Carotid artery disease ([address]     Cataracts, bilateral     COPD (chronic obstructive pulmonary disease) (HCC)     Coronary artery disease     Eczema     Fibromyalgia     Glaucoma     H/O blood clots     Headache     Heart attack ([address]     Hx of Bell's palsy     Mild right facial paralysis    Hypertension     Liver disease     Mild dementia     Neuropathy     Osteoporosis     Sleep apnea 2014    sleep study done in [facility]     Type 2 diabetes mellitus with hyperglycemia [facility])        Past Surgical History:   Procedure Laterality Date    APPENDECTOMY      BARIATRIC SURGERY      COLONOSCOPY  2012    CORONARY ANGIOPLASTY WITH STENT PLACEMENT      HYSTERECTOMY  1987    INDUCED   1970    LUMBAR SPINE SURGERY Right 2019    Right L2 L3 TRANSFORAMINAL performed by Chapo Sylvester MD at [address] [facility] 1.1-2CM FACE,FACIAL Left 2018    Excision Cyst Left Chest, Middle Back performed by Mamadou Arias MD at [address] [place] [address] ANES/STEROID FORAMEN LUMBAR/SACRAL W IMG GUIDE ,EA ADD LEVEL Right 10/5/2018    Right L2 & L3 TRANSFORAMINAL performed by Chapo Sylvester MD at [address] [place] [address] ANES/STEROID FORAMEN LUMBAR/SACRAL W IMG GUIDE ,EA ADD LEVEL Right 10/26/2018    Right L2 L3 TRANSFORAMINAL performed by Chapo Sylvester MD at [facility]       Family History   Problem Relation Age of Onset    Heart Disease Mother     High Blood Pressure Mother     Stroke Mother     Glaucoma Mother     Early Death Father     Cancer Father         stomach    Miscarriages / Stillbirths Maternal Uncle      REVIEW magnesium oxide (MAGNESIUM-OXIDE) 400 (241.3 Mg) MG TABS tablet, Take 1 tablet by mouth 2 times daily, Disp: 60 tablet, Rfl: 11    ibuprofen (ADVIL;MOTRIN) 400 MG tablet, TAKE 1 TABLET EVERY 6 HOURS AS NEEDED FOR PAIN, Disp: 360 tablet, Rfl: 3    furosemide (LASIX) 40 MG tablet, Take 1 tablet by mouth daily, Disp: 90 tablet, Rfl: 3    umeclidinium-vilanterol (ANORO ELLIPTA) 62.5-25 MCG/INH AEPB inhaler, INHALE 1 PUFF INTO LUNGS EVERY DAY, Disp: 3 each, Rfl: 3    clopidogrel (PLAVIX) 75 MG tablet, Take 1 tablet by mouth daily, Disp: 90 tablet, Rfl: 3    potassium chloride (MICRO-K) 10 MEQ extended release capsule, TAKE 1 CAPSULE TWICE DAILY, Disp: 180 capsule, Rfl: 3    atorvastatin (LIPITOR) 40 MG tablet, Take 1 tablet by mouth daily, Disp: 90 tablet, Rfl: 2    alendronate (FOSAMAX) 70 MG tablet, Take 1 tablet by mouth every 7 days, Disp: 12 tablet, Rfl: 3    pantoprazole (PROTONIX) 40 MG tablet, TAKE 1 TABLET EVERY DAY, Disp: 90 tablet, Rfl: 3    lisinopril (PRINIVIL;ZESTRIL) 10 MG tablet, TAKE 1 TABLET EVERY DAY, Disp: 90 tablet, Rfl: 3    amLODIPine (NORVASC) 5 MG tablet, TAKE 1 TABLET EVERY DAY, Disp: 90 tablet, Rfl: 3    citalopram (CELEXA) 10 MG tablet, TAKE 1 TABLET EVERY DAY, Disp: 90 tablet, Rfl: 3    buPROPion (WELLBUTRIN) 75 MG tablet, TAKE 1 TABLET TWICE DAILY, Disp: 180 tablet, Rfl: 3    amitriptyline (ELAVIL) 25 MG tablet, take 1 tablet by mouth at bedtime, Disp: 90 tablet, Rfl: 3    spironolactone (ALDACTONE) 50 MG tablet, TAKE 1 TABLET TWICE DAILY, Disp: 180 tablet, Rfl: 3    albuterol sulfate HFA (VENTOLIN HFA) 108 (90 Base) MCG/ACT inhaler, Inhale 2 puffs into the lungs every 4 hours as needed for Wheezing, Disp: 1 Inhaler, Rfl: 5    Insulin Syringe-Needle U-100 (INSULIN SYRINGE .5CC/31GX5/16\") 31G X 5/16\" 0.5 ML MISC, 1 each by Does not apply route daily, Disp: 200 each, Rfl: 3    Cholecalciferol (VITAMIN D3) 1000 units TABS, TAKE 3 TABLETS BY MOUTH TWICE DAILY, Disp: 540 tablet, Cyanocobalamin (VITAMIN B12 PO), Take 2,500 mcg by mouth daily, Disp: , Rfl:     B Complex-C (SUPER B COMPLEX PO), Take by mouth daily, Disp: , Rfl:     travoprost, benzalkonium, (TRAVATAN) 0.004 % ophthalmic solution, Place 1 drop into both eyes daily, Disp: 3 Bottle, Rfl: 3    insulin glargine (LANTUS) 100 UNIT/ML injection vial, Inject 10 Units into the skin nightly, Disp: 3 vial, Rfl: 3    CPAP Machine MISC, by Does not apply route Pressure of 13 (AirSense 10) [employer]., Disp: , Rfl:     albuterol (PROVENTIL) (2.5 MG/3ML) 0.083% nebulizer solution, Take 2.5 mg by nebulization every 6 hours as needed for Wheezing, Disp: , Rfl:     ferrous sulfate 325 (65 FE) MG EC tablet, Take 325 mg by mouth daily (with breakfast), Disp: , Rfl:     Multiple Vitamin (MULTI VITAMIN DAILY) TABS, Take by mouth daily, Disp: , Rfl:     nitroGLYCERIN (NITROSTAT) 0.4 MG SL tablet, Place 0.4 mg under the tongue every 5 minutes as needed for Chest pain Dissolve 1 tab under tongue at first sign of chest pain. May repeat every 5 minutes until relief is obtained. If pain persists after taking 3 tabs in a 15-minute period, or the pain is different than is typically experienced, call 9-1-1 immediately. , Disp: , Rfl:     gabapentin (NEURONTIN) 300 MG capsule, TAKE 2 CAPSULES TWICE DAILY, Disp: 360 capsule, Rfl: 3    LAST CATH 6/15- minimal non obstructive CAD,  mRCA 5%, EF 65%     Holter 1/9/18- 7 beat run of PAT     TTE 1/9/18  CONCLUSIONS:  1. Normal LV size and function with ejection fraction of 65%. 2.  Grade 1 diastolic dysfunction. 3.  Mild concentric LVH. 4.  Mild biatrial enlargement. 5.  No significant valvulopathies. 6.  No effusion. 7.  Mildly dilated RV with normal function. Assessment and Plan:    1. CPOE and LAUREANO- check stress. 2. Carotid artery disease- Stable on statin  3. CAD with Hx of BMS to RCA in 2002  4. Cath in 2015- non obstructive CAD  5. Dyspnea- TTE showed preserved LV systolic function.  May be diastolic dysfunction  6. Palpitations- PAT. Resolved. Not on BB due to sinus lynette. 7. Sinus lynette on ecg- not on medications to account for this. Maybe due to LEANDRO   8. H/o CAD s/p PCI to M RCA in 2002- Cath 06/2015 showed stable finding. Continue plavix   9. Orthostatic hypotension. Wear compression stockings for leg edema. 10. Chronic diastolic HF-on diuretics. Obtain BMP  11. LEANDRO- on CPAP  12. Patient to follow up with PCP and neurology for falls. Has history of orthostatic hypotension. Risk of bleeding discussed with patient on plavix. Patient wants to continue plavix. 13. RTC 6 months. Thank you for allowing me to participate in the care of this patient, please do not hesitate to call if you have any questions. Merritt Curtis DO, [facility] [place], [address] [facility]  [facility] [place]  [phone]

## 2019-04-24 ENCOUNTER — OFFICE VISIT (OUTPATIENT)
Dept: NUTRITION | Age: 69
End: 2019-04-24
Payer: MEDICARE

## 2019-04-24 DIAGNOSIS — E11.42 TYPE 2 DIABETES MELLITUS WITH DIABETIC POLYNEUROPATHY, WITH LONG-TERM CURRENT USE OF INSULIN (HCC): Primary | ICD-10-CM

## 2019-04-24 DIAGNOSIS — E66.9 OBESITY WITH SERIOUS COMORBIDITY, UNSPECIFIED CLASSIFICATION, UNSPECIFIED OBESITY TYPE: Primary | ICD-10-CM

## 2019-04-24 DIAGNOSIS — Z79.4 TYPE 2 DIABETES MELLITUS WITH DIABETIC POLYNEUROPATHY, WITH LONG-TERM CURRENT USE OF INSULIN (HCC): Primary | ICD-10-CM

## 2019-04-24 PROCEDURE — G8428 CUR MEDS NOT DOCUMENT: HCPCS | Performed by: DIETITIAN, REGISTERED

## 2019-04-24 PROCEDURE — 97802 MEDICAL NUTRITION INDIV IN: CPT | Performed by: DIETITIAN, REGISTERED

## 2019-04-24 PROCEDURE — G8417 CALC BMI ABV UP PARAM F/U: HCPCS | Performed by: DIETITIAN, REGISTERED

## 2019-05-02 ENCOUNTER — TELEPHONE (OUTPATIENT)
Dept: INTERNAL MEDICINE | Age: 69
End: 2019-05-02
Payer: MEDICARE

## 2019-05-02 DIAGNOSIS — E11.9 TYPE 2 DIABETES MELLITUS WITHOUT COMPLICATION, UNSPECIFIED WHETHER LONG TERM INSULIN USE (HCC): ICD-10-CM

## 2019-05-02 DIAGNOSIS — K74.60 HEPATIC CIRRHOSIS, UNSPECIFIED HEPATIC CIRRHOSIS TYPE, UNSPECIFIED WHETHER ASCITES PRESENT (HCC): Primary | ICD-10-CM

## 2019-05-02 DIAGNOSIS — R26.9 GAIT ABNORMALITY: ICD-10-CM

## 2019-05-02 DIAGNOSIS — M43.07 SPONDYLOLYSIS OF LUMBOSACRAL REGION: ICD-10-CM

## 2019-05-02 DIAGNOSIS — I25.10 CORONARY ARTERY DISEASE DUE TO LIPID RICH PLAQUE: ICD-10-CM

## 2019-05-02 DIAGNOSIS — M54.41 LOW BACK PAIN WITH RIGHT-SIDED SCIATICA, UNSPECIFIED BACK PAIN LATERALITY, UNSPECIFIED CHRONICITY: ICD-10-CM

## 2019-05-02 DIAGNOSIS — I25.83 CORONARY ARTERY DISEASE DUE TO LIPID RICH PLAQUE: ICD-10-CM

## 2019-05-03 LAB
DIABETIC RETINOPATHY: NEGATIVE
DIABETIC RETINOPATHY: NEGATIVE

## 2019-05-03 PROCEDURE — G0179 MD RECERTIFICATION HHA PT: HCPCS | Performed by: INTERNAL MEDICINE

## 2019-05-07 ENCOUNTER — APPOINTMENT (OUTPATIENT)
Dept: GENERAL RADIOLOGY | Age: 69
End: 2019-05-07
Attending: PHYSICAL MEDICINE & REHABILITATION
Payer: MEDICARE

## 2019-05-07 ENCOUNTER — HOSPITAL ENCOUNTER (OUTPATIENT)
Age: 69
Setting detail: OUTPATIENT SURGERY
Discharge: HOME | End: 2019-05-07
Attending: PHYSICAL MEDICINE & REHABILITATION | Admitting: PHYSICAL MEDICINE & REHABILITATION
Payer: MEDICARE

## 2019-05-07 VITALS
BODY MASS INDEX: 45.31 KG/M2 | RESPIRATION RATE: 16 BRPM | TEMPERATURE: 97 F | DIASTOLIC BLOOD PRESSURE: 47 MMHG | OXYGEN SATURATION: 94 % | WEIGHT: 265.4 LBS | HEIGHT: 64 IN | HEART RATE: 58 BPM | SYSTOLIC BLOOD PRESSURE: 101 MMHG

## 2019-05-07 PROCEDURE — 2709999900 HC NON-CHARGEABLE SUPPLY: Performed by: PHYSICAL MEDICINE & REHABILITATION

## 2019-05-07 PROCEDURE — 64484 NJX AA&/STRD TFRM EPI L/S EA: CPT | Performed by: PHYSICAL MEDICINE & REHABILITATION

## 2019-05-07 PROCEDURE — 3600000056 HC PAIN LEVEL 4 BASE: Performed by: PHYSICAL MEDICINE & REHABILITATION

## 2019-05-07 PROCEDURE — 3209999900 FLUORO FOR SURGICAL PROCEDURES

## 2019-05-07 PROCEDURE — 6360000004 HC RX CONTRAST MEDICATION: Performed by: PHYSICAL MEDICINE & REHABILITATION

## 2019-05-07 PROCEDURE — 7100000010 HC PHASE II RECOVERY - FIRST 15 MIN: Performed by: PHYSICAL MEDICINE & REHABILITATION

## 2019-05-07 PROCEDURE — 6360000002 HC RX W HCPCS: Performed by: PHYSICAL MEDICINE & REHABILITATION

## 2019-05-07 PROCEDURE — 3600000057 HC PAIN LEVEL 4 ADDL 15 MIN: Performed by: PHYSICAL MEDICINE & REHABILITATION

## 2019-05-07 PROCEDURE — 2500000003 HC RX 250 WO HCPCS: Performed by: PHYSICAL MEDICINE & REHABILITATION

## 2019-05-07 PROCEDURE — 7100000011 HC PHASE II RECOVERY - ADDTL 15 MIN: Performed by: PHYSICAL MEDICINE & REHABILITATION

## 2019-05-07 PROCEDURE — 64483 NJX AA&/STRD TFRM EPI L/S 1: CPT | Performed by: PHYSICAL MEDICINE & REHABILITATION

## 2019-05-07 PROCEDURE — 2580000003 HC RX 258: Performed by: PHYSICAL MEDICINE & REHABILITATION

## 2019-05-07 PROCEDURE — 99152 MOD SED SAME PHYS/QHP 5/>YRS: CPT | Performed by: PHYSICAL MEDICINE & REHABILITATION

## 2019-05-07 RX ORDER — BUPIVACAINE HYDROCHLORIDE 5 MG/ML
INJECTION, SOLUTION EPIDURAL; INTRACAUDAL PRN
Status: DISCONTINUED | OUTPATIENT
Start: 2019-05-07 | End: 2019-05-07 | Stop reason: ALTCHOICE

## 2019-05-07 RX ORDER — MIDAZOLAM HYDROCHLORIDE 1 MG/ML
INJECTION INTRAMUSCULAR; INTRAVENOUS PRN
Status: DISCONTINUED | OUTPATIENT
Start: 2019-05-07 | End: 2019-05-07 | Stop reason: ALTCHOICE

## 2019-05-07 RX ORDER — DEXAMETHASONE SODIUM PHOSPHATE 10 MG/ML
INJECTION INTRAMUSCULAR; INTRAVENOUS PRN
Status: DISCONTINUED | OUTPATIENT
Start: 2019-05-07 | End: 2019-05-07 | Stop reason: ALTCHOICE

## 2019-05-07 RX ORDER — SODIUM CHLORIDE, SODIUM LACTATE, POTASSIUM CHLORIDE, CALCIUM CHLORIDE 600; 310; 30; 20 MG/100ML; MG/100ML; MG/100ML; MG/100ML
INJECTION, SOLUTION INTRAVENOUS CONTINUOUS
Status: DISCONTINUED | OUTPATIENT
Start: 2019-05-07 | End: 2019-05-07 | Stop reason: HOSPADM

## 2019-05-07 RX ORDER — FENTANYL CITRATE 50 UG/ML
INJECTION, SOLUTION INTRAMUSCULAR; INTRAVENOUS PRN
Status: DISCONTINUED | OUTPATIENT
Start: 2019-05-07 | End: 2019-05-07 | Stop reason: ALTCHOICE

## 2019-05-07 RX ORDER — 0.9 % SODIUM CHLORIDE 0.9 %
VIAL (ML) INJECTION PRN
Status: DISCONTINUED | OUTPATIENT
Start: 2019-05-07 | End: 2019-05-07 | Stop reason: ALTCHOICE

## 2019-05-07 RX ADMIN — SODIUM CHLORIDE, POTASSIUM CHLORIDE, SODIUM LACTATE AND CALCIUM CHLORIDE: 600; 310; 30; 20 INJECTION, SOLUTION INTRAVENOUS at 08:58

## 2019-05-07 ASSESSMENT — PAIN SCALES - GENERAL
PAINLEVEL_OUTOF10: 0
PAINLEVEL_OUTOF10: 0

## 2019-05-07 ASSESSMENT — PAIN - FUNCTIONAL ASSESSMENT: PAIN_FUNCTIONAL_ASSESSMENT: 0-10

## 2019-05-07 NOTE — H&P
(FOSAMAX) 70 MG tablet Take 1 tablet by mouth every 7 days 12 tablet 3    pantoprazole (PROTONIX) 40 MG tablet TAKE 1 TABLET EVERY DAY 90 tablet 3    lisinopril (PRINIVIL;ZESTRIL) 10 MG tablet TAKE 1 TABLET EVERY DAY 90 tablet 3    amLODIPine (NORVASC) 5 MG tablet TAKE 1 TABLET EVERY DAY 90 tablet 3    citalopram (CELEXA) 10 MG tablet TAKE 1 TABLET EVERY DAY 90 tablet 3    buPROPion (WELLBUTRIN) 75 MG tablet TAKE 1 TABLET TWICE DAILY 180 tablet 3    amitriptyline (ELAVIL) 25 MG tablet take 1 tablet by mouth at bedtime 90 tablet 3    spironolactone (ALDACTONE) 50 MG tablet TAKE 1 TABLET TWICE DAILY 180 tablet 3    albuterol sulfate HFA (VENTOLIN HFA) 108 (90 Base) MCG/ACT inhaler Inhale 2 puffs into the lungs every 4 hours as needed for Wheezing 1 Inhaler 5    Insulin Syringe-Needle U-100 (INSULIN SYRINGE .5CC/31GX5/16\") 31G X 5/16\" 0.5 ML MISC 1 each by Does not apply route daily 200 each 3    Cholecalciferol (VITAMIN D3) 1000 units TABS TAKE 3 TABLETS BY MOUTH TWICE DAILY 540 tablet 3    senna (SENOKOT) 8.6 MG TABS tablet TAKE 1 TABLET BY MOUTH TWICE DAILY 180 tablet 3    triamcinolone (KENALOG) 0.1 % cream APPLY TOPICALLY TWICE DAILY 80 g 5    B-D ULTRAFINE III SHORT PEN 31G X 8 MM MISC USE TWICE DAILY 180 each 3    NOVOLOG FLEXPEN 100 UNIT/ML injection pen INJECT 4 UNITS AT LUNCH AND  6 UNITS AT SUPPER (EACH PEN EXPIRES 28 DAYS AFTER 1ST USE) 15 mL 3    fluticasone (FLONASE) 50 MCG/ACT nasal spray USE 2 SPRAYS NASALLY EVERY DAY 48 g 3    loratadine (CLARITIN) 10 MG tablet Take 10 mg by mouth daily as needed        nystatin (MYCOSTATIN) 915523 UNIT/GM powder Apply 3 times daily.  60 g 5    VITAMIN A PO Take 2,400 mcg by mouth daily        vitamin C (ASCORBIC ACID) 500 MG tablet TAKE 1 TABLET BY MOUTH TWICE DAILY 60 tablet 11    PRODIGY NO CODING BLOOD GLUC strip TEST FOUR TIMES DAILY AS DIRECTED 400 strip 3    PRODIGY TWIST TOP LANCETS 28G MISC USE AS DIRECTED FOUR TIMES DAILY 400 each 3    immediately.                 Past Medical History        Past Medical History:   Diagnosis Date    Arthritis      Asthma      CAD (coronary artery disease) 44    [facility] [address]     cervical    Carotid artery disease ([facility]      Cataracts, bilateral      COPD (chronic obstructive pulmonary disease) (HCC)      Coronary artery disease      Eczema      Fibromyalgia      Glaucoma      H/O blood clots      Headache      Heart attack ([address]      Hx of Bell's palsy       Mild right facial paralysis    Hypertension      Liver disease      Mild dementia      Neuropathy      Osteoporosis      Sleep apnea 2014     [facility] done [facility]      Type 2 diabetes mellitus with hyperglycemia ([facility]              Past Surgical History         Past Surgical History:   Procedure Laterality Date    APPENDECTOMY   1968    BARIATRIC SURGERY       COLONOSCOPY   2012    CORONARY ANGIOPLASTY WITH STENT PLACEMENT        HYSTERECTOMY       INDUCED    1970    LUMBAR SPINE SURGERY Right 2019     Right L2 L3 TRANSFORAMINAL performed by Rafal Raza MD at [address] [facility] 1.1-2CM FACE,FACIAL Left 2018     Excision Cyst Left Chest, Middle Back performed by Elio Fontenot MD at [address] [facility] ANES/STEROID FORAMEN LUMBAR/SACRAL W IMG GUIDE ,EA ADD LEVEL Right 10/5/2018     Right L2 & L3 TRANSFORAMINAL performed by Rafal Raza MD at [address] [facility] ANES/STEROID FORAMEN LUMBAR/SACRAL W IMG GUIDE ,EA ADD LEVEL Right 10/26/2018     Right L2 L3 TRANSFORAMINAL performed [facility] at [facility]            Family History         Family History   Problem Relation Age of Onset    Heart Disease Mother      High Blood Pressure Mother      Stroke Mother      Glaucoma Mother      Early Death Father      Cancer Father           stomach    Miscarriages / Stillbirths Maternal Uncle              Social History   Social History            Socioeconomic History    Marital status:        Spouse name: None    Number of children: None    Years of education: None    Highest education level: None   Occupational History    None   Social Needs    Financial resource strain: None    Food insecurity:       Worry: None       Inability: None    Transportation needs:       Medical: None       Non-medical: None   Tobacco Use    Smoking status: Former Smoker       Packs/day: 1.00       Years: 35.00       Pack years: 35.00       Last attempt to quit: 2001       Years since quittin.5    Smokeless tobacco: Never Used   Substance and Sexual Activity    Alcohol use: No    Drug use: No    Sexual activity: Never   Lifestyle    Physical activity:       Days per week: None       Minutes per session: None    Stress: None   Relationships    Social connections:       Talks on phone: None       Gets together: None       Attends [employer] service: None       Active member of club or organization: None       Attends meetings of clubs or organizations: None       Relationship status: None    Intimate partner violence:       Fear of current or ex partner: None       Emotionally abused: None       Physically abused: None       Forced sexual activity: None   Other Topics Concern    None   Social History Narrative    None            Review of Systems   Constitutional: Positive for activity change. HENT: Negative. Eyes: Negative. Respiratory: Negative. Cardiovascular: Negative. Gastrointestinal: Negative for constipation. Endocrine: Negative. Genitourinary: Negative. Musculoskeletal: Positive for arthralgias, back pain and myalgias. Neurological: Negative. Psychiatric/Behavioral: Positive for sleep disturbance. Objective:   Physical Exam   Constitutional: She is oriented to person, place, and time. She appears well-developed and well-nourished. She is cooperative. No distress.    HENT:   Head: Normocephalic and atraumatic. Cardiovascular: Normal rate. Pulmonary/Chest: Effort normal.   Musculoskeletal:        Lumbar back: She exhibits decreased range of motion and pain. EXAMINATION:  2 XRAY VIEWS OF THE LUMBAR SPINE, 9/14/2018 10:39 am     COMPARISON:  None     HISTORY:  ORDERING SYSTEM PROVIDED HISTORY: Right-sided low back pain with right-sided  sciatica, unspecified chronicity  TECHNOLOGIST PROVIDED HISTORY:  pain  Ordering Physician Provided Reason for Exam: No injury; chronic rt sided low  back pain  Acuity: Chronic  Type of Exam: Unknown     FINDINGS:  Osseous structures are diffusely demineralized. There is grade 1  anterolisthesis at L4-L5, measuring 0.4 cm, likely secondary to facet  arthropathy. Minimal anterolisthesis at L3-L4 is also probably secondary to  facet arthropathy. Alignment in the lumbar spine is otherwise normal.  The  vertebral body heights are preserved. There is mild disc space narrowing at  L5-S1. Mild-to-moderate multilevel facet arthropathy is noted. There are  scattered vascular calcifications at the aortic bifurcation. Cholecystectomy  clips are noted. Impression  1. No evidence of acute compression fracture. 2. Minimal to mild anterolisthesis at L3-L4 and L4-L5, likely secondary to  facet arthropathy. 3. Minimal degenerative disc disease and mild-to-moderate facet arthropathy. Neurological: She is alert and oriented to person, place, and time. No cranial nerve deficit. GCS eye subscore is 4. GCS verbal subscore is 5. GCS motor subscore is 6. Skin: Skin is warm, dry and intact. She is not diaphoretic. No cyanosis. No pallor. Nails show no clubbing. Psychiatric: She has a normal mood and affect. Her speech is normal.   Vitals reviewed. Assessment:   1. Lumbosacral spondylosis without myelopathy    2. DDD (degenerative disc disease), lumbar    3. Lumbar radiculopathy    4.  Chronic right-sided low back pain with right-sided sciatica Plan:    Will schedule right L2, L3 transforaminal     Informed consent has not been obtained for procedure. Penelope Mooneyenis  on blood thinners. Medications to hold have been reviewed with patient. Blood thinners must be held with permission from your cardiologist or primary care physician. A letter is  required to besent to PCP/Cardiologist regarding holding medications for procedure to decrease bleeding risk.                        Shannan Levi, APRN - CNP

## 2019-05-07 NOTE — OP NOTE
TRANSFORAMINAL EPIDURAL STEROID INJECTION    5/7/19    Surgeon: Santiago Hoffman MD    Pre-operative Diagnosis:   Patient Active Problem List   Diagnosis Code    Obstructive sleep apnea G47.33    Chronic fatigue R53.82    Coronary artery disease due to lipid rich plaque I25.10, I25.83    Vitamin D deficiency E55.9    Type 2 diabetes mellitus with diabetic polyneuropathy, with long-term current use of insulin ([facility]) E11.42, Z79.4    Peripheral polyneuropathy G62.9    Bilateral leg edema R60.0    Right hip pain M25.551    Muscle ache M79.10    Hx of Bell's palsy Z86.69    Memory problem R41.3    Gait abnormality R26.9    Balance problem R26.89    H/O falling Z91.81    Dizziness R42    Intractable episodic tension-type headache G44. 80    Essential tremor G25.0    Anxiety and depression F41.9, F32.9    Elevated hemoglobin A1c R73.09    VBI (vertebrobasilar insufficiency) G45.0    Chronic cerebral ischemia I67.82    TIA (transient ischemic attack) G45.9    Chronic tension headache G44.229    Morbid obesity with BMI of 45.0-49.9, adult ([facility]) E66.01, Z68.42    Chronic right-sided low back pain with right-sided sciatica M54.41, G89.29    Lumbar radiculopathy M54.16    Encounter for chronic pain management G89.29    Carotid stenosis, asymptomatic, bilateral I65.23    Chronic tension-type headache, not intractable G44.229    Lumbosacral spondylosis without myelopathy M47.817    DDD (degenerative disc disease), lumbar M51.36    Acquired spondylolisthesis M43.10       Post-operative Diagnosis: Same    Assistants: none    This is a 77 y.o. female patient with pain in the Back, right leg. Previous treatment and examination findings are noted in the H&P.r l2,3 transforaminal epidural injection has been requested for diagnostic and therapeutic reasons.     Conservative treatment was ineffective i.e.: ice, NSAIDS, rest, narcotic medication, chiropractic care, physical therapy and message therapy. Patient is unable to perform the following ADL's: ambulating and grooming     Pain Assessment: 0-10  Pain Level: 0                Last Plain films: 2018      EXAMINATION:  1. rL2,3 transforaminal radiculogram/epidurogram.   2. rl2,3 transforaminal epidural anesthetic injection. 3. rl2,3 transforaminal epidural steroid injection. CONSENT: Written consent was obtained from the patient on preprinted consent form after explaining the procedure, indications, potential complications and outcomes. Alternative treatments were also discussed. DISCUSSION: The patient was sterilely prepped and draped in the usual fashion in the prone position. Time out was verified for correct patient, side, level and procedure. SEDATION:    conscious sedation Versed 1 mg and Fentanyl 100 mcg  was performed during the procedure. The patient remained drowsy and respinding non verbally to commands throughout the procedure. The patient underwent pulse oximetry and blood pressure monitoring independently by a trained observer, as well as by a physician. PROCEDURE:  Under image-intensifier control, a 22 gauge needle x 5 inch spinal needle was guided successfully into the epidural space employing a posterior lateral/oblique approach. Needle aspiration was negative for heme or CSF. Instillation of  .5 mL of Omnipaque 240 contrast medium opacified the spinal nerve and demonstrated contiguous flow into the  rl 2 epidural space. No vascular spread was noted. Digital subtraction was not employed to evaluate for vascular spread. The patient was monitored for any untoward reaction to contrast medium before proceeding with procedure #2. The patient did not report pain reproduction in a concordant distribution. Following needle position verification, a test dose of .5 mL of sterile lidocaine 0.5% was administered and patient monitored for any adverse effects.  Then, 1 mL of   was instilled into the epidural space and the patient's response was again monitored. Finally, Dexamethasone (Decadron 10 mg/mL) 1 ml of 0.5% bupivacaine was then instilled. The patient's response was again monitored. The spinal needle was removed. Instillation of  .5 mL of Omnipaque 240 contrast medium opacified the spinal nerve and demonstrated contiguous flow into the rl 3 epidural space. No vascular spread was noted. Digital subtraction was not employed to evaluate for vascular spread. The patient was monitored for any untoward reaction to contrast medium before proceeding with procedure #2. The patient did not report pain reproduction in a concordant distribution. Following needle position verification, a test dose of .5 mL of sterile lidocaine 0.5% was administered and patient monitored for any adverse effects. Then, 1 mL of   was instilled into the epidural space and the patient's response was again monitored. Finally, Dexamethasone (Decadron 10 mg/mL) 1 ml of 0.5% bupivacaine was then instilled. The patient's response was again monitored. The spinal needle was removed. The patient tolerated the procedure well and without complications and was noted to be in stable condition prior to discharge from the procedure center with discharge instructions. EBL: no blood loss    SPECIMEN: none    IMPRESSIONS:  1. RrL2,3  transforaminal epidurogram, epidural anesthesia and epidural steroid injection procedures accomplished without incident. RECOMMENDATIONS:  1. Complete and return Post-Procedure Pain and Activity Diary.   2. Contact the [address] for symptom exacerbation, fever or unusual symptoms. 3. Post-procedure care according to verbal and written discharge instructions    POST-PROCEDURE EPIDUROGRAPHY INTERPRETATION:    EXAMINATION: AP, lateral, and oblique views    FLUORO TIME: 28 seconds    DISCUSSION: Spot views of the spine reveal normal alignment and segmentation.   Spinal needle is positioned at the [address] [place] [address] neuroforamin. Contrast spreads and outlines the rl2,3 nerve/ neuroforamin and epidural space. The epidurogram reveals excellent contrast flow. Visualized spine reveals See radiology report. Soft tissues reveal no abnormalities. IMPRESSION: rl2,3 transforaminal epidurogram/epineurogram reveals satisfactory needle position and contrast spread.      Electronically signed by Rex Newton MD on 5/7/2019 at 9:26 AM

## 2019-05-07 NOTE — PRE SEDATION
Sedation Pre-Procedure Note    Patient Name: Jorge Luis Solomon   YOB: 1950  Room/Bed: [facility]  Medical Record Number: [id number]  Date: 2019   Time: 9:54 AM       Indication:  R L2,3  TFE    Consent: I have discussed with the patient and/or the patient representative the indication, alternatives, and the possible risks and/or complications of the planned procedure and the anesthesia methods. The patient and/or patient representative appear to understand and agree to proceed. Vital Signs:   Vitals:    19 0951   BP: 108/62   Pulse: (!) 48   Resp: 16   Temp:    SpO2: 96%       Past Medical History:   has a past medical history of Arthritis, Asthma, CAD (coronary artery disease), Cancer ([address] Carotid artery disease ([address] Cataracts, bilateral, COPD (chronic obstructive pulmonary disease) ([address] Coronary artery disease, Eczema, Fibromyalgia, Glaucoma, H/O blood clots, Headache, Heart attack ([address] Hx of Bell's palsy, Hypertension, Liver disease, Mild dementia, Neuropathy, Osteoporosis, Sleep apnea, Tremor, and Type 2 diabetes mellitus with hyperglycemia ([address] Past Surgical History:   has a past surgical history that includes Appendectomy (); Induced  (); Tonsillectomy (); Hysterectomy (); Bariatric Surgery (); Colonoscopy (2012); pr exc skin benig 1.1-2cm face,facial (Left, 2018); pr inject anes/steroid foramen lumbar/sacral w img guide ,ea add level (Right, 10/5/2018); pr inject anes/steroid foramen lumbar/sacral w img guide ,ea add level (Right, 10/26/2018); Lumbar spine surgery (Right, 2019); and Coronary angioplasty with stent.     Medications:   Scheduled Meds:   Continuous Infusions:    lactated ringers 100 mL/hr at 19 0858     PRN Meds: fentaNYL, midazolam, bupivacaine (PF), dexamethasone, iohexol, lidocaine 1% (buffered), sodium chloride (PF)  Home Meds:   Prior to Admission medications    Medication Sig Start Date End Date Taking?  Authorizing Provider   Diabetic Shoe MISC Dispense #1 pair 4/24/19  Yes Richi Rasmussen MD   ondansetron (ZOFRAN) 4 MG tablet Take 1 tablet by mouth every 8 hours as needed for Nausea or Vomiting 4/18/19  Yes Richi Rasmussen MD   magnesium oxide (MAGNESIUM-OXIDE) 400 (241.3 Mg) MG TABS tablet Take 1 tablet by mouth 2 times daily 2/11/19  Yes Lorene Primrose, MD   ibuprofen (ADVIL;MOTRIN) 400 MG tablet TAKE 1 TABLET EVERY 6 HOURS AS NEEDED FOR PAIN 1/30/19  Yes Richi Rasmussen MD   furosemide (LASIX) 40 MG tablet Take 1 tablet by mouth daily 1/28/19  Yes Richi Rasmussen MD   umeclidinium-vilanterol [facility] ELLIPTA) 62.5-25 MCG/INH AEPB inhaler INHALE 1 PUFF INTO LUNGS EVERY DAY 1/28/19  Yes Richi Rasmussen MD   potassium chloride (MICRO-K) 10 MEQ extended release capsule TAKE 1 CAPSULE TWICE DAILY 1/28/19  Yes Richi Rasmussen MD   atorvastatin (LIPITOR) 40 MG tablet Take 1 tablet by mouth daily 1/28/19  Yes Richi Rasmussen MD   alendronate (FOSAMAX) 70 MG tablet Take 1 tablet by mouth every 7 days 1/28/19  Yes Richi Rasmussen MD   pantoprazole (PROTONIX) 40 MG tablet TAKE 1 TABLET EVERY DAY 1/28/19  Yes Richi Rasmussen MD   lisinopril (PRINIVIL;ZESTRIL) 10 MG tablet TAKE 1 TABLET EVERY DAY 1/28/19  Yes Richi Rasmussen MD   amLODIPine (NORVASC) 5 MG tablet TAKE 1 TABLET EVERY DAY 1/28/19  Yes Richi Rasmussen MD   citalopram (CELEXA) 10 MG tablet TAKE 1 TABLET EVERY DAY 1/28/19  Yes Richi Rasmussen MD   buPROPion (WELLBUTRIN) 75 MG tablet TAKE 1 TABLET TWICE DAILY 1/28/19  Yes Richi Rasmussen MD   amitriptyline (ELAVIL) 25 MG tablet take 1 tablet by mouth at bedtime 1/28/19  Yes Richi Rasmussen MD   spironolactone (ALDACTONE) 50 MG tablet TAKE 1 TABLET TWICE DAILY 1/28/19  Yes Richi Rasmussen MD   albuterol sulfate HFA (VENTOLIN HFA) 108 (90 Base) MCG/ACT inhaler Inhale 2 puffs into the lungs every 4 hours as needed for Wheezing 1/28/19  Yes Richi Rasmussen MD   Insulin Syringe-Needle U-100 (INSULIN SYRINGE .5CC/31GX5/16\") 31G X 5/16\" 0.5 ML MISC 1 each by Does not apply route daily 12/31/18  Yes Tyron Ochoa MD   Cholecalciferol (VITAMIN D3) 1000 units TABS TAKE 3 TABLETS BY MOUTH TWICE DAILY 12/21/18  Yes Tyron Ochoa MD   senna (SENOKOT) 8.6 MG TABS tablet TAKE 1 TABLET BY MOUTH TWICE DAILY 11/16/18  Yes Tyron Ochoa MD   triamcinolone (KENALOG) 0.1 % cream APPLY TOPICALLY TWICE DAILY 9/18/18  Yes MD ALEXIA Shah ULTRAFINE III SHORT PEN 31G X 8 MM MISC USE TWICE DAILY 9/12/18  Yes Tyron Ochoa MD   NOVOLOG FLEXPEN 100 UNIT/ML injection pen INJECT 4 UNITS AT LUNCH AND  6 UNITS AT SUPPER [facility] PEN EXPIRES 28 DAYS AFTER 1ST USE) 9/12/18  Yes Tyron Ochoa MD   fluticasone (FLONASE) 50 MCG/ACT nasal spray USE 2 SPRAYS NASALLY EVERY DAY 9/12/18  Yes Tyron Ochoa MD   loratadine (CLARITIN) 10 MG tablet Take 10 mg by mouth daily as needed   Yes Historical Provider, MD   nystatin (MYCOSTATIN) 178342 UNIT/GM powder Apply 3 times daily.  7/11/18  Yes Jacque Jones, APRN - CNP   VITAMIN A PO Take 2,400 mcg by mouth daily   Yes Historical Provider, MD   vitamin C (ASCORBIC ACID) 500 MG tablet TAKE 1 TABLET BY MOUTH TWICE DAILY 5/30/18  Yes MD TORRES Shah NO CODING BLOOD GLUC strip TEST FOUR TIMES DAILY AS DIRECTED 4/20/18  Yes MD TORRES Shah TWIST TOP LANCETS 28G MISC USE AS DIRECTED FOUR TIMES DAILY 4/20/18  Yes Tyron Ochoa MD   vitamin E 400 UNIT capsule Take 400 Units by mouth daily   Yes Historical Provider, MD   COMBIGAN 0.2-0.5 % ophthalmic solution PLACE 1 DROP INTO BOTH EYES DAILY 1/2/18  Yes Tyron Ochoa MD   Lancet Devices (PRODIGY LANCING DEVICE) MISC USE TO TEST BLOOD SUGAR FOUR TIMES DAILY AS DIRECTED 10/13/17  Yes Tyron Ochoa MD   Compression Stockings MISC by Does not apply route 15-20mmHg  Knee high  Open tow  With assist device to help put them on 5/10/17  Yes Gray Bains, DO   Zinc 50 MG TABS Take 50 mg by mouth daily   Yes Historical Provider, MD   Diabetic Shoe MISC by Does not apply route 11/4/16  Yes Vianey Pinto MD   niacin 500 MG extended release capsule Take 500 mg by mouth daily   Yes Historical Provider, MD   folic acid (FOLVITE) 510 MCG tablet Take 800 mcg by mouth daily   Yes Historical Provider, MD   Cyanocobalamin (VITAMIN B12 PO) Take 2,500 mcg by mouth daily   Yes Historical Provider, MD   B Complex-C (SUPER B COMPLEX PO) Take by mouth daily   Yes Historical Provider, MD   travoprost, benzalkonium, (TRAVATAN) 0.004 % ophthalmic solution Place 1 drop into both eyes daily 10/7/16  Yes Vianey Pinto MD   insulin glargine (LANTUS) 100 UNIT/ML injection vial Inject 10 Units into the skin nightly 10/7/16  Yes Vianey Pinto MD   CPAP Machine MISC by Does not apply route Pressure of 13 (AirSense 10)  [employer].   Yes Historical Provider, MD   albuterol (PROVENTIL) (2.5 MG/3ML) 0.083% nebulizer solution Take 2.5 mg by nebulization every 6 hours as needed for Wheezing   Yes Historical Provider, MD   ferrous sulfate 325 (65 FE) MG EC tablet Take 325 mg by mouth daily (with breakfast)   Yes Historical Provider, MD   Multiple Vitamin (MULTI VITAMIN DAILY) TABS Take by mouth daily   Yes Historical Provider, MD   nitroGLYCERIN (NITROSTAT) 0.4 MG SL tablet Place 0.4 mg under the tongue every 5 minutes as needed for Chest pain Dissolve 1 tab under tongue at first sign of chest pain. May repeat every 5 minutes until relief is obtained. If pain persists after taking 3 tabs in a 15-minute period, or the pain is different than is typically experienced, call 9-1-1 immediately.    Yes Historical Provider, MD   gabapentin (NEURONTIN) 300 MG capsule TAKE 2 CAPSULES TWICE DAILY 3/15/19 5/2/19  Vianey Pinto MD   clopidogrel (PLAVIX) 75 MG tablet Take 1 tablet by mouth daily 1/28/19   Vianey Pinto MD   Omega-3 Fatty Acids (FISH OIL) 1200 MG CAPS Take 1,200 mg by mouth daily Historical Provider, MD     Coumadin Use Last 7 Days:  no  Antiplatelet drug therapy use last 7 days: yes - 5  Other anticoagulant use last 7 days: no  Additional Medication Information:  . Pre-Sedation Documentation and Exam:   I have personally completed a history, physical exam & review of systems for this patient (see notes).     Mallampati Airway Assessment:  normal    Prior History of Anesthesia Complications:   none    ASA Classification:  Class 2 - A normal healthy patient with mild systemic disease    Sedation/ Anesthesia Plan:   intravenous sedation    Medications Planned:   midazolam (Versed) intravenously and fentanyl intravenously    Patient is an appropriate candidate for plan of sedation: yes    Electronically signed by Argenis Poon MD on 5/7/2019 at 9:54 AM

## 2019-05-07 NOTE — INTERVAL H&P NOTE
I have interviewed and examined the patient and reviewed the recent History and Physical.  There have been no changes to the recent H&P documentation. The surgical consent form has been signed. Last anticoagulant medication use was:3-5 days    Premedication taken for contrast allergy? No    Valium taken for oral sedation?  No    Outpatient Medications Marked as Taking for the 5/7/19 encounter [facility] Encounter)   Medication Sig Dispense Refill    Diabetic Shoe MISC Dispense #1 pair 2 each 0    ondansetron (ZOFRAN) 4 MG tablet Take 1 tablet by mouth every 8 hours as needed for Nausea or Vomiting 30 tablet 3    magnesium oxide (MAGNESIUM-OXIDE) 400 (241.3 Mg) MG TABS tablet Take 1 tablet by mouth 2 times daily 60 tablet 11    ibuprofen (ADVIL;MOTRIN) 400 MG tablet TAKE 1 TABLET EVERY 6 HOURS AS NEEDED FOR PAIN 360 tablet 3    furosemide (LASIX) 40 MG tablet Take 1 tablet by mouth daily 90 tablet 3    umeclidinium-vilanterol (ANORO ELLIPTA) 62.5-25 MCG/INH AEPB inhaler INHALE 1 PUFF INTO LUNGS EVERY DAY 3 each 3    potassium chloride (MICRO-K) 10 MEQ extended release capsule TAKE 1 CAPSULE TWICE DAILY 180 capsule 3    atorvastatin (LIPITOR) 40 MG tablet Take 1 tablet by mouth daily 90 tablet 2    alendronate (FOSAMAX) 70 MG tablet Take 1 tablet by mouth every 7 days 12 tablet 3    pantoprazole (PROTONIX) 40 MG tablet TAKE 1 TABLET EVERY DAY 90 tablet 3    lisinopril (PRINIVIL;ZESTRIL) 10 MG tablet TAKE 1 TABLET EVERY DAY 90 tablet 3    amLODIPine (NORVASC) 5 MG tablet TAKE 1 TABLET EVERY DAY 90 tablet 3    citalopram (CELEXA) 10 MG tablet TAKE 1 TABLET EVERY DAY 90 tablet 3    buPROPion (WELLBUTRIN) 75 MG tablet TAKE 1 TABLET TWICE DAILY 180 tablet 3    amitriptyline (ELAVIL) 25 MG tablet take 1 tablet by mouth at bedtime 90 tablet 3    spironolactone (ALDACTONE) 50 MG tablet TAKE 1 TABLET TWICE DAILY 180 tablet 3    albuterol sulfate HFA (VENTOLIN HFA) 108 (90 Base) MCG/ACT inhaler Inhale 2 puffs into the lungs every 4 hours as needed for Wheezing 1 Inhaler 5    Insulin Syringe-Needle U-100 (INSULIN SYRINGE .5CC/31GX5/16\") 31G X 5/16\" 0.5 ML MISC 1 each by Does not apply route daily 200 each 3    Cholecalciferol (VITAMIN D3) 1000 units TABS TAKE 3 TABLETS BY MOUTH TWICE DAILY 540 tablet 3    senna (SENOKOT) 8.6 MG TABS tablet TAKE 1 TABLET BY MOUTH TWICE DAILY 180 tablet 3    triamcinolone (KENALOG) 0.1 % cream APPLY TOPICALLY TWICE DAILY 80 g 5    B-D ULTRAFINE III SHORT PEN 31G X 8 MM MISC USE TWICE DAILY 180 each 3    NOVOLOG FLEXPEN 100 UNIT/ML injection pen INJECT 4 UNITS AT LUNCH AND  6 UNITS AT SUPPER (EACH PEN EXPIRES 28 DAYS AFTER 1ST USE) 15 mL 3    fluticasone (FLONASE) 50 MCG/ACT nasal spray USE 2 SPRAYS NASALLY EVERY DAY 48 g 3    loratadine (CLARITIN) 10 MG tablet Take 10 mg by mouth daily as needed      nystatin (MYCOSTATIN) 362221 UNIT/GM powder Apply 3 times daily.  60 g 5    VITAMIN A PO Take 2,400 mcg by mouth daily      vitamin C (ASCORBIC ACID) 500 MG tablet TAKE 1 TABLET BY MOUTH TWICE DAILY 60 tablet 11    PRODIGY NO CODING BLOOD GLUC strip TEST FOUR TIMES DAILY AS DIRECTED 400 strip 3    PRODIGY TWIST TOP LANCETS 28G MISC USE AS DIRECTED FOUR TIMES DAILY 400 each 3    vitamin E 400 UNIT capsule Take 400 Units by mouth daily      COMBIGAN 0.2-0.5 % ophthalmic solution PLACE 1 DROP INTO BOTH EYES DAILY 15 mL 3    Lancet Devices (PRODIGY LANCING DEVICE) MISC USE TO TEST BLOOD SUGAR FOUR TIMES DAILY AS DIRECTED 1 each 0    Compression Stockings MISC by Does not apply route 15-20mmHg  Knee high  Open tow  With assist device to help put them on 1 each 0    Zinc 50 MG TABS Take 50 mg by mouth daily      Diabetic Shoe MISC by Does not apply route 1 each 0    niacin 500 MG extended release capsule Take 500 mg by mouth daily      folic acid (FOLVITE) 401 MCG tablet Take 800 mcg by mouth daily      Cyanocobalamin (VITAMIN B12 PO) Take 2,500 mcg by mouth daily      B

## 2019-05-08 ENCOUNTER — HOSPITAL ENCOUNTER (OUTPATIENT)
Dept: NUCLEAR MEDICINE | Age: 69
Discharge: HOME | End: 2019-05-10
Payer: MEDICARE

## 2019-05-08 ENCOUNTER — HOSPITAL ENCOUNTER (OUTPATIENT)
Dept: NON INVASIVE DIAGNOSTICS | Age: 69
Discharge: HOME | End: 2019-05-08
Payer: MEDICARE

## 2019-05-08 DIAGNOSIS — R06.02 SOB (SHORTNESS OF BREATH): ICD-10-CM

## 2019-05-08 DIAGNOSIS — R07.89 OTHER CHEST PAIN: ICD-10-CM

## 2019-05-08 DIAGNOSIS — R94.31 ABNORMAL ECG: ICD-10-CM

## 2019-05-08 PROCEDURE — 78452 HT MUSCLE IMAGE SPECT MULT: CPT | Performed by: INTERNAL MEDICINE

## 2019-05-08 PROCEDURE — 6360000002 HC RX W HCPCS: Performed by: INTERNAL MEDICINE

## 2019-05-08 PROCEDURE — A9500 TC99M SESTAMIBI: HCPCS | Performed by: INTERNAL MEDICINE

## 2019-05-08 PROCEDURE — 78452 HT MUSCLE IMAGE SPECT MULT: CPT

## 2019-05-08 PROCEDURE — 3430000000 HC RX DIAGNOSTIC RADIOPHARMACEUTICAL: Performed by: INTERNAL MEDICINE

## 2019-05-08 PROCEDURE — 93017 CV STRESS TEST TRACING ONLY: CPT

## 2019-05-08 PROCEDURE — 93016 CV STRESS TEST SUPVJ ONLY: CPT | Performed by: INTERNAL MEDICINE

## 2019-05-08 PROCEDURE — 93018 CV STRESS TEST I&R ONLY: CPT | Performed by: INTERNAL MEDICINE

## 2019-05-08 RX ADMIN — TETRAKIS(2-METHOXYISOBUTYLISOCYANIDE)COPPER(I) TETRAFLUOROBORATE 10 MILLICURIE: 1 INJECTION, POWDER, LYOPHILIZED, FOR SOLUTION INTRAVENOUS at 14:42

## 2019-05-08 RX ADMIN — TETRAKIS(2-METHOXYISOBUTYLISOCYANIDE)COPPER(I) TETRAFLUOROBORATE 30 MILLICURIE: 1 INJECTION, POWDER, LYOPHILIZED, FOR SOLUTION INTRAVENOUS at 14:43

## 2019-05-08 RX ADMIN — REGADENOSON 0.4 MG: 0.08 INJECTION, SOLUTION INTRAVENOUS at 14:30

## 2019-05-08 NOTE — PROGRESS NOTES
Patient Name:  Meeta Delgado MRN:  [id number]   :  1950  Age:  77 y.o.  Sex: female   Ordering Provider: Adelaide Baron DO  Referring Provider: Maryann Maldonado MD  Primary Care Provider:  Maryann Maldonado MD     Indications: Shortness of Breath, Abnormal ECG, Chest Pain     Medications:     Current Outpatient Medications:     Diabetic Shoe MISC, Dispense #1 pair, Disp: 2 each, Rfl: 0    ondansetron (ZOFRAN) 4 MG tablet, Take 1 tablet by mouth every 8 hours as needed for Nausea or Vomiting, Disp: 30 tablet, Rfl: 3    gabapentin (NEURONTIN) 300 MG capsule, TAKE 2 CAPSULES TWICE DAILY, Disp: 360 capsule, Rfl: 3    magnesium oxide (MAGNESIUM-OXIDE) 400 (241.3 Mg) MG TABS tablet, Take 1 tablet by mouth 2 times daily, Disp: 60 tablet, Rfl: 11    ibuprofen (ADVIL;MOTRIN) 400 MG tablet, TAKE 1 TABLET EVERY 6 HOURS AS NEEDED FOR PAIN, Disp: 360 tablet, Rfl: 3    furosemide (LASIX) 40 MG tablet, Take 1 tablet by mouth daily, Disp: 90 tablet, Rfl: 3    umeclidinium-vilanterol (ANORO ELLIPTA) 62.5-25 MCG/INH AEPB inhaler, INHALE 1 PUFF INTO LUNGS EVERY DAY, Disp: 3 each, Rfl: 3    clopidogrel (PLAVIX) 75 MG tablet, Take 1 tablet by mouth daily, Disp: 90 tablet, Rfl: 3    potassium chloride (MICRO-K) 10 MEQ extended release capsule, TAKE 1 CAPSULE TWICE DAILY, Disp: 180 capsule, Rfl: 3    atorvastatin (LIPITOR) 40 MG tablet, Take 1 tablet by mouth daily, Disp: 90 tablet, Rfl: 2    alendronate (FOSAMAX) 70 MG tablet, Take 1 tablet by mouth every 7 days, Disp: 12 tablet, Rfl: 3    pantoprazole (PROTONIX) 40 MG tablet, TAKE 1 TABLET EVERY DAY, Disp: 90 tablet, Rfl: 3    lisinopril (PRINIVIL;ZESTRIL) 10 MG tablet, TAKE 1 TABLET EVERY DAY, Disp: 90 tablet, Rfl: 3    amLODIPine (NORVASC) 5 MG tablet, TAKE 1 TABLET EVERY DAY, Disp: 90 tablet, Rfl: 3    citalopram (CELEXA) 10 MG tablet, TAKE 1 TABLET EVERY DAY, Disp: 90 tablet, Rfl: 3    buPROPion (WELLBUTRIN) 75 MG tablet, TAKE 1 TABLET TWICE DAILY, Disp: 180 tablet, Rfl: 3    amitriptyline (ELAVIL) 25 MG tablet, take 1 tablet by mouth at bedtime, Disp: 90 tablet, Rfl: 3    spironolactone (ALDACTONE) 50 MG tablet, TAKE 1 TABLET TWICE DAILY, Disp: 180 tablet, Rfl: 3    albuterol sulfate HFA (VENTOLIN HFA) 108 (90 Base) MCG/ACT inhaler, Inhale 2 puffs into the lungs every 4 hours as needed for Wheezing, Disp: 1 Inhaler, Rfl: 5    Insulin Syringe-Needle U-100 (INSULIN SYRINGE .5CC/31GX5/16\") 31G X 5/16\" 0.5 ML MISC, 1 each by Does not apply route daily, Disp: 200 each, Rfl: 3    Cholecalciferol (VITAMIN D3) 1000 units TABS, TAKE 3 TABLETS BY MOUTH TWICE DAILY, Disp: 540 tablet, Rfl: 3    senna (SENOKOT) 8.6 MG TABS tablet, TAKE 1 TABLET BY MOUTH TWICE DAILY, Disp: 180 tablet, Rfl: 3    triamcinolone (KENALOG) 0.1 % cream, APPLY TOPICALLY TWICE DAILY, Disp: 80 g, Rfl: 5    B-D ULTRAFINE III SHORT PEN 31G X 8 MM MISC, USE TWICE DAILY, Disp: 180 each, Rfl: 3    NOVOLOG FLEXPEN 100 UNIT/ML injection pen, INJECT 4 UNITS AT LUNCH AND  6 UNITS AT SUPPER (EACH PEN EXPIRES 28 DAYS AFTER 1ST USE), Disp: 15 mL, Rfl: 3    fluticasone (FLONASE) 50 MCG/ACT nasal spray, USE 2 SPRAYS NASALLY EVERY DAY, Disp: 48 g, Rfl: 3    loratadine (CLARITIN) 10 MG tablet, Take 10 mg by mouth daily as needed, Disp: , Rfl:     nystatin (MYCOSTATIN) 976371 UNIT/GM powder, Apply 3 times daily. , Disp: 60 g, Rfl: 5    VITAMIN A PO, Take 2,400 mcg by mouth daily, Disp: , Rfl:     vitamin C (ASCORBIC ACID) 500 MG tablet, TAKE 1 TABLET BY MOUTH TWICE DAILY, Disp: 60 tablet, Rfl: 11    PRODIGY NO CODING BLOOD GLUC strip, TEST FOUR TIMES DAILY AS DIRECTED, Disp: 400 strip, Rfl: 3    PRODIGY TWIST TOP LANCETS 28G MISC, USE AS DIRECTED FOUR TIMES DAILY, Disp: 400 each, Rfl: 3    vitamin E 400 UNIT capsule, Take 400 Units by mouth daily, Disp: , Rfl:     Omega-3 Fatty Acids (FISH OIL) 1200 MG CAPS, Take 1,200 mg by mouth daily , Disp: , Rfl:     COMBIGAN 0.2-0.5 % ophthalmic solution, PLACE 1 DROP INTO BOTH EYES DAILY, Disp: 15 mL, Rfl: 3    Lancet Devices (PRODIGY LANCING DEVICE) MISC, USE TO TEST BLOOD SUGAR FOUR TIMES DAILY AS DIRECTED, Disp: 1 each, Rfl: 0    Compression Stockings MISC, by Does not apply route 15-20mmHg Knee high Open tow With assist device to help put them on, Disp: 1 each, Rfl: 0    Zinc 50 MG TABS, Take 50 mg by mouth daily, Disp: , Rfl:     Diabetic Shoe MISC, by Does not apply route, Disp: 1 each, Rfl: 0    niacin 500 MG extended release capsule, Take 500 mg by mouth daily, Disp: , Rfl:     folic acid (FOLVITE) 455 MCG tablet, Take 800 mcg by mouth daily, Disp: , Rfl:     Cyanocobalamin (VITAMIN B12 PO), Take 2,500 mcg by mouth daily, Disp: , Rfl:     B Complex-C (SUPER B COMPLEX PO), Take by mouth daily, Disp: , Rfl:     travoprost, benzalkonium, (TRAVATAN) 0.004 % ophthalmic solution, Place 1 drop into both eyes daily, Disp: 3 Bottle, Rfl: 3    insulin glargine (LANTUS) 100 UNIT/ML injection vial, Inject 10 Units into the skin nightly, Disp: 3 vial, Rfl: 3    CPAP Machine MISC, by Does not apply route Pressure of 13 (AirSense 10) [employer] Disp: , Rfl:     albuterol (PROVENTIL) (2.5 MG/3ML) 0.083% nebulizer solution, Take 2.5 mg by nebulization every 6 hours as needed for Wheezing, Disp: , Rfl:     ferrous sulfate 325 (65 FE) MG EC tablet, Take 325 mg by mouth daily (with breakfast), Disp: , Rfl:     Multiple Vitamin (MULTI VITAMIN DAILY) TABS, Take by mouth daily, Disp: , Rfl:     nitroGLYCERIN (NITROSTAT) 0.4 MG SL tablet, Place 0.4 mg under the tongue every 5 minutes as needed for Chest pain Dissolve 1 tab under tongue at first sign of chest pain. May repeat every 5 minutes until relief is obtained. If pain persists after taking 3 tabs in a 15-minute period, or the pain is different than is typically experienced, call 9-1-1 immediately. , Disp: , Rfl:     Current Facility-Administered Medications:     regadenoson (LEXISCAN) injection 0.4 mg, 0.4 mg, Intravenous, Once, Dona Harman MD    Facility-Administered Medications Ordered in Other Encounters:     technetium sestamibi (CARDIOLITE) injection 30 millicurie, 30 millicurie, Intravenous, ONCE PRN, Gray Bains DO    technetium sestamibi (CARDIOLITE) injection 10 millicurie, 10 millicurie, Intravenous, ONCE PRN, Gray Bains DO      ----------------------------------------------------------------------------------------------------------                Lexiscan 0.4 mg injected over 10 seconds. IV Cardiolite injected 20 seconds post Lexiscan injection. Heart Rate:  59  Resting Blood Pressure:  142/69   ----------------------------------------------------------------------------------------------------------     HR BP   1 min 63 115/56   2 min     3 min 60 112/56   4 min     5 min 60 124/59   6 min     7 min 58 128/60   8 min     9 min 58 116/56   10 min       Symptoms:  Chest Pain:  No      Nausea:  No     Headache:  Yes    Shortness of Breath:  Yes     Other:  No      Electronically signed by Esme Núñez RN on 5/8/19 at 2:18 PM  ----------------------------------------------------------------------------------------------------------  Resting EKG:  Sinus bradycardia, PAC    Arrhythmias:  PACs, PVC    EKG Changes:  No ischemic changes     Interpretation:  No ischemic changes noted. Cardiolite results to follow.        Supervising Physician:  Dona Harman MD

## 2019-05-09 ENCOUNTER — TELEPHONE (OUTPATIENT)
Dept: CARDIOLOGY | Age: 69
End: 2019-05-09

## 2019-05-09 NOTE — TELEPHONE ENCOUNTER
Notified patient of Stress results. Instructed patient to keep upcoming appointment with cardiologist and to call our office for any questions. Patient also instructed to utilize the E.D. for any emergent health issues that may arise.

## 2019-05-13 NOTE — PROCEDURES
[address]                 [address] [place] [id number]                              CARDIAC STRESS TEST    PATIENT NAME: Bouchra Ybarra                 :        1950  MED REC NO:   [id number]                             ROOM:  ACCOUNT NO:   [de-identified]                           ADMIT DATE: 2019  PROVIDER:     Sujit Hernandez    DATE OF STUDY:  2019    STRESS TEST    Ordering Provider: Keiko Parra DO    Primary Care Provider: Guy Sierra MD    Patient's Age: 77     Height: 64 inches  Weight: 265 pounds    INDICATION:  Chest pain, shortness of breath, abnormal EKG. Lexiscan 0.4 mg injected over 10 seconds. IV Cardiolite injected 20 seconds post Lexiscan injection. Heart Rate: 59 Resting blood pressure:  142/69              HR   BP  1 min          63   115/56  2 min  3 min          60   112/56  4 min  5 min          60   124/59  6 min  7 min          58   128/60  8 min  9 min          58   116/56  10 min    Symptoms:  Chest Pain: No.  Nausea: No.  Headache:  Yes. Shortness of breath: Yes. Other: No.    Resting EKG:  Sinus bradycardia, PAC. Arrhythmias: PACs, PVCs. EKG changes:  No ischemic changes. INTERPRETATION:  1. No ischemic changes noted. 2. Cardiolite results to follow. Nuclear Myocardial Perfusion Imaging (SPECT)    TESTING METHOD  STRESS:   Lexiscan  AGENT:    Cardiolite  REST:     Injection Date:  2019  Time:  1325  Amount:  13.8 mCi  STRESS:   Injection Date:  2019  Time:  1420  Amount:  40.4 mCi  IMAGE TIME:    Rest:  1400  Stress:  1500    EF:  81%  TID:  0.82  LHR:  0.51    FINDINGS:  Ischemia (Reversible Defect): No  Infarction (Irreversible Defect):  No  Ejection fraction Calculated: 81%  Segmental wall motion:   Normal  Diastolic LV Compliance (Stiffness):    Normal    Normal study. IMPRESSION:  1. No ischemia or infarction. 2. Normal LV systolic function.         [facility] SHANKAR    D: 05/13/2019 9:01:10       T: 05/13/2019 9:02:37     MA/CALIXTO_CORRIE  Job#: [id number]     Doc#: Unknown    CC: Gray Alexander  [address]

## 2019-05-15 RX ORDER — BLOOD SUGAR DIAGNOSTIC
STRIP MISCELLANEOUS
Qty: 400 STRIP | Refills: 3 | Status: SHIPPED | OUTPATIENT
Start: 2019-05-15 | End: 2019-08-26 | Stop reason: ALTCHOICE

## 2019-05-20 ENCOUNTER — OFFICE VISIT (OUTPATIENT)
Dept: PAIN MANAGEMENT | Age: 69
End: 2019-05-20
Payer: MEDICARE

## 2019-05-20 VITALS
HEART RATE: 56 BPM | SYSTOLIC BLOOD PRESSURE: 110 MMHG | BODY MASS INDEX: 44.73 KG/M2 | DIASTOLIC BLOOD PRESSURE: 60 MMHG | HEIGHT: 64 IN | WEIGHT: 262 LBS

## 2019-05-20 DIAGNOSIS — M43.10 ACQUIRED SPONDYLOLISTHESIS: ICD-10-CM

## 2019-05-20 DIAGNOSIS — M54.16 LUMBAR RADICULOPATHY: Primary | ICD-10-CM

## 2019-05-20 PROCEDURE — 0518F FALL PLAN OF CARE DOCD: CPT | Performed by: PHYSICAL MEDICINE & REHABILITATION

## 2019-05-20 PROCEDURE — 99214 OFFICE O/P EST MOD 30 MIN: CPT | Performed by: PHYSICAL MEDICINE & REHABILITATION

## 2019-05-20 PROCEDURE — 1123F ACP DISCUSS/DSCN MKR DOCD: CPT | Performed by: PHYSICAL MEDICINE & REHABILITATION

## 2019-05-20 PROCEDURE — 4040F PNEUMOC VAC/ADMIN/RCVD: CPT | Performed by: PHYSICAL MEDICINE & REHABILITATION

## 2019-05-20 PROCEDURE — G8417 CALC BMI ABV UP PARAM F/U: HCPCS | Performed by: PHYSICAL MEDICINE & REHABILITATION

## 2019-05-20 PROCEDURE — G8598 ASA/ANTIPLAT THER USED: HCPCS | Performed by: PHYSICAL MEDICINE & REHABILITATION

## 2019-05-20 PROCEDURE — 1036F TOBACCO NON-USER: CPT | Performed by: PHYSICAL MEDICINE & REHABILITATION

## 2019-05-20 PROCEDURE — 3288F FALL RISK ASSESSMENT DOCD: CPT | Performed by: PHYSICAL MEDICINE & REHABILITATION

## 2019-05-20 PROCEDURE — 1090F PRES/ABSN URINE INCON ASSESS: CPT | Performed by: PHYSICAL MEDICINE & REHABILITATION

## 2019-05-20 PROCEDURE — G8400 PT W/DXA NO RESULTS DOC: HCPCS | Performed by: PHYSICAL MEDICINE & REHABILITATION

## 2019-05-20 PROCEDURE — 3017F COLORECTAL CA SCREEN DOC REV: CPT | Performed by: PHYSICAL MEDICINE & REHABILITATION

## 2019-05-20 PROCEDURE — G8427 DOCREV CUR MEDS BY ELIG CLIN: HCPCS | Performed by: PHYSICAL MEDICINE & REHABILITATION

## 2019-05-20 NOTE — PROGRESS NOTES
PAIN MANAGEMENT FOLLOW-UP NOTE  5/20/19    CHIEF COMPLAINT: This is a63 y.o. female patientwho returns to the [facility] with a history of Back Pain (follow up post TFE 5/7/19. The patient states that the pain has reduced since the procedure. )      PAIN [facility] returns today for  reevaluation. Since the visit, the patient reports that the pain is not changed. 2 days relief with first  R L2,3, TFE      Location: Back  Location Modifier: right mid back  Severity of Pain: 8  Duration of Pain: Persistent  Frequency of Pain: Intermittent  Aggravating Factors: bending forwards  Limiting Behavior: Standing   Relieving Factors: narcotics        Previous pain medication trials have included:          Mental health:    Patient feels - secondary to their current pain problems as described above. H/O depression and anxiety: No   Patient is not seeing psychologist orpsychiatrist   Abuse history? No    Employed? No    ANALGESIA:   Are your Current Pain medication (s) helping to decrease pain? No.   Current Pain score: Pain Score:   7    ADVERSE AFFECTS:   Medication Side Effects: No.    ACTIVITY:  Are you able to be more active with your pain medications? No      ABERRANT BEHAVIORS SINCE LAST VISIT?  No    Review of Systems     Allergies   Allergen Reactions    Seasonal        Outpatient Medications Prior to Visit   Medication Sig Dispense Refill    PRODIGY NO CODING BLOOD GLUC strip TEST FOUR TIMES DAILY AS DIRECTED 400 strip 3    Diabetic Shoe MISC Dispense #1 pair 2 each 0    ondansetron (ZOFRAN) 4 MG tablet Take 1 tablet by mouth every 8 hours as needed for Nausea or Vomiting 30 tablet 3    gabapentin (NEURONTIN) 300 MG capsule TAKE 2 CAPSULES TWICE DAILY 360 capsule 3    magnesium oxide (MAGNESIUM-OXIDE) 400 (241.3 Mg) MG TABS tablet Take 1 tablet by mouth 2 times daily 60 tablet 11    ibuprofen (ADVIL;MOTRIN) 400 MG tablet TAKE 1 TABLET EVERY 6 HOURS AS NEEDED FOR PAIN 360 tablet 3     loratadine (CLARITIN) 10 MG tablet Take 10 mg by mouth daily as needed      nystatin (MYCOSTATIN) 720248 UNIT/GM powder Apply 3 times daily.  60 g 5    VITAMIN A PO Take 2,400 mcg by mouth daily      vitamin C (ASCORBIC ACID) 500 MG tablet TAKE 1 TABLET BY MOUTH TWICE DAILY 60 tablet 11    PRODIGY NO CODING BLOOD GLUC strip TEST FOUR TIMES DAILY AS DIRECTED 400 strip 3    PRODIGY TWIST TOP LANCETS 28G MISC USE AS DIRECTED FOUR TIMES DAILY 400 each 3    vitamin E 400 UNIT capsule Take 400 Units by mouth daily      Omega-3 Fatty Acids (FISH OIL) 1200 MG CAPS Take 1,200 mg by mouth daily       COMBIGAN 0.2-0.5 % ophthalmic solution PLACE 1 DROP INTO BOTH EYES DAILY 15 mL 3    Lancet Devices (PRODIGY LANCING DEVICE) MISC USE TO TEST BLOOD SUGAR FOUR TIMES DAILY AS DIRECTED 1 each 0    Compression Stockings MISC by Does not apply route 15-20mmHg  Knee high  Open tow  With assist device to help put them on 1 each 0    Zinc 50 MG TABS Take 50 mg by mouth daily      Diabetic Shoe MISC by Does not apply route 1 each 0    niacin 500 MG extended release capsule Take 500 mg by mouth daily      folic acid (FOLVITE) 355 MCG tablet Take 800 mcg by mouth daily      Cyanocobalamin (VITAMIN B12 PO) Take 2,500 mcg by mouth daily      B Complex-C (SUPER B COMPLEX PO) Take by mouth daily      travoprost, benzalkonium, (TRAVATAN) 0.004 % ophthalmic solution Place 1 drop into both eyes daily 3 Bottle 3    insulin glargine (LANTUS) 100 UNIT/ML injection vial Inject 10 Units into the skin nightly 3 vial 3    CPAP Machine MISC by Does not apply route Pressure of 13 (AirSense 10)  [employer].      albuterol (PROVENTIL) (2.5 MG/3ML) 0.083% nebulizer solution Take 2.5 mg by nebulization every 6 hours as needed for Wheezing      ferrous sulfate 325 (65 FE) MG EC tablet Take 325 mg by mouth daily (with breakfast)      Multiple Vitamin (MULTI VITAMIN DAILY) TABS Take by mouth daily      nitroGLYCERIN (NITROSTAT) 0.4 MG SL tablet Place 0.4 mg under the tongue every 5 minutes as needed for Chest pain Dissolve 1 tab under tongue at first sign of chest pain. May repeat every 5 minutes until relief is obtained. If pain persists after taking 3 tabs in a 15-minute period, or the pain is different than is typically experienced, call 9-1-1 immediately. No facility-administered medications prior to visit.         Past Medical History:   Diagnosis Date    Arthritis     Asthma     CAD (coronary artery disease)     Cancer ([address]     cervical    Carotid artery disease ([address]     Cataracts, bilateral     COPD (chronic obstructive pulmonary disease) (HCC)     Coronary artery disease     Eczema     Fibromyalgia     Glaucoma     H/O blood clots     Headache     Heart attack ([address]     Hx of Bell's palsy     Mild right facial paralysis    Hypertension     Liver disease     Mild dementia     Neuropathy     Osteoporosis     [facility] apnea 2014    [facility]     Type 2 diabetes mellitus with hyperglycemia ([facility]        Past Surgical History:   Procedure Laterality Date    APPENDECTOMY      BARIATRIC SURGERY      COLONOSCOPY  2012    CORONARY ANGIOPLASTY WITH STENT PLACEMENT      HYSTERECTOMY      INDUCED   1970    LUMBAR SPINE SURGERY Right 2019    Right L2 L3 TRANSFORAMINAL performed by Bobbi Valenzuela MD at [id number] [place] [address] Dr [address] 2019    Right L2 L3 TRANSFORAMINAL performed by Bobbi Valenzuela MD at [address] [facility] 1.1-2CM FACE,FACIAL Left 2018    Excision Cyst Left Chest, Middle Back performed by Kathryn Coello MD at [address] [place] [address]/STEROID FORAMEN LUMBAR/SACRAL [address] JUAN Laws ADD LEVEL Right 10/5/2018    Right L2 & L3 TRANSFORAMINAL performed by Bobbi Valenzuela MD at [address] [place] [address]/STEROID FORAMEN LUMBAR/SACRAL [address] JUAN Laws ADD LEVEL Right 10/26/2018    Right L2 L3 TRANSFORAMINAL performed by Kwadwo Barriga MD at [facility]     Family History   Problem Relation Age of Onset    Heart Disease Mother     High Blood Pressure Mother    [facility] Stroke Mother     Glaucoma Mother     Early Death Father     Cancer Father         stomach    Miscarriages / Stillbirths Maternal Uncle      Social History     Socioeconomic History    Marital status:      Spouse name: None    Number of children: None    Years of education: None    Highest education level: None   Occupational History    None   Social Needs    Financial resource strain: None    Food insecurity:     Worry: None     Inability: None    Transportation needs:     Medical: None     Non-medical: None   Tobacco Use    Smoking status: Former Smoker     Packs/day: 1.00     Years: 35.00     Pack years: 35.00     Last attempt to quit: 2001     Years since quittin.6    Smokeless tobacco: Never Used   Substance and Sexual Activity    Alcohol use: No    Drug use: No    Sexual activity: Never   Lifestyle    Physical activity:     Days per week: None     Minutes per session: None    Stress: None   Relationships    Social connections:     Talks on phone: None     Gets together: None     Attends Yarsanism service: None     Active member of club or organization: None     Attends meetings of clubs or organizations: None     Relationship status: None    Intimate partner violence:     Fear of current or ex partner: None     Emotionally abused: None     Physically abused: None     Forced sexual activity: None   Other Topics Concern    None   Social History Narrative    None         Family and Social Historyreviewed in the electronic medical record. Imaging:Reviewed available imaging in our system with the patient. No results found.     Objective:     Physical Exam:  Vitals:    19 1451   BP: 110/60   Site: Right Upper Arm   Position: Sitting   Cuff Size: Medium Adult   Pulse: 56   Weight: 262 lb (118.8 kg) Height: 5' 4\" (1.626 m)     Pain Score:   7    Physical Exam  Ortho Exam                             Assessment: This is a 77 y.o. female patient with:    Diagnosis:   Diagnosis Orders   1. Lumbar radiculopathy     2. Acquired spondylolisthesis         Medical Comorbidities:  As listed in the patient's past medical and surgical history    Functional Limitations:   Pain limits function and quality of life. Plan:   R L3,4 TFE  2 0f 2 (  Changed from L2,3 to  R L3,4 for pattern  Side of legs     Meds:   Controlled Substances Monitoring: Pt educated about the risks of taking opiates,including increased sedation, constipation, slowed breathing, tolerance, dependence,and addiction. New Prescriptions    No medications on file      No orders of the defined types were placed in this encounter. No orders of the defined types were placed in this encounter. No follow-ups on file. The patient expressed understanding of the above assessment and plan. Totaltime spent face to face with patient was 25 minutes inwhich  50% or more of the time was spent in counseling, education about risk andbenefits of the above plan, and coordination of care.

## 2019-05-22 ENCOUNTER — TELEPHONE (OUTPATIENT)
Dept: PAIN MANAGEMENT | Age: 69
End: 2019-05-22

## 2019-05-22 NOTE — TELEPHONE ENCOUNTER
Okay to hold Plavix, aspirin and NSAIDs including Motrin ×7 days before procedure.     Also, tell patient take only half the usual Lantus dose the night before

## 2019-06-04 ENCOUNTER — APPOINTMENT (OUTPATIENT)
Dept: GENERAL RADIOLOGY | Age: 69
End: 2019-06-04
Attending: PHYSICAL MEDICINE & REHABILITATION
Payer: MEDICARE

## 2019-06-04 ENCOUNTER — HOSPITAL ENCOUNTER (OUTPATIENT)
Age: 69
Setting detail: OUTPATIENT SURGERY
Discharge: HOME | End: 2019-06-04
Attending: PHYSICAL MEDICINE & REHABILITATION | Admitting: PHYSICAL MEDICINE & REHABILITATION
Payer: MEDICARE

## 2019-06-04 VITALS
OXYGEN SATURATION: 96 % | BODY MASS INDEX: 45.41 KG/M2 | HEART RATE: 53 BPM | SYSTOLIC BLOOD PRESSURE: 126 MMHG | TEMPERATURE: 97.8 F | DIASTOLIC BLOOD PRESSURE: 62 MMHG | WEIGHT: 266 LBS | HEIGHT: 64 IN | RESPIRATION RATE: 16 BRPM

## 2019-06-04 LAB — GLUCOSE BLD-MCNC: 142 MG/DL (ref 65–105)

## 2019-06-04 PROCEDURE — 99152 MOD SED SAME PHYS/QHP 5/>YRS: CPT | Performed by: PHYSICAL MEDICINE & REHABILITATION

## 2019-06-04 PROCEDURE — 7100000011 HC PHASE II RECOVERY - ADDTL 15 MIN: Performed by: PHYSICAL MEDICINE & REHABILITATION

## 2019-06-04 PROCEDURE — 64484 NJX AA&/STRD TFRM EPI L/S EA: CPT | Performed by: PHYSICAL MEDICINE & REHABILITATION

## 2019-06-04 PROCEDURE — 3600000057 HC PAIN LEVEL 4 ADDL 15 MIN: Performed by: PHYSICAL MEDICINE & REHABILITATION

## 2019-06-04 PROCEDURE — 2500000003 HC RX 250 WO HCPCS: Performed by: PHYSICAL MEDICINE & REHABILITATION

## 2019-06-04 PROCEDURE — 6360000004 HC RX CONTRAST MEDICATION: Performed by: PHYSICAL MEDICINE & REHABILITATION

## 2019-06-04 PROCEDURE — 2580000003 HC RX 258: Performed by: PHYSICAL MEDICINE & REHABILITATION

## 2019-06-04 PROCEDURE — 3209999900 FLUORO FOR SURGICAL PROCEDURES

## 2019-06-04 PROCEDURE — 6360000002 HC RX W HCPCS: Performed by: PHYSICAL MEDICINE & REHABILITATION

## 2019-06-04 PROCEDURE — 64483 NJX AA&/STRD TFRM EPI L/S 1: CPT | Performed by: PHYSICAL MEDICINE & REHABILITATION

## 2019-06-04 PROCEDURE — 2709999900 HC NON-CHARGEABLE SUPPLY: Performed by: PHYSICAL MEDICINE & REHABILITATION

## 2019-06-04 PROCEDURE — 82947 ASSAY GLUCOSE BLOOD QUANT: CPT

## 2019-06-04 PROCEDURE — 7100000010 HC PHASE II RECOVERY - FIRST 15 MIN: Performed by: PHYSICAL MEDICINE & REHABILITATION

## 2019-06-04 PROCEDURE — 3600000056 HC PAIN LEVEL 4 BASE: Performed by: PHYSICAL MEDICINE & REHABILITATION

## 2019-06-04 RX ORDER — SODIUM CHLORIDE 0.9 % (FLUSH) 0.9 %
10 SYRINGE (ML) INJECTION EVERY 12 HOURS SCHEDULED
Status: DISCONTINUED | OUTPATIENT
Start: 2019-06-04 | End: 2019-06-04 | Stop reason: HOSPADM

## 2019-06-04 RX ORDER — 0.9 % SODIUM CHLORIDE 0.9 %
VIAL (ML) INJECTION PRN
Status: DISCONTINUED | OUTPATIENT
Start: 2019-06-04 | End: 2019-06-04 | Stop reason: ALTCHOICE

## 2019-06-04 RX ORDER — FENTANYL CITRATE 50 UG/ML
INJECTION, SOLUTION INTRAMUSCULAR; INTRAVENOUS PRN
Status: DISCONTINUED | OUTPATIENT
Start: 2019-06-04 | End: 2019-06-04 | Stop reason: ALTCHOICE

## 2019-06-04 RX ORDER — SODIUM CHLORIDE, SODIUM LACTATE, POTASSIUM CHLORIDE, CALCIUM CHLORIDE 600; 310; 30; 20 MG/100ML; MG/100ML; MG/100ML; MG/100ML
INJECTION, SOLUTION INTRAVENOUS CONTINUOUS
Status: DISCONTINUED | OUTPATIENT
Start: 2019-06-04 | End: 2019-06-04 | Stop reason: HOSPADM

## 2019-06-04 RX ORDER — BUPIVACAINE HYDROCHLORIDE 5 MG/ML
INJECTION, SOLUTION EPIDURAL; INTRACAUDAL PRN
Status: DISCONTINUED | OUTPATIENT
Start: 2019-06-04 | End: 2019-06-04 | Stop reason: ALTCHOICE

## 2019-06-04 RX ORDER — SODIUM CHLORIDE 0.9 % (FLUSH) 0.9 %
10 SYRINGE (ML) INJECTION PRN
Status: DISCONTINUED | OUTPATIENT
Start: 2019-06-04 | End: 2019-06-04 | Stop reason: HOSPADM

## 2019-06-04 RX ORDER — MIDAZOLAM HYDROCHLORIDE 1 MG/ML
INJECTION INTRAMUSCULAR; INTRAVENOUS PRN
Status: DISCONTINUED | OUTPATIENT
Start: 2019-06-04 | End: 2019-06-04 | Stop reason: ALTCHOICE

## 2019-06-04 RX ORDER — DEXAMETHASONE SODIUM PHOSPHATE 10 MG/ML
INJECTION INTRAMUSCULAR; INTRAVENOUS PRN
Status: DISCONTINUED | OUTPATIENT
Start: 2019-06-04 | End: 2019-06-04 | Stop reason: ALTCHOICE

## 2019-06-04 RX ADMIN — SODIUM CHLORIDE, POTASSIUM CHLORIDE, SODIUM LACTATE AND CALCIUM CHLORIDE: 600; 310; 30; 20 INJECTION, SOLUTION INTRAVENOUS at 09:11

## 2019-06-04 ASSESSMENT — PAIN SCALES - GENERAL: PAINLEVEL_OUTOF10: 0

## 2019-06-04 ASSESSMENT — PAIN - FUNCTIONAL ASSESSMENT: PAIN_FUNCTIONAL_ASSESSMENT: 0-10

## 2019-06-04 NOTE — PRE SEDATION
Sedation Pre-Procedure Note    Patient Name: Shea Carrasco   YOB: 1950  Room/Bed: [facility]  Medical Record Number: [id number]  Date: 2019   Time: 9:15 AM       Indication:  R L3,4    Consent: I have discussed with the patient and/or the patient representative the indication, alternatives, and the possible risks and/or complications of the planned procedure and the anesthesia methods. The patient and/or patient representative appear to understand and agree to proceed. Vital Signs:   Vitals:    19 0849   BP: (!) 114/54   Pulse: 52   Resp: 16   Temp: 97.9 °F (36.6 °C)   SpO2: 93%       Past Medical History:   has a past medical history of Arthritis, Asthma, CAD (coronary artery disease), Cancer ([address] Carotid artery disease ([address] Cataracts, bilateral, COPD (chronic obstructive pulmonary disease) ([address] Coronary artery disease, Eczema, Fibromyalgia, Glaucoma, H/O blood clots, Headache, Heart attack ([address] Hx of Bell's palsy, Hypertension, Liver disease, Mild dementia, Neuropathy, Osteoporosis, Sleep apnea, Tremor, and Type 2 diabetes mellitus with hyperglycemia ([address] Past Surgical History:   has a past surgical history that includes Appendectomy (); Induced  (); Tonsillectomy (); Hysterectomy (); Bariatric Surgery (); Colonoscopy (2012); pr exc skin benig 1.1-2cm face,facial (Left, 2018); pr inject anes/steroid foramen lumbar/sacral w img guide ,ea add level (Right, 10/5/2018); pr inject anes/steroid foramen lumbar/sacral w img guide ,ea add level (Right, 10/26/2018); Lumbar spine surgery (Right, 2019); Coronary angioplasty with stent; and Lumbar spine surgery (Right, 2019).     Medications:   Scheduled Meds:   Continuous Infusions:    lactated ringers 100 mL/hr at 19 0911     PRN Meds: dexamethasone, bupivacaine (PF), sodium chloride (PF), lidocaine 1% (buffered)  Home Meds:   Prior to Admission medications    Medication Sig 15-20mmHg  Knee high  Open tow  With assist device to help put them on 5/10/17  Yes Gray Bains,    Zinc 50 MG TABS Take 50 mg by mouth daily   Yes Historical Provider, MD   Diabetic Shoe MISC by Does not apply route 11/4/16  Yes Zara Alvarado MD   niacin 500 MG extended release capsule Take 500 mg by mouth daily   Yes Historical Provider, MD   folic acid (FOLVITE) 566 MCG tablet Take 800 mcg by mouth daily   Yes Historical Provider, MD   Cyanocobalamin (VITAMIN B12 PO) Take 2,500 mcg by mouth daily   Yes Historical Provider, MD   B Complex-C (SUPER B COMPLEX PO) Take by mouth daily   Yes Historical Provider, MD   travoprost, benzalkonium, (TRAVATAN) 0.004 % ophthalmic solution Place 1 drop into both eyes daily 10/7/16  Yes Zara Alvarado MD   insulin glargine (LANTUS) 100 UNIT/ML injection vial Inject 10 Units into the skin nightly 10/7/16  Yes Zara Alvarado MD   CPAP Machine MISC by Does not apply route Pressure of 13 (AirSense 10)  [employer].   Yes Historical Provider, MD   albuterol (PROVENTIL) (2.5 MG/3ML) 0.083% nebulizer solution Take 2.5 mg by nebulization every 6 hours as needed for Wheezing   Yes Historical Provider, MD   ferrous sulfate 325 (65 FE) MG EC tablet Take 325 mg by mouth daily (with breakfast)   Yes Historical Provider, MD   Multiple Vitamin (MULTI VITAMIN DAILY) TABS Take by mouth daily   Yes Historical Provider, MD   gabapentin (NEURONTIN) 300 MG capsule TAKE 2 CAPSULES TWICE DAILY 3/15/19 5/20/19  Zara Alvarado MD   ibuprofen (ADVIL;MOTRIN) 400 MG tablet TAKE 1 TABLET EVERY 6 HOURS AS NEEDED FOR PAIN 1/30/19   Zara Alvarado MD   clopidogrel (PLAVIX) 75 MG tablet Take 1 tablet by mouth daily 1/28/19   Zara Alvarado MD   nitroGLYCERIN (NITROSTAT) 0.4 MG SL tablet Place 0.4 mg under the tongue every 5 minutes as needed for Chest pain Dissolve 1 tab under tongue at first sign of chest pain. May repeat every 5 minutes until relief is obtained.  If pain persists after taking 3 tabs in a 15-minute period, or the pain is different than is typically experienced, call 9-1-1 immediately. Historical Provider, MD     Coumadin Use Last 7 Days:  no  Antiplatelet drug therapy use last 7 days: yes - .plavix off 6 days  Other anticoagulant use last 7 days: no  Additional Medication Information:        Pre-Sedation Documentation and Exam:   I have personally completed a history, physical exam & review of systems for this patient (see notes).     Mallampati Airway Assessment:  normal    Prior History of Anesthesia Complications:   none    ASA Classification:  Class 2 - A normal healthy patient with mild systemic disease    Sedation/ Anesthesia Plan:   intravenous sedation    Medications Planned:   fentanyl intravenously and midazolam intravenously    Patient is an appropriate candidate for plan of sedation: yes    Electronically signed by Tasha Rios MD on 6/4/2019 at 9:15 AM

## 2019-06-04 NOTE — OP NOTE
TRANSFORAMINAL EPIDURAL STEROID INJECTION    6/4/19    Surgeon: Rex Newton MD    Pre-operative Diagnosis:   Patient Active Problem List   Diagnosis Code    Obstructive sleep apnea G47.33    Chronic fatigue R53.82    Coronary artery disease due to lipid rich plaque I25.10, I25.83    Vitamin D deficiency E55.9    Type 2 diabetes mellitus with diabetic polyneuropathy, with long-term current use of insulin ([facility]) E11.42, Z79.4    Peripheral polyneuropathy G62.9    Bilateral leg edema R60.0    Right hip pain M25.551    Muscle ache M79.10    Hx of Bell's palsy Z86.69    Memory problem R41.3    Gait abnormality R26.9    Balance problem R26.89    H/O falling Z91.81    Dizziness R42    Intractable episodic tension-type headache G44. 80    Essential tremor G25.0    Anxiety and depression F41.9, F32.9    Elevated hemoglobin A1c R73.09    VBI (vertebrobasilar insufficiency) G45.0    Chronic cerebral ischemia I67.82    TIA (transient ischemic attack) G45.9    Chronic tension headache G44.229    Morbid obesity with BMI of 45.0-49.9, adult ([facility]) E66.01, Z68.42    Chronic right-sided low back pain with right-sided sciatica M54.41, G89.29    Lumbar radiculopathy M54.16    Encounter for chronic pain management G89.29    Carotid stenosis, asymptomatic, bilateral I65.23    Chronic tension-type headache, not intractable G44.229    Lumbosacral spondylosis without myelopathy M47.817    DDD (degenerative disc disease), lumbar M51.36    Acquired spondylolisthesis M43.10       Post-operative Diagnosis: Same    Assistants: none    This is a 77 y.o. female patient with pain in the Back, right leg. Previous treatment and examination findings are noted in the H&P. Rl3,4 transforaminal epidural injection has been requested for diagnostic and therapeutic reasons.     Conservative treatment was ineffective i.e.: ice, NSAIDS, rest, narcotic medication, chiropractic care, physical therapy and message therapy. Patient is unable to perform the following ADL's: ambulating and grooming     Pain Assessment: 0-10  Pain Level: 7     Pain Orientation: Lower  Pain Location: Back       Last Plain films: 2018      EXAMINATION:  1. Rl3,4 transforaminal radiculogram/epidurogram.   2. RL3,4 transforaminal epidural anesthetic injection. 3. RL3,4 transforaminal epidural steroid injection. CONSENT: Written consent was obtained from the patient on preprinted consent form after explaining the procedure, indications, potential complications and outcomes. Alternative treatments were also discussed. DISCUSSION: The patient was sterilely prepped and draped in the usual fashion in the prone position. Time out was verified for correct patient, side, level and procedure. SEDATION: 1mg of Versed and 100mcg of fentanyl and 0 mg propofol IV were given IV pre procedurally and during the procedure. The patient remained fairly sedated throughout the procedure and underwent constant continuous EKG, pulse oximetry and blood pressure monitoring independently by the CRNA/RN as well as by myself. Sedation time was 14 minutes. PROCEDURE:  Under image-intensifier control, a 22 gauge needle x 5 inch spinal needle was guided successfully into the epidural space employing a posterior lateral/oblique approach. Needle aspiration was negative for heme or CSF. Instillation of  .5 mL of Omnipaque 240 contrast medium opacified the spinal nerve and demonstrated contiguous flow into theRL 3  epidural space. No vascular spread was noted. Digital subtraction was not employed to evaluate for vascular spread. The patient was monitored for any untoward reaction to contrast medium before proceeding with procedure #2. The patient did not report pain reproduction in a concordant distribution. Following needle position verification, a test dose of .5 mL of sterile lidocaine 0.5% was administered and patient monitored for any adverse effects. Then, 1 mL of  was instilled into the epidural space and the patient's response was again monitored. Finally, Dexamethasone (Decadron 10 mg/mL)  1 ml of 0.5% bupivacaine was then instilled. The patient's response was again monitored. The spinal needle was removed. Instillation of  .5 mL of Omnipaque 240 contrast medium opacified the spinal nerve and demonstrated contiguous flow into theRL4  epidural space. No vascular spread was noted. Digital subtraction was not employed to evaluate for vascular spread. The patient was monitored for any untoward reaction to contrast medium before proceeding with procedure #2. The patient did not report pain reproduction in a concordant distribution. Following needle position verification, a test dose of .5 mL of sterile lidocaine 0.5% was administered and patient monitored for any adverse effects. Then, 1 mL of  was instilled into the epidural space and the patient's response was again monitored. Finally, Dexamethasone (Decadron 10 mg/mL)  1 ml of 0.5% bupivacaine was then instilled. The patient's response was again monitored. The spinal needle was removed. The patient tolerated the procedure well and without complications and was noted to be in stable condition prior to discharge from the procedure center with discharge instructions. EBL: no blood loss    SPECIMEN: none    IMPRESSIONS:  1. RL3,4 transforaminal epidurogram, epidural anesthesia and epidural steroid injection procedures accomplished without incident. RECOMMENDATIONS:  1. Complete and return Post-Procedure Pain and Activity Diary.   2. Contact the [address] for symptom exacerbation, fever or unusual symptoms.    3. Post-procedure care according to verbal and written discharge instructions    POST-PROCEDURE EPIDUROGRAPHY INTERPRETATION:    EXAMINATION: AP, lateral, and oblique views    FLUORO TIME: 25 seconds    DISCUSSION: Spot views of the spine reveal normal alignment and segmentation. Spinal needle is positioned at the RL3,4 neuroforamin. Contrast spreads and outlines the Rl3,4 nerve/ neuroforamin and epidural space. The epidurogram reveals excellent contrast flow. Visualized spine reveals See radiology report. Soft tissues reveal no abnormalities. IMPRESSION: RL3,4 transforaminal epidurogram/epineurogram reveals satisfactory needle position and contrast spread.      Electronically signed by Emile Sauer MD on 6/4/2019 at 9:22 AM

## 2019-06-04 NOTE — H&P
Signed                Show:Clear all  []Manual[]Template[x]Copied    Added by:  [x]Alfredo Granado MD      []Tone for details                                     Signed          Expand All Collapse All            Show:Clear all  [x]Manual[x]Template[x]Copied     Added by:  [x]DAVID Linares CNP        []Tone for details        Subjective:      Patient ID: Nalini Mo is a 77 y.o. female. Chief Complaint   Patient presents with    Back Pain       follow up post TFE, the patient states that the injection helped for 3 weeks and the pain is now returning      Back Pain      Here today for routine pain clinic recheck. Her pain has been well controlled on motrin and lumbar epidurals, but the epidurals wear off. She would like to schedule a repeat epidural at this time, as she is not interested in surgery.       Pain Assessment  Location of Pain: Back  Severity of Pain: 9  Quality of Pain: Dull, Aching(burning)  Duration of Pain: Persistent  Frequency of Pain: Constant  Aggravating Factors: Standing, Walking  Limiting Behavior: No  Relieving Factors: Rest(stretching, elevating legs)  Are there other pain locations you wish to document?: No          Allergies   Allergen Reactions    Seasonal           Active Medications               Outpatient Medications Marked as Taking for the 4/15/19 encounter (Office Visit) with DAVID Brownlee CNP   Medication Sig Dispense Refill    magnesium oxide (MAGNESIUM-OXIDE) 400 (241.3 Mg) MG TABS tablet Take 1 tablet by mouth 2 times daily 60 tablet 11    ibuprofen (ADVIL;MOTRIN) 400 MG tablet TAKE 1 TABLET EVERY 6 HOURS AS NEEDED FOR PAIN 360 tablet 3    furosemide (LASIX) 40 MG tablet Take 1 tablet by mouth daily 90 tablet 3    umeclidinium-vilanterol (ANORO ELLIPTA) 62.5-25 MCG/INH AEPB inhaler INHALE 1 PUFF INTO LUNGS EVERY DAY 3 each 3    clopidogrel (PLAVIX) 75 MG tablet Take 1 tablet by mouth daily 90 tablet 3    potassium chloride (MICRO-K) 10 MEQ extended release capsule TAKE 1 CAPSULE TWICE DAILY 180 capsule 3    atorvastatin (LIPITOR) 40 MG tablet Take 1 tablet by mouth daily 90 tablet 2    alendronate (FOSAMAX) 70 MG tablet Take 1 tablet by mouth every 7 days 12 tablet 3    pantoprazole (PROTONIX) 40 MG tablet TAKE 1 TABLET EVERY DAY 90 tablet 3    lisinopril (PRINIVIL;ZESTRIL) 10 MG tablet TAKE 1 TABLET EVERY DAY 90 tablet 3    amLODIPine (NORVASC) 5 MG tablet TAKE 1 TABLET EVERY DAY 90 tablet 3    citalopram (CELEXA) 10 MG tablet TAKE 1 TABLET EVERY DAY 90 tablet 3    buPROPion (WELLBUTRIN) 75 MG tablet TAKE 1 TABLET TWICE DAILY 180 tablet 3    amitriptyline (ELAVIL) 25 MG tablet take 1 tablet by mouth at bedtime 90 tablet 3    spironolactone (ALDACTONE) 50 MG tablet TAKE 1 TABLET TWICE DAILY 180 tablet 3    albuterol sulfate HFA (VENTOLIN HFA) 108 (90 Base) MCG/ACT inhaler Inhale 2 puffs into the lungs every 4 hours as needed for Wheezing 1 Inhaler 5    Insulin Syringe-Needle U-100 (INSULIN SYRINGE .5CC/31GX5/16\") 31G X 5/16\" 0.5 ML MISC 1 each by Does not apply route daily 200 each 3    Cholecalciferol (VITAMIN D3) 1000 units TABS TAKE 3 TABLETS BY MOUTH TWICE DAILY 540 tablet 3    senna (SENOKOT) 8.6 MG TABS tablet TAKE 1 TABLET BY MOUTH TWICE DAILY 180 tablet 3    triamcinolone (KENALOG) 0.1 % cream APPLY TOPICALLY TWICE DAILY 80 g 5    B-D ULTRAFINE III SHORT PEN 31G X 8 MM MISC USE TWICE DAILY 180 each 3    NOVOLOG FLEXPEN 100 UNIT/ML injection pen INJECT 4 UNITS AT LUNCH AND  6 UNITS AT SUPPER (EACH PEN EXPIRES 28 DAYS AFTER 1ST USE) 15 mL 3    fluticasone (FLONASE) 50 MCG/ACT nasal spray USE 2 SPRAYS NASALLY EVERY DAY 48 g 3    loratadine (CLARITIN) 10 MG tablet Take 10 mg by mouth daily as needed        nystatin (MYCOSTATIN) 426246 UNIT/GM powder Apply 3 times daily.  60 g 5    VITAMIN A PO Take 2,400 mcg by mouth daily        vitamin C (ASCORBIC ACID) 500 MG tablet TAKE 1 TABLET BY MOUTH TWICE DAILY 60 tablet 11    PRODIGY NO CODING BLOOD GLUC strip TEST FOUR TIMES DAILY AS DIRECTED 400 strip 3    PRODIGY TWIST TOP LANCETS 28G MISC USE AS DIRECTED FOUR TIMES DAILY 400 each 3    vitamin E 400 UNIT capsule Take 400 Units by mouth daily        Omega-3 Fatty Acids (FISH OIL) 1200 MG CAPS Take 1,200 mg by mouth daily         COMBIGAN 0.2-0.5 % ophthalmic solution PLACE 1 DROP INTO BOTH EYES DAILY 15 mL 3    Lancet Devices (PRODIGY LANCING DEVICE) MISC USE TO TEST BLOOD SUGAR FOUR TIMES DAILY AS DIRECTED 1 each 0    ondansetron (ZOFRAN) 4 MG tablet Take 1 tablet by mouth every 8 hours as needed for Nausea or Vomiting 30 tablet 3    Compression Stockings MISC by Does not apply route 15-20mmHg  Knee high  Open tow  With assist device to help put them on 1 each 0    Zinc 50 MG TABS Take 50 mg by mouth daily        Diabetic Shoe MISC by Does not apply route 1 each 0    niacin 500 MG extended release capsule Take 500 mg by mouth daily        folic acid (FOLVITE) 041 MCG tablet Take 800 mcg by mouth daily        Cyanocobalamin (VITAMIN B12 PO) Take 2,500 mcg by mouth daily        B Complex-C (SUPER B COMPLEX PO) Take by mouth daily        travoprost, benzalkonium, (TRAVATAN) 0.004 % ophthalmic solution Place 1 drop into both eyes daily 3 Bottle 3    insulin glargine (LANTUS) 100 UNIT/ML injection vial Inject 10 Units into the skin nightly 3 vial 3    CPAP Machine MISC by Does not apply route Pressure of 13 (AirSense 10)  [employer].        albuterol (PROVENTIL) (2.5 MG/3ML) 0.083% nebulizer solution Take 2.5 mg by nebulization every 6 hours as needed for Wheezing        ferrous sulfate 325 (65 FE) MG EC tablet Take 325 mg by mouth daily (with breakfast)        Multiple Vitamin (MULTI VITAMIN DAILY) TABS Take by mouth daily        nitroGLYCERIN (NITROSTAT) 0.4 MG SL tablet Place 0.4 mg under the tongue every 5 minutes as needed for Chest pain Dissolve 1 tab under tongue at first sign of chest pain. May repeat every 5 minutes until relief is obtained. If pain persists after taking 3 tabs in a 15-minute period, or the pain is different than is typically experienced, call 9-1-1 immediately.                 Past Medical History           Past Medical History:   Diagnosis Date    Arthritis      Asthma      CAD (coronary artery disease) 46    Cancer ([address] 1971     cervical    [facility]ot[facility]      Cataracts, bilateral      COPD (chronic obstructive pulmonary disease) (HCC)      Coronary artery disease      Eczema      Fibromyalgia      Glaucoma      H/O blood clots      Headache      Heart attack ([address]      Hx of Bell's palsy       Mild right facial paralysis    Hypertension      Liver disease      Mild dementia      Neuropathy      Osteoporosis      Sleep apnea 2014     sleep study [facility]      Type 2 diabetes mellitus with hyperglycemia ([facility]              Past Surgical History             Past Surgical History:   Procedure Laterality Date    APPENDECTOMY   1968    BARIATRIC SURGERY       COLONOSCOPY   2012    CORONARY ANGIOPLASTY WITH STENT PLACEMENT        HYSTERECTOMY   1987    INDUCED    1970    LUMBAR SPINE SURGERY Right 2019     Right L2 L3 TRANSFORAMINAL performed by Gary Woodard MD at [address] [facility] 1.1-2CM FACE,FACIAL Left 2018     Excision Cyst Left Chest, Middle Back performed by Oscar Monteiro MD at [address] [place] [address] ANES/STEROID FORAMEN LUMBAR/SACRAL  [address] [facility] JUAN Laws ADD LEVEL Right 10/5/2018     Right L2 & L3 TRANSFORAMINAL performed by Gary Woodard MD at [address] [place] [address] ANES/STEROID FORAMEN LUMBAR/SACRAL [address] JUAN Laws ADD LEVEL Right 10/26/2018     Right L2 L3 TRANSFORAMINAL performed [facility]ard MD at [facility]            Family History             Family History   Problem Relation Age of Onset    Heart Disease Mother      High Blood Pressure Mother      Stroke Mother      Glaucoma Mother      Early Death Father      Cancer Father           stomach    Miscarriages / Stillbirths Maternal Uncle              Social History   Social History                Socioeconomic History    Marital status:        Spouse name: None    Number of children: None    Years of education: None    Highest education level: None   Occupational History    None   Social Needs    Financial resource strain: None    Food insecurity:       Worry: None       Inability: None    Transportation needs:       Medical: None       Non-medical: None   Tobacco Use    Smoking status: Former Smoker       Packs/day: 1.00       Years: 35.00       Pack years: 35.00       Last attempt to quit: 2001       Years since quittin.5    Smokeless tobacco: Never Used   Substance and Sexual Activity    Alcohol use: No    Drug use: No    Sexual activity: Never   Lifestyle    Physical activity:       Days per week: None       Minutes per session: None    Stress: None   Relationships    Social connections:       Talks on phone: None       Gets together: None       Attends Spiritism service: None       Active member of club or organization: None       Attends meetings of clubs or organizations: None       Relationship status: None    Intimate partner violence:       Fear of current or ex partner: None       Emotionally abused: None       Physically abused: None       Forced sexual activity: None   Other Topics Concern    None   Social History Narrative    None            Review of Systems   Constitutional: Positive for activity change. HENT: Negative. Eyes: Negative. Respiratory: Negative. Cardiovascular: Negative. Gastrointestinal: Negative for constipation. Endocrine: Negative. Genitourinary: Negative. Musculoskeletal: Positive for arthralgias, back pain and myalgias. Neurological: Negative.     Psychiatric/Behavioral: Positive for sleep disturbance. Objective:   Physical Exam   Constitutional: She is oriented to person, place, and time. She appears well-developed and well-nourished. She is cooperative. No distress. HENT:   Head: Normocephalic and atraumatic. Cardiovascular: Normal rate. Pulmonary/Chest: Effort normal.   Musculoskeletal:        Lumbar back: She exhibits decreased range of motion and pain. EXAMINATION:  2 XRAY VIEWS OF THE LUMBAR SPINE, 9/14/2018 10:39 am     COMPARISON:  None     HISTORY:  ORDERING SYSTEM PROVIDED HISTORY: Right-sided low back pain with right-sided  sciatica, unspecified chronicity  TECHNOLOGIST PROVIDED HISTORY:  pain  Ordering Physician Provided Reason for Exam: No injury; chronic rt sided low  back pain  Acuity: Chronic  Type of Exam: Unknown     FINDINGS:  Osseous structures are diffusely demineralized. There is grade 1  anterolisthesis at L4-L5, measuring 0.4 cm, likely secondary to facet  arthropathy. Minimal anterolisthesis at L3-L4 is also probably secondary to  facet arthropathy. Alignment in the lumbar spine is otherwise normal.  The  vertebral body heights are preserved. There is mild disc space narrowing at  L5-S1. Mild-to-moderate multilevel facet arthropathy is noted. There are  scattered vascular calcifications at the aortic bifurcation. Cholecystectomy  clips are noted. Impression  1. No evidence of acute compression fracture. 2. Minimal to mild anterolisthesis at L3-L4 and L4-L5, likely secondary to  facet arthropathy. 3. Minimal degenerative disc disease and mild-to-moderate facet arthropathy. Neurological: She is alert and oriented to person, place, and time. No cranial nerve deficit. GCS eye subscore is 4. GCS verbal subscore is 5. GCS motor subscore is 6. Skin: Skin is warm, dry and intact. She is not diaphoretic. No cyanosis. No pallor. Nails show no clubbing. Psychiatric: She has a normal mood and affect. Her speech is normal.   Vitals reviewed. Assessment:   1. Lumbosacral spondylosis without myelopathy    2. DDD (degenerative disc disease), lumbar    3. Lumbar radiculopathy    4. Chronic right-sided low back pain with right-sided sciatica                     Plan: Will schedule right L2, L3 transforaminal     Informed consent has not been obtained for procedure. Penelope Mooneyenis  on blood thinners. Medications to hold have been reviewed with patient. Blood thinners must be held with permission from your cardiologist or primary care physician. A letter is  required to besent to PCP/Cardiologist regarding holding medications for procedure to decrease bleeding risk.                        Darin Bruno, DAVID - CNP                                      Routing History

## 2019-06-04 NOTE — INTERVAL H&P NOTE
I have interviewed and examined the patient and reviewed the recent History and Physical.  There have been no changes to the recent H&P documentation. The surgical consent form has been signed. Last anticoagulant medication use was:1 week    Premedication taken for contrast allergy? No    Valium taken for oral sedation?  No    Outpatient Medications Marked as Taking for the 6/4/19 encounter [facility] Encounter)   Medication Sig Dispense Refill    PRODIGY NO CODING BLOOD GLUC strip TEST FOUR TIMES DAILY AS DIRECTED 400 strip 3    Diabetic Shoe MISC Dispense #1 pair 2 each 0    ondansetron (ZOFRAN) 4 MG tablet Take 1 tablet by mouth every 8 hours as needed for Nausea or Vomiting 30 tablet 3    magnesium oxide (MAGNESIUM-OXIDE) 400 (241.3 Mg) MG TABS tablet Take 1 tablet by mouth 2 times daily 60 tablet 11    furosemide (LASIX) 40 MG tablet Take 1 tablet by mouth daily 90 tablet 3    umeclidinium-vilanterol (ANORO ELLIPTA) 62.5-25 MCG/INH AEPB inhaler INHALE 1 PUFF INTO LUNGS EVERY DAY 3 each 3    potassium chloride (MICRO-K) 10 MEQ extended release capsule TAKE 1 CAPSULE TWICE DAILY 180 capsule 3    atorvastatin (LIPITOR) 40 MG tablet Take 1 tablet by mouth daily 90 tablet 2    alendronate (FOSAMAX) 70 MG tablet Take 1 tablet by mouth every 7 days 12 tablet 3    pantoprazole (PROTONIX) 40 MG tablet TAKE 1 TABLET EVERY DAY 90 tablet 3    lisinopril (PRINIVIL;ZESTRIL) 10 MG tablet TAKE 1 TABLET EVERY DAY 90 tablet 3    amLODIPine (NORVASC) 5 MG tablet TAKE 1 TABLET EVERY DAY 90 tablet 3    citalopram (CELEXA) 10 MG tablet TAKE 1 TABLET EVERY DAY 90 tablet 3    buPROPion (WELLBUTRIN) 75 MG tablet TAKE 1 TABLET TWICE DAILY 180 tablet 3    amitriptyline (ELAVIL) 25 MG tablet take 1 tablet by mouth at bedtime 90 tablet 3    spironolactone (ALDACTONE) 50 MG tablet TAKE 1 TABLET TWICE DAILY 180 tablet 3    albuterol sulfate HFA (VENTOLIN HFA) 108 (90 Base) MCG/ACT inhaler Inhale 2 puffs into the lungs every 4 hours as needed for Wheezing 1 Inhaler 5    Insulin Syringe-Needle U-100 (INSULIN SYRINGE .5CC/31GX5/16\") 31G X 5/16\" 0.5 ML MISC 1 each by Does not apply route daily 200 each 3    Cholecalciferol (VITAMIN D3) 1000 units TABS TAKE 3 TABLETS BY MOUTH TWICE DAILY 540 tablet 3    senna (SENOKOT) 8.6 MG TABS tablet TAKE 1 TABLET BY MOUTH TWICE DAILY 180 tablet 3    triamcinolone (KENALOG) 0.1 % cream APPLY TOPICALLY TWICE DAILY 80 g 5    B-D ULTRAFINE III SHORT PEN 31G X 8 MM MISC USE TWICE DAILY 180 each 3    NOVOLOG FLEXPEN 100 UNIT/ML injection pen INJECT 4 UNITS AT LUNCH AND  6 UNITS AT SUPPER (EACH PEN EXPIRES 28 DAYS AFTER 1ST USE) 15 mL 3    fluticasone (FLONASE) 50 MCG/ACT nasal spray USE 2 SPRAYS NASALLY EVERY DAY 48 g 3    loratadine (CLARITIN) 10 MG tablet Take 10 mg by mouth daily as needed      nystatin (MYCOSTATIN) 685210 UNIT/GM powder Apply 3 times daily.  60 g 5    VITAMIN A PO Take 2,400 mcg by mouth daily      vitamin C (ASCORBIC ACID) 500 MG tablet TAKE 1 TABLET BY MOUTH TWICE DAILY 60 tablet 11    PRODIGY NO CODING BLOOD GLUC strip TEST FOUR TIMES DAILY AS DIRECTED 400 strip 3    PRODIGY TWIST TOP LANCETS 28G MISC USE AS DIRECTED FOUR TIMES DAILY 400 each 3    vitamin E 400 UNIT capsule Take 400 Units by mouth daily      Omega-3 Fatty Acids (FISH OIL) 1200 MG CAPS Take 1,200 mg by mouth daily       COMBIGAN 0.2-0.5 % ophthalmic solution PLACE 1 DROP INTO BOTH EYES DAILY 15 mL 3    Lancet Devices (PRODIGY LANCING DEVICE) MISC USE TO TEST BLOOD SUGAR FOUR TIMES DAILY AS DIRECTED 1 each 0    Compression Stockings MISC by Does not apply route 15-20mmHg  Knee high  Open tow  With assist device to help put them on 1 each 0    Zinc 50 MG TABS Take 50 mg by mouth daily      Diabetic Shoe MISC by Does not apply route 1 each 0    niacin 500 MG extended release capsule Take 500 mg by mouth daily      folic acid (FOLVITE) 656 MCG tablet Take 800 mcg by mouth daily      Cyanocobalamin (VITAMIN B12 PO) Take 2,500 mcg by mouth daily      B Complex-C (SUPER B COMPLEX PO) Take by mouth daily      travoprost, benzalkonium, (TRAVATAN) 0.004 % ophthalmic solution Place 1 drop into both eyes daily 3 Bottle 3    insulin glargine (LANTUS) 100 UNIT/ML injection vial Inject 10 Units into the skin nightly 3 vial 3    CPAP Machine MISC by Does not apply route Pressure of 13 (AirSense 10)  [employer].      albuterol (PROVENTIL) (2.5 MG/3ML) 0.083% nebulizer solution Take 2.5 mg by nebulization every 6 hours as needed for Wheezing      ferrous sulfate 325 (65 FE) MG EC tablet Take 325 mg by mouth daily (with breakfast)      Multiple Vitamin (MULTI VITAMIN DAILY) TABS Take by mouth daily         The patient understands the planned operation and its associated risks and benefits and agrees to proceed.         Electronically signed by Marne Boas, MD on 6/4/2019 at 9:19 AM

## 2019-06-12 ENCOUNTER — OFFICE VISIT (OUTPATIENT)
Dept: PULMONOLOGY | Age: 69
End: 2019-06-12
Payer: MEDICARE

## 2019-06-12 VITALS
BODY MASS INDEX: 45.41 KG/M2 | HEART RATE: 56 BPM | WEIGHT: 266 LBS | HEIGHT: 64 IN | SYSTOLIC BLOOD PRESSURE: 100 MMHG | OXYGEN SATURATION: 97 % | DIASTOLIC BLOOD PRESSURE: 64 MMHG

## 2019-06-12 DIAGNOSIS — Z99.89 OSA ON CPAP: Primary | ICD-10-CM

## 2019-06-12 DIAGNOSIS — G47.33 OSA ON CPAP: Primary | ICD-10-CM

## 2019-06-12 DIAGNOSIS — E66.01 MORBID OBESITY DUE TO EXCESS CALORIES (HCC): ICD-10-CM

## 2019-06-12 DIAGNOSIS — J47.9 BRONCHIECTASIS WITHOUT COMPLICATION (HCC): ICD-10-CM

## 2019-06-12 PROCEDURE — G8598 ASA/ANTIPLAT THER USED: HCPCS | Performed by: NURSE PRACTITIONER

## 2019-06-12 PROCEDURE — 0518F FALL PLAN OF CARE DOCD: CPT | Performed by: NURSE PRACTITIONER

## 2019-06-12 PROCEDURE — 3288F FALL RISK ASSESSMENT DOCD: CPT | Performed by: NURSE PRACTITIONER

## 2019-06-12 PROCEDURE — 99213 OFFICE O/P EST LOW 20 MIN: CPT | Performed by: NURSE PRACTITIONER

## 2019-06-12 PROCEDURE — 3017F COLORECTAL CA SCREEN DOC REV: CPT | Performed by: NURSE PRACTITIONER

## 2019-06-12 PROCEDURE — G8400 PT W/DXA NO RESULTS DOC: HCPCS | Performed by: NURSE PRACTITIONER

## 2019-06-12 PROCEDURE — 1036F TOBACCO NON-USER: CPT | Performed by: NURSE PRACTITIONER

## 2019-06-12 PROCEDURE — G8427 DOCREV CUR MEDS BY ELIG CLIN: HCPCS | Performed by: NURSE PRACTITIONER

## 2019-06-12 PROCEDURE — 1090F PRES/ABSN URINE INCON ASSESS: CPT | Performed by: NURSE PRACTITIONER

## 2019-06-12 PROCEDURE — 4040F PNEUMOC VAC/ADMIN/RCVD: CPT | Performed by: NURSE PRACTITIONER

## 2019-06-12 PROCEDURE — 1123F ACP DISCUSS/DSCN MKR DOCD: CPT | Performed by: NURSE PRACTITIONER

## 2019-06-12 PROCEDURE — G8417 CALC BMI ABV UP PARAM F/U: HCPCS | Performed by: NURSE PRACTITIONER

## 2019-06-12 RX ORDER — ALBUTEROL SULFATE 2.5 MG/3ML
2.5 SOLUTION RESPIRATORY (INHALATION) EVERY 6 HOURS PRN
Qty: 120 EACH | Refills: 3 | Status: SHIPPED | OUTPATIENT
Start: 2019-06-12 | End: 2022-06-24 | Stop reason: SDUPTHER

## 2019-06-12 NOTE — PROGRESS NOTES
PULMONARY OP  PROGRESS NOTE      Patient:  Irwin Buckley  YOB: 1950    MRN: [id number]     Acct:        Pt seen and Chart reviewed. Mrs. Irwin Buckley is here in followup for    Diagnosis Orders   1. LEANDRO on CPAP     2. Bronchiectasis without complication (HCC)  albuterol (PROVENTIL) (2.5 MG/3ML) 0.083% nebulizer solution   3. Morbid obesity due to excess calories ([address]         Pt has been doing well since her last visit 6 months ago. She denies worsening SOB/LAUREANO, wheezing, cough/sputum production, hemoptysis, chest pain/tightness, fever/chills. She reports compliance with BD as prescribed - Anoro, albuterol prn and albuterol nebs. She states she uses her albuterol rescue inhaler \"rarely\" and nebulizers 3-4 x/week. She reports compliance with CPAP; compliance data was reviewed - pt 100% compliant for an average of 8 h 52 min; AHI 0.3. She reports restful restorative sleep. Subjective:   Review of Systems -   Constitutional ROS: negative  ENT ROS: negative  Allergy and Immunology ROS: negative  Respiratory ROS: SOB/LAUREANO, stable; apnea   Cardiovascular ROS: negative  Gastrointestinal ROS: negative  Musculoskeletal ROS: negative    Allergies:   Allergies   Allergen Reactions    Seasonal        Medications:    Current Outpatient Medications:     albuterol (PROVENTIL) (2.5 MG/3ML) 0.083% nebulizer solution, Take 3 mLs by nebulization every 6 hours as needed for Wheezing, Disp: 120 each, Rfl: 3    PRODIGY NO CODING BLOOD GLUC strip, TEST FOUR TIMES DAILY AS DIRECTED, Disp: 400 strip, Rfl: 3    Diabetic Shoe MISC, Dispense #1 pair, Disp: 2 each, Rfl: 0    ondansetron (ZOFRAN) 4 MG tablet, Take 1 tablet by mouth every 8 hours as needed for Nausea or Vomiting, Disp: 30 tablet, Rfl: 3    gabapentin (NEURONTIN) 300 MG capsule, TAKE 2 CAPSULES TWICE DAILY, Disp: 360 capsule, Rfl: 3    magnesium oxide (MAGNESIUM-OXIDE) 400 (241.3 Mg) MG TABS tablet, Take 1 tablet by mouth 2 times daily, Disp: 3    triamcinolone (KENALOG) 0.1 % cream, APPLY TOPICALLY TWICE DAILY, Disp: 80 g, Rfl: 5    B-D ULTRAFINE III SHORT PEN 31G X 8 MM MISC, USE TWICE DAILY, Disp: 180 each, Rfl: 3    NOVOLOG FLEXPEN 100 UNIT/ML injection pen, INJECT 4 UNITS AT LUNCH AND  6 UNITS AT SUPPER (EACH PEN EXPIRES 28 DAYS AFTER 1ST USE), Disp: 15 mL, Rfl: 3    fluticasone (FLONASE) 50 MCG/ACT nasal spray, USE 2 SPRAYS NASALLY EVERY DAY, Disp: 48 g, Rfl: 3    loratadine (CLARITIN) 10 MG tablet, Take 10 mg by mouth daily as needed, Disp: , Rfl:     nystatin (MYCOSTATIN) 824101 UNIT/GM powder, Apply 3 times daily. , Disp: 60 g, Rfl: 5    VITAMIN A PO, Take 2,400 mcg by mouth daily, Disp: , Rfl:     vitamin C (ASCORBIC ACID) 500 MG tablet, TAKE 1 TABLET BY MOUTH TWICE DAILY, Disp: 60 tablet, Rfl: 11    PRODIGY NO CODING BLOOD GLUC strip, TEST FOUR TIMES DAILY AS DIRECTED, Disp: 400 strip, Rfl: 3    PRODIGY TWIST TOP LANCETS 28G MISC, USE AS DIRECTED FOUR TIMES DAILY, Disp: 400 each, Rfl: 3    vitamin E 400 UNIT capsule, Take 400 Units by mouth daily, Disp: , Rfl:     Omega-3 Fatty Acids (FISH OIL) 1200 MG CAPS, Take 1,200 mg by mouth daily , Disp: , Rfl:     COMBIGAN 0.2-0.5 % ophthalmic solution, PLACE 1 DROP INTO BOTH EYES DAILY, Disp: 15 mL, Rfl: 3    Lancet Devices (PRODIGY LANCING DEVICE) MISC, USE TO TEST BLOOD SUGAR FOUR TIMES DAILY AS DIRECTED, Disp: 1 each, Rfl: 0    Zinc 50 MG TABS, Take 50 mg by mouth daily, Disp: , Rfl:     Diabetic Shoe MISC, by Does not apply route, Disp: 1 each, Rfl: 0    niacin 500 MG extended release capsule, Take 500 mg by mouth daily, Disp: , Rfl:     folic acid (FOLVITE) 597 MCG tablet, Take 800 mcg by mouth daily, Disp: , Rfl:     Cyanocobalamin (VITAMIN B12 PO), Take 2,500 mcg by mouth daily, Disp: , Rfl:     B Complex-C (SUPER B COMPLEX PO), Take by mouth daily, Disp: , Rfl:     travoprost, benzalkonium, (TRAVATAN) 0.004 % ophthalmic solution, Place 1 drop into both eyes daily, Disp: 3 Bottle, Rfl: 3    insulin glargine (LANTUS) 100 UNIT/ML injection vial, Inject 10 Units into the skin nightly, Disp: 3 vial, Rfl: 3    CPAP Machine MISC, by Does not apply route Pressure of 13 (AirSense 10) [employer] Disp: , Rfl:     ferrous sulfate 325 (65 FE) MG EC tablet, Take 325 mg by mouth daily (with breakfast), Disp: , Rfl:     Multiple Vitamin (MULTI VITAMIN DAILY) TABS, Take by mouth daily, Disp: , Rfl:     Compression Stockings MISC, by Does not apply route 15-20mmHg Knee high Open tow With assist device to help put them on, Disp: 1 each, Rfl: 0    nitroGLYCERIN (NITROSTAT) 0.4 MG SL tablet, Place 0.4 mg under the tongue every 5 minutes as needed for Chest pain Dissolve 1 tab under tongue at first sign of chest pain. May repeat every 5 minutes until relief is obtained. If pain persists after taking 3 tabs in a 15-minute period, or the pain is different than is typically experienced, call 9-1-1 immediately. , Disp: , Rfl:       Objective:    Physical Exam:  Vitals: /64   Pulse 56   Ht 5' 4\" (1.626 m)   Wt 266 lb (120.7 kg)   LMP 12/07/1995 (Approximate)   SpO2 97%   BMI 45.66 kg/m²   Last 3 weights: Wt Readings from Last 3 Encounters:   06/12/19 266 lb (120.7 kg)   06/04/19 266 lb (120.7 kg)   05/20/19 262 lb (118.8 kg)     Body mass index is 45.66 kg/m². Physical Examination:   Constitutional: Appears well, no distress; morbid obese  EENT: large tongue with low hanging soft palate and overall decreased oropharynx;  PERRLA,  sclera clear, anicteric, oropharynx clear, no lesions, neck supple with midline trachea. Neck: short/thick neck; Supple, symmetrical, trachea midline, no adenopathy, no goiter, no jvd skin normal  Respiratory: clear to auscultation, no wheezes or rales and unlabored breathing.  No intercostal tenderness  Cardiovascular: regular rate and rhythm, normal S1, S2, no murmur noted  Abdomen: soft, nontender, nondistended, no masses or organomegaly  Extremities:  no pedal edema, no clubbing or cyanosis    Assessment:     Diagnosis Orders   1. LEANDRO on CPAP     2. Bronchiectasis without complication (HCC)  albuterol (PROVENTIL) (2.5 MG/3ML) 0.083% nebulizer solution   3. Morbid obesity due to excess calories (HCC)           PLAN:    Continue Anoro and albuterol rescue inhaler/nebs   Refills were provided - albuterol nebs   Educated and clarified the medication use. Recommend flu vaccination in the fall annually. Recommendations given regarding pneumococcal vaccinations. Patient is up-to-date with vaccinations from pulmonary perspective. Maintain an active lifestyle. Questions patient had were answered to his/her satisfaction. CPAP to be used for at least 4 hours every night. Use humidifier if needed. Weight loss was recommended and discussed. Recommended following good sleep hygiene instructions. Given sleep hygiene instructions to the patient. Explained importance of compliance with treatment of sleep apnea. Compliance data was reviewed and the patient is in compliance per insurance requirement.     We'll see the patient back in 6 months      Rivera Kent CNP             6/12/2019, 12:29 PM

## 2019-06-14 DIAGNOSIS — R53.82 CHRONIC FATIGUE: ICD-10-CM

## 2019-06-14 RX ORDER — ASCORBIC ACID 500 MG
TABLET ORAL
Qty: 180 TABLET | Refills: 3 | Status: SHIPPED | OUTPATIENT
Start: 2019-06-14 | End: 2020-06-01

## 2019-06-17 ENCOUNTER — OFFICE VISIT (OUTPATIENT)
Dept: NEUROLOGY | Age: 69
End: 2019-06-17
Payer: MEDICARE

## 2019-06-17 VITALS
HEART RATE: 70 BPM | SYSTOLIC BLOOD PRESSURE: 126 MMHG | BODY MASS INDEX: 44.42 KG/M2 | DIASTOLIC BLOOD PRESSURE: 78 MMHG | HEIGHT: 64 IN | WEIGHT: 260.2 LBS

## 2019-06-17 DIAGNOSIS — R42 DIZZINESS: ICD-10-CM

## 2019-06-17 DIAGNOSIS — E11.65 POORLY CONTROLLED DIABETES MELLITUS (HCC): Primary | ICD-10-CM

## 2019-06-17 DIAGNOSIS — E66.01 OBESITY, CLASS III, BMI 40-49.9 (MORBID OBESITY) (HCC): ICD-10-CM

## 2019-06-17 DIAGNOSIS — M54.50 BACK PAIN AT L4-L5 LEVEL: ICD-10-CM

## 2019-06-17 DIAGNOSIS — R53.1 WEAK: ICD-10-CM

## 2019-06-17 DIAGNOSIS — R26.89 BALANCE PROBLEM: ICD-10-CM

## 2019-06-17 DIAGNOSIS — G62.9 PERIPHERAL POLYNEUROPATHY: ICD-10-CM

## 2019-06-17 DIAGNOSIS — R51.9 HEADACHE, UNSPECIFIED HEADACHE TYPE: ICD-10-CM

## 2019-06-17 PROCEDURE — G8427 DOCREV CUR MEDS BY ELIG CLIN: HCPCS | Performed by: PSYCHIATRY & NEUROLOGY

## 2019-06-17 PROCEDURE — 1090F PRES/ABSN URINE INCON ASSESS: CPT | Performed by: PSYCHIATRY & NEUROLOGY

## 2019-06-17 PROCEDURE — 2022F DILAT RTA XM EVC RTNOPTHY: CPT | Performed by: PSYCHIATRY & NEUROLOGY

## 2019-06-17 PROCEDURE — G8417 CALC BMI ABV UP PARAM F/U: HCPCS | Performed by: PSYCHIATRY & NEUROLOGY

## 2019-06-17 PROCEDURE — G8400 PT W/DXA NO RESULTS DOC: HCPCS | Performed by: PSYCHIATRY & NEUROLOGY

## 2019-06-17 PROCEDURE — 4040F PNEUMOC VAC/ADMIN/RCVD: CPT | Performed by: PSYCHIATRY & NEUROLOGY

## 2019-06-17 PROCEDURE — 99215 OFFICE O/P EST HI 40 MIN: CPT | Performed by: PSYCHIATRY & NEUROLOGY

## 2019-06-17 PROCEDURE — 3017F COLORECTAL CA SCREEN DOC REV: CPT | Performed by: PSYCHIATRY & NEUROLOGY

## 2019-06-17 PROCEDURE — 1123F ACP DISCUSS/DSCN MKR DOCD: CPT | Performed by: PSYCHIATRY & NEUROLOGY

## 2019-06-17 PROCEDURE — 3044F HG A1C LEVEL LT 7.0%: CPT | Performed by: PSYCHIATRY & NEUROLOGY

## 2019-06-17 PROCEDURE — 3288F FALL RISK ASSESSMENT DOCD: CPT | Performed by: PSYCHIATRY & NEUROLOGY

## 2019-06-17 PROCEDURE — G8598 ASA/ANTIPLAT THER USED: HCPCS | Performed by: PSYCHIATRY & NEUROLOGY

## 2019-06-17 PROCEDURE — 1036F TOBACCO NON-USER: CPT | Performed by: PSYCHIATRY & NEUROLOGY

## 2019-06-17 PROCEDURE — 0518F FALL PLAN OF CARE DOCD: CPT | Performed by: PSYCHIATRY & NEUROLOGY
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
## 2019-06-17 NOTE — PATIENT INSTRUCTIONS
1. Follow with primary doctor for good blood sugar control  2. Weight loss  3. Follow with pain management for your back pain  4. If in future, you want to switch gabapentin to topamax, let me know.        Return 6-8 months    Soumya May MD, MS

## 2019-06-20 ENCOUNTER — OFFICE VISIT (OUTPATIENT)
Dept: PAIN MANAGEMENT | Age: 69
End: 2019-06-20
Payer: MEDICARE

## 2019-06-20 VITALS
WEIGHT: 260 LBS | HEIGHT: 64 IN | SYSTOLIC BLOOD PRESSURE: 126 MMHG | DIASTOLIC BLOOD PRESSURE: 72 MMHG | BODY MASS INDEX: 44.39 KG/M2 | HEART RATE: 68 BPM

## 2019-06-20 DIAGNOSIS — M47.816 LUMBAR FACET JOINT SYNDROME: ICD-10-CM

## 2019-06-20 DIAGNOSIS — M54.16 LUMBAR RADICULOPATHY: Primary | ICD-10-CM

## 2019-06-20 DIAGNOSIS — M51.36 DDD (DEGENERATIVE DISC DISEASE), LUMBAR: ICD-10-CM

## 2019-06-20 PROCEDURE — 3017F COLORECTAL CA SCREEN DOC REV: CPT | Performed by: PHYSICAL MEDICINE & REHABILITATION

## 2019-06-20 PROCEDURE — G8598 ASA/ANTIPLAT THER USED: HCPCS | Performed by: PHYSICAL MEDICINE & REHABILITATION

## 2019-06-20 PROCEDURE — G8400 PT W/DXA NO RESULTS DOC: HCPCS | Performed by: PHYSICAL MEDICINE & REHABILITATION

## 2019-06-20 PROCEDURE — 99214 OFFICE O/P EST MOD 30 MIN: CPT | Performed by: PHYSICAL MEDICINE & REHABILITATION

## 2019-06-20 PROCEDURE — 0518F FALL PLAN OF CARE DOCD: CPT | Performed by: PHYSICAL MEDICINE & REHABILITATION

## 2019-06-20 PROCEDURE — 1036F TOBACCO NON-USER: CPT | Performed by: PHYSICAL MEDICINE & REHABILITATION

## 2019-06-20 PROCEDURE — 1090F PRES/ABSN URINE INCON ASSESS: CPT | Performed by: PHYSICAL MEDICINE & REHABILITATION

## 2019-06-20 PROCEDURE — 4040F PNEUMOC VAC/ADMIN/RCVD: CPT | Performed by: PHYSICAL MEDICINE & REHABILITATION

## 2019-06-20 PROCEDURE — G8427 DOCREV CUR MEDS BY ELIG CLIN: HCPCS | Performed by: PHYSICAL MEDICINE & REHABILITATION

## 2019-06-20 PROCEDURE — 3288F FALL RISK ASSESSMENT DOCD: CPT | Performed by: PHYSICAL MEDICINE & REHABILITATION

## 2019-06-20 PROCEDURE — 1123F ACP DISCUSS/DSCN MKR DOCD: CPT | Performed by: PHYSICAL MEDICINE & REHABILITATION

## 2019-06-20 PROCEDURE — G8417 CALC BMI ABV UP PARAM F/U: HCPCS | Performed by: PHYSICAL MEDICINE & REHABILITATION

## 2019-06-20 ASSESSMENT — ENCOUNTER SYMPTOMS
RESPIRATORY NEGATIVE: 1
BACK PAIN: 1
CONSTIPATION: 0
EYES NEGATIVE: 1
DIARRHEA: 0
ALLERGIC/IMMUNOLOGIC NEGATIVE: 1

## 2019-06-20 NOTE — PATIENT INSTRUCTIONS
Patient Education        Learning About Medial Branch Block and Neurotomy  What are medial branch block and neurotomy? Facet joints connect your vertebrae to each other. Problems in these joints can cause chronic (long-term) pain in the neck or back. They can sometimes affect the shoulders, arms, buttocks, or legs. Medial branch nerves are the nerves that carry many of the pain messages from your facet joints. Radiofrequency medial branch neurotomy is a type of medial branch neurotomy that is used to relieve arthritis pain. It uses radio waves to damage nerves in your neck or back so that they can no longer send pain messages to your brain. Before your doctor knows if a neurotomy will help you, he or she will do a medial branch block to find out if certain nerves are the ones that are a source of your pain. You will need two separate visits to the outpatient center or hospital to have both procedures. How is a medial branch block done? The doctor will use a tiny needle to numb the skin where you will get the block. Then he or she puts the block needle into the numbed area. You may feel some pressure, but you should not feel pain. Using fluoroscopy (live X-ray) to guide the needle, the doctor injects medicine onto one or more nerves to make them numb. If you get relief from your pain in the next 4 to 6 hours, it's a sign that those nerves may be contributing to your pain. The relief will last only a short time. You may then have a medial branch neurotomy at a later visit to try to get longer relief. It takes 20 to 30 minutes to get the block. You can go home after the doctor watches you for about an hour. You will get instructions on how to report how much pain you have when you are at home. You will need someone to drive you home. How is medial branch neurotomy done? The doctor will use a tiny needle to numb the skin where you will get the neurotomy.  Then he or she puts the neurotomy needle into the numbed area. You may feel some pressure. Using fluoroscopy (live X-ray) to guide the needle, the doctor sends radio waves through the needle to the nerve for 60 to 90 seconds. The radio waves heat the nerve, which damages it. The doctor may do this several times. And he or she may treat more than one nerve. It takes 45 to 90 minutes to get a neurotomy, depending on how many nerves are heated. You will probably go home 30 to 60 minutes later. You will need someone to drive you home. What can you expect after a neurotomy? You may feel a little sore or tender at the injection site at first. But after a successful neurotomy, most people have pain relief right away. It often lasts for 9 to 12 months or longer. Sometimes the pain relief is permanent. If your pain does come back, it may mean that the damaged nerve has healed and can send pain messages again. Or it can mean that a different nerve is causing pain. Your doctor will discuss your options with you. Follow-up care is a key part of your treatment and safety. Be sure to make and go to all appointments, and call your doctor if you are having problems. It's also a good idea to know your test results and keep a list of the medicines you take. Where can you learn more? Go to https://sougou.[employer]Genius. org and sign in to your [employer] account. Enter N716 in the [employer] box to learn more about \"Learning About Medial Branch Block and Neurotomy. \"     If you do not have an account, please click on the \"Sign Up Now\" link. Current as of: Dayanara 3, 2018  Content Version: 12.0  © 8004-7963 Healthwise, Incorporated. Care instructions adapted under license by [facility] ([facility]). If you have questions about a medical condition or this instruction, always ask your healthcare professional. [place] [address] any warranty or liability for your use of this information.           [facility]  [address] [facility] the permit to be able to do the procedure. 9.  You must have finished any antibiotic prescribed for recent infections. If required, please take pre-procedure antibiotic or other pre-procedure medications as instructed. 10. Bring inhalers and pain medications with you to your procedure. 11. Bring your MRI/CT films if they were done outside of the [facility]. 12. If you should develop a cold, sore throat, cough, fever or other new indication of illness or infection, or are started on antibiotics within 2 weeks of the scheduled procedure, please notify the [facility] office as early as possible at [phone].

## 2019-06-20 NOTE — PROGRESS NOTES
PAIN MANAGEMENT FOLLOW-UP NOTE  6/20/19    CHIEF COMPLAINT: This is a63 y.o. female patientwho returns to the [facility] with a history of Back Pain and Leg Pain (the patient stated that she gets spasms in her calves and her feet/toes)      PAIN Mamadou Jacobo returns today for  reevaluation. Since the visit, the patient reports that the pain is not changed. Pain in R back does not respond for more than 3 weeks  With TFEs R L2,3,4      Location: Back  Location Modifier: entire back  Severity of Pain: 9  Duration of Pain: Persistent  Frequency of Pain: Constant  Aggravating Factors: bending sideways  Limiting Behavior: Standing   Relieving Factors: relaxation        Previous pain medication trials have included:          Mental health:    Patient feels - secondary to their current pain problems as described above. H/O depression and anxiety: No   Patient is not seeing psychologist orpsychiatrist   Abuse history? No    Employed? No    ANALGESIA:   Are your Current Pain medication (s) helping to decrease pain? No.   Current Pain score: Pain Score:   5    ADVERSE AFFECTS:   Medication Side Effects: No.    ACTIVITY:  Are you able to be more active with your pain medications? No      ABERRANT BEHAVIORS SINCE LAST VISIT? No    Review of Systems   Constitutional: Positive for fatigue. HENT: Negative. Eyes: Negative. Respiratory: Negative. Cardiovascular: Negative. Gastrointestinal: Negative for constipation and diarrhea. Endocrine: Negative. Genitourinary: Negative for difficulty urinating and flank pain. Musculoskeletal: Positive for back pain and myalgias. Skin: Negative. Allergic/Immunologic: Negative. Neurological: Positive for weakness and numbness. Hematological: Negative. Psychiatric/Behavioral: Positive for sleep disturbance.         Allergies   Allergen Reactions    Seasonal        Outpatient Medications Prior to Visit   Medication Sig Dispense Refill    vitamin C (ASCORBIC ACID) 500 MG tablet TAKE 1 TABLET BY MOUTH TWICE DAILY 180 tablet 3    albuterol (PROVENTIL) (2.5 MG/3ML) 0.083% nebulizer solution Take 3 mLs by nebulization every 6 hours as needed for Wheezing 120 each 3    PRODIGY NO CODING BLOOD GLUC strip TEST FOUR TIMES DAILY AS DIRECTED 400 strip 3    Diabetic Shoe MISC Dispense #1 pair 2 each 0    ondansetron (ZOFRAN) 4 MG tablet Take 1 tablet by mouth every 8 hours as needed for Nausea or Vomiting 30 tablet 3    magnesium oxide (MAGNESIUM-OXIDE) 400 (241.3 Mg) MG TABS tablet Take 1 tablet by mouth 2 times daily 60 tablet 11    ibuprofen (ADVIL;MOTRIN) 400 MG tablet TAKE 1 TABLET EVERY 6 HOURS AS NEEDED FOR PAIN 360 tablet 3    furosemide (LASIX) 40 MG tablet Take 1 tablet by mouth daily 90 tablet 3    umeclidinium-vilanterol (ANORO ELLIPTA) 62.5-25 MCG/INH AEPB inhaler INHALE 1 PUFF INTO LUNGS EVERY DAY 3 each 3    clopidogrel (PLAVIX) 75 MG tablet Take 1 tablet by mouth daily 90 tablet 3    potassium chloride (MICRO-K) 10 MEQ extended release capsule TAKE 1 CAPSULE TWICE DAILY 180 capsule 3    atorvastatin (LIPITOR) 40 MG tablet Take 1 tablet by mouth daily 90 tablet 2    alendronate (FOSAMAX) 70 MG tablet Take 1 tablet by mouth every 7 days 12 tablet 3    pantoprazole (PROTONIX) 40 MG tablet TAKE 1 TABLET EVERY DAY 90 tablet 3    lisinopril (PRINIVIL;ZESTRIL) 10 MG tablet TAKE 1 TABLET EVERY DAY 90 tablet 3    amLODIPine (NORVASC) 5 MG tablet TAKE 1 TABLET EVERY DAY 90 tablet 3    citalopram (CELEXA) 10 MG tablet TAKE 1 TABLET EVERY DAY 90 tablet 3    buPROPion (WELLBUTRIN) 75 MG tablet TAKE 1 TABLET TWICE DAILY 180 tablet 3    amitriptyline (ELAVIL) 25 MG tablet take 1 tablet by mouth at bedtime 90 tablet 3    spironolactone (ALDACTONE) 50 MG tablet TAKE 1 TABLET TWICE DAILY 180 tablet 3    albuterol sulfate HFA (VENTOLIN HFA) 108 (90 Base) MCG/ACT inhaler Inhale 2 puffs into the lungs every 4 hours as needed for Wheezing 1 Inhaler 5    Insulin Syringe-Needle U-100 (INSULIN SYRINGE .5CC/31GX5/16\") 31G X 5/16\" 0.5 ML MISC 1 each by Does not apply route daily 200 each 3    Cholecalciferol (VITAMIN D3) 1000 units TABS TAKE 3 TABLETS BY MOUTH TWICE DAILY 540 tablet 3    senna (SENOKOT) 8.6 MG TABS tablet TAKE 1 TABLET BY MOUTH TWICE DAILY 180 tablet 3    triamcinolone (KENALOG) 0.1 % cream APPLY TOPICALLY TWICE DAILY 80 g 5    B-D ULTRAFINE III SHORT PEN 31G X 8 MM MISC USE TWICE DAILY 180 each 3    NOVOLOG FLEXPEN 100 UNIT/ML injection pen INJECT 4 UNITS AT LUNCH AND  6 UNITS AT SUPPER (EACH PEN EXPIRES 28 DAYS AFTER 1ST USE) 15 mL 3    fluticasone (FLONASE) 50 MCG/ACT nasal spray USE 2 SPRAYS NASALLY EVERY DAY 48 g 3    loratadine (CLARITIN) 10 MG tablet Take 10 mg by mouth daily as needed      nystatin (MYCOSTATIN) 547846 UNIT/GM powder Apply 3 times daily.  60 g 5    VITAMIN A PO Take 2,400 mcg by mouth daily      PRODIGY NO CODING BLOOD GLUC strip TEST FOUR TIMES DAILY AS DIRECTED 400 strip 3    PRODIGY TWIST TOP LANCETS 28G MISC USE AS DIRECTED FOUR TIMES DAILY 400 each 3    vitamin E 400 UNIT capsule Take 400 Units by mouth daily      Omega-3 Fatty Acids (FISH OIL) 1200 MG CAPS Take 1,200 mg by mouth daily       COMBIGAN 0.2-0.5 % ophthalmic solution PLACE 1 DROP INTO BOTH EYES DAILY 15 mL 3    Lancet Devices (PRODIGY LANCING DEVICE) MISC USE TO TEST BLOOD SUGAR FOUR TIMES DAILY AS DIRECTED 1 each 0    Compression Stockings MISC by Does not apply route 15-20mmHg  Knee high  Open tow  With assist device to help put them on 1 each 0    Zinc 50 MG TABS Take 50 mg by mouth daily      Diabetic Shoe MISC by Does not apply route 1 each 0    niacin 500 MG extended release capsule Take 500 mg by mouth daily      folic acid (FOLVITE) 558 MCG tablet Take 800 mcg by mouth daily      Cyanocobalamin (VITAMIN B12 PO) Take 2,500 mcg by mouth daily      B Complex-C (SUPER B COMPLEX PO) Take by mouth daily      travoprost, benzalkonium, (TRAVATAN) 0.004 % ophthalmic solution Place 1 drop into both eyes daily 3 Bottle 3    insulin glargine (LANTUS) 100 UNIT/ML injection vial Inject 10 Units into the skin nightly 3 vial 3    CPAP Machine MISC by Does not apply route Pressure of 13 (AirSense 10)  [employer].      ferrous sulfate 325 (65 FE) MG EC tablet Take 325 mg by mouth daily (with breakfast)      Multiple Vitamin (MULTI VITAMIN DAILY) TABS Take by mouth daily      nitroGLYCERIN (NITROSTAT) 0.4 MG SL tablet Place 0.4 mg under the tongue every 5 minutes as needed for Chest pain Dissolve 1 tab under tongue at first sign of chest pain. May repeat every 5 minutes until relief is obtained. If pain persists after taking 3 tabs in a 15-minute period, or the pain is different than is typically experienced, call 9-1-1 immediately.  gabapentin (NEURONTIN) 300 MG capsule TAKE 2 CAPSULES TWICE DAILY 360 capsule 3     No facility-administered medications prior to visit.         Past Medical History:   Diagnosis Date    Arthritis     Asthma     CAD (coronary artery disease)     Cancer ([address] 1971    cervical    Carotid artery disease ([address]     Cataracts, bilateral     COPD (chronic obstructive pulmonary disease) (HCC)     Coronary artery disease     Eczema     Fibromyalgia     Glaucoma     H/O blood clots     Headache     Heart attack ([address]     Hx of Bell's palsy     Mild right facial paralysis    Hypertension     Liver disease     Mild dementia     Neuropathy     Osteoporosis     Sleep apnea 2014    sleep study done in [place]    Tremor     Type 2 diabetes mellitus with hyperglycemia [facility])        Past Surgical History:   Procedure Laterality Date    APPENDECTOMY  1968    BARIATRIC SURGERY  2002    COLONOSCOPY  2012    CORONARY ANGIOPLASTY WITH STENT PLACEMENT      HYSTERECTOMY  1987    INDUCED   1970    LUMBAR SPINE SURGERY Right 2019    Right L2 L3 TRANSFORAMINAL performed by Cecy Villegas Oma Combs MD at [address] [place] [address] Dr Hamilton 2019    Right L2 L3 TRANSFORAMINAL performed by Bobbi Zheng MD at [address] [place] [address] Dr Hamilton 2019    Right L3 & L4 TRANSFORAMINAL performed by Bobbi Zheng MD at [address] [facility] 1.1-2CM FACE,FACIAL Left 2018    Excision Cyst Left Chest, Middle Back performed by Caden Berumen MD at [address] [place] [address] ANES/STEROID FORAMEN LUMBAR/SACRAL W IMG GUIDE ,EA ADD LEVEL Right 10/5/2018    Right L2 & L3 TRANSFORAMINAL performed by Bobbi Zheng MD at [address] [facility] ANES/STEROID FORAMEN LUMBAR/SACRAL W IMG GUIDE ,EA ADD LEVEL Right 10/26/2018    Right L2 L3 TRANSFORAMINAL performed by Bobbi Zheng MD at [facility]     Family History   Problem Relation Age of Onset    Heart Disease Mother     High Blood Pressure Mother    [place] Stroke Mother    [place] Glaucoma Mother     Early Death Father     Cancer Father         stomach    Miscarriages / Stillbirths Maternal Uncle      Social History     Socioeconomic History    Marital status:      Spouse name: None    Number of children: None    Years of education: None    Highest education level: None   Occupational History    None   Social Needs    Financial resource strain: None    Food insecurity:     Worry: None     Inability: None    Transportation needs:     Medical: None     Non-medical: None   Tobacco Use    Smoking status: Former Smoker     Packs/day: 1.00     Years: 35.00     Pack years: 35.00     Last attempt to quit: 2001     Years since quittin.7    Smokeless tobacco: Never Used   Substance and Sexual Activity    Alcohol use: No    Drug use: No    Sexual activity: Never   Lifestyle    Physical activity:     Days per week: None     Minutes per session: None    Stress: None   Relationships    Social connections:     Talks on phone: None     Gets together: None     Attends Gnosticism service: None     Active member of club or organization: None     Attends meetings of clubs or organizations: None     Relationship status: None    Intimate partner violence:     Fear of current or ex partner: None     Emotionally abused: None     Physically abused: None     Forced sexual activity: None   Other Topics Concern    None   Social History Narrative    None         Family and Social Historyreviewed in the electronic medical record. Imaging:Reviewed available imaging in our system with the patient. No results found. Objective:     Physical Exam:  Vitals:    06/20/19 1557   BP: 126/72   Site: Right Upper Arm   Position: Sitting   Cuff Size: Large Adult   Pulse: 68   Weight: 260 lb (117.9 kg)   Height: 5' 4\" (1.626 m)     Pain Score:   5    Physical Exam   Constitutional: She is oriented to person, place, and time. She appears well-developed and well-nourished. No distress. HENT:   Head: Normocephalic and atraumatic. Right Ear: External ear normal. No decreased hearing is noted. Left Ear: External ear normal. No decreased hearing is noted. Nose: Nose normal.   Mouth/Throat: Oropharynx is clear and moist and mucous membranes are normal.   Eyes: Conjunctivae and lids are normal. No scleral icterus. Neck: Phonation normal.   Cardiovascular:   No BLE edema present   Pulmonary/Chest: Effort normal. No accessory muscle usage. No respiratory distress. Abdominal: Normal appearance. Genitourinary:   Genitourinary Comments: deferred   Neurological: She is alert and oriented to person, place, and time. Skin: Skin is warm, dry and intact. No rash noted. She is not diaphoretic. No cyanosis or erythema. No pallor. Psychiatric: She has a normal mood and affect. Her speech is normal and behavior is normal.     Back Exam     Tenderness   The patient is experiencing tenderness in the lumbar.     Range of Motion   Extension: normal   Flexion: normal   Lateral bend right: normal   Lateral bend left: normal   Rotation right: normal Rotation left: normal     Muscle Strength   Right Quadriceps:  5/5   Left Quadriceps:  5/5   Right Hamstrings:  5/5   Left Hamstrings:  5/5     Tests   Straight leg raise right: negative  Straight leg raise left: negative    Other   Toe walk: normal  Heel walk: normal  Sensation: normal  Gait: normal     Comments:  + Tayo durbin                                      Assessment: This is a 77 y.o. female patient with:    Diagnosis:   Diagnosis Orders   1. Lumbar radiculopathy     2. Lumbar facet joint syndrome     3. DDD (degenerative disc disease), lumbar         Medical Comorbidities:  As listed in the patient's past medical and surgical history    Functional Limitations:   Pain limits function and quality of life. Plan:   R L2,3,4,5 Diag MBB     Meds:   Controlled Substances Monitoring: Pt educated about the risks of taking opiates,including increased sedation, constipation, slowed breathing, tolerance, dependence,and addiction. New Prescriptions    No medications on file      No orders of the defined types were placed in this encounter. No orders of the defined types were placed in this encounter. No follow-ups on file. The patient expressed understanding of the above assessment and plan. Totaltime spent face to face with patient was 30 minutes inwhich  50% or more of the time was spent in counseling, education about risk andbenefits of the above plan, and coordination of care.

## 2019-06-26 ENCOUNTER — OFFICE VISIT (OUTPATIENT)
Dept: NUTRITION | Age: 69
End: 2019-06-26
Payer: MEDICARE

## 2019-06-26 DIAGNOSIS — E66.01 CLASS 3 SEVERE OBESITY DUE TO EXCESS CALORIES WITH SERIOUS COMORBIDITY AND BODY MASS INDEX (BMI) OF 40.0 TO 44.9 IN ADULT (HCC): Primary | ICD-10-CM

## 2019-06-26 PROCEDURE — 97803 MED NUTRITION INDIV SUBSEQ: CPT | Performed by: DIETITIAN, REGISTERED

## 2019-06-26 PROCEDURE — G8417 CALC BMI ABV UP PARAM F/U: HCPCS | Performed by: DIETITIAN, REGISTERED

## 2019-06-26 PROCEDURE — G8428 CUR MEDS NOT DOCUMENT: HCPCS | Performed by: DIETITIAN, REGISTERED

## 2019-06-27 ENCOUNTER — TELEPHONE (OUTPATIENT)
Dept: INTERNAL MEDICINE | Age: 69
End: 2019-06-27
Payer: MEDICARE

## 2019-06-27 DIAGNOSIS — Z79.4 TYPE 2 DIABETES MELLITUS WITH DIABETIC POLYNEUROPATHY, WITH LONG-TERM CURRENT USE OF INSULIN (HCC): ICD-10-CM

## 2019-06-27 DIAGNOSIS — R53.82 CHRONIC FATIGUE: ICD-10-CM

## 2019-06-27 DIAGNOSIS — M43.07 SPONDYLOLYSIS OF LUMBOSACRAL REGION: ICD-10-CM

## 2019-06-27 DIAGNOSIS — M47.817 LUMBOSACRAL SPONDYLOSIS WITHOUT MYELOPATHY: ICD-10-CM

## 2019-06-27 DIAGNOSIS — G62.9 PERIPHERAL POLYNEUROPATHY: ICD-10-CM

## 2019-06-27 DIAGNOSIS — E11.42 TYPE 2 DIABETES MELLITUS WITH DIABETIC POLYNEUROPATHY, WITH LONG-TERM CURRENT USE OF INSULIN (HCC): ICD-10-CM

## 2019-06-27 DIAGNOSIS — K74.60 HEPATIC CIRRHOSIS, UNSPECIFIED HEPATIC CIRRHOSIS TYPE, UNSPECIFIED WHETHER ASCITES PRESENT (HCC): Primary | ICD-10-CM

## 2019-06-27 PROCEDURE — G0179 MD RECERTIFICATION HHA PT: HCPCS | Performed by: INTERNAL MEDICINE

## 2019-06-28 ENCOUNTER — HOSPITAL ENCOUNTER (OUTPATIENT)
Age: 69
Setting detail: OUTPATIENT SURGERY
Discharge: HOME | End: 2019-06-28
Attending: PHYSICAL MEDICINE & REHABILITATION | Admitting: PHYSICAL MEDICINE & REHABILITATION
Payer: MEDICARE

## 2019-06-28 ENCOUNTER — APPOINTMENT (OUTPATIENT)
Dept: GENERAL RADIOLOGY | Age: 69
End: 2019-06-28
Attending: PHYSICAL MEDICINE & REHABILITATION
Payer: MEDICARE

## 2019-06-28 VITALS
RESPIRATION RATE: 16 BRPM | DIASTOLIC BLOOD PRESSURE: 66 MMHG | HEART RATE: 51 BPM | OXYGEN SATURATION: 97 % | BODY MASS INDEX: 45.31 KG/M2 | SYSTOLIC BLOOD PRESSURE: 133 MMHG | WEIGHT: 265.4 LBS | HEIGHT: 64 IN | TEMPERATURE: 97 F

## 2019-06-28 PROBLEM — M47.816 LUMBAR FACET JOINT SYNDROME: Status: ACTIVE | Noted: 2019-06-28

## 2019-06-28 PROCEDURE — 64495 INJ PARAVERT F JNT L/S 3 LEV: CPT | Performed by: PHYSICAL MEDICINE & REHABILITATION

## 2019-06-28 PROCEDURE — 64494 INJ PARAVERT F JNT L/S 2 LEV: CPT | Performed by: PHYSICAL MEDICINE & REHABILITATION

## 2019-06-28 PROCEDURE — 3209999900 FLUORO FOR SURGICAL PROCEDURES

## 2019-06-28 PROCEDURE — 7100000010 HC PHASE II RECOVERY - FIRST 15 MIN: Performed by: PHYSICAL MEDICINE & REHABILITATION

## 2019-06-28 PROCEDURE — 7100000011 HC PHASE II RECOVERY - ADDTL 15 MIN: Performed by: PHYSICAL MEDICINE & REHABILITATION

## 2019-06-28 PROCEDURE — 6360000004 HC RX CONTRAST MEDICATION: Performed by: PHYSICAL MEDICINE & REHABILITATION

## 2019-06-28 PROCEDURE — 99152 MOD SED SAME PHYS/QHP 5/>YRS: CPT | Performed by: PHYSICAL MEDICINE & REHABILITATION

## 2019-06-28 PROCEDURE — 64493 INJ PARAVERT F JNT L/S 1 LEV: CPT | Performed by: PHYSICAL MEDICINE & REHABILITATION

## 2019-06-28 PROCEDURE — 2500000003 HC RX 250 WO HCPCS: Performed by: PHYSICAL MEDICINE & REHABILITATION

## 2019-06-28 PROCEDURE — 6360000002 HC RX W HCPCS: Performed by: PHYSICAL MEDICINE & REHABILITATION

## 2019-06-28 PROCEDURE — 3600000056 HC PAIN LEVEL 4 BASE: Performed by: PHYSICAL MEDICINE & REHABILITATION

## 2019-06-28 PROCEDURE — 2709999900 HC NON-CHARGEABLE SUPPLY: Performed by: PHYSICAL MEDICINE & REHABILITATION

## 2019-06-28 PROCEDURE — 2580000003 HC RX 258: Performed by: PHYSICAL MEDICINE & REHABILITATION

## 2019-06-28 RX ORDER — MIDAZOLAM HYDROCHLORIDE 1 MG/ML
INJECTION INTRAMUSCULAR; INTRAVENOUS PRN
Status: DISCONTINUED | OUTPATIENT
Start: 2019-06-28 | End: 2019-06-28 | Stop reason: ALTCHOICE

## 2019-06-28 RX ORDER — SODIUM CHLORIDE, SODIUM LACTATE, POTASSIUM CHLORIDE, CALCIUM CHLORIDE 600; 310; 30; 20 MG/100ML; MG/100ML; MG/100ML; MG/100ML
INJECTION, SOLUTION INTRAVENOUS CONTINUOUS
Status: DISCONTINUED | OUTPATIENT
Start: 2019-06-28 | End: 2019-06-28 | Stop reason: HOSPADM

## 2019-06-28 RX ORDER — SODIUM CHLORIDE 0.9 % (FLUSH) 0.9 %
10 SYRINGE (ML) INJECTION PRN
Status: DISCONTINUED | OUTPATIENT
Start: 2019-06-28 | End: 2019-06-28 | Stop reason: HOSPADM

## 2019-06-28 RX ORDER — SODIUM CHLORIDE 0.9 % (FLUSH) 0.9 %
10 SYRINGE (ML) INJECTION EVERY 12 HOURS SCHEDULED
Status: DISCONTINUED | OUTPATIENT
Start: 2019-06-28 | End: 2019-06-28 | Stop reason: HOSPADM

## 2019-06-28 RX ORDER — FENTANYL CITRATE 50 UG/ML
INJECTION, SOLUTION INTRAMUSCULAR; INTRAVENOUS PRN
Status: DISCONTINUED | OUTPATIENT
Start: 2019-06-28 | End: 2019-06-28 | Stop reason: ALTCHOICE

## 2019-06-28 RX ORDER — LIDOCAINE HYDROCHLORIDE 20 MG/ML
INJECTION, SOLUTION EPIDURAL; INFILTRATION; INTRACAUDAL; PERINEURAL PRN
Status: DISCONTINUED | OUTPATIENT
Start: 2019-06-28 | End: 2019-06-28 | Stop reason: ALTCHOICE

## 2019-06-28 RX ADMIN — SODIUM CHLORIDE, SODIUM LACTATE, POTASSIUM CHLORIDE, AND CALCIUM CHLORIDE: .6; .31; .03; .02 INJECTION, SOLUTION INTRAVENOUS at 09:46

## 2019-06-28 ASSESSMENT — PAIN SCALES - GENERAL
PAINLEVEL_OUTOF10: 0
PAINLEVEL_OUTOF10: 0

## 2019-06-28 ASSESSMENT — PAIN - FUNCTIONAL ASSESSMENT: PAIN_FUNCTIONAL_ASSESSMENT: 0-10

## 2019-06-28 NOTE — H&P
Genitourinary:   Genitourinary Comments: deferred   Neurological: She is alert and oriented to person, place, and time. Skin: Skin is warm, dry and intact. No rash noted. She is not diaphoretic. No cyanosis or erythema. No pallor. Psychiatric: She has a normal mood and affect. Her speech is normal and behavior is normal.      Back Exam      Tenderness   The patient is experiencing tenderness in the lumbar. Range of Motion   Extension: normal   Flexion: normal   Lateral bend right: normal   Lateral bend left: normal   Rotation right: normal   Rotation left: normal      Muscle Strength   Right Quadriceps:  5/5   Left Quadriceps:  5/5   Right Hamstrings:  5/5   Left Hamstrings:  5/5      Tests   Straight leg raise right: negative  Straight leg raise left: negative     Other   Toe walk: normal  Heel walk: normal  Sensation: normal  Gait: normal      Comments:  + Tayo   -hari   -herberth                                          Assessment: This is a 77 y.o. female patient with:     Diagnosis:    Diagnosis Orders   1. Lumbar radiculopathy      2. Lumbar facet joint syndrome      3. DDD (degenerative disc disease), lumbar            Medical Comorbidities:  As listed in the patient's past medical and surgical history     Functional Limitations:   Pain limits function and quality of life. Plan:   R L2,3,4,5 Diag MBB      Meds:   Controlled Substances Monitoring: Pt educated about the risks of taking opiates,including increased sedation, constipation, slowed breathing, tolerance, dependence,and addiction. New Prescriptions     No medications on file        Encounter Medications    No orders of the defined types were placed in this encounter. No orders of the defined types were placed in this encounter. No follow-ups on file. The patient expressed understanding of the above assessment and plan.      Totaltime spent face to face with patient was 30 minutes inwhich  50% or more of the Cardiovascular: Negative. Gastrointestinal: Negative for constipation and diarrhea. Endocrine: Negative. Genitourinary: Negative for difficulty urinating and flank pain. Musculoskeletal: Positive for back pain and myalgias. Skin: Negative. Allergic/Immunologic: Negative. Neurological: Positive for weakness and numbness. Hematological: Negative. Psychiatric/Behavioral: Positive for sleep disturbance.               Allergies   Allergen Reactions    Seasonal           Medications Prior to Visit          Outpatient Medications Prior to Visit   Medication Sig Dispense Refill    vitamin C (ASCORBIC ACID) 500 MG tablet TAKE 1 TABLET BY MOUTH TWICE DAILY 180 tablet 3    albuterol (PROVENTIL) (2.5 MG/3ML) 0.083% nebulizer solution Take 3 mLs by nebulization every 6 hours as needed for Wheezing 120 each 3    PRODIGY NO CODING BLOOD GLUC strip TEST FOUR TIMES DAILY AS DIRECTED 400 strip 3    Diabetic Shoe MISC Dispense #1 pair 2 each 0    ondansetron (ZOFRAN) 4 MG tablet Take 1 tablet by mouth every 8 hours as needed for Nausea or Vomiting 30 tablet 3    magnesium oxide (MAGNESIUM-OXIDE) 400 (241.3 Mg) MG TABS tablet Take 1 tablet by mouth 2 times daily 60 tablet 11    ibuprofen (ADVIL;MOTRIN) 400 MG tablet TAKE 1 TABLET EVERY 6 HOURS AS NEEDED FOR PAIN 360 tablet 3    furosemide (LASIX) 40 MG tablet Take 1 tablet by mouth daily 90 tablet 3    umeclidinium-vilanterol (ANORO ELLIPTA) 62.5-25 MCG/INH AEPB inhaler INHALE 1 PUFF INTO LUNGS EVERY DAY 3 each 3    clopidogrel (PLAVIX) 75 MG tablet Take 1 tablet by mouth daily 90 tablet 3    potassium chloride (MICRO-K) 10 MEQ extended release capsule TAKE 1 CAPSULE TWICE DAILY 180 capsule 3    atorvastatin (LIPITOR) 40 MG tablet Take 1 tablet by mouth daily 90 tablet 2    alendronate (FOSAMAX) 70 MG tablet Take 1 tablet by mouth every 7 days 12 tablet 3    pantoprazole (PROTONIX) 40 MG tablet TAKE 1 TABLET EVERY DAY 90 tablet 3    lisinopril LANCING DEVICE) MISC USE TO TEST BLOOD SUGAR FOUR TIMES DAILY AS DIRECTED 1 each 0    Compression Stockings MISC by Does not apply route 15-20mmHg  Knee high  Open tow  With assist device to help put them on 1 each 0    Zinc 50 MG TABS Take 50 mg by mouth daily        Diabetic Shoe MISC by Does not apply route 1 each 0    niacin 500 MG extended release capsule Take 500 mg by mouth daily        folic acid (FOLVITE) 091 MCG tablet Take 800 mcg by mouth daily        Cyanocobalamin (VITAMIN B12 PO) Take 2,500 mcg by mouth daily        B Complex-C (SUPER B COMPLEX PO) Take by mouth daily        travoprost, benzalkonium, (TRAVATAN) 0.004 % ophthalmic solution Place 1 drop into both eyes daily 3 Bottle 3    insulin glargine (LANTUS) 100 UNIT/ML injection vial Inject 10 Units into the skin nightly 3 vial 3    CPAP Machine MISC by Does not apply route Pressure of 13 (AirSense 10)  [employer].        ferrous sulfate 325 (65 FE) MG EC tablet Take 325 mg by mouth daily (with breakfast)        Multiple Vitamin (MULTI VITAMIN DAILY) TABS Take by mouth daily        nitroGLYCERIN (NITROSTAT) 0.4 MG SL tablet Place 0.4 mg under the tongue every 5 minutes as needed for Chest pain Dissolve 1 tab under tongue at first sign of chest pain. May repeat every 5 minutes until relief is obtained. If pain persists after taking 3 tabs in a 15-minute period, or the pain is different than is typically experienced, call 9-1-1 immediately.  gabapentin (NEURONTIN) 300 MG capsule TAKE 2 CAPSULES TWICE DAILY 360 capsule 3      No facility-administered medications prior to visit.              Past Medical History        Past Medical History:   Diagnosis Date    Arthritis      Asthma      CAD (coronary artery disease) 46    Cancer ([facility] [address] 1971     cervical    Carotid artery disease ([facility] [address] [facility]      Cataracts, bilateral      COPD (chronic obstructive pulmonary disease) (HCC)      Coronary artery disease      Eczema      education: None    Highest education level: None   Occupational History    None   Social Needs    Financial resource strain: None    Food insecurity:       Worry: None       Inability: None    Transportation needs:       Medical: None       Non-medical: None   Tobacco Use    Smoking status: Former Smoker       Packs/day: 1.00       Years: 35.00       Pack years: 35.00       Last attempt to quit: 2001       Years since quittin.7    Smokeless tobacco: Never Used   Substance and Sexual Activity    Alcohol use: No    Drug use: No    Sexual activity: Never   Lifestyle    Physical activity:       Days per week: None       Minutes per session: None    Stress: None   Relationships    Social connections:       Talks on phone: None       Gets together: None       Attends Scientology service: None       Active member of club or organization: None       Attends meetings of clubs or organizations: None       Relationship status: None    Intimate partner violence:       Fear of current or ex partner: None       Emotionally abused: None       Physically abused: None       Forced sexual activity: None   Other Topics Concern    None   Social History Narrative    None               Family and Social Historyreviewed in the electronic medical record. Imaging:Reviewed available imaging in our system with the patient. No results found. Objective:      Physical Exam:  Vitals       Vitals:     19 1557   BP: 126/72   Site: Right Upper Arm   Position: Sitting   Cuff Size: Large Adult   Pulse: 68   Weight: 260 lb (117.9 kg)   Height: 5' 4\" (1.626 m)         Pain Score:   5     Physical Exam   Constitutional: She is oriented to person, place, and time. She appears well-developed and well-nourished. No distress. HENT:   Head: Normocephalic and atraumatic. Right Ear: External ear normal. No decreased hearing is noted. Left Ear: External ear normal. No decreased hearing is noted.    Nose: Nose normal. Past Medical History:   Diagnosis Date    Arthritis      Asthma      CAD (coronary artery disease)     Cancer ([facility] [address] 1971     cervical    Carotid artery disease ([facility] [address]      Cataracts, bilateral      COPD (chronic obstructive pulmonary disease) (HCC)      Coronary artery disease      Eczema      Fibromyalgia      Glaucoma      H/O blood clots      Headache      Heart attack ([facility] [address]      Hx of Bell's palsy       Mild right facial paralysis    Hypertension      Liver disease      Mild dementia      Neuropathy      Osteoporosis      Sleep apnea 2014     sleep study done in [place]    Tremor      Type 2 diabetes mellitus with hyperglycemia ([facility] [address]              Past Surgical History         Past Surgical History:   Procedure Laterality Date    APPENDECTOMY       BARIATRIC SURGERY       COLONOSCOPY   2012    CORONARY ANGIOPLASTY WITH STENT PLACEMENT        HYSTERECTOMY       INDUCED    1970    LUMBAR SPINE SURGERY Right 2019     Right L2 L3 TRANSFORAMINAL performed by Africa Castillo MD at [address] [place] [address] Right 2019     Right L2 L3 TRANSFORAMINAL performed by Africa Castillo MD at [address] [place] [address] Right 2019     Right L3 & L4 TRANSFORAMINAL performed by Africa Castillo MD at [address] [facility] 1.1-2CM FACE,FACIAL Left 2018     Excision Cyst Left Chest, Middle Back performed by Loli Lofton MD at [address] [place] [address] ANES/STEROID FORAMEN LUMBAR/SACRAL [address] JUAN Laws ADD LEVEL Right 10/5/2018     Right L2 & L3 TRANSFORAMINAL performed by Africa Castillo MD at [address] [place] [address] ANES/STEROID FORAMEN LUMBAR/SACRAL [address] JUAN Laws ADD LEVEL Right 10/26/2018     Right L2 L3 TRANSFORAMINAL performed by Africa Castillo MD at [address] [facility]         Family History         Family History   Problem Relation Age of Onset    Heart Disease Mother      High Blood Pressure Mother      Stroke Mother

## 2019-06-28 NOTE — INTERVAL H&P NOTE
Cholecalciferol (VITAMIN D3) 1000 units TABS TAKE 3 TABLETS BY MOUTH TWICE DAILY 540 tablet 3    NOVOLOG FLEXPEN 100 UNIT/ML injection pen INJECT 4 UNITS AT LUNCH AND  6 UNITS AT SUPPER (EACH PEN EXPIRES 28 DAYS AFTER 1ST USE) 15 mL 3    fluticasone (FLONASE) 50 MCG/ACT nasal spray USE 2 SPRAYS NASALLY EVERY DAY 48 g 3    loratadine (CLARITIN) 10 MG tablet Take 10 mg by mouth daily as needed      VITAMIN A PO Take 2,400 mcg by mouth daily      vitamin E 400 UNIT capsule Take 400 Units by mouth daily      Omega-3 Fatty Acids (FISH OIL) 1200 MG CAPS Take 1,200 mg by mouth daily       COMBIGAN 0.2-0.5 % ophthalmic solution PLACE 1 DROP INTO BOTH EYES DAILY 15 mL 3    Zinc 50 MG TABS Take 50 mg by mouth daily      niacin 500 MG extended release capsule Take 500 mg by mouth daily      folic acid (FOLVITE) 128 MCG tablet Take 800 mcg by mouth daily      Cyanocobalamin (VITAMIN B12 PO) Take 2,500 mcg by mouth daily      B Complex-C (SUPER B COMPLEX PO) Take by mouth daily      travoprost, benzalkonium, (TRAVATAN) 0.004 % ophthalmic solution Place 1 drop into both eyes daily 3 Bottle 3    ferrous sulfate 325 (65 FE) MG EC tablet Take 325 mg by mouth daily (with breakfast)      Multiple Vitamin (MULTI VITAMIN DAILY) TABS Take by mouth daily         The patient understands the planned operation and its associated risks and benefits and agrees to proceed.         Electronically signed by Marah Matt MD on 6/28/2019 at 9:56 AM

## 2019-06-28 NOTE — H&P (VIEW-ONLY)
and numbness. Hematological: Negative. Psychiatric/Behavioral: Positive for sleep disturbance.               Allergies   Allergen Reactions    Seasonal           Medications Prior to Visit          Outpatient Medications Prior to Visit   Medication Sig Dispense Refill    vitamin C (ASCORBIC ACID) 500 MG tablet TAKE 1 TABLET BY MOUTH TWICE DAILY 180 tablet 3    albuterol (PROVENTIL) (2.5 MG/3ML) 0.083% nebulizer solution Take 3 mLs by nebulization every 6 hours as needed for Wheezing 120 each 3    PRODIGY NO CODING BLOOD GLUC strip TEST FOUR TIMES DAILY AS DIRECTED 400 strip 3    Diabetic Shoe MISC Dispense #1 pair 2 each 0    ondansetron (ZOFRAN) 4 MG tablet Take 1 tablet by mouth every 8 hours as needed for Nausea or Vomiting 30 tablet 3    magnesium oxide (MAGNESIUM-OXIDE) 400 (241.3 Mg) MG TABS tablet Take 1 tablet by mouth 2 times daily 60 tablet 11    ibuprofen (ADVIL;MOTRIN) 400 MG tablet TAKE 1 TABLET EVERY 6 HOURS AS NEEDED FOR PAIN 360 tablet 3    furosemide (LASIX) 40 MG tablet Take 1 tablet by mouth daily 90 tablet 3    umeclidinium-vilanterol (ANORO ELLIPTA) 62.5-25 MCG/INH AEPB inhaler INHALE 1 PUFF INTO LUNGS EVERY DAY 3 each 3    clopidogrel (PLAVIX) 75 MG tablet Take 1 tablet by mouth daily 90 tablet 3    potassium chloride (MICRO-K) 10 MEQ extended release capsule TAKE 1 CAPSULE TWICE DAILY 180 capsule 3    atorvastatin (LIPITOR) 40 MG tablet Take 1 tablet by mouth daily 90 tablet 2    alendronate (FOSAMAX) 70 MG tablet Take 1 tablet by mouth every 7 days 12 tablet 3    pantoprazole (PROTONIX) 40 MG tablet TAKE 1 TABLET EVERY DAY 90 tablet 3    lisinopril (PRINIVIL;ZESTRIL) 10 MG tablet TAKE 1 TABLET EVERY DAY 90 tablet 3    amLODIPine (NORVASC) 5 MG tablet TAKE 1 TABLET EVERY DAY 90 tablet 3    citalopram (CELEXA) 10 MG tablet TAKE 1 TABLET EVERY DAY 90 tablet 3    buPROPion (WELLBUTRIN) 75 MG tablet TAKE 1 TABLET TWICE DAILY 180 tablet 3    amitriptyline (ELAVIL) 25 MG (PRINIVIL;ZESTRIL) 10 MG tablet TAKE 1 TABLET EVERY DAY 90 tablet 3    amLODIPine (NORVASC) 5 MG tablet TAKE 1 TABLET EVERY DAY 90 tablet 3    citalopram (CELEXA) 10 MG tablet TAKE 1 TABLET EVERY DAY 90 tablet 3    buPROPion (WELLBUTRIN) 75 MG tablet TAKE 1 TABLET TWICE DAILY 180 tablet 3    amitriptyline (ELAVIL) 25 MG tablet take 1 tablet by mouth at bedtime 90 tablet 3    spironolactone (ALDACTONE) 50 MG tablet TAKE 1 TABLET TWICE DAILY 180 tablet 3    albuterol sulfate HFA (VENTOLIN HFA) 108 (90 Base) MCG/ACT inhaler Inhale 2 puffs into the lungs every 4 hours as needed for Wheezing 1 Inhaler 5    Insulin Syringe-Needle U-100 (INSULIN SYRINGE .5CC/31GX5/16\") 31G X 5/16\" 0.5 ML MISC 1 each by Does not apply route daily 200 each 3    Cholecalciferol (VITAMIN D3) 1000 units TABS TAKE 3 TABLETS BY MOUTH TWICE DAILY 540 tablet 3    senna (SENOKOT) 8.6 MG TABS tablet TAKE 1 TABLET BY MOUTH TWICE DAILY 180 tablet 3    triamcinolone (KENALOG) 0.1 % cream APPLY TOPICALLY TWICE DAILY 80 g 5    B-D ULTRAFINE III SHORT PEN 31G X 8 MM MISC USE TWICE DAILY 180 each 3    NOVOLOG FLEXPEN 100 UNIT/ML injection pen INJECT 4 UNITS AT LUNCH AND  6 UNITS AT SUPPER (EACH PEN EXPIRES 28 DAYS AFTER 1ST USE) 15 mL 3    fluticasone (FLONASE) 50 MCG/ACT nasal spray USE 2 SPRAYS NASALLY EVERY DAY 48 g 3    loratadine (CLARITIN) 10 MG tablet Take 10 mg by mouth daily as needed        nystatin (MYCOSTATIN) 877634 UNIT/GM powder Apply 3 times daily.  60 g 5    VITAMIN A PO Take 2,400 mcg by mouth daily        PRODIGY NO CODING BLOOD GLUC strip TEST FOUR TIMES DAILY AS DIRECTED 400 strip 3    PRODIGY TWIST TOP LANCETS 28G MISC USE AS DIRECTED FOUR TIMES DAILY 400 each 3    vitamin E 400 UNIT capsule Take 400 Units by mouth daily        Omega-3 Fatty Acids (FISH OIL) 1200 MG CAPS Take 1,200 mg by mouth daily         COMBIGAN 0.2-0.5 % ophthalmic solution PLACE 1 DROP INTO BOTH EYES DAILY 15 mL 3    Lancet Devices (PRODIGY LANCING DEVICE) MISC USE TO TEST BLOOD SUGAR FOUR TIMES DAILY AS DIRECTED 1 each 0    Compression Stockings MISC by Does not apply route 15-20mmHg  Knee high  Open tow  With assist device to help put them on 1 each 0    Zinc 50 MG TABS Take 50 mg by mouth daily        Diabetic Shoe MISC by Does not apply route 1 each 0    niacin 500 MG extended release capsule Take 500 mg by mouth daily        folic acid (FOLVITE) 578 MCG tablet Take 800 mcg by mouth daily        Cyanocobalamin (VITAMIN B12 PO) Take 2,500 mcg by mouth daily        B Complex-C (SUPER B COMPLEX PO) Take by mouth daily        travoprost, benzalkonium, (TRAVATAN) 0.004 % ophthalmic solution Place 1 drop into both eyes daily 3 Bottle 3    insulin glargine (LANTUS) 100 UNIT/ML injection vial Inject 10 Units into the skin nightly 3 vial 3    CPAP Machine MISC by Does not apply route Pressure of 13 (AirSense 10)  [employer] [facility]        ferrous sulfate 325 (65 FE) MG EC tablet Take 325 mg by mouth daily (with breakfast)        Multiple Vitamin (MULTI VITAMIN DAILY) TABS Take by mouth daily        nitroGLYCERIN (NITROSTAT) 0.4 MG SL tablet Place 0.4 mg under the tongue every 5 minutes as needed for Chest pain Dissolve 1 tab under tongue at first sign of chest pain. May repeat every 5 minutes until relief is obtained. If pain persists after taking 3 tabs in a 15-minute period, or the pain is different than is typically experienced, call 9-1-1 immediately.  gabapentin (NEURONTIN) 300 MG capsule TAKE 2 CAPSULES TWICE DAILY 360 capsule 3      No facility-administered medications prior to visit.              Past Medical History        Past Medical History:   Diagnosis Date    Arthritis      Asthma      CAD (coronary artery disease) 46    Cancer ([facility] [address] 1971     cervical    Carotid artery disease ([facility] [address]      Cataracts, bilateral      COPD (chronic obstructive pulmonary disease) (HCC)      Coronary artery disease      Eczema      No orders of the defined types were placed in this encounter. No orders of the defined types were placed in this encounter. No follow-ups on file. The patient expressed understanding of the above assessment and plan. Totaltime spent face to face with patient was 30 minutes inwhich  50% or more of the time was spent in counseling, education about risk andbenefits of the above plan, and coordination of care. Leisa Robison MD   Physician   Physical Medicine and Rehab   Progress Notes      Signed   Encounter Date:  6/20/2019          Related encounter: Office Visit from 6/20/2019 in [facility]         Signed        Expand All Collapse All         Show:Clear all  [x]Manual[x]Template[]Copied    Added by:  [x]Alfredo Willis MD      []Tone for details      PAIN MANAGEMENT FOLLOW-UP NOTE  6/20/19     CHIEF COMPLAINT: This is a63 y.o. female patientwho returns to the Pain Management Clinic with a history of Back Pain and Leg Pain (the patient stated that she gets spasms in her calves and her feet/toes)        BEL Gonsales returns today for  reevaluation. Since the visit, the patient reports that the pain is not changed. Pain in R back does not respond for more than 3 weeks  With TFEs R L2,3,4      Location: Back  Location Modifier: entire back  Severity of Pain: 9  Duration of Pain: Persistent  Frequency of Pain: Constant  Aggravating Factors: bending sideways  Limiting Behavior: Standing   Relieving Factors: relaxation           Previous pain medication trials have included:                      Mental health:               Patient feels - secondary to their current pain problems as described above. H/O depression and anxiety: No              Patient is not seeing psychologist orpsychiatrist              Abuse history? No     Employed? No     ANALGESIA:   Are your Current Pain medication (s) helping to decrease pain?   No.   Current Pain score: Pain Score:   5     ADVERSE AFFECTS:   Medication Side Effects: No.     ACTIVITY:  Are you able to be more active with your pain medications? No      ABERRANT BEHAVIORS SINCE LAST VISIT? No     Review of Systems   Constitutional: Positive for fatigue. HENT: Negative. Eyes: Negative. Respiratory: Negative. Cardiovascular: Negative. Gastrointestinal: Negative for constipation and diarrhea. Endocrine: Negative. Genitourinary: Negative for difficulty urinating and flank pain. Musculoskeletal: Positive for back pain and myalgias. Skin: Negative. Allergic/Immunologic: Negative. Neurological: Positive for weakness and numbness. Hematological: Negative. Psychiatric/Behavioral: Positive for sleep disturbance.               Allergies   Allergen Reactions    Seasonal           Medications Prior to Visit          Outpatient Medications Prior to Visit   Medication Sig Dispense Refill    vitamin C (ASCORBIC ACID) 500 MG tablet TAKE 1 TABLET BY MOUTH TWICE DAILY 180 tablet 3    albuterol (PROVENTIL) (2.5 MG/3ML) 0.083% nebulizer solution Take 3 mLs by nebulization every 6 hours as needed for Wheezing 120 each 3    PRODIGY NO CODING BLOOD GLUC strip TEST FOUR TIMES DAILY AS DIRECTED 400 strip 3    Diabetic Shoe MISC Dispense #1 pair 2 each 0    ondansetron (ZOFRAN) 4 MG tablet Take 1 tablet by mouth every 8 hours as needed for Nausea or Vomiting 30 tablet 3    magnesium oxide (MAGNESIUM-OXIDE) 400 (241.3 Mg) MG TABS tablet Take 1 tablet by mouth 2 times daily 60 tablet 11    ibuprofen (ADVIL;MOTRIN) 400 MG tablet TAKE 1 TABLET EVERY 6 HOURS AS NEEDED FOR PAIN 360 tablet 3    furosemide (LASIX) 40 MG tablet Take 1 tablet by mouth daily 90 tablet 3    umeclidinium-vilanterol (ANORO ELLIPTA) 62.5-25 MCG/INH AEPB inhaler INHALE 1 PUFF INTO LUNGS EVERY DAY 3 each 3    clopidogrel (PLAVIX) 75 MG tablet Take 1 tablet by mouth daily 90 tablet 3    potassium chloride (MICRO-K) 10 MEQ extended [facility] Glaucoma Mother      Early Death Father      Cancer Father           stomach    Miscarriages / Stillbirths Maternal Uncle           Social History   Social History            Socioeconomic History    Marital status:        Spouse name: None    Number of children: None    Years of education: None    Highest education level: None   Occupational History    None   Social Needs    Financial resource strain: None    Food insecurity:       Worry: None       Inability: None    Transportation needs:       Medical: None       Non-medical: None   Tobacco Use    Smoking status: Former Smoker       Packs/day: 1.00       Years: 35.00       Pack years: 35.00       Last attempt to quit: 2001       Years since quittin.7    Smokeless tobacco: Never Used   Substance and Sexual Activity    Alcohol use: No    Drug use: No    Sexual activity: Never   Lifestyle    Physical activity:       Days per week: None       Minutes per session: None    Stress: None   Relationships    Social connections:       Talks on phone: None       Gets together: None       Attends Church service: None       Active member of club or organization: None       Attends meetings of clubs or organizations: None       Relationship status: None    Intimate partner violence:       Fear of current or ex partner: None       Emotionally abused: None       Physically abused: None       Forced sexual activity: None   Other Topics Concern    None   Social History Narrative    None               Family and Social Historyreviewed in the electronic medical record. Imaging:Reviewed available imaging in our system with the patient. No results found.      Objective:      Physical Exam:  Vitals       Vitals:     19 1557   BP: 126/72   Site: Right Upper Arm   Position: Sitting   Cuff Size: Large Adult   Pulse: 68   Weight: 260 lb (117.9 kg)   Height: 5' 4\" (1.626 m)         Pain Score:   5     Physical Exam   Constitutional:

## 2019-07-15 ENCOUNTER — OFFICE VISIT (OUTPATIENT)
Dept: OBGYN | Age: 69
End: 2019-07-15
Payer: MEDICARE

## 2019-07-15 ENCOUNTER — HOSPITAL ENCOUNTER (OUTPATIENT)
Age: 69
Setting detail: SPECIMEN
Discharge: HOME | End: 2019-07-15
Payer: MEDICARE

## 2019-07-15 VITALS
BODY MASS INDEX: 45.24 KG/M2 | HEIGHT: 64 IN | WEIGHT: 265 LBS | SYSTOLIC BLOOD PRESSURE: 112 MMHG | HEART RATE: 52 BPM | DIASTOLIC BLOOD PRESSURE: 78 MMHG

## 2019-07-15 DIAGNOSIS — Z12.31 OTHER SCREENING MAMMOGRAM: ICD-10-CM

## 2019-07-15 DIAGNOSIS — Z01.419 WELL FEMALE EXAM WITH ROUTINE GYNECOLOGICAL EXAM: ICD-10-CM

## 2019-07-15 DIAGNOSIS — Z12.72 SCREENING FOR VAGINAL CANCER: ICD-10-CM

## 2019-07-15 DIAGNOSIS — K62.5 RECTAL BLEEDING: Primary | ICD-10-CM

## 2019-07-15 PROCEDURE — 3017F COLORECTAL CA SCREEN DOC REV: CPT | Performed by: NURSE PRACTITIONER

## 2019-07-15 PROCEDURE — G8417 CALC BMI ABV UP PARAM F/U: HCPCS | Performed by: NURSE PRACTITIONER

## 2019-07-15 PROCEDURE — G0145 SCR C/V CYTO,THINLAYER,RESCR: HCPCS

## 2019-07-15 PROCEDURE — 1123F ACP DISCUSS/DSCN MKR DOCD: CPT | Performed by: NURSE PRACTITIONER

## 2019-07-15 PROCEDURE — 1036F TOBACCO NON-USER: CPT | Performed by: NURSE PRACTITIONER

## 2019-07-15 PROCEDURE — G8598 ASA/ANTIPLAT THER USED: HCPCS | Performed by: NURSE PRACTITIONER

## 2019-07-15 PROCEDURE — 99214 OFFICE O/P EST MOD 30 MIN: CPT | Performed by: NURSE PRACTITIONER

## 2019-07-15 PROCEDURE — G0101 CA SCREEN;PELVIC/BREAST EXAM: HCPCS | Performed by: NURSE PRACTITIONER

## 2019-07-15 PROCEDURE — G8400 PT W/DXA NO RESULTS DOC: HCPCS | Performed by: NURSE PRACTITIONER

## 2019-07-15 PROCEDURE — 3288F FALL RISK ASSESSMENT DOCD: CPT | Performed by: NURSE PRACTITIONER

## 2019-07-15 PROCEDURE — 0518F FALL PLAN OF CARE DOCD: CPT | Performed by: NURSE PRACTITIONER

## 2019-07-15 PROCEDURE — 1090F PRES/ABSN URINE INCON ASSESS: CPT | Performed by: NURSE PRACTITIONER

## 2019-07-15 PROCEDURE — 4040F PNEUMOC VAC/ADMIN/RCVD: CPT | Performed by: NURSE PRACTITIONER

## 2019-07-15 PROCEDURE — G8427 DOCREV CUR MEDS BY ELIG CLIN: HCPCS | Performed by: NURSE PRACTITIONER

## 2019-07-15 NOTE — PROGRESS NOTES
Financial resource strain: Not on file    Food insecurity:     Worry: Not on file     Inability: Not on file    Transportation needs:     Medical: Not on file     Non-medical: Not on file   Tobacco Use    Smoking status: Former Smoker     Packs/day: 1.00     Years: 35.00     Pack years: 35.00     Last attempt to quit: 2001     Years since quittin.8    Smokeless tobacco: Never Used   Substance and Sexual Activity    Alcohol use: No    Drug use: No    Sexual activity: Never   Lifestyle    Physical activity:     Days per week: Not on file     Minutes per session: Not on file    Stress: Not on file   Relationships    Social connections:     Talks on phone: Not on file     Gets together: Not on file     Attends Yarsani service: Not on file     Active member of club or organization: Not on file     Attends meetings of clubs or organizations: Not on file     Relationship status: Not on file    Intimate partner violence:     Fear of current or ex partner: Not on file     Emotionally abused: Not on file     Physically abused: Not on file     Forced sexual activity: Not on file   Other Topics Concern    Not on file   Social History Narrative    Not on file       MEDICATIONS:  Current Outpatient Medications   Medication Sig Dispense Refill    vitamin C (ASCORBIC ACID) 500 MG tablet TAKE 1 TABLET BY MOUTH TWICE DAILY 180 tablet 3    albuterol (PROVENTIL) (2.5 MG/3ML) 0.083% nebulizer solution Take 3 mLs by nebulization every 6 hours as needed for Wheezing 120 each 3    PRODIGY NO CODING BLOOD GLUC strip TEST FOUR TIMES DAILY AS DIRECTED 400 strip 3    Diabetic Shoe MISC Dispense #1 pair 2 each 0    ondansetron (ZOFRAN) 4 MG tablet Take 1 tablet by mouth every 8 hours as needed for Nausea or Vomiting 30 tablet 3    magnesium oxide (MAGNESIUM-OXIDE) 400 (241.3 Mg) MG TABS tablet Take 1 tablet by mouth 2 times daily 60 tablet 11    ibuprofen (ADVIL;MOTRIN) 400 MG tablet TAKE 1 TABLET EVERY 6 HOURS AS NEEDED FOR PAIN 360 tablet 3    furosemide (LASIX) 40 MG tablet Take 1 tablet by mouth daily 90 tablet 3    umeclidinium-vilanterol (ANORO ELLIPTA) 62.5-25 MCG/INH AEPB inhaler INHALE 1 PUFF INTO LUNGS EVERY DAY 3 each 3    clopidogrel (PLAVIX) 75 MG tablet Take 1 tablet by mouth daily 90 tablet 3    potassium chloride (MICRO-K) 10 MEQ extended release capsule TAKE 1 CAPSULE TWICE DAILY 180 capsule 3    atorvastatin (LIPITOR) 40 MG tablet Take 1 tablet by mouth daily 90 tablet 2    alendronate (FOSAMAX) 70 MG tablet Take 1 tablet by mouth every 7 days 12 tablet 3    pantoprazole (PROTONIX) 40 MG tablet TAKE 1 TABLET EVERY DAY 90 tablet 3    lisinopril (PRINIVIL;ZESTRIL) 10 MG tablet TAKE 1 TABLET EVERY DAY 90 tablet 3    amLODIPine (NORVASC) 5 MG tablet TAKE 1 TABLET EVERY DAY 90 tablet 3    citalopram (CELEXA) 10 MG tablet TAKE 1 TABLET EVERY DAY 90 tablet 3    buPROPion (WELLBUTRIN) 75 MG tablet TAKE 1 TABLET TWICE DAILY 180 tablet 3    amitriptyline (ELAVIL) 25 MG tablet take 1 tablet by mouth at bedtime 90 tablet 3    spironolactone (ALDACTONE) 50 MG tablet TAKE 1 TABLET TWICE DAILY 180 tablet 3    albuterol sulfate HFA (VENTOLIN HFA) 108 (90 Base) MCG/ACT inhaler Inhale 2 puffs into the lungs every 4 hours as needed for Wheezing 1 Inhaler 5    Insulin Syringe-Needle U-100 (INSULIN SYRINGE .5CC/31GX5/16\") 31G X 5/16\" 0.5 ML MISC 1 each by Does not apply route daily 200 each 3    Cholecalciferol (VITAMIN D3) 1000 units TABS TAKE 3 TABLETS BY MOUTH TWICE DAILY 540 tablet 3    senna (SENOKOT) 8.6 MG TABS tablet TAKE 1 TABLET BY MOUTH TWICE DAILY 180 tablet 3    triamcinolone (KENALOG) 0.1 % cream APPLY TOPICALLY TWICE DAILY 80 g 5    B-D ULTRAFINE III SHORT PEN 31G X 8 MM MISC USE TWICE DAILY 180 each 3    NOVOLOG FLEXPEN 100 UNIT/ML injection pen INJECT 4 UNITS AT LUNCH AND  6 UNITS AT SUPPER (EACH PEN EXPIRES 28 DAYS AFTER 1ST USE) 15 mL 3    fluticasone (FLONASE) 50 MCG/ACT nasal spray USE 2 SPRAYS NASALLY EVERY DAY 48 g 3    loratadine (CLARITIN) 10 MG tablet Take 10 mg by mouth daily as needed      nystatin (MYCOSTATIN) 320285 UNIT/GM powder Apply 3 times daily.  60 g 5    VITAMIN A PO Take 2,400 mcg by mouth daily      PRODIGY NO CODING BLOOD GLUC strip TEST FOUR TIMES DAILY AS DIRECTED 400 strip 3    PRODIGY TWIST TOP LANCETS 28G MISC USE AS DIRECTED FOUR TIMES DAILY 400 each 3    vitamin E 400 UNIT capsule Take 400 Units by mouth daily      COMBIGAN 0.2-0.5 % ophthalmic solution PLACE 1 DROP INTO BOTH EYES DAILY 15 mL 3    Lancet Devices (PRODIGY LANCING DEVICE) MISC USE TO TEST BLOOD SUGAR FOUR TIMES DAILY AS DIRECTED 1 each 0    Compression Stockings MISC by Does not apply route 15-20mmHg  Knee high  Open tow  With assist device to help put them on 1 each 0    Zinc 50 MG TABS Take 50 mg by mouth daily      Diabetic Shoe MISC by Does not apply route 1 each 0    niacin 500 MG extended release capsule Take 500 mg by mouth daily      folic acid (FOLVITE) 068 MCG tablet Take 800 mcg by mouth daily      Cyanocobalamin (VITAMIN B12 PO) Take 2,500 mcg by mouth daily      B Complex-C (SUPER B COMPLEX PO) Take by mouth daily      travoprost, benzalkonium, (TRAVATAN) 0.004 % ophthalmic solution Place 1 drop into both eyes daily 3 Bottle 3    insulin glargine (LANTUS) 100 UNIT/ML injection vial Inject 10 Units into the skin nightly 3 vial 3    CPAP Machine MISC by Does not apply route Pressure of 13 (AirSense 10)  [employer]      ferrous sulfate 325 (65 FE) MG EC tablet Take 325 mg by mouth daily (with breakfast)      Multiple Vitamin (MULTI VITAMIN DAILY) TABS Take by mouth daily      gabapentin (NEURONTIN) 300 MG capsule TAKE 2 CAPSULES TWICE DAILY 360 capsule 3    Omega-3 Fatty Acids (FISH OIL) 1200 MG CAPS Take 1,200 mg by mouth daily       nitroGLYCERIN (NITROSTAT) 0.4 MG SL tablet Place 0.4 mg under the tongue every 5 minutes as needed for Chest pain Dissolve 1 tab under ischemic attack)    Chronic tension headache    Morbid obesity with BMI of 45.0-49.9, adult (HCC)    Chronic right-sided low back pain with right-sided sciatica    Lumbar radiculopathy    Encounter for chronic pain management    Carotid stenosis, asymptomatic, bilateral    Chronic tension-type headache, not intractable    Lumbosacral spondylosis without myelopathy    DDD (degenerative disc disease), lumbar    Acquired spondylolisthesis    Lumbar facet joint syndrome          Hereditary Breast, Ovarian, Colon and Uterine Cancer screening Done. Tobacco & Secondary smoke risks reviewed; instructed on cessation and avoidance    PLAN:  Return in about 1 year (around 7/15/2020) for Annual Exam.  Return for annual exams  Mammograms every 1 year. If 33 yo and last mammogram was negative. Routine health maintenance per patients PCP. Orders Placed This Encounter   Procedures    MASOOD DIGITAL SCREEN W CAD BILATERAL     Standing Status:   Future     Standing Expiration Date:   1/15/2021     Scheduling Instructions: On the day of the exam, the patient should not wear deodorant, lotion, or powder on the breast or underarm area. Wear a two piece outfit and be prepared to give information about prior breast procedures including mammograms, biopsies, or surgeries. If you've had mammograms done elsewhere, please request any previous mammogram films from those organizations prior to your appointment. Order Specific Question:   Reason for exam:     Answer:   SCREENING MAMMOGRAM    PAP SMEAR     Standing Status:   Future     Number of Occurrences:   1     Standing Expiration Date:   9/15/2019     Order Specific Question:   Collection Type     Answer: Thin Prep     Order Specific Question:   Prior Abnormal Pap Test     Answer:   No     Order Specific Question:   Screening or Diagnostic     Answer:   Screening     Order Specific Question:   HPV Requested?      Answer:   Yes - If Abnormal Reflex

## 2019-07-18 LAB — CYTOLOGY REPORT: NORMAL

## 2019-07-21 NOTE — PRE SEDATION
Sedation Pre-Procedure Note    Patient Name: Vern Wright   YOB: 1950  Room/Bed: [facility]  Medical Record Number: [id number]  Date: 2019   Time: 9:58 AM       Indication:  R Diagnostic L2,3,4,5, MBB     Consent: I have discussed with the patient and/or the patient representative the indication, alternatives, and the possible risks and/or complications of the planned procedure and the anesthesia methods. The patient and/or patient representative appear to understand and agree to proceed. Vital Signs:   Vitals:    19 0935   BP: (!) 122/58   Pulse: 55   Resp: 16   Temp: 97.2 °F (36.2 °C)   SpO2: 96%       Past Medical History:   has a past medical history of Arthritis, Asthma, CAD (coronary artery disease), Cancer ([address] Carotid artery disease ([address] Cataracts, bilateral, COPD (chronic obstructive pulmonary disease) ([address] Coronary artery disease, Eczema, Fibromyalgia, Glaucoma, H/O blood clots, Headache, Heart attack ([address] Hx of Bell's palsy, Hypertension, Liver disease, Mild dementia, Neuropathy, Osteoporosis, Sleep apnea, Tremor, and Type 2 diabetes mellitus with hyperglycemia ([address] Past Surgical History:   has a past surgical history that includes Appendectomy (); Induced  (); Tonsillectomy (); Hysterectomy (); Bariatric Surgery (); Colonoscopy (2012); pr exc skin benig 1.1-2cm face,facial (Left, 2018); pr inject anes/steroid foramen lumbar/sacral w img guide ,ea add level (Right, 10/5/2018); pr inject anes/steroid foramen lumbar/sacral w img guide ,ea add level (Right, 10/26/2018); Lumbar spine surgery (Right, 2019); Coronary angioplasty with stent; Lumbar spine surgery (Right, 2019); and Lumbar spine surgery (Right, 2019).     Medications:   Scheduled Meds:   Continuous Infusions:    lactated ringers 100 mL/hr at 19 0946     PRN Meds:   Home Meds:   Prior to Admission medications    Medication Sig Start Date End Date No

## 2019-07-22 ENCOUNTER — HOSPITAL ENCOUNTER (OUTPATIENT)
Dept: LAB | Age: 69
Discharge: HOME | End: 2019-07-22
Payer: MEDICARE

## 2019-07-22 DIAGNOSIS — I25.83 CORONARY ARTERY DISEASE DUE TO LIPID RICH PLAQUE: ICD-10-CM

## 2019-07-22 DIAGNOSIS — Z79.4 TYPE 2 DIABETES MELLITUS WITH DIABETIC POLYNEUROPATHY, WITH LONG-TERM CURRENT USE OF INSULIN (HCC): ICD-10-CM

## 2019-07-22 DIAGNOSIS — I25.10 CORONARY ARTERY DISEASE DUE TO LIPID RICH PLAQUE: ICD-10-CM

## 2019-07-22 DIAGNOSIS — E55.9 VITAMIN D DEFICIENCY: ICD-10-CM

## 2019-07-22 DIAGNOSIS — R53.83 FATIGUE, UNSPECIFIED TYPE: ICD-10-CM

## 2019-07-22 DIAGNOSIS — E11.42 TYPE 2 DIABETES MELLITUS WITH DIABETIC POLYNEUROPATHY, WITH LONG-TERM CURRENT USE OF INSULIN (HCC): ICD-10-CM

## 2019-07-22 LAB
ALBUMIN SERPL-MCNC: 3.9 G/DL (ref 3.5–5.2)
ALBUMIN/GLOBULIN RATIO: 1.2 (ref 1–2.5)
ALP BLD-CCNC: 59 U/L (ref 35–104)
ALT SERPL-CCNC: 33 U/L (ref 5–33)
ANION GAP SERPL CALCULATED.3IONS-SCNC: 12 MMOL/L (ref 9–17)
AST SERPL-CCNC: 34 U/L
BILIRUB SERPL-MCNC: 0.44 MG/DL (ref 0.3–1.2)
BUN BLDV-MCNC: 15 MG/DL (ref 8–23)
BUN/CREAT BLD: 20 (ref 9–20)
CALCIUM SERPL-MCNC: 9 MG/DL (ref 8.6–10.4)
CHLORIDE BLD-SCNC: 101 MMOL/L (ref 98–107)
CHOLESTEROL/HDL RATIO: 2.1
CHOLESTEROL: 115 MG/DL
CO2: 25 MMOL/L (ref 20–31)
CREAT SERPL-MCNC: 0.75 MG/DL (ref 0.5–0.9)
ESTIMATED AVERAGE GLUCOSE: 163 MG/DL
GFR AFRICAN AMERICAN: >60 ML/MIN
GFR NON-AFRICAN AMERICAN: >60 ML/MIN
GFR SERPL CREATININE-BSD FRML MDRD: ABNORMAL ML/MIN/{1.73_M2}
GFR SERPL CREATININE-BSD FRML MDRD: ABNORMAL ML/MIN/{1.73_M2}
GLUCOSE BLD-MCNC: 182 MG/DL (ref 70–99)
HBA1C MFR BLD: 7.3 % (ref 4.8–5.9)
HDLC SERPL-MCNC: 54 MG/DL
HEMOGLOBIN: 12.2 G/DL (ref 12–16)
IRON SATURATION: 34 % (ref 20–55)
IRON: 102 UG/DL (ref 37–145)
LDL CHOLESTEROL: 45 MG/DL (ref 0–130)
POTASSIUM SERPL-SCNC: 4.3 MMOL/L (ref 3.7–5.3)
SODIUM BLD-SCNC: 138 MMOL/L (ref 135–144)
THYROXINE, FREE: 1.11 NG/DL (ref 0.93–1.7)
TOTAL IRON BINDING CAPACITY: 303 UG/DL (ref 250–450)
TOTAL PROTEIN: 7.2 G/DL (ref 6.4–8.3)
TRIGL SERPL-MCNC: 81 MG/DL
TSH SERPL DL<=0.05 MIU/L-ACNC: 1.37 MIU/L (ref 0.3–5)
UNSATURATED IRON BINDING CAPACITY: 201 UG/DL (ref 112–347)
VITAMIN B-12: 1467 PG/ML (ref 232–1245)
VITAMIN D 25-HYDROXY: 53.5 NG/ML (ref 30–100)
VLDLC SERPL CALC-MCNC: NORMAL MG/DL (ref 1–30)

## 2019-07-22 PROCEDURE — 83550 IRON BINDING TEST: CPT

## 2019-07-22 PROCEDURE — 83540 ASSAY OF IRON: CPT

## 2019-07-22 PROCEDURE — 85018 HEMOGLOBIN: CPT

## 2019-07-22 PROCEDURE — 80053 COMPREHEN METABOLIC PANEL: CPT

## 2019-07-22 PROCEDURE — 84439 ASSAY OF FREE THYROXINE: CPT

## 2019-07-22 PROCEDURE — 84443 ASSAY THYROID STIM HORMONE: CPT

## 2019-07-22 PROCEDURE — 82607 VITAMIN B-12: CPT

## 2019-07-22 PROCEDURE — 36415 COLL VENOUS BLD VENIPUNCTURE: CPT

## 2019-07-22 PROCEDURE — 83036 HEMOGLOBIN GLYCOSYLATED A1C: CPT

## 2019-07-22 PROCEDURE — 80061 LIPID PANEL: CPT

## 2019-07-22 PROCEDURE — 82306 VITAMIN D 25 HYDROXY: CPT

## 2019-07-23 DIAGNOSIS — Z79.4 TYPE 2 DIABETES MELLITUS WITH DIABETIC POLYNEUROPATHY, WITH LONG-TERM CURRENT USE OF INSULIN (HCC): ICD-10-CM

## 2019-07-23 DIAGNOSIS — E11.42 TYPE 2 DIABETES MELLITUS WITH DIABETIC POLYNEUROPATHY, WITH LONG-TERM CURRENT USE OF INSULIN (HCC): ICD-10-CM

## 2019-07-23 RX ORDER — PEN NEEDLE, DIABETIC 31 GX5/16"
NEEDLE, DISPOSABLE MISCELLANEOUS
Qty: 180 EACH | Refills: 3 | Status: SHIPPED | OUTPATIENT
Start: 2019-07-23 | End: 2020-04-09 | Stop reason: SDUPTHER

## 2019-07-23 RX ORDER — ALBUTEROL SULFATE 90 UG/1
2 AEROSOL, METERED RESPIRATORY (INHALATION) EVERY 4 HOURS PRN
Qty: 3 INHALER | Refills: 3 | Status: SHIPPED | OUTPATIENT
Start: 2019-07-23 | End: 2020-02-26

## 2019-07-24 RX ORDER — LANCETS 28 GAUGE
EACH MISCELLANEOUS
Qty: 400 EACH | Refills: 3 | Status: SHIPPED | OUTPATIENT
Start: 2019-07-24 | End: 2019-08-26 | Stop reason: ALTCHOICE

## 2019-07-26 ENCOUNTER — HOSPITAL ENCOUNTER (OUTPATIENT)
Age: 69
Setting detail: OUTPATIENT SURGERY
Discharge: HOME | End: 2019-07-26
Attending: PHYSICAL MEDICINE & REHABILITATION | Admitting: PHYSICAL MEDICINE & REHABILITATION
Payer: MEDICARE

## 2019-07-26 ENCOUNTER — APPOINTMENT (OUTPATIENT)
Dept: GENERAL RADIOLOGY | Age: 69
End: 2019-07-26
Attending: PHYSICAL MEDICINE & REHABILITATION
Payer: MEDICARE

## 2019-07-26 VITALS
RESPIRATION RATE: 16 BRPM | TEMPERATURE: 97.3 F | HEIGHT: 64 IN | HEART RATE: 51 BPM | WEIGHT: 264.4 LBS | BODY MASS INDEX: 45.14 KG/M2 | DIASTOLIC BLOOD PRESSURE: 46 MMHG | OXYGEN SATURATION: 97 % | SYSTOLIC BLOOD PRESSURE: 125 MMHG

## 2019-07-26 LAB — GLUCOSE BLD-MCNC: 141 MG/DL (ref 65–105)

## 2019-07-26 PROCEDURE — 99152 MOD SED SAME PHYS/QHP 5/>YRS: CPT | Performed by: PHYSICAL MEDICINE & REHABILITATION

## 2019-07-26 PROCEDURE — 3600000057 HC PAIN LEVEL 4 ADDL 15 MIN: Performed by: PHYSICAL MEDICINE & REHABILITATION

## 2019-07-26 PROCEDURE — 2500000003 HC RX 250 WO HCPCS: Performed by: PHYSICAL MEDICINE & REHABILITATION

## 2019-07-26 PROCEDURE — 7100000010 HC PHASE II RECOVERY - FIRST 15 MIN: Performed by: PHYSICAL MEDICINE & REHABILITATION

## 2019-07-26 PROCEDURE — 2709999900 HC NON-CHARGEABLE SUPPLY: Performed by: PHYSICAL MEDICINE & REHABILITATION

## 2019-07-26 PROCEDURE — 3209999900 FLUORO FOR SURGICAL PROCEDURES

## 2019-07-26 PROCEDURE — 6360000002 HC RX W HCPCS: Performed by: PHYSICAL MEDICINE & REHABILITATION

## 2019-07-26 PROCEDURE — 2580000003 HC RX 258: Performed by: PHYSICAL MEDICINE & REHABILITATION

## 2019-07-26 PROCEDURE — 3600000056 HC PAIN LEVEL 4 BASE: Performed by: PHYSICAL MEDICINE & REHABILITATION

## 2019-07-26 PROCEDURE — 82947 ASSAY GLUCOSE BLOOD QUANT: CPT

## 2019-07-26 PROCEDURE — 64494 INJ PARAVERT F JNT L/S 2 LEV: CPT | Performed by: PHYSICAL MEDICINE & REHABILITATION

## 2019-07-26 PROCEDURE — 6360000004 HC RX CONTRAST MEDICATION: Performed by: PHYSICAL MEDICINE & REHABILITATION

## 2019-07-26 PROCEDURE — 64493 INJ PARAVERT F JNT L/S 1 LEV: CPT | Performed by: PHYSICAL MEDICINE & REHABILITATION

## 2019-07-26 PROCEDURE — 7100000011 HC PHASE II RECOVERY - ADDTL 15 MIN: Performed by: PHYSICAL MEDICINE & REHABILITATION

## 2019-07-26 PROCEDURE — 64495 INJ PARAVERT F JNT L/S 3 LEV: CPT | Performed by: PHYSICAL MEDICINE & REHABILITATION

## 2019-07-26 RX ORDER — FENTANYL CITRATE 50 UG/ML
INJECTION, SOLUTION INTRAMUSCULAR; INTRAVENOUS PRN
Status: DISCONTINUED | OUTPATIENT
Start: 2019-07-26 | End: 2019-07-26 | Stop reason: ALTCHOICE

## 2019-07-26 RX ORDER — SODIUM CHLORIDE 0.9 % (FLUSH) 0.9 %
10 SYRINGE (ML) INJECTION EVERY 12 HOURS SCHEDULED
Status: DISCONTINUED | OUTPATIENT
Start: 2019-07-26 | End: 2019-07-26 | Stop reason: HOSPADM

## 2019-07-26 RX ORDER — SODIUM CHLORIDE, SODIUM LACTATE, POTASSIUM CHLORIDE, CALCIUM CHLORIDE 600; 310; 30; 20 MG/100ML; MG/100ML; MG/100ML; MG/100ML
INJECTION, SOLUTION INTRAVENOUS CONTINUOUS
Status: DISCONTINUED | OUTPATIENT
Start: 2019-07-26 | End: 2019-07-26

## 2019-07-26 RX ORDER — MIDAZOLAM HYDROCHLORIDE 1 MG/ML
INJECTION INTRAMUSCULAR; INTRAVENOUS PRN
Status: DISCONTINUED | OUTPATIENT
Start: 2019-07-26 | End: 2019-07-26 | Stop reason: ALTCHOICE

## 2019-07-26 RX ORDER — SODIUM CHLORIDE, SODIUM LACTATE, POTASSIUM CHLORIDE, CALCIUM CHLORIDE 600; 310; 30; 20 MG/100ML; MG/100ML; MG/100ML; MG/100ML
INJECTION, SOLUTION INTRAVENOUS CONTINUOUS
Status: DISCONTINUED | OUTPATIENT
Start: 2019-07-26 | End: 2019-07-26 | Stop reason: HOSPADM

## 2019-07-26 RX ORDER — BUPIVACAINE HYDROCHLORIDE 7.5 MG/ML
INJECTION, SOLUTION EPIDURAL; RETROBULBAR PRN
Status: DISCONTINUED | OUTPATIENT
Start: 2019-07-26 | End: 2019-07-26 | Stop reason: ALTCHOICE

## 2019-07-26 RX ORDER — SODIUM CHLORIDE 0.9 % (FLUSH) 0.9 %
10 SYRINGE (ML) INJECTION PRN
Status: DISCONTINUED | OUTPATIENT
Start: 2019-07-26 | End: 2019-07-26 | Stop reason: HOSPADM

## 2019-07-26 RX ADMIN — SODIUM CHLORIDE, POTASSIUM CHLORIDE, SODIUM LACTATE AND CALCIUM CHLORIDE: 600; 310; 30; 20 INJECTION, SOLUTION INTRAVENOUS at 08:51

## 2019-07-26 ASSESSMENT — PAIN SCALES - GENERAL
PAINLEVEL_OUTOF10: 0
PAINLEVEL_OUTOF10: 0

## 2019-07-26 ASSESSMENT — PAIN DESCRIPTION - DESCRIPTORS: DESCRIPTORS: ACHING;DULL

## 2019-07-26 ASSESSMENT — PAIN - FUNCTIONAL ASSESSMENT: PAIN_FUNCTIONAL_ASSESSMENT: 0-10

## 2019-07-26 NOTE — INTERVAL H&P NOTE
apply route Pressure of 13 (AirSense 10)  [employer]      ferrous sulfate 325 (65 FE) MG EC tablet Take 325 mg by mouth daily (with breakfast)      Multiple Vitamin (MULTI VITAMIN DAILY) TABS Take by mouth daily         The patient understands the planned operation and its associated risks and benefits and agrees to proceed.         Electronically signed by Arminda Garcia MD on 7/26/2019 at 9:31 AM

## 2019-07-26 NOTE — OP NOTE
perform the following ADL's: ambulating and grooming     Pain Assessment: 0-10  Pain Level: 5     Pain Orientation: Right  Pain Location: Back  Pain Descriptors: Aching, Dull    Last Plain films: 2019    EXAMINATION:  right L2, L3, L4, L5 medial branch blocks. CONSENT:  Written consent was obtained from the patient on preprinted consent form after explaining the procedure, indications, potential complications and outcomes. Alternative treatments were also discussed. DISCUSSION:  The patient was sterilely prepped and draped in the usual fashion in the prone position.  Time out was verified for correct patient, side, level and procedure. SEDATION:   0.25mg of Versed and 25mcg of fentanyl were given IV pre procedurally and during the procedure.  The patient remained fairly sedated throughout the procedure and underwent constant continuous EKG, pulse oximetry and blood pressure monitoring independently by the CRNA/RN as well as by myself. Sedation time was 12 minutes. PROCEDURE:   Under image-intensifier control, 22 gauge needle x 5 inch spinal needles were directed into the right L2, L3, L4, L5 medial branches of the dorsal rami at the target points, according to ARACELI guidelines.  Needle tip positions were confirmed with 0.2 mL of Omnipaque 240 contrast medium. Then, 1mL of equal volumes of 0.75% bupivacaine // 2% lidocaine / and Celestone> were instilled at each site. EBL: no blood loss    SPECIMEN: none    The patient tolerated the procedure well and without complications and was noted to be in stable condition prior to discharge from the procedure center with discharge instructions. IMPRESSIONS:  1.     right L2, L3, L4, L5 medial branch blocks performed uneventfully. 2.      rightL2, L3, L4, L5 medial branch blocks decreased pain to a 2 on 0 to 10 numeric pain scale at 15 and 30 minutes after the procedure. RECOMMENDATIONS:  1.  Complete and return the \"Post-Procedure Pain and Activity

## 2019-07-29 ENCOUNTER — OFFICE VISIT (OUTPATIENT)
Dept: INTERNAL MEDICINE | Age: 69
End: 2019-07-29
Payer: MEDICARE

## 2019-07-29 VITALS
HEART RATE: 60 BPM | WEIGHT: 264 LBS | RESPIRATION RATE: 20 BRPM | SYSTOLIC BLOOD PRESSURE: 110 MMHG | BODY MASS INDEX: 45.07 KG/M2 | HEIGHT: 64 IN | DIASTOLIC BLOOD PRESSURE: 60 MMHG

## 2019-07-29 DIAGNOSIS — E11.42 TYPE 2 DIABETES MELLITUS WITH DIABETIC POLYNEUROPATHY, WITH LONG-TERM CURRENT USE OF INSULIN (HCC): ICD-10-CM

## 2019-07-29 DIAGNOSIS — F32.A DEPRESSION, UNSPECIFIED DEPRESSION TYPE: ICD-10-CM

## 2019-07-29 DIAGNOSIS — R10.84 GENERALIZED ABDOMINAL PAIN: ICD-10-CM

## 2019-07-29 DIAGNOSIS — Z79.4 TYPE 2 DIABETES MELLITUS WITH DIABETIC POLYNEUROPATHY, WITH LONG-TERM CURRENT USE OF INSULIN (HCC): ICD-10-CM

## 2019-07-29 DIAGNOSIS — I25.10 CORONARY ARTERY DISEASE DUE TO LIPID RICH PLAQUE: Primary | ICD-10-CM

## 2019-07-29 DIAGNOSIS — I25.83 CORONARY ARTERY DISEASE DUE TO LIPID RICH PLAQUE: Primary | ICD-10-CM

## 2019-07-29 DIAGNOSIS — R53.82 CHRONIC FATIGUE: ICD-10-CM

## 2019-07-29 PROCEDURE — 2022F DILAT RTA XM EVC RTNOPTHY: CPT | Performed by: INTERNAL MEDICINE

## 2019-07-29 PROCEDURE — 99214 OFFICE O/P EST MOD 30 MIN: CPT | Performed by: INTERNAL MEDICINE

## 2019-07-29 PROCEDURE — 4040F PNEUMOC VAC/ADMIN/RCVD: CPT | Performed by: INTERNAL MEDICINE

## 2019-07-29 PROCEDURE — G8400 PT W/DXA NO RESULTS DOC: HCPCS | Performed by: INTERNAL MEDICINE

## 2019-07-29 PROCEDURE — G8417 CALC BMI ABV UP PARAM F/U: HCPCS | Performed by: INTERNAL MEDICINE

## 2019-07-29 PROCEDURE — G8427 DOCREV CUR MEDS BY ELIG CLIN: HCPCS | Performed by: INTERNAL MEDICINE

## 2019-07-29 PROCEDURE — 1123F ACP DISCUSS/DSCN MKR DOCD: CPT | Performed by: INTERNAL MEDICINE

## 2019-07-29 PROCEDURE — 1036F TOBACCO NON-USER: CPT | Performed by: INTERNAL MEDICINE

## 2019-07-29 PROCEDURE — 3045F PR MOST RECENT HEMOGLOBIN A1C LEVEL 7.0-9.0%: CPT | Performed by: INTERNAL MEDICINE

## 2019-07-29 PROCEDURE — G8598 ASA/ANTIPLAT THER USED: HCPCS | Performed by: INTERNAL MEDICINE

## 2019-07-29 PROCEDURE — 3017F COLORECTAL CA SCREEN DOC REV: CPT | Performed by: INTERNAL MEDICINE

## 2019-07-29 PROCEDURE — 1090F PRES/ABSN URINE INCON ASSESS: CPT | Performed by: INTERNAL MEDICINE

## 2019-07-29 RX ORDER — INSULIN GLARGINE 100 [IU]/ML
10 INJECTION, SOLUTION SUBCUTANEOUS 2 TIMES DAILY
COMMUNITY
End: 2019-08-06 | Stop reason: SDUPTHER

## 2019-07-29 ASSESSMENT — ENCOUNTER SYMPTOMS
VOMITING: 0
EYE PAIN: 0
NAUSEA: 0
DIARRHEA: 0
BACK PAIN: 0
BLOOD IN STOOL: 0
COUGH: 0
SHORTNESS OF BREATH: 0
CONSTIPATION: 0
ABDOMINAL PAIN: 0

## 2019-07-29 ASSESSMENT — PATIENT HEALTH QUESTIONNAIRE - PHQ9
SUM OF ALL RESPONSES TO PHQ QUESTIONS 1-9: 0
SUM OF ALL RESPONSES TO PHQ QUESTIONS 1-9: 0
SUM OF ALL RESPONSES TO PHQ9 QUESTIONS 1 & 2: 0
1. LITTLE INTEREST OR PLEASURE IN DOING THINGS: 0
2. FEELING DOWN, DEPRESSED OR HOPELESS: 0

## 2019-07-29 NOTE — PROGRESS NOTES
Eric Mckinleycameron received counseling on the following healthy behaviors: medication adherence  Reviewed prior labs and health maintenance  Continue current medications except where noted below, diet and exercise. Discussed use, benefit, and side effects of prescribed medications. Barriers to medication compliance addressed. Patient given educational materials - see patient instructions  Was a self-tracking handout given in paper form or via [email or web address]? No    Requested Prescriptions      No prescriptions requested or ordered in this encounter       All patient questions answered. Patient voiced understanding. Quality Measures    Body mass index is 45.32 kg/m². Elevated. Weight control planned discussed: healthy diet and regular exercise. BP: 110/60. Blood pressure is normal. Treatment plan consists of: see progress note below. Fall Risk 7/29/2019 1/28/2019 7/23/2018 5/16/2017   2 or more falls in past year? no yes yes no   Fall with injury in past year? no no no no     The patient does not have a history of falls. I did , complete a risk assessment for falls.  A plan of care for falls home safety tips provided    Lab Results   Component Value Date    LDLCALC 80 05/19/2016    LDLCHOLESTEROL 45 07/22/2019    (goal LDL reduction with dx if diabetes is 50% LDL reduction)    PHQ Scores 7/29/2019 1/28/2019 7/23/2018 5/16/2017   PHQ2 Score 0 0 2 1   PHQ9 Score 0 0 2 1     Interpretation of Total Score Depression Severity: 1-4 = Minimal depression, 5-9 = Mild depression, 10-14 = Moderate depression, 15-19 = Moderately severe depression, 20-27 = Severe depression

## 2019-07-29 NOTE — PROGRESS NOTES
[address] [facility] INTERNAL [employer]  [address]  [facility] [address]  Dept: [phone]  Dept Fax: [phone]  Loc: [phone]     Deni Lofton is a 77 y.o. female who presents today for her medical conditions/complaintsas noted below. Deni Lofton is c/o of   Chief Complaint   Patient presents with    Coronary Artery Disease     6 month appt    Diabetes    Fatigue         HPI:     Coronary Artery Disease   Presents for follow-up (CAD Due to lipid rich plaque) visit. Pertinent negatives include no chest pain, chest pressure, dizziness, leg swelling, palpitations or shortness of breath. The symptoms have been stable. Compliance with diet is good. Compliance with exercise is good. Compliance with medications is good. Diabetes   She presents for her follow-up diabetic visit. She has type 2 diabetes mellitus. Her disease course has been fluctuating. Pertinent negatives for hypoglycemia include no confusion, dizziness, headaches, nervousness/anxiousness or pallor. Associated symptoms include fatigue. Pertinent negatives for diabetes include no chest pain, no polydipsia, no polyuria and no weakness. Fatigue   This is a recurrent problem. The current episode started more than 1 month ago. The problem occurs intermittently. The problem has been waxing and waning. Associated symptoms include fatigue. Pertinent negatives include no abdominal pain, arthralgias, chest pain, chills, coughing, fever, headaches, nausea, neck pain, numbness, rash, vomiting or weakness. Other   This is a chronic (4-depression, better now) problem. The current episode started more than 1 month ago. The problem occurs intermittently. The problem has been gradually improving. Associated symptoms include fatigue. Pertinent negatives include no abdominal pain, arthralgias, chest pain, chills, coughing, fever, headaches, nausea, neck pain, numbness, rash, vomiting or weakness.        Hemoglobin A1C (%)   Date 2019    Right L3 & L4 TRANSFORAMINAL performed by Jimmy Mckinley MD at [address] 1.1-2CM FACE,FACIAL Left 2018    Excision Cyst Left Chest, Middle Back performed by Kacy Fletcher MD at [address] [facility] ANES/STEROID FORAMEN LUMBAR/SACRAL [address] [place] ADD LEVEL Right 10/5/2018    Right L2 & L3 TRANSFORAMINAL performed by Jimmy Mckinley MD at [address] [facility] ANES/STEROID FORAMEN LUMBAR/SACRAL [address] [place] ADD LEVEL Right 10/26/2018    Right L2 L3 TRANSFORAMINAL performed by Jimmy Mckinley MD at [address] [facility]    Cervical cancer.   Had XRT    TONSILLECTOMY  1983       Family History   Problem Relation Age of Onset    Heart Disease Mother     High Blood Pressure Mother    [place] He Stroke Mother     Glaucoma Mother     Early Death Father     Cancer Father         stomach    Miscarriages / Stillbirths Maternal Uncle           Social History     Tobacco Use    Smoking status: Former Smoker     Packs/day: 1.00     Years: 35.00     Pack years: 35.00     Last attempt to quit: 2001     Years since quittin.8    Smokeless tobacco: Never Used   Substance Use Topics    Alcohol use: No         Current Outpatient Medications   Medication Sig Dispense Refill    PRODIGY TWIST TOP LANCETS 28G MISC TEST FOUR TIMES DAILY AS DIRECTED 400 each 3    B-D ULTRAFINE III SHORT PEN 31G X 8 MM MISC USE TWICE DAILY 180 each 3    albuterol sulfate HFA (VENTOLIN HFA) 108 (90 Base) MCG/ACT inhaler Inhale 2 puffs into the lungs every 4 hours as needed for Wheezing 3 Inhaler 3    vitamin C (ASCORBIC ACID) 500 MG tablet TAKE 1 TABLET BY MOUTH TWICE DAILY 180 tablet 3    albuterol (PROVENTIL) (2.5 MG/3ML) 0.083% nebulizer solution Take 3 mLs by nebulization every 6 hours as needed for Wheezing 120 each 3    PRODIGY NO CODING BLOOD GLUC strip TEST FOUR TIMES DAILY AS DIRECTED 400 strip 3    Diabetic Shoe MISC Dispense #1 pair 2 each 0    ondansetron (ZOFRAN) 4 MG tablet Take LUNCH AND  6 UNITS AT SUPPER (EACH PEN EXPIRES 28 DAYS AFTER 1ST USE) 15 mL 3    fluticasone (FLONASE) 50 MCG/ACT nasal spray USE 2 SPRAYS NASALLY EVERY DAY 48 g 3    loratadine (CLARITIN) 10 MG tablet Take 10 mg by mouth daily as needed      nystatin (MYCOSTATIN) 894572 UNIT/GM powder Apply 3 times daily. 60 g 5    VITAMIN A PO Take 2,400 mcg by mouth daily      PRODIGY NO CODING BLOOD GLUC strip TEST FOUR TIMES DAILY AS DIRECTED 400 strip 3    vitamin E 400 UNIT capsule Take 400 Units by mouth daily      Omega-3 Fatty Acids (FISH OIL) 1200 MG CAPS Take 1,200 mg by mouth daily       COMBIGAN 0.2-0.5 % ophthalmic solution PLACE 1 DROP INTO BOTH EYES DAILY 15 mL 3    Lancet Devices (PRODIGY LANCING DEVICE) MISC USE TO TEST BLOOD SUGAR FOUR TIMES DAILY AS DIRECTED 1 each 0    Compression Stockings MISC by Does not apply route 15-20mmHg  Knee high  Open tow  With assist device to help put them on 1 each 0    Zinc 50 MG TABS Take 50 mg by mouth daily      Diabetic Shoe MISC by Does not apply route 1 each 0    niacin 500 MG extended release capsule Take 500 mg by mouth daily      folic acid (FOLVITE) 444 MCG tablet Take 800 mcg by mouth daily      Cyanocobalamin (VITAMIN B12 PO) Take 2,500 mcg by mouth daily      B Complex-C (SUPER B COMPLEX PO) Take by mouth daily      travoprost, benzalkonium, (TRAVATAN) 0.004 % ophthalmic solution Place 1 drop into both eyes daily 3 Bottle 3    insulin glargine (LANTUS) 100 UNIT/ML injection vial Inject 10 Units into the skin nightly 3 vial 3    CPAP Machine MISC by Does not apply route Pressure of 13 (AirSense 10)  [employer]      ferrous sulfate 325 (65 FE) MG EC tablet Take 325 mg by mouth daily (with breakfast)      Multiple Vitamin (MULTI VITAMIN DAILY) TABS Take by mouth daily      nitroGLYCERIN (NITROSTAT) 0.4 MG SL tablet Place 0.4 mg under the tongue every 5 minutes as needed for Chest pain Dissolve 1 tab under tongue at first sign of chest pain.  May repeat every 5 minutes until relief is obtained. If pain persists after taking 3 tabs in a 15-minute period, or the pain is different than is typically experienced, call 9-1-1 immediately. No current facility-administered medications for this visit. Allergies   Allergen Reactions    Seasonal        Health Maintenance   Topic Date Due    Hepatitis A vaccine (1 of 2 - Risk 2-dose series) 10/05/1951    Hepatitis B Vaccine (1 of 3 - Risk 3-dose series) 10/05/1967    Diabetic foot exam  03/28/2019    Flu vaccine (1) 09/01/2019    Diabetic microalbuminuria test  01/21/2020    Annual Wellness Visit (AWV)  01/28/2020    A1C test (Diabetic or Prediabetic)  07/22/2020    Lipid screen  07/22/2020    Potassium monitoring  07/22/2020    Creatinine monitoring  07/22/2020    Breast cancer screen  11/19/2020    Diabetic retinal exam  05/03/2021    Colon cancer screen colonoscopy  09/20/2022    DTaP/Tdap/Td vaccine (2 - Td) 06/18/2027    DEXA (modify frequency per FRAX score)  Completed    Shingles Vaccine  Completed    Pneumococcal 65+ years Vaccine  Completed    Hepatitis C screen  Completed       Subjective:      Review of Systems   Constitutional: Positive for fatigue. Negative for chills and fever. HENT: Negative for hearing loss. Eyes: Negative for pain and visual disturbance. Respiratory: Negative for cough and shortness of breath. Cardiovascular: Negative for chest pain, palpitations and leg swelling. Gastrointestinal: Negative for abdominal pain, blood in stool, constipation, diarrhea, nausea and vomiting. Endocrine: Negative for cold intolerance, polydipsia and polyuria. Genitourinary: Negative for difficulty urinating, dysuria and hematuria. Musculoskeletal: Negative for arthralgias, back pain, gait problem and neck pain. Skin: Negative for pallor and rash. Neurological: Negative for dizziness, weakness, numbness and headaches. Hematological: Negative for adenopathy.

## 2019-07-31 ASSESSMENT — ENCOUNTER SYMPTOMS
DIARRHEA: 0
ALLERGIC/IMMUNOLOGIC NEGATIVE: 1
BACK PAIN: 1
EYES NEGATIVE: 1
CONSTIPATION: 0
RESPIRATORY NEGATIVE: 1

## 2019-07-31 NOTE — H&P
SURGERY Right 5/7/2019    Right L2 L3 TRANSFORAMINAL performed by Kimberly Saldana MD at [address] [facility]  Right 6/4/2019    Right L3 & L4 TRANSFORAMINAL performed by Kimberly Saldana MD at [address] [facility] 1.1-2CM FACE,FACIAL Left 2/7/2018    Excision Cyst Left Chest, Middle Back performed by Mateusz Chun MD at [address] [facility] ANES/STEROID FORAMEN LUMBAR/SACRAL [address] [facility] [place] ADD LEVEL Right 10/5/2018    Right L2 & L3 TRANSFORAMINAL performed by Kimberly Saldana MD at [address] [facility] ANES/STEROID FORAMEN LUMBAR/SACRAL  [address] [facility] [place] ADD LEVEL Right 10/26/2018    Right L2 L3 TRANSFORAMINAL performed by Kimberly Saldana MD at [address] [facility]    Cervical cancer. Had XRT   [facility]       Family andSocial History reviewed in the electronic medical record. Imaging: Reviewed available imaging in oursystem with the patient. No results found. Objective:     Vitals:    07/26/19 1001   BP: (!) 125/46   Pulse: 51   Resp: 16   Temp:    SpO2: 97%          Physical Exam  Neurologic Exam  Ortho Exam    Lab Results   Component Value Date    WBC 5.6 04/16/2019    HGB 12.2 07/22/2019    HCT 38.1 04/16/2019     04/16/2019    CHOL 115 07/22/2019    TRIG 81 07/22/2019    HDL 54 07/22/2019    ALT 33 07/22/2019    AST 34 (H) 07/22/2019     07/22/2019    K 4.3 07/22/2019     07/22/2019    CREATININE 0.75 07/22/2019    BUN 15 07/22/2019    CO2 25 07/22/2019    TSH 1.37 07/22/2019    INR 1.1 01/15/2019    LABA1C 7.3 (H) 07/22/2019    LABMICR CANNOT BE CALCULATED 01/21/2019       Assessment:                            Active Hospital Problems    Diagnosis Date Noted    Lumbar facet joint syndrome [M47.816] 06/28/2019    Acquired spondylolisthesis [M43.10] 01/17/2019                  Plan:   Proceed with planned procedure  -     The patientunderstands the planned operation and its associated risks and benefits and agrees to proceed.  The surgical

## 2019-07-31 NOTE — H&P
Subjective:       Patient is here today for a diagnostic/therapeutic injection. 7/31/19    Active Hospital Problems    Diagnosis Date Noted    Lumbar facet joint syndrome [M47.816] 06/28/2019    Acquired spondylolisthesis [M43.10] 01/17/2019       HPI: Patient is here today for adiagnostic/therapeutic injection. The most recent [facility] office visit notes have been reviewed and are unchanged. Review of Systems   Constitutional: Positive for fatigue. HENT: Negative. Eyes: Negative. Respiratory: Negative. Cardiovascular: Negative. Gastrointestinal: Negative for constipation and diarrhea. Endocrine: Negative. Genitourinary: Negative for difficulty urinating and flank pain. Musculoskeletal: Positive for back pain and myalgias. Skin: Negative. Allergic/Immunologic: Negative. Neurological: Positive for weakness and numbness. Hematological: Negative. Psychiatric/Behavioral: Positive for sleep disturbance. Allergies   Allergen Reactions    Seasonal           Prior to Admission medications    Medication Sig Start Date End Date Taking?  Authorizing Provider   PRODIGY TWIST TOP LANCETS 28G MISC TEST FOUR TIMES DAILY AS DIRECTED 7/24/19  Yes Joie Barton MD   B-D ULTRAFINE III SHORT PEN 31G X 8 MM MISC USE TWICE DAILY 7/23/19  Yes Joie Barton MD   albuterol sulfate HFA (VENTOLIN HFA) 108 (90 Base) MCG/ACT inhaler Inhale 2 puffs into the lungs every 4 hours as needed for Wheezing 7/23/19  Yes Joie Barton MD   vitamin C (ASCORBIC ACID) 500 MG tablet TAKE 1 TABLET BY MOUTH TWICE DAILY 6/14/19  Yes Joie Barton MD   albuterol (PROVENTIL) (2.5 MG/3ML) 0.083% nebulizer solution Take 3 mLs by nebulization every 6 hours as needed for Wheezing 6/12/19  Yes Jero Postal, APRN - CNP   PRODIGY NO CODING BLOOD GLUC strip TEST FOUR TIMES DAILY AS DIRECTED 5/15/19  Yes Joie Barton MD   magnesium oxide (MAGNESIUM-OXIDE) 400 (241.3 Mg) NOVOLOG FLEXPEN 100 UNIT/ML injection pen INJECT 4 UNITS AT LUNCH AND  6 UNITS AT SUPPER [facility] PEN EXPIRES 28 DAYS AFTER 1ST USE) 9/12/18  Yes Arti Avery MD   fluticasone (FLONASE) 50 MCG/ACT nasal spray USE 2 SPRAYS NASALLY EVERY DAY 9/12/18  Yes Arti Avery MD   VITAMIN A PO Take 2,400 mcg by mouth daily   Yes Historical Provider, MD   PRODIGY NO CODING BLOOD GLUC strip TEST FOUR TIMES DAILY AS DIRECTED 4/20/18  Yes Arti Avery MD   vitamin E 400 UNIT capsule Take 400 Units by mouth daily   Yes Historical Provider, MD   COMBIGAN 0.2-0.5 % ophthalmic solution PLACE 1 DROP INTO BOTH EYES DAILY 1/2/18  Yes Arti Avery MD   Lancet Devices (PRODIGY LANCING DEVICE) MISC USE TO TEST BLOOD SUGAR FOUR TIMES DAILY AS DIRECTED 10/13/17  Yes Arti Avery MD   Zinc 50 MG TABS Take 50 mg by mouth daily   Yes Historical Provider, MD   niacin 500 MG extended release capsule Take 500 mg by mouth daily   Yes Historical Provider, MD   folic acid (FOLVITE) 473 MCG tablet Take 800 mcg by mouth daily   Yes Historical Provider, MD   Cyanocobalamin (VITAMIN B12 PO) Take 2,500 mcg by mouth daily   Yes Historical Provider, MD   B Complex-C (SUPER B COMPLEX PO) Take by mouth daily   Yes Historical Provider, MD   travoprost, benzalkonium, (TRAVATAN) 0.004 % ophthalmic solution Place 1 drop into both eyes daily 10/7/16  Yes Arti Avery MD   CPAP Machine MISC by Does not apply route Pressure of 13 (AirSense 10)  [facility]   Yes Historical Provider, MD   ferrous sulfate 325 (65 FE) MG EC tablet Take 325 mg by mouth daily (with breakfast)   Yes Historical Provider, MD   Multiple Vitamin (MULTI VITAMIN DAILY) TABS Take by mouth daily   Yes Historical Provider, MD   insulin glargine (LANTUS) 100 UNIT/ML injection vial Inject 10 Units into the skin 2 times daily    Historical Provider, MD   Diabetic Shoe MISC Dispense #1 pair 4/24/19   Arti Avery MD   ondansetron (ZOFRAN) 4 MG tablet Take 1 tablet by ANESTHESIA NERVE BLOCK Right 2019    Right L2 L3 L4 L5 Diagnostic Medial BB performed by Lynne Bajwa MD at [facility] Right 2019    Right L2 L3 L4 L5 Confirmatory Medial BB performed by Lynne Bajwa MD at [address] [place] [address] SURGERY  2002    COLONOSCOPY  2012    CORONARY ANGIOPLASTY WITH STENT PLACEMENT      INDUCED   1970    LUMBAR SPINE SURGERY Right 2019    Right L2 L3 TRANSFORAMINAL performed by Lynne Bajwa MD at [address] [place] [address] [address] Right 2019    Right L2 L3 TRANSFORAMINAL performed by Lynne Bajwa MD at [address] [place] [address] [address] Right 2019    Right L3 & L4 TRANSFORAMINAL performed by Lynne Bajwa MD at [address] [facility] 1.1-2CM FACE,FACIAL Left 2018    Excision Cyst Left Chest, Middle Back performed by Fay Whittington MD at [address] [place] [address]/STEROID FORAMEN LUMBAR/SACRAL [address] JUAN Laws ADD LEVEL Right 10/5/2018    Right L2 & L3 TRANSFORAMINAL performed by Lynne Bajwa MD at [address] [place] [address] ANES/STEROID FORAMEN LUMBAR/SACRAL [address] [place] ADD LEVEL Right 10/26/2018    Right L2 L3 TRANSFORAMINAL performed by Lynne Bajwa MD at [address] [place] [address],[address]    Cervical cancer. Had XRT   One Jhony Torres       Family andSocial History reviewed in the electronic medical record. Imaging: Reviewed available imaging in oursystem with the patient. No results found. Objective:     Vitals:    19 1001   BP: (!) 125/46   Pulse: 51   Resp: 16   Temp:    SpO2: 97%          Physical Exam   Constitutional: She is oriented to person, place, and time. She appears well-developed and well-nourished. No distress. HENT:   Head: Normocephalic and atraumatic. Right Ear: External ear normal. No decreased hearing is noted. Left Ear: External ear normal. No decreased hearing is noted.    Nose: Nose normal.   Mouth/Throat: Oropharynx is clear and moist and

## 2019-08-01 ENCOUNTER — APPOINTMENT (OUTPATIENT)
Dept: GENERAL RADIOLOGY | Age: 69
End: 2019-08-01
Attending: PHYSICAL MEDICINE & REHABILITATION
Payer: MEDICARE

## 2019-08-01 ENCOUNTER — ANESTHESIA EVENT (OUTPATIENT)
Dept: OPERATING ROOM | Age: 69
End: 2019-08-01
Payer: MEDICARE

## 2019-08-01 ENCOUNTER — ANESTHESIA (OUTPATIENT)
Dept: OPERATING ROOM | Age: 69
End: 2019-08-01
Payer: MEDICARE

## 2019-08-01 ENCOUNTER — HOSPITAL ENCOUNTER (OUTPATIENT)
Age: 69
Setting detail: OUTPATIENT SURGERY
Discharge: HOME | End: 2019-08-01
Attending: PHYSICAL MEDICINE & REHABILITATION | Admitting: PHYSICAL MEDICINE & REHABILITATION
Payer: MEDICARE

## 2019-08-01 VITALS
DIASTOLIC BLOOD PRESSURE: 76 MMHG | RESPIRATION RATE: 16 BRPM | HEART RATE: 57 BPM | WEIGHT: 264 LBS | OXYGEN SATURATION: 98 % | SYSTOLIC BLOOD PRESSURE: 146 MMHG | BODY MASS INDEX: 45.07 KG/M2 | TEMPERATURE: 97.9 F | HEIGHT: 64 IN

## 2019-08-01 VITALS — OXYGEN SATURATION: 100 % | SYSTOLIC BLOOD PRESSURE: 99 MMHG | DIASTOLIC BLOOD PRESSURE: 58 MMHG

## 2019-08-01 PROCEDURE — 2500000003 HC RX 250 WO HCPCS: Performed by: NURSE ANESTHETIST, CERTIFIED REGISTERED

## 2019-08-01 PROCEDURE — 3209999900 FLUORO FOR SURGICAL PROCEDURES

## 2019-08-01 PROCEDURE — 64636 DESTROY L/S FACET JNT ADDL: CPT | Performed by: PHYSICAL MEDICINE & REHABILITATION

## 2019-08-01 PROCEDURE — 3700000001 HC ADD 15 MINUTES (ANESTHESIA): Performed by: PHYSICAL MEDICINE & REHABILITATION

## 2019-08-01 PROCEDURE — 2580000003 HC RX 258: Performed by: PHYSICAL MEDICINE & REHABILITATION

## 2019-08-01 PROCEDURE — 64635 DESTROY LUMB/SAC FACET JNT: CPT | Performed by: PHYSICAL MEDICINE & REHABILITATION

## 2019-08-01 PROCEDURE — 6360000002 HC RX W HCPCS: Performed by: PHYSICAL MEDICINE & REHABILITATION

## 2019-08-01 PROCEDURE — 3600000057 HC PAIN LEVEL 4 ADDL 15 MIN: Performed by: PHYSICAL MEDICINE & REHABILITATION

## 2019-08-01 PROCEDURE — 3700000000 HC ANESTHESIA ATTENDED CARE: Performed by: PHYSICAL MEDICINE & REHABILITATION

## 2019-08-01 PROCEDURE — 6360000002 HC RX W HCPCS: Performed by: NURSE ANESTHETIST, CERTIFIED REGISTERED

## 2019-08-01 PROCEDURE — 3600000056 HC PAIN LEVEL 4 BASE: Performed by: PHYSICAL MEDICINE & REHABILITATION

## 2019-08-01 PROCEDURE — 7100000011 HC PHASE II RECOVERY - ADDTL 15 MIN: Performed by: PHYSICAL MEDICINE & REHABILITATION

## 2019-08-01 PROCEDURE — 7100000010 HC PHASE II RECOVERY - FIRST 15 MIN: Performed by: PHYSICAL MEDICINE & REHABILITATION

## 2019-08-01 PROCEDURE — 2709999900 HC NON-CHARGEABLE SUPPLY: Performed by: PHYSICAL MEDICINE & REHABILITATION

## 2019-08-01 PROCEDURE — 2500000003 HC RX 250 WO HCPCS: Performed by: PHYSICAL MEDICINE & REHABILITATION

## 2019-08-01 RX ORDER — SODIUM CHLORIDE, SODIUM LACTATE, POTASSIUM CHLORIDE, CALCIUM CHLORIDE 600; 310; 30; 20 MG/100ML; MG/100ML; MG/100ML; MG/100ML
INJECTION, SOLUTION INTRAVENOUS CONTINUOUS
Status: DISCONTINUED | OUTPATIENT
Start: 2019-08-01 | End: 2019-08-01 | Stop reason: HOSPADM

## 2019-08-01 RX ORDER — SODIUM CHLORIDE 0.9 % (FLUSH) 0.9 %
10 SYRINGE (ML) INJECTION PRN
Status: DISCONTINUED | OUTPATIENT
Start: 2019-08-01 | End: 2019-08-01 | Stop reason: HOSPADM

## 2019-08-01 RX ORDER — SODIUM CHLORIDE 0.9 % (FLUSH) 0.9 %
10 SYRINGE (ML) INJECTION EVERY 12 HOURS SCHEDULED
Status: DISCONTINUED | OUTPATIENT
Start: 2019-08-01 | End: 2019-08-01 | Stop reason: HOSPADM

## 2019-08-01 RX ORDER — PROPOFOL 10 MG/ML
INJECTION, EMULSION INTRAVENOUS PRN
Status: DISCONTINUED | OUTPATIENT
Start: 2019-08-01 | End: 2019-08-01 | Stop reason: SDUPTHER

## 2019-08-01 RX ORDER — DEXAMETHASONE SODIUM PHOSPHATE 10 MG/ML
INJECTION INTRAMUSCULAR; INTRAVENOUS PRN
Status: DISCONTINUED | OUTPATIENT
Start: 2019-08-01 | End: 2019-08-01 | Stop reason: ALTCHOICE

## 2019-08-01 RX ORDER — BUPIVACAINE HYDROCHLORIDE 5 MG/ML
INJECTION, SOLUTION EPIDURAL; INTRACAUDAL PRN
Status: DISCONTINUED | OUTPATIENT
Start: 2019-08-01 | End: 2019-08-01 | Stop reason: ALTCHOICE

## 2019-08-01 RX ORDER — LIDOCAINE HYDROCHLORIDE 20 MG/ML
INJECTION, SOLUTION EPIDURAL; INFILTRATION; INTRACAUDAL; PERINEURAL PRN
Status: DISCONTINUED | OUTPATIENT
Start: 2019-08-01 | End: 2019-08-01 | Stop reason: SDUPTHER

## 2019-08-01 RX ORDER — LIDOCAINE HYDROCHLORIDE 40 MG/ML
INJECTION, SOLUTION RETROBULBAR; TOPICAL PRN
Status: DISCONTINUED | OUTPATIENT
Start: 2019-08-01 | End: 2019-08-01 | Stop reason: ALTCHOICE

## 2019-08-01 RX ORDER — GLYCOPYRROLATE 1 MG/5 ML
SYRINGE (ML) INTRAVENOUS PRN
Status: DISCONTINUED | OUTPATIENT
Start: 2019-08-01 | End: 2019-08-01 | Stop reason: SDUPTHER

## 2019-08-01 RX ORDER — MIDAZOLAM HYDROCHLORIDE 1 MG/ML
INJECTION INTRAMUSCULAR; INTRAVENOUS PRN
Status: DISCONTINUED | OUTPATIENT
Start: 2019-08-01 | End: 2019-08-01 | Stop reason: SDUPTHER

## 2019-08-01 RX ADMIN — LIDOCAINE HYDROCHLORIDE 50 MG: 20 INJECTION, SOLUTION EPIDURAL; INFILTRATION; INTRACAUDAL; PERINEURAL at 11:10

## 2019-08-01 RX ADMIN — PROPOFOL 50 MG: 10 INJECTION, EMULSION INTRAVENOUS at 11:25

## 2019-08-01 RX ADMIN — MIDAZOLAM HYDROCHLORIDE 2 MG: 1 INJECTION, SOLUTION INTRAMUSCULAR; INTRAVENOUS at 11:10

## 2019-08-01 RX ADMIN — SODIUM CHLORIDE, SODIUM LACTATE, POTASSIUM CHLORIDE, AND CALCIUM CHLORIDE: .6; .31; .03; .02 INJECTION, SOLUTION INTRAVENOUS at 10:32

## 2019-08-01 RX ADMIN — PROPOFOL 50 MG: 10 INJECTION, EMULSION INTRAVENOUS at 11:20

## 2019-08-01 RX ADMIN — PROPOFOL 50 MG: 10 INJECTION, EMULSION INTRAVENOUS at 11:30

## 2019-08-01 RX ADMIN — Medication 0.4 MG: at 11:14

## 2019-08-01 RX ADMIN — PROPOFOL 50 MG: 10 INJECTION, EMULSION INTRAVENOUS at 11:15

## 2019-08-01 ASSESSMENT — PULMONARY FUNCTION TESTS
PIF_VALUE: 0

## 2019-08-01 ASSESSMENT — PAIN SCALES - GENERAL
PAINLEVEL_OUTOF10: 0

## 2019-08-01 ASSESSMENT — PAIN - FUNCTIONAL ASSESSMENT: PAIN_FUNCTIONAL_ASSESSMENT: 0-10

## 2019-08-01 NOTE — ANESTHESIA PRE PROCEDURE
Chris Palma MD   Diabetic Shoe MISC Dispense #1 pair 4/24/19   Joseluis Reinoso MD   ondansetron [facility]) 4 MG tablet Take 1 tablet by mouth every 8 hours as needed for Nausea or Vomiting 4/18/19   Joseluis Reinoso MD   ibuprofen (ADVIL;MOTRIN) 400 MG tablet TAKE 1 TABLET EVERY 6 HOURS AS NEEDED FOR PAIN 1/30/19   Joseluis Reinoso MD   umeclidinium-vilanterol [facility] ELLIPTA) 62.5-25 MCG/INH AEPB inhaler INHALE 1 PUFF INTO LUNGS EVERY DAY 1/28/19   Joseluis Reinoso MD   Insulin Syringe-Needle U-100 (INSULIN SYRINGE .5CC/31GX5/16\") 31G X 5/16\" 0.5 ML MISC 1 each by Does not apply route daily 12/31/18   Joseluis Reinoso MD   senna (SENOKOT) 8.6 MG TABS tablet TAKE 1 TABLET BY MOUTH TWICE DAILY 11/16/18   Joseluis Reinoso MD   triamcinolone (KENALOG) 0.1 % cream APPLY TOPICALLY TWICE DAILY 9/18/18   Joseluis Reinoso MD   NOVOLOG FLEXPEN 100 UNIT/ML injection pen INJECT 4 UNITS AT LUNCH AND  6 UNITS AT SUPPER [facility] PEN EXPIRES 28 DAYS AFTER 1ST USE) 9/12/18   Joseluis Reinoso MD   fluticasone (FLONASE) 50 MCG/ACT nasal spray USE 2 SPRAYS NASALLY EVERY DAY 9/12/18   Joseluis Reinoso MD   nystatin (MYCOSTATIN) 704282 UNIT/GM powder Apply 3 times daily.  7/11/18   DAVID Redmond - CNP   PRODIGY NO CODING BLOOD GLUC strip TEST FOUR TIMES DAILY AS DIRECTED 4/20/18   Joseluis Reinoso MD   COMBIGAN 0.2-0.5 % ophthalmic solution PLACE 1 DROP INTO BOTH EYES DAILY 1/2/18   Joseluis Reinoso MD   Lancet Devices (PRODIGY LANCING DEVICE) MISC USE TO TEST BLOOD SUGAR FOUR TIMES DAILY AS DIRECTED 10/13/17   Joseluis Reinoso MD   Compression Stockings MISC by Does not apply route 15-20mmHg  Knee high  Open tow  With assist device to help put them on 5/10/17   Gray Bains DO   Diabetic Shoe MISC by Does not apply route 11/4/16   Joseluis Reinoso MD   travoprost, benzalkonium, (TRAVATAN) 0.004 % ophthalmic solution Place 1 drop into both eyes daily 10/7/16   Joseluis Reinoso MD   CPAP Machine MISC by Does not apply route Pressure of 13 (AirSense 10)  [facility].    Historical Provider, MD   nitroGLYCERIN (NITROSTAT) 0.4 MG SL tablet Place 0.4 mg under the tongue every 5 minutes as needed for Chest pain Dissolve 1 tab under tongue at first sign of chest pain. May repeat every 5 minutes until relief is obtained. If pain persists after taking 3 tabs in a 15-minute period, or the pain is different than is typically experienced, call 9-1-1 immediately. Historical Provider, MD       Current medications:    Current Facility-Administered Medications   Medication Dose Route Frequency Provider Last Rate Last Dose    lactated ringers infusion   Intravenous Continuous Zain Ahuja  mL/hr at 08/01/19 1032         Allergies: Allergies   Allergen Reactions    Seasonal        Problem List:    Patient Active Problem List   Diagnosis Code    Obstructive sleep apnea G47.33    Chronic fatigue R53.82    Coronary artery disease due to lipid rich plaque I25.10, I25.83    Vitamin D deficiency E55.9    Type 2 diabetes mellitus with diabetic polyneuropathy, with long-term current use of insulin ([facility]) E11.42, Z79.4    Peripheral polyneuropathy G62.9    Bilateral leg edema R60.0    Right hip pain M25.551    Muscle ache M79.10    Hx of Bell's palsy Z86.69    Memory problem R41.3    Gait abnormality R26.9    Balance problem R26.89    H/O falling Z91.81    Dizziness R42    Intractable episodic tension-type headache G44. 211    Essential tremor G25.0    Anxiety and depression F41.9, F32.9    Elevated hemoglobin A1c R73.09    VBI (vertebrobasilar insufficiency) G45.0    Chronic cerebral ischemia I67.82    TIA (transient ischemic attack) G45.9    Chronic tension headache G44.229    Morbid obesity with BMI of 45.0-49.9, adult ([facility]) E66.01, Z68.42    Chronic right-sided low back pain with right-sided sciatica M54.41, G89.29    Lumbar radiculopathy M54.16    Encounter for chronic pain management G89.29    Carotid IMG GUIDE ,EA ADD LEVEL Right 10/5/2018    Right L2 & L3 TRANSFORAMINAL performed by Holli Xie MD at [address] [facility] ANES/STEROID FORAMEN LUMBAR/SACRAL [address] [facility] [place] ADD LEVEL Right 10/26/2018    Right L2 L3 TRANSFORAMINAL performed by Holli Xie MD at [address] [facility],[address]    Cervical cancer. Had XRT    TONSILLECTOMY         Social History:    Social History     Tobacco Use    Smoking status: Former Smoker     Packs/day: 1.00     Years: 35.00     Pack years: 35.00     Last attempt to quit: 2001     Years since quittin.8    Smokeless tobacco: Never Used   Substance Use Topics    Alcohol use: No                                Counseling given: Not Answered      Vital Signs (Current):   Vitals:    19 1019   BP: (!) 103/52   Pulse: 52   Resp: 16   SpO2: 96%   Weight: 264 lb (119.7 kg)   Height: 5' 4\" (1.626 m)                                              BP Readings from Last 3 Encounters:   19 (!) 103/52   19 110/60   19 (!) 125/46       NPO Status: Time of last liquid consumption: 0700(sip with water)                        Time of last solid consumption: 0                        Date of last liquid consumption: 19                        Date of last solid food consumption: 19    BMI:   Wt Readings from Last 3 Encounters:   19 264 lb (119.7 kg)   19 264 lb (119.7 kg)   19 264 lb 6.4 oz (119.9 kg)     Body mass index is 45.32 kg/m².     CBC:   Lab Results   Component Value Date    WBC 5.6 2019    RBC 4.11 2019    HGB 12.2 2019    HCT 38.1 2019    MCV 92.9 2019    RDW 13.9 2019     2019       CMP:   Lab Results   Component Value Date     2019    K 4.3 2019     2019    CO2 25 2019    BUN 15 2019    CREATININE 0.75 2019    GFRAA >60 2019    LABGLOM >60 2019    GLUCOSE 182 2019    PROT 7.2 2019

## 2019-08-01 NOTE — H&P
PO Take 2,400 mcg by mouth daily     Yes Historical Provider, MD   PRODIGY NO CODING BLOOD GLUC strip TEST FOUR TIMES DAILY AS DIRECTED 4/20/18   Yes Ying Denise MD   vitamin E 400 UNIT capsule Take 400 Units by mouth daily     Yes Historical Provider, MD   COMBIGAN 0.2-0.5 % ophthalmic solution PLACE 1 DROP INTO BOTH EYES DAILY 1/2/18   Yes Ying Denise MD   Lancet Devices (PRODIGY LANCING DEVICE) MISC USE TO TEST BLOOD SUGAR FOUR TIMES DAILY AS DIRECTED 10/13/17   Yes Ying Denise MD   Zinc 50 MG TABS Take 50 mg by mouth daily     Yes Historical Provider, MD   niacin 500 MG extended release capsule Take 500 mg by mouth daily     Yes Historical Provider, MD   folic acid (FOLVITE) 769 MCG tablet Take 800 mcg by mouth daily     Yes Historical Provider, MD   Cyanocobalamin (VITAMIN B12 PO) Take 2,500 mcg by mouth daily     Yes Historical Provider, MD   B Complex-C (SUPER B COMPLEX PO) Take by mouth daily     Yes Historical Provider, MD   travoprost, benzalkonium, (TRAVATAN) 0.004 % ophthalmic solution Place 1 drop into both eyes daily 10/7/16   Yes Ying Denise MD   CPAP Machine MISC by Does not apply route Pressure of 13 (AirSense 10)  [facility].     Yes Historical Provider, MD   ferrous sulfate 325 (65 FE) MG EC tablet Take 325 mg by mouth daily (with breakfast)     Yes Historical Provider, MD   Multiple Vitamin (MULTI VITAMIN DAILY) TABS Take by mouth daily     Yes Historical Provider, MD   insulin glargine (LANTUS) 100 UNIT/ML injection vial Inject 10 Units into the skin 2 times daily       Historical Provider, MD   Diabetic Shoe MISC Dispense #1 pair 4/24/19     Ying Denise MD   ondansetron (ZOFRAN) 4 MG tablet Take 1 tablet by mouth every 8 hours as needed for Nausea or Vomiting 4/18/19     Ying Denise MD   gabapentin (NEURONTIN) 300 MG capsule TAKE 2 CAPSULES TWICE DAILY 3/15/19 7/29/19   Ying Denise MD   alendronate (FOSAMAX) 70 MG tablet Take 1 tablet by mouth every 7 07/22/2019     LABMICR CANNOT BE CALCULATED 01/21/2019         Assessment:                                 Active Hospital Problems     Diagnosis Date Noted    Lumbar facet joint syndrome [M47.816] 06/28/2019    Acquired spondylolisthesis [M43.10] 01/17/2019                                                                                                            Plan:   Proceed with planned procedure  -      The patientunderstands the planned operation and its associated risks and benefits and agrees to proceed. The surgical consent form has been signed. Last NSAID/anticoagulant medication use was:na     Premedication taken for contrast allergy? No     Valium taken for oral sedation?  No                        Routing History

## 2019-08-01 NOTE — OP NOTE
rest,     Patient is unable to perform the following ADL's: ambulating, grooming, sitting and standing     Pain Assessment: 0-10  Pain Level: 4     Pain Orientation: Lower  Pain Location: Back       Last Plain films: 2019    EXAMINATION:  Right L2, L3, L4, L5 radiofrequency neurotomies. CONSENT:  Written consent was obtained from the patient on the preprinted consent form after explaining the procedure, indications, potential complications and outcomes. Alternative treatments were also discussed. DISCUSSION:  The patient was sterilely prepped and draped in the usual fashion in the prone position. Time out was verified for the correct patient, side, level and procedure. SEDATION: per anesthesia    FACET RF  Under image-intensifier control, a 40327  mm Red Swoosh RFK insulated radiofrequency probe with 5 mm active tips were successfully directed at the Right L2, L3, L4, L5 medial branches of the dorsal rami at the target points described by ARACELI. Needle tip positions were confirmed in 3 views and sensory and motor test stimulation was performed. The patient reported sensory stimulation at 0.0 to 0.0 volts at 50 Hz and motor stimulation at 0.0 to 0.9 volts at 2 Hz, which confirmed satisfactory sensory motor dissociation. 1 ml of 2% lidocaine was instilled  at each site prior to lesioning. 3 lesions were performed at each level  BY SAGITTAL APPROACH at a temperature of 80 degrees Celsius for a duration of 90 seconds, as described by ARACELI. Impedance was measured and ranged from 176-304 ohms. Then, 0.5mL of  0.5% bupivacaine was instilled at each site after lesioning. The patient tolerated the procedure(s) well and without complications and was noted to be in stable condition prior to discharge from procedure center with discharge instructions. EBL: no blood loss    SPECIMEN: none    IMPRESSION:    1. Right L2, L3, L4, L5 radiofrequency neurotomies performed uneventfully. RECOMMENDATIONS:  1.

## 2019-08-06 RX ORDER — INSULIN GLARGINE 100 [IU]/ML
10 INJECTION, SOLUTION SUBCUTANEOUS 2 TIMES DAILY
Qty: 3 VIAL | Refills: 3 | Status: SHIPPED | OUTPATIENT
Start: 2019-08-06 | End: 2019-08-27 | Stop reason: SDUPTHER

## 2019-08-13 ENCOUNTER — TELEPHONE (OUTPATIENT)
Dept: INTERNAL MEDICINE | Age: 69
End: 2019-08-13

## 2019-08-20 ENCOUNTER — OFFICE VISIT (OUTPATIENT)
Dept: SURGERY | Age: 69
End: 2019-08-20
Payer: MEDICARE

## 2019-08-20 VITALS
BODY MASS INDEX: 45.24 KG/M2 | WEIGHT: 265 LBS | HEIGHT: 64 IN | DIASTOLIC BLOOD PRESSURE: 70 MMHG | SYSTOLIC BLOOD PRESSURE: 126 MMHG | TEMPERATURE: 98.7 F | HEART RATE: 56 BPM

## 2019-08-20 DIAGNOSIS — K62.5 RECTAL BLEEDING: Primary | ICD-10-CM

## 2019-08-20 PROCEDURE — 4040F PNEUMOC VAC/ADMIN/RCVD: CPT | Performed by: SURGERY

## 2019-08-20 PROCEDURE — G8417 CALC BMI ABV UP PARAM F/U: HCPCS | Performed by: SURGERY

## 2019-08-20 PROCEDURE — 1123F ACP DISCUSS/DSCN MKR DOCD: CPT | Performed by: SURGERY

## 2019-08-20 PROCEDURE — G8427 DOCREV CUR MEDS BY ELIG CLIN: HCPCS | Performed by: SURGERY

## 2019-08-20 PROCEDURE — 1090F PRES/ABSN URINE INCON ASSESS: CPT | Performed by: SURGERY

## 2019-08-20 PROCEDURE — G8400 PT W/DXA NO RESULTS DOC: HCPCS | Performed by: SURGERY

## 2019-08-20 PROCEDURE — 3017F COLORECTAL CA SCREEN DOC REV: CPT | Performed by: SURGERY

## 2019-08-20 PROCEDURE — 99214 OFFICE O/P EST MOD 30 MIN: CPT | Performed by: SURGERY

## 2019-08-20 PROCEDURE — G8598 ASA/ANTIPLAT THER USED: HCPCS | Performed by: SURGERY

## 2019-08-20 PROCEDURE — 1036F TOBACCO NON-USER: CPT | Performed by: SURGERY

## 2019-08-20 RX ORDER — POLYETHYLENE GLYCOL 3350, SODIUM CHLORIDE, SODIUM BICARBONATE, POTASSIUM CHLORIDE 420; 11.2; 5.72; 1.48 G/4L; G/4L; G/4L; G/4L
4000 POWDER, FOR SOLUTION ORAL ONCE
Qty: 4000 ML | Refills: 0 | Status: SHIPPED | OUTPATIENT
Start: 2019-08-20 | End: 2019-08-20

## 2019-08-20 NOTE — PROGRESS NOTES
or Vomiting 30 tablet 3    gabapentin (NEURONTIN) 300 MG capsule TAKE 2 CAPSULES TWICE DAILY 360 capsule 3    magnesium oxide (MAGNESIUM-OXIDE) 400 (241.3 Mg) MG TABS tablet Take 1 tablet by mouth 2 times daily 60 tablet 11    ibuprofen (ADVIL;MOTRIN) 400 MG tablet TAKE 1 TABLET EVERY 6 HOURS AS NEEDED FOR PAIN 360 tablet 3    furosemide (LASIX) 40 MG tablet Take 1 tablet by mouth daily 90 tablet 3    umeclidinium-vilanterol (ANORO ELLIPTA) 62.5-25 MCG/INH AEPB inhaler INHALE 1 PUFF INTO LUNGS EVERY DAY 3 each 3    clopidogrel (PLAVIX) 75 MG tablet Take 1 tablet by mouth daily 90 tablet 3    potassium chloride (MICRO-K) 10 MEQ extended release capsule TAKE 1 CAPSULE TWICE DAILY 180 capsule 3    atorvastatin (LIPITOR) 40 MG tablet Take 1 tablet by mouth daily 90 tablet 2    alendronate (FOSAMAX) 70 MG tablet Take 1 tablet by mouth every 7 days 12 tablet 3    pantoprazole (PROTONIX) 40 MG tablet TAKE 1 TABLET EVERY DAY 90 tablet 3    lisinopril (PRINIVIL;ZESTRIL) 10 MG tablet TAKE 1 TABLET EVERY DAY 90 tablet 3    amLODIPine (NORVASC) 5 MG tablet TAKE 1 TABLET EVERY DAY 90 tablet 3    citalopram (CELEXA) 10 MG tablet TAKE 1 TABLET EVERY DAY 90 tablet 3    buPROPion (WELLBUTRIN) 75 MG tablet TAKE 1 TABLET TWICE DAILY 180 tablet 3    amitriptyline (ELAVIL) 25 MG tablet take 1 tablet by mouth at bedtime 90 tablet 3    spironolactone (ALDACTONE) 50 MG tablet TAKE 1 TABLET TWICE DAILY 180 tablet 3    Insulin Syringe-Needle U-100 (INSULIN SYRINGE .5CC/31GX5/16\") 31G X 5/16\" 0.5 ML MISC 1 each by Does not apply route daily 200 each 3    senna (SENOKOT) 8.6 MG TABS tablet TAKE 1 TABLET BY MOUTH TWICE DAILY 180 tablet 3    triamcinolone (KENALOG) 0.1 % cream APPLY TOPICALLY TWICE DAILY 80 g 5    NOVOLOG FLEXPEN 100 UNIT/ML injection pen INJECT 4 UNITS AT LUNCH AND  6 UNITS AT SUPPER (EACH PEN EXPIRES 28 DAYS AFTER 1ST USE) 15 mL 3    fluticasone (FLONASE) 50 MCG/ACT nasal spray USE 2 SPRAYS NASALLY EVERY MOUTH TWICE DAILY 540 tablet 3     No current facility-administered medications for this visit. Home Medications:   Prior to Admission medications    Medication Sig Start Date End Date Taking?  Authorizing Provider   insulin glargine (LANTUS) 100 UNIT/ML injection vial Inject 10 Units into the skin 2 times daily 8/6/19  Yes Radha Laughlin MD   PRODIGY TWIST TOP LANCETS 28G MISC TEST FOUR TIMES DAILY AS DIRECTED 7/24/19  Yes Radha Laughlin MD   B-D ULTRAFINE III SHORT PEN 31G X 8 MM MISC USE TWICE DAILY 7/23/19  Yes Radha Laughlin MD   albuterol sulfate HFA (VENTOLIN HFA) 108 (90 Base) MCG/ACT inhaler Inhale 2 puffs into the lungs every 4 hours as needed for Wheezing 7/23/19  Yes Radha Laughlin MD   vitamin C (ASCORBIC ACID) 500 MG tablet TAKE 1 TABLET BY MOUTH TWICE DAILY 6/14/19  Yes Radha Laughlin MD   albuterol (PROVENTIL) (2.5 MG/3ML) 0.083% nebulizer solution Take 3 mLs by nebulization every 6 hours as needed for Wheezing 6/12/19  Yes Ivy Avalos, APRN - CNP   PRODIGY NO CODING BLOOD GLUC strip TEST FOUR TIMES DAILY AS DIRECTED 5/15/19  Yes Radha Laughlin MD   Diabetic Shoe MISC Dispense #1 pair 4/24/19  Yes Radha Laughlin MD   ondansetron (ZOFRAN) 4 MG tablet Take 1 tablet by mouth every 8 hours as needed for Nausea or Vomiting 4/18/19  Yes Radha Laughlin MD   gabapentin (NEURONTIN) 300 MG capsule TAKE 2 CAPSULES TWICE DAILY 3/15/19 8/20/19 Yes Radha Laughlin MD   magnesium oxide (MAGNESIUM-OXIDE) 400 (241.3 Mg) MG TABS tablet Take 1 tablet by mouth 2 times daily 2/11/19  Yes Cristi Francis MD   ibuprofen (ADVIL;MOTRIN) 400 MG tablet TAKE 1 TABLET EVERY 6 HOURS AS NEEDED FOR PAIN 1/30/19  Yes Radha Laughlin MD   furosemide (LASIX) 40 MG tablet Take 1 tablet by mouth daily 1/28/19  Yes Radha Laughlin MD   umeclidinium-vilanterol (ANORO ELLIPTA) 62.5-25 MCG/INH AEPB inhaler INHALE 1 PUFF INTO LUNGS EVERY DAY 1/28/19  Yes Radha Laughlin MD   clopidogrel (PLAVIX) 75 MG VITAMIN A PO Take 2,400 mcg by mouth daily   Yes Historical Provider, MD   PRODIGY NO CODING BLOOD GLUC strip TEST FOUR TIMES DAILY AS DIRECTED 4/20/18  Yes Arti Avery MD   vitamin E 400 UNIT capsule Take 400 Units by mouth daily   Yes Historical Provider, MD   Omega-3 Fatty Acids (FISH OIL) 1200 MG CAPS Take 1,200 mg by mouth daily    Yes Historical Provider, MD   COMBIGAN 0.2-0.5 % ophthalmic solution PLACE 1 DROP INTO BOTH EYES DAILY 1/2/18  Yes Arti Avery MD   Lancet Devices (PRODIGY LANCING DEVICE) MISC USE TO TEST BLOOD SUGAR FOUR TIMES DAILY AS DIRECTED 10/13/17  Yes Arti Avery MD   Compression Stockings [address] [facility] by Does not apply route 15-20mmHg  Knee high  Open tow  With assist device to help put them on 5/10/17  Yes Gray Bains, DO   Zinc 50 MG TABS Take 50 mg by mouth daily   Yes Historical Provider, MD   Diabetic Shoe MISC by Does not apply route 11/4/16  Yes Arti Avery MD   niacin 500 MG extended release capsule Take 500 mg by mouth daily   Yes Historical Provider, MD   folic acid (FOLVITE) 423 MCG tablet Take 800 mcg by mouth daily   Yes Historical Provider, MD   Cyanocobalamin (VITAMIN B12 PO) Take 2,500 mcg by mouth daily   Yes Historical Provider, MD   B Complex-C (SUPER B COMPLEX PO) Take by mouth daily   Yes Historical Provider, MD   travoprost, benzalkonium, (TRAVATAN) 0.004 % ophthalmic solution Place 1 drop into both eyes daily 10/7/16  Yes Arti Avery MD   CPAP Machine MISC by Does not apply route Pressure of 13 (AirSense 10)  [facility].   Yes Historical Provider, MD   ferrous sulfate 325 (65 FE) MG EC tablet Take 325 mg by mouth daily (with breakfast)   Yes Historical Provider, MD   Multiple Vitamin (MULTI VITAMIN DAILY) TABS Take by mouth daily   Yes Historical Provider, MD   nitroGLYCERIN (NITROSTAT) 0.4 MG SL tablet Place 0.4 mg under the tongue every 5 minutes as needed for Chest pain Dissolve 1 tab under tongue at first sign of chest pain.  May repeat ROM of all joints is normal, strength normal  Neurologic:  negative findings: proximal muscle strength normal, speech normal, mental status intact, cranial nerves 2-12 intact, muscle tone normal, muscle strength normal    LABS:  CBC   Lab Results   Component Value Date    WBC 5.6 04/16/2019    HGB 12.2 07/22/2019    HCT 38.1 04/16/2019     04/16/2019     BMP   Lab Results   Component Value Date     07/22/2019    K 4.3 07/22/2019     07/22/2019    CO2 25 07/22/2019    BUN 15 07/22/2019    CREATININE 0.75 07/22/2019     LFT's:   Lab Results   Component Value Date    AST 34 07/22/2019    ALT 33 07/22/2019    ALKPHOS 59 07/22/2019    BILITOT 0.44 07/22/2019     COAGS:   Lab Results   Component Value Date    INR 1.1 01/15/2019     Lipids:   Lab Results   Component Value Date    CHOL 115 07/22/2019    CHOL 186 05/19/2016    HDL 54 07/22/2019    LDLCHOLESTEROL 45 07/22/2019    CHOLHDLRATIO 2.1 07/22/2019    TRIG 81 07/22/2019    VLDL NOT REPORTED 07/22/2019       RADIOLOGY:  All images reviewed and within normal limits unless otherwise specified: Yes    IMPRESSIONS:  1. Rectal bleeding  Patient had a colonoscopy 2012 this is most likely hemorrhoidal bleeding but is certainly reasonable to proceed with colonoscopy to rule out a polyp or carcinoma. Even if we saw hemorrhoids on physical exam I would still do a colonoscopy to make sure there is no polyp or cancer in the rectum. Surgical Risk: moderate risk    PLAN:  1. Set up for     Medical Decision Making: moderate complexity    Thank you for the interesting evaluation. Further recommendations to follow.     Riya Yang LPN  Electronically signed 8/20/2019 at 2:38 PM

## 2019-08-21 ENCOUNTER — TELEPHONE (OUTPATIENT)
Dept: SURGERY | Age: 69
End: 2019-08-21

## 2019-08-21 NOTE — TELEPHONE ENCOUNTER
JOSE Dubose            :1950           Surgical/Procedure Planned: colonoscopy    Date & Location: [facility] 19       Outpatient   Planned Length of OR: 30 minutes    Sedation: intravenous sedation    This is notification of the scheduled procedure only: no    Estimated Cardiac Risk for Non-Cardiac Surgery/Procedure     Low______ Moderate______ High______    Medication Instructions - Clarification needed by this date:   -Insulin  -Novolog      Hold___    Lantus         Hold___    -Other medications:     Hold ___ Days  Plavix   Hold ___ Days      Resume medications:     Lovenox indicated: _____Yes _____NO    Provider:Regino      Signature of Provider Giving Orders for Medication holds:    _____________________________________________

## 2019-08-22 ENCOUNTER — TELEPHONE (OUTPATIENT)
Dept: INTERNAL MEDICINE | Age: 69
End: 2019-08-22
Payer: MEDICARE

## 2019-08-22 ENCOUNTER — OFFICE VISIT (OUTPATIENT)
Dept: PAIN MANAGEMENT | Age: 69
End: 2019-08-22
Payer: MEDICARE

## 2019-08-22 VITALS
RESPIRATION RATE: 16 BRPM | WEIGHT: 262.8 LBS | HEIGHT: 64 IN | DIASTOLIC BLOOD PRESSURE: 64 MMHG | SYSTOLIC BLOOD PRESSURE: 130 MMHG | BODY MASS INDEX: 44.86 KG/M2

## 2019-08-22 DIAGNOSIS — M54.12 CERVICAL RADICULOPATHY: Primary | ICD-10-CM

## 2019-08-22 DIAGNOSIS — F32.A ANXIETY AND DEPRESSION: ICD-10-CM

## 2019-08-22 DIAGNOSIS — Z79.4 TYPE 2 DIABETES MELLITUS WITH DIABETIC POLYNEUROPATHY, WITH LONG-TERM CURRENT USE OF INSULIN (HCC): ICD-10-CM

## 2019-08-22 DIAGNOSIS — M47.817 LUMBOSACRAL SPONDYLOSIS WITHOUT MYELOPATHY: ICD-10-CM

## 2019-08-22 DIAGNOSIS — M47.812 CERVICAL FACET JOINT SYNDROME: ICD-10-CM

## 2019-08-22 DIAGNOSIS — I65.23 BILATERAL CAROTID ARTERY OCCLUSION: ICD-10-CM

## 2019-08-22 DIAGNOSIS — I65.23 CAROTID STENOSIS, ASYMPTOMATIC, BILATERAL: ICD-10-CM

## 2019-08-22 DIAGNOSIS — F41.9 ANXIETY AND DEPRESSION: ICD-10-CM

## 2019-08-22 DIAGNOSIS — K74.60 HEPATIC CIRRHOSIS, UNSPECIFIED HEPATIC CIRRHOSIS TYPE, UNSPECIFIED WHETHER ASCITES PRESENT (HCC): Primary | ICD-10-CM

## 2019-08-22 DIAGNOSIS — M54.16 LUMBAR RADICULOPATHY: ICD-10-CM

## 2019-08-22 DIAGNOSIS — E11.42 TYPE 2 DIABETES MELLITUS WITH DIABETIC POLYNEUROPATHY, WITH LONG-TERM CURRENT USE OF INSULIN (HCC): ICD-10-CM

## 2019-08-22 PROCEDURE — G8427 DOCREV CUR MEDS BY ELIG CLIN: HCPCS | Performed by: PHYSICAL MEDICINE & REHABILITATION

## 2019-08-22 PROCEDURE — 3017F COLORECTAL CA SCREEN DOC REV: CPT | Performed by: PHYSICAL MEDICINE & REHABILITATION

## 2019-08-22 PROCEDURE — 1090F PRES/ABSN URINE INCON ASSESS: CPT | Performed by: PHYSICAL MEDICINE & REHABILITATION

## 2019-08-22 PROCEDURE — G8598 ASA/ANTIPLAT THER USED: HCPCS | Performed by: PHYSICAL MEDICINE & REHABILITATION

## 2019-08-22 PROCEDURE — 99214 OFFICE O/P EST MOD 30 MIN: CPT | Performed by: PHYSICAL MEDICINE & REHABILITATION

## 2019-08-22 PROCEDURE — G8400 PT W/DXA NO RESULTS DOC: HCPCS | Performed by: PHYSICAL MEDICINE & REHABILITATION

## 2019-08-22 PROCEDURE — 1036F TOBACCO NON-USER: CPT | Performed by: PHYSICAL MEDICINE & REHABILITATION

## 2019-08-22 PROCEDURE — 1123F ACP DISCUSS/DSCN MKR DOCD: CPT | Performed by: PHYSICAL MEDICINE & REHABILITATION

## 2019-08-22 PROCEDURE — G8417 CALC BMI ABV UP PARAM F/U: HCPCS | Performed by: PHYSICAL MEDICINE & REHABILITATION

## 2019-08-22 PROCEDURE — 4040F PNEUMOC VAC/ADMIN/RCVD: CPT | Performed by: PHYSICAL MEDICINE & REHABILITATION

## 2019-08-22 PROCEDURE — G0179 MD RECERTIFICATION HHA PT: HCPCS | Performed by: INTERNAL MEDICINE

## 2019-08-22 RX ORDER — MULTIVITAMIN WITH IRON
100 TABLET ORAL DAILY
COMMUNITY
End: 2020-06-19 | Stop reason: SDUPTHER

## 2019-08-22 ASSESSMENT — ENCOUNTER SYMPTOMS
BACK PAIN: 1
ALLERGIC/IMMUNOLOGIC NEGATIVE: 1
DIARRHEA: 0
RESPIRATORY NEGATIVE: 1
EYES NEGATIVE: 1
CONSTIPATION: 0

## 2019-08-22 NOTE — PROGRESS NOTES
Medication Sig Dispense Refill    vitamin B-6 (PYRIDOXINE) 100 MG tablet Take 100 mg by mouth daily      insulin glargine (LANTUS) 100 UNIT/ML injection vial Inject 10 Units into the skin 2 times daily 3 vial 3    PRODIGY TWIST TOP LANCETS 28G MISC TEST FOUR TIMES DAILY AS DIRECTED 400 each 3    B-D ULTRAFINE III SHORT PEN 31G X 8 MM MISC USE TWICE DAILY 180 each 3    albuterol sulfate HFA (VENTOLIN HFA) 108 (90 Base) MCG/ACT inhaler Inhale 2 puffs into the lungs every 4 hours as needed for Wheezing 3 Inhaler 3    vitamin C (ASCORBIC ACID) 500 MG tablet TAKE 1 TABLET BY MOUTH TWICE DAILY 180 tablet 3    albuterol (PROVENTIL) (2.5 MG/3ML) 0.083% nebulizer solution Take 3 mLs by nebulization every 6 hours as needed for Wheezing 120 each 3    PRODIGY NO CODING BLOOD GLUC strip TEST FOUR TIMES DAILY AS DIRECTED 400 strip 3    Diabetic Shoe MISC Dispense #1 pair 2 each 0    ondansetron (ZOFRAN) 4 MG tablet Take 1 tablet by mouth every 8 hours as needed for Nausea or Vomiting 30 tablet 3    gabapentin (NEURONTIN) 300 MG capsule TAKE 2 CAPSULES TWICE DAILY 360 capsule 3    magnesium oxide (MAGNESIUM-OXIDE) 400 (241.3 Mg) MG TABS tablet Take 1 tablet by mouth 2 times daily 60 tablet 11    ibuprofen (ADVIL;MOTRIN) 400 MG tablet TAKE 1 TABLET EVERY 6 HOURS AS NEEDED FOR PAIN 360 tablet 3    furosemide (LASIX) 40 MG tablet Take 1 tablet by mouth daily 90 tablet 3    umeclidinium-vilanterol (ANORO ELLIPTA) 62.5-25 MCG/INH AEPB inhaler INHALE 1 PUFF INTO LUNGS EVERY DAY 3 each 3    clopidogrel (PLAVIX) 75 MG tablet Take 1 tablet by mouth daily 90 tablet 3    potassium chloride (MICRO-K) 10 MEQ extended release capsule TAKE 1 CAPSULE TWICE DAILY 180 capsule 3    atorvastatin (LIPITOR) 40 MG tablet Take 1 tablet by mouth daily 90 tablet 2    alendronate (FOSAMAX) 70 MG tablet Take 1 tablet by mouth every 7 days 12 tablet 3    pantoprazole (PROTONIX) 40 MG tablet TAKE 1 TABLET EVERY DAY 90 tablet 3    lisinopril (PRINIVIL;ZESTRIL) 10 MG tablet TAKE 1 TABLET EVERY DAY 90 tablet 3    amLODIPine (NORVASC) 5 MG tablet TAKE 1 TABLET EVERY DAY 90 tablet 3    citalopram (CELEXA) 10 MG tablet TAKE 1 TABLET EVERY DAY 90 tablet 3    buPROPion (WELLBUTRIN) 75 MG tablet TAKE 1 TABLET TWICE DAILY 180 tablet 3    amitriptyline (ELAVIL) 25 MG tablet take 1 tablet by mouth at bedtime 90 tablet 3    spironolactone (ALDACTONE) 50 MG tablet TAKE 1 TABLET TWICE DAILY 180 tablet 3    Insulin Syringe-Needle U-100 (INSULIN SYRINGE .5CC/31GX5/16\") 31G X 5/16\" 0.5 ML MISC 1 each by Does not apply route daily 200 each 3    Cholecalciferol (VITAMIN D3) 1000 units TABS TAKE 3 TABLETS BY MOUTH TWICE DAILY 540 tablet 3    senna (SENOKOT) 8.6 MG TABS tablet TAKE 1 TABLET BY MOUTH TWICE DAILY 180 tablet 3    triamcinolone (KENALOG) 0.1 % cream APPLY TOPICALLY TWICE DAILY 80 g 5    NOVOLOG FLEXPEN 100 UNIT/ML injection pen INJECT 4 UNITS AT LUNCH AND  6 UNITS AT SUPPER (EACH PEN EXPIRES 28 DAYS AFTER 1ST USE) 15 mL 3    fluticasone (FLONASE) 50 MCG/ACT nasal spray USE 2 SPRAYS NASALLY EVERY DAY 48 g 3    loratadine (CLARITIN) 10 MG tablet Take 10 mg by mouth daily as needed      nystatin (MYCOSTATIN) 360275 UNIT/GM powder Apply 3 times daily.  60 g 5    VITAMIN A PO Take 2,400 mcg by mouth daily      PRODIGY NO CODING BLOOD GLUC strip TEST FOUR TIMES DAILY AS DIRECTED 400 strip 3    vitamin E 400 UNIT capsule Take 400 Units by mouth daily      COMBIGAN 0.2-0.5 % ophthalmic solution PLACE 1 DROP INTO BOTH EYES DAILY 15 mL 3    Compression Stockings MISC by Does not apply route 15-20mmHg  Knee high  Open tow  With assist device to help put them on 1 each 0    Zinc 50 MG TABS Take 50 mg by mouth daily      niacin 500 MG extended release capsule Take 500 mg by mouth daily      folic acid (FOLVITE) 148 MCG tablet Take 800 mcg by mouth daily      Cyanocobalamin (VITAMIN B12 PO) Take 2,500 mcg by mouth daily      B Complex-C use: No    Sexual activity: Never   Lifestyle    Physical activity:     Days per week: None     Minutes per session: None    Stress: None   Relationships    Social connections:     Talks on phone: None     Gets together: None     Attends [employer] service: None     Active member of club or organization: None     Attends meetings of clubs or organizations: None     Relationship status: None    Intimate partner violence:     Fear of current or ex partner: None     Emotionally abused: None     Physically abused: None     Forced sexual activity: None   Other Topics Concern    None   Social History Narrative    None         Family and Social Historyreviewed in the electronic medical record. Imaging:Reviewed available imaging in our system with the patient. No results found. Objective:     Physical Exam:  Vitals:    08/22/19 1445   BP: 130/64   Site: Right Upper Arm   Position: Sitting   Cuff Size: Large Adult   Resp: 16   Weight: 262 lb 12.8 oz (119.2 kg)   Height: 5' 4.02\" (1.626 m)          Physical Exam   Constitutional: She is oriented to person, place, and time. She appears well-developed and well-nourished. No distress. HENT:   Head: Normocephalic and atraumatic. Right Ear: External ear normal. No decreased hearing is noted. Left Ear: External ear normal. No decreased hearing is noted. Nose: Nose normal.   Mouth/Throat: Oropharynx is clear and moist and mucous membranes are normal.   Eyes: Conjunctivae and lids are normal. No scleral icterus. Neck: Phonation normal.   Cardiovascular:   No BLE edema present   Pulmonary/Chest: Effort normal. No accessory muscle usage. No respiratory distress. Abdominal: Normal appearance. Genitourinary:   Genitourinary Comments: deferred   Neurological: She is alert and oriented to person, place, and time. Skin: Skin is warm, dry and intact. No rash noted. She is not diaphoretic. No cyanosis or erythema. No pallor.    Psychiatric: She has a normal mood and affect. Her speech is normal and behavior is normal.     Back Exam     Range of Motion   Extension: normal   Flexion: normal   Lateral bend right: normal   Lateral bend left: normal   Rotation right: normal   Rotation left: normal     Muscle Strength   Right Quadriceps:  5/5   Left Quadriceps:  5/5   Right Hamstrings:  5/5   Left Hamstrings:  5/5     Tests   Straight leg raise right: negative  Straight leg raise left: negative    Other   Toe walk: normal  Heel walk: normal  Sensation: normal  Gait: normal                                      Assessment: This is a 77 y.o. female patient with:    Diagnosis:   Diagnosis Orders   1. Cervical radiculopathy     2. Cervical facet joint syndrome     3. Bilateral carotid artery occlusion         Medical Comorbidities:  As listed in the patient's past medical and surgical history    Functional Limitations:   Pain limits function and quality of life. Plan: To see dr Tyler Diego for  Carotid artery  Disease  In jan   Should wait on cervical facet  If worsens  Patient should call  And we can try to clear with Drradha     Meds:   Controlled Substances Monitoring: Pt educated about the risks of taking opiates,including increased sedation, constipation, slowed breathing, tolerance, dependence,and addiction. New Prescriptions    No medications on file      No orders of the defined types were placed in this encounter. No orders of the defined types were placed in this encounter. No follow-ups on file. The patient expressed understanding of the above assessment and plan. Totaltime spent face to face with patient was 25 minutes inwhich  50% or more of the time was spent in counseling, education about risk andbenefits of the above plan, and coordination of care.

## 2019-08-26 RX ORDER — BLOOD SUGAR DIAGNOSTIC
STRIP MISCELLANEOUS
Qty: 500 STRIP | Refills: 3 | OUTPATIENT
Start: 2019-08-26

## 2019-08-26 RX ORDER — LANCETS 28 GAUGE
EACH MISCELLANEOUS
Qty: 500 EACH | Refills: 3 | OUTPATIENT
Start: 2019-08-26

## 2019-08-27 DIAGNOSIS — E11.42 TYPE 2 DIABETES MELLITUS WITH DIABETIC POLYNEUROPATHY, WITH LONG-TERM CURRENT USE OF INSULIN (HCC): ICD-10-CM

## 2019-08-27 DIAGNOSIS — Z79.4 TYPE 2 DIABETES MELLITUS WITH DIABETIC POLYNEUROPATHY, WITH LONG-TERM CURRENT USE OF INSULIN (HCC): ICD-10-CM

## 2019-08-27 RX ORDER — INSULIN GLARGINE 100 [IU]/ML
10 INJECTION, SOLUTION SUBCUTANEOUS 2 TIMES DAILY
Qty: 3 VIAL | Refills: 3 | Status: SHIPPED | OUTPATIENT
Start: 2019-08-27 | End: 2020-01-21 | Stop reason: SDUPTHER

## 2019-09-16 RX ORDER — ATORVASTATIN CALCIUM 40 MG/1
40 TABLET, FILM COATED ORAL DAILY
Qty: 90 TABLET | Refills: 3 | Status: SHIPPED | OUTPATIENT
Start: 2019-09-16 | End: 2020-01-10

## 2019-09-30 ENCOUNTER — HOSPITAL ENCOUNTER (OUTPATIENT)
Age: 69
Setting detail: OUTPATIENT SURGERY
Discharge: HOME | End: 2019-09-30
Attending: SURGERY | Admitting: SURGERY
Payer: MEDICARE

## 2019-09-30 ENCOUNTER — ANESTHESIA (OUTPATIENT)
Dept: OPERATING ROOM | Age: 69
End: 2019-09-30
Payer: MEDICARE

## 2019-09-30 ENCOUNTER — ANESTHESIA EVENT (OUTPATIENT)
Dept: OPERATING ROOM | Age: 69
End: 2019-09-30
Payer: MEDICARE

## 2019-09-30 VITALS — OXYGEN SATURATION: 96 % | DIASTOLIC BLOOD PRESSURE: 46 MMHG | SYSTOLIC BLOOD PRESSURE: 72 MMHG

## 2019-09-30 VITALS
TEMPERATURE: 97.2 F | SYSTOLIC BLOOD PRESSURE: 100 MMHG | HEART RATE: 62 BPM | OXYGEN SATURATION: 94 % | DIASTOLIC BLOOD PRESSURE: 60 MMHG | HEIGHT: 64 IN | BODY MASS INDEX: 46.78 KG/M2 | WEIGHT: 274 LBS | RESPIRATION RATE: 16 BRPM

## 2019-09-30 LAB — GLUCOSE BLD-MCNC: 132 MG/DL (ref 65–105)

## 2019-09-30 PROCEDURE — 3700000000 HC ANESTHESIA ATTENDED CARE: Performed by: SURGERY

## 2019-09-30 PROCEDURE — 3609009500 HC COLONOSCOPY DIAGNOSTIC OR SCREENING: Performed by: SURGERY

## 2019-09-30 PROCEDURE — 7100000011 HC PHASE II RECOVERY - ADDTL 15 MIN: Performed by: SURGERY

## 2019-09-30 PROCEDURE — 6360000002 HC RX W HCPCS: Performed by: NURSE ANESTHETIST, CERTIFIED REGISTERED

## 2019-09-30 PROCEDURE — 2580000003 HC RX 258: Performed by: SURGERY

## 2019-09-30 PROCEDURE — 45378 DIAGNOSTIC COLONOSCOPY: CPT | Performed by: SURGERY

## 2019-09-30 PROCEDURE — 3700000001 HC ADD 15 MINUTES (ANESTHESIA): Performed by: SURGERY

## 2019-09-30 PROCEDURE — 2500000003 HC RX 250 WO HCPCS: Performed by: NURSE ANESTHETIST, CERTIFIED REGISTERED

## 2019-09-30 PROCEDURE — 82947 ASSAY GLUCOSE BLOOD QUANT: CPT

## 2019-09-30 PROCEDURE — 7100000010 HC PHASE II RECOVERY - FIRST 15 MIN: Performed by: SURGERY

## 2019-09-30 PROCEDURE — 2709999900 HC NON-CHARGEABLE SUPPLY: Performed by: SURGERY

## 2019-09-30 RX ORDER — PROPOFOL 10 MG/ML
INJECTION, EMULSION INTRAVENOUS PRN
Status: DISCONTINUED | OUTPATIENT
Start: 2019-09-30 | End: 2019-09-30 | Stop reason: SDUPTHER

## 2019-09-30 RX ORDER — SODIUM CHLORIDE, SODIUM LACTATE, POTASSIUM CHLORIDE, CALCIUM CHLORIDE 600; 310; 30; 20 MG/100ML; MG/100ML; MG/100ML; MG/100ML
INJECTION, SOLUTION INTRAVENOUS CONTINUOUS
Status: DISCONTINUED | OUTPATIENT
Start: 2019-09-30 | End: 2019-09-30 | Stop reason: HOSPADM

## 2019-09-30 RX ORDER — LIDOCAINE HYDROCHLORIDE 40 MG/ML
INJECTION, SOLUTION RETROBULBAR; TOPICAL PRN
Status: DISCONTINUED | OUTPATIENT
Start: 2019-09-30 | End: 2019-09-30 | Stop reason: SDUPTHER

## 2019-09-30 RX ADMIN — SODIUM CHLORIDE, POTASSIUM CHLORIDE, SODIUM LACTATE AND CALCIUM CHLORIDE: 600; 310; 30; 20 INJECTION, SOLUTION INTRAVENOUS at 09:06

## 2019-09-30 RX ADMIN — PROPOFOL 200 MG: 10 INJECTION, EMULSION INTRAVENOUS at 09:54

## 2019-09-30 RX ADMIN — LIDOCAINE HYDROCHLORIDE 80 MG: 40 INJECTION, SOLUTION RETROBULBAR; TOPICAL at 09:54

## 2019-09-30 ASSESSMENT — PAIN SCALES - GENERAL
PAINLEVEL_OUTOF10: 0
PAINLEVEL_OUTOF10: 0

## 2019-09-30 ASSESSMENT — PAIN - FUNCTIONAL ASSESSMENT: PAIN_FUNCTIONAL_ASSESSMENT: 0-10

## 2019-09-30 NOTE — ANESTHESIA PRE PROCEDURE
VITAMIN A PO Take 2,400 mcg by mouth daily    Historical Provider, MD   vitamin E 400 UNIT capsule Take 400 Units by mouth daily    Historical Provider, MD   COMBIGAN 0.2-0.5 % ophthalmic solution PLACE 1 DROP INTO BOTH EYES DAILY 1/2/18   Wilfrido Gray MD   Compression Stockings MISC by Does not apply route 15-20mmHg  Knee high  Open tow  With assist device to help put them on 5/10/17   Gray Bains DO   Zinc 50 MG TABS Take 50 mg by mouth daily    Historical Provider, MD   niacin 500 MG extended release capsule Take 500 mg by mouth daily    Historical Provider, MD   folic acid (FOLVITE) 274 MCG tablet Take 800 mcg by mouth daily    Historical Provider, MD   Cyanocobalamin (VITAMIN B12 PO) Take 2,500 mcg by mouth daily    Historical Provider, MD   B Complex-C (SUPER B COMPLEX PO) Take by mouth daily    Historical Provider, MD   travoprost, benzalkonium, (TRAVATAN) 0.004 % ophthalmic solution Place 1 drop into both eyes daily 10/7/16   Wilfrido Gray MD   CPAP Machine MISC by Does not apply route Pressure of 13 (AirSense 10)  [employer].    Historical Provider, MD   ferrous sulfate 325 (65 FE) MG EC tablet Take 325 mg by mouth daily (with breakfast)    Historical Provider, MD   Multiple Vitamin (MULTI VITAMIN DAILY) TABS Take by mouth daily    Historical Provider, MD   nitroGLYCERIN (NITROSTAT) 0.4 MG SL tablet Place 0.4 mg under the tongue every 5 minutes as needed for Chest pain Dissolve 1 tab under tongue at first sign of chest pain. May repeat every 5 minutes until relief is obtained. If pain persists after taking 3 tabs in a 15-minute period, or the pain is different than is typically experienced, call 9-1-1 immediately. Historical Provider, MD       Current medications:    No current facility-administered medications for this visit. No current outpatient medications on file.      Facility-Administered Medications Ordered in Other Visits   Medication Dose Route Frequency Provider Last Rate

## 2019-10-01 ENCOUNTER — CARE COORDINATION (OUTPATIENT)
Dept: CARE COORDINATION | Age: 69
End: 2019-10-01

## 2019-10-04 DIAGNOSIS — E55.9 VITAMIN D DEFICIENCY: ICD-10-CM

## 2019-10-04 RX ORDER — AVOBENZONE, HOMOSALATE, OCTISALATE, OCTOCRYLENE 30; 100; 50; 25 MG/ML; MG/ML; MG/ML; MG/ML
SPRAY TOPICAL
Qty: 540 TABLET | Refills: 3 | Status: SHIPPED | OUTPATIENT
Start: 2019-10-04 | End: 2020-01-27

## 2019-10-07 ENCOUNTER — TELEPHONE (OUTPATIENT)
Dept: INTERNAL MEDICINE | Age: 69
End: 2019-10-07

## 2019-10-07 ENCOUNTER — CARE COORDINATION (OUTPATIENT)
Dept: CARE COORDINATION | Age: 69
End: 2019-10-07

## 2019-10-07 DIAGNOSIS — E55.9 VITAMIN D DEFICIENCY: ICD-10-CM

## 2019-10-08 ENCOUNTER — CARE COORDINATION (OUTPATIENT)
Dept: CARE COORDINATION | Age: 69
End: 2019-10-08

## 2019-10-11 ENCOUNTER — TELEPHONE (OUTPATIENT)
Dept: INTERNAL MEDICINE | Age: 69
End: 2019-10-11

## 2019-10-14 ENCOUNTER — TELEPHONE (OUTPATIENT)
Dept: INTERNAL MEDICINE | Age: 69
End: 2019-10-14

## 2019-10-18 ENCOUNTER — CARE COORDINATION (OUTPATIENT)
Dept: CARE COORDINATION | Age: 69
End: 2019-10-18

## 2019-10-21 ENCOUNTER — TELEPHONE (OUTPATIENT)
Dept: INTERNAL MEDICINE | Age: 69
End: 2019-10-21

## 2019-10-22 ENCOUNTER — CARE COORDINATION (OUTPATIENT)
Dept: CARE COORDINATION | Age: 69
End: 2019-10-22

## 2019-10-23 ENCOUNTER — TELEPHONE (OUTPATIENT)
Dept: INTERNAL MEDICINE | Age: 69
End: 2019-10-23
Payer: MEDICARE

## 2019-10-23 DIAGNOSIS — E11.42 TYPE 2 DIABETES MELLITUS WITH DIABETIC POLYNEUROPATHY, WITH LONG-TERM CURRENT USE OF INSULIN (HCC): ICD-10-CM

## 2019-10-23 DIAGNOSIS — M54.16 LUMBAR RADICULOPATHY: ICD-10-CM

## 2019-10-23 DIAGNOSIS — K74.60 HEPATIC CIRRHOSIS, UNSPECIFIED HEPATIC CIRRHOSIS TYPE, UNSPECIFIED WHETHER ASCITES PRESENT (HCC): Primary | ICD-10-CM

## 2019-10-23 DIAGNOSIS — M47.817 LUMBOSACRAL SPONDYLOSIS WITHOUT MYELOPATHY: ICD-10-CM

## 2019-10-23 DIAGNOSIS — M51.36 DDD (DEGENERATIVE DISC DISEASE), LUMBAR: ICD-10-CM

## 2019-10-23 DIAGNOSIS — Z79.4 TYPE 2 DIABETES MELLITUS WITH DIABETIC POLYNEUROPATHY, WITH LONG-TERM CURRENT USE OF INSULIN (HCC): ICD-10-CM

## 2019-10-23 DIAGNOSIS — R53.82 CHRONIC FATIGUE: ICD-10-CM

## 2019-10-23 PROCEDURE — G0179 MD RECERTIFICATION HHA PT: HCPCS | Performed by: INTERNAL MEDICINE

## 2019-10-24 ENCOUNTER — OFFICE VISIT (OUTPATIENT)
Dept: PAIN MANAGEMENT | Age: 69
End: 2019-10-24
Payer: MEDICARE

## 2019-10-24 VITALS
BODY MASS INDEX: 44.73 KG/M2 | HEIGHT: 64 IN | WEIGHT: 262 LBS | HEART RATE: 64 BPM | DIASTOLIC BLOOD PRESSURE: 68 MMHG | SYSTOLIC BLOOD PRESSURE: 120 MMHG

## 2019-10-24 DIAGNOSIS — M47.812 CERVICAL FACET SYNDROME: Primary | ICD-10-CM

## 2019-10-24 DIAGNOSIS — M54.16 LUMBAR RADICULOPATHY: ICD-10-CM

## 2019-10-24 DIAGNOSIS — M54.12 CERVICAL RADICULOPATHY: ICD-10-CM

## 2019-10-24 DIAGNOSIS — I65.23 CAROTID STENOSIS, ASYMPTOMATIC, BILATERAL: ICD-10-CM

## 2019-10-24 DIAGNOSIS — M47.816 LUMBAR FACET JOINT SYNDROME: ICD-10-CM

## 2019-10-24 DIAGNOSIS — M79.18 CERVICAL MYOFASCIAL PAIN SYNDROME: ICD-10-CM

## 2019-10-24 DIAGNOSIS — Z96.612 STATUS POST SHOULDER REPLACEMENT, LEFT: ICD-10-CM

## 2019-10-24 PROCEDURE — 1090F PRES/ABSN URINE INCON ASSESS: CPT | Performed by: PHYSICAL MEDICINE & REHABILITATION

## 2019-10-24 PROCEDURE — 99214 OFFICE O/P EST MOD 30 MIN: CPT | Performed by: PHYSICAL MEDICINE & REHABILITATION

## 2019-10-24 PROCEDURE — G8598 ASA/ANTIPLAT THER USED: HCPCS | Performed by: PHYSICAL MEDICINE & REHABILITATION

## 2019-10-24 PROCEDURE — G8427 DOCREV CUR MEDS BY ELIG CLIN: HCPCS | Performed by: PHYSICAL MEDICINE & REHABILITATION

## 2019-10-24 PROCEDURE — 1123F ACP DISCUSS/DSCN MKR DOCD: CPT | Performed by: PHYSICAL MEDICINE & REHABILITATION

## 2019-10-24 PROCEDURE — G8400 PT W/DXA NO RESULTS DOC: HCPCS | Performed by: PHYSICAL MEDICINE & REHABILITATION

## 2019-10-24 PROCEDURE — 4040F PNEUMOC VAC/ADMIN/RCVD: CPT | Performed by: PHYSICAL MEDICINE & REHABILITATION

## 2019-10-24 PROCEDURE — G8417 CALC BMI ABV UP PARAM F/U: HCPCS | Performed by: PHYSICAL MEDICINE & REHABILITATION

## 2019-10-24 PROCEDURE — 3017F COLORECTAL CA SCREEN DOC REV: CPT | Performed by: PHYSICAL MEDICINE & REHABILITATION

## 2019-10-24 PROCEDURE — G8484 FLU IMMUNIZE NO ADMIN: HCPCS | Performed by: PHYSICAL MEDICINE & REHABILITATION

## 2019-10-24 PROCEDURE — 20553 NJX 1/MLT TRIGGER POINTS 3/>: CPT | Performed by: PHYSICAL MEDICINE & REHABILITATION

## 2019-10-24 PROCEDURE — 1036F TOBACCO NON-USER: CPT | Performed by: PHYSICAL MEDICINE & REHABILITATION

## 2019-10-24 ASSESSMENT — ENCOUNTER SYMPTOMS
DIARRHEA: 0
RESPIRATORY NEGATIVE: 1
ALLERGIC/IMMUNOLOGIC NEGATIVE: 1
CONSTIPATION: 0
EYES NEGATIVE: 1
BACK PAIN: 1

## 2019-10-28 ENCOUNTER — TELEPHONE (OUTPATIENT)
Dept: SURGERY | Age: 69
End: 2019-10-28

## 2019-10-28 ENCOUNTER — OFFICE VISIT (OUTPATIENT)
Dept: CARDIOLOGY | Age: 69
End: 2019-10-28
Payer: MEDICARE

## 2019-10-28 VITALS
HEART RATE: 51 BPM | SYSTOLIC BLOOD PRESSURE: 128 MMHG | HEIGHT: 64 IN | BODY MASS INDEX: 45.48 KG/M2 | WEIGHT: 266.4 LBS | DIASTOLIC BLOOD PRESSURE: 68 MMHG

## 2019-10-28 DIAGNOSIS — I25.10 CORONARY ARTERY DISEASE DUE TO LIPID RICH PLAQUE: ICD-10-CM

## 2019-10-28 DIAGNOSIS — I25.83 CORONARY ARTERY DISEASE DUE TO LIPID RICH PLAQUE: ICD-10-CM

## 2019-10-28 DIAGNOSIS — I25.10 CORONARY ARTERY DISEASE INVOLVING NATIVE CORONARY ARTERY OF NATIVE HEART WITHOUT ANGINA PECTORIS: Primary | ICD-10-CM

## 2019-10-28 PROCEDURE — 1090F PRES/ABSN URINE INCON ASSESS: CPT | Performed by: INTERNAL MEDICINE

## 2019-10-28 PROCEDURE — 1123F ACP DISCUSS/DSCN MKR DOCD: CPT | Performed by: INTERNAL MEDICINE

## 2019-10-28 PROCEDURE — 4040F PNEUMOC VAC/ADMIN/RCVD: CPT | Performed by: INTERNAL MEDICINE

## 2019-10-28 PROCEDURE — G8598 ASA/ANTIPLAT THER USED: HCPCS | Performed by: INTERNAL MEDICINE

## 2019-10-28 PROCEDURE — 3017F COLORECTAL CA SCREEN DOC REV: CPT | Performed by: INTERNAL MEDICINE

## 2019-10-28 PROCEDURE — G8427 DOCREV CUR MEDS BY ELIG CLIN: HCPCS | Performed by: INTERNAL MEDICINE

## 2019-10-28 PROCEDURE — G8484 FLU IMMUNIZE NO ADMIN: HCPCS | Performed by: INTERNAL MEDICINE

## 2019-10-28 PROCEDURE — 1036F TOBACCO NON-USER: CPT | Performed by: INTERNAL MEDICINE

## 2019-10-28 PROCEDURE — G8400 PT W/DXA NO RESULTS DOC: HCPCS | Performed by: INTERNAL MEDICINE

## 2019-10-28 PROCEDURE — G8417 CALC BMI ABV UP PARAM F/U: HCPCS | Performed by: INTERNAL MEDICINE

## 2019-10-28 PROCEDURE — 99214 OFFICE O/P EST MOD 30 MIN: CPT | Performed by: INTERNAL MEDICINE

## 2019-10-28 PROCEDURE — 93000 ELECTROCARDIOGRAM COMPLETE: CPT | Performed by: INTERNAL MEDICINE

## 2019-10-30 ENCOUNTER — CARE COORDINATION (OUTPATIENT)
Dept: CARE COORDINATION | Age: 69
End: 2019-10-30

## 2019-10-30 RX ORDER — AMITRIPTYLINE HYDROCHLORIDE 25 MG/1
TABLET, FILM COATED ORAL
Qty: 90 TABLET | Refills: 3 | Status: SHIPPED | OUTPATIENT
Start: 2019-10-30 | End: 2020-07-27

## 2019-10-30 RX ORDER — BUPROPION HYDROCHLORIDE 75 MG/1
TABLET ORAL
Qty: 180 TABLET | Refills: 3 | Status: SHIPPED | OUTPATIENT
Start: 2019-10-30 | End: 2019-11-05 | Stop reason: SDUPTHER

## 2019-11-01 ENCOUNTER — CARE COORDINATION (OUTPATIENT)
Dept: CARE COORDINATION | Age: 69
End: 2019-11-01

## 2019-11-05 RX ORDER — BUPROPION HYDROCHLORIDE 75 MG/1
TABLET ORAL
Qty: 180 TABLET | Refills: 3 | Status: SHIPPED | OUTPATIENT
Start: 2019-11-05 | End: 2019-11-16 | Stop reason: SDUPTHER

## 2019-11-15 ENCOUNTER — TELEPHONE (OUTPATIENT)
Dept: INTERNAL MEDICINE | Age: 69
End: 2019-11-15

## 2019-11-18 RX ORDER — BUPROPION HYDROCHLORIDE 75 MG/1
TABLET ORAL
Qty: 180 TABLET | Refills: 3 | Status: SHIPPED | OUTPATIENT
Start: 2019-11-18 | End: 2020-07-27 | Stop reason: SDUPTHER

## 2019-11-20 DIAGNOSIS — I67.82 CHRONIC CEREBRAL ISCHEMIA: ICD-10-CM

## 2019-11-20 DIAGNOSIS — G45.9 TIA (TRANSIENT ISCHEMIC ATTACK): ICD-10-CM

## 2019-11-20 RX ORDER — IBUPROFEN 400 MG/1
TABLET ORAL
Qty: 360 TABLET | Refills: 3 | Status: SHIPPED | OUTPATIENT
Start: 2019-11-20 | End: 2020-07-27

## 2019-11-20 RX ORDER — CLOPIDOGREL BISULFATE 75 MG/1
TABLET ORAL
Qty: 90 TABLET | Refills: 3 | Status: SHIPPED | OUTPATIENT
Start: 2019-11-20 | End: 2020-07-27

## 2019-11-25 ENCOUNTER — OFFICE VISIT (OUTPATIENT)
Dept: PAIN MANAGEMENT | Age: 69
End: 2019-11-25
Payer: MEDICARE

## 2019-11-25 VITALS
BODY MASS INDEX: 46.88 KG/M2 | HEART RATE: 56 BPM | HEIGHT: 64 IN | SYSTOLIC BLOOD PRESSURE: 108 MMHG | WEIGHT: 274.6 LBS | DIASTOLIC BLOOD PRESSURE: 66 MMHG

## 2019-11-25 DIAGNOSIS — M47.817 LUMBOSACRAL SPONDYLOSIS WITHOUT MYELOPATHY: ICD-10-CM

## 2019-11-25 DIAGNOSIS — M54.12 CERVICAL RADICULOPATHY: ICD-10-CM

## 2019-11-25 DIAGNOSIS — M54.16 LUMBAR RADICULOPATHY: Primary | ICD-10-CM

## 2019-11-25 DIAGNOSIS — M47.816 LUMBAR FACET JOINT SYNDROME: ICD-10-CM

## 2019-11-25 PROCEDURE — G8417 CALC BMI ABV UP PARAM F/U: HCPCS | Performed by: PHYSICAL MEDICINE & REHABILITATION

## 2019-11-25 PROCEDURE — 1036F TOBACCO NON-USER: CPT | Performed by: PHYSICAL MEDICINE & REHABILITATION

## 2019-11-25 PROCEDURE — G8598 ASA/ANTIPLAT THER USED: HCPCS | Performed by: PHYSICAL MEDICINE & REHABILITATION

## 2019-11-25 PROCEDURE — G8400 PT W/DXA NO RESULTS DOC: HCPCS | Performed by: PHYSICAL MEDICINE & REHABILITATION

## 2019-11-25 PROCEDURE — 1090F PRES/ABSN URINE INCON ASSESS: CPT | Performed by: PHYSICAL MEDICINE & REHABILITATION

## 2019-11-25 PROCEDURE — G8484 FLU IMMUNIZE NO ADMIN: HCPCS | Performed by: PHYSICAL MEDICINE & REHABILITATION

## 2019-11-25 PROCEDURE — 1123F ACP DISCUSS/DSCN MKR DOCD: CPT | Performed by: PHYSICAL MEDICINE & REHABILITATION

## 2019-11-25 PROCEDURE — 3017F COLORECTAL CA SCREEN DOC REV: CPT | Performed by: PHYSICAL MEDICINE & REHABILITATION

## 2019-11-25 PROCEDURE — 99214 OFFICE O/P EST MOD 30 MIN: CPT | Performed by: PHYSICAL MEDICINE & REHABILITATION

## 2019-11-25 PROCEDURE — 4040F PNEUMOC VAC/ADMIN/RCVD: CPT | Performed by: PHYSICAL MEDICINE & REHABILITATION

## 2019-11-25 PROCEDURE — G8427 DOCREV CUR MEDS BY ELIG CLIN: HCPCS | Performed by: PHYSICAL MEDICINE & REHABILITATION

## 2019-11-25 ASSESSMENT — ENCOUNTER SYMPTOMS
RESPIRATORY NEGATIVE: 1
DIARRHEA: 0
BACK PAIN: 1
ALLERGIC/IMMUNOLOGIC NEGATIVE: 1
CONSTIPATION: 0
EYES NEGATIVE: 1

## 2019-11-26 ENCOUNTER — HOSPITAL ENCOUNTER (OUTPATIENT)
Dept: MAMMOGRAPHY | Age: 69
Discharge: HOME | End: 2019-11-28
Payer: MEDICARE

## 2019-11-26 ENCOUNTER — HOSPITAL ENCOUNTER (OUTPATIENT)
Dept: INTERVENTIONAL RADIOLOGY/VASCULAR | Age: 69
Discharge: HOME | End: 2019-11-28
Payer: MEDICARE

## 2019-11-26 DIAGNOSIS — I65.23 CAROTID STENOSIS, ASYMPTOMATIC, BILATERAL: ICD-10-CM

## 2019-11-26 DIAGNOSIS — Z12.31 OTHER SCREENING MAMMOGRAM: ICD-10-CM

## 2019-11-26 DIAGNOSIS — J01.00 ACUTE NON-RECURRENT MAXILLARY SINUSITIS: ICD-10-CM

## 2019-11-26 PROCEDURE — 77063 BREAST TOMOSYNTHESIS BI: CPT

## 2019-11-26 PROCEDURE — 93880 EXTRACRANIAL BILAT STUDY: CPT

## 2019-11-26 RX ORDER — FLUTICASONE PROPIONATE 50 MCG
SPRAY, SUSPENSION (ML) NASAL
Qty: 48 G | Refills: 3 | Status: SHIPPED | OUTPATIENT
Start: 2019-11-26 | End: 2020-09-12

## 2019-12-02 RX ORDER — PERPHENAZINE 16 MG/1
TABLET, FILM COATED ORAL
Qty: 400 STRIP | Refills: 5 | Status: SHIPPED | OUTPATIENT
Start: 2019-12-02 | End: 2020-12-11

## 2019-12-03 DIAGNOSIS — Z12.31 OTHER SCREENING MAMMOGRAM: Primary | ICD-10-CM

## 2019-12-04 ENCOUNTER — OFFICE VISIT (OUTPATIENT)
Dept: PULMONOLOGY | Age: 69
End: 2019-12-04
Payer: MEDICARE

## 2019-12-04 VITALS
OXYGEN SATURATION: 94 % | BODY MASS INDEX: 46.71 KG/M2 | WEIGHT: 273.6 LBS | HEIGHT: 64 IN | SYSTOLIC BLOOD PRESSURE: 128 MMHG | DIASTOLIC BLOOD PRESSURE: 70 MMHG | HEART RATE: 51 BPM

## 2019-12-04 DIAGNOSIS — E66.01 MORBID OBESITY WITH BMI OF 45.0-49.9, ADULT (HCC): ICD-10-CM

## 2019-12-04 DIAGNOSIS — J47.9 BRONCHIECTASIS WITHOUT COMPLICATION (HCC): ICD-10-CM

## 2019-12-04 DIAGNOSIS — G47.33 OSA ON CPAP: Primary | ICD-10-CM

## 2019-12-04 DIAGNOSIS — Z99.89 OSA ON CPAP: Primary | ICD-10-CM

## 2019-12-04 PROCEDURE — 3017F COLORECTAL CA SCREEN DOC REV: CPT | Performed by: NURSE PRACTITIONER

## 2019-12-04 PROCEDURE — G8400 PT W/DXA NO RESULTS DOC: HCPCS | Performed by: NURSE PRACTITIONER

## 2019-12-04 PROCEDURE — 1123F ACP DISCUSS/DSCN MKR DOCD: CPT | Performed by: NURSE PRACTITIONER

## 2019-12-04 PROCEDURE — 1090F PRES/ABSN URINE INCON ASSESS: CPT | Performed by: NURSE PRACTITIONER

## 2019-12-04 PROCEDURE — 99213 OFFICE O/P EST LOW 20 MIN: CPT | Performed by: NURSE PRACTITIONER

## 2019-12-04 PROCEDURE — 4040F PNEUMOC VAC/ADMIN/RCVD: CPT | Performed by: NURSE PRACTITIONER

## 2019-12-04 PROCEDURE — G8417 CALC BMI ABV UP PARAM F/U: HCPCS | Performed by: NURSE PRACTITIONER

## 2019-12-04 PROCEDURE — G8427 DOCREV CUR MEDS BY ELIG CLIN: HCPCS | Performed by: NURSE PRACTITIONER

## 2019-12-04 PROCEDURE — 1036F TOBACCO NON-USER: CPT | Performed by: NURSE PRACTITIONER

## 2019-12-04 PROCEDURE — G8598 ASA/ANTIPLAT THER USED: HCPCS | Performed by: NURSE PRACTITIONER

## 2019-12-04 PROCEDURE — G8484 FLU IMMUNIZE NO ADMIN: HCPCS | Performed by: NURSE PRACTITIONER

## 2019-12-04 RX ORDER — SENNOSIDES 8.6 MG
TABLET ORAL
Qty: 180 TABLET | Refills: 3 | Status: SHIPPED | OUTPATIENT
Start: 2019-12-04 | End: 2021-02-02

## 2019-12-04 ASSESSMENT — ENCOUNTER SYMPTOMS
ALLERGIC/IMMUNOLOGIC NEGATIVE: 1
EYES NEGATIVE: 1
GASTROINTESTINAL NEGATIVE: 1

## 2019-12-12 ENCOUNTER — APPOINTMENT (OUTPATIENT)
Dept: GENERAL RADIOLOGY | Age: 69
End: 2019-12-12
Attending: PHYSICAL MEDICINE & REHABILITATION
Payer: MEDICARE

## 2019-12-12 ENCOUNTER — ANESTHESIA EVENT (OUTPATIENT)
Dept: OPERATING ROOM | Age: 69
End: 2019-12-12
Payer: MEDICARE

## 2019-12-12 ENCOUNTER — ANESTHESIA (OUTPATIENT)
Dept: OPERATING ROOM | Age: 69
End: 2019-12-12
Payer: MEDICARE

## 2019-12-12 ENCOUNTER — HOSPITAL ENCOUNTER (OUTPATIENT)
Age: 69
Setting detail: OUTPATIENT SURGERY
Discharge: HOME | End: 2019-12-12
Attending: PHYSICAL MEDICINE & REHABILITATION | Admitting: PHYSICAL MEDICINE & REHABILITATION
Payer: MEDICARE

## 2019-12-12 VITALS
BODY MASS INDEX: 46.44 KG/M2 | SYSTOLIC BLOOD PRESSURE: 101 MMHG | RESPIRATION RATE: 18 BRPM | WEIGHT: 272 LBS | OXYGEN SATURATION: 98 % | HEIGHT: 64 IN | DIASTOLIC BLOOD PRESSURE: 50 MMHG | TEMPERATURE: 98 F | HEART RATE: 47 BPM

## 2019-12-12 VITALS
SYSTOLIC BLOOD PRESSURE: 102 MMHG | RESPIRATION RATE: 19 BRPM | DIASTOLIC BLOOD PRESSURE: 53 MMHG | OXYGEN SATURATION: 96 %

## 2019-12-12 LAB — GLUCOSE BLD-MCNC: 157 MG/DL (ref 65–105)

## 2019-12-12 PROCEDURE — 82947 ASSAY GLUCOSE BLOOD QUANT: CPT

## 2019-12-12 PROCEDURE — 64483 NJX AA&/STRD TFRM EPI L/S 1: CPT | Performed by: PHYSICAL MEDICINE & REHABILITATION

## 2019-12-12 PROCEDURE — 7100000011 HC PHASE II RECOVERY - ADDTL 15 MIN: Performed by: PHYSICAL MEDICINE & REHABILITATION

## 2019-12-12 PROCEDURE — 3209999900 FLUORO FOR SURGICAL PROCEDURES

## 2019-12-12 PROCEDURE — 3600000056 HC PAIN LEVEL 4 BASE: Performed by: PHYSICAL MEDICINE & REHABILITATION

## 2019-12-12 PROCEDURE — 6360000002 HC RX W HCPCS: Performed by: NURSE ANESTHETIST, CERTIFIED REGISTERED

## 2019-12-12 PROCEDURE — 2500000003 HC RX 250 WO HCPCS: Performed by: PHYSICAL MEDICINE & REHABILITATION

## 2019-12-12 PROCEDURE — 7100000010 HC PHASE II RECOVERY - FIRST 15 MIN: Performed by: PHYSICAL MEDICINE & REHABILITATION

## 2019-12-12 PROCEDURE — 2580000003 HC RX 258: Performed by: PHYSICAL MEDICINE & REHABILITATION

## 2019-12-12 PROCEDURE — 2500000003 HC RX 250 WO HCPCS: Performed by: NURSE ANESTHETIST, CERTIFIED REGISTERED

## 2019-12-12 PROCEDURE — 64484 NJX AA&/STRD TFRM EPI L/S EA: CPT | Performed by: PHYSICAL MEDICINE & REHABILITATION

## 2019-12-12 PROCEDURE — 6360000004 HC RX CONTRAST MEDICATION: Performed by: PHYSICAL MEDICINE & REHABILITATION

## 2019-12-12 PROCEDURE — 6360000002 HC RX W HCPCS: Performed by: PHYSICAL MEDICINE & REHABILITATION

## 2019-12-12 PROCEDURE — 3700000000 HC ANESTHESIA ATTENDED CARE: Performed by: PHYSICAL MEDICINE & REHABILITATION

## 2019-12-12 PROCEDURE — 2709999900 HC NON-CHARGEABLE SUPPLY: Performed by: PHYSICAL MEDICINE & REHABILITATION

## 2019-12-12 RX ORDER — LIDOCAINE HYDROCHLORIDE 20 MG/ML
INJECTION, SOLUTION EPIDURAL; INFILTRATION; INTRACAUDAL; PERINEURAL PRN
Status: DISCONTINUED | OUTPATIENT
Start: 2019-12-12 | End: 2019-12-12 | Stop reason: SDUPTHER

## 2019-12-12 RX ORDER — DEXAMETHASONE SODIUM PHOSPHATE 10 MG/ML
INJECTION INTRAMUSCULAR; INTRAVENOUS PRN
Status: DISCONTINUED | OUTPATIENT
Start: 2019-12-12 | End: 2019-12-12 | Stop reason: ALTCHOICE

## 2019-12-12 RX ORDER — SODIUM CHLORIDE 9 MG/ML
INJECTION INTRAVENOUS PRN
Status: DISCONTINUED | OUTPATIENT
Start: 2019-12-12 | End: 2019-12-12 | Stop reason: ALTCHOICE

## 2019-12-12 RX ORDER — SODIUM CHLORIDE, SODIUM LACTATE, POTASSIUM CHLORIDE, CALCIUM CHLORIDE 600; 310; 30; 20 MG/100ML; MG/100ML; MG/100ML; MG/100ML
INJECTION, SOLUTION INTRAVENOUS CONTINUOUS
Status: DISCONTINUED | OUTPATIENT
Start: 2019-12-12 | End: 2019-12-12 | Stop reason: HOSPADM

## 2019-12-12 RX ORDER — BUPIVACAINE HYDROCHLORIDE 5 MG/ML
INJECTION, SOLUTION EPIDURAL; INTRACAUDAL PRN
Status: DISCONTINUED | OUTPATIENT
Start: 2019-12-12 | End: 2019-12-12 | Stop reason: ALTCHOICE

## 2019-12-12 RX ORDER — SODIUM CHLORIDE 0.9 % (FLUSH) 0.9 %
10 SYRINGE (ML) INJECTION EVERY 12 HOURS SCHEDULED
Status: DISCONTINUED | OUTPATIENT
Start: 2019-12-12 | End: 2019-12-12 | Stop reason: HOSPADM

## 2019-12-12 RX ORDER — PROPOFOL 10 MG/ML
INJECTION, EMULSION INTRAVENOUS PRN
Status: DISCONTINUED | OUTPATIENT
Start: 2019-12-12 | End: 2019-12-12 | Stop reason: SDUPTHER

## 2019-12-12 RX ORDER — SODIUM CHLORIDE 0.9 % (FLUSH) 0.9 %
10 SYRINGE (ML) INJECTION PRN
Status: DISCONTINUED | OUTPATIENT
Start: 2019-12-12 | End: 2019-12-12 | Stop reason: HOSPADM

## 2019-12-12 RX ADMIN — PROPOFOL 50 MG: 10 INJECTION, EMULSION INTRAVENOUS at 10:02

## 2019-12-12 RX ADMIN — PROPOFOL 50 MG: 10 INJECTION, EMULSION INTRAVENOUS at 09:59

## 2019-12-12 RX ADMIN — LIDOCAINE HYDROCHLORIDE 40 MG: 20 INJECTION, SOLUTION EPIDURAL; INFILTRATION; INTRACAUDAL; PERINEURAL at 09:58

## 2019-12-12 RX ADMIN — SODIUM CHLORIDE, POTASSIUM CHLORIDE, SODIUM LACTATE AND CALCIUM CHLORIDE: 600; 310; 30; 20 INJECTION, SOLUTION INTRAVENOUS at 09:25

## 2019-12-12 ASSESSMENT — PAIN SCALES - GENERAL
PAINLEVEL_OUTOF10: 0

## 2019-12-12 ASSESSMENT — PAIN - FUNCTIONAL ASSESSMENT: PAIN_FUNCTIONAL_ASSESSMENT: 0-10

## 2019-12-12 ASSESSMENT — COPD QUESTIONNAIRES: CAT_SEVERITY: NO INTERVAL CHANGE

## 2019-12-30 ENCOUNTER — TELEPHONE (OUTPATIENT)
Dept: INTERNAL MEDICINE | Age: 69
End: 2019-12-30
Payer: MEDICARE

## 2019-12-30 DIAGNOSIS — G45.9 TIA (TRANSIENT ISCHEMIC ATTACK): ICD-10-CM

## 2019-12-30 DIAGNOSIS — K74.60 HEPATIC CIRRHOSIS, UNSPECIFIED HEPATIC CIRRHOSIS TYPE, UNSPECIFIED WHETHER ASCITES PRESENT (HCC): Primary | ICD-10-CM

## 2019-12-30 DIAGNOSIS — F41.9 ANXIETY AND DEPRESSION: ICD-10-CM

## 2019-12-30 DIAGNOSIS — M43.07 SPONDYLOLYSIS OF LUMBOSACRAL REGION: ICD-10-CM

## 2019-12-30 DIAGNOSIS — Z79.4 TYPE 2 DIABETES MELLITUS WITH DIABETIC POLYNEUROPATHY, WITH LONG-TERM CURRENT USE OF INSULIN (HCC): ICD-10-CM

## 2019-12-30 DIAGNOSIS — E11.42 TYPE 2 DIABETES MELLITUS WITH DIABETIC POLYNEUROPATHY, WITH LONG-TERM CURRENT USE OF INSULIN (HCC): ICD-10-CM

## 2019-12-30 DIAGNOSIS — F32.A ANXIETY AND DEPRESSION: ICD-10-CM

## 2019-12-30 DIAGNOSIS — M54.16 LUMBAR RADICULOPATHY: ICD-10-CM

## 2019-12-30 PROCEDURE — G0179 MD RECERTIFICATION HHA PT: HCPCS | Performed by: INTERNAL MEDICINE

## 2020-01-09 ENCOUNTER — TELEPHONE (OUTPATIENT)
Dept: PAIN MANAGEMENT | Age: 70
End: 2020-01-09

## 2020-01-09 NOTE — TELEPHONE ENCOUNTER
Patient was scheduled for 01/20/2020 @ 1030.   Pt does understand how to hold plavix; will call if she has any questions

## 2020-01-10 RX ORDER — ALENDRONATE SODIUM 70 MG/1
TABLET ORAL
Qty: 12 TABLET | Refills: 3 | Status: SHIPPED | OUTPATIENT
Start: 2020-01-10 | End: 2020-12-07

## 2020-01-10 RX ORDER — ATORVASTATIN CALCIUM 40 MG/1
TABLET, FILM COATED ORAL
Qty: 90 TABLET | Refills: 3 | Status: SHIPPED | OUTPATIENT
Start: 2020-01-10 | End: 2020-11-30

## 2020-01-10 RX ORDER — POTASSIUM CHLORIDE 750 MG/1
CAPSULE, EXTENDED RELEASE ORAL
Qty: 180 CAPSULE | Refills: 3 | Status: SHIPPED | OUTPATIENT
Start: 2020-01-10 | End: 2020-12-17

## 2020-01-20 ENCOUNTER — ANESTHESIA (OUTPATIENT)
Dept: OPERATING ROOM | Age: 70
End: 2020-01-20
Payer: MEDICARE

## 2020-01-20 ENCOUNTER — APPOINTMENT (OUTPATIENT)
Dept: GENERAL RADIOLOGY | Age: 70
End: 2020-01-20
Attending: PHYSICAL MEDICINE & REHABILITATION
Payer: MEDICARE

## 2020-01-20 ENCOUNTER — HOSPITAL ENCOUNTER (OUTPATIENT)
Age: 70
Setting detail: OUTPATIENT SURGERY
Discharge: HOME | End: 2020-01-20
Attending: PHYSICAL MEDICINE & REHABILITATION | Admitting: PHYSICAL MEDICINE & REHABILITATION
Payer: MEDICARE

## 2020-01-20 ENCOUNTER — ANESTHESIA EVENT (OUTPATIENT)
Dept: OPERATING ROOM | Age: 70
End: 2020-01-20
Payer: MEDICARE

## 2020-01-20 VITALS
WEIGHT: 271 LBS | OXYGEN SATURATION: 94 % | SYSTOLIC BLOOD PRESSURE: 97 MMHG | RESPIRATION RATE: 16 BRPM | BODY MASS INDEX: 46.26 KG/M2 | HEART RATE: 47 BPM | DIASTOLIC BLOOD PRESSURE: 48 MMHG | TEMPERATURE: 97 F | HEIGHT: 64 IN

## 2020-01-20 VITALS
SYSTOLIC BLOOD PRESSURE: 163 MMHG | OXYGEN SATURATION: 96 % | DIASTOLIC BLOOD PRESSURE: 69 MMHG | RESPIRATION RATE: 21 BRPM

## 2020-01-20 LAB — GLUCOSE BLD-MCNC: 117 MG/DL (ref 65–105)

## 2020-01-20 PROCEDURE — 2500000003 HC RX 250 WO HCPCS: Performed by: NURSE ANESTHETIST, CERTIFIED REGISTERED

## 2020-01-20 PROCEDURE — 7100000011 HC PHASE II RECOVERY - ADDTL 15 MIN: Performed by: PHYSICAL MEDICINE & REHABILITATION

## 2020-01-20 PROCEDURE — 3209999900 FLUORO FOR SURGICAL PROCEDURES

## 2020-01-20 PROCEDURE — 2580000003 HC RX 258: Performed by: PHYSICAL MEDICINE & REHABILITATION

## 2020-01-20 PROCEDURE — 64484 NJX AA&/STRD TFRM EPI L/S EA: CPT | Performed by: PHYSICAL MEDICINE & REHABILITATION

## 2020-01-20 PROCEDURE — 3700000000 HC ANESTHESIA ATTENDED CARE: Performed by: PHYSICAL MEDICINE & REHABILITATION

## 2020-01-20 PROCEDURE — 6360000004 HC RX CONTRAST MEDICATION: Performed by: PHYSICAL MEDICINE & REHABILITATION

## 2020-01-20 PROCEDURE — 64483 NJX AA&/STRD TFRM EPI L/S 1: CPT | Performed by: PHYSICAL MEDICINE & REHABILITATION

## 2020-01-20 PROCEDURE — 6360000002 HC RX W HCPCS: Performed by: PHYSICAL MEDICINE & REHABILITATION

## 2020-01-20 PROCEDURE — 2500000003 HC RX 250 WO HCPCS: Performed by: PHYSICAL MEDICINE & REHABILITATION

## 2020-01-20 PROCEDURE — 2709999900 HC NON-CHARGEABLE SUPPLY: Performed by: PHYSICAL MEDICINE & REHABILITATION

## 2020-01-20 PROCEDURE — 7100000010 HC PHASE II RECOVERY - FIRST 15 MIN: Performed by: PHYSICAL MEDICINE & REHABILITATION

## 2020-01-20 PROCEDURE — 3600000056 HC PAIN LEVEL 4 BASE: Performed by: PHYSICAL MEDICINE & REHABILITATION

## 2020-01-20 PROCEDURE — 6360000002 HC RX W HCPCS: Performed by: NURSE ANESTHETIST, CERTIFIED REGISTERED

## 2020-01-20 PROCEDURE — 82947 ASSAY GLUCOSE BLOOD QUANT: CPT

## 2020-01-20 RX ORDER — SODIUM CHLORIDE 0.9 % (FLUSH) 0.9 %
10 SYRINGE (ML) INJECTION PRN
Status: DISCONTINUED | OUTPATIENT
Start: 2020-01-20 | End: 2020-01-20 | Stop reason: HOSPADM

## 2020-01-20 RX ORDER — DEXAMETHASONE SODIUM PHOSPHATE 10 MG/ML
INJECTION INTRAMUSCULAR; INTRAVENOUS PRN
Status: DISCONTINUED | OUTPATIENT
Start: 2020-01-20 | End: 2020-01-20 | Stop reason: ALTCHOICE

## 2020-01-20 RX ORDER — SODIUM CHLORIDE 9 MG/ML
INJECTION INTRAVENOUS PRN
Status: DISCONTINUED | OUTPATIENT
Start: 2020-01-20 | End: 2020-01-20 | Stop reason: ALTCHOICE

## 2020-01-20 RX ORDER — LIDOCAINE HYDROCHLORIDE 20 MG/ML
INJECTION, SOLUTION EPIDURAL; INFILTRATION; INTRACAUDAL; PERINEURAL PRN
Status: DISCONTINUED | OUTPATIENT
Start: 2020-01-20 | End: 2020-01-20 | Stop reason: SDUPTHER

## 2020-01-20 RX ORDER — SODIUM CHLORIDE 0.9 % (FLUSH) 0.9 %
10 SYRINGE (ML) INJECTION EVERY 12 HOURS SCHEDULED
Status: DISCONTINUED | OUTPATIENT
Start: 2020-01-20 | End: 2020-01-20 | Stop reason: HOSPADM

## 2020-01-20 RX ORDER — BUPIVACAINE HYDROCHLORIDE 5 MG/ML
INJECTION, SOLUTION EPIDURAL; INTRACAUDAL PRN
Status: DISCONTINUED | OUTPATIENT
Start: 2020-01-20 | End: 2020-01-20 | Stop reason: ALTCHOICE

## 2020-01-20 RX ORDER — SODIUM CHLORIDE, SODIUM LACTATE, POTASSIUM CHLORIDE, CALCIUM CHLORIDE 600; 310; 30; 20 MG/100ML; MG/100ML; MG/100ML; MG/100ML
INJECTION, SOLUTION INTRAVENOUS CONTINUOUS
Status: DISCONTINUED | OUTPATIENT
Start: 2020-01-20 | End: 2020-01-20 | Stop reason: HOSPADM

## 2020-01-20 RX ORDER — PROPOFOL 10 MG/ML
INJECTION, EMULSION INTRAVENOUS PRN
Status: DISCONTINUED | OUTPATIENT
Start: 2020-01-20 | End: 2020-01-20 | Stop reason: SDUPTHER

## 2020-01-20 RX ORDER — DEXMEDETOMIDINE HYDROCHLORIDE 100 UG/ML
INJECTION, SOLUTION INTRAVENOUS PRN
Status: DISCONTINUED | OUTPATIENT
Start: 2020-01-20 | End: 2020-01-20 | Stop reason: SDUPTHER

## 2020-01-20 RX ORDER — GABAPENTIN 300 MG/1
CAPSULE ORAL
Qty: 360 CAPSULE | Refills: 3 | Status: SHIPPED | OUTPATIENT
Start: 2020-01-20 | End: 2021-03-08

## 2020-01-20 RX ADMIN — DEXMEDETOMIDINE HYDROCHLORIDE 50 MCG: 100 INJECTION, SOLUTION INTRAVENOUS at 11:04

## 2020-01-20 RX ADMIN — PROPOFOL 100 MG: 10 INJECTION, EMULSION INTRAVENOUS at 11:08

## 2020-01-20 RX ADMIN — LIDOCAINE HYDROCHLORIDE 100 MG: 20 INJECTION, SOLUTION EPIDURAL; INFILTRATION; INTRACAUDAL; PERINEURAL at 11:08

## 2020-01-20 RX ADMIN — SODIUM CHLORIDE, POTASSIUM CHLORIDE, SODIUM LACTATE AND CALCIUM CHLORIDE: 600; 310; 30; 20 INJECTION, SOLUTION INTRAVENOUS at 11:00

## 2020-01-20 ASSESSMENT — PULMONARY FUNCTION TESTS
PIF_VALUE: 0

## 2020-01-20 ASSESSMENT — PAIN SCALES - GENERAL
PAINLEVEL_OUTOF10: 0
PAINLEVEL_OUTOF10: 0

## 2020-01-20 ASSESSMENT — PAIN - FUNCTIONAL ASSESSMENT: PAIN_FUNCTIONAL_ASSESSMENT: 0-10

## 2020-01-20 ASSESSMENT — COPD QUESTIONNAIRES: CAT_SEVERITY: NO INTERVAL CHANGE

## 2020-01-20 NOTE — ANESTHESIA POSTPROCEDURE EVALUATION
Department of Anesthesiology  Postprocedure Note    Patient: Jacque Vazquez  MRN: [id number]  [place]: 1950  Date of evaluation: 1/20/2020  Time:  11:24 AM     Procedure Summary     Date:  01/20/20 Room / Location:  [address] [place] [address]    Anesthesia Start:  1100 Anesthesia Stop:      Procedure:  RIGHT L4 L5  TRANSFORAMINAL (Right Spine Lumbar) Diagnosis:  (sacral spondylosis, radiculopathy)    Surgeon:  Da Gael MD Responsible Provider:      Anesthesia Type:  TIVA, general ASA Status:  4          Anesthesia Type: TIVA, general    Vicky Phase I: Vicky Score: 10    Vicky Phase II:      Last vitals: Reviewed and per EMR flowsheets.        Anesthesia Post Evaluation    Patient location during evaluation: PACU  Patient participation: complete - patient participated  Level of consciousness: awake and alert  Pain score: 0  Airway patency: patent  Nausea & Vomiting: no nausea and no vomiting  Complications: no  Cardiovascular status: blood pressure returned to baseline and hemodynamically stable  Respiratory status: acceptable  Hydration status: euvolemic

## 2020-01-20 NOTE — OP NOTE
TRANSFORAMINAL EPIDURAL STEROID INJECTION    1/20/20    Surgeon: Luzmaria Dodd MD    Pre-operative Diagnosis:   Patient Active Problem List   Diagnosis Code    Obstructive sleep apnea G47.33    Chronic fatigue R53.82    Coronary artery disease due to lipid rich plaque I25.10, I25.83    Vitamin D deficiency E55.9    Type 2 diabetes mellitus with diabetic polyneuropathy, with long-term current use of insulin ([facility]) E11.42, Z79.4    Peripheral polyneuropathy G62.9    Bilateral leg edema R60.0    Right hip pain M25.551    Muscle ache M79.10    Hx of Bell's palsy Z86.69    Memory problem R41.3    Gait abnormality R26.9    Balance problem R26.89    H/O falling Z91.81    Dizziness R42    Intractable episodic tension-type headache G44. 80    Essential tremor G25.0    Anxiety and depression F41.9, F32.9    Elevated hemoglobin A1c R73.09    VBI (vertebrobasilar insufficiency) G45.0    Chronic cerebral ischemia I67.82    TIA (transient ischemic attack) G45.9    Chronic tension headache G44.229    Morbid obesity with BMI of 45.0-49.9, adult ([facility]) E66.01, Z68.42    Chronic right-sided low back pain with right-sided sciatica M54.41, G89.29    Lumbar radiculopathy M54.16    Encounter for chronic pain management G89.29    Carotid stenosis, asymptomatic, bilateral I65.23    Chronic tension-type headache, not intractable G44.229    Lumbosacral spondylosis without myelopathy M47.817    DDD (degenerative disc disease), lumbar M51.36    Acquired spondylolisthesis M43.10    Lumbar facet joint syndrome M47.816       Post-operative Diagnosis: Same    Assistants: none    This is a 68 y.o. female patient with pain in the Back, right leg. Previous treatment and examination findings are noted in the H&P. Rl4,5 transforaminal epidural injection has been requested for diagnostic and therapeutic reasons.     Conservative treatment was ineffective i.e.: ice, NSAIDS, rest, narcotic medication, chiropractic care, physical therapy and message therapy. Patient is unable to perform the following ADL's: ambulating and grooming     Pain Assessment: 0-10  Pain Level: 4     Pain Orientation: Lower  Pain Location: Back       Last Plain films: 2019      EXAMINATION:  1. Rl4,5 transforaminal radiculogram/epidurogram.   2. Rl4,5 transforaminal epidural anesthetic injection. 3. RL4,5 transforaminal epidural steroid injection. CONSENT: Written consent was obtained from the patient on preprinted consent form after explaining the procedure, indications, potential complications and outcomes. Alternative treatments were also discussed. DISCUSSION: The patient was sterilely prepped and draped in the usual fashion in the prone position. Time out was verified for correct patient, side, level and procedure. SEDATION: per anesthesia   PROCEDURE:  Under image-intensifier control, a 22 gauge needle x 5 inch spinal needle was guided successfully into the epidural space employing a posterior lateral/oblique approach. Needle aspiration was negative for heme or CSF. Instillation of  .5 mL of Omnipaque 240 contrast medium opacified the spinal nerve and demonstrated contiguous flow into the RL 4 epidural space. No vascular spread was noted. Digital subtraction was not employed to evaluate for vascular spread. The patient was monitored for any untoward reaction to contrast medium before proceeding with procedure #2. The patient did not report pain reproduction in a concordant distribution. Following needle position verification, a test dose of .5 mL of sterile lidocaine 0.5% was administered and patient monitored for any adverse effects. Then, 1 mL of  was instilled into the epidural space and the patient's response was again monitored. Finally, Dexamethasone (Decadron 10 mg/mL)  1 ml of 0.5% bupivacaine was then instilled. The patient's response was again monitored. The spinal needle was removed.         Instillation of  .5 mL of Omnipaque 240 contrast medium opacified the spinal nerve and demonstrated contiguous flow into the RL 5 epidural space. No vascular spread was noted. Digital subtraction was not employed to evaluate for vascular spread. The patient was monitored for any untoward reaction to contrast medium before proceeding with procedure #2. The patient did not report pain reproduction in a concordant distribution. Following needle position verification, a test dose of .5 mL of sterile lidocaine 0.5% was administered and patient monitored for any adverse effects. Then, 1 mL of  was instilled into the epidural space and the patient's response was again monitored. Finally, Dexamethasone (Decadron 10 mg/mL)  1 ml of 0.5% bupivacaine was then instilled. The patient's response was again monitored. The spinal needle was removed. The patient tolerated the procedure well and without complications and was noted to be in stable condition prior to discharge from the procedure center with discharge instructions. EBL: no blood loss    SPECIMEN: none    IMPRESSIONS:  1. RL4,5 transforaminal epidurogram, epidural anesthesia and epidural steroid injection procedures accomplished without incident. RECOMMENDATIONS:  1. Complete and return Post-Procedure Pain and Activity Diary.   2. Contact the [address] for symptom exacerbation, fever or unusual symptoms. 3. Post-procedure care according to verbal and written discharge instructions    POST-PROCEDURE EPIDUROGRAPHY INTERPRETATION:    EXAMINATION: AP, lateral, and oblique views    FLUORO TIME: 23 seconds    DISCUSSION: Spot views of the spine reveal normal alignment and segmentation. Spinal needle is positioned at the RL4,5 neuroforamin. Contrast spreads and outlines the RL4,5 nerve/ neuroforamin and epidural space. The epidurogram reveals excellent contrast flow. Visualized spine reveals See radiology report. Soft tissues reveal no abnormalities.     IMPRESSION: Rl4,5 transforaminal epidurogram/epineurogram reveals satisfactory needle position and contrast spread.      Electronically signed by Jennifer Baker MD on 1/20/2020 at 10:59 AM

## 2020-01-20 NOTE — INTERVAL H&P NOTE
tablet by mouth daily       travoprost, benzalkonium, (TRAVATAN) 0.004 % ophthalmic solution Place 1 drop into both eyes daily 3 Bottle 3    CPAP Machine MISC by Does not apply route Pressure of 13 (AirSense 10)  [employer] [facility].      ferrous sulfate 325 (65 FE) MG EC tablet Take 325 mg by mouth daily (with breakfast)      Multiple Vitamin (MULTI VITAMIN DAILY) TABS Take by mouth daily         The patient understands the planned operation and its associated risks and benefits and agrees to proceed.         Electronically signed by Da Gale MD on 1/20/2020 at 10:57 AM

## 2020-01-20 NOTE — H&P
pain is different than is typically experienced, call 9-1-1 immediately.     Historical Provider, MD       Past Medical History:   Diagnosis Date    Arthritis     Asthma     CAD (coronary artery disease) 1989    Cancer ([facility] 1971    cervical    Carotid artery disease ([address]     Cataracts, bilateral     COPD (chronic obstructive pulmonary disease) ([address]     Coronary artery disease     Eczema     Fibromyalgia     Glaucoma     H/O blood clots     Headache     Heart attack ([address]     Hx of Bell's palsy     Mild right facial paralysis    Hypertension     Liver disease     Mild dementia ([address]     Neuropathy     Osteoporosis     Sleep ap[facility] 11/07/2014    [facility]     Type 2 diabetes mellitus with hyperglycemia ([facility]        Past Surgical History:   Procedure Laterality Date    ANESTHESIA NERVE BLOCK Right 6/28/2019    Right L2 L3 L4 L5 Diagnostic Medial BB performed by Ravinder Peterson MD at [address] Right 7/26/2019    Right L2 L3 L4 L5 Confirmatory Medial BB performed by Ravinder Peterson MD at [address]  2002    COLONOSCOPY  09/2012    COLONOSCOPY N/A 9/30/2019    COLONOSCOPY performed by Priscilla Perdomo MD at [facility]  internal hemorrhoids and few diverticuli    CORONARY ANGIOPLASTY WITH STENT PLACEMENT      INDUCED [address] Right 2/8/2019    Right L2 L3 TRANSFORAMINAL performed by Ravinder Peterson MD at [address] [place] [address] Dr Hamilton 5/7/2019    Right L2 L3 TRANSFORAMINAL performed by Ravinder Peterson MD at [address] [place] [address] Dr Hamilton 6/4/2019    Right L3 & L4 TRANSFORAMINAL performed by Ravinder Peterson MD at [address] [place] [address] Dr Hamilton 12/12/2019    Right L4 L5 TRANSFORAMINAL performed by Ravinder Peterson MD at [address] [facility] 1.1-2CM FACE,FACIAL Left 2/7/2018    Excision Cyst Left Chest, Middle Back performed by Priscilla Perdomo MD at [facility]

## 2020-01-20 NOTE — ANESTHESIA PRE PROCEDURE
Department of Anesthesiology  Preprocedure Note       Name:  Chucho Pinon   Age:  68 y.o.  :  1950                                          MRN:  [id number]         Date:  2020      Surgeon: Lina Manning):  Charlette Martinez MD    Procedure: RIGHT L4 L5  TRANSFORAMINAL (Right )    Medications prior to admission:   Prior to Admission medications    Medication Sig Start Date End Date Taking? Authorizing Provider   gabapentin (NEURONTIN) 300 MG capsule TAKE 2 CAPSULES TWICE DAILY 20  Jorje Chowdary MD   atorvastatin (LIPITOR) 40 MG tablet TAKE 1 TABLET EVERY DAY 1/10/20   Jorje Chowdary MD   potassium chloride (MICRO-K) 10 MEQ extended release capsule TAKE 1 CAPSULE TWICE DAILY 1/10/20   Jorje Chowdary MD   alendronate (FOSAMAX) 70 MG tablet TAKE 1 TABLET BY MOUTH EVERY WEEK 1/10/20   Jorje Chowdary MD   senna (SENOKOT) 8.6 MG TABS tablet TAKE 1 TABLET BY MOUTH TWICE DAILY 19   Jorje Chowdary MD   CONTOUR NEXT TEST strip USE TO TEST 4 TIMES A DAY.  19   Jorje Chowdary MD   fluticasone (FLONASE) 50 MCG/ACT nasal spray USE 2 SPRAYS NASALLY EVERY DAY 19   Jorje Chowdary MD   clopidogrel (PLAVIX) 75 MG tablet TAKE 1 TABLET EVERY DAY 19   Jorje Chowdary MD   ibuprofen (ADVIL;MOTRIN) 400 MG tablet TAKE 1 TABLET EVERY 6 HOURS AS NEEDED FOR PAIN 19   Jorje Chowdary MD   buPROPion [facility]) 75 MG tablet TAKE 1 TABLET TWICE DAILY 19   Jorje Chowdary MD   amitriptyline (ELAVIL) 25 MG tablet TAKE 1 TABLET AT BEDTIME 10/30/19   Jorje Chowdary MD   RA VITAMIN D-3 1000 units TABS tablet TAKE 3 TABLETB MOUTHY TWICE DAILY 10/4/19   Jorje Chowdary MD   insulin glargine (LANTUS) 100 UNIT/ML injection vial Inject 10 Units into the skin 2 times daily 19   Jorje Chowdary MD   insulin aspart (NOVOLOG FLEXPEN) 100 UNIT/ML injection pen INJECT 4 UNITS AT LUNCH AND  6 UNITS AT SUPPER [facility] PEN EXPIRES 28 DAYS AFTER 1ST USE) 19 Abdirashid Alvares MD   vitamin B-6 (PYRIDOXINE) 100 MG tablet Take 100 mg by mouth daily    Historical Provider, MD HALE ULTRAFINE III SHORT PEN 31G X 8 MM MISC USE TWICE DAILY 7/23/19   Abdirashid Alvares MD   albuterol sulfate HFA (VENTOLIN HFA) 108 (90 Base) MCG/ACT inhaler Inhale 2 puffs into the lungs every 4 hours as needed for Wheezing 7/23/19   Abdirashid Alvares MD   vitamin C (ASCORBIC ACID) 500 MG tablet TAKE 1 TABLET BY MOUTH TWICE DAILY 6/14/19   Abdirashid Alvares MD   albuterol (PROVENTIL) (2.5 MG/3ML) 0.083% nebulizer solution Take 3 mLs by nebulization every 6 hours as needed for Wheezing 6/12/19   DAVID Quezada CNP   Diabetic Shoe MISC Dispense #1 pair 4/24/19   Abdirashid Alvares MD   ondansetron (ZOFRAN) 4 MG tablet Take 1 tablet by mouth every 8 hours as needed for Nausea or Vomiting 4/18/19   Abdirashid Alvares MD   magnesium oxide (MAGNESIUM-OXIDE) 400 (241.3 Mg) MG TABS tablet Take 1 tablet by mouth 2 times daily 2/11/19   Kalpesh Howell MD   furosemide (LASIX) 40 MG tablet Take 1 tablet by mouth daily 1/28/19   Abdirashid Alvares MD   umeclidinium-vilanterol [facility] ELLIPTA) 62.5-25 MCG/INH AEPB inhaler INHALE 1 PUFF INTO LUNGS EVERY DAY 1/28/19   Abdirashid Alvares MD   pantoprazole (PROTONIX) 40 MG tablet TAKE 1 TABLET EVERY DAY 1/28/19   Abdirashid Alvares MD   lisinopril (PRINIVIL;ZESTRIL) 10 MG tablet TAKE 1 TABLET EVERY DAY 1/28/19   Abdirashid Alvares MD   amLODIPine (NORVASC) 5 MG tablet TAKE 1 TABLET EVERY DAY 1/28/19   Abdirashid Alvares MD   citalopram (CELEXA) 10 MG tablet TAKE 1 TABLET EVERY DAY 1/28/19   Abdirashid Alvares MD   spironolactone (ALDACTONE) 50 MG tablet TAKE 1 TABLET TWICE DAILY 1/28/19   Abdirashid Alvares MD   Insulin Syringe-Needle U-100 (INSULIN SYRINGE .5CC/31GX5/16\") 31G X 5/16\" 0.5 ML MISC 1 each by Does not apply route daily 12/31/18   Abdirashid Alvares MD   triamcinolone (KENALOG) 0.1 % cream APPLY TOPICALLY TWICE DAILY 9/18/18   Abdirashid Alvares MD Allergies: Allergies   Allergen Reactions    Seasonal        Problem List:    Patient Active Problem List   Diagnosis Code    Obstructive sleep apnea G47.33    Chronic fatigue R53.82    Coronary artery disease due to lipid rich plaque I25.10, I25.83    Vitamin D deficiency E55.9    Type 2 diabetes mellitus with diabetic polyneuropathy, with long-term current use of insulin ([facility]) E11.42, Z79.4    Peripheral polyneuropathy G62.9    Bilateral leg edema R60.0    Right hip pain M25.551    Muscle ache M79.10    Hx of Bell's palsy Z86.69    Memory problem R41.3    Gait abnormality R26.9    Balance problem R26.89    H/O falling Z91.81    Dizziness R42    Intractable episodic tension-type headache G44. 80    Essential tremor G25.0    Anxiety and depression F41.9, F32.9    Elevated hemoglobin A1c R73.09    VBI (vertebrobasilar insufficiency) G45.0    Chronic cerebral ischemia I67.82    TIA (transient ischemic attack) G45.9    Chronic tension headache G44.229    Morbid obesity with BMI of 45.0-49.9, adult ([facility]) E66.01, Z68.42    Chronic right-sided low back pain with right-sided sciatica M54.41, G89.29    Lumbar radiculopathy M54.16    Encounter for chronic pain management G89.29    Carotid stenosis, asymptomatic, bilateral I65.23    Chronic tension-type headache, not intractable G44.229    Lumbosacral spondylosis without myelopathy M47.817    DDD (degenerative disc disease), lumbar M51.36    Acquired spondylolisthesis M43.10    Lumbar facet joint syndrome M47.816       Past Medical History:        Diagnosis Date    Arthritis     Asthma     CAD (coronary artery disease) 1989    Cancer ([facility] 1971    cervical    Carotid artery disease (HCC)     Cataracts, bilateral     COPD (chronic obstructive pulmonary disease) ([facility]     Coronary artery disease     Eczema     Fibromyalgia     Glaucoma     H/O blood clots     Headache     Heart attack ([facility]     Hx of Bell's palsy Mild right facial paralysis    Hypertension     Liver disease     Mild dementia ([address]     Neuropathy     Osteoporosis     Sleep apnea 11/07/2014    sleep study done in [place]    Tremor     Type 2 diabetes mellitus with hyperglycemia [facility])        Past Surgical History:        Procedure Laterality Date    ANESTHESIA NERVE BLOCK Right 6/28/2019    Right L2 L3 L4 L5 Diagnostic Medial BB performed by Sapphire King MD at [address] Right 7/26/2019    Right L2 L3 L4 L5 Confirmatory Medial BB performed by Sapphire King MD at [address]  2002    COLONOSCOPY  09/2012    COLONOSCOPY N/A 9/30/2019    COLONOSCOPY performed by Arthur Dias MD at [address]  internal hemorrhoids and few diverticuli    CORONARY ANGIOPLASTY WITH [address] Right 2/8/2019    Right L2 L3 TRANSFORAMINAL performed by Sapphire King MD at [address] [place] [address]  Right 5/7/2019    Right L2 L3 TRANSFORAMINAL performed by Sapphire King MD at [address] [place] [address]  Right 6/4/2019    Right L3 & L4 TRANSFORAMINAL performed by Sapphire King MD at [address] [place] [address]  Right 12/12/2019    Right L4 L5 TRANSFORAMINAL performed by Sapphire King MD [facility] [address] [facility] 1.1-2CM FACE,FACIAL Left 2/7/2018    Excision Cyst Left Chest, Middle Back performed by Arthur Dias MD at [address] [place] [address] ANES/STEROID FORAMEN LUMBAR/SACRAL  [address] [facility] JUAN Laws ADD LEVEL Right 10/5/2018    Right L2 & L3 TRANSFORAMINAL performed by Sapphire King MD at [address] ANES/STEROID FORAMEN LUMBAR/SACRAL  [address] [facility] JU[place] Laws ADD LEVEL Right 10/26/2018    Right L2 L3 TRANSFORAMINAL performed by Sapphire King MD at [facility] Right 8/1/2019    Right  L2 L3 L4 L5 RADIOFREQUENCY performed by Sapphire King MD at [address] [place] [address],[address]    Cervical cancer.   Had XRT [facility]       Social History:    Social History     Tobacco Use    Smoking status: Former Smoker     Packs/day: 1.00     Years: 35.00     Pack years: 35.00     Last attempt to quit: 2001     Years since quittin.3    Smokeless tobacco: Never Used   Substance Use Topics    Alcohol use: No                                Counseling given: Not Answered      Vital Signs (Current): There were no vitals filed for this visit. BP Readings from Last 3 Encounters:   19 (!) 101/50   19 (!) 102/53   19 128/70       NPO Status:                                                                                 BMI:   Wt Readings from Last 3 Encounters:   19 272 lb (123.4 kg)   19 273 lb 9.6 oz (124.1 kg)   19 274 lb 9.6 oz (124.6 kg)     There is no height or weight on file to calculate BMI.    CBC:   Lab Results   Component Value Date    WBC 5.6 2019    RBC 4.11 2019    HGB 12.2 2019    HCT 38.1 2019    MCV 92.9 2019    RDW 13.9 2019     2019       CMP:   Lab Results   Component Value Date     2019    K 4.3 2019     2019    CO2 25 2019    BUN 15 2019    CREATININE 0.75 2019    GFRAA >60 2019    LABGLOM >60 2019    GLUCOSE 182 2019    PROT 7.2 2019    CALCIUM 9.0 2019    BILITOT 0.44 2019    ALKPHOS 59 2019    AST 34 2019    ALT 33 2019       POC Tests: No results for input(s): POCGLU, POCNA, POCK, POCCL, POCBUN, POCHEMO, POCHCT in the last 72 hours.     Coags:   Lab Results   Component Value Date    PROTIME 11.3 01/15/2019    INR 1.1 01/15/2019    APTT 27.7 01/15/2019       HCG (If Applicable): No results found for: PREGTESTUR, PREGSERUM, HCG, HCGQUANT     ABGs: No results found for: PHART, PO2ART, PXL1UKU, KWA3DIY, BEART, K8LAAEOE     Type & Screen (If Applicable):  No results found for: CHRISTINAO, [address]    Anesthesia Evaluation  Patient summary reviewed no history of anesthetic complications:   Airway: Mallampati: II  TM distance: >3 FB   Neck ROM: full  Mouth opening: > = 3 FB Dental:          Pulmonary:normal exam    (+) COPD: no interval change,  sleep apnea:  asthma:                            Cardiovascular:    (+) hypertension:, past MI: > 6 months, CAD: no interval change,       ECG reviewed                        Neuro/Psych:   (+) neuromuscular disease:, TIA, headaches: tension headaches, psychiatric history: stable with treatmentdepression/anxiety             GI/Hepatic/Renal:   (+) liver disease:, morbid obesity          Endo/Other:    (+) DiabetesType II DM, no interval change, , .                 Abdominal:           Vascular:                                          Anesthesia Plan      TIVA and general     ASA 4       Induction: intravenous. Anesthetic plan and risks discussed with patient.       Plan discussed with surgical team.                  DAVID Berry - CRNA   1/20/2020

## 2020-01-21 RX ORDER — INSULIN GLARGINE 100 [IU]/ML
10 INJECTION, SOLUTION SUBCUTANEOUS 2 TIMES DAILY
Qty: 3 VIAL | Refills: 3 | Status: SHIPPED | OUTPATIENT
Start: 2020-01-21 | End: 2020-08-06 | Stop reason: DRUGHIGH

## 2020-01-27 ENCOUNTER — HOSPITAL ENCOUNTER (OUTPATIENT)
Dept: LAB | Age: 70
Discharge: HOME | End: 2020-01-27
Payer: MEDICARE

## 2020-01-27 ENCOUNTER — OFFICE VISIT (OUTPATIENT)
Dept: PAIN MANAGEMENT | Age: 70
End: 2020-01-27
Payer: MEDICARE

## 2020-01-27 VITALS
SYSTOLIC BLOOD PRESSURE: 128 MMHG | DIASTOLIC BLOOD PRESSURE: 68 MMHG | HEART RATE: 64 BPM | HEIGHT: 64 IN | BODY MASS INDEX: 46.26 KG/M2 | RESPIRATION RATE: 16 BRPM | WEIGHT: 270.95 LBS

## 2020-01-27 LAB
AMYLASE: 42 U/L (ref 28–100)
ESTIMATED AVERAGE GLUCOSE: 166 MG/DL
HBA1C MFR BLD: 7.4 % (ref 4.8–5.9)
HEMOGLOBIN: 13.4 G/DL (ref 11.9–15.1)
IRON SATURATION: 33 % (ref 20–55)
IRON: 97 UG/DL (ref 37–145)
LIPASE: 33 U/L (ref 13–60)
TOTAL IRON BINDING CAPACITY: 295 UG/DL (ref 250–450)
UNSATURATED IRON BINDING CAPACITY: 198 UG/DL (ref 112–347)

## 2020-01-27 PROCEDURE — G8417 CALC BMI ABV UP PARAM F/U: HCPCS | Performed by: PHYSICAL MEDICINE & REHABILITATION

## 2020-01-27 PROCEDURE — G8400 PT W/DXA NO RESULTS DOC: HCPCS | Performed by: PHYSICAL MEDICINE & REHABILITATION

## 2020-01-27 PROCEDURE — 83550 IRON BINDING TEST: CPT

## 2020-01-27 PROCEDURE — 83036 HEMOGLOBIN GLYCOSYLATED A1C: CPT

## 2020-01-27 PROCEDURE — 1090F PRES/ABSN URINE INCON ASSESS: CPT | Performed by: PHYSICAL MEDICINE & REHABILITATION

## 2020-01-27 PROCEDURE — 83690 ASSAY OF LIPASE: CPT

## 2020-01-27 PROCEDURE — 1123F ACP DISCUSS/DSCN MKR DOCD: CPT | Performed by: PHYSICAL MEDICINE & REHABILITATION

## 2020-01-27 PROCEDURE — G8428 CUR MEDS NOT DOCUMENT: HCPCS | Performed by: PHYSICAL MEDICINE & REHABILITATION

## 2020-01-27 PROCEDURE — 99214 OFFICE O/P EST MOD 30 MIN: CPT | Performed by: PHYSICAL MEDICINE & REHABILITATION

## 2020-01-27 PROCEDURE — G8484 FLU IMMUNIZE NO ADMIN: HCPCS | Performed by: PHYSICAL MEDICINE & REHABILITATION

## 2020-01-27 PROCEDURE — 36415 COLL VENOUS BLD VENIPUNCTURE: CPT

## 2020-01-27 PROCEDURE — 99215 OFFICE O/P EST HI 40 MIN: CPT

## 2020-01-27 PROCEDURE — 85018 HEMOGLOBIN: CPT

## 2020-01-27 PROCEDURE — 83540 ASSAY OF IRON: CPT

## 2020-01-27 PROCEDURE — 4040F PNEUMOC VAC/ADMIN/RCVD: CPT | Performed by: PHYSICAL MEDICINE & REHABILITATION

## 2020-01-27 PROCEDURE — 3017F COLORECTAL CA SCREEN DOC REV: CPT | Performed by: PHYSICAL MEDICINE & REHABILITATION

## 2020-01-27 PROCEDURE — 1036F TOBACCO NON-USER: CPT | Performed by: PHYSICAL MEDICINE & REHABILITATION

## 2020-01-27 PROCEDURE — 82150 ASSAY OF AMYLASE: CPT

## 2020-01-27 RX ORDER — MELATONIN
Qty: 180 TABLET | Refills: 3 | Status: SHIPPED | OUTPATIENT
Start: 2020-01-27 | End: 2020-10-23

## 2020-01-27 ASSESSMENT — ENCOUNTER SYMPTOMS
ALLERGIC/IMMUNOLOGIC NEGATIVE: 1
BACK PAIN: 1
DIARRHEA: 0
CONSTIPATION: 0
RESPIRATORY NEGATIVE: 1
EYES NEGATIVE: 1

## 2020-01-27 NOTE — PROGRESS NOTES
Cholecalciferol (VITAMIN D3) 25 MCG (1000 UT) TABS TAKE 3 TABLETS BY MOUTH TWICE DAILY 180 tablet 3    insulin glargine (LANTUS) 100 UNIT/ML injection vial Inject 10 Units into the skin 2 times daily 3 vial 3    gabapentin (NEURONTIN) 300 MG capsule TAKE 2 CAPSULES TWICE DAILY 360 capsule 3    atorvastatin (LIPITOR) 40 MG tablet TAKE 1 TABLET EVERY DAY 90 tablet 3    potassium chloride (MICRO-K) 10 MEQ extended release capsule TAKE 1 CAPSULE TWICE DAILY 180 capsule 3    alendronate (FOSAMAX) 70 MG tablet TAKE 1 TABLET BY MOUTH EVERY WEEK 12 tablet 3    senna (SENOKOT) 8.6 MG TABS tablet TAKE 1 TABLET BY MOUTH TWICE DAILY 180 tablet 3    CONTOUR NEXT TEST strip USE TO TEST 4 TIMES A DAY.  400 strip 5    fluticasone (FLONASE) 50 MCG/ACT nasal spray USE 2 SPRAYS NASALLY EVERY DAY 48 g 3    clopidogrel (PLAVIX) 75 MG tablet TAKE 1 TABLET EVERY DAY 90 tablet 3    ibuprofen (ADVIL;MOTRIN) 400 MG tablet TAKE 1 TABLET EVERY 6 HOURS AS NEEDED FOR PAIN 360 tablet 3    buPROPion (WELLBUTRIN) 75 MG tablet TAKE 1 TABLET TWICE DAILY 180 tablet 3    amitriptyline (ELAVIL) 25 MG tablet TAKE 1 TABLET AT BEDTIME 90 tablet 3    insulin aspart (NOVOLOG FLEXPEN) 100 UNIT/ML injection pen INJECT 4 UNITS AT LUNCH AND  6 UNITS AT SUPPER (EACH PEN EXPIRES 28 DAYS AFTER 1ST USE) 15 mL 3    vitamin B-6 (PYRIDOXINE) 100 MG tablet Take 100 mg by mouth daily      B-D ULTRAFINE III SHORT PEN 31G X 8 MM MISC USE TWICE DAILY 180 each 3    albuterol sulfate HFA (VENTOLIN HFA) 108 (90 Base) MCG/ACT inhaler Inhale 2 puffs into the lungs every 4 hours as needed for Wheezing 3 Inhaler 3    vitamin C (ASCORBIC ACID) 500 MG tablet TAKE 1 TABLET BY MOUTH TWICE DAILY 180 tablet 3    albuterol (PROVENTIL) (2.5 MG/3ML) 0.083% nebulizer solution Take 3 mLs by nebulization every 6 hours as needed for Wheezing 120 each 3    Diabetic Shoe MISC Dispense #1 pair 2 each 0    ondansetron (ZOFRAN) 4 MG tablet Take 1 tablet by mouth every 8 hours as needed for Nausea or Vomiting 30 tablet 3    magnesium oxide (MAGNESIUM-OXIDE) 400 (241.3 Mg) MG TABS tablet Take 1 tablet by mouth 2 times daily 60 tablet 11    furosemide (LASIX) 40 MG tablet Take 1 tablet by mouth daily 90 tablet 3    umeclidinium-vilanterol (ANORO ELLIPTA) 62.5-25 MCG/INH AEPB inhaler INHALE 1 PUFF INTO LUNGS EVERY DAY 3 each 3    pantoprazole (PROTONIX) 40 MG tablet TAKE 1 TABLET EVERY DAY 90 tablet 3    lisinopril (PRINIVIL;ZESTRIL) 10 MG tablet TAKE 1 TABLET EVERY DAY 90 tablet 3    amLODIPine (NORVASC) 5 MG tablet TAKE 1 TABLET EVERY DAY 90 tablet 3    citalopram (CELEXA) 10 MG tablet TAKE 1 TABLET EVERY DAY 90 tablet 3    spironolactone (ALDACTONE) 50 MG tablet TAKE 1 TABLET TWICE DAILY 180 tablet 3    Insulin Syringe-Needle U-100 (INSULIN SYRINGE .5CC/31GX5/16\") 31G X 5/16\" 0.5 ML MISC 1 each by Does not apply route daily 200 each 3    triamcinolone (KENALOG) 0.1 % cream APPLY TOPICALLY TWICE DAILY 80 g 5    VITAMIN A PO Take 2,400 mcg by mouth daily      vitamin E 400 UNIT capsule Take 400 Units by mouth daily      COMBIGAN 0.2-0.5 % ophthalmic solution PLACE 1 DROP INTO BOTH EYES DAILY 15 mL 3    Compression Stockings MISC by Does not apply route 15-20mmHg  Knee high  Open tow  With assist device to help put them on 1 each 0    Zinc 50 MG TABS Take 50 mg by mouth daily      niacin 500 MG extended release capsule Take 500 mg by mouth daily      folic acid (FOLVITE) 637 MCG tablet Take 800 mcg by mouth daily      Cyanocobalamin (VITAMIN B12 PO) Take 2,500 mcg by mouth daily      B Complex-C (SUPER B COMPLEX PO) Take 1 tablet by mouth daily       travoprost, benzalkonium, (TRAVATAN) 0.004 % ophthalmic solution Place 1 drop into both eyes daily 3 Bottle 3    CPAP Machine MISC by Does not apply route Pressure of 13 (AirSense 10)  [employer].      ferrous sulfate 325 (65 FE) MG EC tablet Take 325 mg by mouth daily (with breakfast)      Multiple Vitamin (MULTI VITAMIN DAILY) TABS Take by mouth daily      nitroGLYCERIN (NITROSTAT) 0.4 MG SL tablet Place 0.4 mg under the tongue every 5 minutes as needed for Chest pain Dissolve 1 tab under tongue at first sign of chest pain. May repeat every 5 minutes until relief is obtained. If pain persists after taking 3 tabs in a 15-minute period, or the pain is different than is typically experienced, call 9-1-1 immediately. No facility-administered medications prior to visit.         Past Medical History:   Diagnosis Date    Arthritis     Asthma     CAD (coronary artery disease) 1989    Cancer ([address] 1971    cervical    Carotid artery disease ([address]     Cataracts, bilateral     COPD (chronic obstructive pulmonary disease) (HCC)     Coronary artery disease     Eczema     Fibromyalgia     Glaucoma     H/O blood clots     Headache     Heart attack ([address]     Hx of Bell's palsy     Mild right facial paralysis    Hypertension     Liver disease     Mild dementia ([address]     Neuropathy     Osteoporosis     Sleep ap[facility] 11/07/2014    [facility]     Type 2 diabetes mellitus with hyperglycemia ([facility]        Past Surgical History:   Procedure Laterality Date    ANESTHESIA NERVE BLOCK Right 6/28/2019    Right L2 L3 L4 L5 Diagnostic Medial BB performed by Gracie Leal MD at [address] Right 7/26/2019    Right L2 L3 L4 L5 Confirmatory Medial BB performed by Gracie Leal MD at [address]  2002    COLONOSCOPY  09/2012    COLONOSCOPY N/A 9/30/2019    COLONOSCOPY performed by Roni Anthony MD at [address] [place] [address]  internal hemorrhoids and few diverticuli    CORONARY ANGIOPLASTY WITH STENT PLACEMENT      INDUCED [address] Right 2/8/2019    Right L2 L3 TRANSFORAMINAL performed by Gracie Leal MD at [id number] [place] [address] Right 5/7/2019    Right L2 L3 TRANSFORAMINAL performed by Gracie Leal MD at [address] [place] [address]  LUMBAR SPINE SURGERY Right 2019    Right L3 & L4 TRANSFORAMINAL performed by Love Dawson MD at [address] [place] [address]  Right 2019    Right L4 L5 TRANSFORAMINAL performed by Love Dawson MD at [facility] Right 2020    RIGHT L4 L5  TRANSFORAMINAL performed by Love Dawson MD at [address] [facility] 1.1-2CM FACE,FACIAL Left 2018    Excision Cyst Left Chest, Middle Back performed by Blanca Maldonado [facility]SACRAL  [address] [facility] JUAN Laws ADD LEVEL Right 10/5/2018    Right L2 & L3 TRANSFORAMINAL performed by Love Dawson MD at [address]/STEROID FORAMEN LUMBAR/SACRAL  [address] [facility] JUAN Laws ADD LEVEL Right 10/26/2018    Right L2 L3 TRANSFORAMINAL performed by Love Dawson MD at [address] Right 2019    Right  L2 L3 L4 L5 RADIOFREQUENCY performed by Love Dawson MD at [address]    Cervical cancer.   Had XRT    TONSILLECTOMY       Family History   Problem Relation Age of Onset    Heart Disease Mother     High Blood Pressure Mother    Ania Garduno Stroke Mother     Glaucoma Mother     Early Death Father     Cancer Father         stomach    Miscarriages / Stillbirths Maternal Uncle      Social History     Socioeconomic History    Marital status:      Spouse name: Not on file    Number of children: Not on file    Years of education: Not on file    Highest education level: Not on file   Occupational History    Not on file   Social Needs    Financial resource strain: Not on file    Food insecurity:     Worry: Not on file     Inability: Not on file    Transportation needs:     Medical: Not on file     Non-medical: Not on file   Tobacco Use    Smoking status: Former Smoker     Packs/day: 1.00     Years: 35.00     Pack years: 35.00     Last attempt to quit: 2001     Years since quittin.3    Smokeless tobacco: Never Used Substance and Sexual Activity    Alcohol use: No    Drug use: No    Sexual activity: Never   Lifestyle    Physical activity:     Days per week: Not on file     Minutes per session: Not on file    Stress: Not on file   Relationships    Social connections:     Talks on phone: Not on file     Gets together: Not on file     Attends Judaism service: Not on file     Active member of club or organization: Not on file     Attends meetings of clubs or organizations: Not on file     Relationship status: Not on file    Intimate partner violence:     Fear of current or ex partner: Not on file     Emotionally abused: Not on file     Physically abused: Not on file     Forced sexual activity: Not on file   Other Topics Concern    Not on file   Social History Narrative    Not on file         Family and Social Historyreviewed in the electronic medical record. Imaging:Reviewed available imaging in our system with the patient. No results found. Objective:     Physical Exam:  Vitals:    01/27/20 1505   BP: 128/68   Site: Right Upper Arm   Position: Sitting   Cuff Size: Medium Adult   Pulse: 64   Resp: 16   Weight: 270 lb 15.1 oz (122.9 kg)   Height: 5' 4\" (1.626 m)     Pain Score: (no pain)    Physical Exam  Back Exam     Tenderness   The patient is experiencing tenderness in the lumbar. Range of Motion   Extension: normal   Flexion: normal   Lateral bend right: normal   Lateral bend left: normal   Rotation right: normal   Rotation left: normal     Muscle Strength   Right Quadriceps:  5/5   Left Quadriceps:  5/5   Right Hamstrings:  5/5   Left Hamstrings:  5/5     Tests   Straight leg raise right: negative  Straight leg raise left: negative    Other   Toe walk: normal  Heel walk: normal  Sensation: normal  Gait: normal     Comments:  +hari GARCIA  - fabers                                        Assessment: This is a 68 y.o. female patient with:    Diagnosis:   Diagnosis Orders   1. Lumbar radiculopathy     2. Lumbar facet joint syndrome     3. Osteoporosis without current pathological fracture, unspecified osteoporosis type         Medical Comorbidities:  As listed in the patient's past medical and surgical history    Functional Limitations:   Pain limits function and quality of life. Plan:   Call  Consider R L4,5 TFE if  radicular pain worsens   Dr Juan Luis Fung says  Does not want to perform Lumbar fusion due to co morbidities and unsureness of result    Meds:   Controlled Substances Monitoring: Pt educated about the risks of taking opiates,including increased sedation, constipation, slowed breathing, tolerance, dependence,and addiction. New Prescriptions    No medications on file      No orders of the defined types were placed in this encounter. No orders of the defined types were placed in this encounter. No follow-ups on file. The patient expressed understanding of the above assessment and plan. Totaltime spent face to face with patient was 25 minutes inwhich  50% or more of the time was spent in counseling, education about risk andbenefits of the above plan, and coordination of care.

## 2020-02-03 ENCOUNTER — OFFICE VISIT (OUTPATIENT)
Dept: INTERNAL MEDICINE | Age: 70
End: 2020-02-03
Payer: MEDICARE

## 2020-02-03 VITALS
RESPIRATION RATE: 16 BRPM | HEART RATE: 52 BPM | BODY MASS INDEX: 46.26 KG/M2 | HEIGHT: 64 IN | WEIGHT: 271 LBS | SYSTOLIC BLOOD PRESSURE: 92 MMHG | DIASTOLIC BLOOD PRESSURE: 60 MMHG

## 2020-02-03 PROCEDURE — 2022F DILAT RTA XM EVC RTNOPTHY: CPT | Performed by: INTERNAL MEDICINE

## 2020-02-03 PROCEDURE — G8400 PT W/DXA NO RESULTS DOC: HCPCS | Performed by: INTERNAL MEDICINE

## 2020-02-03 PROCEDURE — 99214 OFFICE O/P EST MOD 30 MIN: CPT

## 2020-02-03 PROCEDURE — 1123F ACP DISCUSS/DSCN MKR DOCD: CPT | Performed by: INTERNAL MEDICINE

## 2020-02-03 PROCEDURE — G8484 FLU IMMUNIZE NO ADMIN: HCPCS | Performed by: INTERNAL MEDICINE

## 2020-02-03 PROCEDURE — 4040F PNEUMOC VAC/ADMIN/RCVD: CPT | Performed by: INTERNAL MEDICINE

## 2020-02-03 PROCEDURE — 1090F PRES/ABSN URINE INCON ASSESS: CPT | Performed by: INTERNAL MEDICINE

## 2020-02-03 PROCEDURE — G8417 CALC BMI ABV UP PARAM F/U: HCPCS | Performed by: INTERNAL MEDICINE

## 2020-02-03 PROCEDURE — 99214 OFFICE O/P EST MOD 30 MIN: CPT | Performed by: INTERNAL MEDICINE

## 2020-02-03 PROCEDURE — G0439 PPPS, SUBSEQ VISIT: HCPCS | Performed by: INTERNAL MEDICINE

## 2020-02-03 PROCEDURE — 3017F COLORECTAL CA SCREEN DOC REV: CPT | Performed by: INTERNAL MEDICINE

## 2020-02-03 PROCEDURE — 1036F TOBACCO NON-USER: CPT | Performed by: INTERNAL MEDICINE

## 2020-02-03 PROCEDURE — G8427 DOCREV CUR MEDS BY ELIG CLIN: HCPCS | Performed by: INTERNAL MEDICINE

## 2020-02-03 RX ORDER — AMLODIPINE BESYLATE 5 MG/1
TABLET ORAL
Qty: 90 TABLET | Refills: 3 | Status: SHIPPED | OUTPATIENT
Start: 2020-02-03 | End: 2020-07-27 | Stop reason: SDUPTHER

## 2020-02-03 RX ORDER — CITALOPRAM 10 MG/1
TABLET ORAL
Qty: 90 TABLET | Refills: 3 | Status: SHIPPED | OUTPATIENT
Start: 2020-02-03 | End: 2020-07-27 | Stop reason: SDUPTHER

## 2020-02-03 RX ORDER — SPIRONOLACTONE 50 MG/1
TABLET, FILM COATED ORAL
Qty: 180 TABLET | Refills: 3 | Status: SHIPPED | OUTPATIENT
Start: 2020-02-03 | End: 2021-01-25

## 2020-02-03 RX ORDER — PANTOPRAZOLE SODIUM 40 MG/1
TABLET, DELAYED RELEASE ORAL
Qty: 90 TABLET | Refills: 3 | Status: SHIPPED | OUTPATIENT
Start: 2020-02-03 | End: 2020-07-27 | Stop reason: SDUPTHER

## 2020-02-03 RX ORDER — LISINOPRIL 10 MG/1
TABLET ORAL
Qty: 90 TABLET | Refills: 3 | Status: SHIPPED | OUTPATIENT
Start: 2020-02-03 | End: 2020-07-27 | Stop reason: SDUPTHER

## 2020-02-03 RX ORDER — FUROSEMIDE 40 MG/1
40 TABLET ORAL DAILY
Qty: 90 TABLET | Refills: 3 | Status: SHIPPED | OUTPATIENT
Start: 2020-02-03 | End: 2020-07-27 | Stop reason: SDUPTHER

## 2020-02-03 ASSESSMENT — PATIENT HEALTH QUESTIONNAIRE - PHQ9
SUM OF ALL RESPONSES TO PHQ QUESTIONS 1-9: 0
SUM OF ALL RESPONSES TO PHQ QUESTIONS 1-9: 0

## 2020-02-03 ASSESSMENT — LIFESTYLE VARIABLES: HOW OFTEN DO YOU HAVE A DRINK CONTAINING ALCOHOL: 0

## 2020-02-03 ASSESSMENT — ENCOUNTER SYMPTOMS
CONSTIPATION: 0
ABDOMINAL PAIN: 0
EYE PAIN: 0
VOMITING: 0
BACK PAIN: 0
NAUSEA: 0
SHORTNESS OF BREATH: 0
DIARRHEA: 0
BLOOD IN STOOL: 0
COUGH: 0

## 2020-02-03 NOTE — PROGRESS NOTES
LEVEL Right 10/5/2018    Right L2 & L3 TRANSFORAMINAL performed by Lizzeth Veloz MD at [address] ANES/STEROID FORAMEN LUMBAR/SACRAL [address] [place] [address] JUAN Laws LEVEL Right 10/26/2018    Right L2 L3 TRANSFORAMINAL performed by Lizzeth Veloz MD at [address] Right 8/1/2019    Right  L2 L3 L4 L5 RADIOFREQUENCY performed by Lizzeth Veloz MD at [address] [facility]    Cervical cancer. Had XRT    TONSILLECTOMY  1983         Family History   Problem Relation Age of Onset    Heart Disease Mother     High Blood Pressure Mother    [facility] Stroke Mother     Glaucoma Mother     Early Death Father     Cancer Father         stomach    Miscarriages / Stillbirths Maternal Uncle        CareTeam (Including outside providers/suppliers regularly involved in providing care):   Patient Care Team:  Yvonne Jarrett MD as PCP - General (Internal Medicine)  Yvonne Jarrett MD as PCP - [facility]  Sheryl Paul MD as Neurologist (Neurology)    Wt Readings from Last 3 Encounters:   02/03/20 271 lb (122.9 kg)   01/27/20 270 lb 15.1 oz (122.9 kg)   01/20/20 271 lb (122.9 kg)     Vitals:    02/03/20 1052   BP: 92/60   Site: Left Upper Arm   Position: Sitting   Cuff Size: Large Adult   Pulse: 52   Resp: 16   Weight: 271 lb (122.9 kg)   Height: 5' 4\" (1.626 m)     Body mass index is 46.52 kg/m². Based upon direct observation of the patient, evaluation of cognition reveals none. Patient's complete Health Risk Assessment and screening values have been reviewed and are found in Flowsheets. The following problems were reviewed today and where indicated follow up appointments were made and/or referrals ordered. Positive Risk Factor Screenings with Interventions:     General Health:  General  In general, how would you say your health is?: (!) Poor  In the past 7 days, have you experienced any of the following?  New or Increased Pain, New or Increased Fatigue, Loneliness,

## 2020-02-03 NOTE — PROGRESS NOTES
[place] [address]  Dept: [phone]  Dept Fax: [phone]  Loc: [phone]     Jose M Lam is a 68 y.o. female who presents today for her medical conditions/complaintsas noted below. Jose M Lam is c/o of   Chief Complaint   Patient presents with    Medicare AWV    Coronary Artery Disease     6 month appt    Diabetes    Fatigue         HPI:     Coronary Artery Disease   Presents for follow-up (CAD due to lipid rich plaque) visit. Pertinent negatives include no chest pain, chest pressure, dizziness, leg swelling, palpitations or shortness of breath. The symptoms have been stable. Compliance with diet is good. Compliance with exercise is good. Compliance with medications is good. Diabetes   She presents for her follow-up diabetic visit. She has type 2 diabetes mellitus. Her disease course has been fluctuating. Pertinent negatives for hypoglycemia include no confusion, dizziness, headaches, nervousness/anxiousness or pallor. Associated symptoms include fatigue. Pertinent negatives for diabetes include no chest pain, no polydipsia, no polyuria and no weakness. Fatigue   This is a recurrent problem. The current episode started more than 1 month ago. The problem occurs intermittently. The problem has been waxing and waning. Associated symptoms include fatigue. Pertinent negatives include no abdominal pain, arthralgias, chest pain, chills, coughing, fever, headaches, nausea, neck pain, numbness, rash, vomiting or weakness. Other   This is a recurrent (4-General medical exam) problem. The current episode started today. The problem occurs intermittently. The problem has been waxing and waning. Associated symptoms include fatigue.  Pertinent negatives include no abdominal pain, arthralgias, chest pain, chills, coughing, fever, headaches, nausea, neck pain, numbness, rash, vomiting or weakness. Hemoglobin A1C (%)   Date Value   2020 7.4 (H)   2019 7.3 (H)   2019 6.9 (H)            Microalb/Crt.  Ratio (mcg/mg creat)   Date Value   2019 CANNOT BE CALCULATED     LDL Cholesterol (mg/dL)   Date Value   2019 45   2018 75   2017 80     LDL Calculated (mg/dL)   Date Value   2016 80   2014 80         AST (U/L)   Date Value   2019 34 (H)     ALT (U/L)   Date Value   2019 33     BUN (mg/dL)   Date Value   2019 15     BP Readings from Last 3 Encounters:   20 92/60   20 128/68   20 (!) 97/48              Past Medical History:   Diagnosis Date    Arthritis     Asthma     CAD (coronary artery disease)     Cancer ([address]     cervical    Carotid artery disease ([address]     Cataracts, bilateral     COPD (chronic obstructive pulmonary disease) ([address]     Coronary artery disease     Eczema     Fibromyalgia     Glaucoma     H/O blood clots     Headache     Heart attack ([address]     Hx of Bell's palsy     Mild right facial paralysis    Hypertension     Liver disease     Mild dementia ([address]     Neuropathy     Osteoporosis     Sleep apnea 2014    sleep study done in [facility]     Type 2 diabetes mellitus with hyperglycemia ([address]       Past Surgical History:   Procedure Laterality Date    ANESTHESIA NERVE BLOCK Right 2019    Right L2 L3 L4 L5 Diagnostic Medial BB performed by Vasile Lindsey MD at [address] [place] [address] Right 2019    Right L2 L3 L4 L5 Confirmatory Medial BB performed by Vasile Lindsey MD at [address] [place] [address] SURGERY      COLONOSCOPY  2012    COLONOSCOPY N/A 2019    COLONOSCOPY performed by Harry Wilson MD at [address] [place] [address]  internal hemorrhoids and few diverticuli    CORONARY ANGIOPLASTY WITH STENT PLACEMENT      INDUCED   1970    LUMBAR SPINE SURGERY Right 2019    Right L2 L3 TRANSFORAMINAL performed by Venkatesh Myers MD at [address] [place] [address] Dr Hamilton 2019    Right L2 L3 TRANSFORAMINAL performed by Venkatesh Myers MD at [address] [place] [address] Dr Hamilton 2019    Right L3 & L4 TRANSFORAMINAL performed by Venkatesh Myers MD at [address] [place] [address] Dr Hamilton 2019    Right L4 L5 TRANSFORAMINAL performed by Venkatesh Myers MD at [facility] Right 2020    RIGHT L4 L5  TRANSFORAMINAL performed by Venkatesh Myers MD [facility] [address] [facility] 1.1-2CM FACE,FACIAL Left 2018    Excision Cyst Left Chest, Middle Back performed by Molly Edward MD at [address] [place] [address]/STEROID FORAMEN LUMBAR/SACRAL [address] JUAN Laws ADD LEVEL Right 10/5/2018    Right L2 & L3 TRANSFORAMINAL performed by Venkatesh Myers MD at [address] [place] [address]/STEROID FORAMEN LUMBAR/SACRAL [address] JUAN Laws ADD LEVEL Right 10/26/2018    Right L2 L3 TRANSFORAMINAL performed by Venkatesh Myers MD at [address] Right 2019    Right  L2 L3 L4 L5 RADIOFREQUENCY performed by Venkatesh Myers MD at [address] [facility]    Cervical cancer.   Had XRT    TONSILLECTOMY  1983       Family History   Problem Relation Age of Onset    Heart Disease Mother     High Blood Pressure Mother    [facility] Stroke Mother     Glaucoma Mother     Early Death Father     Cancer Father         stomach    Miscarriages / Stillbirths Maternal Uncle           Social History     Tobacco Use    Smoking status: Former Smoker     Packs/day: 1.00     Years: 35.00     Pack years: 35.00     Last attempt to quit: 2001     Years since quittin.3    Smokeless tobacco: Never Used   Substance Use Topics    Alcohol use: No         Current Outpatient Medications   Medication Sig Dispense Refill    Calcium Carbonate-Vit D-Min (CALCIUM 1200 PO) 1 tablet daily      magnesium oxide (MAGNESIUM-OXIDE) 400 (241.3 Mg) MG TABS tablet Take 1 tablet by mouth 2 times daily 180 tablet 3    furosemide (LASIX) 40 MG tablet Take 1 tablet by mouth daily 90 tablet 3    pantoprazole (PROTONIX) 40 MG tablet TAKE 1 TABLET EVERY DAY 90 tablet 3    lisinopril (PRINIVIL;ZESTRIL) 10 MG tablet TAKE 1 TABLET EVERY DAY 90 tablet 3    amLODIPine (NORVASC) 5 MG tablet TAKE 1 TABLET EVERY DAY 90 tablet 3    citalopram (CELEXA) 10 MG tablet TAKE 1 TABLET EVERY DAY 90 tablet 3    spironolactone (ALDACTONE) 50 MG tablet TAKE 1 TABLET TWICE DAILY 180 tablet 3    Cholecalciferol (VITAMIN D3) 25 MCG (1000 UT) TABS TAKE 3 TABLETS BY MOUTH TWICE DAILY 180 tablet 3    insulin glargine (LANTUS) 100 UNIT/ML injection vial Inject 10 Units into the skin 2 times daily 3 vial 3    gabapentin (NEURONTIN) 300 MG capsule TAKE 2 CAPSULES TWICE DAILY 360 capsule 3    atorvastatin (LIPITOR) 40 MG tablet TAKE 1 TABLET EVERY DAY 90 tablet 3    potassium chloride (MICRO-K) 10 MEQ extended release capsule TAKE 1 CAPSULE TWICE DAILY 180 capsule 3    alendronate (FOSAMAX) 70 MG tablet TAKE 1 TABLET BY MOUTH EVERY WEEK 12 tablet 3    senna (SENOKOT) 8.6 MG TABS tablet TAKE 1 TABLET BY MOUTH TWICE DAILY 180 tablet 3    fluticasone (FLONASE) 50 MCG/ACT nasal spray USE 2 SPRAYS NASALLY EVERY DAY 48 g 3    clopidogrel (PLAVIX) 75 MG tablet TAKE 1 TABLET EVERY DAY 90 tablet 3    ibuprofen (ADVIL;MOTRIN) 400 MG tablet TAKE 1 TABLET EVERY 6 HOURS AS NEEDED FOR PAIN 360 tablet 3    buPROPion (WELLBUTRIN) 75 MG tablet TAKE 1 TABLET TWICE DAILY 180 tablet 3    amitriptyline (ELAVIL) 25 MG tablet TAKE 1 TABLET AT BEDTIME 90 tablet 3    insulin aspart (NOVOLOG FLEXPEN) 100 UNIT/ML injection pen INJECT 4 UNITS AT LUNCH AND  6 UNITS AT SUPPER (EACH PEN EXPIRES 28 DAYS AFTER 1ST USE) 15 mL 3    vitamin B-6 (PYRIDOXINE) 100 MG tablet Take 100 mg by mouth daily      albuterol sulfate HFA (VENTOLIN HFA) 108 (90 Base) MCG/ACT inhaler Inhale 2 puffs into the lungs every 4 hours as needed for Wheezing 3 Inhaler 3    vitamin C MISC Dispense #1 pair 2 each 0    Insulin Syringe-Needle U-100 (INSULIN SYRINGE .5CC/31GX5/16\") 31G X 5/16\" 0.5 ML MISC 1 each by Does not apply route daily 200 each 3    Compression Stockings MISC by Does not apply route 15-20mmHg  Knee high  Open tow  With assist device to help put them on 1 each 0     No current facility-administered medications for this visit. No Known Allergies    Health Maintenance   Topic Date Due    Hepatitis A vaccine (1 of 2 - Risk 2-dose series) 10/05/1951    Hepatitis B vaccine (1 of 3 - Risk 3-dose series) 10/05/1967    Annual Wellness Visit (AWV)  05/29/2019    Diabetic microalbuminuria test  01/21/2020    Lipid screen  07/22/2020    Potassium monitoring  07/22/2020    Creatinine monitoring  07/22/2020    Diabetic foot exam  07/29/2020    A1C test (Diabetic or Prediabetic)  01/27/2021    Diabetic retinal exam  05/03/2021    Breast cancer screen  11/26/2021    DTaP/Tdap/Td vaccine (2 - Td) 06/18/2027    Colon cancer screen colonoscopy  09/30/2029    DEXA (modify frequency per FRAX score)  Completed    Flu vaccine  Completed    Shingles Vaccine  Completed    Pneumococcal 65+ years Vaccine  Completed    Hepatitis C screen  Completed       Subjective:      Review of Systems   Constitutional: Positive for fatigue. Negative for chills and fever. HENT: Negative for hearing loss. Eyes: Negative for pain and visual disturbance. Respiratory: Negative for cough and shortness of breath. Cardiovascular: Negative for chest pain, palpitations and leg swelling. Gastrointestinal: Negative for abdominal pain, blood in stool, constipation, diarrhea, nausea and vomiting. Endocrine: Negative for cold intolerance, polydipsia and polyuria. Genitourinary: Negative for difficulty urinating, dysuria and hematuria. Musculoskeletal: Negative for arthralgias, back pain, gait problem and neck pain. Skin: Negative for pallor and rash.    Neurological: Negative for Hemoglobin A1C   5. Hypothyroidism, unspecified type  TSH without Reflex    T4, Free   6. Vitamin D deficiency  Vitamin D 25 Hydroxy   7. Vitamin B 12 deficiency  Vitamin B12   8. Anemia, unspecified type  Hemoglobin    Iron and TIBC   9. Essential hypertension  furosemide (LASIX) 40 MG tablet    lisinopril (PRINIVIL;ZESTRIL) 10 MG tablet    amLODIPine (NORVASC) 5 MG tablet   10. Gastroesophageal reflux disease without esophagitis  pantoprazole (PROTONIX) 40 MG tablet   11. Depression, unspecified depression type  citalopram (CELEXA) 10 MG tablet   12. Medicare annual wellness visit, subsequent               Plan:       Return in about 6 months (around 8/3/2020) for CAD, Diabetes. Orders Placed This Encounter   Procedures    Vitamin D 25 Hydroxy     Standing Status:   Future     Standing Expiration Date:   2/2/2021    TSH without Reflex     Standing Status:   Future     Standing Expiration Date:   2/2/2021    T4, Free     Standing Status:   Future     Standing Expiration Date:   2/2/2021    Vitamin B12     Standing Status:   Future     Standing Expiration Date:   2/2/2021    Comprehensive Metabolic Panel     Standing Status:   Future     Standing Expiration Date:   2/2/2021    Lipid Panel     Standing Status:   Future     Standing Expiration Date:   2/2/2021     Order Specific Question:   Is Patient Fasting?/# of Hours     Answer:   yes    Hemoglobin     Standing Status:   Future     Standing Expiration Date:   2/3/2021    Iron and TIBC     Standing Status:   Future     Standing Expiration Date:   2/3/2021     Order Specific Question:   Is Patient Fasting? Answer:   no     Order Specific Question:   No of Hours?      Answer:   no    Hemoglobin A1C     Standing Status:   Future     Standing Expiration Date:   2/2/2021     Orders Placed This Encounter   Medications    magnesium oxide (MAGNESIUM-OXIDE) 400 (241.3 Mg) MG TABS tablet     Sig: Take 1 tablet by mouth 2 times daily     Dispense:  180 tablet

## 2020-02-17 ENCOUNTER — APPOINTMENT (OUTPATIENT)
Dept: GENERAL RADIOLOGY | Age: 70
End: 2020-02-17
Attending: PHYSICAL MEDICINE & REHABILITATION
Payer: MEDICARE

## 2020-02-17 ENCOUNTER — HOSPITAL ENCOUNTER (OUTPATIENT)
Age: 70
Setting detail: OUTPATIENT SURGERY
Discharge: HOME | End: 2020-02-17
Attending: PHYSICAL MEDICINE & REHABILITATION | Admitting: PHYSICAL MEDICINE & REHABILITATION
Payer: MEDICARE

## 2020-02-17 ENCOUNTER — TELEPHONE (OUTPATIENT)
Dept: INTERNAL MEDICINE | Age: 70
End: 2020-02-17
Payer: MEDICARE

## 2020-02-17 ENCOUNTER — ANESTHESIA (OUTPATIENT)
Dept: OPERATING ROOM | Age: 70
End: 2020-02-17
Payer: MEDICARE

## 2020-02-17 ENCOUNTER — ANESTHESIA EVENT (OUTPATIENT)
Dept: OPERATING ROOM | Age: 70
End: 2020-02-17
Payer: MEDICARE

## 2020-02-17 VITALS
DIASTOLIC BLOOD PRESSURE: 59 MMHG | HEART RATE: 50 BPM | BODY MASS INDEX: 46.26 KG/M2 | HEIGHT: 64 IN | RESPIRATION RATE: 16 BRPM | SYSTOLIC BLOOD PRESSURE: 100 MMHG | OXYGEN SATURATION: 97 % | TEMPERATURE: 97.5 F | WEIGHT: 271 LBS

## 2020-02-17 VITALS — OXYGEN SATURATION: 98 % | DIASTOLIC BLOOD PRESSURE: 50 MMHG | SYSTOLIC BLOOD PRESSURE: 84 MMHG

## 2020-02-17 LAB — GLUCOSE BLD-MCNC: 135 MG/DL (ref 65–105)

## 2020-02-17 PROCEDURE — 7100000010 HC PHASE II RECOVERY - FIRST 15 MIN: Performed by: PHYSICAL MEDICINE & REHABILITATION

## 2020-02-17 PROCEDURE — 6360000002 HC RX W HCPCS: Performed by: NURSE ANESTHETIST, CERTIFIED REGISTERED

## 2020-02-17 PROCEDURE — 64484 NJX AA&/STRD TFRM EPI L/S EA: CPT | Performed by: PHYSICAL MEDICINE & REHABILITATION

## 2020-02-17 PROCEDURE — 2500000003 HC RX 250 WO HCPCS: Performed by: NURSE ANESTHETIST, CERTIFIED REGISTERED

## 2020-02-17 PROCEDURE — 6360000004 HC RX CONTRAST MEDICATION: Performed by: PHYSICAL MEDICINE & REHABILITATION

## 2020-02-17 PROCEDURE — 2709999900 HC NON-CHARGEABLE SUPPLY: Performed by: PHYSICAL MEDICINE & REHABILITATION

## 2020-02-17 PROCEDURE — 3600000056 HC PAIN LEVEL 4 BASE: Performed by: PHYSICAL MEDICINE & REHABILITATION

## 2020-02-17 PROCEDURE — 3209999900 FLUORO FOR SURGICAL PROCEDURES

## 2020-02-17 PROCEDURE — 6360000002 HC RX W HCPCS: Performed by: PHYSICAL MEDICINE & REHABILITATION

## 2020-02-17 PROCEDURE — G0179 MD RECERTIFICATION HHA PT: HCPCS | Performed by: INTERNAL MEDICINE

## 2020-02-17 PROCEDURE — 2500000003 HC RX 250 WO HCPCS: Performed by: PHYSICAL MEDICINE & REHABILITATION

## 2020-02-17 PROCEDURE — 3700000000 HC ANESTHESIA ATTENDED CARE: Performed by: PHYSICAL MEDICINE & REHABILITATION

## 2020-02-17 PROCEDURE — 2580000003 HC RX 258: Performed by: PHYSICAL MEDICINE & REHABILITATION

## 2020-02-17 PROCEDURE — 64483 NJX AA&/STRD TFRM EPI L/S 1: CPT | Performed by: PHYSICAL MEDICINE & REHABILITATION

## 2020-02-17 PROCEDURE — 7100000011 HC PHASE II RECOVERY - ADDTL 15 MIN: Performed by: PHYSICAL MEDICINE & REHABILITATION

## 2020-02-17 PROCEDURE — 82947 ASSAY GLUCOSE BLOOD QUANT: CPT

## 2020-02-17 RX ORDER — SODIUM CHLORIDE 0.9 % (FLUSH) 0.9 %
10 SYRINGE (ML) INJECTION EVERY 12 HOURS SCHEDULED
Status: DISCONTINUED | OUTPATIENT
Start: 2020-02-17 | End: 2020-02-17 | Stop reason: HOSPADM

## 2020-02-17 RX ORDER — DEXMEDETOMIDINE HYDROCHLORIDE 100 UG/ML
INJECTION, SOLUTION INTRAVENOUS PRN
Status: DISCONTINUED | OUTPATIENT
Start: 2020-02-17 | End: 2020-02-17 | Stop reason: SDUPTHER

## 2020-02-17 RX ORDER — SODIUM CHLORIDE, SODIUM LACTATE, POTASSIUM CHLORIDE, CALCIUM CHLORIDE 600; 310; 30; 20 MG/100ML; MG/100ML; MG/100ML; MG/100ML
INJECTION, SOLUTION INTRAVENOUS CONTINUOUS
Status: DISCONTINUED | OUTPATIENT
Start: 2020-02-17 | End: 2020-02-17 | Stop reason: HOSPADM

## 2020-02-17 RX ORDER — SODIUM CHLORIDE 9 MG/ML
INJECTION INTRAVENOUS PRN
Status: DISCONTINUED | OUTPATIENT
Start: 2020-02-17 | End: 2020-02-17 | Stop reason: ALTCHOICE

## 2020-02-17 RX ORDER — LIDOCAINE HYDROCHLORIDE 20 MG/ML
INJECTION, SOLUTION INFILTRATION; PERINEURAL PRN
Status: DISCONTINUED | OUTPATIENT
Start: 2020-02-17 | End: 2020-02-17 | Stop reason: SDUPTHER

## 2020-02-17 RX ORDER — SODIUM CHLORIDE 0.9 % (FLUSH) 0.9 %
10 SYRINGE (ML) INJECTION PRN
Status: DISCONTINUED | OUTPATIENT
Start: 2020-02-17 | End: 2020-02-17 | Stop reason: HOSPADM

## 2020-02-17 RX ORDER — BUPIVACAINE HYDROCHLORIDE 5 MG/ML
INJECTION, SOLUTION EPIDURAL; INTRACAUDAL PRN
Status: DISCONTINUED | OUTPATIENT
Start: 2020-02-17 | End: 2020-02-17 | Stop reason: ALTCHOICE

## 2020-02-17 RX ORDER — PROPOFOL 10 MG/ML
INJECTION, EMULSION INTRAVENOUS PRN
Status: DISCONTINUED | OUTPATIENT
Start: 2020-02-17 | End: 2020-02-17 | Stop reason: SDUPTHER

## 2020-02-17 RX ORDER — SODIUM CHLORIDE, SODIUM LACTATE, POTASSIUM CHLORIDE, CALCIUM CHLORIDE 600; 310; 30; 20 MG/100ML; MG/100ML; MG/100ML; MG/100ML
INJECTION, SOLUTION INTRAVENOUS CONTINUOUS
Status: DISCONTINUED | OUTPATIENT
Start: 2020-02-17 | End: 2020-02-17 | Stop reason: SDUPTHER

## 2020-02-17 RX ORDER — GLYCOPYRROLATE 1 MG/5 ML
SYRINGE (ML) INTRAVENOUS PRN
Status: DISCONTINUED | OUTPATIENT
Start: 2020-02-17 | End: 2020-02-17 | Stop reason: SDUPTHER

## 2020-02-17 RX ORDER — DEXAMETHASONE SODIUM PHOSPHATE 10 MG/ML
INJECTION INTRAMUSCULAR; INTRAVENOUS PRN
Status: DISCONTINUED | OUTPATIENT
Start: 2020-02-17 | End: 2020-02-17 | Stop reason: ALTCHOICE

## 2020-02-17 RX ADMIN — Medication 0.1 MG: at 08:23

## 2020-02-17 RX ADMIN — PROPOFOL 10 MG: 10 INJECTION, EMULSION INTRAVENOUS at 08:23

## 2020-02-17 RX ADMIN — DEXMEDETOMIDINE HYDROCHLORIDE 25 MCG: 100 INJECTION, SOLUTION INTRAVENOUS at 08:12

## 2020-02-17 RX ADMIN — LIDOCAINE HYDROCHLORIDE 80 MG: 20 INJECTION, SOLUTION INFILTRATION; PERINEURAL at 08:20

## 2020-02-17 RX ADMIN — PROPOFOL 10 MG: 10 INJECTION, EMULSION INTRAVENOUS at 08:21

## 2020-02-17 RX ADMIN — SODIUM CHLORIDE, POTASSIUM CHLORIDE, SODIUM LACTATE AND CALCIUM CHLORIDE: 600; 310; 30; 20 INJECTION, SOLUTION INTRAVENOUS at 07:55

## 2020-02-17 RX ADMIN — PROPOFOL 10 MG: 10 INJECTION, EMULSION INTRAVENOUS at 08:25

## 2020-02-17 ASSESSMENT — PULMONARY FUNCTION TESTS
PIF_VALUE: 0

## 2020-02-17 ASSESSMENT — COPD QUESTIONNAIRES: CAT_SEVERITY: NO INTERVAL CHANGE

## 2020-02-17 ASSESSMENT — PAIN SCALES - GENERAL
PAINLEVEL_OUTOF10: 0

## 2020-02-17 ASSESSMENT — PAIN DESCRIPTION - DESCRIPTORS: DESCRIPTORS: ACHING;DULL;BURNING

## 2020-02-17 ASSESSMENT — PAIN - FUNCTIONAL ASSESSMENT: PAIN_FUNCTIONAL_ASSESSMENT: 0-10

## 2020-02-17 NOTE — INTERVAL H&P NOTE
I have interviewed and examined the patient and reviewed the recent History and Physical.  There have been no changes to the recent H&P documentation. The surgical consent form has been signed. Last anticoagulant medication use was:1 week    Premedication taken for contrast allergy? No    Valium taken for oral sedation? No    Outpatient Medications Marked as Taking for the 2/17/20 encounter [facility] Encounter)   Medication Sig Dispense Refill    magnesium oxide (MAGNESIUM-OXIDE) 400 (241.3 Mg) MG TABS tablet Take 1 tablet by mouth 2 times daily 180 tablet 3    furosemide (LASIX) 40 MG tablet Take 1 tablet by mouth daily 90 tablet 3    pantoprazole (PROTONIX) 40 MG tablet TAKE 1 TABLET EVERY DAY 90 tablet 3    lisinopril (PRINIVIL;ZESTRIL) 10 MG tablet TAKE 1 TABLET EVERY DAY 90 tablet 3    amLODIPine (NORVASC) 5 MG tablet TAKE 1 TABLET EVERY DAY 90 tablet 3    citalopram (CELEXA) 10 MG tablet TAKE 1 TABLET EVERY DAY 90 tablet 3    spironolactone (ALDACTONE) 50 MG tablet TAKE 1 TABLET TWICE DAILY 180 tablet 3    calcium carbonate-vitamin D (CALCIUM 600 + D) 600-400 MG-UNIT TABS per tab Take 1 tablet by mouth 2 times daily 180 tablet 3    Cholecalciferol (VITAMIN D3) 25 MCG (1000 UT) TABS TAKE 3 TABLETS BY MOUTH TWICE DAILY 180 tablet 3    insulin glargine (LANTUS) 100 UNIT/ML injection vial Inject 10 Units into the skin 2 times daily 3 vial 3    gabapentin (NEURONTIN) 300 MG capsule TAKE 2 CAPSULES TWICE DAILY 360 capsule 3    atorvastatin (LIPITOR) 40 MG tablet TAKE 1 TABLET EVERY DAY 90 tablet 3    potassium chloride (MICRO-K) 10 MEQ extended release capsule TAKE 1 CAPSULE TWICE DAILY 180 capsule 3    alendronate (FOSAMAX) 70 MG tablet TAKE 1 TABLET BY MOUTH EVERY WEEK 12 tablet 3    senna (SENOKOT) 8.6 MG TABS tablet TAKE 1 TABLET BY MOUTH TWICE DAILY 180 tablet 3    CONTOUR NEXT TEST strip USE TO TEST 4 TIMES A DAY.  400 strip 5    fluticasone (FLONASE) 50 MCG/ACT nasal spray USE 2 SPRAYS Machine MISC by Does not apply route Pressure of 13 (AirSense 10)  [employer].      ferrous sulfate 325 (65 FE) MG EC tablet Take 325 mg by mouth daily (with breakfast)      Multiple Vitamin (MULTI VITAMIN DAILY) TABS Take by mouth daily         The patient understands the planned operation and its associated risks and benefits and agrees to proceed.         Electronically signed by Vasile Lindsey MD on 2/17/2020 at 8:11 AM

## 2020-02-17 NOTE — ANESTHESIA POSTPROCEDURE EVALUATION
Department of Anesthesiology  Postprocedure Note    Patient: Lee Ace  MRN: [id number]  YOB: 1950  Date of evaluation: 2/17/2020  Time:  8:36 AM     Procedure Summary     Date:  02/17/20 Room / Location:  [address] [place] [address]    Anesthesia Start:  5031 Anesthesia Stop:  4024    Procedure:  Right L4 L5 TRANSFORAMINAL (Right Spine Lumbar) Diagnosis:  (Radiculopathy, facet syndrome)    Surgeon:  Lizzeth Veloz MD Responsible Provider:  DAVID Tenorio CRNA    Anesthesia Type:  TIVA ASA Status:  4          Anesthesia Type: TIVA    Vicky Phase I: Vicky Score: 10    Vicky Phase II: Vicky Score: 8    Last vitals: Reviewed and per EMR flowsheets.        Anesthesia Post Evaluation    Patient location during evaluation: PACU  Patient participation: complete - patient participated  Level of consciousness: awake and alert  Pain score: 0  Airway patency: patent  Nausea & Vomiting: no nausea and no vomiting  Complications: no  Cardiovascular status: blood pressure returned to baseline and hemodynamically stable  Respiratory status: acceptable  Hydration status: euvolemic

## 2020-02-17 NOTE — H&P
Positive for weakness and numbness. Hematological: Negative. Psychiatric/Behavioral: Positive for sleep disturbance. No Known Allergies     Medications Prior to Visit          Outpatient Medications Prior to Visit   Medication Sig Dispense Refill    Cholecalciferol (VITAMIN D3) 25 MCG (1000 UT) TABS TAKE 3 TABLETS BY MOUTH TWICE DAILY 180 tablet 3    insulin glargine (LANTUS) 100 UNIT/ML injection vial Inject 10 Units into the skin 2 times daily 3 vial 3    gabapentin (NEURONTIN) 300 MG capsule TAKE 2 CAPSULES TWICE DAILY 360 capsule 3    atorvastatin (LIPITOR) 40 MG tablet TAKE 1 TABLET EVERY DAY 90 tablet 3    potassium chloride (MICRO-K) 10 MEQ extended release capsule TAKE 1 CAPSULE TWICE DAILY 180 capsule 3    alendronate (FOSAMAX) 70 MG tablet TAKE 1 TABLET BY MOUTH EVERY WEEK 12 tablet 3    senna (SENOKOT) 8.6 MG TABS tablet TAKE 1 TABLET BY MOUTH TWICE DAILY 180 tablet 3    CONTOUR NEXT TEST strip USE TO TEST 4 TIMES A DAY.  400 strip 5    fluticasone (FLONASE) 50 MCG/ACT nasal spray USE 2 SPRAYS NASALLY EVERY DAY 48 g 3    clopidogrel (PLAVIX) 75 MG tablet TAKE 1 TABLET EVERY DAY 90 tablet 3    ibuprofen (ADVIL;MOTRIN) 400 MG tablet TAKE 1 TABLET EVERY 6 HOURS AS NEEDED FOR PAIN 360 tablet 3    buPROPion (WELLBUTRIN) 75 MG tablet TAKE 1 TABLET TWICE DAILY 180 tablet 3    amitriptyline (ELAVIL) 25 MG tablet TAKE 1 TABLET AT BEDTIME 90 tablet 3    insulin aspart (NOVOLOG FLEXPEN) 100 UNIT/ML injection pen INJECT 4 UNITS AT LUNCH AND  6 UNITS AT SUPPER (EACH PEN EXPIRES 28 DAYS AFTER 1ST USE) 15 mL 3    vitamin B-6 (PYRIDOXINE) 100 MG tablet Take 100 mg by mouth daily        B-D ULTRAFINE III SHORT PEN 31G X 8 MM MISC USE TWICE DAILY 180 each 3    albuterol sulfate HFA (VENTOLIN HFA) 108 (90 Base) MCG/ACT inhaler Inhale 2 puffs into the lungs every 4 hours as needed for Wheezing 3 Inhaler 3    vitamin C (ASCORBIC ACID) 500 MG tablet TAKE 1 TABLET BY MOUTH TWICE DAILY 180 tablet 3  albuterol (PROVENTIL) (2.5 MG/3ML) 0.083% nebulizer solution Take 3 mLs by nebulization every 6 hours as needed for Wheezing 120 each 3    Diabetic Shoe MISC Dispense #1 pair 2 each 0    ondansetron (ZOFRAN) 4 MG tablet Take 1 tablet by mouth every 8 hours as needed for Nausea or Vomiting 30 tablet 3    magnesium oxide (MAGNESIUM-OXIDE) 400 (241.3 Mg) MG TABS tablet Take 1 tablet by mouth 2 times daily 60 tablet 11    furosemide (LASIX) 40 MG tablet Take 1 tablet by mouth daily 90 tablet 3    umeclidinium-vilanterol (ANORO ELLIPTA) 62.5-25 MCG/INH AEPB inhaler INHALE 1 PUFF INTO LUNGS EVERY DAY 3 each 3    pantoprazole (PROTONIX) 40 MG tablet TAKE 1 TABLET EVERY DAY 90 tablet 3    lisinopril (PRINIVIL;ZESTRIL) 10 MG tablet TAKE 1 TABLET EVERY DAY 90 tablet 3    amLODIPine (NORVASC) 5 MG tablet TAKE 1 TABLET EVERY DAY 90 tablet 3    citalopram (CELEXA) 10 MG tablet TAKE 1 TABLET EVERY DAY 90 tablet 3    spironolactone (ALDACTONE) 50 MG tablet TAKE 1 TABLET TWICE DAILY 180 tablet 3    Insulin Syringe-Needle U-100 (INSULIN SYRINGE .5CC/31GX5/16\") 31G X 5/16\" 0.5 ML MISC 1 each by Does not apply route daily 200 each 3    triamcinolone (KENALOG) 0.1 % cream APPLY TOPICALLY TWICE DAILY 80 g 5    VITAMIN A PO Take 2,400 mcg by mouth daily        vitamin E 400 UNIT capsule Take 400 Units by mouth daily        COMBIGAN 0.2-0.5 % ophthalmic solution PLACE 1 DROP INTO BOTH EYES DAILY 15 mL 3    Compression Stockings MISC by Does not apply route 15-20mmHg  Knee high  Open tow  With assist device to help put them on 1 each 0    Zinc 50 MG TABS Take 50 mg by mouth daily        niacin 500 MG extended release capsule Take 500 mg by mouth daily        folic acid (FOLVITE) 682 MCG tablet Take 800 mcg by mouth daily        Cyanocobalamin (VITAMIN B12 PO) Take 2,500 mcg by mouth daily        B Complex-C (SUPER B COMPLEX PO) Take 1 tablet by mouth daily         travoprost, benzalkonium, (TRAVATAN) 0.004 % ophthalmic solution Place 1 drop into both eyes daily 3 Bottle 3    CPAP Machine MISC by Does not apply route Pressure of 13 (AirSense 10)  [employer] [facility].        ferrous sulfate 325 (65 FE) MG EC tablet Take 325 mg by mouth daily (with breakfast)        Multiple Vitamin (MULTI VITAMIN DAILY) TABS Take by mouth daily        nitroGLYCERIN (NITROSTAT) 0.4 MG SL tablet Place 0.4 mg under the tongue every 5 minutes as needed for Chest pain Dissolve 1 tab under tongue at first sign of chest pain. May repeat every 5 minutes until relief is obtained. If pain persists after taking 3 tabs in a 15-minute period, or the pain is different than is typically experienced, call 9-1-1 immediately. No facility-administered medications prior to visit.              Past Medical History        Past Medical History:   Diagnosis Date    Arthritis      Asthma      CAD (coronary artery disease) 46    Cancer ([address] 1971     cervical    Carotid artery disease ([address]      Cataracts, bilateral      COPD (chronic obstructive pulmonary disease) (HCC)      Coronary artery disease      Eczema      Fibromyalgia      Glaucoma      H/O blood clots      Headache      Heart attack ([address]      Hx of Bell's palsy       Mild right facial paralysis    Hypertension      Liver disease      Mild dementia ([address]      Neuropathy      Osteoporosis      Sleep apnea 11/07/2014     [facility] [address] [facility]    Tremor      Type 2 diabetes mellitus with hyperglycemia [facility])              Past Surgical History         Past Surgical History:   Procedure Laterality Date    ANESTHESIA NERVE BLOCK Right 6/28/2019     Right L2 L3 L4 L5 Diagnostic Medial BB performed by Price Harden MD at [facility] Right 7/26/2019     Right L2 L3 L4 L5 Confirmatory Medial BB performed by Price Harden MD at [address] [place] [address]   2002    COLONOSCOPY   09/2012    COLONOSCOPY N/A 9/30/2019 COLONOSCOPY performed by Alanna Coelho MD at [facility]  internal hemorrhoids and few diverticuli    CORONARY ANGIOPLASTY WITH STENT PLACEMENT        INDUCED    1970    LUMBAR SPINE SURGERY Right 2019     Right L2 L3 TRANSFORAMINAL performed by Tootie Lee MD at [address] [place] [address]  Right 2019     Right L2 L3 TRANSFORAMINAL performed by Tootie Lee MD at [address] [place] [address]  Right 2019     Right L3 & L4 TRANSFORAMINAL performed by Tootie Lee MD at [address] [place] [address]  Right 2019     Right L4 L5 TRANSFORAMINAL performed by Tootie Lee MD at [address] [facility] Right 2020     RIGHT L4 L5  TRANSFORAMINAL performed by Tootie Lee MD at [address] [facility] 1.1-2CM FACE,FACIAL Left 2018     Excision Cyst Left Chest, Middle Back performed by Alanna Coelho MD at [address] [facility] ANES/STEROID FORAMEN LUMBAR/SACRAL  [address] [facility] J[place] Laws ADD LEVEL Right 10/5/2018     Right L2 & L3 TRANSFORAMINAL performed by Tootie Lee MD at [address] [facility] ANES/STEROID FORAMEN LUMBAR/SACRAL  [address] [facility] [place] ADD LEVEL Right 10/26/2018     Right L2 L3 TRANSFORAMINAL performed by Tootie Lee MD at [address] Right 2019     Right  L2 L3 L4 L5 RADIOFREQUENCY performed by Tootie Lee MD at [address]     Cervical cancer.   Had XRT    TONSILLECTOMY   1983         Family History         Family History   Problem Relation Age of Onset    Heart Disease Mother      High Blood Pressure Mother     Vict[employer] Stroke Mother      Glaucoma Mother      Early Death Father      Cancer Father           stomach    Miscarriages / Stillbirths Maternal Uncle           Social History   Social History            Socioeconomic History    Marital status:        Spouse name: Not on file    Number of children: Not on file    Years of education: Not on file    Highest education level: Not on file   Occupational History    Not on file   Social Needs    Financial resource strain: Not on file    Food insecurity:       Worry: Not on file       Inability: Not on file    Transportation needs:       Medical: Not on file       Non-medical: Not on file   Tobacco Use    Smoking status: Former Smoker       Packs/day: 1.00       Years: 35.00       Pack years: 35.00       Last attempt to quit: 2001       Years since quittin.3    Smokeless tobacco: Never Used   Substance and Sexual Activity    Alcohol use: No    Drug use: No    Sexual activity: Never   Lifestyle    Physical activity:       Days per week: Not on file       Minutes per session: Not on file    Stress: Not on file   Relationships    Social connections:       Talks on phone: Not on file       Gets together: Not on file       Attends [employer] service: Not on file       Active member of club or organization: Not on file       Attends meetings of clubs or organizations: Not on file       Relationship status: Not on file    Intimate partner violence:       Fear of current or ex partner: Not on file       Emotionally abused: Not on file       Physically abused: Not on file       Forced sexual activity: Not on file   Other Topics Concern    Not on file   Social History Narrative    Not on file               Family and Social Historyreviewed in the electronic medical record. Imaging:Reviewed available imaging in our system with the patient. No results found. Objective:      Physical Exam:  Vitals       Vitals:     20 1505   BP: 128/68   Site: Right Upper Arm   Position: Sitting   Cuff Size: Medium Adult   Pulse: 64   Resp: 16   Weight: 270 lb 15.1 oz (122.9 kg)   Height: 5' 4\" (1.626 m)         Pain Score: (no pain)     Physical Exam  Back Exam      Tenderness   The patient is experiencing tenderness in the lumbar.      Range of Motion   Extension: normal   Flexion: normal   Lateral bend right: normal Lateral bend left: normal   Rotation right: normal   Rotation left: normal      Muscle Strength   Right Quadriceps:  5/5   Left Quadriceps:  5/5   Right Hamstrings:  5/5   Left Hamstrings:  5/5      Tests   Straight leg raise right: negative  Straight leg raise left: negative     Other   Toe walk: normal  Heel walk: normal  Sensation: normal  Gait: normal      Comments:  +slump R  - fabers                                             Assessment: This is a 68 y.o. female patient with:     Diagnosis:    Diagnosis Orders   1. Lumbar radiculopathy      2. Lumbar facet joint syndrome      3. Osteoporosis without current pathological fracture, unspecified osteoporosis type            Medical Comorbidities:  As listed in the patient's past medical and surgical history     Functional Limitations:   Pain limits function and quality of life. Plan:   Call  Consider R L4,5 TFE if  radicular pain worsens   Dr Janine Gonzalez says  Does not want to perform Lumbar fusion due to co morbidities and unsureness of result     Meds:   Controlled Substances Monitoring: Pt educated about the risks of taking opiates,including increased sedation, constipation, slowed breathing, tolerance, dependence,and addiction. New Prescriptions     No medications on file        Encounter Medications    No orders of the defined types were placed in this encounter. No orders of the defined types were placed in this encounter. No follow-ups on file. The patient expressed understanding of the above assessment and plan. Totaltime spent face to face with patient was 25 minutes inwhich  50% or more of the time was spent in counseling, education about risk andbenefits of the above plan, and coordination of care.

## 2020-02-17 NOTE — OP NOTE
removed. Instillation of  .5 mL of Omnipaque 240 contrast medium opacified the spinal nerve and demonstrated contiguous flow into the RL 5 epidural space. No vascular spread was noted. Digital subtraction was not employed to evaluate for vascular spread. The patient was monitored for any untoward reaction to contrast medium before proceeding with procedure #2. The patient did not report pain reproduction in a concordant distribution. Following needle position verification, a test dose of .5 mL of sterile lidocaine 0.5% was administered and patient monitored for any adverse effects. Then, 1 mL of  was instilled into the epidural space and the patient's response was again monitored. Finally, Dexamethasone (Decadron 10 mg/mL)  1 ml of 0.5% bupivacaine was then instilled. The patient's response was again monitored. The spinal needle was removed. The patient tolerated the procedure well and without complications and was noted to be in stable condition prior to discharge from the procedure center with discharge instructions. EBL: no blood loss    SPECIMEN: none    IMPRESSIONS:  1. Rl4,5 transforaminal epidurogram, epidural anesthesia and epidural steroid injection procedures accomplished without incident. RECOMMENDATIONS:  1. Complete and return Post-Procedure Pain and Activity Diary.   2. Contact the [address] for symptom exacerbation, fever or unusual symptoms. 3. Post-procedure care according to verbal and written discharge instructions    POST-PROCEDURE EPIDUROGRAPHY INTERPRETATION:    EXAMINATION: AP, lateral, and oblique views    FLUORO TIME: 43 seconds    DISCUSSION: Spot views of the spine reveal normal alignment and segmentation. Spinal needle is positioned at the Rl4,5 neuroforamin. Contrast spreads and outlines the Rl4,5 nerve/ neuroforamin and epidural space. The epidurogram reveals excellent contrast flow. Visualized spine reveals See radiology report.  Soft tissues

## 2020-02-17 NOTE — ADDENDUM NOTE
Addendum  created 02/17/20 [id number] by DAVID Lafleur CRNA    Intraprocedure Meds edited, Orders acknowledged in Narrator

## 2020-02-17 NOTE — ANESTHESIA PRE PROCEDURE
Syringe-Needle U-100 (INSULIN SYRINGE .5CC/31GX5/16\") 31G X 5/16\" 0.5 ML MISC 1 each by Does not apply route daily 12/31/18   Sissy Castellanos MD   triamcinolone (KENALOG) 0.1 % cream APPLY TOPICALLY TWICE DAILY 9/18/18   Sissy Castellanos MD   VITAMIN A PO Take 2,400 mcg by mouth daily    Historical Provider, MD   vitamin E 400 UNIT capsule Take 400 Units by mouth daily    Historical Provider, MD   COMBIGAN 0.2-0.5 % ophthalmic solution PLACE 1 DROP INTO BOTH EYES DAILY 1/2/18   Sissy Castellanos MD   Compression Stockings MISC by Does not apply route 15-20mmHg  Knee high  Open tow  With assist device to help put them on 5/10/17   Gray Bains DO   Zinc 50 MG TABS Take 50 mg by mouth daily    Historical Provider, MD   niacin 500 MG extended release capsule Take 500 mg by mouth daily    Historical Provider, MD   folic acid (FOLVITE) 035 MCG tablet Take 800 mcg by mouth daily    Historical Provider, MD   Cyanocobalamin (VITAMIN B12 PO) Take 2,500 mcg by mouth daily    Historical Provider, MD   B Complex-C (SUPER B COMPLEX PO) Take 1 tablet by mouth daily     Historical Provider, MD   travoprost, benzalkonium, (TRAVATAN) 0.004 % ophthalmic solution Place 1 drop into both eyes daily 10/7/16   Sissy Castellanos MD   CPAP Machine MISC by Does not apply route Pressure of 13 (AirSense 10)  [employer] [facility].    Historical Provider, MD   ferrous sulfate 325 (65 FE) MG EC tablet Take 325 mg by mouth daily (with breakfast)    Historical Provider, MD   Multiple Vitamin (MULTI VITAMIN DAILY) TABS Take by mouth daily    Historical Provider, MD   nitroGLYCERIN (NITROSTAT) 0.4 MG SL tablet Place 0.4 mg under the tongue every 5 minutes as needed for Chest pain Dissolve 1 tab under tongue at first sign of chest pain. May repeat every 5 minutes until relief is obtained. If pain persists after taking 3 tabs in a 15-minute period, or the pain is different than is typically experienced, call 9-1-1 immediately.     Historical Provider, MD Fibromyalgia     Glaucoma     H/O blood clots     Headache     Heart [facility] ([facility]     Hx of Bell's palsy     Mild right facial paralysis    Hypertension     Liver disease     Mild dementia ([facility]     Neuropathy     Osteoporosis     Sleep apnea 11/07/2014    sleep study done in [facility]    Tremor     Type 2 diabetes mellitus with hyperglycemia [facility])        Past Surgical History:        Procedure Laterality Date    ANESTHESIA NERVE BLOCK Right 6/28/2019    Right L2 L3 L4 L5 Diagnostic Medial BB performed by Grace Solorzano MD at [address] [place] [address] Right 7/26/2019    Right L2 L3 L4 L5 Confirmatory Medial BB performed by Grace Solorzano MD at [address] [place] [address] SURGERY  2002    COLONOSCOPY  09/2012    COLONOSCOPY N/A 9/30/2019    COLONOSCOPY performed by Rachel Porter MD at [address] [place] [address]  internal hemorrhoids and few diverticuli    CORONARY ANGIOPLASTY WITH [address] Right 2/8/2019    Right L2 L3 TRANSFORAMINAL performed by Grace Solorzano MD at [address] [place] [address]  Right 5/7/2019    Right L2 L3 TRANSFORAMINAL performed by Grace Solorzano MD at [address] [place] [address] Dr [place] 6/4/2019    Right L3 & L4 TRANSFORAMINAL performed by Grace Solorzano MD at [address] [place] [address]  Right 12/12/2019    Right L4 L5 TRANSFORAMINAL performed by [facility]ano MD at [facility] Right 1/20/2020    RIGHT L4 L5  TRANSFORAMINAL performed by Grace Solorzano MD at [address] [facility] 1.1-2CM FACE,FACIAL Left 2/7/2018    Excision Cyst Left Chest, Middle Back performed by Rachel Porter MD at [address] [place] [address] ANES/STEROID FORAMEN LUMBAR/SACRAL [address] [place] Laws ADD LEVEL Right 10/5/2018    Right L2 & L3 TRANSFORAMINAL performed by Grace Solorzano MD at [address] [place], [place] [address]S/STEROID FORAMEN LUMBAR/SACRAL [address] [place] Laws ADD LEVEL Right 10/26/2018    Right L2 L3 TRANSFORAMINAL performed by Ninoska Ramirez MD at [address] Right 2019    Right  L2 L3 L4 L5 RADIOFREQUENCY performed by Ninoska Ramirez MD at [address] [facility]    Cervical cancer. Had XRT    TONSILLECTOMY         Social History:    Social History     Tobacco Use    Smoking status: Former Smoker     Packs/day: 1.00     Years: 35.00     Pack years: 35.00     Last attempt to quit: 2001     Years since quittin.3    Smokeless tobacco: Never Used   Substance Use Topics    Alcohol use: No                                Counseling given: Not Answered      Vital Signs (Current): There were no vitals filed for this visit. BP Readings from Last 3 Encounters:   20 92/60   20 128/68   20 (!) 97/48       NPO Status:                                                                                 BMI:   Wt Readings from Last 3 Encounters:   20 271 lb (122.9 kg)   20 270 lb 15.1 oz (122.9 kg)   20 271 lb (122.9 kg)     There is no height or weight on file to calculate BMI.    CBC:   Lab Results   Component Value Date    WBC 5.6 2019    RBC 4.11 2019    HGB 13.4 2020    HCT 38.1 2019    MCV 92.9 2019    RDW 13.9 2019     2019       CMP:   Lab Results   Component Value Date     2019    K 4.3 2019     2019    CO2 25 2019    BUN 15 2019    CREATININE 0.75 2019    GFRAA >60 2019    LABGLOM >60 2019    GLUCOSE 182 2019    PROT 7.2 2019    CALCIUM 9.0 2019    BILITOT 0.44 2019    ALKPHOS 59 2019    AST 34 2019    ALT 33 2019       POC Tests: No results for input(s): POCGLU, POCNA, POCK, POCCL, POCBUN, POCHEMO, POCHCT in the last 72 hours.     Coags:   Lab Results   Component Value Date    PROTIME 11.3 01/15/2019    INR 1.1 01/15/2019    APTT 27.7 01/15/2019       HCG (If Applicable): No results found for: PREGTESTUR, PREGSERUM, HCG, HCGQUANT     ABGs: No results found for: PHART, PO2ART, GMJ5OMS, TLG2FAM, BEART, S0QSXFHF     Type & Screen (If Applicable):  No results found for: [facility]    Anesthesia Evaluation  Patient summary reviewed no history of anesthetic complications:   Airway: Mallampati: II  TM distance: >3 FB   Neck ROM: full  Mouth opening: > = 3 FB Dental:          Pulmonary:normal exam    (+) COPD: no interval change,  sleep apnea:  asthma:                            Cardiovascular:    (+) hypertension:, past MI: > 6 months, CAD: no interval change,       ECG reviewed                        Neuro/Psych:   (+) neuromuscular disease:, TIA, headaches: tension headaches, psychiatric history: stable with treatmentdepression/anxiety             GI/Hepatic/Renal:   (+) liver disease:, morbid obesity          Endo/Other:    (+) DiabetesType II DM, no interval change, , .                 Abdominal:           Vascular:                                          Anesthesia Plan      TIVA     ASA 4       Induction: intravenous. Anesthetic plan and risks discussed with patient.       Plan discussed with surgical team.                  DAVID Lafleur - CRNA   2/17/2020

## 2020-02-19 RX ORDER — SYRGE-NDL,INS 0.5 ML HALF MARK 31 GX5/16"
SYRINGE, EMPTY DISPOSABLE MISCELLANEOUS
Qty: 200 EACH | Refills: 3 | Status: SHIPPED | OUTPATIENT
Start: 2020-02-19 | End: 2020-04-09 | Stop reason: SDUPTHER

## 2020-02-26 RX ORDER — ALBUTEROL SULFATE 90 UG/1
AEROSOL, METERED RESPIRATORY (INHALATION)
Qty: 54 G | Refills: 3 | Status: SHIPPED | OUTPATIENT
Start: 2020-02-26 | End: 2021-01-11

## 2020-02-27 ENCOUNTER — TELEPHONE (OUTPATIENT)
Dept: INTERNAL MEDICINE | Age: 70
End: 2020-02-27

## 2020-02-27 ENCOUNTER — OFFICE VISIT (OUTPATIENT)
Dept: VASCULAR SURGERY | Age: 70
End: 2020-02-27
Payer: MEDICARE

## 2020-02-27 VITALS
DIASTOLIC BLOOD PRESSURE: 72 MMHG | BODY MASS INDEX: 46.26 KG/M2 | HEART RATE: 55 BPM | WEIGHT: 270.95 LBS | SYSTOLIC BLOOD PRESSURE: 118 MMHG | HEIGHT: 64 IN

## 2020-02-27 PROCEDURE — G8400 PT W/DXA NO RESULTS DOC: HCPCS | Performed by: SURGERY

## 2020-02-27 PROCEDURE — 1123F ACP DISCUSS/DSCN MKR DOCD: CPT | Performed by: SURGERY

## 2020-02-27 PROCEDURE — G8427 DOCREV CUR MEDS BY ELIG CLIN: HCPCS | Performed by: SURGERY

## 2020-02-27 PROCEDURE — 1090F PRES/ABSN URINE INCON ASSESS: CPT | Performed by: SURGERY

## 2020-02-27 PROCEDURE — G8417 CALC BMI ABV UP PARAM F/U: HCPCS | Performed by: SURGERY

## 2020-02-27 PROCEDURE — 3017F COLORECTAL CA SCREEN DOC REV: CPT | Performed by: SURGERY

## 2020-02-27 PROCEDURE — 1036F TOBACCO NON-USER: CPT | Performed by: SURGERY

## 2020-02-27 PROCEDURE — G8484 FLU IMMUNIZE NO ADMIN: HCPCS | Performed by: SURGERY

## 2020-02-27 PROCEDURE — 4040F PNEUMOC VAC/ADMIN/RCVD: CPT | Performed by: SURGERY

## 2020-02-27 PROCEDURE — 99214 OFFICE O/P EST MOD 30 MIN: CPT

## 2020-02-27 PROCEDURE — 99213 OFFICE O/P EST LOW 20 MIN: CPT | Performed by: SURGERY

## 2020-02-27 NOTE — TELEPHONE ENCOUNTER
Patient needs script for her diabetic shoes, f 2 f, etc sent to Dr Minerva Peters  [phone] fax   Ph is [phone]

## 2020-02-27 NOTE — PROGRESS NOTES
[address] [facility] DEFIANCE [facility]INIC  [facility] VASCULAR SURG  [address] [facility] [address]  Mal[place] [address]  1950  68 y. o.female       Chief Complaint:  Chief Complaint   Patient presents with    Follow-up     1 year with carotid       History of present Illness:  Pt is here today for evaluation and treatment of Bilateral carotid stenosis. Her last study in 2018 showed less than 50% stenosis bilaterally. Is been about 18 months and there is been no progression of her disease. I reassured her she does not have any significant risk of stroke and that she does not need any further carotid ultrasounds in the future. She is weird    Past Medical History:  has a past medical history of Arthritis, Asthma, CAD (coronary artery disease), Cancer ([address] Carotid artery disease ([address] Cataracts, bilateral, COPD (chronic obstructive pulmonary disease) ([address] Coronary artery disease, Eczema, Fibromyalgia, Glaucoma, H/O blood clots, Headache, Heart attack ([address] Hx of Bell's palsy, Hypertension, Liver disease, Mild dementia ([address] Neuropathy, Osteoporosis, Sleep apnea, Tremor, and Type 2 diabetes mellitus with hyperglycemia ([address]     Past Surgical History:   Past Surgical History:   Procedure Laterality Date    ANESTHESIA NERVE BLOCK Right 2019    Right L2 L3 L4 L5 Diagnostic Medial BB performed by Radha Harkins MD at [facility] Right 2019    Right L2 L3 L4 L5 Confirmatory Medial BB performed by Radha Harkins MD at [address] [place] [address] SURGERY  2002    COLONOSCOPY  2012    COLONOSCOPY N/A 2019    COLONOSCOPY performed by Lakia Garrison MD at [address] [place] [address]  internal hemorrhoids and few diverticuli    CORONARY ANGIOPLASTY WITH STENT PLACEMENT      INDUCED   1970    LUMBAR SPINE SURGERY Right 2019    Right L2 L3 TRANSFORAMINAL performed by Radha Harkins MD at [address] [facility] SPINE SURGERY Right 5/7/2019    Right L2 L3 TRANSFORAMINAL performed by Gema Kilgore MD at [address] [place] [address]  Right 6/4/2019    Right L3 & L4 TRANSFORAMINAL performed by Gema Kilgore MD at [address] [place] [address]  Right 12/12/2019    Right L4 L5 TRANSFORAMINAL performed by Gema Kilgore MD at [address] [place] [address] Right 1/20/2020    RIGHT L4 L5  TRANSFORAMINAL performed by Gema Kilgore MD at [address] [place] [address] Right 2/17/2020    Right L4 L5 TRANSFORAMINAL performed by Gema Kilgore MD at [address] 1.1-2CM FACE,FACIAL Left 2/7/2018    Excision Cyst Left Chest, Middle Back performed by Sharona Millard MD at [address] [place] [address] ANES/STEROID FORAMEN LUMBAR/SACRAL [address] JUAN Laws ADD LEVEL Right 10/5/2018    Right L2 & L3 TRANSFORAMINAL performed by Gema Kilgore MD at [address] [place], [place] [address]S/STEROID FORAMEN LUMBAR/SACRAL [address] JUAN Laws ADD LEVEL Right 10/26/2018    Right L2 L3 TRANSFORAMINAL performed by Gema Kilgore MD at [address] Right 8/1/2019    Right  L2 L3 L4 L5 RADIOFREQUENCY performed by Gema Kilgore MD at [address] [facility]    Cervical cancer. Had XRT    TONSILLECTOMY  1983       Social History:  reports that she quit smoking about 18 years ago. She has a 35.00 pack-year smoking history. She has never used smokeless tobacco. She reports that she does not drink alcohol or use drugs. Family History: family history includes Cancer in her father; Early Death in her father; Glaucoma in her mother; Heart Disease in her mother; High Blood Pressure in her mother; Roberts Gemma / Peggym Rodrick in her maternal uncle; Stroke in her mother.     Review of Systems:   Constitutional:  negative for  fevers, chills, sweats, fatigue, and weight loss  HEENT:  negative for vision or hearing changes,   Respiratory:  negative for shortness of breath, cough, or congestion  Cardiovascular: have any ongoing risk factors and that no carotid studies need to be repeated because she is in the lowest category of less than 50% bilaterally. 2. Follow-up as needed. No orders of the defined types were placed in this encounter.           Electronically signed by Irais Fyre MD on 2/27/2020 at 12:48 PM     Copy sent to Dr. Yvonne Jarrett MD

## 2020-03-02 ENCOUNTER — OFFICE VISIT (OUTPATIENT)
Dept: PAIN MANAGEMENT | Age: 70
End: 2020-03-02
Payer: MEDICARE

## 2020-03-02 VITALS
BODY MASS INDEX: 45.99 KG/M2 | HEIGHT: 64 IN | SYSTOLIC BLOOD PRESSURE: 120 MMHG | DIASTOLIC BLOOD PRESSURE: 86 MMHG | WEIGHT: 269.4 LBS | RESPIRATION RATE: 18 BRPM

## 2020-03-02 PROCEDURE — 2022F DILAT RTA XM EVC RTNOPTHY: CPT | Performed by: PHYSICAL MEDICINE & REHABILITATION

## 2020-03-02 PROCEDURE — 99214 OFFICE O/P EST MOD 30 MIN: CPT | Performed by: PHYSICAL MEDICINE & REHABILITATION

## 2020-03-02 PROCEDURE — 1090F PRES/ABSN URINE INCON ASSESS: CPT | Performed by: PHYSICAL MEDICINE & REHABILITATION

## 2020-03-02 PROCEDURE — 4040F PNEUMOC VAC/ADMIN/RCVD: CPT | Performed by: PHYSICAL MEDICINE & REHABILITATION

## 2020-03-02 PROCEDURE — 1036F TOBACCO NON-USER: CPT | Performed by: PHYSICAL MEDICINE & REHABILITATION

## 2020-03-02 PROCEDURE — 99214 OFFICE O/P EST MOD 30 MIN: CPT

## 2020-03-02 PROCEDURE — G8427 DOCREV CUR MEDS BY ELIG CLIN: HCPCS | Performed by: PHYSICAL MEDICINE & REHABILITATION

## 2020-03-02 PROCEDURE — 3051F HG A1C>EQUAL 7.0%<8.0%: CPT | Performed by: PHYSICAL MEDICINE & REHABILITATION

## 2020-03-02 PROCEDURE — G8417 CALC BMI ABV UP PARAM F/U: HCPCS | Performed by: PHYSICAL MEDICINE & REHABILITATION

## 2020-03-02 PROCEDURE — G8484 FLU IMMUNIZE NO ADMIN: HCPCS | Performed by: PHYSICAL MEDICINE & REHABILITATION

## 2020-03-02 PROCEDURE — 99213 OFFICE O/P EST LOW 20 MIN: CPT | Performed by: PHYSICAL MEDICINE & REHABILITATION

## 2020-03-02 PROCEDURE — 1123F ACP DISCUSS/DSCN MKR DOCD: CPT | Performed by: PHYSICAL MEDICINE & REHABILITATION

## 2020-03-02 PROCEDURE — 3017F COLORECTAL CA SCREEN DOC REV: CPT | Performed by: PHYSICAL MEDICINE & REHABILITATION

## 2020-03-02 PROCEDURE — G8400 PT W/DXA NO RESULTS DOC: HCPCS | Performed by: PHYSICAL MEDICINE & REHABILITATION

## 2020-03-02 ASSESSMENT — ENCOUNTER SYMPTOMS
DIARRHEA: 0
CONSTIPATION: 0
EYES NEGATIVE: 1
ALLERGIC/IMMUNOLOGIC NEGATIVE: 1
BACK PAIN: 1
RESPIRATORY NEGATIVE: 1

## 2020-03-02 NOTE — PROGRESS NOTES
Highest education level: None   Occupational History    None   Social Needs    Financial resource strain: None    Food insecurity:     Worry: None     Inability: None    Transportation needs:     Medical: None     Non-medical: None   Tobacco Use    Smoking status: Former Smoker     Packs/day: 1.00     Years: 35.00     Pack years: 35.00     Last attempt to quit: 2001     Years since quittin.4    Smokeless tobacco: Never Used   Substance and Sexual Activity    Alcohol use: No    Drug use: No    Sexual activity: Never   Lifestyle    Physical activity:     Days per week: None     Minutes per session: None    Stress: None   Relationships    Social connections:     Talks on phone: None     Gets together: None     Attends Denominational service: None     Active member of club or organization: None     Attends meetings of clubs or organizations: None     Relationship status: None    Intimate partner violence:     Fear of current or ex partner: None     Emotionally abused: None     Physically abused: None     Forced sexual activity: None   Other Topics Concern    None   Social History Narrative    None         Family and Social Historyreviewed in the electronic medical record. Imaging:Reviewed available imaging in our system with the patient. Lumbar CT  2019 L3-4 facet hypertrophy results found. Objective:     Physical Exam:  Vitals:    20 0837   BP: 120/86   Site: Left Upper Arm   Position: Sitting   Cuff Size: Large Adult   Resp: 18   Weight: 269 lb 6.4 oz (122.2 kg)   Height: 5' 4.02\" (1.626 m)     Pain Score:   8    Physical Exam  Constitutional:       General: She is not in acute distress. Appearance: Normal appearance. She is well-developed. She is not diaphoretic. HENT:      Head: Normocephalic and atraumatic. Right Ear: External ear normal. No decreased hearing noted. Left Ear: External ear normal. No decreased hearing noted.       Nose: Nose normal.   Eyes: General: Lids are normal. No scleral icterus. Conjunctiva/sclera: Conjunctivae normal.   Neck:      Trachea: Phonation normal.   Cardiovascular:      Comments: No BLE edema present  Pulmonary:      Effort: Pulmonary effort is normal. No accessory muscle usage or respiratory distress. Genitourinary:     Comments: deferred  Skin:     General: Skin is warm and dry. Coloration: Skin is not pale. Findings: No erythema or rash. Neurological:      Mental Status: She is alert and oriented to person, place, and time. Psychiatric:         Speech: Speech normal.         Behavior: Behavior normal.       Ortho Exam                             Assessment: This is a 68 y.o. female patient with:    Diagnosis:   Diagnosis Orders   1. Chronic right-sided low back pain with right-sided sciatica         Medical Comorbidities:  As listed in the patient's past medical and surgical history    Functional Limitations:   Pain limits function and quality of life. Plan:   Lumbar MRI   R L2,3,4,5 Diag MBB  ? Valium on  Hold for now to see cardiology may 4     Meds:   Controlled Substances Monitoring: Pt educated about the risks of taking opiates,including increased sedation, constipation, slowed breathing, tolerance, dependence,and addiction. New Prescriptions    No medications on file      No orders of the defined types were placed in this encounter. No orders of the defined types were placed in this encounter. No follow-ups on file. The patient expressed understanding of the above assessment and plan. Totaltime spent face to face with patient was 25 minutes inwhich  50% or more of the time was spent in counseling, education about risk andbenefits of the above plan, and coordination of care.

## 2020-03-09 ENCOUNTER — HOSPITAL ENCOUNTER (OUTPATIENT)
Dept: MRI IMAGING | Age: 70
Discharge: HOME | End: 2020-03-11
Payer: MEDICARE

## 2020-03-09 PROCEDURE — 72148 MRI LUMBAR SPINE W/O DYE: CPT

## 2020-03-16 ENCOUNTER — OFFICE VISIT (OUTPATIENT)
Dept: PAIN MANAGEMENT | Age: 70
End: 2020-03-16
Payer: MEDICARE

## 2020-03-16 VITALS
RESPIRATION RATE: 16 BRPM | HEIGHT: 64 IN | WEIGHT: 270 LBS | DIASTOLIC BLOOD PRESSURE: 70 MMHG | BODY MASS INDEX: 46.1 KG/M2 | SYSTOLIC BLOOD PRESSURE: 110 MMHG

## 2020-03-16 PROCEDURE — 1123F ACP DISCUSS/DSCN MKR DOCD: CPT | Performed by: PHYSICAL MEDICINE & REHABILITATION

## 2020-03-16 PROCEDURE — 99214 OFFICE O/P EST MOD 30 MIN: CPT | Performed by: PHYSICAL MEDICINE & REHABILITATION

## 2020-03-16 PROCEDURE — G8417 CALC BMI ABV UP PARAM F/U: HCPCS | Performed by: PHYSICAL MEDICINE & REHABILITATION

## 2020-03-16 PROCEDURE — 1090F PRES/ABSN URINE INCON ASSESS: CPT | Performed by: PHYSICAL MEDICINE & REHABILITATION

## 2020-03-16 PROCEDURE — 4040F PNEUMOC VAC/ADMIN/RCVD: CPT | Performed by: PHYSICAL MEDICINE & REHABILITATION

## 2020-03-16 PROCEDURE — G8427 DOCREV CUR MEDS BY ELIG CLIN: HCPCS | Performed by: PHYSICAL MEDICINE & REHABILITATION

## 2020-03-16 PROCEDURE — 3017F COLORECTAL CA SCREEN DOC REV: CPT | Performed by: PHYSICAL MEDICINE & REHABILITATION

## 2020-03-16 PROCEDURE — G8400 PT W/DXA NO RESULTS DOC: HCPCS | Performed by: PHYSICAL MEDICINE & REHABILITATION

## 2020-03-16 PROCEDURE — 1036F TOBACCO NON-USER: CPT | Performed by: PHYSICAL MEDICINE & REHABILITATION

## 2020-03-16 PROCEDURE — G8484 FLU IMMUNIZE NO ADMIN: HCPCS | Performed by: PHYSICAL MEDICINE & REHABILITATION

## 2020-03-16 ASSESSMENT — ENCOUNTER SYMPTOMS
CONSTIPATION: 0
BACK PAIN: 1
ALLERGIC/IMMUNOLOGIC NEGATIVE: 1
RESPIRATORY NEGATIVE: 1
DIARRHEA: 0
EYES NEGATIVE: 1

## 2020-03-16 NOTE — PROGRESS NOTES
PAIN MANAGEMENT FOLLOW-UP NOTE  3/16/20    CHIEF COMPLAINT: This is a76 y.o. female patientwho returns to the Pain Management Clinic with a history of Lower Back Pain and Results (discuss MRI)      PAIN Fish Lai returns today for  reevaluation. Since the visit, the patient reports that the pain is not changed. Has mild  Improvement with R radicular pain  Since last  R L4,5 TFE last month. Has had  Monthly R L4,5 TFEs  x3 since  December gets 3-4 weeks improvement      Location: Back  Location Modifier: R lower  back  Severity of Pain: 7  Duration of Pain: Persistent  Frequency of Pain: Intermittent  Aggravating Factors: bending forwards  Limiting Behavior: Standing   Relieving Factors: narcotics        Previous pain medication trials have included:          Mental health:    Patient feels - secondary to their current pain problems as described above. H/O depression and anxiety: No   Patient is not seeing psychologist orpsychiatrist   Abuse history? No    Employed? No    ANALGESIA:   Are your Current Pain medication (s) helping to decrease pain? No.   Current Pain score: Pain Score:   6    ADVERSE AFFECTS:   Medication Side Effects: No.    ACTIVITY:  Are you able to be more active with your pain medications? No      ABERRANT BEHAVIORS SINCE LAST VISIT? No    Review of Systems   Constitutional: Positive for fatigue. HENT: Negative. Eyes: Negative. Respiratory: Negative. Cardiovascular: Negative. Gastrointestinal: Negative for constipation and diarrhea. Endocrine: Negative. Genitourinary: Negative for difficulty urinating and flank pain. Musculoskeletal: Positive for back pain and myalgias. Skin: Negative. Allergic/Immunologic: Negative. Neurological: Positive for weakness and numbness. Hematological: Negative. Psychiatric/Behavioral: Positive for sleep disturbance.         No Known Allergies    Outpatient Medications Prior to Visit   Medication Sig Dispense Refill    Diabetic Shoe MISC by Does not apply route 1 each 0    albuterol sulfate  (90 Base) MCG/ACT inhaler INHALE 2 PUFFS EVERY 4 HOURS AS NEEDED FOR WHEEZING 54 g 3    DROPLET INSULIN SYRINGE 31G X 5/16\" 0.5 ML MISC USE  DAILY AS DIRECTED 200 each 3    magnesium oxide (MAGNESIUM-OXIDE) 400 (241.3 Mg) MG TABS tablet Take 1 tablet by mouth 2 times daily 180 tablet 3    furosemide (LASIX) 40 MG tablet Take 1 tablet by mouth daily 90 tablet 3    pantoprazole (PROTONIX) 40 MG tablet TAKE 1 TABLET EVERY DAY 90 tablet 3    lisinopril (PRINIVIL;ZESTRIL) 10 MG tablet TAKE 1 TABLET EVERY DAY 90 tablet 3    amLODIPine (NORVASC) 5 MG tablet TAKE 1 TABLET EVERY DAY 90 tablet 3    citalopram (CELEXA) 10 MG tablet TAKE 1 TABLET EVERY DAY 90 tablet 3    spironolactone (ALDACTONE) 50 MG tablet TAKE 1 TABLET TWICE DAILY 180 tablet 3    calcium carbonate-vitamin D (CALCIUM 600 + D) 600-400 MG-UNIT TABS per tab Take 1 tablet by mouth 2 times daily 180 tablet 3    Cholecalciferol (VITAMIN D3) 25 MCG (1000 UT) TABS TAKE 3 TABLETS BY MOUTH TWICE DAILY 180 tablet 3    insulin glargine (LANTUS) 100 UNIT/ML injection vial Inject 10 Units into the skin 2 times daily 3 vial 3    atorvastatin (LIPITOR) 40 MG tablet TAKE 1 TABLET EVERY DAY 90 tablet 3    potassium chloride (MICRO-K) 10 MEQ extended release capsule TAKE 1 CAPSULE TWICE DAILY 180 capsule 3    alendronate (FOSAMAX) 70 MG tablet TAKE 1 TABLET BY MOUTH EVERY WEEK 12 tablet 3    senna (SENOKOT) 8.6 MG TABS tablet TAKE 1 TABLET BY MOUTH TWICE DAILY 180 tablet 3    CONTOUR NEXT TEST strip USE TO TEST 4 TIMES A DAY.  400 strip 5    fluticasone (FLONASE) 50 MCG/ACT nasal spray USE 2 SPRAYS NASALLY EVERY DAY 48 g 3    clopidogrel (PLAVIX) 75 MG tablet TAKE 1 TABLET EVERY DAY 90 tablet 3    ibuprofen (ADVIL;MOTRIN) 400 MG tablet TAKE 1 TABLET EVERY 6 HOURS AS NEEDED FOR PAIN 360 tablet 3    buPROPion (WELLBUTRIN) 75 MG tablet TAKE 1 TABLET TWICE DAILY 180 tablet 3    amitriptyline (ELAVIL) 25 MG tablet TAKE 1 TABLET AT BEDTIME 90 tablet 3    insulin aspart (NOVOLOG FLEXPEN) 100 UNIT/ML injection pen INJECT 4 UNITS AT LUNCH AND  6 UNITS AT SUPPER (EACH PEN EXPIRES 28 DAYS AFTER 1ST USE) 15 mL 3    B-D ULTRAFINE III SHORT PEN 31G X 8 MM MISC USE TWICE DAILY 180 each 3    vitamin C (ASCORBIC ACID) 500 MG tablet TAKE 1 TABLET BY MOUTH TWICE DAILY 180 tablet 3    albuterol (PROVENTIL) (2.5 MG/3ML) 0.083% nebulizer solution Take 3 mLs by nebulization every 6 hours as needed for Wheezing 120 each 3    Diabetic Shoe MISC Dispense #1 pair 2 each 0    umeclidinium-vilanterol (ANORO ELLIPTA) 62.5-25 MCG/INH AEPB inhaler INHALE 1 PUFF INTO LUNGS EVERY DAY 3 each 3    triamcinolone (KENALOG) 0.1 % cream APPLY TOPICALLY TWICE DAILY 80 g 5    VITAMIN A PO Take 2,400 mcg by mouth daily      vitamin E 400 UNIT capsule Take 400 Units by mouth daily      COMBIGAN 0.2-0.5 % ophthalmic solution PLACE 1 DROP INTO BOTH EYES DAILY 15 mL 3    Compression Stockings MISC by Does not apply route 15-20mmHg  Knee high  Open tow  With assist device to help put them on 1 each 0    Zinc 50 MG TABS Take 50 mg by mouth daily      niacin 500 MG extended release capsule Take 500 mg by mouth daily      folic acid (FOLVITE) 216 MCG tablet Take 800 mcg by mouth daily      Cyanocobalamin (VITAMIN B12 PO) Take 2,500 mcg by mouth daily      B Complex-C (SUPER B COMPLEX PO) Take 1 tablet by mouth daily       travoprost, benzalkonium, (TRAVATAN) 0.004 % ophthalmic solution Place 1 drop into both eyes daily 3 Bottle 3    CPAP Machine MISC by Does not apply route Pressure of 13 (AirSense 10)  [employer].      ferrous sulfate 325 (65 FE) MG EC tablet Take 325 mg by mouth daily (with breakfast)      Multiple Vitamin (MULTI VITAMIN DAILY) TABS Take by mouth daily      nitroGLYCERIN (NITROSTAT) 0.4 MG SL tablet Place 0.4 mg under the tongue every 5 minutes as needed for Chest pain Dissolve 1 tab under Right L2 L3 TRANSFORAMINAL performed by Tootie Lee MD at [place] [address] Right 6/4/2019    Right L3 & L4 TRANSFORAMINAL performed by Tootie Lee MD at [place] [address] Right 12/12/2019    Right L4 L5 TRANSFORAMINAL performed by Tootie Lee MD at [address] [place] [address] Right 1/20/2020    RIGHT L4 L5  TRANSFORAMINAL performed by Tootie Lee MD at [address] [place] [address] Right 2/17/2020    Right L4 L5 TRANSFORAMINAL performed by Tootie Lee MD at [address] [facility] 1.1-2CM FACE,FACIAL Left 2/7/2018    Excision Cyst Left Chest, Middle Back performed by Alanna Coelho MD at [address] [place] [address] ANES/STEROID FORAMEN LUMBAR/SACRAL [address] JUAN Laws ADD LEVEL Right 10/5/2018    Right L2 & L3 TRANSFORAMINAL performed by Tootie Lee MD at [address] [place] [address]/STEROID FORAMEN LUMBAR/SACRAL [address] JUAN Laws ADD LEVEL Right 10/26/2018    Right L2 L3 TRANSFORAMINAL performed by Tootie Lee MD at [address] [facility] Right 8/1/2019    Right  L2 L3 L4 L5 RADIOFREQUENCY performed by Tootie Lee MD at [address] [place] [address],[address]    Cervical cancer.   Had XRT    TONSILLECTOMY  1983     Family History   Problem Relation Age of Onset    Heart Disease Mother     High Blood Pressure Mother    Victory Clapper Stroke Mother     Glaucoma Mother     Early Death Father     Cancer Father         stomach    Miscarriages / Stillbirths Maternal Uncle      Social History     Socioeconomic History    Marital status:      Spouse name: None    Number of children: None    Years of education: None    Highest education level: None   Occupational History    None   Social Needs    Financial resource strain: None    Food insecurity     Worry: None     Inability: None    Transportation needs     Medical: None     Non-medical: None   Tobacco Use    Smoking status: Former Smoker     Packs/day: 1.00     Years: 35.00 Coloration: Skin is not pale. Findings: No erythema or rash. Neurological:      Mental Status: She is alert and oriented to person, place, and time. Psychiatric:         Speech: Speech normal.         Behavior: Behavior normal.       Back Exam     Tenderness   The patient is experiencing tenderness in the lumbar. Range of Motion   Extension: normal   Flexion: normal   Lateral bend right: normal   Lateral bend left: normal   Rotation right: normal   Rotation left: normal     Muscle Strength   Right Quadriceps:  5/5   Left Quadriceps:  5/5   Right Hamstrings:  5/5   Left Hamstrings:  5/5     Tests   Straight leg raise right: positive  Straight leg raise left: negative    Other   Toe walk: normal  Heel walk: normal  Sensation: normal  Gait: normal     Comments:  +slump R  +kemps mild  Nl fabers                                      Assessment: This is a 68 y.o. female patient with:    Diagnosis:   Diagnosis Orders   1. Lumbar radiculopathy     2. Chronic right-sided low back pain with right-sided sciatica     3. Lumbar facet joint syndrome     4. Lumbosacral spondylosis without myelopathy         Medical Comorbidities:  As listed in the patient's past medical and surgical history    Functional Limitations:   Pain limits function and quality of life. Plan:   Order R l4,5 TFE for April May  Dr. Jarvis Costa the  Spine surgery needed  Would have signifcant concerns and wants to  Not  Proceed. Wrote order for TENs unit to use on R lumbar and R thigh  Calf   would hold on SCS  Eval as significant Spine arthitis  as well causing R lumbar pain  And  TENS would not help as much  With this even with field stim    Meds:   Controlled Substances Monitoring: Pt educated about the risks of taking opiates,including increased sedation, constipation, slowed breathing, tolerance, dependence,and addiction.       New Prescriptions    TENS UNIT MISC    by Does not apply route      Orders Placed This Encounter Medications    Tens Unit MISC     Sig: by Does not apply route     Dispense:  1 each     Refill:  0   Order R l4,5 TFE for April may    No orders of the defined types were placed in this encounter. No follow-ups on file. The patient expressed understanding of the above assessment and plan. Totaltime spent face to face with patient was 25 minutes inwhich  50% or more of the time was spent in counseling, education about risk andbenefits of the above plan, and coordination of care.

## 2020-03-19 ENCOUNTER — TELEPHONE (OUTPATIENT)
Dept: PAIN MANAGEMENT | Age: 70
End: 2020-03-19

## 2020-03-19 NOTE — TELEPHONE ENCOUNTER
Pt called today saying that her longterm care nurse needs a Rx for tens unit faxed to [phone] to [facility].   If you need to call her the number is [phone] ext [phone]

## 2020-03-20 RX ORDER — NITROGLYCERIN 0.4 MG/1
0.4 TABLET SUBLINGUAL EVERY 5 MIN PRN
Qty: 25 TABLET | Refills: 1 | Status: SHIPPED | OUTPATIENT
Start: 2020-03-20 | End: 2020-05-14

## 2020-03-24 RX ORDER — UMECLIDINIUM BROMIDE AND VILANTEROL TRIFENATATE 62.5; 25 UG/1; UG/1
POWDER RESPIRATORY (INHALATION)
Qty: 180 EACH | Refills: 3 | Status: SHIPPED | OUTPATIENT
Start: 2020-03-24 | End: 2021-01-27

## 2020-03-30 RX ORDER — PEN NEEDLE, DIABETIC 31 GX5/16"
NEEDLE, DISPOSABLE MISCELLANEOUS
Qty: 200 EACH | OUTPATIENT
Start: 2020-03-30

## 2020-04-09 RX ORDER — PEN NEEDLE, DIABETIC 31 GX5/16"
NEEDLE, DISPOSABLE MISCELLANEOUS
Qty: 180 EACH | Refills: 3 | Status: SHIPPED | OUTPATIENT
Start: 2020-04-09 | End: 2021-10-18 | Stop reason: SDUPTHER

## 2020-04-09 RX ORDER — BLOOD SUGAR DIAGNOSTIC
1 STRIP MISCELLANEOUS 2 TIMES DAILY
Qty: 200 EACH | Refills: 3 | Status: SHIPPED | OUTPATIENT
Start: 2020-04-09 | End: 2020-10-09 | Stop reason: SDUPTHER

## 2020-04-13 RX ORDER — INSULIN ASPART 100 [IU]/ML
INJECTION, SOLUTION INTRAVENOUS; SUBCUTANEOUS
Qty: 15 ML | Refills: 3 | Status: SHIPPED | OUTPATIENT
Start: 2020-04-13 | End: 2021-10-04

## 2020-04-17 ENCOUNTER — TELEPHONE (OUTPATIENT)
Dept: INTERNAL MEDICINE | Age: 70
End: 2020-04-17
Payer: MEDICARE

## 2020-04-17 PROCEDURE — G0179 MD RECERTIFICATION HHA PT: HCPCS | Performed by: INTERNAL MEDICINE

## 2020-04-30 ENCOUNTER — VIRTUAL VISIT (OUTPATIENT)
Dept: CARDIOLOGY | Age: 70
End: 2020-04-30
Payer: MEDICARE

## 2020-04-30 PROCEDURE — 4040F PNEUMOC VAC/ADMIN/RCVD: CPT | Performed by: INTERNAL MEDICINE

## 2020-04-30 PROCEDURE — G8417 CALC BMI ABV UP PARAM F/U: HCPCS | Performed by: INTERNAL MEDICINE

## 2020-04-30 PROCEDURE — 1123F ACP DISCUSS/DSCN MKR DOCD: CPT | Performed by: INTERNAL MEDICINE

## 2020-04-30 PROCEDURE — G8428 CUR MEDS NOT DOCUMENT: HCPCS | Performed by: INTERNAL MEDICINE

## 2020-04-30 PROCEDURE — 1036F TOBACCO NON-USER: CPT | Performed by: INTERNAL MEDICINE

## 2020-04-30 PROCEDURE — G8400 PT W/DXA NO RESULTS DOC: HCPCS | Performed by: INTERNAL MEDICINE

## 2020-04-30 PROCEDURE — 1090F PRES/ABSN URINE INCON ASSESS: CPT | Performed by: INTERNAL MEDICINE

## 2020-04-30 PROCEDURE — 3017F COLORECTAL CA SCREEN DOC REV: CPT | Performed by: INTERNAL MEDICINE

## 2020-04-30 PROCEDURE — 99214 OFFICE O/P EST MOD 30 MIN: CPT | Performed by: INTERNAL MEDICINE

## 2020-04-30 NOTE — PROGRESS NOTES
mouth daily  Historical Provider, MD   folic acid (FOLVITE) 423 MCG tablet Take 800 mcg by mouth daily  Historical Provider, MD   Cyanocobalamin (VITAMIN B12 PO) Take 2,500 mcg by mouth daily  Historical Provider, MD   B Complex-C (SUPER B COMPLEX PO) Take 1 tablet by mouth daily   Historical Provider, MD   travoprost, benzalkonium, (TRAVATAN) 0.004 % ophthalmic solution Place 1 drop into both eyes daily  Linda , MD   CPAP Machine MISC by Does not apply route Pressure of 13 (AirSense 10)  [facility].  Historical Provider, MD   ferrous sulfate 325 (65 FE) MG EC tablet Take 325 mg by mouth daily (with breakfast)  Historical Provider, MD   Multiple Vitamin (MULTI VITAMIN DAILY) TABS Take by mouth daily  Historical Provider, MD       Social History     Tobacco Use    Smoking status: Former Smoker     Packs/day: 1.00     Years: 35.00     Pack years: 35.00     Last attempt to quit: 2001     Years since quittin.5    Smokeless tobacco: Never Used   Substance Use Topics    Alcohol use: No    Drug use: No        Past Medical History:   Diagnosis Date    Arthritis     Asthma     CAD (coronary artery disease)     Cancer ([address] 1971    cervical    Carotid artery disease ([address]     Cataracts, bilateral     COPD (chronic obstructive pulmonary disease) ([address]     Coronary artery disease     Eczema     Fibromyalgia     Glaucoma     H/O blood clots     Headache     Heart attack ([address]     Hx of Bell's palsy     Mild right facial paralysis    Hypertension     Liver disease     Mild dementia ([address]     Neuropathy     Osteoporosis     Sleep apnea 2014    sleep study done in [place]    Tremor     Type 2 diabetes mellitus with hyperglycemia [facility])        Past Surgical History:   Procedure Laterality Date    ANESTHESIA NERVE BLOCK Right 2019    Right L2 L3 L4 L5 Diagnostic Medial BB performed by Veena Ortiz MD at [address] [facility] Right 2019    Right L2 L3 L4 times daily 200 each 3    umeclidinium-vilanterol (ANORO ELLIPTA) 62.5-25 MCG/INH AEPB inhaler INHALE 1 PUFF INTO LUNGS EVERY  each 3    Tens Unit MISC by Does not apply route TENS unit and supplies 1 each 0    nitroGLYCERIN (NITROSTAT) 0.4 MG SL tablet Place 1 tablet under the tongue every 5 minutes as needed for Chest pain Dissolve 1 tab under tongue at first sign of chest pain. May repeat every 5 minutes until relief.  If pain persists after taking 3 tabs in a 15-minute period, or the pain is different than is typically experienced, call 911. 25 tablet 1    Diabetic Shoe MISC by Does not apply route 1 each 0    albuterol sulfate  (90 Base) MCG/ACT inhaler INHALE 2 PUFFS EVERY 4 HOURS AS NEEDED FOR WHEEZING 54 g 3    magnesium oxide (MAGNESIUM-OXIDE) 400 (241.3 Mg) MG TABS tablet Take 1 tablet by mouth 2 times daily 180 tablet 3    furosemide (LASIX) 40 MG tablet Take 1 tablet by mouth daily 90 tablet 3    pantoprazole (PROTONIX) 40 MG tablet TAKE 1 TABLET EVERY DAY 90 tablet 3    lisinopril (PRINIVIL;ZESTRIL) 10 MG tablet TAKE 1 TABLET EVERY DAY 90 tablet 3    amLODIPine (NORVASC) 5 MG tablet TAKE 1 TABLET EVERY DAY 90 tablet 3    citalopram (CELEXA) 10 MG tablet TAKE 1 TABLET EVERY DAY 90 tablet 3    spironolactone (ALDACTONE) 50 MG tablet TAKE 1 TABLET TWICE DAILY 180 tablet 3    calcium carbonate-vitamin D (CALCIUM 600 + D) 600-400 MG-UNIT TABS per tab Take 1 tablet by mouth 2 times daily 180 tablet 3    Cholecalciferol (VITAMIN D3) 25 MCG (1000 UT) TABS TAKE 3 TABLETS BY MOUTH TWICE DAILY 180 tablet 3    insulin glargine (LANTUS) 100 UNIT/ML injection vial Inject 10 Units into the skin 2 times daily 3 vial 3    gabapentin (NEURONTIN) 300 MG capsule TAKE 2 CAPSULES TWICE DAILY 360 capsule 3    atorvastatin (LIPITOR) 40 MG tablet TAKE 1 TABLET EVERY DAY 90 tablet 3    potassium chloride (MICRO-K) 10 MEQ extended release capsule TAKE 1 CAPSULE TWICE DAILY 180 capsule 3    alendronate (FOSAMAX) 70 MG tablet TAKE 1 TABLET BY MOUTH EVERY WEEK 12 tablet 3    senna (SENOKOT) 8.6 MG TABS tablet TAKE 1 TABLET BY MOUTH TWICE DAILY 180 tablet 3    CONTOUR NEXT TEST strip USE TO TEST 4 TIMES A DAY.  400 strip 5    fluticasone (FLONASE) 50 MCG/ACT nasal spray USE 2 SPRAYS NASALLY EVERY DAY 48 g 3    clopidogrel (PLAVIX) 75 MG tablet TAKE 1 TABLET EVERY DAY 90 tablet 3    ibuprofen (ADVIL;MOTRIN) 400 MG tablet TAKE 1 TABLET EVERY 6 HOURS AS NEEDED FOR PAIN 360 tablet 3    buPROPion (WELLBUTRIN) 75 MG tablet TAKE 1 TABLET TWICE DAILY 180 tablet 3    amitriptyline (ELAVIL) 25 MG tablet TAKE 1 TABLET AT BEDTIME 90 tablet 3    vitamin C (ASCORBIC ACID) 500 MG tablet TAKE 1 TABLET BY MOUTH TWICE DAILY 180 tablet 3    albuterol (PROVENTIL) (2.5 MG/3ML) 0.083% nebulizer solution Take 3 mLs by nebulization every 6 hours as needed for Wheezing 120 each 3    Diabetic Shoe MISC Dispense #1 pair 2 each 0    triamcinolone (KENALOG) 0.1 % cream APPLY TOPICALLY TWICE DAILY 80 g 5    VITAMIN A PO Take 2,400 mcg by mouth daily      vitamin E 400 UNIT capsule Take 400 Units by mouth daily      COMBIGAN 0.2-0.5 % ophthalmic solution PLACE 1 DROP INTO BOTH EYES DAILY 15 mL 3    Compression Stockings MISC by Does not apply route 15-20mmHg  Knee high  Open tow  With assist device to help put them on 1 each 0    Zinc 50 MG TABS Take 50 mg by mouth daily      niacin 500 MG extended release capsule Take 500 mg by mouth daily      folic acid (FOLVITE) 874 MCG tablet Take 800 mcg by mouth daily      Cyanocobalamin (VITAMIN B12 PO) Take 2,500 mcg by mouth daily      B Complex-C (SUPER B COMPLEX PO) Take 1 tablet by mouth daily       travoprost, benzalkonium, (TRAVATAN) 0.004 % ophthalmic solution Place 1 drop into both eyes daily 3 Bottle 3    CPAP Machine MISC by Does not apply route Pressure of 13 (AirSense 10)  [facility].      ferrous sulfate 325 (65 FE) MG EC tablet Take 325 mg by mouth daily

## 2020-05-14 ENCOUNTER — OFFICE VISIT (OUTPATIENT)
Dept: VASCULAR SURGERY | Age: 70
End: 2020-05-14
Payer: MEDICARE

## 2020-05-14 VITALS
DIASTOLIC BLOOD PRESSURE: 62 MMHG | SYSTOLIC BLOOD PRESSURE: 114 MMHG | HEART RATE: 64 BPM | WEIGHT: 269 LBS | HEIGHT: 64 IN | BODY MASS INDEX: 45.93 KG/M2

## 2020-05-14 PROCEDURE — G8417 CALC BMI ABV UP PARAM F/U: HCPCS | Performed by: SURGERY

## 2020-05-14 PROCEDURE — 99214 OFFICE O/P EST MOD 30 MIN: CPT | Performed by: SURGERY

## 2020-05-14 PROCEDURE — 1090F PRES/ABSN URINE INCON ASSESS: CPT | Performed by: SURGERY

## 2020-05-14 PROCEDURE — 1123F ACP DISCUSS/DSCN MKR DOCD: CPT | Performed by: SURGERY

## 2020-05-14 PROCEDURE — G8427 DOCREV CUR MEDS BY ELIG CLIN: HCPCS | Performed by: SURGERY

## 2020-05-14 PROCEDURE — G8400 PT W/DXA NO RESULTS DOC: HCPCS | Performed by: SURGERY

## 2020-05-14 PROCEDURE — 1036F TOBACCO NON-USER: CPT | Performed by: SURGERY

## 2020-05-14 PROCEDURE — 3017F COLORECTAL CA SCREEN DOC REV: CPT | Performed by: SURGERY

## 2020-05-14 PROCEDURE — 99214 OFFICE O/P EST MOD 30 MIN: CPT

## 2020-05-14 PROCEDURE — 4040F PNEUMOC VAC/ADMIN/RCVD: CPT | Performed by: SURGERY

## 2020-05-14 RX ORDER — NITROGLYCERIN 0.4 MG/1
TABLET SUBLINGUAL
Qty: 25 TABLET | Refills: 1 | Status: SHIPPED | OUTPATIENT
Start: 2020-05-14

## 2020-05-14 NOTE — PROGRESS NOTES
fatigue, and weight loss  HEENT:  negative for vision or hearing changes,   Respiratory:  negative for shortness of breath, cough, or congestion  Cardiovascular:  negative for  chest pain, palpitations  Gastrointestinal:  negative for nausea, vomiting, diarrhea, constipation, abdominal pain  Genitourinary:  negative for frequency, dysuria  Integument/Breast:  negative for rash, skin lesions  Musculoskeletal:  negative for muscle aches or joint pain  Neurological:  negative for headaches, dizziness, lightheadedness, numbness, pain and tingling extremities  Behavior/Psych:  negative for depression and anxiety    Allergies: Patient has no known allergies.     Current Meds:  Current Outpatient Medications:     insulin aspart (NOVOLOG FLEXPEN) 100 UNIT/ML injection pen, INJECT 4 UNITS AT LUNCH AND  6 UNITS AT SUPPER (EACH PEN EXPIRES 28 DAYS AFTER 1ST USE), Disp: 15 mL, Rfl: 3    Insulin Pen Needle (B-D ULTRAFINE III SHORT PEN) 31G X 8 MM MISC, USE TWICE DAILY, Disp: 180 each, Rfl: 3    Insulin Syringe-Needle U-100 (DROPLET INSULIN SYRINGE) 31G X 5/16\" 0.5 ML MISC, Inject 1 each into the skin 2 times daily, Disp: 200 each, Rfl: 3    umeclidinium-vilanterol (ANORO ELLIPTA) 62.5-25 MCG/INH AEPB inhaler, INHALE 1 PUFF INTO LUNGS EVERY DAY, Disp: 180 each, Rfl: 3    Tens Unit MISC, by Does not apply route TENS unit and supplies, Disp: 1 each, Rfl: 0    Diabetic Shoe MISC, by Does not apply route, Disp: 1 each, Rfl: 0    albuterol sulfate  (90 Base) MCG/ACT inhaler, INHALE 2 PUFFS EVERY 4 HOURS AS NEEDED FOR WHEEZING, Disp: 54 g, Rfl: 3    magnesium oxide (MAGNESIUM-OXIDE) 400 (241.3 Mg) MG TABS tablet, Take 1 tablet by mouth 2 times daily, Disp: 180 tablet, Rfl: 3    furosemide (LASIX) 40 MG tablet, Take 1 tablet by mouth daily, Disp: 90 tablet, Rfl: 3    pantoprazole (PROTONIX) 40 MG tablet, TAKE 1 TABLET EVERY DAY, Disp: 90 tablet, Rfl: 3    lisinopril (PRINIVIL;ZESTRIL) 10 MG tablet, TAKE 1 TABLET EVERY

## 2020-06-01 RX ORDER — ASCORBIC ACID 500 MG
TABLET ORAL
Qty: 180 TABLET | Refills: 3 | Status: SHIPPED | OUTPATIENT
Start: 2020-06-01 | End: 2022-03-11

## 2020-06-19 ENCOUNTER — TELEPHONE (OUTPATIENT)
Dept: INTERNAL MEDICINE | Age: 70
End: 2020-06-19
Payer: MEDICARE

## 2020-06-19 PROCEDURE — G0179 MD RECERTIFICATION HHA PT: HCPCS | Performed by: INTERNAL MEDICINE

## 2020-06-30 ENCOUNTER — OFFICE VISIT (OUTPATIENT)
Dept: PAIN MANAGEMENT | Age: 70
End: 2020-06-30
Payer: MEDICARE

## 2020-06-30 VITALS
SYSTOLIC BLOOD PRESSURE: 120 MMHG | BODY MASS INDEX: 46.26 KG/M2 | HEIGHT: 64 IN | DIASTOLIC BLOOD PRESSURE: 64 MMHG | WEIGHT: 271 LBS

## 2020-06-30 PROCEDURE — G8427 DOCREV CUR MEDS BY ELIG CLIN: HCPCS | Performed by: PHYSICAL MEDICINE & REHABILITATION

## 2020-06-30 PROCEDURE — 3017F COLORECTAL CA SCREEN DOC REV: CPT | Performed by: PHYSICAL MEDICINE & REHABILITATION

## 2020-06-30 PROCEDURE — 99214 OFFICE O/P EST MOD 30 MIN: CPT | Performed by: PHYSICAL MEDICINE & REHABILITATION

## 2020-06-30 PROCEDURE — 1036F TOBACCO NON-USER: CPT | Performed by: PHYSICAL MEDICINE & REHABILITATION

## 2020-06-30 PROCEDURE — 4040F PNEUMOC VAC/ADMIN/RCVD: CPT | Performed by: PHYSICAL MEDICINE & REHABILITATION

## 2020-06-30 PROCEDURE — 1090F PRES/ABSN URINE INCON ASSESS: CPT | Performed by: PHYSICAL MEDICINE & REHABILITATION

## 2020-06-30 PROCEDURE — 1123F ACP DISCUSS/DSCN MKR DOCD: CPT | Performed by: PHYSICAL MEDICINE & REHABILITATION

## 2020-06-30 PROCEDURE — G8400 PT W/DXA NO RESULTS DOC: HCPCS | Performed by: PHYSICAL MEDICINE & REHABILITATION

## 2020-06-30 PROCEDURE — G8417 CALC BMI ABV UP PARAM F/U: HCPCS | Performed by: PHYSICAL MEDICINE & REHABILITATION

## 2020-06-30 ASSESSMENT — ENCOUNTER SYMPTOMS
EYES NEGATIVE: 1
BACK PAIN: 1
DIARRHEA: 0
CONSTIPATION: 0
RESPIRATORY NEGATIVE: 1
ALLERGIC/IMMUNOLOGIC NEGATIVE: 1

## 2020-06-30 NOTE — PROGRESS NOTES
MOUTH EVERY WEEK 12 tablet 3    senna (SENOKOT) 8.6 MG TABS tablet TAKE 1 TABLET BY MOUTH TWICE DAILY 180 tablet 3    CONTOUR NEXT TEST strip USE TO TEST 4 TIMES A DAY.  400 strip 5    fluticasone (FLONASE) 50 MCG/ACT nasal spray USE 2 SPRAYS NASALLY EVERY DAY 48 g 3    clopidogrel (PLAVIX) 75 MG tablet TAKE 1 TABLET EVERY DAY 90 tablet 3    ibuprofen (ADVIL;MOTRIN) 400 MG tablet TAKE 1 TABLET EVERY 6 HOURS AS NEEDED FOR PAIN 360 tablet 3    buPROPion (WELLBUTRIN) 75 MG tablet TAKE 1 TABLET TWICE DAILY 180 tablet 3    amitriptyline (ELAVIL) 25 MG tablet TAKE 1 TABLET AT BEDTIME 90 tablet 3    albuterol (PROVENTIL) (2.5 MG/3ML) 0.083% nebulizer solution Take 3 mLs by nebulization every 6 hours as needed for Wheezing 120 each 3    Diabetic Shoe MISC Dispense #1 pair 2 each 0    triamcinolone (KENALOG) 0.1 % cream APPLY TOPICALLY TWICE DAILY 80 g 5    VITAMIN A PO Take 2,400 mcg by mouth daily      vitamin E 400 UNIT capsule Take 400 Units by mouth daily      COMBIGAN 0.2-0.5 % ophthalmic solution PLACE 1 DROP INTO BOTH EYES DAILY 15 mL 3    Compression Stockings MISC by Does not apply route 15-20mmHg  Knee high  Open tow  With assist device to help put them on 1 each 0    Zinc 50 MG TABS Take 50 mg by mouth daily      niacin 500 MG extended release capsule Take 500 mg by mouth daily      folic acid (FOLVITE) 224 MCG tablet Take 800 mcg by mouth daily      Cyanocobalamin (VITAMIN B12 PO) Take 2,500 mcg by mouth daily      B Complex-C (SUPER B COMPLEX PO) Take 1 tablet by mouth daily       travoprost, benzalkonium, (TRAVATAN) 0.004 % ophthalmic solution Place 1 drop into both eyes daily 3 Bottle 3    CPAP Machine MISC by Does not apply route Pressure of 13 (AirSense 10)  [facility]      ferrous sulfate 325 (65 FE) MG EC tablet Take 325 mg by mouth daily (with breakfast)      Multiple Vitamin (MULTI VITAMIN DAILY) TABS Take by mouth daily      calcium carbonate-vitamin D (CALCIUM 600 + D) 600-400 MG-UNIT TABS per tab Take 1 tablet by mouth 2 times daily 180 tablet 3    gabapentin (NEURONTIN) 300 MG capsule TAKE 2 CAPSULES TWICE DAILY 360 capsule 3     No facility-administered medications prior to visit.         Past Medical History:   Diagnosis Date    Arthritis     Asthma     CAD (coronary artery disease) 1989    Cancer ([facility] 1971    cervical    Carotid artery disease ([address]     Cataracts, bilateral     COPD (chronic obstructive pulmonary disease) (HCC)     Coronary artery disease     Eczema     Fibromyalgia     Glaucoma     H/O blood clots     Headache     Heart attack ([address]     Hx of Bell's palsy     Mild right facial paralysis    Hypertension     Liver disease     Mild dementia ([facility]     Neuropathy     Osteoporosis     Sleep apnea 11/07/2014    [facility] study [facility]     Type 2 diabetes mellitus with hyperglycemia ([facility]        Past Surgical History:   Procedure Laterality Date    ANESTHESIA NERVE BLOCK Right 6/28/2019    Right L2 L3 L4 L5 Diagnostic Medial BB performed by Dave Romo MD at [address] [facility] Right 7/26/2019    Right L2 L3 L4 L5 Confirmatory Medial BB performed by Dave Romo MD at [address]  2002    COLONOSCOPY  09/2012    COLONOSCOPY N/A 9/30/2019    COLONOSCOPY performed by Triny Canas MD at [address] [place] [address]  internal hemorrhoids and few diverticuli    CORONARY ANGIOPLASTY WITH STENT PLACEMENT      INDUCED [address] Right 2/8/2019    Right L2 L3 TRANSFORAMINAL performed by Dave Romo MD at [address] [place] [address] Dr Hamilton 5/7/2019    Right L2 L3 TRANSFORAMINAL performed by Dave Romo MD at [address] [place] [address] Dr Hamilton 6/4/2019    Right L3 & L4 TRANSFORAMINAL performed by Dave Romo MD at [address] [place] [address] Dr Hamilton 12/12/2019    Right L4 L5 TRANSFORAMINAL performed by Dave Romo MD at [address] [place] [address] PROCEDURE Right 2020    RIGHT L4 L5  TRANSFORAMINAL performed by Temitope Durand MD at [address] [facility] Right 2020    Right L4 L5 TRANSFORAMINAL performed by Temitope Durand MD at [address] [facility] 1.1-2CM FACE,FACIAL Left 2018    Excision Cyst Left Chest, Middle Back performed by Dennie Albee, MD at [address] [facility] ANES/STEROID FORAMEN LUMBAR/SACRAL  [address] [facility] JUAN Laws ADD LEVEL Right 10/5/2018    Right L2 & L3 TRANSFORAMINAL performed by Temitope Durand MD at [address] [facility] ANES/STEROID FORAMEN LUMBAR/SACRAL [address] [facility] [place] ADD LEVEL Right 10/26/2018    Right L2 L3 TRANSFORAMINAL performed by Temitope Durand MD at [address] Right 2019    Right  L2 L3 L4 L5 RADIOFREQUENCY performed by Temitope Durand MD at [address] [facility]    Cervical cancer.   Had XRT    TONSILLECTOMY       Family History   Problem Relation Age of Onset    Heart Disease Mother     High Blood Pressure Mother    [facility] Stroke Mother     Glaucoma Mother     Early Death Father     Cancer Father         stomach    Miscarriages / Stillbirths Maternal Uncle      Social History     Socioeconomic History    Marital status:      Spouse name: None    Number of children: None    Years of education: None    Highest education level: None   Occupational History    None   Social Needs    Financial resource strain: None    Food insecurity     Worry: None     Inability: None    Transportation needs     Medical: None     Non-medical: None   Tobacco Use    Smoking status: Former Smoker     Packs/day: 1.00     Years: 35.00     Pack years: 35.00     Last attempt to quit: 2001     Years since quittin.7    Smokeless tobacco: Never Used   Substance and Sexual Activity    Alcohol use: No    Drug use: No    Sexual activity: Never   Lifestyle    Physical activity     Days per week: None     Minutes per session: None    Stress: None

## 2020-07-27 ENCOUNTER — HOSPITAL ENCOUNTER (OUTPATIENT)
Dept: LAB | Age: 70
Discharge: HOME | End: 2020-07-27
Payer: MEDICARE

## 2020-07-27 LAB
ALBUMIN SERPL-MCNC: 3.9 G/DL (ref 3.5–5.2)
ALBUMIN/GLOBULIN RATIO: 1.3 (ref 1–2.5)
ALP BLD-CCNC: 63 U/L (ref 35–104)
ALT SERPL-CCNC: 46 U/L (ref 5–33)
ANION GAP SERPL CALCULATED.3IONS-SCNC: 11 MMOL/L (ref 9–17)
AST SERPL-CCNC: 41 U/L
BILIRUB SERPL-MCNC: 0.44 MG/DL (ref 0.3–1.2)
BUN BLDV-MCNC: 10 MG/DL (ref 8–23)
BUN/CREAT BLD: 13 (ref 9–20)
CALCIUM SERPL-MCNC: 8.9 MG/DL (ref 8.6–10.4)
CHLORIDE BLD-SCNC: 101 MMOL/L (ref 98–107)
CHOLESTEROL/HDL RATIO: 1.8
CHOLESTEROL: 105 MG/DL
CO2: 25 MMOL/L (ref 20–31)
CREAT SERPL-MCNC: 0.78 MG/DL (ref 0.5–0.9)
CREATININE URINE: 22.7 MG/DL (ref 28–217)
ESTIMATED AVERAGE GLUCOSE: 157 MG/DL
GFR AFRICAN AMERICAN: >60 ML/MIN
GFR NON-AFRICAN AMERICAN: >60 ML/MIN
GFR SERPL CREATININE-BSD FRML MDRD: ABNORMAL ML/MIN/{1.73_M2}
GFR SERPL CREATININE-BSD FRML MDRD: ABNORMAL ML/MIN/{1.73_M2}
GLUCOSE BLD-MCNC: 188 MG/DL (ref 70–99)
HBA1C MFR BLD: 7.1 % (ref 4.8–5.9)
HDLC SERPL-MCNC: 58 MG/DL
HEMOGLOBIN: 12 G/DL (ref 11.9–15.1)
IRON SATURATION: 33 % (ref 20–55)
IRON: 105 UG/DL (ref 37–145)
LDL CHOLESTEROL: 34 MG/DL (ref 0–130)
MICROALBUMIN/CREAT 24H UR: 15 MG/L
MICROALBUMIN/CREAT UR-RTO: 66 MCG/MG CREAT
POTASSIUM SERPL-SCNC: 4.6 MMOL/L (ref 3.7–5.3)
SODIUM BLD-SCNC: 137 MMOL/L (ref 135–144)
THYROXINE, FREE: 1.24 NG/DL (ref 0.93–1.7)
TOTAL IRON BINDING CAPACITY: 322 UG/DL (ref 250–450)
TOTAL PROTEIN: 7 G/DL (ref 6.4–8.3)
TRIGL SERPL-MCNC: 66 MG/DL
TSH SERPL DL<=0.05 MIU/L-ACNC: 1.31 MIU/L (ref 0.3–5)
UNSATURATED IRON BINDING CAPACITY: 217 UG/DL (ref 112–347)
VITAMIN B-12: >2000 PG/ML (ref 232–1245)
VITAMIN D 25-HYDROXY: 58.5 NG/ML (ref 30–100)
VLDLC SERPL CALC-MCNC: NORMAL MG/DL (ref 1–30)

## 2020-07-27 PROCEDURE — 84443 ASSAY THYROID STIM HORMONE: CPT

## 2020-07-27 PROCEDURE — 80053 COMPREHEN METABOLIC PANEL: CPT

## 2020-07-27 PROCEDURE — 80061 LIPID PANEL: CPT

## 2020-07-27 PROCEDURE — 85018 HEMOGLOBIN: CPT

## 2020-07-27 PROCEDURE — 83036 HEMOGLOBIN GLYCOSYLATED A1C: CPT

## 2020-07-27 PROCEDURE — 82043 UR ALBUMIN QUANTITATIVE: CPT

## 2020-07-27 PROCEDURE — 82607 VITAMIN B-12: CPT

## 2020-07-27 PROCEDURE — 82306 VITAMIN D 25 HYDROXY: CPT

## 2020-07-27 PROCEDURE — 83540 ASSAY OF IRON: CPT

## 2020-07-27 PROCEDURE — 84439 ASSAY OF FREE THYROXINE: CPT

## 2020-07-27 PROCEDURE — 83550 IRON BINDING TEST: CPT

## 2020-07-27 PROCEDURE — 82570 ASSAY OF URINE CREATININE: CPT

## 2020-07-27 PROCEDURE — 36415 COLL VENOUS BLD VENIPUNCTURE: CPT

## 2020-07-27 RX ORDER — BUPROPION HYDROCHLORIDE 75 MG/1
TABLET ORAL
Qty: 180 TABLET | Refills: 3 | Status: SHIPPED | OUTPATIENT
Start: 2020-07-27 | End: 2021-10-04

## 2020-07-27 RX ORDER — AMITRIPTYLINE HYDROCHLORIDE 25 MG/1
TABLET, FILM COATED ORAL
Qty: 90 TABLET | Refills: 3 | Status: CANCELLED | OUTPATIENT
Start: 2020-07-27

## 2020-07-27 RX ORDER — LISINOPRIL 10 MG/1
TABLET ORAL
Qty: 90 TABLET | Refills: 3 | Status: SHIPPED | OUTPATIENT
Start: 2020-07-27 | End: 2021-10-04

## 2020-07-27 RX ORDER — IBUPROFEN 400 MG/1
TABLET ORAL
Qty: 360 TABLET | Refills: 3 | Status: SHIPPED | OUTPATIENT
Start: 2020-07-27 | End: 2021-12-21

## 2020-07-27 RX ORDER — AMLODIPINE BESYLATE 5 MG/1
TABLET ORAL
Qty: 90 TABLET | Refills: 3 | Status: SHIPPED | OUTPATIENT
Start: 2020-07-27 | End: 2021-10-04

## 2020-07-27 RX ORDER — CLOPIDOGREL BISULFATE 75 MG/1
TABLET ORAL
Qty: 90 TABLET | Refills: 3 | Status: SHIPPED | OUTPATIENT
Start: 2020-07-27 | End: 2021-10-04

## 2020-07-27 RX ORDER — CITALOPRAM 10 MG/1
TABLET ORAL
Qty: 90 TABLET | Refills: 3 | Status: SHIPPED | OUTPATIENT
Start: 2020-07-27 | End: 2021-10-04

## 2020-07-27 RX ORDER — CLOPIDOGREL BISULFATE 75 MG/1
TABLET ORAL
Qty: 90 TABLET | Refills: 3 | Status: SHIPPED | OUTPATIENT
Start: 2020-07-27 | End: 2020-07-27 | Stop reason: SDUPTHER

## 2020-07-27 RX ORDER — AMITRIPTYLINE HYDROCHLORIDE 25 MG/1
TABLET, FILM COATED ORAL
Qty: 90 TABLET | Refills: 3 | Status: SHIPPED | OUTPATIENT
Start: 2020-07-27 | End: 2021-10-04

## 2020-07-27 RX ORDER — PANTOPRAZOLE SODIUM 40 MG/1
TABLET, DELAYED RELEASE ORAL
Qty: 90 TABLET | Refills: 3 | Status: SHIPPED | OUTPATIENT
Start: 2020-07-27 | End: 2021-10-04

## 2020-07-27 RX ORDER — FUROSEMIDE 40 MG/1
40 TABLET ORAL DAILY
Qty: 90 TABLET | Refills: 3 | Status: SHIPPED | OUTPATIENT
Start: 2020-07-27 | End: 2021-10-04

## 2020-08-03 ENCOUNTER — HOSPITAL ENCOUNTER (OUTPATIENT)
Dept: LAB | Age: 70
Discharge: HOME | End: 2020-08-03
Payer: MEDICARE

## 2020-08-03 ENCOUNTER — OFFICE VISIT (OUTPATIENT)
Dept: INTERNAL MEDICINE | Age: 70
End: 2020-08-03
Payer: MEDICARE

## 2020-08-03 VITALS
BODY MASS INDEX: 45.65 KG/M2 | DIASTOLIC BLOOD PRESSURE: 70 MMHG | SYSTOLIC BLOOD PRESSURE: 122 MMHG | TEMPERATURE: 97.4 F | HEIGHT: 64 IN | HEART RATE: 60 BPM | RESPIRATION RATE: 20 BRPM | WEIGHT: 267.4 LBS

## 2020-08-03 PROBLEM — J47.9 BRONCHIECTASIS WITHOUT COMPLICATION (HCC): Status: ACTIVE | Noted: 2020-08-03

## 2020-08-03 PROBLEM — I50.32 CHRONIC DIASTOLIC CONGESTIVE HEART FAILURE (HCC): Status: ACTIVE | Noted: 2020-08-03

## 2020-08-03 LAB
IRON SATURATION: 29 % (ref 20–55)
IRON: 96 UG/DL (ref 37–145)
TOTAL IRON BINDING CAPACITY: 326 UG/DL (ref 250–450)
UNSATURATED IRON BINDING CAPACITY: 230 UG/DL (ref 112–347)

## 2020-08-03 PROCEDURE — G8427 DOCREV CUR MEDS BY ELIG CLIN: HCPCS | Performed by: INTERNAL MEDICINE

## 2020-08-03 PROCEDURE — 1123F ACP DISCUSS/DSCN MKR DOCD: CPT | Performed by: INTERNAL MEDICINE

## 2020-08-03 PROCEDURE — 3051F HG A1C>EQUAL 7.0%<8.0%: CPT | Performed by: INTERNAL MEDICINE

## 2020-08-03 PROCEDURE — 99214 OFFICE O/P EST MOD 30 MIN: CPT

## 2020-08-03 PROCEDURE — 1090F PRES/ABSN URINE INCON ASSESS: CPT | Performed by: INTERNAL MEDICINE

## 2020-08-03 PROCEDURE — 83540 ASSAY OF IRON: CPT

## 2020-08-03 PROCEDURE — 1036F TOBACCO NON-USER: CPT | Performed by: INTERNAL MEDICINE

## 2020-08-03 PROCEDURE — 99214 OFFICE O/P EST MOD 30 MIN: CPT | Performed by: INTERNAL MEDICINE

## 2020-08-03 PROCEDURE — G8417 CALC BMI ABV UP PARAM F/U: HCPCS | Performed by: INTERNAL MEDICINE

## 2020-08-03 PROCEDURE — 3017F COLORECTAL CA SCREEN DOC REV: CPT | Performed by: INTERNAL MEDICINE

## 2020-08-03 PROCEDURE — 2022F DILAT RTA XM EVC RTNOPTHY: CPT | Performed by: INTERNAL MEDICINE

## 2020-08-03 PROCEDURE — 4040F PNEUMOC VAC/ADMIN/RCVD: CPT | Performed by: INTERNAL MEDICINE

## 2020-08-03 PROCEDURE — G8400 PT W/DXA NO RESULTS DOC: HCPCS | Performed by: INTERNAL MEDICINE

## 2020-08-03 PROCEDURE — 36415 COLL VENOUS BLD VENIPUNCTURE: CPT

## 2020-08-03 PROCEDURE — 83550 IRON BINDING TEST: CPT

## 2020-08-03 ASSESSMENT — ENCOUNTER SYMPTOMS
ABDOMINAL PAIN: 0
NAUSEA: 0
CONSTIPATION: 0
SHORTNESS OF BREATH: 0
VOMITING: 0
EYE PAIN: 0
COUGH: 0
BLOOD IN STOOL: 0
BACK PAIN: 0
DIARRHEA: 0

## 2020-08-03 NOTE — PROGRESS NOTES
performed by Parvin Zayas MD at [address] [place] [address] Dr Hamilton 2019    Right L2 L3 TRANSFORAMINAL performed by Parvin Zayas MD at [address] [place] [address] Dr Hamilton 2019    Right L3 & L4 TRANSFORAMINAL performed by Parvin Zayas MD at [address] [place] [address] Dr Hamilton 2019    Right L4 L5 TRANSFORAMINAL performed by Parvin Zayas MD at [address] [place] [address] Right 2020    RIGHT L4 L5  TRANSFORAMINAL performed by Parvin Zayas MD at [address] [place] [address] Right 2020    Right L4 L5 TRANSFORAMINAL performed by Parvin Zayas MD at [address] 1.1-2CM FACE,FACIAL Left 2018    Excision Cyst Left Chest, Middle Back performed by Yissel Monte MD at [address] [place] [address] ANES/STEROID FORAMEN LUMBAR/SACRAL [address] [place] ADD LEVEL Right 10/5/2018    Right L2 & L3 TRANSFORAMINAL performed by Parvin Zayas MD at [address] [place] [address] ANES/STEROID FORAMEN LUMBAR/SACRAL [address] [place] ADD LEVEL Right 10/26/2018    Right L2 L3 TRANSFORAMINAL performed by Parvin Zayas MD at [address] Right 2019    Right  L2 L3 L4 L5 RADIOFREQUENCY performed by Parvin Zayas MD at [address] [facility],[address]    Cervical cancer.   Had XRT    TONSILLECTOMY  1983       Family History   Problem Relation Age of Onset    Heart Disease Mother     High Blood Pressure Mother    Lenetta Ni Stroke Mother     Glaucoma Mother     Early Death Father     Cancer Father         stomach    Miscarriages / Stillbirths Maternal Uncle           Social History     Tobacco Use    Smoking status: Former Smoker     Packs/day: 1.00     Years: 35.00     Pack years: 35.00     Last attempt to quit: 2001     Years since quittin.8    Smokeless tobacco: Never Used   Substance Use Topics    Alcohol use: No         Current Outpatient Medications   Medication Sig Dispense Refill    amitriptyline (ELAVIL) 25 MG tablet TAKE 1 TABLET AT BEDTIME 90 tablet 3    ibuprofen (ADVIL;MOTRIN) 400 MG tablet TAKE 1 TABLET EVERY 6 HOURS AS NEEDED FOR PAIN 360 tablet 3    citalopram (CELEXA) 10 MG tablet TAKE 1 TABLET EVERY DAY 90 tablet 3    magnesium oxide (MAGNESIUM-OXIDE) 400 (241.3 Mg) MG TABS tablet Take 1 tablet by mouth 2 times daily 180 tablet 3    lisinopril (PRINIVIL;ZESTRIL) 10 MG tablet TAKE 1 TABLET EVERY DAY 90 tablet 3    furosemide (LASIX) 40 MG tablet Take 1 tablet by mouth daily 90 tablet 3    clopidogrel (PLAVIX) 75 MG tablet TAKE 1 TABLET EVERY DAY 90 tablet 3    pantoprazole (PROTONIX) 40 MG tablet TAKE 1 TABLET EVERY DAY 90 tablet 3    buPROPion (WELLBUTRIN) 75 MG tablet TAKE 1 TABLET TWICE DAILY 180 tablet 3    amLODIPine (NORVASC) 5 MG tablet TAKE 1 TABLET EVERY DAY 90 tablet 3    nitroGLYCERIN (NITROSTAT) 0.4 MG SL tablet PLACE 1 TABLET UNER THE TONGUE EVERY 5 MINUTES AS NEEDED FOR CHEST PAIN AT FIRST SIGN OF CHEST PAIN.  25 tablet 1    insulin aspart (NOVOLOG FLEXPEN) 100 UNIT/ML injection pen INJECT 4 UNITS AT LUNCH AND  6 UNITS AT SUPPER (EACH PEN EXPIRES 28 DAYS AFTER 1ST USE) 15 mL 3    Insulin Pen Needle (B-D ULTRAFINE III SHORT PEN) 31G X 8 MM MISC USE TWICE DAILY 180 each 3    Insulin Syringe-Needle U-100 (DROPLET INSULIN SYRINGE) 31G X 5/16\" 0.5 ML MISC Inject 1 each into the skin 2 times daily 200 each 3    umeclidinium-vilanterol (ANORO ELLIPTA) 62.5-25 MCG/INH AEPB inhaler INHALE 1 PUFF INTO LUNGS EVERY  each 3    albuterol sulfate  (90 Base) MCG/ACT inhaler INHALE 2 PUFFS EVERY 4 HOURS AS NEEDED FOR WHEEZING 54 g 3    spironolactone (ALDACTONE) 50 MG tablet TAKE 1 TABLET TWICE DAILY 180 tablet 3    Cholecalciferol (VITAMIN D3) 25 MCG (1000 UT) TABS TAKE 3 TABLETS BY MOUTH TWICE DAILY 180 tablet 3    atorvastatin (LIPITOR) 40 MG tablet TAKE 1 TABLET EVERY DAY 90 tablet 3    potassium chloride (MICRO-K) 10 MEQ extended release capsule TAKE 1 CAPSULE TWICE DAILY 180 capsule 3    alendronate (FOSAMAX) 70 MG tablet TAKE 1 TABLET BY MOUTH EVERY WEEK 12 tablet 3    senna (SENOKOT) 8.6 MG TABS tablet TAKE 1 TABLET BY MOUTH TWICE DAILY 180 tablet 3    CONTOUR NEXT TEST strip USE TO TEST 4 TIMES A DAY. 400 strip 5    fluticasone (FLONASE) 50 MCG/ACT nasal spray USE 2 SPRAYS NASALLY EVERY DAY 48 g 3    albuterol (PROVENTIL) (2.5 MG/3ML) 0.083% nebulizer solution Take 3 mLs by nebulization every 6 hours as needed for Wheezing 120 each 3    triamcinolone (KENALOG) 0.1 % cream APPLY TOPICALLY TWICE DAILY 80 g 5    VITAMIN A PO Take 2,400 mcg by mouth daily      vitamin E 400 UNIT capsule Take 400 Units by mouth daily      COMBIGAN 0.2-0.5 % ophthalmic solution PLACE 1 DROP INTO BOTH EYES DAILY 15 mL 3    Compression Stockings MISC by Does not apply route 15-20mmHg  Knee high  Open tow  With assist device to help put them on 1 each 0    Zinc 50 MG TABS Take 50 mg by mouth daily      niacin 500 MG extended release capsule Take 500 mg by mouth daily      folic acid (FOLVITE) 087 MCG tablet Take 800 mcg by mouth daily      Cyanocobalamin (VITAMIN B12 PO) Take 2,500 mcg by mouth daily      B Complex-C (SUPER B COMPLEX PO) Take 1 tablet by mouth daily       travoprost, benzalkonium, (TRAVATAN) 0.004 % ophthalmic solution Place 1 drop into both eyes daily 3 Bottle 3    CPAP Machine MISC by Does not apply route Pressure of 13 (AirSense 10)  [facility]      ferrous sulfate 325 (65 FE) MG EC tablet Take 325 mg by mouth daily (with breakfast)      Multiple Vitamin (MULTI VITAMIN DAILY) TABS Take by mouth daily      Respiratory Therapy Supplies MISC CPAP supplies 1 each 0    insulin glargine (LANTUS) 100 UNIT/ML injection vial Inject 15 Units into the skin 2 times daily      vitamin C (ASCORBIC ACID) 500 MG tablet TAKE 1 TABLET BY MOUTH TWICE A DAY.  180 tablet 3    calcium carbonate-vitamin D (CALCIUM 600 + D) 600-400 MG-UNIT TABS per tab Take 1 tablet by mouth 2 times daily 180 tablet 3  gabapentin (NEURONTIN) 300 MG capsule TAKE 2 CAPSULES TWICE DAILY 360 capsule 3     No current facility-administered medications for this visit. No Known Allergies    Health Maintenance   Topic Date Due    Hepatitis A vaccine (1 of 2 - Risk 2-dose series) 10/05/1951    Diabetic foot exam  07/29/2020    Flu vaccine (1) 09/01/2020    Annual Wellness Visit (AWV)  02/03/2021    Diabetic retinal exam  05/03/2021    A1C test (Diabetic or Prediabetic)  07/27/2021    Diabetic microalbuminuria test  07/27/2021    Lipid screen  07/27/2021    Potassium monitoring  07/27/2021    Creatinine monitoring  07/27/2021    Breast cancer screen  11/26/2021    DTaP/Tdap/Td vaccine (2 - Td) 06/18/2027    Colon cancer screen colonoscopy  09/30/2029    DEXA (modify frequency per FRAX score)  Completed    Shingles Vaccine  Completed    Pneumococcal 65+ years Vaccine  Completed    Hepatitis C screen  Completed    Hib vaccine  Aged Out    Meningococcal (ACWY) vaccine  Aged Out       Subjective:      Review of Systems   Constitutional: Positive for fatigue. Negative for chills and fever. HENT: Negative for hearing loss. Eyes: Negative for pain and visual disturbance. Respiratory: Negative for cough and shortness of breath. Cardiovascular: Negative for chest pain, palpitations and leg swelling. Gastrointestinal: Negative for abdominal pain, blood in stool, constipation, diarrhea, nausea and vomiting. Endocrine: Negative for cold intolerance, polydipsia and polyuria. Genitourinary: Negative for difficulty urinating, dysuria and hematuria. Musculoskeletal: Negative for arthralgias, back pain, gait problem and neck pain. Skin: Negative for pallor and rash. Neurological: Negative for dizziness, weakness, numbness and headaches. Hematological: Negative for adenopathy. Does not bruise/bleed easily. Psychiatric/Behavioral: Negative for confusion. The patient is not nervous/anxious.         Objective: Physical Exam  Vitals signs reviewed. Constitutional:       Appearance: She is well-developed. HENT:      Head: Normocephalic and atraumatic. Eyes:      Pupils: Pupils are equal, round, and reactive to light. Neck:      Musculoskeletal: Neck supple. Cardiovascular:      Rate and Rhythm: Normal rate and regular rhythm. Heart sounds: No murmur. No friction rub. No gallop. Pulmonary:      Effort: Pulmonary effort is normal.      Breath sounds: Normal breath sounds. No wheezing or rales. Abdominal:      General: There is no distension. Palpations: Abdomen is soft. There is no mass. Tenderness: There is no abdominal tenderness. There is no rebound. Musculoskeletal: Normal range of motion. Lymphadenopathy:      Cervical: No cervical adenopathy. Skin:     General: Skin is warm and dry. Findings: No rash. Neurological:      Mental Status: She is alert and oriented to person, place, and time. Cranial Nerves: No cranial nerve deficit (grossly). Psychiatric:         Thought Content: Thought content normal.        /70   Pulse 60   Temp 97.4 °F (36.3 °C)   Resp 20   Ht 5' 4\" (1.626 m)   Wt 267 lb 6.4 oz (121.3 kg)   LMP 12/07/1995 (Approximate)   BMI 45.90 kg/m²     Assessment:       Diagnosis Orders   1. Coronary artery disease due to lipid rich plaque     2. Type 2 diabetes mellitus with diabetic polyneuropathy, with long-term current use of insulin (HCC)  Hemoglobin A1C   3. Morbid obesity with BMI of 45.0-49.9, adult ([facility]     4. Chronic fatigue     5. Chronic diastolic congestive heart failure ([address]     6. Bronchiectasis without complication ([facility])     7. Other iron deficiency anemia  Hemoglobin    Iron And TIBC   8. Obstructive sleep apnea     Patient uses her CPAP machine nightly, and she is benefiting from it. Plan:       Return in about 6 months (around 2/3/2021) for medicare AWV, Diabetes, CAD.     Orders Placed This Encounter   Procedures   

## 2020-08-06 ENCOUNTER — TELEPHONE (OUTPATIENT)
Dept: INTERNAL MEDICINE | Age: 70
End: 2020-08-06

## 2020-08-06 RX ORDER — INSULIN GLARGINE 100 [IU]/ML
15 INJECTION, SOLUTION SUBCUTANEOUS 2 TIMES DAILY
COMMUNITY
End: 2021-08-17 | Stop reason: SDUPTHER

## 2020-08-06 NOTE — TELEPHONE ENCOUNTER
Lantus insulin which she already has should be increased from 10 units   to 15 units twice daily.      also, she should take 600 mg of the calcium twice daily (with or without the magnesium)

## 2020-08-06 NOTE — TELEPHONE ENCOUNTER
Patient was in to see you and stated she was having terrible muscle cramps in her lower leg/feet area. She stated in her appointment you told her to increase her insulin by 5 units twice a day. She stated you also told her to start extra magnesium and calcium. She stated that she didn't see any information on her after visit summary explaining this or directed her of the changes. She would like you to clarify so she knows if this is correct. Please advise.

## 2020-08-11 ENCOUNTER — TELEPHONE (OUTPATIENT)
Dept: INTERNAL MEDICINE | Age: 70
End: 2020-08-11

## 2020-08-13 ENCOUNTER — TELEPHONE (OUTPATIENT)
Dept: INTERNAL MEDICINE | Age: 70
End: 2020-08-13

## 2020-08-18 ENCOUNTER — TELEPHONE (OUTPATIENT)
Dept: INTERNAL MEDICINE | Age: 70
End: 2020-08-18

## 2020-08-18 RX ORDER — ACYCLOVIR 400 MG/1
400 TABLET ORAL 2 TIMES DAILY
Qty: 10 TABLET | Refills: 0 | Status: SHIPPED | OUTPATIENT
Start: 2020-08-18 | End: 2020-08-23

## 2020-08-25 RX ORDER — VALACYCLOVIR HYDROCHLORIDE 500 MG/1
500 TABLET, FILM COATED ORAL 2 TIMES DAILY
Qty: 14 TABLET | Refills: 0 | Status: SHIPPED | OUTPATIENT
Start: 2020-08-25 | End: 2020-09-01

## 2020-08-25 NOTE — TELEPHONE ENCOUNTER
Patient called and the cold sore is still on the corner of her mouth. She stated that you sent in acyclovir and it hasn't \"touched\" it. Please advise.       Chip Molina

## 2020-08-27 ENCOUNTER — TELEPHONE (OUTPATIENT)
Dept: INTERNAL MEDICINE | Age: 70
End: 2020-08-27

## 2020-09-08 ENCOUNTER — TELEPHONE (OUTPATIENT)
Dept: INTERNAL MEDICINE | Age: 70
End: 2020-09-08

## 2020-09-08 NOTE — TELEPHONE ENCOUNTER
Patient called in stating that antibiotic for her cold sore that was given is not helping. She would like to know if something else can be sent in. Please advise.

## 2020-09-08 NOTE — TELEPHONE ENCOUNTER
Should have worked.   Patient needs an appointment with somebody-- urgent care, nurse practitioner etc. to see if this is truly a \"cold sore\" vs. something else

## 2020-09-08 NOTE — TELEPHONE ENCOUNTER
appt made with Dr. Alfreda Cheema on 9/14/2020. Patient will not have ride till then. Has appt with Dr. Dennise Castillo at the same time.

## 2020-09-12 RX ORDER — FLUTICASONE PROPIONATE 50 MCG
SPRAY, SUSPENSION (ML) NASAL
Qty: 48 G | Refills: 3 | Status: SHIPPED | OUTPATIENT
Start: 2020-09-12 | End: 2021-10-04

## 2020-09-14 ENCOUNTER — OFFICE VISIT (OUTPATIENT)
Dept: PAIN MANAGEMENT | Age: 70
End: 2020-09-14
Payer: MEDICARE

## 2020-09-14 VITALS
OXYGEN SATURATION: 98 % | HEIGHT: 64 IN | WEIGHT: 271 LBS | BODY MASS INDEX: 46.26 KG/M2 | HEART RATE: 50 BPM | DIASTOLIC BLOOD PRESSURE: 80 MMHG | SYSTOLIC BLOOD PRESSURE: 120 MMHG

## 2020-09-14 PROCEDURE — 3017F COLORECTAL CA SCREEN DOC REV: CPT | Performed by: PHYSICAL MEDICINE & REHABILITATION

## 2020-09-14 PROCEDURE — 1123F ACP DISCUSS/DSCN MKR DOCD: CPT | Performed by: PHYSICAL MEDICINE & REHABILITATION

## 2020-09-14 PROCEDURE — 99214 OFFICE O/P EST MOD 30 MIN: CPT | Performed by: PHYSICAL MEDICINE & REHABILITATION

## 2020-09-14 PROCEDURE — G8400 PT W/DXA NO RESULTS DOC: HCPCS | Performed by: PHYSICAL MEDICINE & REHABILITATION

## 2020-09-14 PROCEDURE — 99214 OFFICE O/P EST MOD 30 MIN: CPT

## 2020-09-14 PROCEDURE — 1036F TOBACCO NON-USER: CPT | Performed by: PHYSICAL MEDICINE & REHABILITATION

## 2020-09-14 PROCEDURE — G8427 DOCREV CUR MEDS BY ELIG CLIN: HCPCS | Performed by: PHYSICAL MEDICINE & REHABILITATION

## 2020-09-14 PROCEDURE — G8417 CALC BMI ABV UP PARAM F/U: HCPCS | Performed by: PHYSICAL MEDICINE & REHABILITATION

## 2020-09-14 PROCEDURE — 1090F PRES/ABSN URINE INCON ASSESS: CPT | Performed by: PHYSICAL MEDICINE & REHABILITATION

## 2020-09-14 PROCEDURE — 4040F PNEUMOC VAC/ADMIN/RCVD: CPT | Performed by: PHYSICAL MEDICINE & REHABILITATION

## 2020-09-14 RX ORDER — LANCETS
EACH MISCELLANEOUS
COMMUNITY
Start: 2020-08-24 | End: 2020-11-23

## 2020-09-14 ASSESSMENT — ENCOUNTER SYMPTOMS
ALLERGIC/IMMUNOLOGIC NEGATIVE: 1
DIARRHEA: 0
RESPIRATORY NEGATIVE: 1
CONSTIPATION: 0
EYES NEGATIVE: 1
BACK PAIN: 1

## 2020-09-14 NOTE — PROGRESS NOTES
PAIN MANAGEMENT FOLLOW-UP NOTE  9/14/20    CHIEF COMPLAINT: This is a76 y.o. female patientwho [facility] the [facility] with a history of Lower Back Pain (radiate to Rt leg)      PAIN Nova Denson returns today for  reevaluation. Since the visit, the patient reports that the pain is not changed. Still pain in R  Lumbar and  Down  Posterior R leg   To knee on R       Location: Back  Location Modifier: right lower back  Severity of Pain: 7  Duration of Pain: Intermittent  Frequency of Pain: Intermittent  Aggravating Factors: -  Limiting Behavior: Standing   Relieving Factors: narcotics        Previous pain medication trials have included:          Mental health:    Patient feels - secondary to their current pain problems as described above. H/O depression and anxiety: No   Patient is not seeing psychologist orpsychiatrist   Abuse history? No    Employed? No    ANALGESIA:   Are your Current Pain medication (s) helping to decrease pain? No.   Current Pain score: Pain Score:   6    ADVERSE AFFECTS:   Medication Side Effects: No.    ACTIVITY:  Are you able to be more active with your pain medications? No      ABERRANT BEHAVIORS SINCE LAST VISIT? No    Review of Systems   Constitutional: Positive for fatigue. HENT: Negative. Eyes: Negative. Respiratory: Negative. Cardiovascular: Negative. Gastrointestinal: Negative for constipation and diarrhea. Endocrine: Negative. Genitourinary: Negative for difficulty urinating and flank pain. Musculoskeletal: Positive for back pain and myalgias. Skin: Negative. Allergic/Immunologic: Negative. Neurological: Positive for weakness and numbness. Hematological: Negative. Psychiatric/Behavioral: Positive for sleep disturbance.         No Known Allergies    Outpatient Medications Prior to Visit   Medication Sig Dispense Refill    fluticasone (FLONASE) 50 MCG/ACT nasal spray USE 2 SPRAYS NASALLY EVERY DAY 48 g 3    Respiratory Therapy Supplies MISC CPAP supplies 1 each 0    insulin glargine (LANTUS) 100 UNIT/ML injection vial Inject 15 Units into the skin 2 times daily      amitriptyline (ELAVIL) 25 MG tablet TAKE 1 TABLET AT BEDTIME 90 tablet 3    citalopram (CELEXA) 10 MG tablet TAKE 1 TABLET EVERY DAY 90 tablet 3    magnesium oxide (MAGNESIUM-OXIDE) 400 (241.3 Mg) MG TABS tablet Take 1 tablet by mouth 2 times daily 180 tablet 3    lisinopril (PRINIVIL;ZESTRIL) 10 MG tablet TAKE 1 TABLET EVERY DAY 90 tablet 3    furosemide (LASIX) 40 MG tablet Take 1 tablet by mouth daily 90 tablet 3    clopidogrel (PLAVIX) 75 MG tablet TAKE 1 TABLET EVERY DAY 90 tablet 3    pantoprazole (PROTONIX) 40 MG tablet TAKE 1 TABLET EVERY DAY 90 tablet 3    buPROPion (WELLBUTRIN) 75 MG tablet TAKE 1 TABLET TWICE DAILY 180 tablet 3    amLODIPine (NORVASC) 5 MG tablet TAKE 1 TABLET EVERY DAY 90 tablet 3    vitamin C (ASCORBIC ACID) 500 MG tablet TAKE 1 TABLET BY MOUTH TWICE A DAY.  180 tablet 3    insulin aspart (NOVOLOG FLEXPEN) 100 UNIT/ML injection pen INJECT 4 UNITS AT LUNCH AND  6 UNITS AT SUPPER (EACH PEN EXPIRES 28 DAYS AFTER 1ST USE) 15 mL 3    Insulin Pen Needle (B-D ULTRAFINE III SHORT PEN) 31G X 8 MM MISC USE TWICE DAILY 180 each 3    Insulin Syringe-Needle U-100 (DROPLET INSULIN SYRINGE) 31G X 5/16\" 0.5 ML MISC Inject 1 each into the skin 2 times daily 200 each 3    umeclidinium-vilanterol (ANORO ELLIPTA) 62.5-25 MCG/INH AEPB inhaler INHALE 1 PUFF INTO LUNGS EVERY  each 3    albuterol sulfate  (90 Base) MCG/ACT inhaler INHALE 2 PUFFS EVERY 4 HOURS AS NEEDED FOR WHEEZING 54 g 3    spironolactone (ALDACTONE) 50 MG tablet TAKE 1 TABLET TWICE DAILY 180 tablet 3    calcium carbonate-vitamin D (CALCIUM 600 + D) 600-400 MG-UNIT TABS per tab Take 1 tablet by mouth 2 times daily 180 tablet 3    Cholecalciferol (VITAMIN D3) 25 MCG (1000 UT) TABS TAKE 3 TABLETS BY MOUTH TWICE DAILY 180 tablet 3    gabapentin (NEURONTIN) 300 MG capsule TAKE 2 CAPSULES TWICE DAILY 360 capsule 3    atorvastatin (LIPITOR) 40 MG tablet TAKE 1 TABLET EVERY DAY 90 tablet 3    potassium chloride (MICRO-K) 10 MEQ extended release capsule TAKE 1 CAPSULE TWICE DAILY 180 capsule 3    alendronate (FOSAMAX) 70 MG tablet TAKE 1 TABLET BY MOUTH EVERY WEEK 12 tablet 3    senna (SENOKOT) 8.6 MG TABS tablet TAKE 1 TABLET BY MOUTH TWICE DAILY 180 tablet 3    CONTOUR NEXT TEST strip USE TO TEST 4 TIMES A DAY. 400 strip 5    albuterol (PROVENTIL) (2.5 MG/3ML) 0.083% nebulizer solution Take 3 mLs by nebulization every 6 hours as needed for Wheezing 120 each 3    triamcinolone (KENALOG) 0.1 % cream APPLY TOPICALLY TWICE DAILY 80 g 5    VITAMIN A PO Take 2,400 mcg by mouth daily      vitamin E 400 UNIT capsule Take 400 Units by mouth daily      COMBIGAN 0.2-0.5 % ophthalmic solution PLACE 1 DROP INTO BOTH EYES DAILY 15 mL 3    Compression Stockings MISC by Does not apply route 15-20mmHg  Knee high  Open tow  With assist device to help put them on 1 each 0    Zinc 50 MG TABS Take 50 mg by mouth daily      niacin 500 MG extended release capsule Take 500 mg by mouth daily      folic acid (FOLVITE) 778 MCG tablet Take 800 mcg by mouth daily      Cyanocobalamin (VITAMIN B12 PO) Take 2,500 mcg by mouth daily      B Complex-C (SUPER B COMPLEX PO) Take 1 tablet by mouth daily       travoprost, benzalkonium, (TRAVATAN) 0.004 % ophthalmic solution Place 1 drop into both eyes daily 3 Bottle 3    CPAP Machine MISC by Does not apply route Pressure of 13 (AirSense 10)  [employer].      ferrous sulfate 325 (65 FE) MG EC tablet Take 325 mg by mouth daily (with breakfast)      Multiple Vitamin (MULTI VITAMIN DAILY) TABS Take by mouth daily      Microlet Lancets MISC USE TO TEST 4 TIMES A DAY.       ibuprofen (ADVIL;MOTRIN) 400 MG tablet TAKE 1 TABLET EVERY 6 HOURS AS NEEDED FOR PAIN (Patient not taking: Reported on 9/14/2020) 360 tablet 3    nitroGLYCERIN (NITROSTAT) 0.4 MG MANAGEMENT PROCEDURE Right 2020    RIGHT L4 L5  TRANSFORAMINAL performed by Maliha Arguello MD at [address] [facility] Right 2020    Right L4 L5 TRANSFORAMINAL performed by Maliha Arguello MD at [address] 1.1-2CM FACE,FACIAL Left 2018    Excision Cyst Left Chest, Middle Back performed by Norma Chopra MD at [address] [facility] ANES/STEROID FORAMEN LUMBAR/SACRAL  [address] [facility] JUAN Laws ADD LEVEL Right 10/5/2018    Right L2 & L3 TRANSFORAMINAL performed by Maliha rAguello MD at [address] [facility] ANES/STEROID FORAMEN LUMBAR/SACRAL  [address] [facility] JU[place] Laws ADD LEVEL Right 10/26/2018    Right L2 L3 TRANSFORAMINAL performed by Maliha Arguello MD at [address] Right 2019    Right  L2 L3 L4 L5 RADIOFREQUENCY performed by Maliha Arguello MD at [address] [facility],[address]    Cervical cancer.   Had XRT    TONSILLECTOMY       Family History   Problem Relation Age of Onset    Heart Disease Mother     High Blood Pressure Mother    [facility] Stroke Mother     Glaucoma Mother     Early Death Father     Cancer Father         stomach    Miscarriages / Stillbirths Maternal Uncle      Social History     Socioeconomic History    Marital status:      Spouse name: None    Number of children: None    Years of education: None    Highest education level: None   Occupational History    None   Social Needs    Financial resource strain: None    Food insecurity     Worry: None     Inability: None    Transportation needs     Medical: None     Non-medical: None   Tobacco Use    Smoking status: Former Smoker     Packs/day: 1.00     Years: 35.00     Pack years: 35.00     Last attempt to quit: 2001     Years since quittin.9    Smokeless tobacco: Never Used   Substance and Sexual Activity    Alcohol use: No    Drug use: No    Sexual activity: Never   Lifestyle    Physical activity     Days per week: None     Minutes per session: None    Stress: None   Relationships    Social connections     Talks on phone: None     Gets together: None     Attends Mandaeism service: None     Active member of club or organization: None     Attends meetings of clubs or organizations: None     Relationship status: None    Intimate partner violence     Fear of current or ex partner: None     Emotionally abused: None     Physically abused: None     Forced sexual activity: None   Other Topics Concern    None   Social History Narrative    None         Family and Social Historyreviewed in the electronic medical record. Imaging:Reviewed available imaging in our system with the patient. No results found. Objective:     Physical Exam:  Vitals:    09/14/20 1022   BP: 120/80   Site: Left Upper Arm   Position: Sitting   Cuff Size: Medium Adult   Pulse: 50   SpO2: 98%   Weight: 271 lb (122.9 kg)   Height: 5' 4\" (1.626 m)     Pain Score:   6    Physical Exam  Constitutional:       General: She is not in acute distress. Appearance: Normal appearance. She is well-developed. She is not diaphoretic. HENT:      Head: Normocephalic and atraumatic. Right Ear: External ear normal. No decreased hearing noted. Left Ear: External ear normal. No decreased hearing noted. Nose: Nose normal.   Eyes:      General: Lids are normal. No scleral icterus. Conjunctiva/sclera: Conjunctivae normal.   Neck:      Trachea: Phonation normal.   Cardiovascular:      Comments: No BLE edema present  Pulmonary:      Effort: Pulmonary effort is normal. No accessory muscle usage or respiratory distress. Abdominal:      General: Abdomen is flat. Palpations: Abdomen is soft. Genitourinary:     Comments: deferred  Skin:     General: Skin is warm and dry. Coloration: Skin is not pale. Findings: No erythema or rash. Neurological:      General: No focal deficit present. Mental Status: She is alert and oriented to person, place, and time.    Psychiatric: Mood and Affect: Mood normal.         Speech: Speech normal.         Behavior: Behavior normal.       Back Exam     Range of Motion   Extension: normal   Flexion: normal   Lateral bend right: normal   Lateral bend left: normal   Rotation right: normal   Rotation left: normal     Muscle Strength   Right Quadriceps:  5/5   Left Quadriceps:  5/5   Right Hamstrings:  5/5   Left Hamstrings:  5/5     Tests   Straight leg raise right: negative  Straight leg raise left: negative    Other   Toe walk: normal  Heel walk: normal  Sensation: normal  Gait: normal                                      Assessment: This is a 68 y.o. female patient with:    Diagnosis:   Diagnosis Orders   1. Lumbar radiculopathy     2. Lumbar facet joint syndrome     3. Lumbosacral spondylosis without myelopathy     4. Encounter for long-term opiate analgesic use     5. History of total knee arthroplasty, bilateral     6. History of left shoulder replacement         Medical Comorbidities:  As listed in the patient's past medical and surgical history    Functional Limitations:   Pain limits function and quality of life. Plan:   UDT   Monitor for  Pain relief and  Function  Will hold off  MAC  L5-S1 IESI  Due to COVID  To trudy if worsens R  Lumbar radicular pain    Meds:   Controlled Substances Monitoring: Pt educated about the risks of taking opiates,including increased sedation, constipation, slowed breathing, tolerance, dependence,and addiction. New Prescriptions    No medications on file      No orders of the defined types were placed in this encounter. No orders of the defined types were placed in this encounter. No follow-ups on file. The patient expressed understanding of the above assessment and plan. Totaltime spent face to face with patient was 25 minutes inwhich  50% or more of the time was spent in counseling, education about risk andbenefits of the above plan, and coordination of care.

## 2020-10-09 ENCOUNTER — TELEPHONE (OUTPATIENT)
Dept: INTERNAL MEDICINE | Age: 70
End: 2020-10-09
Payer: MEDICARE

## 2020-10-09 PROCEDURE — G0179 MD RECERTIFICATION HHA PT: HCPCS | Performed by: INTERNAL MEDICINE

## 2020-10-09 RX ORDER — BLOOD SUGAR DIAGNOSTIC
1 STRIP MISCELLANEOUS 2 TIMES DAILY
Qty: 200 EACH | Refills: 3 | Status: SHIPPED | OUTPATIENT
Start: 2020-10-09 | End: 2021-10-19 | Stop reason: SDUPTHER

## 2020-10-14 ENCOUNTER — OFFICE VISIT (OUTPATIENT)
Dept: CARDIOLOGY | Age: 70
End: 2020-10-14
Payer: MEDICARE

## 2020-10-14 ENCOUNTER — OFFICE VISIT (OUTPATIENT)
Dept: PULMONOLOGY | Age: 70
End: 2020-10-14
Payer: MEDICARE

## 2020-10-14 VITALS
BODY MASS INDEX: 46.57 KG/M2 | WEIGHT: 272.8 LBS | DIASTOLIC BLOOD PRESSURE: 74 MMHG | SYSTOLIC BLOOD PRESSURE: 118 MMHG | HEART RATE: 55 BPM | HEIGHT: 64 IN | OXYGEN SATURATION: 96 % | TEMPERATURE: 98.5 F

## 2020-10-14 VITALS
HEART RATE: 52 BPM | DIASTOLIC BLOOD PRESSURE: 64 MMHG | HEIGHT: 64 IN | SYSTOLIC BLOOD PRESSURE: 116 MMHG | WEIGHT: 273 LBS | BODY MASS INDEX: 46.61 KG/M2

## 2020-10-14 PROCEDURE — G8484 FLU IMMUNIZE NO ADMIN: HCPCS | Performed by: INTERNAL MEDICINE

## 2020-10-14 PROCEDURE — 93005 ELECTROCARDIOGRAM TRACING: CPT | Performed by: INTERNAL MEDICINE

## 2020-10-14 PROCEDURE — G8400 PT W/DXA NO RESULTS DOC: HCPCS | Performed by: INTERNAL MEDICINE

## 2020-10-14 PROCEDURE — G8417 CALC BMI ABV UP PARAM F/U: HCPCS | Performed by: INTERNAL MEDICINE

## 2020-10-14 PROCEDURE — 3017F COLORECTAL CA SCREEN DOC REV: CPT | Performed by: INTERNAL MEDICINE

## 2020-10-14 PROCEDURE — 93010 ELECTROCARDIOGRAM REPORT: CPT | Performed by: INTERNAL MEDICINE

## 2020-10-14 PROCEDURE — G8427 DOCREV CUR MEDS BY ELIG CLIN: HCPCS | Performed by: INTERNAL MEDICINE

## 2020-10-14 PROCEDURE — 99215 OFFICE O/P EST HI 40 MIN: CPT

## 2020-10-14 PROCEDURE — 1090F PRES/ABSN URINE INCON ASSESS: CPT | Performed by: INTERNAL MEDICINE

## 2020-10-14 PROCEDURE — 1036F TOBACCO NON-USER: CPT | Performed by: INTERNAL MEDICINE

## 2020-10-14 PROCEDURE — 99213 OFFICE O/P EST LOW 20 MIN: CPT | Performed by: INTERNAL MEDICINE

## 2020-10-14 PROCEDURE — 1123F ACP DISCUSS/DSCN MKR DOCD: CPT | Performed by: INTERNAL MEDICINE

## 2020-10-14 PROCEDURE — 99214 OFFICE O/P EST MOD 30 MIN: CPT | Performed by: INTERNAL MEDICINE

## 2020-10-14 PROCEDURE — 4040F PNEUMOC VAC/ADMIN/RCVD: CPT | Performed by: INTERNAL MEDICINE

## 2020-10-14 ASSESSMENT — ENCOUNTER SYMPTOMS
SHORTNESS OF BREATH: 1
GASTROINTESTINAL NEGATIVE: 1
EYES NEGATIVE: 1
BACK PAIN: 1

## 2020-10-14 NOTE — PROGRESS NOTES
09/14/20 271 lb (122.9 kg)          Results for orders placed or performed during the hospital encounter of 08/03/20   Iron And TIBC   Result Value Ref Range    Iron 96 37 - 145 ug/dL    TIBC 326 250 - 450 ug/dL    Iron Saturation 29 20 - 55 %    UIBC 230 112 - 347 ug/dL       Assessment:         1. LEANDRO on CPAP    2. Morbid obesity with BMI of 45.0-49.9, adult ([facility]    3. Bronchiectasis without complication ([facility]          Plan:      1. Continue CPAP. 2. Weight loss. 3. Avoid sedative hypnotics and alcohol at bedtime. 4. Encouraged flu shot. 5. Discussed strategies to mitigate risk of COVID-19 infection. 6. Return in 1 year.      Electronically signed by Fina Mckeon DO on 10/14/2020at 4:11 PM

## 2020-10-14 NOTE — PROGRESS NOTES
Today's Date: 10/14/2020  Patient's Name: Jesusita Blake  Patient's age: 65 y. o., 1950    Subjective:  Jesusita Blake  is here for follow up. She denies any chest pain. She reports chronic dyspnea on moderate exertion. She reports intermittent dizziness if gets up fast. She denies any syncope. She denies any PND or orthopnea. BP at home 102-140s/50-70s      Past Medical History:   has a past medical history of Arthritis, Asthma, CAD (coronary artery disease), Cancer ([address] Carotid artery disease ([address] Cataracts, bilateral, COPD (chronic obstructive pulmonary disease) ([address] Coronary artery disease, Eczema, Fibromyalgia, Glaucoma, H/O blood clots, Headache, Heart attack ([address] Hx of Bell's palsy, Hypertension, Liver disease, Mild dementia ([address] Neuropathy, Osteoporosis, Sleep apnea, Tremor, and Type 2 diabetes mellitus with hyperglycemia ([address] Past Surgical History:   has a past surgical history that includes Appendectomy (); Induced  (); Tonsillectomy (); Bariatric Surgery (); Colonoscopy (2012); pr exc skin benig 1.1-2cm face,facial (Left, 2018); pr inject anes/steroid foramen lumbar/sacral w img guide ,ea add level (Right, 10/5/2018); pr inject anes/steroid foramen lumbar/sacral w img guide ,ea add level (Right, 10/26/2018); Lumbar spine surgery (Right, 2019); Coronary angioplasty with stent; Lumbar spine surgery (Right, 2019); Lumbar spine surgery (Right, 2019); Anesthesia Nerve Block (Right, 2019); greer and bso (cervix removed) (); Anesthesia Nerve Block (Right, 2019); RADIOFREQUENCY ABLATION NERVES (Right, 2019); Colonoscopy (N/A, 2019); Lumbar spine surgery (Right, 2019); Pain management procedure (Right, 2020); and Pain management procedure (Right, 2020). Home Medications:  Prior to Admission medications    Medication Sig Start Date End Date Taking?  Authorizing Provider   Insulin Syringe-Needle U-100 UNITS AT [facility] PEN EXPIRES 28 DAYS AFTER 1ST USE) 4/13/20   Adis Marti MD   Insulin Pen Needle (B-D ULTRAFINE III SHORT PEN) 31G X 8 MM MISC USE TWICE DAILY 4/9/20   Adis Marti MD   umeclidinium-vilanterol [facility] ELLIPTA) 62.5-25 MCG/INH AEPB inhaler INHALE 1 PUFF INTO LUNGS EVERY DAY 3/24/20   Adis Marti MD   albuterol sulfate  (90 Base) MCG/ACT inhaler INHALE 2 PUFFS EVERY 4 HOURS AS NEEDED FOR WHEEZING 2/26/20   Adis Marti MD   spironolactone (ALDACTONE) 50 MG tablet TAKE 1 TABLET TWICE DAILY 2/3/20   Adis Marti MD   calcium carbonate-vitamin D (CALCIUM 600 + D) 600-400 MG-UNIT TABS per tab Take 1 tablet by mouth 2 times daily 2/3/20 10/9/20  Adis Marti MD   Cholecalciferol (VITAMIN D3) 25 MCG (1000 UT) TABS TAKE 3 TABLETS BY MOUTH TWICE DAILY 1/27/20   Adis Marti MD   gabapentin (NEURONTIN) 300 MG capsule TAKE 2 CAPSULES TWICE DAILY 1/20/20 10/9/20  Adis Marti MD   atorvastatin (LIPITOR) 40 MG tablet TAKE 1 TABLET EVERY DAY 1/10/20   Adis Marti MD   potassium chloride (MICRO-K) 10 MEQ extended release capsule TAKE 1 CAPSULE TWICE DAILY 1/10/20   Adis Marti MD   alendronate (FOSAMAX) 70 MG tablet TAKE 1 TABLET BY MOUTH EVERY WEEK 1/10/20   Adis Marti MD   senna (SENOKOT) 8.6 MG TABS tablet TAKE 1 TABLET BY MOUTH TWICE DAILY 12/4/19   Adis Marti MD   CONTOUR NEXT TEST strip USE TO TEST 4 TIMES A DAY.  12/2/19   Adis Marti MD   albuterol (PROVENTIL) (2.5 MG/3ML) 0.083% nebulizer solution Take 3 mLs by nebulization every 6 hours as needed for Wheezing 6/12/19   DAVID Mario - CNP   triamcinolone (KENALOG) 0.1 % cream APPLY TOPICALLY TWICE DAILY 9/18/18   Adis Marti MD   VITAMIN A PO Take 2,400 mcg by mouth daily    Historical Provider, MD   vitamin E 400 UNIT capsule Take 400 Units by mouth daily    Historical Provider, MD   COMBIGAN 0.2-0.5 % ophthalmic solution PLACE 1 DROP INTO BOTH EYES DAILY 1/2/18   Jordyn Gonsalez MD   Compression Stockings MISC by Does not apply route 15-20mmHg  Knee high  Open tow  With assist device to help put them on 5/10/17   Gray Bains DO   Zinc 50 MG TABS Take 50 mg by mouth daily    Historical Provider, MD   niacin 500 MG extended release capsule Take 500 mg by mouth daily    Historical Provider, MD   folic acid (FOLVITE) 919 MCG tablet Take 800 mcg by mouth daily    Historical Provider, MD   Cyanocobalamin (VITAMIN B12 PO) Take 2,500 mcg by mouth daily    Historical Provider, MD   B Complex-C (SUPER B COMPLEX PO) Take 1 tablet by mouth daily     Historical Provider, MD   travoprost, benzalkonium, (TRAVATAN) 0.004 % ophthalmic solution Place 1 drop into both eyes daily 10/7/16   Jordyn Gonsalez MD   CPAP Machine MISC by Does not apply route Pressure of 13 (AirSense 10)  [employer].    Historical Provider, MD   ferrous sulfate 325 (65 FE) MG EC tablet Take 325 mg by mouth daily (with breakfast)    Historical Provider, MD   Multiple Vitamin (MULTI VITAMIN DAILY) TABS Take by mouth daily    Historical Provider, MD       Allergies:  Patient has no known allergies. Social History:   reports that she quit smoking about 19 years ago. She has a 35.00 pack-year smoking history. She has never used smokeless tobacco. She reports that she does not drink alcohol or use drugs. Family History: family history includes Cancer in her father; Early Death in her father; Glaucoma in her mother; Heart Disease in her mother; High Blood Pressure in her mother; Natalia Hoit / Tilford Piety in her maternal uncle; Stroke in her mother. No h/o sudden cardiac death. Yes for premature CAD    REVIEW OF SYSTEMS:    · Constitutional: there has been no unanticipated weight loss. There's been No change in energy level, No change in activity level. · Eyes: No visual changes or diplopia. No scleral icterus. · ENT: No Headaches, hearing loss or vertigo.  No mouth sores or sore throat. · Cardiovascular: see above  · Respiratory: see above  · Gastrointestinal: No abdominal pain, appetite loss, blood in stools. · Genitourinary: No dysuria, trouble voiding, or hematuria. · Musculoskeletal:  No gait disturbance, No weakness or joint complaints. · Integumentary: No rash or pruritis. · Neurological: No headache or diplopia. No tingling  · Psychiatric: No anxiety, or depression. · Endocrine: No temperature intolerance. · Hematologic/Lymphatic: No abnormal bruising or bleeding, blood clots or swollen lymph nodes. · Allergic/Immunologic: No nasal congestion or hives. PHYSICAL EXAM:      Vitals:    10/14/20 1429   BP: 116/64   Pulse: 52       Constitutional and General Appearance: alert, cooperative, no distress and appears stated age  HEENT: PERRL, no cervical lymphadenopathy. No masses palpable. Normal oral mucosa  Respiratory:  · Normal excursion and expansion without use of accessory muscles  · Resp Auscultation: Good respiratory effort. No for increased work of breathing. On auscultation: clear to auscultation bilaterally  Cardiovascular:  · Heart tones are crisp and normal. regular S1 and S2.  · Jugular venous pulsation Normal  · The carotid upstroke is normal in amplitude and contour without delay or bruit   Abdomen:   · soft  · Bowel sounds present  Extremities:  ·  1-2+ edema  Neurological:  · Alert and oriented. Cardiac Data:  EKG: SB, poor R wave progression    Labs:     CBC: No results for input(s): WBC, HGB, HCT, PLT in the last 72 hours. BMP: No results for input(s): NA, K, CO2, BUN, CREATININE, LABGLOM, GLUCOSE in the last 72 hours. PT/INR: No results for input(s): PROTIME, INR in the last 72 hours. FASTING LIPID PANEL:  Lab Results   Component Value Date    HDL 58 07/27/2020    LDLCALC 80 05/19/2016    TRIG 66 07/27/2020     LIVER PROFILE:No results for input(s): AST, ALT, LABALBU in the last 72 hours.     LAST CATH 6/15- minimal non obstructive CAD,  mRCA 5%, EF 65%     Holter 1/9/18- 7 beat run of PAT      TTE 1/9/18  CONCLUSIONS:  1.  Normal LV size and function with ejection fraction of 65%. 2.  Grade 1 diastolic dysfunction. 3.  Mild concentric LVH. 4.  Mild biatrial enlargement. 5.  No significant valvulopathies. 6.  No effusion. 7.  Mildly dilated RV with normal function.     Nuclear Stress 5/2019:  IMPRESSION:  1. No ischemia or infarction. 2. Normal LV systolic function. EF 81%.      Assessment and Plan:     1. CAD with Hx of BMS to RCA in 2002, Cath in 2015- non obstructive CAD. Negative Nuclear Stress 5/2019  2. Dyspnea- obtain TTE. Had negative stress test 5/2019  3. HFpEF- stable. On lasix and aldactone. Elevated leg, compression stocking. Take norvasc at night time. Extra lasix dose PRN for swelling. 4. Palpitations- PAT. Resolved. Not on BB due to sinus lynette. 5. H/o Orthostatic hypotension. Wear compression stockings  6. LEANDRO- on CPAP  7. Patient to follow up with PCP and neurology for falls. Has history of orthostatic hypotension. Risk of bleeding discussed with patient on plavix. Patient wants to continue plavix. 8. HTN- at goal, on ACEi  9. DL- LDL 45 on labs 7/19- continue statin.      [facility]re [id number]           [phone]

## 2020-10-23 RX ORDER — MELATONIN
Qty: 540 TABLET | Refills: 3 | Status: SHIPPED | OUTPATIENT
Start: 2020-10-23 | End: 2021-01-18 | Stop reason: SDUPTHER

## 2020-11-09 RX ORDER — PYRIDOXINE HCL (VITAMIN B6) 100 MG
50 TABLET ORAL DAILY
COMMUNITY

## 2020-11-09 RX ORDER — GLUCOSAMINE/D3/BOSWELLIA SERRA 1500MG-400
TABLET ORAL
COMMUNITY

## 2020-11-20 ENCOUNTER — HOSPITAL ENCOUNTER (OUTPATIENT)
Age: 70
Setting detail: SPECIMEN
Discharge: HOME | End: 2020-11-20
Payer: MEDICARE

## 2020-11-20 ENCOUNTER — OFFICE VISIT (OUTPATIENT)
Dept: PAIN MANAGEMENT | Age: 70
End: 2020-11-20
Payer: MEDICARE

## 2020-11-20 VITALS
HEART RATE: 58 BPM | HEIGHT: 64 IN | DIASTOLIC BLOOD PRESSURE: 60 MMHG | BODY MASS INDEX: 47.46 KG/M2 | SYSTOLIC BLOOD PRESSURE: 110 MMHG | WEIGHT: 278 LBS

## 2020-11-20 PROCEDURE — G8417 CALC BMI ABV UP PARAM F/U: HCPCS | Performed by: PHYSICAL MEDICINE & REHABILITATION

## 2020-11-20 PROCEDURE — 99214 OFFICE O/P EST MOD 30 MIN: CPT | Performed by: PHYSICAL MEDICINE & REHABILITATION

## 2020-11-20 PROCEDURE — G8484 FLU IMMUNIZE NO ADMIN: HCPCS | Performed by: PHYSICAL MEDICINE & REHABILITATION

## 2020-11-20 PROCEDURE — 80307 DRUG TEST PRSMV CHEM ANLYZR: CPT

## 2020-11-20 PROCEDURE — 1123F ACP DISCUSS/DSCN MKR DOCD: CPT | Performed by: PHYSICAL MEDICINE & REHABILITATION

## 2020-11-20 PROCEDURE — 3017F COLORECTAL CA SCREEN DOC REV: CPT | Performed by: PHYSICAL MEDICINE & REHABILITATION

## 2020-11-20 PROCEDURE — 1036F TOBACCO NON-USER: CPT | Performed by: PHYSICAL MEDICINE & REHABILITATION

## 2020-11-20 PROCEDURE — G8427 DOCREV CUR MEDS BY ELIG CLIN: HCPCS | Performed by: PHYSICAL MEDICINE & REHABILITATION

## 2020-11-20 PROCEDURE — 4040F PNEUMOC VAC/ADMIN/RCVD: CPT | Performed by: PHYSICAL MEDICINE & REHABILITATION

## 2020-11-20 PROCEDURE — 1090F PRES/ABSN URINE INCON ASSESS: CPT | Performed by: PHYSICAL MEDICINE & REHABILITATION

## 2020-11-20 PROCEDURE — G8400 PT W/DXA NO RESULTS DOC: HCPCS | Performed by: PHYSICAL MEDICINE & REHABILITATION

## 2020-11-20 ASSESSMENT — ENCOUNTER SYMPTOMS
CONSTIPATION: 0
DIARRHEA: 0
ALLERGIC/IMMUNOLOGIC NEGATIVE: 1
RESPIRATORY NEGATIVE: 1
BACK PAIN: 1
EYES NEGATIVE: 1

## 2020-11-20 NOTE — PROGRESS NOTES
PAIN MANAGEMENT FOLLOW-UP NOTE  11/20/20    CHIEF COMPLAINT: This is a68 y.o. female patientwho [facility] the [facility] with a history of Lower Back Pain      PAIN Rogerio Howard returns today for  reevaluation. Since the visit, the patient reports that the pain is not changed. lst R L4,5 TFE in February 2019 not effective   notes B lumbar  Pain mostly    has   R plantar fasciitis  Says Ibuprofen 400mg once every 3 days or so helps  That day a  lot     Location: Back  Location Modifier: entire back  Severity of Pain: 5  Duration of Pain: Intermittent  Frequency of Pain: Intermittent  Aggravating Factors: standing  Limiting Behavior: Standing   Relieving Factors: NSAID's        Previous pain medication trials have included:          Mental health:    Patient feels - secondary to their current pain problems as described above. H/O depression and anxiety: No   Patient is not seeing psychologist orpsychiatrist   Abuse history? No    Employed? No    ANALGESIA:   Are your Current Pain medication (s) helping to decrease pain? No.   Current Pain score:      ADVERSE AFFECTS:   Medication Side Effects: No.    ACTIVITY:  Are you able to be more active with your pain medications? No      ABERRANT BEHAVIORS SINCE LAST VISIT? No    Review of Systems   Constitutional: Positive for fatigue. HENT: Negative. Eyes: Negative. Respiratory: Negative. Cardiovascular: Negative. Gastrointestinal: Negative for constipation and diarrhea. Endocrine: Negative. Genitourinary: Negative for difficulty urinating and flank pain. Musculoskeletal: Positive for back pain and myalgias. Skin: Negative. Allergic/Immunologic: Negative. Neurological: Positive for weakness and numbness. Hematological: Negative. Psychiatric/Behavioral: Positive for sleep disturbance.         No Known Allergies    Outpatient Medications Prior to Visit   Medication Sig Dispense Refill    pyridoxine (B-6) 100 MG tablet Take 50 mg by mouth daily      Biotin 72143 MCG TABS Take by mouth      vitamin D3 (CHOLECALCIFEROL) 25 MCG (1000 UT) TABS tablet TAKE 3 TABLETS BY MOUTH TWICE A DAY. 540 tablet 3    fluticasone (FLONASE) 50 MCG/ACT nasal spray USE 2 SPRAYS NASALLY EVERY DAY 48 g 3    insulin glargine (LANTUS) 100 UNIT/ML injection vial Inject 15 Units into the skin 2 times daily      amitriptyline (ELAVIL) 25 MG tablet TAKE 1 TABLET AT BEDTIME 90 tablet 3    ibuprofen (ADVIL;MOTRIN) 400 MG tablet TAKE 1 TABLET EVERY 6 HOURS AS NEEDED FOR PAIN 360 tablet 3    citalopram (CELEXA) 10 MG tablet TAKE 1 TABLET EVERY DAY 90 tablet 3    magnesium oxide (MAGNESIUM-OXIDE) 400 (241.3 Mg) MG TABS tablet Take 1 tablet by mouth 2 times daily 180 tablet 3    lisinopril (PRINIVIL;ZESTRIL) 10 MG tablet TAKE 1 TABLET EVERY DAY 90 tablet 3    furosemide (LASIX) 40 MG tablet Take 1 tablet by mouth daily 90 tablet 3    clopidogrel (PLAVIX) 75 MG tablet TAKE 1 TABLET EVERY DAY 90 tablet 3    pantoprazole (PROTONIX) 40 MG tablet TAKE 1 TABLET EVERY DAY 90 tablet 3    buPROPion (WELLBUTRIN) 75 MG tablet TAKE 1 TABLET TWICE DAILY 180 tablet 3    amLODIPine (NORVASC) 5 MG tablet TAKE 1 TABLET EVERY DAY 90 tablet 3    vitamin C (ASCORBIC ACID) 500 MG tablet TAKE 1 TABLET BY MOUTH TWICE A DAY.  180 tablet 3    insulin aspart (NOVOLOG FLEXPEN) 100 UNIT/ML injection pen INJECT 4 UNITS AT LUNCH AND  6 UNITS AT SUPPER (EACH PEN EXPIRES 28 DAYS AFTER 1ST USE) 15 mL 3    umeclidinium-vilanterol (ANORO ELLIPTA) 62.5-25 MCG/INH AEPB inhaler INHALE 1 PUFF INTO LUNGS EVERY  each 3    albuterol sulfate  (90 Base) MCG/ACT inhaler INHALE 2 PUFFS EVERY 4 HOURS AS NEEDED FOR WHEEZING 54 g 3    spironolactone (ALDACTONE) 50 MG tablet TAKE 1 TABLET TWICE DAILY 180 tablet 3    atorvastatin (LIPITOR) 40 MG tablet TAKE 1 TABLET EVERY DAY 90 tablet 3    potassium chloride (MICRO-K) 10 MEQ extended release capsule TAKE 1 CAPSULE TWICE DAILY 180 capsule 3    alendronate (FOSAMAX) 70 MG tablet TAKE 1 TABLET BY MOUTH EVERY WEEK 12 tablet 3    senna (SENOKOT) 8.6 MG TABS tablet TAKE 1 TABLET BY MOUTH TWICE DAILY 180 tablet 3    albuterol (PROVENTIL) (2.5 MG/3ML) 0.083% nebulizer solution Take 3 mLs by nebulization every 6 hours as needed for Wheezing 120 each 3    triamcinolone (KENALOG) 0.1 % cream APPLY TOPICALLY TWICE DAILY 80 g 5    VITAMIN A PO Take 2,400 mcg by mouth daily      vitamin E 400 UNIT capsule Take 400 Units by mouth daily      COMBIGAN 0.2-0.5 % ophthalmic solution PLACE 1 DROP INTO BOTH EYES DAILY 15 mL 3    Zinc 50 MG TABS Take 50 mg by mouth daily      niacin 500 MG extended release capsule Take 500 mg by mouth daily      folic acid (FOLVITE) 179 MCG tablet Take 800 mcg by mouth daily      Cyanocobalamin (VITAMIN B12 PO) Take 2,500 mcg by mouth daily      B Complex-C (SUPER B COMPLEX PO) Take 1 tablet by mouth daily       travoprost, benzalkonium, (TRAVATAN) 0.004 % ophthalmic solution Place 1 drop into both eyes daily 3 Bottle 3    CPAP Machine MISC by Does not apply route Pressure of 13 (AirSense 10)  [employer].      ferrous sulfate 325 (65 FE) MG EC tablet Take 325 mg by mouth daily (with breakfast)      Multiple Vitamin (MULTI VITAMIN DAILY) TABS Take by mouth daily      Insulin Syringe-Needle U-100 (DROPLET INSULIN SYRINGE) 31G X 5/16\" 0.5 ML MISC Inject 1 each into the skin 2 times daily 200 each 3    Microlet Lancets MISC USE TO TEST 4 TIMES A DAY.  Respiratory Therapy Supplies MISC CPAP supplies 1 each 0    nitroGLYCERIN (NITROSTAT) 0.4 MG SL tablet PLACE 1 TABLET UNER THE TONGUE EVERY 5 MINUTES AS NEEDED FOR CHEST PAIN AT FIRST SIGN OF CHEST PAIN.  (Patient not taking: Reported on 10/14/2020) 25 tablet 1    Insulin Pen Needle (B-D ULTRAFINE III SHORT PEN) 31G X 8 MM MISC USE TWICE DAILY 180 each 3    calcium carbonate-vitamin D (CALCIUM 600 + D) 600-400 MG-UNIT TABS per tab Take 1 tablet by mouth 2 times daily 180 tablet 3    gabapentin (NEURONTIN) 300 MG capsule TAKE 2 CAPSULES TWICE DAILY 360 capsule 3    CONTOUR NEXT TEST strip USE TO TEST 4 TIMES A DAY. 400 strip 5    Compression Stockings MISC by Does not apply route 15-20mmHg  Knee high  Open tow  With assist device to help put them on (Patient not taking: Reported on 10/14/2020) 1 each 0     No facility-administered medications prior to visit.         Past Medical History:   Diagnosis Date    Arthritis     Asthma     CAD (coronary artery disease) 1989    Cancer ([address] 1971    cervical    Carotid artery disease ([address]     Cataracts, bilateral     COPD (chronic obstructive pulmonary disease) (HCC)     Coronary artery disease     Eczema     Fibromyalgia     Glaucoma     H/O blood clots     Headache     Heart attack ([address]     Hx of Bell's palsy     Mild right facial paralysis    Hypertension     Liver disease     [facility]     Neuropathy     Osteoporosis     Sleep apnea 11/07/2014    [facility]     Type 2 diabetes mellitus with hyperglycemia ([facility]        Past Surgical History:   Procedure Laterality Date    ANESTHESIA NERVE BLOCK Right 6/28/2019    Right L2 L3 L4 L5 Diagnostic Medial BB performed by Cyrus Drew MD at [address] [facility] Right 7/26/2019    Right L2 L3 L4 L5 Confirmatory Medial BB performed by Cyrus Drew MD at [address]  2002    COLONOSCOPY  09/2012    COLONOSCOPY N/A 9/30/2019    COLONOSCOPY performed by Hunter Fields MD at [address] [place] [address]  internal hemorrhoids and few diverticuli    CORONARY ANGIOPLASTY WITH STENT PLACEMENT      [address]UCED [address] Right 2/8/2019    Right L2 L3 TRANSFORAMINAL performed by Cyrus Drew MD at [address] [place] [address] Dr Hamilton 5/7/2019    Right L2 L3 TRANSFORAMINAL performed by Cyrus Drew MD at [address] [place] [address] Dr Hamilton 6/4/2019    Right L3 & L4 TRANSFORAMINAL performed by Scotty Perez MD at [id number] [place] [address] Dr Right 2019    Right L4 L5 TRANSFORAMINAL performed by Scotty Perez MD at [address] [place] [address] Right 2020    RIGHT L4 L5  TRANSFORAMINAL performed by Scotty Perez MD at [address] [facility] Right 2020    Right L4 L5 TRANSFORAMINAL performed by Scotty Perez MD at [address] 1.1-2CM FACE,FACIAL Left 2018    Excision Cyst Left Chest, Middle Back performed by Suraj Murphy MD at [address] [facility] ANES/STEROID FORAMEN LUMBAR/SACRAL  [address] [facility] [address] ADD LEVEL Right 10/5/2018    Right L2 & L3 TRANSFORAMINAL performed by Scotty Perez MD at [address] [facility]/STEROID FORAMEN LUMBAR/SACRAL  [address] [facility] JU[address] Laws ADD LEVEL Right 10/26/2018    Right L2 L3 TRANSFORAMINAL performed by Scotty Perez MD at [address] [facility] Right 2019    Right  L2 L3 L4 L5 RADIOFREQUENCY performed by Scotty Perez MD at [address] [facility]    Cervical cancer.   Had XRT    TONSILLECTOMY  1983     Family History   Problem Relation Age of Onset    Heart Disease Mother     High Blood Pressure Mother    Clifford Loser Stroke Mother     Glaucoma Mother     Early Death Father     Cancer Father         stomach    Miscarriages / Stillbirths Maternal Uncle      Social History     Socioeconomic History    Marital status:      Spouse name: None    Number of children: None    Years of education: None    Highest education level: None   Occupational History    None   Social Needs    Financial resource strain: None    Food insecurity     Worry: None     Inability: None    Transportation needs     Medical: None     Non-medical: None   Tobacco Use    Smoking status: Former Smoker     Packs/day: 1.00     Years: 35.00     Pack years: 35.00     Last attempt to quit: 2001     Years since quittin.1    Smokeless tobacco: Never Used Substance and Sexual Activity    Alcohol use: No    Drug use: No    Sexual activity: Never   Lifestyle    Physical activity     Days per week: None     Minutes per session: None    Stress: None   Relationships    Social connections     Talks on phone: None     Gets together: None     Attends [employer] service: None     Active member of club or organization: None     Attends meetings of clubs or organizations: None     Relationship status: None    Intimate partner violence     Fear of current or ex partner: None     Emotionally abused: None     Physically abused: None     Forced sexual activity: None   Other Topics Concern    None   Social History Narrative    None         Family and Social Historyreviewed in the electronic medical record. Imaging:Reviewed available imaging in our system with the patient. No results found. Objective:     Physical Exam:  Vitals:    11/20/20 1445   BP: 110/60   Site: Right Upper Arm   Position: Sitting   Cuff Size: Medium Adult   Pulse: 58   Weight: 278 lb (126.1 kg)   Height: 5' 4\" (1.626 m)          Physical Exam  Constitutional:       General: She is not in acute distress. Appearance: Normal appearance. She is well-developed. She is not diaphoretic. HENT:      Head: Normocephalic and atraumatic. Right Ear: External ear normal. No decreased hearing noted. Left Ear: External ear normal. No decreased hearing noted. Nose: Nose normal.   Eyes:      General: Lids are normal. No scleral icterus. Conjunctiva/sclera: Conjunctivae normal.   Neck:      Trachea: Phonation normal.   Cardiovascular:      Comments: No BLE edema present  Pulmonary:      Effort: Pulmonary effort is normal. No accessory muscle usage or respiratory distress. Genitourinary:     Comments: deferred  Skin:     General: Skin is warm and dry. Coloration: Skin is not pale. Findings: No erythema or rash.    Neurological:      Mental Status: She is alert and oriented to person, place, and time. Psychiatric:         Speech: Speech normal.         Behavior: Behavior normal.       Right Ankle Exam     Comments:  +3-4 edema B feet to  Below knees       Back Exam     Tenderness   The patient is experiencing tenderness in the lumbar. Range of Motion   Extension: normal   Flexion: normal   Lateral bend right: normal   Lateral bend left: normal   Rotation right: normal   Rotation left: normal     Muscle Strength   Right Quadriceps:  5/5   Left Quadriceps:  5/5   Right Hamstrings:  5/5   Left Hamstrings:  5/5     Tests   Straight leg raise right: negative  Straight leg raise left: negative    Other   Toe walk: normal  Heel walk: normal  Sensation: normal  Gait: normal     Comments:  -hari  -bladimir durbin                                         Assessment: This is a 65 y.o. female patient with:    Diagnosis:   Diagnosis Orders   1. Encounter for long-term opiate analgesic use  Pain Management Drug Screen   2. Encounter for drug screening  Pain Management Drug Screen       Medical Comorbidities:  As listed in the patient's past medical and surgical history    Functional Limitations:   Pain limits function and quality of life. Plan:   Doubt swelling of  Legs  From lumbar radiculopathy   is taking  Diuretics   If back  Pain doesn't respond to  Motrin 400  q 3 days or so and taking daily  Can call us to see if any  Alternatives treatments    Meds:   Controlled Substances Monitoring: Pt educated about the risks of taking opiates,including increased sedation, constipation, slowed breathing, tolerance, dependence,and addiction. New Prescriptions    No medications on file      No orders of the defined types were placed in this encounter. Orders Placed This Encounter   Procedures    Pain Management Drug Screen     Standing Status:   Future     Standing Expiration Date:   11/20/2021       No follow-ups on file.     The patient expressed understanding of the above assessment and

## 2020-11-23 RX ORDER — LANCETS
EACH MISCELLANEOUS
Qty: 400 EACH | Refills: 3 | Status: SHIPPED | OUTPATIENT
Start: 2020-11-23 | End: 2021-12-21 | Stop reason: SDUPTHER

## 2020-11-25 LAB

## 2020-11-30 RX ORDER — MULTIVITAMIN
TABLET ORAL
Qty: 180 TABLET | Refills: 3 | Status: SHIPPED | OUTPATIENT
Start: 2020-11-30 | End: 2021-01-18 | Stop reason: SDUPTHER

## 2020-11-30 RX ORDER — ATORVASTATIN CALCIUM 40 MG/1
TABLET, FILM COATED ORAL
Qty: 90 TABLET | Refills: 3 | Status: SHIPPED | OUTPATIENT
Start: 2020-11-30 | End: 2021-12-21 | Stop reason: SDUPTHER

## 2020-12-01 ENCOUNTER — HOSPITAL ENCOUNTER (OUTPATIENT)
Dept: MAMMOGRAPHY | Age: 70
Discharge: HOME | End: 2020-12-03
Payer: MEDICARE

## 2020-12-01 ENCOUNTER — OFFICE VISIT (OUTPATIENT)
Dept: OBGYN | Age: 70
End: 2020-12-01
Payer: MEDICARE

## 2020-12-01 VITALS
TEMPERATURE: 97.3 F | HEART RATE: 78 BPM | SYSTOLIC BLOOD PRESSURE: 124 MMHG | BODY MASS INDEX: 46.95 KG/M2 | HEIGHT: 64 IN | WEIGHT: 275 LBS | DIASTOLIC BLOOD PRESSURE: 70 MMHG

## 2020-12-01 PROCEDURE — G8427 DOCREV CUR MEDS BY ELIG CLIN: HCPCS | Performed by: NURSE PRACTITIONER

## 2020-12-01 PROCEDURE — 77063 BREAST TOMOSYNTHESIS BI: CPT

## 2020-12-01 PROCEDURE — 99214 OFFICE O/P EST MOD 30 MIN: CPT

## 2020-12-01 PROCEDURE — G8417 CALC BMI ABV UP PARAM F/U: HCPCS | Performed by: NURSE PRACTITIONER

## 2020-12-01 PROCEDURE — 1123F ACP DISCUSS/DSCN MKR DOCD: CPT | Performed by: NURSE PRACTITIONER

## 2020-12-01 PROCEDURE — 3017F COLORECTAL CA SCREEN DOC REV: CPT | Performed by: NURSE PRACTITIONER

## 2020-12-01 PROCEDURE — G8484 FLU IMMUNIZE NO ADMIN: HCPCS | Performed by: NURSE PRACTITIONER

## 2020-12-01 PROCEDURE — 99214 OFFICE O/P EST MOD 30 MIN: CPT | Performed by: NURSE PRACTITIONER

## 2020-12-01 PROCEDURE — 1036F TOBACCO NON-USER: CPT | Performed by: NURSE PRACTITIONER

## 2020-12-01 PROCEDURE — 4040F PNEUMOC VAC/ADMIN/RCVD: CPT | Performed by: NURSE PRACTITIONER

## 2020-12-01 PROCEDURE — G8400 PT W/DXA NO RESULTS DOC: HCPCS | Performed by: NURSE PRACTITIONER

## 2020-12-01 PROCEDURE — 1090F PRES/ABSN URINE INCON ASSESS: CPT | Performed by: NURSE PRACTITIONER

## 2020-12-01 NOTE — PROGRESS NOTES
[address] [facility]  2020              65 y.o. Chief Complaint   Patient presents with    Gynecologic Exam         Patient's last menstrual period was 1995 (approximate). No referring provider defined for this encounter. HPI :Annual Exam  Patient presents for annual exam.  Counseling on healthy lifestyle reviewed, as well as the need for self breast exam. We reviewed the need for Kegal exercises and literature was provided. We discussed and reviewed the need for routine screenings and immunization updates when appropriate. History of HAILE-BSO  And XRT for cervical cancer. Good bladder control. Performs monthly self breast exams. Still has left chest wall/breast pain. States it is episodic and was told it's from shoulder surgery. Discussed bone health. Reviewed adequate calcium and vitamin D requirements with emphasis on dietary calcium as opposed to large quantities of supplements. Discussed osteoporosis prevention, fall prevention, fracture risks, weight bearing exercise and upper body strengthening. There are no questions or concerns of a gynecologic nature. The patient is not sexually active. last pap: was normal, last mammogram: was normal  The patient has regular exercise: as she is able.  . We did review the need for and frequency of both weight bearing and strengthening as tolerated by the patient. ________________________________________________________________________  OB History    Para Term  AB Living   1 1 1 0 0 1   SAB TAB Ectopic Molar Multiple Live Births   0 0 0 0 0 0      # Outcome Date GA Lbr Rashard/2nd Weight Sex Delivery Anes PTL Lv   1 Term              Past Medical History:   Diagnosis Date    Arthritis     Asthma     CAD (coronary artery disease) 46    Cancer [facility]    cervical    [facility]     Cataracts, bilateral     COPD [facility]chronic obstructive [facility] disease) [facility]     Coronary artery disease     Eczema     Fibromyalgia     Glaucoma     H/O blood clots     Headache     Heart attack ([facility]     Hx of Bell's palsy     Mild right facial paralysis    Hypertension     Liver disease     Mild dementia ([facility]     Neuropathy     Osteoporosis     Sleep apnea 11/07/2014    sleep study done in [place]    Tremor     Type 2 diabetes mellitus with hyperglycemia [facility])                                                                    Past Surgical History:   Procedure Laterality Date    ANESTHESIA NERVE BLOCK Right 6/28/2019    Right L2 L3 L4 L5 Diagnostic Medial BB performed by Wilnette Collet, MD at [address] [facility] Right 7/26/2019    Right L2 L3 L4 L5 Confirmatory Medial BB performed by Wilnette Collet, MD at [address] [place] [address] SURGERY  2002    COLONOSCOPY  09/2012    COLONOSCOPY N/A 9/30/2019    COLONOSCOPY performed by Dona Prado MD at [address] [place] [address]  internal hemorrhoids and few diverticuli    CORONARY ANGIOPLASTY WITH [address] Right 2/8/2019    Right L2 L3 TRANSFORAMINAL performed by Wilnette Collet, MD at [facility] Right 5/7/2019    Right L2 L3 TRANSFORAMINAL performed by Wilnette Collet, MD at [address] [facility] Right 6/4/2019    Right L3 & L4 TRANSFORAMINAL performed by Wilnette Collet, MD at [facility] Right 12/12/2019    Right L4 L5 TRANSFORAMINAL performed by Wilnette Collet, MD at [address] [facility] Right 1/20/2020    RIGHT L4 L5  TRANSFORAMINAL performed by Wilnette Collet, MD at [facility] [facility] Right 2/17/2020    Right L4 L5 TRANSFORAMINAL performed by Wilnette Collet, MD at [address] [facility] 1.1-2CM FACE,FACIAL Left 2/7/2018    Excision Cyst Left Chest, Middle Back performed by Dona Prado MD at [address] [facility] ANES/STEROID FORAMEN LUMBAR/SACRAL [address] [place] [address] JUAN Laws ADD LEVEL Right 10/5/2018 Right L2 & L3 TRANSFORAMINAL performed by Wilnette Collet, MD at [address] [facility] ANES/STEROID FORAMEN LUMBAR/SACRAL [address] [facility] JUAN Laws ADD LEVEL Right 10/26/2018    Right L2 L3 TRANSFORAMINAL performed by Wilnette Collet, MD at [address] Right 2019    Right  L2 L3 L4 L5 RADIOFREQUENCY performed by Wilnette Collet, MD at [address] [facility]    Cervical cancer.   Had XRT    TONSILLECTOMY       Family History   Problem Relation Age of Onset    Heart Disease Mother     High Blood Pressure Mother    Jackie Matt Stroke Mother     Glaucoma Mother     Early Death Father     Cancer Father         stomach    Miscarriages / Stillbirths Maternal Uncle      Social History     Socioeconomic History    Marital status:      Spouse name: Not on file    Number of children: Not on file    Years of education: Not on file    Highest education level: Not on file   Occupational History    Not on file   Social Needs    Financial resource strain: Not on file    Food insecurity     Worry: Not on file     Inability: Not on file    Transportation needs     Medical: Not on file     Non-medical: Not on file   Tobacco Use    Smoking status: Former Smoker     Packs/day: 1.00     Years: 35.00     Pack years: 35.00     Last attempt to quit: 2001     Years since quittin.1    Smokeless tobacco: Never Used   Substance and Sexual Activity    Alcohol use: No    Drug use: No    Sexual activity: Never   Lifestyle    Physical activity     Days per week: Not on file     Minutes per session: Not on file    Stress: Not on file   Relationships    Social connections     Talks on phone: Not on file     Gets together: Not on file     Attends Quaker service: Not on file     Active member of club or organization: Not on file     Attends meetings of clubs or organizations: Not on file     Relationship status: Not on file    Intimate partner violence     Fear of current or ex partner: Not on file     Emotionally abused: Not on file     Physically abused: Not on file     Forced sexual activity: Not on file   Other Topics Concern    Not on file   Social History Narrative    Not on file       MEDICATIONS:  Current Outpatient Medications   Medication Sig Dispense Refill    CALCIUM 600+D3 600-400 MG-UNIT TABS per tab TAKE 1 TABLET BY MOUTH TWICE DAILY 180 tablet 3    atorvastatin (LIPITOR) 40 MG tablet TAKE 1 TABLET EVERY DAY 90 tablet 3    Microlet Lancets MISC USE TO TEST 4 TIMES A DAY. 400 each 3    pyridoxine (B-6) 100 MG tablet Take 50 mg by mouth daily      Biotin 20627 MCG TABS Take by mouth      vitamin D3 (CHOLECALCIFEROL) 25 MCG (1000 UT) TABS tablet TAKE 3 TABLETS BY MOUTH TWICE A DAY.  540 tablet 3    Insulin Syringe-Needle U-100 (DROPLET INSULIN SYRINGE) 31G X 5/16\" 0.5 ML MISC Inject 1 each into the skin 2 times daily 200 each 3    fluticasone (FLONASE) 50 MCG/ACT nasal spray USE 2 SPRAYS NASALLY EVERY DAY 48 g 3    Respiratory Therapy Supplies MISC CPAP supplies 1 each 0    insulin glargine (LANTUS) 100 UNIT/ML injection vial Inject 15 Units into the skin 2 times daily      amitriptyline (ELAVIL) 25 MG tablet TAKE 1 TABLET AT BEDTIME 90 tablet 3    ibuprofen (ADVIL;MOTRIN) 400 MG tablet TAKE 1 TABLET EVERY 6 HOURS AS NEEDED FOR PAIN 360 tablet 3    citalopram (CELEXA) 10 MG tablet TAKE 1 TABLET EVERY DAY 90 tablet 3    magnesium oxide (MAGNESIUM-OXIDE) 400 (241.3 Mg) MG TABS tablet Take 1 tablet by mouth 2 times daily 180 tablet 3    lisinopril (PRINIVIL;ZESTRIL) 10 MG tablet TAKE 1 TABLET EVERY DAY 90 tablet 3    furosemide (LASIX) 40 MG tablet Take 1 tablet by mouth daily 90 tablet 3    clopidogrel (PLAVIX) 75 MG tablet TAKE 1 TABLET EVERY DAY 90 tablet 3    pantoprazole (PROTONIX) 40 MG tablet TAKE 1 TABLET EVERY DAY 90 tablet 3    buPROPion (WELLBUTRIN) 75 MG tablet TAKE 1 TABLET TWICE DAILY 180 tablet 3    amLODIPine (NORVASC) 5 MG tablet TAKE 1 TABLET EVERY DAY 90 tablet 3    vitamin C (ASCORBIC ACID) 500 MG tablet TAKE 1 TABLET BY MOUTH TWICE A DAY. 180 tablet 3    nitroGLYCERIN (NITROSTAT) 0.4 MG SL tablet PLACE 1 TABLET UNER THE TONGUE EVERY 5 MINUTES AS NEEDED FOR CHEST PAIN AT FIRST SIGN OF CHEST PAIN. 25 tablet 1    insulin aspart (NOVOLOG FLEXPEN) 100 UNIT/ML injection pen INJECT 4 UNITS AT LUNCH AND  6 UNITS AT SUPPER (EACH PEN EXPIRES 28 DAYS AFTER 1ST USE) 15 mL 3    Insulin Pen Needle (B-D ULTRAFINE III SHORT PEN) 31G X 8 MM MISC USE TWICE DAILY 180 each 3    umeclidinium-vilanterol (ANORO ELLIPTA) 62.5-25 MCG/INH AEPB inhaler INHALE 1 PUFF INTO LUNGS EVERY  each 3    albuterol sulfate  (90 Base) MCG/ACT inhaler INHALE 2 PUFFS EVERY 4 HOURS AS NEEDED FOR WHEEZING 54 g 3    spironolactone (ALDACTONE) 50 MG tablet TAKE 1 TABLET TWICE DAILY 180 tablet 3    potassium chloride (MICRO-K) 10 MEQ extended release capsule TAKE 1 CAPSULE TWICE DAILY 180 capsule 3    alendronate (FOSAMAX) 70 MG tablet TAKE 1 TABLET BY MOUTH EVERY WEEK 12 tablet 3    senna (SENOKOT) 8.6 MG TABS tablet TAKE 1 TABLET BY MOUTH TWICE DAILY 180 tablet 3    CONTOUR NEXT TEST strip USE TO TEST 4 TIMES A DAY.  400 strip 5    albuterol (PROVENTIL) (2.5 MG/3ML) 0.083% nebulizer solution Take 3 mLs by nebulization every 6 hours as needed for Wheezing 120 each 3    triamcinolone (KENALOG) 0.1 % cream APPLY TOPICALLY TWICE DAILY 80 g 5    VITAMIN A PO Take 2,400 mcg by mouth daily      vitamin E 400 UNIT capsule Take 400 Units by mouth daily      COMBIGAN 0.2-0.5 % ophthalmic solution PLACE 1 DROP INTO BOTH EYES DAILY 15 mL 3    Compression Stockings MISC by Does not apply route 15-20mmHg  Knee high  Open tow  With assist device to help put them on 1 each 0    Zinc 50 MG TABS Take 50 mg by mouth daily      niacin 500 MG extended release capsule Take 500 mg by mouth daily      folic acid (FOLVITE) 829 MCG tablet Take 800 mcg by mouth daily      Cyanocobalamin (VITAMIN B12 PO) Take 2,500 mcg by mouth daily      B Complex-C (SUPER B COMPLEX PO) Take 1 tablet by mouth daily       travoprost, benzalkonium, (TRAVATAN) 0.004 % ophthalmic solution Place 1 drop into both eyes daily 3 Bottle 3    CPAP Machine MISC by Does not apply route Pressure of 13 (AirSense 10)  [employer].      ferrous sulfate 325 (65 FE) MG EC tablet Take 325 mg by mouth daily (with breakfast)      Multiple Vitamin (MULTI VITAMIN DAILY) TABS Take by mouth daily      gabapentin (NEURONTIN) 300 MG capsule TAKE 2 CAPSULES TWICE DAILY 360 capsule 3     No current facility-administered medications for this visit. ALLERGIES:  Allergies as of 12/01/2020    (No Known Allergies)       Gynecologic History:  Menarche: 10   Menopause at surgical menopause      Patient's last menstrual period was 12/07/1995 (approximate).   Hormone Exposure: No    Family History of Breast, Ovarian , Colon or Uterine Cancer: No     Preventative Health Testing:  Date of Last Pap Smear: 2019  Date of Last Mammogram: 2019  Date of Last Colonoscopy: 2019  Date of Last Bone Density: 2018  ________________________________________________________________________      Review Of Systems:  General ROS:  negative  Hematological and Lymphatic ROS:negative   Breast ROS: negative  Cardiovascular ROS: negative  Respiratory ROS: negative   Gastrointestinal ROS: negative  Genito-Urinary ROS: negative  Psychological ROS: negative  Neurological ROS: negative  Musculoskeletal ROS: negative  Dermatological ROS: negative                                                                                                                                                                                   PHYSICAL Exam:     Constitutional:  Blood pressure 124/70, pulse 78, temperature 97.3 °F (36.3 °C), temperature source Temporal, height 5' 3.5\" (1.613 m), weight 275 lb (124.7 kg), last menstrual period 12/07/1995, not currently breastfeeding. General Appearance: This  is a well Developed, well Nourished, well groomed female. Her BMI was reviewed. Skin:  There was a Normal Inspection of the skin without rashes or lesions. Lymphatic:  No Lymph Nodes were Palpable in the neck , axilla or groin. Neck and EENT:  The neck was supple. There were no masses   The thyroid was not enlarged and had no masses. PERRLA, Nares Patent No Masses    Respiratory: The lungs were auscultated and found to be clear. There were no rales, rhonchi or wheezes. There was a good respiratory effort. Cardiovascular: The heart was in a regular rate and rhythm. There was no murmur appreciated. Extremities: The patients extremities were without calf tenderness, or varicosities. Peripheral edema bilateral lower extremities  There was full range of motion in all four extremities. Abdomen: The abdomen was soft and non-tender. There was no guarding, rebound or rigidity. On evaluation there was no evidence of hepatosplenomegaly and there was no costal vertebral yajaira tenderness bilaterally. No hernias were appreciated. Psych: The patient had a normal Orientation to: Time, Place, Person, and Situation  Mood and affect appropriate    Breast:  (Chest)  normal appearance, no masses or tenderness, Inspection negative, No nipple retraction or dimpling, No nipple discharge or bleeding, No axillary or supraclavicular adenopathy, Normal to palpation without dominant masses      Pelvic Exam:  External genitalia: normal general appearance  Urinary system: urethral meatus normal  Vaginal: atrophic mucosa  Cervix: absent and removed surgically  Adnexa: normal bimanual exam and non palpable  Uterus: absent and removed surgically    Musculosk:  Normal Gait and station was noted. Digits were evaluated without abnormal findings.   Range of motion, stability and strength were evaluated and found to be appropriate for the patients age.    ASSESSMENT:      65 y.o. Annual   Diagnosis Orders   1. Vaginal atrophy     2. Menopause     3.  Other screening mammogram  [facility] DARCI DIGITAL SCREEN BILATERAL        Chief Complaint   Patient presents with    Gynecologic Exam         Past Medical History:   Diagnosis Date    Arthritis     Asthma     CAD (coronary artery disease) 1989    Cancer ([facility] 1971    cervical    Carotid artery disease ([facility]     Cataracts, bilateral     COPD (chronic obstructive pulmonary disease) ([facility]     Coronary artery disease     Eczema     Fibromyalgia     Glaucoma     H/O blood clots     Headache     Heart attack ([address]     Hx of Bell's palsy     Mild right facial paralysis    Hypertension     Liver disease     Mild dementia ([facility]     Neuropathy     Osteoporosis     Sleep apnea 11/07/2014    [facility] study done [facility]     Type 2 diabetes mellitus with hyperglycemia ([facility]          Patient Active Problem List   Diagnosis    Obstructive sleep apnea    Chronic fatigue    Coronary artery disease due to lipid rich plaque    Vitamin D deficiency    Type 2 diabetes mellitus with diabetic polyneuropathy, with long-term current use of insulin ([facility]    Peripheral polyneuropathy    Bilateral leg edema    Right hip pain    Muscle ache    Hx of Bell's palsy    Memory problem    Gait abnormality    Balance problem    H/O falling    Dizziness    Intractable episodic tension-type headache    Essential tremor    Anxiety and depression    Elevated hemoglobin A1c    VBI (vertebrobasilar insufficiency)    Chronic cerebral ischemia    TIA (transient ischemic attack)    Chronic tension headache    Morbid obesity with BMI of 45.0-49.9, adult (HCC)    Chronic right-sided low back pain with right-sided sciatica    Lumbar radiculopathy    Encounter for chronic pain management    Carotid stenosis, asymptomatic, bilateral    Chronic tension-type headache, not intractable    Lumbosacral spondylosis without myelopathy    DDD (degenerative disc disease), lumbar    Acquired spondylolisthesis    Lumbar facet joint syndrome    Chronic diastolic congestive heart failure (HCC)    Bronchiectasis without complication (HCC)          Hereditary Breast, Ovarian, Colon and Uterine Cancer screening Done. Tobacco & Secondary smoke risks reviewed; instructed on cessation and avoidance    25 minutes spent face to face, 80% in counseling, education, and coordination of care. PLAN:  Return in about 1 year (around 12/1/2021) for Annual Exam, Mammogram.  Return for annual exams  Mammograms every 1 year. If 35 yo and last mammogram was negative. Routine health maintenance per patients PCP.   Orders Placed This Encounter   Procedures    [facility] DARCI DIGITAL SCREEN BILATERAL     Standing Status:   Future     Standing Expiration Date:   6/1/2022       Lisa Jones  12/1/2020

## 2020-12-07 ENCOUNTER — HOSPITAL ENCOUNTER (OUTPATIENT)
Dept: NON INVASIVE DIAGNOSTICS | Age: 70
Discharge: HOME | End: 2020-12-07
Payer: MEDICARE

## 2020-12-07 ENCOUNTER — TELEPHONE (OUTPATIENT)
Dept: CARDIOLOGY | Age: 70
End: 2020-12-07

## 2020-12-07 LAB
LV EF: 65 %
LVEF MODALITY: NORMAL

## 2020-12-07 PROCEDURE — 93306 TTE W/DOPPLER COMPLETE: CPT

## 2020-12-07 RX ORDER — ALENDRONATE SODIUM 70 MG/1
TABLET ORAL
Qty: 12 TABLET | Refills: 3 | Status: SHIPPED | OUTPATIENT
Start: 2020-12-07 | End: 2022-04-05

## 2020-12-09 NOTE — TELEPHONE ENCOUNTER
Left detailed message on voicemail with results of echo, asked pt to call us with any questions and keep their follow-up appointment with Cardiology.

## 2020-12-11 RX ORDER — PERPHENAZINE 16 MG/1
TABLET, FILM COATED ORAL
Qty: 400 STRIP | Refills: 5 | Status: SHIPPED | OUTPATIENT
Start: 2020-12-11 | End: 2022-01-17

## 2020-12-17 ENCOUNTER — TELEPHONE (OUTPATIENT)
Dept: INTERNAL MEDICINE | Age: 70
End: 2020-12-17
Payer: MEDICARE

## 2020-12-17 PROCEDURE — G0179 MD RECERTIFICATION HHA PT: HCPCS | Performed by: INTERNAL MEDICINE

## 2020-12-17 RX ORDER — POTASSIUM CHLORIDE 750 MG/1
CAPSULE, EXTENDED RELEASE ORAL
Qty: 180 CAPSULE | Refills: 3 | Status: SHIPPED | OUTPATIENT
Start: 2020-12-17 | End: 2022-02-14

## 2020-12-18 RX ORDER — GABAPENTIN 300 MG/1
CAPSULE ORAL
Qty: 360 CAPSULE | Refills: 3 | OUTPATIENT
Start: 2020-12-18 | End: 2021-03-18

## 2020-12-18 NOTE — TELEPHONE ENCOUNTER
Patient called in requesting a refill on gabapentin sent to [facility] Medication pended if agreeable.      Last Appt:  8/3/2020  Next Appt:   2/3/2021  Med verified in [address] [place] [address] Rd

## 2021-01-11 RX ORDER — ALBUTEROL SULFATE 90 UG/1
AEROSOL, METERED RESPIRATORY (INHALATION)
Qty: 54 G | Refills: 3 | Status: SHIPPED | OUTPATIENT
Start: 2021-01-11 | End: 2021-09-13

## 2021-01-11 NOTE — TELEPHONE ENCOUNTER
Last Appt:  8/3/2020  Next Appt:   2/3/2021  Med verified in [facility]

## 2021-01-18 DIAGNOSIS — E55.9 VITAMIN D DEFICIENCY: ICD-10-CM

## 2021-01-18 DIAGNOSIS — M81.0 OSTEOPOROSIS, UNSPECIFIED OSTEOPOROSIS TYPE, UNSPECIFIED PATHOLOGICAL FRACTURE PRESENCE: ICD-10-CM

## 2021-01-18 DIAGNOSIS — R53.82 CHRONIC FATIGUE: ICD-10-CM

## 2021-01-18 RX ORDER — MELATONIN
Qty: 540 TABLET | Refills: 3 | Status: SHIPPED | OUTPATIENT
Start: 2021-01-18 | End: 2022-01-21

## 2021-01-25 RX ORDER — SPIRONOLACTONE 50 MG/1
TABLET, FILM COATED ORAL
Qty: 180 TABLET | Refills: 3 | Status: SHIPPED | OUTPATIENT
Start: 2021-01-25 | End: 2022-04-05

## 2021-01-27 RX ORDER — UMECLIDINIUM BROMIDE AND VILANTEROL TRIFENATATE 62.5; 25 UG/1; UG/1
POWDER RESPIRATORY (INHALATION)
Qty: 1 EACH | Refills: 5 | Status: SHIPPED | OUTPATIENT
Start: 2021-01-27 | End: 2021-02-15 | Stop reason: ALTCHOICE

## 2021-01-28 ENCOUNTER — HOSPITAL ENCOUNTER (OUTPATIENT)
Dept: LAB | Age: 71
Discharge: HOME | End: 2021-01-28
Payer: MEDICARE

## 2021-01-28 DIAGNOSIS — Z79.4 TYPE 2 DIABETES MELLITUS WITH DIABETIC POLYNEUROPATHY, WITH LONG-TERM CURRENT USE OF INSULIN (HCC): ICD-10-CM

## 2021-01-28 DIAGNOSIS — E11.42 TYPE 2 DIABETES MELLITUS WITH DIABETIC POLYNEUROPATHY, WITH LONG-TERM CURRENT USE OF INSULIN (HCC): ICD-10-CM

## 2021-01-28 DIAGNOSIS — D50.8 OTHER IRON DEFICIENCY ANEMIA: ICD-10-CM

## 2021-01-28 LAB — HEMOGLOBIN: 12.5 G/DL (ref 11.9–15.1)

## 2021-01-28 PROCEDURE — 36415 COLL VENOUS BLD VENIPUNCTURE: CPT

## 2021-01-28 PROCEDURE — 85018 HEMOGLOBIN: CPT

## 2021-01-28 PROCEDURE — 83036 HEMOGLOBIN GLYCOSYLATED A1C: CPT

## 2021-01-29 LAB
ESTIMATED AVERAGE GLUCOSE: 140 MG/DL
HBA1C MFR BLD: 6.5 % (ref 4–6)

## 2021-02-01 ENCOUNTER — TELEPHONE (OUTPATIENT)
Dept: INTERNAL MEDICINE | Age: 71
End: 2021-02-01

## 2021-02-01 NOTE — TELEPHONE ENCOUNTER
CHUCHO- received call from [facility] in [facility] [phone]). She just re-certed pt for nursing for 60 more days.

## 2021-02-02 RX ORDER — SENNOSIDES 8.6 MG
TABLET ORAL
Qty: 180 TABLET | Refills: 3 | Status: SHIPPED | OUTPATIENT
Start: 2021-02-02 | End: 2022-02-17 | Stop reason: SDUPTHER

## 2021-02-04 ENCOUNTER — TELEPHONE (OUTPATIENT)
Dept: INTERNAL MEDICINE | Age: 71
End: 2021-02-04
Payer: MEDICARE

## 2021-02-04 DIAGNOSIS — M47.817 LUMBOSACRAL SPONDYLOSIS WITHOUT MYELOPATHY: ICD-10-CM

## 2021-02-04 DIAGNOSIS — Z79.4 TYPE 2 DIABETES MELLITUS WITH DIABETIC POLYNEUROPATHY, WITH LONG-TERM CURRENT USE OF INSULIN (HCC): Primary | ICD-10-CM

## 2021-02-04 DIAGNOSIS — M43.07 SPONDYLOLYSIS, LUMBOSACRAL: ICD-10-CM

## 2021-02-04 DIAGNOSIS — E11.42 TYPE 2 DIABETES MELLITUS WITH DIABETIC POLYNEUROPATHY, WITH LONG-TERM CURRENT USE OF INSULIN (HCC): Primary | ICD-10-CM

## 2021-02-04 PROCEDURE — G0179 MD RECERTIFICATION HHA PT: HCPCS | Performed by: INTERNAL MEDICINE

## 2021-02-12 ENCOUNTER — OFFICE VISIT (OUTPATIENT)
Dept: INTERNAL MEDICINE | Age: 71
End: 2021-02-12
Payer: MEDICARE

## 2021-02-12 VITALS
WEIGHT: 272 LBS | HEIGHT: 64 IN | SYSTOLIC BLOOD PRESSURE: 108 MMHG | HEART RATE: 97 BPM | DIASTOLIC BLOOD PRESSURE: 68 MMHG | BODY MASS INDEX: 46.44 KG/M2 | OXYGEN SATURATION: 96 %

## 2021-02-12 DIAGNOSIS — Z00.00 ANNUAL PHYSICAL EXAM: ICD-10-CM

## 2021-02-12 DIAGNOSIS — I25.83 CORONARY ARTERY DISEASE DUE TO LIPID RICH PLAQUE: ICD-10-CM

## 2021-02-12 DIAGNOSIS — E03.9 HYPOTHYROIDISM (ACQUIRED): ICD-10-CM

## 2021-02-12 DIAGNOSIS — E55.9 VITAMIN D DEFICIENCY: ICD-10-CM

## 2021-02-12 DIAGNOSIS — D50.9 IRON DEFICIENCY ANEMIA, UNSPECIFIED IRON DEFICIENCY ANEMIA TYPE: ICD-10-CM

## 2021-02-12 DIAGNOSIS — Z00.00 MEDICARE ANNUAL WELLNESS VISIT, SUBSEQUENT: ICD-10-CM

## 2021-02-12 DIAGNOSIS — D50.8 OTHER IRON DEFICIENCY ANEMIA: ICD-10-CM

## 2021-02-12 DIAGNOSIS — R10.9 ABDOMINAL PAIN, UNSPECIFIED ABDOMINAL LOCATION: ICD-10-CM

## 2021-02-12 DIAGNOSIS — R73.09 ELEVATED HEMOGLOBIN A1C: ICD-10-CM

## 2021-02-12 DIAGNOSIS — E53.8 VITAMIN B12 DEFICIENCY: ICD-10-CM

## 2021-02-12 DIAGNOSIS — E11.42 TYPE 2 DIABETES MELLITUS WITH DIABETIC POLYNEUROPATHY, WITH LONG-TERM CURRENT USE OF INSULIN (HCC): ICD-10-CM

## 2021-02-12 DIAGNOSIS — Z13.6 SCREENING FOR ISCHEMIC HEART DISEASE: ICD-10-CM

## 2021-02-12 DIAGNOSIS — I25.10 CORONARY ARTERY DISEASE DUE TO LIPID RICH PLAQUE: ICD-10-CM

## 2021-02-12 DIAGNOSIS — Z79.4 TYPE 2 DIABETES MELLITUS WITH DIABETIC POLYNEUROPATHY, WITH LONG-TERM CURRENT USE OF INSULIN (HCC): ICD-10-CM

## 2021-02-12 DIAGNOSIS — R53.82 CHRONIC FATIGUE: ICD-10-CM

## 2021-02-12 DIAGNOSIS — Z00.00 GENERAL MEDICAL EXAM: Primary | ICD-10-CM

## 2021-02-12 PROCEDURE — 99214 OFFICE O/P EST MOD 30 MIN: CPT | Performed by: INTERNAL MEDICINE

## 2021-02-12 PROCEDURE — 1123F ACP DISCUSS/DSCN MKR DOCD: CPT | Performed by: INTERNAL MEDICINE

## 2021-02-12 PROCEDURE — G8484 FLU IMMUNIZE NO ADMIN: HCPCS | Performed by: INTERNAL MEDICINE

## 2021-02-12 PROCEDURE — G8427 DOCREV CUR MEDS BY ELIG CLIN: HCPCS | Performed by: INTERNAL MEDICINE

## 2021-02-12 PROCEDURE — 1090F PRES/ABSN URINE INCON ASSESS: CPT | Performed by: INTERNAL MEDICINE

## 2021-02-12 PROCEDURE — 1036F TOBACCO NON-USER: CPT | Performed by: INTERNAL MEDICINE

## 2021-02-12 PROCEDURE — 3044F HG A1C LEVEL LT 7.0%: CPT | Performed by: INTERNAL MEDICINE

## 2021-02-12 PROCEDURE — G8400 PT W/DXA NO RESULTS DOC: HCPCS | Performed by: INTERNAL MEDICINE

## 2021-02-12 PROCEDURE — 99214 OFFICE O/P EST MOD 30 MIN: CPT

## 2021-02-12 PROCEDURE — G0439 PPPS, SUBSEQ VISIT: HCPCS | Performed by: INTERNAL MEDICINE

## 2021-02-12 PROCEDURE — 4040F PNEUMOC VAC/ADMIN/RCVD: CPT | Performed by: INTERNAL MEDICINE

## 2021-02-12 PROCEDURE — G8417 CALC BMI ABV UP PARAM F/U: HCPCS | Performed by: INTERNAL MEDICINE

## 2021-02-12 PROCEDURE — 3017F COLORECTAL CA SCREEN DOC REV: CPT | Performed by: INTERNAL MEDICINE

## 2021-02-12 PROCEDURE — 2022F DILAT RTA XM EVC RTNOPTHY: CPT | Performed by: INTERNAL MEDICINE

## 2021-02-12 ASSESSMENT — ENCOUNTER SYMPTOMS
EYE PAIN: 0
BLOOD IN STOOL: 0
NAUSEA: 0
SHORTNESS OF BREATH: 0
BACK PAIN: 0
CONSTIPATION: 0
COUGH: 0
VOMITING: 0
DIARRHEA: 0
ABDOMINAL PAIN: 0

## 2021-02-12 ASSESSMENT — PATIENT HEALTH QUESTIONNAIRE - PHQ9
SUM OF ALL RESPONSES TO PHQ QUESTIONS 1-9: 1
SUM OF ALL RESPONSES TO PHQ9 QUESTIONS 1 & 2: 1
2. FEELING DOWN, DEPRESSED OR HOPELESS: 0
SUM OF ALL RESPONSES TO PHQ QUESTIONS 1-9: 1

## 2021-02-12 ASSESSMENT — LIFESTYLE VARIABLES
HOW OFTEN DO YOU HAVE A DRINK CONTAINING ALCOHOL: NEVER
AUDIT TOTAL SCORE: INCOMPLETE
AUDIT-C TOTAL SCORE: INCOMPLETE
HOW OFTEN DO YOU HAVE A DRINK CONTAINING ALCOHOL: 0

## 2021-02-12 NOTE — PROGRESS NOTES
Medicare Annual Wellness Visit  Name: Nia Abbott Date: 2021   MRN: [id number] Sex: Female   Age: 65 y.o. Ethnicity: Non-/Non    : 1950 Race: Kelle Gomez is here for Medicare AWV, Diabetes, Fatigue, and Mouth Lesions    Screenings for behavioral, psychosocial and functional/safety risks, and cognitive dysfunction are all negative except as indicated below. These results, as well as other patient data from the [address] form, are documented in Flowsheets linked to this Encounter. No Known Allergies      Prior to Visit Medications    Medication Sig Taking? Authorizing Provider   senna (SENOKOT) 8.6 MG TABS tablet TAKE 1 TABLET BY MOUTH TWICE DAILY Yes Melville Kayser, MD   ANORO ELLIPTA 62.5-25 MCG/INH AEPB inhaler INHALE 1 PUFF INTO LUNGS EVERY DAY Yes Melville Kayser, MD   spironolactone (ALDACTONE) 50 MG tablet TAKE 1 TABLET TWICE DAILY Yes Melville Kayser, MD   calcium carbonate-vitamin D3 (CALCIUM 600+D3) 600-400 MG-UNIT TABS per tab TAKE 1 TABLET BY MOUTH TWICE DAILY Yes Melville Kayser, MD   magnesium oxide (MAGNESIUM-OXIDE) 400 (241.3 Mg) MG TABS tablet Take 1 tablet by mouth 2 times daily Yes Melville Kayser, MD   vitamin D3 (CHOLECALCIFEROL) 25 MCG (1000 UT) TABS tablet TAKE 3 TABLETS BY MOUTH TWICE A DAY. Yes Melville Kayser, MD   albuterol sulfate  (90 Base) MCG/ACT inhaler INHALE 2 PUFFS EVERY 4 HOURS AS NEEDED FOR WHEEZING Yes Melville Kayser, MD   potassium chloride (MICRO-K) 10 MEQ extended release capsule TAKE 1 CAPSULE TWICE DAILY Yes Melville Kayser, MD   CONTOUR NEXT TEST strip TEST FOUR TIMES DAILY Yes Melville Kayser, MD   alendronate (FOSAMAX) 70 MG tablet TAKE 1 TABLET EVERY WEEK Yes Melville Kayser, MD   atorvastatin (LIPITOR) 40 MG tablet TAKE 1 TABLET EVERY DAY Yes Melville Kayser, MD   Microlet Lancets MISC USE TO TEST 4 TIMES A DAY.  Yes Melville Kayser, MD pyridoxine (B-6) 100 MG tablet Take 50 mg by mouth daily Yes Historical Provider, MD   Biotin 55591 MCG TABS Take by mouth Yes Historical Provider, MD   Insulin Syringe-Needle U-100 (DROPLET INSULIN SYRINGE) 31G X 5/16\" 0.5 ML MISC Inject 1 each into the skin 2 times daily Yes Ashley Dong MD   fluticasone (FLONASE) 50 MCG/ACT nasal spray USE 2 SPRAYS NASALLY EVERY DAY Yes Ashley Dong MD   Respiratory Therapy Supplies MISC CPAP supplies Yes Ashley Dong MD   insulin glargine (LANTUS) 100 UNIT/ML injection vial Inject 15 Units into the skin 2 times daily Yes Historical Provider, MD   amitriptyline (ELAVIL) 25 MG tablet TAKE 1 TABLET AT BEDTIME Yes Ashley Dong MD   ibuprofen (ADVIL;MOTRIN) 400 MG tablet TAKE 1 TABLET EVERY 6 HOURS AS NEEDED FOR PAIN Yes Ashley Dong MD   citalopram (CELEXA) 10 MG tablet TAKE 1 TABLET EVERY DAY Yes Ashley Dong MD   lisinopril (PRINIVIL;ZESTRIL) 10 MG tablet TAKE 1 TABLET EVERY DAY Yes Ashley Dong MD   furosemide (LASIX) 40 MG tablet Take 1 tablet by mouth daily Yes Ashley Dong MD   clopidogrel (PLAVIX) 75 MG tablet TAKE 1 TABLET EVERY DAY Yes Ashley Dong MD   pantoprazole (PROTONIX) 40 MG tablet TAKE 1 TABLET EVERY DAY Yes Ashley Dong MD   buPROPion (WELLBUTRIN) 75 MG tablet TAKE 1 TABLET TWICE DAILY Yes Aslhey Dong MD   amLODIPine (NORVASC) 5 MG tablet TAKE 1 TABLET EVERY DAY Yes Ashley Dong MD   vitamin C (ASCORBIC ACID) 500 MG tablet TAKE 1 TABLET BY MOUTH TWICE A DAY. Yes Ashley Dong MD   nitroGLYCERIN (NITROSTAT) 0.4 MG SL tablet PLACE 1 TABLET UNER THE TONGUE EVERY 5 MINUTES AS NEEDED FOR CHEST PAIN AT FIRST SIGN OF CHEST PAIN.  Yes Ashley Dong MD   insulin aspart (NOVOLOG FLEXPEN) 100 UNIT/ML injection pen INJECT 4 UNITS AT LUNCH AND  6 UNITS AT SUPPER (EACH PEN EXPIRES 28 DAYS AFTER 1ST USE) Yes Ashley Dong MD  Cataracts, bilateral     COPD (chronic obstructive pulmonary disease) (HCC)     Coronary artery disease     Eczema     Fibromyalgia     Glaucoma     H/O blood clots     Headache     [facility] ([facility]     Hx of Bell's palsy     Mild right facial paralysis    Hypertension     Liver disease     Mild dementia ([facility]     Neuropathy     Osteoporosis     Sleep apnea 11/07/2014    sleep study done in [place]    Tremor     Type 2 diabetes mellitus with hyperglycemia [facility])        Past Surgical History:   Procedure Laterality Date    ANESTHESIA NERVE BLOCK Right 6/28/2019    Right L2 L3 L4 L5 Diagnostic Medial BB performed by Cong Escobar MD at [facility] Right 7/26/2019    Right L2 L3 L4 L5 Confirmatory Medial BB performed by Cong Escobar MD at [address]  2002    COLONOSCOPY  09/2012    COLONOSCOPY N/A 9/30/2019    COLONOSCOPY performed by Shlomo Nyhan, MD at [address] [place] [address]  internal hemorrhoids and few diverticuli   [address] [address] Right 2/8/2019    Right L2 L3 TRANSFORAMINAL performed by Cong Escobar MD at [place] [address] Right 5/7/2019    Right L2 L3 TRANSFORAMINAL performed by Cong Escobar MD at [place] [address] Right 6/4/2019    Right L3 & L4 TRANSFORAMINAL performed by Cong Escobar MD at [place] [address] Right 12/12/2019    Right L4 L5 TRANSFORAMINAL performed by Cong Escobar MD at [address] [place] [address] Right 1/20/2020    RIGHT L4 L5  TRANSFORAMINAL performed by Cong Escobar MD at [address] [place] [address] Right 2/17/2020    Right L4 L5 TRANSFORAMINAL performed by Cong Escobar MD at [address] [facility] 1.1-2CM FACE,FACIAL Left 2/7/2018    Excision Cyst Left Chest, Middle Back performed by Shlomo Nyhan, MD at [address] [place] [address]  [place] NJ[place] AA&/STRD TFRML EPI LUMBAR/SACRAL EA ADDL Right 10/5/2018    Right L2 & L3 TRANSFORAMINAL performed by Julieta Allison MD at [address] AA&/STRD TFRML EPI LUMBAR/SACRAL EA ADDL Right 10/26/2018    Right L2 L3 TRANSFORAMINAL performed by Julieta Allison MD at [address] Right 8/1/2019    Right  L2 L3 L4 L5 RADIOFREQUENCY performed by Julieta Allison MD at [address] [facility]    Cervical cancer. Had XRT    TONSILLECTOMY  1983         Family History   Problem Relation Age of Onset    Heart Disease Mother     High Blood Pressure Mother     Stroke Mother     Glaucoma Mother     Early Death Father     Cancer Father         stomach    Miscarriages / Stillbirths Maternal Uncle        CareTeam (Including outside providers/suppliers regularly involved in providing care):   Patient Care Team:  Francisco Garcia MD as PCP - General (Internal Medicine)  Francisco Garcia MD as PCP - [facility] Empaneled Provider  Mahnaz Medeiros MD as Neurologist (Neurology)    Wt Readings from Last 3 Encounters:   02/12/21 272 lb (123.4 kg)   12/01/20 275 lb (124.7 kg)   11/20/20 278 lb (126.1 kg)     Vitals:    02/12/21 1429   BP: 108/68   Site: Left Upper Arm   Position: Sitting   Cuff Size: Large Adult   Pulse: 97   SpO2: 96%   Weight: 272 lb (123.4 kg)   Height: 5' 3.5\" (1.613 m)     Body mass index is 47.43 kg/m². Based upon direct observation of the patient, evaluation of cognition reveals recent and remote memory intact. Extremities: no cyanosis, clubbing or edema, no cyanosis and no clubbing    Patient's complete Health Risk Assessment and screening values have been reviewed and are found in Flowsheets. The following problems were reviewed today and where indicated follow up appointments were made and/or referrals ordered.     Positive Risk Factor Screenings with Interventions:            General Health and ACP:  General In general, how would you say your health is?: (!) Poor  In the past 7 days, have you experienced any of the following?  New or Increased Pain, New or Increased Fatigue, Loneliness, Social Isolation, Stress or Anger?: (!) New or Increased Fatigue  Do you get the social and emotional support that you need?: (!) No  Do you have a Living Will?: Yes  Advance Directives     Power of  Living Will ACP-Advance Directive ACP-Power of     Not on File Filed on 01/28/19 Filed Not on File      General Health Risk Interventions:  · Fatigue: None    Health Habits/Nutrition:  Health Habits/Nutrition  Do you exercise for at least 20 minutes 2-3 times per week?: Yes  Have you lost any weight without trying in the past 3 months?: No  Do you eat only one meal per day?: No  Have you seen the dentist within the past year?: (!) No  Body mass index: (!) 47.42  Health Habits/Nutrition Interventions:  · Dental exam overdue:  Yes     Safety:  Safety  Do you have working smoke detectors?: Yes  Have all throw rugs been removed or fastened?: Yes  Do you have non-slip mats or surfaces in all bathtubs/showers?: (!) No  Do all of your stairways have a railing or banister?: (!) No  Are your doorways, halls and stairs free of clutter?: Yes  Do you always fasten your seatbelt when you are in a car?: Yes  Safety Interventions:  · Home safety tips provided     Personalized Preventive Plan   Current Health Maintenance Status  Immunization History   Administered Date(s) Administered    COVID-19, [employer], 30mcg/0.3ml Dose 02/04/2021    Influenza Vaccine, unspecified formulation 09/07/2016    Influenza Virus Vaccine 10/03/2014    Influenza, High Dose (Fluzone 65 yrs and older) 09/16/2017, 09/19/2018    Pneumococcal Conjugate 13-valent (Jossue Fickle) 08/17/2015, 01/12/2017    Pneumococcal Polysaccharide ([id number]) 10/21/2002, 12/13/2012, 08/05/2013, 03/24/2018    Td, unspecified formulation 12/13/2012 -     Comprehensive Metabolic Panel, Fasting; Future    Other iron deficiency anemia  -     Iron And TIBC; Future    Annual physical exam  -     Lipid, Fasting; Future    Coronary artery disease due to lipid rich plaque    Medicare annual wellness visit, subsequent    Hypothyroidism (acquired)  -     TSH; Future  -     T4, Free; Future    Vitamin B12 deficiency  -     Vitamin B12; Future    Screening for ischemic heart disease  -     Lipid, Fasting; Future    Iron deficiency anemia, unspecified iron deficiency anemia type  -     Hemoglobin; Future  -     Iron And TIBC; Future    Abdominal pain, unspecified abdominal location  -     Amylase; Future  -     Lipase;  Future

## 2021-02-12 NOTE — PROGRESS NOTES
[place] [address]  Dept: [phone]  Dept Fax: [phone]  Loc: [phone]     Galileo Velasquez is a 65 y.o. female who presents today for her medical conditions/complaintsas noted below. Galileo Velasquez is c/o of   Chief Complaint   Patient presents with    Medicare AWV    Diabetes    Fatigue    Mouth Lesions         HPI:     Diabetes  She presents for her follow-up diabetic visit. She has type 2 (With neuropathy and with insulin) diabetes mellitus. Her disease course has been fluctuating. Pertinent negatives for hypoglycemia include no confusion, dizziness, headaches, nervousness/anxiousness or pallor. Associated symptoms include fatigue. Pertinent negatives for diabetes include no chest pain, no polydipsia, no polyuria and no weakness. Fatigue  This is a recurrent problem. The current episode started more than 1 year ago. The problem occurs intermittently. The problem has been waxing and waning. Associated symptoms include fatigue. Pertinent negatives include no abdominal pain, arthralgias, chest pain, chills, coughing, fever, headaches, nausea, neck pain, numbness, rash, vomiting or weakness. Coronary Artery Disease  Presents for follow-up visit. Pertinent negatives include no chest pain, chest pressure, dizziness, leg swelling, palpitations or shortness of breath. The symptoms have been stable. Compliance with diet is good. Compliance with exercise is good. Compliance with medications is good. Other  This is a recurrent (4-General medical exam) problem. The current episode started today. The problem occurs intermittently. The problem has been waxing and waning. Associated symptoms include fatigue. Pertinent negatives include no abdominal pain, arthralgias, chest pain, chills, coughing, fever, headaches, nausea, neck pain, numbness, rash, vomiting or weakness. Hemoglobin A1C (%)   Date Value   01/28/2021 6.5 (H)   07/27/2020 7.1 (H)   01/27/2020 7.4 (H)            Microalb/Crt.  Ratio (mcg/mg creat)   Date Value   07/27/2020 66 (H)     LDL Cholesterol (mg/dL)   Date Value   07/27/2020 34   07/22/2019 45   07/16/2018 75     LDL Calculated (mg/dL)   Date Value   05/19/2016 80   09/23/2014 80         AST (U/L)   Date Value   07/27/2020 41 (H)     ALT (U/L)   Date Value   07/27/2020 46 (H)     BUN (mg/dL)   Date Value   07/27/2020 10     BP Readings from Last 3 Encounters:   02/12/21 108/68   12/01/20 124/70   11/20/20 110/60              Past Medical History:   Diagnosis Date    Arthritis     Asthma     CAD (coronary artery disease) 1989    Cancer ([address] 1971    cervical    Carotid artery disease ([address]     Cataracts, bilateral     COPD (chronic obstructive pulmonary disease) (HCC)     Coronary artery disease     Eczema     Fibromyalgia     Glaucoma     H/O blood clots     Headache     Heart attack ([address]     Hx of Bell's palsy     Mild right facial paralysis    Hypertension     Liver disease     Mild dementia ([address]     Neuropathy     Osteoporosis     Sleep ap[facility] 11/07/2014    [facility]     Type 2 diabetes mellitus with hyperglycemia ([address]       Past Surgical History:   Procedure Laterality Date    ANESTHESIA NERVE BLOCK Right 6/28/2019    Right L2 L3 L4 L5 Diagnostic Medial BB performed by William Davenport MD at [address] [place] [address] Right 7/26/2019    Right L2 L3 L4 L5 Confirmatory Medial BB performed by William Davenport MD at [address] [place] [address]  2002    COLONOSCOPY  09/2012    COLONOSCOPY N/A 9/30/2019    COLONOSCOPY performed by Didi Slater MD at [facility]  internal hemorrhoids and few diverticuli   [address] [address] Right 2/8/2019 Right L2 L3 TRANSFORAMINAL performed by Froy Kay MD at [address] [place] [address] Dr [address] 2019    Right L2 L3 TRANSFORAMINAL performed by Froy Kay MD at [address] [place] [address] Dr Hamilton 2019    Right L3 & L4 TRANSFORAMINAL performed by Froy Kay MD at [address] [place] [address] Dr Hamilton 2019    Right L4 L5 TRANSFORAMINAL performed by Froy Kay MD at [place] [address] Right 2020    RIGHT L4 L5  TRANSFORAMINAL performed by Froy Kay MD at [place] [address] Right 2020    Right L4 L5 TRANSFORAMINAL performed by Froy Kay MD at [address] [facility] 1.1-2CM FACE,FACIAL Left 2018    Excision Cyst Left Chest, Middle Back performed by Bessy Alfred MD at [address] [facility] AA&/STRD TFRML EPI LUMBAR/SACRAL EA ADDL Right 10/5/2018    Right L2 & L3 TRANSFORAMINAL performed by Froy Kay MD at [address] [facility] AA&/STRD TFRML EPI LUMBAR/SACRAL EA ADDL Right 10/26/2018    Right L2 L3 TRANSFORAMINAL performed by Froy Kay MD at [facility] Right 2019    Right  L2 L3 L4 L5 RADIOFREQUENCY performed by Froy Kay MD at [address] [facility]    Cervical cancer.   Had XRT    TONSILLECTOMY  1983       Family History   Problem Relation Age of Onset    Heart Disease Mother     High Blood Pressure Mother    [place] Stroke Mother     Glaucoma Mother     Early Death Father     Cancer Father         stomach    Miscarriages / Stillbirths Maternal Uncle           Social History     Tobacco Use    Smoking status: Former Smoker     Packs/day: 1.00     Years: 35.00     Pack years: 35.00     Quit date: 2001     Years since quittin.3    Smokeless tobacco: Never Used   Substance Use Topics    Alcohol use: No         Current Outpatient Medications   Medication Sig Dispense Refill  senna (SENOKOT) 8.6 MG TABS tablet TAKE 1 TABLET BY MOUTH TWICE DAILY 180 tablet 3    ANORO ELLIPTA 62.5-25 MCG/INH AEPB inhaler INHALE 1 PUFF INTO LUNGS EVERY DAY 1 each 5    spironolactone (ALDACTONE) 50 MG tablet TAKE 1 TABLET TWICE DAILY 180 tablet 3    calcium carbonate-vitamin D3 (CALCIUM 600+D3) 600-400 MG-UNIT TABS per tab TAKE 1 TABLET BY MOUTH TWICE DAILY 180 tablet 3    magnesium oxide (MAGNESIUM-OXIDE) 400 (241.3 Mg) MG TABS tablet Take 1 tablet by mouth 2 times daily 180 tablet 3    vitamin D3 (CHOLECALCIFEROL) 25 MCG (1000 UT) TABS tablet TAKE 3 TABLETS BY MOUTH TWICE A DAY. 540 tablet 3    albuterol sulfate  (90 Base) MCG/ACT inhaler INHALE 2 PUFFS EVERY 4 HOURS AS NEEDED FOR WHEEZING 54 g 3    potassium chloride (MICRO-K) 10 MEQ extended release capsule TAKE 1 CAPSULE TWICE DAILY 180 capsule 3    CONTOUR NEXT TEST strip TEST FOUR TIMES DAILY 400 strip 5    alendronate (FOSAMAX) 70 MG tablet TAKE 1 TABLET EVERY WEEK 12 tablet 3    atorvastatin (LIPITOR) 40 MG tablet TAKE 1 TABLET EVERY DAY 90 tablet 3    Microlet Lancets MISC USE TO TEST 4 TIMES A DAY.  400 each 3    pyridoxine (B-6) 100 MG tablet Take 50 mg by mouth daily      Biotin 00425 MCG TABS Take by mouth      Insulin Syringe-Needle U-100 (DROPLET INSULIN SYRINGE) 31G X 5/16\" 0.5 ML MISC Inject 1 each into the skin 2 times daily 200 each 3    fluticasone (FLONASE) 50 MCG/ACT nasal spray USE 2 SPRAYS NASALLY EVERY DAY 48 g 3    Respiratory Therapy Supplies MISC CPAP supplies 1 each 0    insulin glargine (LANTUS) 100 UNIT/ML injection vial Inject 15 Units into the skin 2 times daily      amitriptyline (ELAVIL) 25 MG tablet TAKE 1 TABLET AT BEDTIME 90 tablet 3    ibuprofen (ADVIL;MOTRIN) 400 MG tablet TAKE 1 TABLET EVERY 6 HOURS AS NEEDED FOR PAIN 360 tablet 3    citalopram (CELEXA) 10 MG tablet TAKE 1 TABLET EVERY DAY 90 tablet 3    lisinopril (PRINIVIL;ZESTRIL) 10 MG tablet TAKE 1 TABLET EVERY DAY 90 tablet 3  furosemide (LASIX) 40 MG tablet Take 1 tablet by mouth daily 90 tablet 3    clopidogrel (PLAVIX) 75 MG tablet TAKE 1 TABLET EVERY DAY 90 tablet 3    pantoprazole (PROTONIX) 40 MG tablet TAKE 1 TABLET EVERY DAY 90 tablet 3    buPROPion (WELLBUTRIN) 75 MG tablet TAKE 1 TABLET TWICE DAILY 180 tablet 3    amLODIPine (NORVASC) 5 MG tablet TAKE 1 TABLET EVERY DAY 90 tablet 3    vitamin C (ASCORBIC ACID) 500 MG tablet TAKE 1 TABLET BY MOUTH TWICE A DAY. 180 tablet 3    nitroGLYCERIN (NITROSTAT) 0.4 MG SL tablet PLACE 1 TABLET UNER THE TONGUE EVERY 5 MINUTES AS NEEDED FOR CHEST PAIN AT FIRST SIGN OF CHEST PAIN.  25 tablet 1    insulin aspart (NOVOLOG FLEXPEN) 100 UNIT/ML injection pen INJECT 4 UNITS AT LUNCH AND  6 UNITS AT SUPPER (EACH PEN EXPIRES 28 DAYS AFTER 1ST USE) 15 mL 3    Insulin Pen Needle (B-D ULTRAFINE III SHORT PEN) 31G X 8 MM MISC USE TWICE DAILY 180 each 3    albuterol (PROVENTIL) (2.5 MG/3ML) 0.083% nebulizer solution Take 3 mLs by nebulization every 6 hours as needed for Wheezing 120 each 3    triamcinolone (KENALOG) 0.1 % cream APPLY TOPICALLY TWICE DAILY 80 g 5    VITAMIN A PO Take 2,400 mcg by mouth daily      vitamin E 400 UNIT capsule Take 400 Units by mouth daily      COMBIGAN 0.2-0.5 % ophthalmic solution PLACE 1 DROP INTO BOTH EYES DAILY 15 mL 3    Compression Stockings MISC by Does not apply route 15-20mmHg  Knee high  Open tow  With assist device to help put them on 1 each 0    Zinc 50 MG TABS Take 50 mg by mouth daily      niacin 500 MG extended release capsule Take 500 mg by mouth daily      folic acid (FOLVITE) 690 MCG tablet Take 800 mcg by mouth daily      Cyanocobalamin (VITAMIN B12 PO) Take 2,500 mcg by mouth daily      B Complex-C (SUPER B COMPLEX PO) Take 1 tablet by mouth daily       travoprost, benzalkonium, (TRAVATAN) 0.004 % ophthalmic solution Place 1 drop into both eyes daily 3 Bottle 3    CPAP Machine MISC by Does not apply route Pressure of 13 (AirSense 10) Neurological: Negative for dizziness, weakness, numbness and headaches. Hematological: Negative for adenopathy. Does not bruise/bleed easily. Psychiatric/Behavioral: Negative for confusion. The patient is not nervous/anxious. Objective:     Physical Exam  Vitals signs reviewed. Constitutional:       Appearance: She is well-developed. HENT:      Head: Normocephalic and atraumatic. Eyes:      Pupils: Pupils are equal, round, and reactive to light. Neck:      Musculoskeletal: Neck supple. Cardiovascular:      Rate and Rhythm: Normal rate and regular rhythm. Heart sounds: No murmur. No friction rub. No gallop. Pulmonary:      Effort: Pulmonary effort is normal.      Breath sounds: Normal breath sounds. No wheezing or rales. Abdominal:      General: There is no distension. Palpations: Abdomen is soft. There is no mass. Tenderness: There is no abdominal tenderness. There is no rebound. Musculoskeletal: Normal range of motion. Lymphadenopathy:      Cervical: No cervical adenopathy. Skin:     General: Skin is warm and dry. Findings: No rash. Neurological:      Mental Status: She is alert and oriented to person, place, and time. Cranial Nerves: No cranial nerve deficit (grossly). Psychiatric:         Thought Content: Thought content normal.        /68 (Site: Left Upper Arm, Position: Sitting, Cuff Size: Large Adult)   Pulse 97   Ht 5' 3.5\" (1.613 m)   Wt 272 lb (123.4 kg)   LMP 12/07/1995 (Approximate)   SpO2 96%   BMI 47.43 kg/m²     Assessment:       Diagnosis Orders   1. General medical exam  Comprehensive Metabolic Panel, Fasting   2. Type 2 diabetes mellitus with diabetic polyneuropathy, with long-term current use of insulin ([facility])  Comprehensive Metabolic Panel, Fasting    Hemoglobin A1C    Microalbumin, Ur   3. Vitamin D deficiency  Vitamin D 25 Hydroxy   4.  Chronic fatigue  Vitamin B12    Hemoglobin    Iron And TIBC Order Specific Question:   Is Patient Fasting? Answer:   No     Order Specific Question:   No of Hours? Answer:   0    Amylase     Standing Status:   Future     Standing Expiration Date:   2/12/2022    Lipase     Standing Status:   Future     Standing Expiration Date:   2/12/2022     No orders of the defined types were placed in this encounter. Patientgiven educational materials - see patient instructions. Discussed use, benefit,and side effects of prescribed medications. All patient questions answered. Ptvoiced understanding. Reviewed health maintenance. Instructed to continue currentmedications, diet and exercise. Patient agreed with treatment plan. Follow up asdirected.      Electronically signed by Pepe Williamson MD on 2/12/2021 at 2:52 PM

## 2021-02-15 DIAGNOSIS — J47.9 BRONCHIECTASIS WITHOUT COMPLICATION (HCC): Primary | ICD-10-CM

## 2021-02-15 RX ORDER — DILTIAZEM HYDROCHLORIDE 60 MG/1
2 TABLET, FILM COATED ORAL 2 TIMES DAILY
Qty: 3 INHALER | Refills: 3 | Status: SHIPPED | OUTPATIENT
Start: 2021-02-15 | End: 2021-10-13 | Stop reason: SDUPTHER

## 2021-02-15 RX ORDER — BUDESONIDE AND FORMOTEROL FUMARATE DIHYDRATE 80; 4.5 UG/1; UG/1
2 AEROSOL RESPIRATORY (INHALATION) 2 TIMES DAILY
COMMUNITY
End: 2021-02-15 | Stop reason: SDUPTHER

## 2021-02-15 RX ORDER — BUDESONIDE AND FORMOTEROL FUMARATE DIHYDRATE 80; 4.5 UG/1; UG/1
2 AEROSOL RESPIRATORY (INHALATION) 2 TIMES DAILY
Qty: 3 INHALER | Refills: 3 | Status: SHIPPED | OUTPATIENT
Start: 2021-02-15 | End: 2021-03-10 | Stop reason: SDUPTHER

## 2021-02-18 ENCOUNTER — HOSPITAL ENCOUNTER (OUTPATIENT)
Dept: LAB | Age: 71
Discharge: HOME | End: 2021-02-18
Payer: MEDICARE

## 2021-02-18 DIAGNOSIS — Z13.6 SCREENING FOR ISCHEMIC HEART DISEASE: ICD-10-CM

## 2021-02-18 DIAGNOSIS — E55.9 VITAMIN D DEFICIENCY: ICD-10-CM

## 2021-02-18 DIAGNOSIS — Z00.00 GENERAL MEDICAL EXAM: ICD-10-CM

## 2021-02-18 DIAGNOSIS — R10.9 ABDOMINAL PAIN, UNSPECIFIED ABDOMINAL LOCATION: ICD-10-CM

## 2021-02-18 DIAGNOSIS — E11.42 TYPE 2 DIABETES MELLITUS WITH DIABETIC POLYNEUROPATHY, WITH LONG-TERM CURRENT USE OF INSULIN (HCC): ICD-10-CM

## 2021-02-18 DIAGNOSIS — Z79.4 TYPE 2 DIABETES MELLITUS WITH DIABETIC POLYNEUROPATHY, WITH LONG-TERM CURRENT USE OF INSULIN (HCC): ICD-10-CM

## 2021-02-18 DIAGNOSIS — D50.9 IRON DEFICIENCY ANEMIA, UNSPECIFIED IRON DEFICIENCY ANEMIA TYPE: ICD-10-CM

## 2021-02-18 DIAGNOSIS — Z00.00 ANNUAL PHYSICAL EXAM: ICD-10-CM

## 2021-02-18 DIAGNOSIS — R53.82 CHRONIC FATIGUE: ICD-10-CM

## 2021-02-18 DIAGNOSIS — E03.9 HYPOTHYROIDISM (ACQUIRED): ICD-10-CM

## 2021-02-18 DIAGNOSIS — D50.8 OTHER IRON DEFICIENCY ANEMIA: ICD-10-CM

## 2021-02-18 DIAGNOSIS — R73.09 ELEVATED HEMOGLOBIN A1C: ICD-10-CM

## 2021-02-18 DIAGNOSIS — E53.8 VITAMIN B12 DEFICIENCY: ICD-10-CM

## 2021-02-18 LAB
ALBUMIN SERPL-MCNC: 3.7 G/DL (ref 3.5–5.2)
ALBUMIN/GLOBULIN RATIO: 1.1 (ref 1–2.5)
ALP BLD-CCNC: 77 U/L (ref 35–104)
ALT SERPL-CCNC: 39 U/L (ref 5–33)
AMYLASE: 40 U/L (ref 28–100)
ANION GAP SERPL CALCULATED.3IONS-SCNC: 12 MMOL/L (ref 9–17)
AST SERPL-CCNC: 40 U/L
BILIRUB SERPL-MCNC: 0.44 MG/DL (ref 0.3–1.2)
BUN BLDV-MCNC: 11 MG/DL (ref 8–23)
BUN/CREAT BLD: 13 (ref 9–20)
CALCIUM SERPL-MCNC: 9.3 MG/DL (ref 8.6–10.4)
CHLORIDE BLD-SCNC: 101 MMOL/L (ref 98–107)
CHOLESTEROL, FASTING: 116 MG/DL
CHOLESTEROL/HDL RATIO: 2.1
CO2: 22 MMOL/L (ref 20–31)
CREAT SERPL-MCNC: 0.82 MG/DL (ref 0.5–0.9)
CREATININE URINE: 108.5 MG/DL (ref 28–217)
ESTIMATED AVERAGE GLUCOSE: 154 MG/DL
GFR AFRICAN AMERICAN: >60 ML/MIN
GFR NON-AFRICAN AMERICAN: >60 ML/MIN
GFR SERPL CREATININE-BSD FRML MDRD: ABNORMAL ML/MIN/{1.73_M2}
GFR SERPL CREATININE-BSD FRML MDRD: ABNORMAL ML/MIN/{1.73_M2}
GLUCOSE FASTING: 174 MG/DL (ref 70–99)
HBA1C MFR BLD: 7 % (ref 4–6)
HDLC SERPL-MCNC: 55 MG/DL
HEMOGLOBIN: 12.4 G/DL (ref 11.9–15.1)
IRON SATURATION: 40 % (ref 20–55)
IRON: 114 UG/DL (ref 37–145)
LDL CHOLESTEROL: 44 MG/DL (ref 0–130)
LIPASE: 30 U/L (ref 13–60)
MICROALBUMIN/CREAT 24H UR: <12 MG/L
MICROALBUMIN/CREAT UR-RTO: NORMAL MCG/MG CREAT
POTASSIUM SERPL-SCNC: 3.9 MMOL/L (ref 3.7–5.3)
SODIUM BLD-SCNC: 135 MMOL/L (ref 135–144)
THYROXINE, FREE: 1.16 NG/DL (ref 0.93–1.7)
TOTAL IRON BINDING CAPACITY: 283 UG/DL (ref 250–450)
TOTAL PROTEIN: 7.1 G/DL (ref 6.4–8.3)
TRIGLYCERIDE, FASTING: 87 MG/DL
TSH SERPL DL<=0.05 MIU/L-ACNC: 2.6 MIU/L (ref 0.3–5)
UNSATURATED IRON BINDING CAPACITY: 169 UG/DL (ref 112–347)
VITAMIN B-12: 1531 PG/ML (ref 232–1245)
VITAMIN D 25-HYDROXY: 59.3 NG/ML (ref 30–100)
VLDLC SERPL CALC-MCNC: NORMAL MG/DL (ref 1–30)

## 2021-02-18 PROCEDURE — 83036 HEMOGLOBIN GLYCOSYLATED A1C: CPT

## 2021-02-18 PROCEDURE — 36415 COLL VENOUS BLD VENIPUNCTURE: CPT

## 2021-02-18 PROCEDURE — 82570 ASSAY OF URINE CREATININE: CPT

## 2021-02-18 PROCEDURE — 83550 IRON BINDING TEST: CPT

## 2021-02-18 PROCEDURE — 85018 HEMOGLOBIN: CPT

## 2021-02-18 PROCEDURE — 80053 COMPREHEN METABOLIC PANEL: CPT

## 2021-02-18 PROCEDURE — 82607 VITAMIN B-12: CPT

## 2021-02-18 PROCEDURE — 83540 ASSAY OF IRON: CPT

## 2021-02-18 PROCEDURE — 82150 ASSAY OF AMYLASE: CPT

## 2021-02-18 PROCEDURE — 84443 ASSAY THYROID STIM HORMONE: CPT

## 2021-02-18 PROCEDURE — 80061 LIPID PANEL: CPT

## 2021-02-18 PROCEDURE — 82306 VITAMIN D 25 HYDROXY: CPT

## 2021-02-18 PROCEDURE — 83690 ASSAY OF LIPASE: CPT

## 2021-02-18 PROCEDURE — 82043 UR ALBUMIN QUANTITATIVE: CPT

## 2021-02-18 PROCEDURE — 84439 ASSAY OF FREE THYROXINE: CPT

## 2021-02-18 RX ORDER — MULTIVIT-MIN/IRON/FOLIC ACID/K 18-600-40
500 CAPSULE ORAL 2 TIMES DAILY
Qty: 180 CAPSULE | Refills: 3 | Status: SHIPPED | OUTPATIENT
Start: 2021-02-18 | End: 2021-06-07 | Stop reason: SDUPTHER

## 2021-03-08 DIAGNOSIS — G89.29 CHRONIC RIGHT-SIDED LOW BACK PAIN WITH RIGHT-SIDED SCIATICA: ICD-10-CM

## 2021-03-08 DIAGNOSIS — M54.41 CHRONIC RIGHT-SIDED LOW BACK PAIN WITH RIGHT-SIDED SCIATICA: ICD-10-CM

## 2021-03-08 RX ORDER — GABAPENTIN 300 MG/1
CAPSULE ORAL
Qty: 360 CAPSULE | Refills: 3 | Status: SHIPPED | OUTPATIENT
Start: 2021-03-08 | End: 2022-02-18

## 2021-03-10 ENCOUNTER — HOSPITAL ENCOUNTER (OUTPATIENT)
Age: 71
Setting detail: SPECIMEN
Discharge: HOME | End: 2021-03-10
Payer: MEDICARE

## 2021-03-10 ENCOUNTER — OFFICE VISIT (OUTPATIENT)
Dept: INTERNAL MEDICINE | Age: 71
End: 2021-03-10
Payer: MEDICARE

## 2021-03-10 VITALS
WEIGHT: 273 LBS | BODY MASS INDEX: 46.61 KG/M2 | OXYGEN SATURATION: 95 % | HEIGHT: 64 IN | DIASTOLIC BLOOD PRESSURE: 72 MMHG | SYSTOLIC BLOOD PRESSURE: 124 MMHG | HEART RATE: 54 BPM | TEMPERATURE: 97.6 F | RESPIRATION RATE: 16 BRPM

## 2021-03-10 DIAGNOSIS — E66.01 MORBID OBESITY WITH BMI OF 45.0-49.9, ADULT (HCC): ICD-10-CM

## 2021-03-10 DIAGNOSIS — Z79.4 TYPE 2 DIABETES MELLITUS WITH DIABETIC POLYNEUROPATHY, WITH LONG-TERM CURRENT USE OF INSULIN (HCC): ICD-10-CM

## 2021-03-10 DIAGNOSIS — L02.91 ABSCESS: Primary | ICD-10-CM

## 2021-03-10 DIAGNOSIS — I10 ESSENTIAL HYPERTENSION: ICD-10-CM

## 2021-03-10 DIAGNOSIS — L02.91 ABSCESS: ICD-10-CM

## 2021-03-10 DIAGNOSIS — E11.42 TYPE 2 DIABETES MELLITUS WITH DIABETIC POLYNEUROPATHY, WITH LONG-TERM CURRENT USE OF INSULIN (HCC): ICD-10-CM

## 2021-03-10 PROCEDURE — 1036F TOBACCO NON-USER: CPT | Performed by: NURSE PRACTITIONER

## 2021-03-10 PROCEDURE — 87070 CULTURE OTHR SPECIMN AEROBIC: CPT

## 2021-03-10 PROCEDURE — 1090F PRES/ABSN URINE INCON ASSESS: CPT | Performed by: NURSE PRACTITIONER

## 2021-03-10 PROCEDURE — 4040F PNEUMOC VAC/ADMIN/RCVD: CPT | Performed by: NURSE PRACTITIONER

## 2021-03-10 PROCEDURE — G8484 FLU IMMUNIZE NO ADMIN: HCPCS | Performed by: NURSE PRACTITIONER

## 2021-03-10 PROCEDURE — G8427 DOCREV CUR MEDS BY ELIG CLIN: HCPCS | Performed by: NURSE PRACTITIONER

## 2021-03-10 PROCEDURE — 3017F COLORECTAL CA SCREEN DOC REV: CPT | Performed by: NURSE PRACTITIONER

## 2021-03-10 PROCEDURE — G8417 CALC BMI ABV UP PARAM F/U: HCPCS | Performed by: NURSE PRACTITIONER

## 2021-03-10 PROCEDURE — 2022F DILAT RTA XM EVC RTNOPTHY: CPT | Performed by: NURSE PRACTITIONER

## 2021-03-10 PROCEDURE — 99214 OFFICE O/P EST MOD 30 MIN: CPT | Performed by: NURSE PRACTITIONER

## 2021-03-10 PROCEDURE — 3051F HG A1C>EQUAL 7.0%<8.0%: CPT | Performed by: NURSE PRACTITIONER

## 2021-03-10 PROCEDURE — 1123F ACP DISCUSS/DSCN MKR DOCD: CPT | Performed by: NURSE PRACTITIONER

## 2021-03-10 PROCEDURE — G8400 PT W/DXA NO RESULTS DOC: HCPCS | Performed by: NURSE PRACTITIONER

## 2021-03-10 PROCEDURE — 87205 SMEAR GRAM STAIN: CPT

## 2021-03-10 RX ORDER — DOXYCYCLINE HYCLATE 100 MG/1
100 CAPSULE ORAL 2 TIMES DAILY
Qty: 20 CAPSULE | Refills: 0 | Status: SHIPPED | OUTPATIENT
Start: 2021-03-10 | End: 2021-03-20

## 2021-03-11 NOTE — PROGRESS NOTES
03/10/21  [address] [facility]  1950      Chief Complaint:   1. Abscess    2. Type 2 diabetes mellitus with diabetic polyneuropathy, with long-term current use of insulin ([address]    3. Essential hypertension    4. Morbid obesity with BMI of 45.0-49.9, adult [facility])        HPI:  64-year-old female with a history of type 2 diabetes mellitus hypertension morbid obesity, CAD being seen for complaints of wound to right upper chest/axilla. States it has been there for approximately 1 week, increasing in size. States initially really black. Patient denies any drainage however on exam she has small amount of brownish drainage to her shirt some which has already dried. She has been afebrile. She brings in a list of her blood pressures and then blood sugars for review. States she has noticed her blood sugars going up over the last week and contributes this to the infection. She denies any redness surrounding the area. No trauma. States she has had a history of multiple infected cyst that needed surgically removed. Current treatment includes triple antibiotic ointment. Progressively worsening. She is taking her insulin as directed. Denies any hypoglycemic episodes. Blood pressures are stable. Heart rates are stable. Weight has been stable.       No Known Allergies    Past Medical History:   Diagnosis Date    Arthritis     Asthma     CAD (coronary artery disease) 1989    Cancer ([address] 1971    cervical    Carotid artery disease ([address]     Cataracts, bilateral     COPD (chronic obstructive pulmonary disease) (HCC)     Coronary artery disease     Eczema     Fibromyalgia     Glaucoma     H/O blood clots     Headache     Heart attack ([address]     Hx of Bell's palsy     Mild right facial paralysis    Hypertension     Liver disease     Mild dementia ([address]     Neuropathy     Osteoporosis     Sleep apnea 11/07/2014    sleep study done in [facility]     Type 2 diabetes mellitus with hyperglycemia Medications on File Prior to Visit   Medication Sig Dispense Refill    gabapentin (NEURONTIN) 300 MG capsule TAKE 2 CAPSULES TWICE DAILY 360 capsule 3    Ascorbic Acid (VITAMIN C) 500 MG CAPS Take 500 mg by mouth 2 times daily 180 capsule 3    SYMBICORT 80-4.5 MCG/ACT AERO Inhale 2 puffs into the lungs 2 times daily 3 Inhaler 3    senna (SENOKOT) 8.6 MG TABS tablet TAKE 1 TABLET BY MOUTH TWICE DAILY 180 tablet 3    spironolactone (ALDACTONE) 50 MG tablet TAKE 1 TABLET TWICE DAILY 180 tablet 3    calcium carbonate-vitamin D3 (CALCIUM 600+D3) 600-400 MG-UNIT TABS per tab TAKE 1 TABLET BY MOUTH TWICE DAILY 180 tablet 3    magnesium oxide (MAGNESIUM-OXIDE) 400 (241.3 Mg) MG TABS tablet Take 1 tablet by mouth 2 times daily 180 tablet 3    vitamin D3 (CHOLECALCIFEROL) 25 MCG (1000 UT) TABS tablet TAKE 3 TABLETS BY MOUTH TWICE A DAY. 540 tablet 3    albuterol sulfate  (90 Base) MCG/ACT inhaler INHALE 2 PUFFS EVERY 4 HOURS AS NEEDED FOR WHEEZING 54 g 3    potassium chloride (MICRO-K) 10 MEQ extended release capsule TAKE 1 CAPSULE TWICE DAILY 180 capsule 3    CONTOUR NEXT TEST strip TEST FOUR TIMES DAILY 400 strip 5    alendronate (FOSAMAX) 70 MG tablet TAKE 1 TABLET EVERY WEEK 12 tablet 3    atorvastatin (LIPITOR) 40 MG tablet TAKE 1 TABLET EVERY DAY 90 tablet 3    Microlet Lancets MISC USE TO TEST 4 TIMES A DAY.  400 each 3    pyridoxine (B-6) 100 MG tablet Take 50 mg by mouth daily      Biotin 11837 MCG TABS Take by mouth      Insulin Syringe-Needle U-100 (DROPLET INSULIN SYRINGE) 31G X 5/16\" 0.5 ML MISC Inject 1 each into the skin 2 times daily 200 each 3    fluticasone (FLONASE) 50 MCG/ACT nasal spray USE 2 SPRAYS NASALLY EVERY DAY 48 g 3    Respiratory Therapy Supplies MISC CPAP supplies 1 each 0    insulin glargine (LANTUS) 100 UNIT/ML injection vial Inject 15 Units into the skin 2 times daily      amitriptyline (ELAVIL) 25 MG tablet TAKE 1 TABLET AT BEDTIME 90 tablet 3    ibuprofen Complex-C (SUPER B COMPLEX PO) Take 1 tablet by mouth daily       travoprost, benzalkonium, (TRAVATAN) 0.004 % ophthalmic solution Place 1 drop into both eyes daily 3 Bottle 3    CPAP Machine MISC by Does not apply route Pressure of 13 (AirSense 10)  [employer].      ferrous sulfate 325 (65 FE) MG EC tablet Take 325 mg by mouth daily (with breakfast)      Multiple Vitamin (MULTI VITAMIN DAILY) TABS Take by mouth daily       No current facility-administered medications on file prior to visit.         Social History     Socioeconomic History    Marital status:      Spouse name: Not on file    Number of children: Not on file    Years of education: Not on file    Highest education level: Not on file   Occupational History    Not on file   Social Needs    Financial resource strain: Not on file    Food insecurity     Worry: Not on file     Inability: Not on file    Transportation needs     Medical: Not on file     Non-medical: Not on file   Tobacco Use    Smoking status: Former Smoker     Packs/day: 1.00     Years: 35.00     Pack years: 35.00     Quit date: 2001     Years since quittin.4    Smokeless tobacco: Never Used   Substance and Sexual Activity    Alcohol use: No    Drug use: No    Sexual activity: Never   Lifestyle    Physical activity     Days per week: Not on file     Minutes per session: Not on file    Stress: Not on file   Relationships    Social connections     Talks on phone: Not on file     Gets together: Not on file     Attends [employer] service: Not on file     Active member of club or organization: Not on file     Attends meetings of clubs or organizations: Not on file     Relationship status: Not on file    Intimate partner violence     Fear of current or ex partner: Not on file     Emotionally abused: Not on file     Physically abused: Not on file     Forced sexual activity: Not on file   Other Topics Concern    Not on file   Social History Narrative    Not on file       Review of Systems   Skin: Positive for wound. All other systems reviewed and are negative. Physical Exam  Vitals signs and nursing note reviewed. Constitutional:       General: She is not in acute distress. Appearance: She is well-developed. She is not diaphoretic. HENT:      Head: Normocephalic and atraumatic. Right Ear: External ear normal.      Left Ear: External ear normal.   Eyes:      General:         Right eye: No discharge. Left eye: No discharge. Neck:      Trachea: No tracheal deviation. Cardiovascular:      Rate and Rhythm: Normal rate and regular rhythm. Heart sounds: Normal heart sounds. Pulmonary:      Effort: Pulmonary effort is normal. No respiratory distress. Breath sounds: Normal breath sounds. Abdominal:      Comments: Morbidly obese   Skin:     General: Skin is warm and dry. Coloration: Skin is not pale. Findings: No rash. Neurological:      General: No focal deficit present. Mental Status: She is alert and oriented to person, place, and time. Cranial Nerves: No cranial nerve deficit. Coordination: Coordination normal.   Psychiatric:         Behavior: Behavior normal.         Thought Content: Thought content normal.         Judgment: Judgment normal.       Vitals:    03/10/21 1328   BP: 124/72   Site: Right Upper Arm   Position: Sitting   Cuff Size: Large Adult   Pulse: 54   Resp: 16   Temp: 97.6 °F (36.4 °C)   TempSrc: Temporal   SpO2: 95%   Weight: 273 lb (123.8 kg)   Height: 5' 3.5\" (1.613 m)       Assessment:  1. Abscess  Patient declined I&D in office wants to wait for surgery. States she needs to be put out for these. Wound was already open. Culture was sent. Started on antibiotics to cover MRSA. Wash with mild soap and water. Pat dry. Nonadhesive dressing secured with paper tape  - Culture, Aerobic; Future    2.  Type 2 diabetes mellitus with diabetic polyneuropathy, with long-term current use of insulin ([facility]  Blood sugar is mildly elevated. Continue to monitor. Notify if persistent elevation after initiation of antibiotic    3. Essential hypertension  Multiple blood pressures from home reviewed with patient. Stable    4. Morbid obesity with BMI of 45.0-49.9, adult ([facility]  Work on diet increase activity      Plan:  As noted above. Follow up for routine visit. Call sooner with concerns prior.     Electronically signed by DAVID Adkins CNP on 3/10/2021 at 9:03 PM

## 2021-03-12 LAB
CULTURE: ABNORMAL
DIRECT EXAM: ABNORMAL
DIRECT EXAM: ABNORMAL
Lab: ABNORMAL
SPECIMEN DESCRIPTION: ABNORMAL

## 2021-03-16 ENCOUNTER — PROCEDURE VISIT (OUTPATIENT)
Dept: SURGERY | Age: 71
End: 2021-03-16
Payer: MEDICARE

## 2021-03-16 VITALS
WEIGHT: 266 LBS | HEIGHT: 63 IN | HEART RATE: 54 BPM | DIASTOLIC BLOOD PRESSURE: 80 MMHG | OXYGEN SATURATION: 97 % | TEMPERATURE: 98.2 F | BODY MASS INDEX: 47.13 KG/M2 | SYSTOLIC BLOOD PRESSURE: 126 MMHG

## 2021-03-16 DIAGNOSIS — L02.213 CUTANEOUS ABSCESS OF CHEST WALL: Primary | ICD-10-CM

## 2021-03-16 PROCEDURE — 4040F PNEUMOC VAC/ADMIN/RCVD: CPT | Performed by: SURGERY

## 2021-03-16 PROCEDURE — 99214 OFFICE O/P EST MOD 30 MIN: CPT

## 2021-03-16 PROCEDURE — 99213 OFFICE O/P EST LOW 20 MIN: CPT | Performed by: SURGERY

## 2021-03-16 PROCEDURE — G8400 PT W/DXA NO RESULTS DOC: HCPCS | Performed by: SURGERY

## 2021-03-16 PROCEDURE — 1123F ACP DISCUSS/DSCN MKR DOCD: CPT | Performed by: SURGERY

## 2021-03-16 PROCEDURE — 3017F COLORECTAL CA SCREEN DOC REV: CPT | Performed by: SURGERY

## 2021-03-16 PROCEDURE — 1090F PRES/ABSN URINE INCON ASSESS: CPT | Performed by: SURGERY

## 2021-03-16 PROCEDURE — G8428 CUR MEDS NOT DOCUMENT: HCPCS | Performed by: SURGERY

## 2021-03-16 PROCEDURE — 1036F TOBACCO NON-USER: CPT | Performed by: SURGERY

## 2021-03-16 PROCEDURE — G8417 CALC BMI ABV UP PARAM F/U: HCPCS | Performed by: SURGERY

## 2021-03-16 PROCEDURE — G8484 FLU IMMUNIZE NO ADMIN: HCPCS | Performed by: SURGERY

## 2021-03-16 NOTE — PROGRESS NOTES
Subjective   Lucero Ryan is a 65 y.o. female who presents today for evaluation of recent cutaneous abscess of the right axilla. Patient states approximately 1 week ago she began to notice an area of redness and pain in the right axilla. She was seen by her PCP for this and apparently in the office there was attempts to drain a small abscess but the patient refused and preferred to see surgeon. She was started on antibiotic therapy and is still taking antibiotics. She states that since being seen on the 10th with her PCP that the area has opened and drained spontaneously. Over the past couple days she states that the drainage has minimized. She has been running warm water over it during showers daily. No other complaints. Apparently patient has in the past had infected cyst of the left breast which required surgical removal.  She is a diabetic and seems that her blood sugars run anywhere from 150s up to mid 200s. Non-smoker.     Past Medical History:   Diagnosis Date    Arthritis     Asthma     CAD (coronary artery disease) 1989    Cancer ([address] 1971    cervical    Carotid artery disease ([address]     Cataracts, bilateral     COPD (chronic obstructive pulmonary disease) (HCC)     Coronary artery disease     Eczema     Fibromyalgia     Glaucoma     H/O blood clots     Headache     Heart attack ([address]     Hx of Bell's palsy     Mild right facial paralysis    Hypertension     Liver disease     Mild dementia ([address]     Neuropathy     Osteoporosis     Sleep apnea 11/07/2014    sleep study done in [place]    Tremor     Type 2 diabetes mellitus with hyperglycemia ([address]        Past Surgical History:   Procedure Laterality Date    ANESTHESIA NERVE BLOCK Right 6/28/2019    Right L2 L3 L4 L5 Diagnostic Medial BB performed by Gris Ayala MD at [facility] Right 7/26/2019    Right L2 L3 L4 L5 Confirmatory Medial BB performed by Gris Ayala MD at [address] [facility] [address] [facility] [address]  2002    COLONOSCOPY  2012    COLONOSCOPY N/A 2019    COLONOSCOPY performed by Nika Finn MD at [facility]  internal hemorrhoids and few diverticuli    CORONARY ANGIOPLASTY WITH STENT PLACEMENT      INDUCED   1970    LUMBAR SPINE SURGERY Right 2019    Right L2 L3 TRANSFORAMINAL performed by Leonardo Barnett MD at [place] [address] Right 2019    Right L2 L3 TRANSFORAMINAL performed by Leonardo Barnett MD at [place] [address] Right 2019    Right L3 & L4 TRANSFORAMINAL performed by Leonardo Barnett MD at [place] [address] Right 2019    Right L4 L5 TRANSFORAMINAL performed by Leonardo Barnett MD at [address] [place] [address] Right 2020    RIGHT L4 L5  TRANSFORAMINAL performed by Leonardo Barnett MD at [address] [place] [address] Right 2020    Right L4 L5 TRANSFORAMINAL performed by Leonardo Barnett MD at [address] [facility] 1.1-2CM FACE,FACIAL Left 2018    Excision Cyst Left Chest, Middle Back performed by Nika Finn MD at [address] [facility] AA&/STRD TFRML EPI LUMBAR/SACRAL EA ADDL Right 10/5/2018    Right L2 & L3 TRANSFORAMINAL performed by Leonardo Barnett MD at [address] [facility] AA&/STRD TFRML EPI LUMBAR/SACRAL EA ADDL Right 10/26/2018    Right L2 L3 TRANSFORAMINAL performed by Leonardo Barnett MD at [address] Right 2019    Right  L2 L3 L4 L5 RADIOFREQUENCY performed by Leonardo Barnett MD at [address] [facility]    Cervical cancer. Had XRT    TONSILLECTOMY  1983       Current Outpatient Medications   Medication Sig Dispense Refill    doxycycline hyclate (VIBRAMYCIN) 100 MG capsule Take 1 capsule by mouth 2 times daily for 10 days With food.  20 capsule 0    gabapentin (NEURONTIN) 300 MG capsule TAKE 2 CAPSULES TWICE DAILY 360 capsule 3    Ascorbic Acid (VITAMIN C) 500 MG CAPS Take 500 mg by mouth 2 times daily 180 capsule 3    SYMBICORT 80-4.5 MCG/ACT AERO Inhale 2 puffs into the lungs 2 times daily 3 Inhaler 3    senna (SENOKOT) 8.6 MG TABS tablet TAKE 1 TABLET BY MOUTH TWICE DAILY 180 tablet 3    spironolactone (ALDACTONE) 50 MG tablet TAKE 1 TABLET TWICE DAILY 180 tablet 3    calcium carbonate-vitamin D3 (CALCIUM 600+D3) 600-400 MG-UNIT TABS per tab TAKE 1 TABLET BY MOUTH TWICE DAILY 180 tablet 3    magnesium oxide (MAGNESIUM-OXIDE) 400 (241.3 Mg) MG TABS tablet Take 1 tablet by mouth 2 times daily 180 tablet 3    vitamin D3 (CHOLECALCIFEROL) 25 MCG (1000 UT) TABS tablet TAKE 3 TABLETS BY MOUTH TWICE A DAY. 540 tablet 3    albuterol sulfate  (90 Base) MCG/ACT inhaler INHALE 2 PUFFS EVERY 4 HOURS AS NEEDED FOR WHEEZING 54 g 3    potassium chloride (MICRO-K) 10 MEQ extended release capsule TAKE 1 CAPSULE TWICE DAILY 180 capsule 3    CONTOUR NEXT TEST strip TEST FOUR TIMES DAILY 400 strip 5    alendronate (FOSAMAX) 70 MG tablet TAKE 1 TABLET EVERY WEEK 12 tablet 3    atorvastatin (LIPITOR) 40 MG tablet TAKE 1 TABLET EVERY DAY 90 tablet 3    Microlet Lancets MISC USE TO TEST 4 TIMES A DAY.  400 each 3    pyridoxine (B-6) 100 MG tablet Take 50 mg by mouth daily      Biotin 63760 MCG TABS Take by mouth      Insulin Syringe-Needle U-100 (DROPLET INSULIN SYRINGE) 31G X 5/16\" 0.5 ML MISC Inject 1 each into the skin 2 times daily 200 each 3    fluticasone (FLONASE) 50 MCG/ACT nasal spray USE 2 SPRAYS NASALLY EVERY DAY 48 g 3    Respiratory Therapy Supplies MISC CPAP supplies 1 each 0    insulin glargine (LANTUS) 100 UNIT/ML injection vial Inject 15 Units into the skin 2 times daily      amitriptyline (ELAVIL) 25 MG tablet TAKE 1 TABLET AT BEDTIME 90 tablet 3    ibuprofen (ADVIL;MOTRIN) 400 MG tablet TAKE 1 TABLET EVERY 6 HOURS AS NEEDED FOR PAIN 360 tablet 3    citalopram (CELEXA) 10 MG tablet TAKE 1 TABLET EVERY DAY 90 tablet 3    lisinopril (PRINIVIL;ZESTRIL) 10 MG tablet TAKE 1 TABLET EVERY DAY 90 (AirSense 10)  [employer] [facility]      ferrous sulfate 325 (65 FE) MG EC tablet Take 325 mg by mouth daily (with breakfast)      Multiple Vitamin (MULTI VITAMIN DAILY) TABS Take by mouth daily       No current facility-administered medications for this visit.         No Known Allergies    Family History   Problem Relation Age of Onset    Heart Disease Mother     High Blood Pressure Mother    Selma Holguin Stroke Mother     Glaucoma Mother     Early Death Father     Cancer Father         stomach    Miscarriages / Stillbirths Maternal Uncle        Social History     Socioeconomic History    Marital status:      Spouse name: Not on file    Number of children: Not on file    Years of education: Not on file    Highest education level: Not on file   Occupational History    Not on file   Social Needs    Financial resource strain: Not on file    Food insecurity     Worry: Not on file     Inability: Not on file   [employer] needs     Medical: Not on file     Non-medical: Not on file   Tobacco Use    Smoking status: Former Smoker     Packs/day: 1.00     Years: 35.00     Pack years: 35.00     Quit date: 2001     Years since quittin.4    Smokeless tobacco: Never Used   Substance and Sexual Activity    Alcohol use: No    Drug use: No    Sexual activity: Never   Lifestyle    Physical activity     Days per week: Not on file     Minutes per session: Not on file    Stress: Not on file   Relationships    Social connections     Talks on phone: Not on file     Gets together: Not on file     Attends [employer] service: Not on file     Active member of club or organization: Not on file     Attends meetings of clubs or organizations: Not on file     Relationship status: Not on file    Intimate partner violence     Fear of current or ex partner: Not on file     Emotionally abused: Not on file     Physically abused: Not on file     Forced sexual activity: Not on file   Other Topics Concern    Not on file Social History Narrative    Not on file       ROS:   Review of Systems - General ROS: negative  Psychological ROS: negative  Ophthalmic ROS: negative  ENT ROS: negative  Respiratory ROS: no cough, shortness of breath, or wheezing  Cardiovascular ROS: no chest pain or dyspnea on exertion  Gastrointestinal ROS: no abdominal pain, change in bowel habits, or black or bloody stools  Genito-Urinary ROS: no dysuria, trouble voiding, or hematuria  Musculoskeletal ROS: negative  Dermatological ROS: per HPI      Objective   Vitals:    03/16/21 0827   BP: 126/80   Pulse: 54   Temp: 98.2 °F (36.8 °C)   SpO2: 97%     General:in no apparent distress  Eyes: No gross abnormalities. Ears, Nose, Throat: hearing grossly normal bilaterally  Neck: neck supple and non tender without mass  Lungs: clear to auscultation without wheezes or rales   Heart: S1S2, no mumurs, RRR  Abdomen: soft, nontender, no HSM, no guarding, no rebound, no masses  Extremity: negative  Neuro: CN II-XII grossly intact  Skin: Inspection in the anterior aspect of the left axilla shows a small area of erythema that measures approximately 1.5 cm x 1 cm. In the central portion there is what appears to be an opening of the skin superficially. On palpation in the area there is minimal induration in  tissue but there is no clear area of fluctuance and no abscess is appreciated. Assessment     3  26-year-old female with recent cutaneous abscess that spontaneously drained. Is responding appropriately to antibiotics and no further drainage is noted in the past couple days. No evidence of residual abscess on exam      Plan     1. Patient will continue antibiotic therapy to completion. Continue to wash daily with soap and water. May follow-up with PCP.     Electronically signed by Katty Tidwell DO on 3/16/2021 at 8:53 AM      (Please note that portions of this note were completed with a voice recognition program.  Efforts were made to edit the dictations but occasionally words are mis-transcribed.)

## 2021-04-14 ENCOUNTER — OFFICE VISIT (OUTPATIENT)
Dept: CARDIOLOGY | Age: 71
End: 2021-04-14
Payer: MEDICARE

## 2021-04-14 VITALS
HEIGHT: 63 IN | DIASTOLIC BLOOD PRESSURE: 58 MMHG | SYSTOLIC BLOOD PRESSURE: 119 MMHG | BODY MASS INDEX: 47.84 KG/M2 | HEART RATE: 61 BPM | WEIGHT: 270 LBS

## 2021-04-14 DIAGNOSIS — I49.9 IRREGULAR HEART BEAT: ICD-10-CM

## 2021-04-14 DIAGNOSIS — I10 ESSENTIAL HYPERTENSION: ICD-10-CM

## 2021-04-14 DIAGNOSIS — I25.10 CORONARY ARTERY DISEASE INVOLVING NATIVE CORONARY ARTERY OF NATIVE HEART WITHOUT ANGINA PECTORIS: Primary | ICD-10-CM

## 2021-04-14 DIAGNOSIS — R00.2 PALPITATIONS: ICD-10-CM

## 2021-04-14 DIAGNOSIS — E78.5 HYPERLIPIDEMIA, UNSPECIFIED HYPERLIPIDEMIA TYPE: ICD-10-CM

## 2021-04-14 DIAGNOSIS — I50.32 CHRONIC DIASTOLIC CONGESTIVE HEART FAILURE (HCC): ICD-10-CM

## 2021-04-14 PROCEDURE — 93010 ELECTROCARDIOGRAM REPORT: CPT | Performed by: INTERNAL MEDICINE

## 2021-04-14 PROCEDURE — 3017F COLORECTAL CA SCREEN DOC REV: CPT | Performed by: INTERNAL MEDICINE

## 2021-04-14 PROCEDURE — 1123F ACP DISCUSS/DSCN MKR DOCD: CPT | Performed by: INTERNAL MEDICINE

## 2021-04-14 PROCEDURE — 4040F PNEUMOC VAC/ADMIN/RCVD: CPT | Performed by: INTERNAL MEDICINE

## 2021-04-14 PROCEDURE — G8427 DOCREV CUR MEDS BY ELIG CLIN: HCPCS | Performed by: INTERNAL MEDICINE

## 2021-04-14 PROCEDURE — 99214 OFFICE O/P EST MOD 30 MIN: CPT | Performed by: INTERNAL MEDICINE

## 2021-04-14 PROCEDURE — G8400 PT W/DXA NO RESULTS DOC: HCPCS | Performed by: INTERNAL MEDICINE

## 2021-04-14 PROCEDURE — G8417 CALC BMI ABV UP PARAM F/U: HCPCS | Performed by: INTERNAL MEDICINE

## 2021-04-14 PROCEDURE — 93005 ELECTROCARDIOGRAM TRACING: CPT | Performed by: INTERNAL MEDICINE

## 2021-04-14 PROCEDURE — 1036F TOBACCO NON-USER: CPT | Performed by: INTERNAL MEDICINE

## 2021-04-14 PROCEDURE — 1090F PRES/ABSN URINE INCON ASSESS: CPT | Performed by: INTERNAL MEDICINE

## 2021-04-14 NOTE — PROGRESS NOTES
TAKE 2 CAPSULES TWICE DAILY 3/8/21 4/7/21  Ivanna Hartman MD   Ascorbic Acid (VITAMIN C) 500 MG CAPS Take 500 mg by mouth 2 times daily 2/18/21   Ivanna Hartman MD   [facility] 80-4.5 MCG/ACT AERO Inhale 2 puffs into the lungs 2 times daily 2/15/21   Ivanna Hartman MD   senna (SENOKOT) 8.6 MG TABS tablet TAKE 1 TABLET BY MOUTH TWICE DAILY 2/2/21   Ivanna Hartman MD   spironolactone (ALDACTONE) 50 MG tablet TAKE 1 TABLET TWICE DAILY 1/25/21   Ivanna Hartman MD   calcium carbonate-vitamin D3 (CALCIUM 600+D3) 600-400 MG-UNIT TABS per tab TAKE 1 TABLET BY MOUTH TWICE DAILY 1/18/21   Ivanna Hartman MD   magnesium oxide (MAGNESIUM-OXIDE) 400 (241.3 Mg) MG TABS tablet Take 1 tablet by mouth 2 times daily 1/18/21   Ivanna Hartman MD   vitamin D3 (CHOLECALCIFEROL) 25 MCG (1000 UT) TABS tablet TAKE 3 TABLETS BY MOUTH TWICE A DAY. 1/18/21   Ivanna Hartman MD   albuterol sulfate  (90 Base) MCG/ACT inhaler INHALE 2 PUFFS EVERY 4 HOURS AS NEEDED FOR WHEEZING 1/11/21   Ivanna Hartman MD   potassium chloride (MICRO-K) 10 MEQ extended release capsule TAKE 1 CAPSULE TWICE DAILY 12/17/20   Ivanna Hartman MD   CONTOUR NEXT TEST strip TEST FOUR TIMES DAILY 12/11/20   Ivanna Hartman MD   alendronate (FOSAMAX) 70 MG tablet TAKE 1 TABLET EVERY WEEK 12/7/20   Ivanna Hartman MD   atorvastatin (LIPITOR) 40 MG tablet TAKE 1 TABLET EVERY DAY 11/30/20   Ivanna Hartman MD   Microlet Lancets MISC USE TO TEST 4 TIMES A DAY.  11/23/20   Ivanna Hartman MD   pyridoxine (B-6) 100 MG tablet Take 50 mg by mouth daily    Historical Provider, MD   Biotin 41292 MCG TABS Take by mouth    Historical Provider, MD   Insulin Syringe-Needle U-100 (DROPLET INSULIN SYRINGE) 31G X 5/16\" 0.5 ML MISC Inject 1 each into the skin 2 times daily 10/9/20   Ivanna Hartman MD   fluticasone [facility]) 50 MCG/ACT nasal spray USE 2 SPRAYS NASALLY EVERY DAY 9/12/20   Ivanna Hartman MD   Respiratory Therapy Supplies MISC CPAP supplies 8/13/20   Kasandra Armando MD   insulin glargine (LANTUS) 100 UNIT/ML injection vial Inject 15 Units into the skin 2 times daily    Historical Provider, MD   amitriptyline (ELAVIL) 25 MG tablet TAKE 1 TABLET AT BEDTIME 7/27/20   Kasandra Armando, MD   ibuprofen (ADVIL;MOTRIN) 400 MG tablet TAKE 1 TABLET EVERY 6 HOURS AS NEEDED FOR PAIN 7/27/20   Kasandra Persons, MD   citalopram (CELEXA) 10 MG tablet TAKE 1 TABLET EVERY DAY 7/27/20   Kasandra Persons, MD   lisinopril (PRINIVIL;ZESTRIL) 10 MG tablet TAKE 1 TABLET EVERY DAY 7/27/20   Kasandra Persons, MD   furosemide (LASIX) 40 MG tablet Take 1 tablet by mouth daily 7/27/20   Kasandra Persons, MD   clopidogrel (PLAVIX) 75 MG tablet TAKE 1 TABLET EVERY DAY 7/27/20   Kasandra Persons, MD   pantoprazole (PROTONIX) 40 MG tablet TAKE 1 TABLET EVERY DAY 7/27/20   Kasandra Persons, MD   buPROPion (WELLBUTRIN) 75 MG tablet TAKE 1 TABLET TWICE DAILY 7/27/20   Kasandra Persons, MD   amLODIPine (NORVASC) 5 MG tablet TAKE 1 TABLET EVERY DAY 7/27/20   Kasandra Persons, MD   vitamin C (ASCORBIC ACID) 500 MG tablet TAKE 1 TABLET BY MOUTH TWICE A DAY. 6/1/20   Kasandra Armando MD   nitroGLYCERIN (NITROSTAT) 0.4 MG SL tablet PLACE 1 TABLET UNER THE TONGUE EVERY 5 MINUTES AS NEEDED FOR CHEST PAIN AT FIRST SIGN OF CHEST PAIN. 5/14/20   Kasandra Armando MD   insulin aspart (NOVOLOG FLEXPEN) 100 UNIT/ML injection pen INJECT 4 UNITS AT LUNCH AND  6 UNITS AT SUPPER (EACH PEN EXPIRES 28 DAYS AFTER 1ST USE) 4/13/20   Kasandra Armando MD   Insulin Pen Needle (B-D ULTRAFINE III SHORT PEN) 31G X 8 MM MISC USE TWICE DAILY 4/9/20   Kasandra Armando, MD   albuterol (PROVENTIL) (2.5 MG/3ML) 0.083% nebulizer solution Take 3 mLs by nebulization every 6 hours as needed for Wheezing 6/12/19   DAVID Rodríguez CNP   triamcinolone (KENALOG) 0.1 % cream APPLY TOPICALLY TWICE DAILY 9/18/18   Kasandra Armando MD   VITAMIN A PO Take 2,400 mcg by mouth daily    Historical Provider, MD vitamin E 400 UNIT capsule Take 400 Units by mouth daily    Historical Provider, MD   COMBIGAN 0.2-0.5 % ophthalmic solution PLACE 1 DROP INTO BOTH EYES DAILY 1/2/18   Itzel Schmitz MD   Compression Stockings MISC by Does not apply route 15-20mmHg  Knee high  Open tow  With assist device to help put them on 5/10/17   Gray Bains, DO   Zinc 50 MG TABS Take 50 mg by mouth daily    Historical Provider, MD   niacin 500 MG extended release capsule Take 500 mg by mouth daily    Historical Provider, MD   folic acid (FOLVITE) 349 MCG tablet Take 800 mcg by mouth daily    Historical Provider, MD   Cyanocobalamin (VITAMIN B12 PO) Take 2,500 mcg by mouth daily    Historical Provider, MD   B Complex-C (SUPER B COMPLEX PO) Take 1 tablet by mouth daily     Historical Provider, MD   travoprost, benzalkonium, (TRAVATAN) 0.004 % ophthalmic solution Place 1 drop into both eyes daily 10/7/16   Itzel Schmitz MD   CPAP Machine MISC by Does not apply route Pressure of 13 (AirSense 10)  [facility].    Historical Provider, MD   ferrous sulfate 325 (65 FE) MG EC tablet Take 325 mg by mouth daily (with breakfast)    Historical Provider, MD   Multiple Vitamin (MULTI VITAMIN DAILY) TABS Take by mouth daily    Historical Provider, MD       Allergies:  Patient has no known allergies. Social History:   reports that she quit smoking about 19 years ago. She has a 35.00 pack-year smoking history. She has never used smokeless tobacco. She reports that she does not drink alcohol or use drugs. Family History: family history includes Cancer in her father; Early Death in her father; Glaucoma in her mother; Heart Disease in her mother; High Blood Pressure in her mother; Ilah Axel / [place] in her maternal uncle; Stroke in her mother. No h/o sudden cardiac death. No for premature CAD    REVIEW OF SYSTEMS:    · Constitutional: there has been no unanticipated weight loss.  There's been No change in energy level, No change in activity level.     · Eyes: No visual changes or diplopia. No scleral icterus. · ENT: No Headaches, hearing loss or vertigo. No mouth sores or sore throat. · Cardiovascular: see above  · Respiratory: see above  · Gastrointestinal: No abdominal pain, appetite loss, blood in stools. · Genitourinary: No dysuria, trouble voiding, or hematuria. · Musculoskeletal:  No gait disturbance, No weakness or joint complaints. · Integumentary: No rash or pruritis. · Neurological: No headache or diplopia. No tingling  · Psychiatric: No anxiety, or depression. · Endocrine: No temperature intolerance. · Hematologic/Lymphatic: No abnormal bruising or bleeding, blood clots or swollen lymph nodes. · Allergic/Immunologic: No nasal congestion or hives. PHYSICAL EXAM:      Vitals:    04/14/21 1345   BP: (!) 154/68   Pulse: 61       Constitutional and General Appearance: alert, cooperative, no distress and appears stated age  HEENT: PERRL, no cervical lymphadenopathy. No masses palpable. Normal oral mucosa  Respiratory:  · Normal excursion and expansion without use of accessory muscles  · Resp Auscultation: Good respiratory effort. No for increased work of breathing. On auscultation: clear to auscultation bilaterally  Cardiovascular:  · Heart tones are crisp and normal. regular S1 and S2.  · Jugular venous pulsation Normal  · The carotid upstroke is normal in amplitude and contour without delay or bruit   Abdomen:   · soft  · Bowel sounds present  Extremities:  ·  No edema  Neurological:  · Alert and oriented. Cardiac Data:  EKG: SR, low voltage, inferior infarction,  anterior infarction. Unchanged from last ECG    Labs:     CBC: No results for input(s): WBC, HGB, HCT, PLT in the last 72 hours. BMP: No results for input(s): NA, K, CO2, BUN, CREATININE, LABGLOM, GLUCOSE in the last 72 hours. PT/INR: No results for input(s): PROTIME, INR in the last 72 hours.   FASTING LIPID PANEL:  Lab Results   Component Value Date    HDL 55 02/18/2021    LDLCALC 80 05/19/2016    TRIG 66 07/27/2020     LIVER PROFILE:No results for input(s): AST, ALT, LABALBU in the last 72 hours. LAST CATH 6/15- minimal non obstructive CAD,  mRCA 5%, EF 65%     Holter 1/9/18- 7 beat run of PAT      TTE 1/9/18  CONCLUSIONS:  1.  Normal LV size and function with ejection fraction of 65%. 2.  Grade 1 diastolic dysfunction. 3.  Mild concentric LVH. 4.  Mild biatrial enlargement. 5.  No significant valvulopathies. 6.  No effusion. 7.  Mildly dilated RV with normal function.     Nuclear Stress 5/2019:  IMPRESSION:  1. No ischemia or infarction. 2. Normal LV systolic function. EF 44%.     TTE 12/7/2020  Summary  Normal left ventricular diameter. Left ventricular systolic function is normal. Left ventricular ejectionfraction 65 %. Right ventricular dilatation with normal systolic function. Aortic valve is sclerotic but opens well. Significant mild mitral valve thickening with annular calcification. Trivial mitral regurgitation. Mild tricuspid regurgitation. Estimated right ventricular systolic pressure is 20 mmHg.     Assessment and Plan:     1. CAD with Hx of BMS to RCA in 2002, Cath in 2015- non obstructive CAD. Negative Nuclear Stress 5/2019  2. TTE 12/7/2020 showed LVEF 65%. Had negative stress test 5/2019  3. HFpEF- stable. On lasix and aldactone. Elevated leg, compression stocking. Take norvasc at night time. Extra lasix dose PRN for swelling. 4. Palpitations- PAT. Resolved. Not on BB due to sinus lynette. Holter monitor 48 hours. 5. H/o Orthostatic hypotension. Wear compression stockings  6. LEANDRO- on CPAP  7. Patient to follow up with PCP and neurology for falls. Has history of orthostatic hypotension. Risk of bleeding discussed with patient on plavix. Patient wants to continue plavix. 8. HTN- at goal, on ACEi  9.  DL- LDL 44 on labs 2/18/21- continue statin.     Sincere Odom [facility]           [phone]

## 2021-04-15 ENCOUNTER — TELEPHONE (OUTPATIENT)
Dept: INTERNAL MEDICINE | Age: 71
End: 2021-04-15
Payer: MEDICARE

## 2021-04-15 DIAGNOSIS — E11.42 TYPE 2 DIABETES MELLITUS WITH DIABETIC POLYNEUROPATHY, WITH LONG-TERM CURRENT USE OF INSULIN (HCC): ICD-10-CM

## 2021-04-15 DIAGNOSIS — Z79.4 TYPE 2 DIABETES MELLITUS WITH DIABETIC POLYNEUROPATHY, WITH LONG-TERM CURRENT USE OF INSULIN (HCC): ICD-10-CM

## 2021-04-15 DIAGNOSIS — K74.60 HEPATIC CIRRHOSIS, UNSPECIFIED HEPATIC CIRRHOSIS TYPE, UNSPECIFIED WHETHER ASCITES PRESENT (HCC): Primary | ICD-10-CM

## 2021-04-15 DIAGNOSIS — M54.41 ACUTE BACK PAIN WITH SCIATICA, RIGHT: ICD-10-CM

## 2021-04-15 DIAGNOSIS — M43.07 SPONDYLOLYSIS, LUMBOSACRAL: ICD-10-CM

## 2021-04-15 PROCEDURE — G0179 MD RECERTIFICATION HHA PT: HCPCS | Performed by: INTERNAL MEDICINE

## 2021-04-26 ENCOUNTER — HOSPITAL ENCOUNTER (OUTPATIENT)
Dept: NON INVASIVE DIAGNOSTICS | Age: 71
Discharge: HOME | End: 2021-04-26
Payer: MEDICARE

## 2021-04-26 DIAGNOSIS — I49.9 IRREGULAR HEART BEAT: ICD-10-CM

## 2021-04-26 PROCEDURE — 93226 XTRNL ECG REC<48 HR SCAN A/R: CPT

## 2021-04-26 PROCEDURE — 93225 XTRNL ECG REC<48 HRS REC: CPT

## 2021-04-28 ENCOUNTER — HOSPITAL ENCOUNTER (OUTPATIENT)
Dept: NON INVASIVE DIAGNOSTICS | Age: 71
Discharge: HOME | End: 2021-04-28
Payer: MEDICARE

## 2021-04-29 ENCOUNTER — TELEPHONE (OUTPATIENT)
Dept: CARDIOLOGY | Age: 71
End: 2021-04-29

## 2021-04-29 DIAGNOSIS — R00.2 PALPITATIONS: Primary | ICD-10-CM

## 2021-04-29 NOTE — TELEPHONE ENCOUNTER
Review Holter and give recommendation    Last Appt:  4/14/2021  Next Appt:   10/20/2021  Med verified in [facility]

## 2021-04-30 ENCOUNTER — TELEPHONE (OUTPATIENT)
Dept: CARDIOLOGY | Age: 71
End: 2021-04-30

## 2021-04-30 RX ORDER — METOPROLOL SUCCINATE 25 MG/1
12.5 TABLET, EXTENDED RELEASE ORAL DAILY
Qty: 30 TABLET | Refills: 3 | Status: SHIPPED | OUTPATIENT
Start: 2021-04-30 | End: 2021-04-30

## 2021-04-30 RX ORDER — METOPROLOL SUCCINATE 25 MG/1
12.5 TABLET, EXTENDED RELEASE ORAL DAILY
Qty: 45 TABLET | Refills: 1 | Status: SHIPPED | OUTPATIENT
Start: 2021-04-30 | End: 2021-11-30 | Stop reason: SDUPTHER

## 2021-04-30 NOTE — TELEPHONE ENCOUNTER
Pt notified. She states understanding and agreement. I called the dental office for the fax [phone] FAXED.

## 2021-04-30 NOTE — TELEPHONE ENCOUNTER
Pt called stating that she forgot to ask Dr Ana Jim about clearance for upcoming teeth being extracted on 5/5/21 in [facility]. Pt stated she was cleared by Dr Mihaela Molina but needs cardiac clearance also since starting new medication.      Last Appt:  4/14/2021  Next Appt:   10/20/2021  Med verified in [facility]    Dentist:  [facility]   [facility], [address] [place] [address]

## 2021-04-30 NOTE — TELEPHONE ENCOUNTER
Pt notified. She agrees. She needs the [facility]. Done.

## 2021-06-01 ENCOUNTER — TELEPHONE (OUTPATIENT)
Dept: INTERNAL MEDICINE | Age: 71
End: 2021-06-01

## 2021-06-01 NOTE — TELEPHONE ENCOUNTER
Patient was understanding that she was to be on the symbicort. She got Albuterol inhaler in the mail, she thought that was stopped. What inhalers is she supposed to be on? Please call and advise.

## 2021-06-04 NOTE — TELEPHONE ENCOUNTER
Pt calling Urgent care, asking for Dr. Meyers Fuelling to renew her Vit C 500 mg to [facility].

## 2021-06-07 RX ORDER — MULTIVIT-MIN/IRON/FOLIC ACID/K 18-600-40
500 CAPSULE ORAL 2 TIMES DAILY
Qty: 180 CAPSULE | Refills: 3 | Status: SHIPPED | OUTPATIENT
Start: 2021-06-07 | End: 2022-02-03 | Stop reason: SDUPTHER

## 2021-06-14 ENCOUNTER — TELEPHONE (OUTPATIENT)
Dept: INTERNAL MEDICINE | Age: 71
End: 2021-06-14
Payer: MEDICARE

## 2021-06-14 DIAGNOSIS — M43.07 SPONDYLOLYSIS, LUMBOSACRAL: ICD-10-CM

## 2021-06-14 DIAGNOSIS — Z79.4 TYPE 2 DIABETES MELLITUS WITH DIABETIC POLYNEUROPATHY, WITH LONG-TERM CURRENT USE OF INSULIN (HCC): ICD-10-CM

## 2021-06-14 DIAGNOSIS — E11.42 TYPE 2 DIABETES MELLITUS WITH DIABETIC POLYNEUROPATHY, WITH LONG-TERM CURRENT USE OF INSULIN (HCC): ICD-10-CM

## 2021-06-14 DIAGNOSIS — M54.41 ACUTE BACK PAIN WITH SCIATICA, RIGHT: ICD-10-CM

## 2021-06-14 DIAGNOSIS — K74.60 HEPATIC CIRRHOSIS, UNSPECIFIED HEPATIC CIRRHOSIS TYPE, UNSPECIFIED WHETHER ASCITES PRESENT (HCC): Primary | ICD-10-CM

## 2021-06-14 PROCEDURE — G0179 MD RECERTIFICATION HHA PT: HCPCS | Performed by: INTERNAL MEDICINE

## 2021-06-25 ENCOUNTER — TELEPHONE (OUTPATIENT)
Dept: PULMONOLOGY | Age: 71
End: 2021-06-25

## 2021-06-25 DIAGNOSIS — Z99.89 OSA ON CPAP: Primary | ICD-10-CM

## 2021-06-25 DIAGNOSIS — G47.33 OSA ON CPAP: Primary | ICD-10-CM

## 2021-07-16 ENCOUNTER — TELEPHONE (OUTPATIENT)
Dept: INTERNAL MEDICINE | Age: 71
End: 2021-07-16

## 2021-07-16 NOTE — TELEPHONE ENCOUNTER
And type. Clarify with patient what needs to be in the letter.     Also encourage patient to use [employer] instant breakfast milkshakes to get proper nutrition until she can eat better

## 2021-07-16 NOTE — LETTER
[address] A department of [place] [address]  Phone: [phone]  Fax: [phone]    Riri Raphael MD        July 16, 2021     Patient: Renae Blanchard   YOB: 1950   Date of Visit: 7/16/2021       To Whom It May Concern: It is my medical opinion that due to Zelda Wheeler being diabetic she needs to be getting proper nutrition. For her to be able to get the proper nutrition she needs she needs to be able to have a full set of teeth in her mouth which she would need to be fitted for dentures. If she is not eating properly it will cause her blood sugar levels to either go really high or low, which can be a medical emergency for her if her blood sugar numbers are not controlled. If you have any questions or concerns, please don't hesitate to call.     Sincerely,        Riri Raphael MD

## 2021-07-16 NOTE — TELEPHONE ENCOUNTER
Pt called in today stating she went to the dentist and had 8 teeth pulled. She was trying to get dentures because the teeth she still has are very painful and she has a really hard time eating. She states she cannot eat anything without having pain. Pt feels she is not getting the proper nutrition she needs especially being a diabetic and feels this is medically necessary for her health. Medicaid is refusing to pay for her to have dentures and they even sent an appeal to the dentist office and it was still denied. The dentist told her to try contacting her PCP office to see if Dr. Jonathan Engle could type up a letter stating that it is medically necessary for pt to have this done. Pt gave me the fax number if able [phone].

## 2021-08-10 ENCOUNTER — TELEPHONE (OUTPATIENT)
Dept: INTERNAL MEDICINE | Age: 71
End: 2021-08-10
Payer: MEDICARE

## 2021-08-10 DIAGNOSIS — M43.10 ACQUIRED SPONDYLOLISTHESIS: ICD-10-CM

## 2021-08-10 DIAGNOSIS — M54.41 CHRONIC RIGHT-SIDED LOW BACK PAIN WITH RIGHT-SIDED SCIATICA: ICD-10-CM

## 2021-08-10 DIAGNOSIS — K74.60 HEPATIC CIRRHOSIS, UNSPECIFIED HEPATIC CIRRHOSIS TYPE, UNSPECIFIED WHETHER ASCITES PRESENT (HCC): Primary | ICD-10-CM

## 2021-08-10 DIAGNOSIS — M47.817 LUMBOSACRAL SPONDYLOSIS WITHOUT MYELOPATHY: ICD-10-CM

## 2021-08-10 DIAGNOSIS — G89.29 CHRONIC RIGHT-SIDED LOW BACK PAIN WITH RIGHT-SIDED SCIATICA: ICD-10-CM

## 2021-08-10 DIAGNOSIS — Z79.4 TYPE 2 DIABETES MELLITUS WITH DIABETIC POLYNEUROPATHY, WITH LONG-TERM CURRENT USE OF INSULIN (HCC): ICD-10-CM

## 2021-08-10 DIAGNOSIS — E11.42 TYPE 2 DIABETES MELLITUS WITH DIABETIC POLYNEUROPATHY, WITH LONG-TERM CURRENT USE OF INSULIN (HCC): ICD-10-CM

## 2021-08-10 PROCEDURE — G0179 MD RECERTIFICATION HHA PT: HCPCS | Performed by: INTERNAL MEDICINE

## 2021-08-13 ENCOUNTER — TELEPHONE (OUTPATIENT)
Dept: CARDIOLOGY | Age: 71
End: 2021-08-13

## 2021-08-13 RX ORDER — DORZOLAMIDE HCL 20 MG/ML
SOLUTION/ DROPS OPHTHALMIC 3 TIMES DAILY
COMMUNITY
Start: 2021-07-19

## 2021-08-13 NOTE — TELEPHONE ENCOUNTER
Called and spoke with Barbara Cabrales with P advised her to speak with pharmacy to see if low b/p is a side effect.

## 2021-08-13 NOTE — TELEPHONE ENCOUNTER
I spoke to Catina Perkins RN. She knows the cardiologist is not in anymore today. I encouraged her to use UC or ER for medication changes if needed over the weekend. She will check with PCP Dr. Dot Scheuermann as well.

## 2021-08-13 NOTE — TELEPHONE ENCOUNTER
Recommend our nurse/MA call pharmacy to have them to check if there is any possibility of the eyedrop medicine affecting blood pressure.   And then relay message to patient/caregiver

## 2021-08-13 NOTE — TELEPHONE ENCOUNTER
Elyssa called from [facility] to ask if the eye medication dorvolamide can affect the pt BP and HR. Pt BP has been as low as 96/42 w/ HR between 44-46. Before starting the eye drop the pt BP and HR were 118//78. Pt eye doctor states the medication cannot lower the pt BP. The higher readings were 2 weeks prior to starting the eye drops. Please advise Elyssa since she is the RN that helps pt get her pills ready every 2 weeks.     Elyssa [phone]    Last Appt:  4/14/2021  Next Appt:   10/20/2021  Med verified in Epic

## 2021-08-16 ENCOUNTER — OFFICE VISIT (OUTPATIENT)
Dept: INTERNAL MEDICINE | Age: 71
End: 2021-08-16
Payer: MEDICARE

## 2021-08-16 VITALS
HEIGHT: 63 IN | BODY MASS INDEX: 47.66 KG/M2 | WEIGHT: 269 LBS | DIASTOLIC BLOOD PRESSURE: 70 MMHG | SYSTOLIC BLOOD PRESSURE: 110 MMHG | RESPIRATION RATE: 16 BRPM | HEART RATE: 51 BPM

## 2021-08-16 DIAGNOSIS — I25.83 CORONARY ARTERY DISEASE DUE TO LIPID RICH PLAQUE: Primary | ICD-10-CM

## 2021-08-16 DIAGNOSIS — E03.9 HYPOTHYROIDISM (ACQUIRED): ICD-10-CM

## 2021-08-16 DIAGNOSIS — M54.41 CHRONIC RIGHT-SIDED LOW BACK PAIN WITH RIGHT-SIDED SCIATICA: ICD-10-CM

## 2021-08-16 DIAGNOSIS — Z79.4 TYPE 2 DIABETES MELLITUS WITH DIABETIC POLYNEUROPATHY, WITH LONG-TERM CURRENT USE OF INSULIN (HCC): ICD-10-CM

## 2021-08-16 DIAGNOSIS — E11.42 TYPE 2 DIABETES MELLITUS WITH DIABETIC POLYNEUROPATHY, WITH LONG-TERM CURRENT USE OF INSULIN (HCC): ICD-10-CM

## 2021-08-16 DIAGNOSIS — E53.8 B12 DEFICIENCY: ICD-10-CM

## 2021-08-16 DIAGNOSIS — E55.9 VITAMIN D DEFICIENCY: ICD-10-CM

## 2021-08-16 DIAGNOSIS — D50.9 IRON DEFICIENCY ANEMIA, UNSPECIFIED IRON DEFICIENCY ANEMIA TYPE: ICD-10-CM

## 2021-08-16 DIAGNOSIS — G89.29 CHRONIC RIGHT-SIDED LOW BACK PAIN WITH RIGHT-SIDED SCIATICA: ICD-10-CM

## 2021-08-16 DIAGNOSIS — I25.10 CORONARY ARTERY DISEASE DUE TO LIPID RICH PLAQUE: Primary | ICD-10-CM

## 2021-08-16 PROCEDURE — 1090F PRES/ABSN URINE INCON ASSESS: CPT | Performed by: INTERNAL MEDICINE

## 2021-08-16 PROCEDURE — 99214 OFFICE O/P EST MOD 30 MIN: CPT | Performed by: INTERNAL MEDICINE

## 2021-08-16 PROCEDURE — G8400 PT W/DXA NO RESULTS DOC: HCPCS | Performed by: INTERNAL MEDICINE

## 2021-08-16 PROCEDURE — 1036F TOBACCO NON-USER: CPT | Performed by: INTERNAL MEDICINE

## 2021-08-16 PROCEDURE — G8427 DOCREV CUR MEDS BY ELIG CLIN: HCPCS | Performed by: INTERNAL MEDICINE

## 2021-08-16 PROCEDURE — 4040F PNEUMOC VAC/ADMIN/RCVD: CPT | Performed by: INTERNAL MEDICINE

## 2021-08-16 PROCEDURE — 3017F COLORECTAL CA SCREEN DOC REV: CPT | Performed by: INTERNAL MEDICINE

## 2021-08-16 PROCEDURE — G8417 CALC BMI ABV UP PARAM F/U: HCPCS | Performed by: INTERNAL MEDICINE

## 2021-08-16 PROCEDURE — 1123F ACP DISCUSS/DSCN MKR DOCD: CPT | Performed by: INTERNAL MEDICINE

## 2021-08-16 PROCEDURE — 3051F HG A1C>EQUAL 7.0%<8.0%: CPT | Performed by: INTERNAL MEDICINE

## 2021-08-16 PROCEDURE — 2022F DILAT RTA XM EVC RTNOPTHY: CPT | Performed by: INTERNAL MEDICINE

## 2021-08-16 RX ORDER — PREDNISONE 20 MG/1
TABLET ORAL
Qty: 15 TABLET | Refills: 1 | Status: CANCELLED | OUTPATIENT
Start: 2021-08-16

## 2021-08-16 RX ORDER — CEFDINIR 300 MG/1
300 CAPSULE ORAL 2 TIMES DAILY
Qty: 20 CAPSULE | Refills: 0 | Status: CANCELLED | OUTPATIENT
Start: 2021-08-16 | End: 2021-08-26

## 2021-08-16 SDOH — ECONOMIC STABILITY: FOOD INSECURITY: WITHIN THE PAST 12 MONTHS, THE FOOD YOU BOUGHT JUST DIDN'T LAST AND YOU DIDN'T HAVE MONEY TO GET MORE.: NEVER TRUE

## 2021-08-16 SDOH — ECONOMIC STABILITY: FOOD INSECURITY: WITHIN THE PAST 12 MONTHS, YOU WORRIED THAT YOUR FOOD WOULD RUN OUT BEFORE YOU GOT MONEY TO BUY MORE.: NEVER TRUE

## 2021-08-16 ASSESSMENT — ENCOUNTER SYMPTOMS
NAUSEA: 0
BACK PAIN: 1
CONSTIPATION: 0
BLOOD IN STOOL: 0
COUGH: 0
SHORTNESS OF BREATH: 0
VOMITING: 0
ABDOMINAL PAIN: 0
EYE PAIN: 0
DIARRHEA: 0

## 2021-08-16 ASSESSMENT — SOCIAL DETERMINANTS OF HEALTH (SDOH): HOW HARD IS IT FOR YOU TO PAY FOR THE VERY BASICS LIKE FOOD, HOUSING, MEDICAL CARE, AND HEATING?: NOT HARD AT ALL

## 2021-08-16 NOTE — LETTER
[address] Internal Med A department of [place] [address]  Phone: [phone]  Fax: [phone]    Chris Bravo MD        August 16, 2021     Patient: Jesusita Blake   YOB: 1950   Date of Visit: 8/16/2021       To Whom It May Concern: It is my medical opinion that Carly Elena be allowed to have a kitten with her at all times in her apartment for companionship to help with her Depression, Anxiety, Fibromyalgia and Osteoarthritis. If you have any questions or concerns, please don't hesitate to call.     Sincerely,        Chris Bravo MD

## 2021-08-16 NOTE — PROGRESS NOTES
[place] [address]  Dept: [phone]  Dept Fax: [phone]  Loc: [phone]     Catina Hunt is a 65 y.o. female who presents today for her medical conditions/complaintsas noted below. Catina Hunt is c/o of   Chief Complaint   Patient presents with    Coronary Artery Disease     6 month    Diabetes    Back Pain     chronic low         HPI:     Coronary Artery Disease  Presents for follow-up (CAD due to lipid rich plaque) visit. Pertinent negatives include no chest pain, chest pressure, dizziness, leg swelling, palpitations or shortness of breath. The symptoms have been stable. Compliance with diet is good. Compliance with exercise is good. Compliance with medications is good. Diabetes  She presents for her follow-up diabetic visit. She has type 2 diabetes mellitus. Her disease course has been fluctuating. Pertinent negatives for hypoglycemia include no confusion, dizziness, headaches, nervousness/anxiousness or pallor. Pertinent negatives for diabetes include no chest pain, no polydipsia, no polyuria and no weakness. Back Pain  This is a chronic problem. The current episode started more than 1 year ago. The problem occurs intermittently. The problem has been waxing and waning since onset. The pain is present in the lumbar spine. Pertinent negatives include no abdominal pain, chest pain, dysuria, fever, headaches, numbness or weakness. Hemoglobin A1C (%)   Date Value   02/18/2021 7.0 (H)   01/28/2021 6.5 (H)   07/27/2020 7.1 (H)            Microalb/Crt.  Ratio (mcg/mg creat)   Date Value   02/18/2021 CANNOT BE CALCULATED     LDL Cholesterol (mg/dL)   Date Value   02/18/2021 44   07/27/2020 34   07/22/2019 45     LDL Calculated (mg/dL)   Date Value   05/19/2016 80   09/23/2014 80         AST (U/L)   Date Value   02/18/2021 40 (H)     ALT (U/L)   Date Value   02/18/2021 39 (H)     BUN (mg/dL)   Date Value   02/18/2021 11     BP Readings from Last 3 Encounters:   08/16/21 110/70   04/14/21 (!) 119/58   03/16/21 126/80              Past Medical History:   Diagnosis Date    Arthritis     Asthma     CAD (coronary artery disease) 1989    Cancer ([address] 1971    cervical    Carotid artery disease ([address]     Cataracts, bilateral     COPD (chronic obstructive pulmonary disease) (HCC)     Coronary artery disease     Eczema     Fibromyalgia     Glaucoma     H/O blood clots     Headache     Heart attack ([address]     Hx of Bell's palsy     Mild right facial paralysis    Hypertension     Liver disease     Mild dementia ([address]     Neuropathy     Osteoporosis     Sleep apnea 11/07/2014    sleep [facility]     Type 2 diabetes mellitus with hyperglycemia ([facility]       Past Surgical History:   Procedure Laterality Date    ANESTHESIA NERVE BLOCK Right 6/28/2019    Right L2 L3 L4 L5 Diagnostic Medial BB performed by Pardeep Regalado MD at [address] [facility] Right 7/26/2019    Right L2 L3 L4 L5 Confirmatory Medial BB performed by Pardeep Regalado MD at [address]  2002    COLONOSCOPY  09/2012    COLONOSCOPY N/A 9/30/2019    COLONOSCOPY performed by Riya Sr MD at [facility]  internal hemorrhoids and few diverticuli    CORONARY ANGIOPLASTY WITH STENT PLACEMENT      INDUCED [address] Right 2/8/2019    Right L2 L3 TRANSFORAMINAL performed by Pardeep Regalado MD at [address] [place] [address] Dr Hamilton 5/7/2019    Right L2 L3 TRANSFORAMINAL performed by Pardeep Regalado MD at [address] [place] [address] Dr Hamilton 6/4/2019    Right L3 & L4 TRANSFORAMINAL performed by Pardeep Regalado MD at [address] [place] [address] Dr Hamilton 12/12/2019    Right L4 L5 TRANSFORAMINAL performed by Pardeep Regalado MD at [address] [place] [address] Right 1/20/2020    RIGHT L4 L5  TRANSFORAMINAL performed by Pardeep Regalado MD at [facility] [address] Right 2020    Right L4 L5 TRANSFORAMINAL performed by Pardeep Regalado MD at [address] 1.1-2CM FACE,FACIAL Left 2018    Excision Cyst Left Chest, Middle Back performed by Riya Sr MD at [address] [facility] AA&/STRD TFRML EPI LUMBAR/SACRAL EA ADDL Right 10/5/2018    Right L2 & L3 TRANSFORAMINAL performed by Pardeep Regalado MD at [address] [facility] AA&/STRD TFRML EPI LUMBAR/SACRAL EA ADDL Right 10/26/2018    Right L2 L3 TRANSFORAMINAL performed by Pardeep Regalado MD at [address] Right 2019    Right  L2 L3 L4 L5 RADIOFREQUENCY performed by Pardeep Regalado MD at [address] [facility],[address]    Cervical cancer.   Had XRT    TONSILLECTOMY  1983       Family History   Problem Relation Age of Onset    Heart Disease Mother     High Blood Pressure Mother    Dorswapna Agarwal Stroke Mother     Glaucoma Mother     Early Death Father     Cancer Father         stomach    Miscarriages / Stillbirths Maternal Uncle           Social History     Tobacco Use    Smoking status: Former Smoker     Packs/day: 1.00     Years: 35.00     Pack years: 35.00     Quit date: 2001     Years since quittin.8    Smokeless tobacco: Never Used   Substance Use Topics    Alcohol use: No         Current Outpatient Medications   Medication Sig Dispense Refill    dorzolamide (TRUSOPT) 2 % ophthalmic solution Apply to eye 3 times daily      Ascorbic Acid (VITAMIN C) 500 MG CAPS Take 500 mg by mouth 2 times daily 180 capsule 3    metoprolol succinate (TOPROL XL) 25 MG extended release tablet Take 0.5 tablets by mouth daily 45 tablet 1    gabapentin (NEURONTIN) 300 MG capsule TAKE 2 CAPSULES TWICE DAILY 360 capsule 3    SYMBICORT 80-4.5 MCG/ACT AERO Inhale 2 puffs into the lungs 2 times daily 3 Inhaler 3    senna (SENOKOT) 8.6 MG TABS tablet TAKE 1 TABLET BY MOUTH TWICE DAILY 180 tablet 3    spironolactone TABLET EVERY DAY 90 tablet 3    buPROPion (WELLBUTRIN) 75 MG tablet TAKE 1 TABLET TWICE DAILY 180 tablet 3    amLODIPine (NORVASC) 5 MG tablet TAKE 1 TABLET EVERY DAY 90 tablet 3    vitamin C (ASCORBIC ACID) 500 MG tablet TAKE 1 TABLET BY MOUTH TWICE A DAY. 180 tablet 3    nitroGLYCERIN (NITROSTAT) 0.4 MG SL tablet PLACE 1 TABLET UNER THE TONGUE EVERY 5 MINUTES AS NEEDED FOR CHEST PAIN AT FIRST SIGN OF CHEST PAIN. 25 tablet 1    insulin aspart (NOVOLOG FLEXPEN) 100 UNIT/ML injection pen INJECT 4 UNITS AT LUNCH AND  6 UNITS AT SUPPER (EACH PEN EXPIRES 28 DAYS AFTER 1ST USE) 15 mL 3    Insulin Pen Needle (B-D ULTRAFINE III SHORT PEN) 31G X 8 MM MISC USE TWICE DAILY 180 each 3    albuterol (PROVENTIL) (2.5 MG/3ML) 0.083% nebulizer solution Take 3 mLs by nebulization every 6 hours as needed for Wheezing 120 each 3    triamcinolone (KENALOG) 0.1 % cream APPLY TOPICALLY TWICE DAILY 80 g 5    VITAMIN A PO Take 2,400 mcg by mouth daily      vitamin E 400 UNIT capsule Take 400 Units by mouth daily      COMBIGAN 0.2-0.5 % ophthalmic solution PLACE 1 DROP INTO BOTH EYES DAILY 15 mL 3    Compression Stockings MISC by Does not apply route 15-20mmHg  Knee high  Open tow  With assist device to help put them on 1 each 0    Zinc 50 MG TABS Take 50 mg by mouth daily      niacin 500 MG extended release capsule Take 500 mg by mouth daily      folic acid (FOLVITE) 333 MCG tablet Take 800 mcg by mouth daily      Cyanocobalamin (VITAMIN B12 PO) Take 2,500 mcg by mouth daily      B Complex-C (SUPER B COMPLEX PO) Take 1 tablet by mouth daily       travoprost, benzalkonium, (TRAVATAN) 0.004 % ophthalmic solution Place 1 drop into both eyes daily 3 Bottle 3    CPAP Machine MISC by Does not apply route Pressure of 13 (AirSense 10)  [employer].      Multiple Vitamin (MULTI VITAMIN DAILY) TABS Take by mouth daily       No current facility-administered medications for this visit.      No Known Allergies    Health Maintenance Pupils: Pupils are equal, round, and reactive to light. Cardiovascular:      Rate and Rhythm: Normal rate and regular rhythm. Heart sounds: No murmur heard. No friction rub. No gallop. Pulmonary:      Effort: Pulmonary effort is normal.      Breath sounds: Normal breath sounds. No wheezing or rales. Abdominal:      General: There is no distension. Palpations: Abdomen is soft. There is no mass. Tenderness: There is no abdominal tenderness. There is no rebound. Musculoskeletal:         General: Normal range of motion. Cervical back: Neck supple. Lymphadenopathy:      Cervical: No cervical adenopathy. Skin:     General: Skin is warm and dry. Findings: No rash. Neurological:      Mental Status: She is alert and oriented to person, place, and time. Cranial Nerves: No cranial nerve deficit (grossly). Comments: Diabetic foot exam shows decreased sensation bilaterally per monofilament sensory test, intact vibration sense and intact pulses bilaterally. Psychiatric:         Thought Content: Thought content normal.        /70 (Site: Left Upper Arm, Position: Sitting, Cuff Size: Large Adult)   Pulse 51   Resp 16   Ht 5' 3\" (1.6 m)   Wt 269 lb (122 kg)   LMP 12/07/1995 (Approximate)   BMI 47.65 kg/m²     Assessment:       Diagnosis Orders   1. Coronary artery disease due to lipid rich plaque  Lipid Panel   2. Type 2 diabetes mellitus with diabetic polyneuropathy, with long-term current use of insulin ([facility])  Comprehensive Metabolic Panel    Hemoglobin A1C    Microalbumin, Ur    HM DIABETES FOOT EXAM   3. Chronic right-sided low back pain with right-sided sciatica     4. Vitamin D deficiency  Vitamin D 25 Hydroxy   5. Hypothyroidism (acquired)  TSH    T4, Free   6. B12 deficiency  Vitamin B12    Hemoglobin   7.  Iron deficiency anemia, unspecified iron deficiency anemia type  Hemoglobin    Iron And TIBC   Reviewed multiple test results for this appointment. 2/18/2021 normal total cholesterol 116.    2/18/2021 normal TSH at 2.60.    2/18/2021 okay hemoglobin A1c at 7.0. Patient told to continue Lipitor and Lantus. We are discontinuing ferrous sulfate. Plan:       Return in about 6 months (around 2/17/2022) for CAD, Diabetes, back pain. Orders Placed This Encounter   Procedures    Vitamin D 25 Hydroxy     Standing Status:   Future     Standing Expiration Date:   8/16/2022    TSH     Standing Status:   Future     Standing Expiration Date:   8/16/2022    T4, Free     Standing Status:   Future     Standing Expiration Date:   8/16/2022    Vitamin B12     Standing Status:   Future     Standing Expiration Date:   8/16/2022    Comprehensive Metabolic Panel     Standing Status:   Future     Standing Expiration Date:   8/16/2022    Hemoglobin     Standing Status:   Future     Standing Expiration Date:   8/16/2022    Iron And TIBC     Standing Status:   Future     Standing Expiration Date:   8/16/2022     Order Specific Question:   Is Patient Fasting? Answer:   na     Order Specific Question:   No of Hours? Answer:   na    Lipid Panel     Standing Status:   Future     Standing Expiration Date:   8/16/2022     Order Specific Question:   Is Patient Fasting?/# of Hours     Answer:   yes    Hemoglobin A1C     Standing Status:   Future     Standing Expiration Date:   8/16/2022    Microalbumin, Ur     Standing Status:   Future     Standing Expiration Date:   8/16/2022    HM DIABETES FOOT EXAM     No orders of the defined types were placed in this encounter. Patientgiven educational materials - see patient instructions. Discussed use, benefit,and side effects of prescribed medications. All patient questions answered. Ptvoiced understanding. Reviewed health maintenance. Instructed to continue currentmedications, diet and exercise. Patient agreed with treatment plan. Follow up asdirected.      Electronically signed by Korina Poe Dulce Telles MD on 8/16/2021 at 3:19 PM

## 2021-08-17 ENCOUNTER — TELEPHONE (OUTPATIENT)
Dept: INTERNAL MEDICINE | Age: 71
End: 2021-08-17

## 2021-08-17 DIAGNOSIS — F41.9 ANXIETY: ICD-10-CM

## 2021-08-17 DIAGNOSIS — F32.A DEPRESSION, UNSPECIFIED DEPRESSION TYPE: Primary | ICD-10-CM

## 2021-08-17 RX ORDER — INSULIN GLARGINE 100 [IU]/ML
15 INJECTION, SOLUTION SUBCUTANEOUS 2 TIMES DAILY
Qty: 1 VIAL | Refills: 5 | Status: SHIPPED | OUTPATIENT
Start: 2021-08-17 | End: 2021-08-24 | Stop reason: SDUPTHER

## 2021-08-17 NOTE — TELEPHONE ENCOUNTER
Patient needs a script saying its medically necessary for her to have a cat in her apartment for companionship to help with her depression and anxiety

## 2021-08-24 RX ORDER — INSULIN GLARGINE 100 [IU]/ML
15 INJECTION, SOLUTION SUBCUTANEOUS 2 TIMES DAILY
Qty: 1 VIAL | Refills: 5 | Status: SHIPPED | OUTPATIENT
Start: 2021-08-24 | End: 2021-10-18 | Stop reason: SDUPTHER

## 2021-09-13 RX ORDER — ALBUTEROL SULFATE 90 UG/1
AEROSOL, METERED RESPIRATORY (INHALATION)
Qty: 54 G | Refills: 3 | Status: SHIPPED | OUTPATIENT
Start: 2021-09-13 | End: 2021-10-13 | Stop reason: SDUPTHER

## 2021-09-28 NOTE — PROGRESS NOTES
Subjective:      Patient ID: Cailin Harris is a 66 y.o. female. HPI  Patient here for follow-up for LEANDRO on CPAP. Patient was last seen in the office in October 2020 per Dr. Shannan Beard. Compliance data reviewed from 6/5/2020 2137/4/2021 with 27 out of 30 days used and 26 days for more than 4 hours putting her at 87% compliant. She is on CPAP 13 cm H2O with a residual AHI of 0.8. Patient is  reporting that she has an error message on her CPAP because of a permanent filter. Encourage patient to reach out to her [employer] company to see if they can help troubleshoot what is going on with this error and if they need to replace anything on her machine. Patient denies any significant shortness of breath or cough. Medications:   Albuterol HFA:  Symbicort 80-4.5 mcg:    PRIOR WORKUP:  PFT:  PFT 12/7/2016: Flow volume loop is normal patient's body box was suboptimal. Diffusion capacity is normal. Spirometry is normal. FEV1 111% of predicted. FVC 95% of predicted. Ratio 82. Diffusion capacity 113% of predicted. Lung volumes as measured diffusion capacity is total lung capacity 89% of predicted which is normal. Final impression normal pulmonary function test.    CT Imaging:  High resolution CT chest 6/1/2017: Minimal prominence of airways identified with no fibrotic change, mosaic attenuation or intralobular septal thickening. No concerning nodularity within the lung parenchyma. Sleep Study:  Sleep study 2/9/2017: Titration study. Patient was titrated onto a final pressure of 8 cm H2O with 13 respiratory events of which 4 were complete and 9 were partial and the lowest SPO2 of 91%. Sleep study 11/7/2014: Patient had a total of 45 abnormal respiratory events with 2 obstructive apneas, 43 obstructive hypopneas with an overall AHI 9.1. REM AHI was elevated at 39.3.     Laboratory Evaluation:    Immunizations:   Immunization History   Administered Date(s) Administered    COVID-19, Pfizer, PF, 30mcg/0.3mL 02/04/2021, 02/25/2021    Influenza Vaccine, unspecified formulation 09/07/2016    Influenza Virus Vaccine 10/03/2014    Influenza, High Dose (Fluzone 65 yrs and older) 09/16/2017, 09/19/2018    Influenza, High-dose, Jordan Golas, 65 yrs +, IM (Fluzone) 08/26/2020    Influenza, Triv, inactivated, subunit, adjuvanted, IM (Fluad 65 yrs and older) 08/13/2019    Pneumococcal Conjugate 13-valent ([place]) 08/17/2015, 01/12/2017    Pneumococcal Polysaccharide ([id number]) 10/21/2002, 12/13/2012, 08/05/2013, 03/24/2018    Td, unspecified formulation 12/13/2012    Tdap (Boostrix, Adacel) 06/18/2017    Zoster Live (Zostavax) 08/05/2013    Zoster Recombinant (Shingrix) 05/04/2018, 07/21/2018        Sleep Medicine 10/31/2018 1/31/2018   Sitting and reading 0 1   Watching TV 2 2   Sitting, inactive in a public place (e.g. a theatre or a meeting) 0 1   As a passenger in a car for an hour without a break 0 0   Lying down to rest in the afternoon when circumstances permit 2 2   Sitting and talking to someone 0 0   Sitting quietly after a lunch without alcohol 3 1   In a car, while stopped for a few minutes in traffic 0 0   Total score 7 7       /74 (Site: Left Upper Arm, Position: Sitting, Cuff Size: Large Adult)   Pulse 55   Temp 97.7 °F (36.5 °C)   Ht 5' 3\" (1.6 m)   Wt 265 lb (120.2 kg)   LMP 12/07/1995 (Approximate)   SpO2 96%   BMI 46.94 kg/m²     Past Medical History:   Diagnosis Date    Arthritis     Asthma     CAD (coronary artery disease) 1989    Cancer ([address] 1971    cervical    Carotid artery disease ([address]     Cataracts, bilateral     COPD (chronic obstructive pulmonary disease) ([address]     Coronary artery disease     Eczema     Fibromyalgia     Glaucoma     H/O blood clots     Headache     Heart attack ([address]     Hx of Bell's palsy     Mild right facial paralysis    Hypertension     Liver disease     Mild dementia ([address]     Neuropathy     Osteoporosis     Sleep apnea 11/07/2014    sleep study done in  [address] [place]     Type 2 diabetes mellitus with hyperglycemia [facility])      Past Surgical History:   Procedure Laterality Date    ANESTHESIA NERVE BLOCK Right 2019    Right L2 L3 L4 L5 Diagnostic Medial BB performed by Gianna Teixeira MD at [facility] Right 2019    Right L2 L3 L4 L5 Confirmatory Medial BB performed by Gianna Teixeira MD at [address]      COLONOSCOPY  2012    COLONOSCOPY N/A 2019    COLONOSCOPY performed by Penelope Suh MD at [facility]  internal hemorrhoids and few diverticuli    CORONARY ANGIOPLASTY WITH STENT PLACEMENT      INDUCED   1970    LUMBAR SPINE SURGERY Right 2019    Right L2 L3 TRANSFORAMINAL performed by Gianna Teixeira MD at [address] [place] [address] Dr Right 2019    Right L2 L3 TRANSFORAMINAL performed by Gianna Teixeira MD at [address] [place] [address] Dr Right 2019    Right L3 & L4 TRANSFORAMINAL performed by Gianna Teixeira MD at [address] [place] [address] Dr Right 2019    Right L4 L5 TRANSFORAMINAL performed by Gianna Teixeira MD at [address] [place] [address] Right 2020    RIGHT L4 L5  TRANSFORAMINAL performed by Gianna Teixeira MD at [address] [place] [address] Right 2020    Right L4 L5 TRANSFORAMINAL performed by Gianna Teixeira MD at [address] [facility] 1.1-2CM FACE,FACIAL Left 2018    Excision Cyst Left Chest, Middle Back performed by Penelope Suh MD at [address] [facility] AA&/STRD TFRML EPI LUMBAR/SACRAL EA ADDL Right 10/5/2018    Right L2 & L3 TRANSFORAMINAL performed by Gianna Teixeira MD at [address] [facility] AA&/STRD TFRML EPI LUMBAR/SACRAL EA ADDL Right 10/26/2018    Right L2 L3 TRANSFORAMINAL performed by Gianna Teixeira MD at [facility] Right 2019    Right  L2 L3 L4 L5 RADIOFREQUENCY performed by Gianna Teixeira MD at [address] [facility] Cervical cancer. Had XRT    TONSILLECTOMY       Family History   Problem Relation Age of Onset    Heart Disease Mother     High Blood Pressure Mother    [facility] Stroke Mother     Glaucoma Mother     Early Death Father     Cancer Father         stomach    Miscarriages / Stillbirths Maternal Uncle        Social History     Socioeconomic History    Marital status:      Spouse name: Not on file    Number of children: Not on file    Years of education: Not on file    Highest education level: Not on file   Occupational History    Not on file   Tobacco Use    Smoking status: Former Smoker     Packs/day: 1.00     Years: 35.00     Pack years: 35.00     Quit date: 2001     Years since quittin.0    Smokeless tobacco: Never Used   Vaping Use    Vaping Use: Never used   Substance and Sexual Activity    Alcohol use: No    Drug use: No    Sexual activity: Never   Other Topics Concern    Not on file   Social History Narrative    Not on file     Social Determinants of Health     Financial Resource Strain: Low Risk     Difficulty of Paying Living Expenses: Not hard at all   Food Insecurity: No Food Insecurity    Worried About [address] in the Last Year: Never true    [address] [employer] in the Last Year: Never true   Transportation Needs:     Lack of Transportation (Medical):      Lack of Transportation (Non-Medical):    Physical Activity:     Days of Exercise per Week:     Minutes of Exercise per Session:    Stress:     Feeling of Stress :    Social Connections:     Frequency of Communication with Friends and Family:     Frequency of Social Gatherings with Friends and Family:     Attends [employer] Services:     Active Member of Clubs or Organizations:     Attends Club or Organization Meetings:     Marital Status:    Intimate Partner Violence:     Fear of Current or Ex-Partner:     Emotionally Abused:     Physically Abused:     Sexually Abused:        Review of Systems Constitutional: Negative for fatigue and fever. HENT: Negative for postnasal drip, rhinorrhea, sinus pressure and sinus pain. Respiratory: Negative for cough, chest tightness, shortness of breath, wheezing and stridor. Cardiovascular: Negative for chest pain and leg swelling. Gastrointestinal: Negative for constipation, diarrhea, nausea and vomiting. Genitourinary: Negative for dysuria, frequency and urgency. Musculoskeletal: Negative for arthralgias, joint swelling and myalgias. Skin: Negative for rash. Neurological: Negative for dizziness, speech difficulty and headaches. Hematological: Does not bruise/bleed easily. Psychiatric/Behavioral: Negative for agitation, hallucinations, sleep disturbance and suicidal ideas.        Objective:       Physical Exam  General appearance - alert, well appearing, and in no distress, oriented to person, place, and time and overweight  Mental status - alert, oriented to person, place, and time, normal mood, behavior, speech, dress, motor activity, and thought processes  Eyes - pupils equal and reactive, extraocular eye movements intact  Ears - not examined  Nose - normal and patent, no erythema, discharge or polyps  Mouth - mucous membranes moist, pharynx normal without lesions  Neck - supple, no significant adenopathy  Chest - clear to auscultation, no wheezes, rales or rhonchi, symmetric air entry  Heart -normal rate, regular rhythm, normal S1, S2, no murmurs, rubs, clicks or gallops  Abdomen - soft, nontender, nondistended, no masses or organomegaly  Neuro- alert, oriented, normal speech, no focal findings or movement disorder noted}  Extremities - peripheral pulses normal, no pedal edema, no clubbing or cyanosis  Skin - normal coloration and turgor, no rashes, no suspicious skin lesions noted     Wt Readings from Last 3 Encounters:   10/13/21 265 lb (120.2 kg)   08/16/21 269 lb (122 kg)   04/14/21 270 lb (122.5 kg)       Results for orders placed or performed during the hospital encounter of 03/10/21   Culture, Aerobic    Specimen: Chest   Result Value Ref Range    Specimen Description . CHEST     Special Requests NOT REPORTED     Direct Exam FEW NEUTROPHILS (A)     Direct Exam NO ORGANISMS SEEN     Culture NORMAL SKIN SERENA (A)        No results found. Assessment:      1. LEANDRO on CPAP    2. Bronchiectasis without complication ([facility]    3. Morbid obesity with BMI of 45.0-49.9, adult ([facility]          Plan:      1. Medications reviewed, continue as ordered. 2. Educated and clarified the medication use. 3. Recommend flu vaccination in the fall annually. Due now  4. Patient is up-to-date with vaccinations from pulmonary perspective. Patient is up-to-date with Covid vaccine, due for Weeks Peter booster. 5. Maintain an active lifestyle. 6. Patient's questions were answered to her satisfaction. 7. Pulmonary function tests were reviewed  8. CT scan of the chest was reviewed  9. CPAP/BiPAP to be used for at least 4 hours every night. Use humidifier if needed. Weight loss was recommended and discussed. Recommended following good sleep hygiene instructions. Sleep hygiene instructions given to the patient. Explained importance of compliance with treatment of sleep apnea. Compliance data was reviewed and the patient is  compliant per insurance requirement.   10. We'll see the patient back in 12 months            Electronically signed by DAVID Joseph CNP on 10/13/2021 at 4:01 PM

## 2021-10-04 DIAGNOSIS — K21.9 GASTROESOPHAGEAL REFLUX DISEASE WITHOUT ESOPHAGITIS: ICD-10-CM

## 2021-10-04 DIAGNOSIS — G45.9 TIA (TRANSIENT ISCHEMIC ATTACK): ICD-10-CM

## 2021-10-04 DIAGNOSIS — I67.82 CHRONIC CEREBRAL ISCHEMIA: ICD-10-CM

## 2021-10-04 DIAGNOSIS — E11.42 TYPE 2 DIABETES MELLITUS WITH DIABETIC POLYNEUROPATHY, WITH LONG-TERM CURRENT USE OF INSULIN (HCC): ICD-10-CM

## 2021-10-04 DIAGNOSIS — I10 ESSENTIAL HYPERTENSION: ICD-10-CM

## 2021-10-04 DIAGNOSIS — J01.00 ACUTE NON-RECURRENT MAXILLARY SINUSITIS: ICD-10-CM

## 2021-10-04 DIAGNOSIS — Z79.4 TYPE 2 DIABETES MELLITUS WITH DIABETIC POLYNEUROPATHY, WITH LONG-TERM CURRENT USE OF INSULIN (HCC): ICD-10-CM

## 2021-10-04 DIAGNOSIS — F32.A DEPRESSION, UNSPECIFIED DEPRESSION TYPE: ICD-10-CM

## 2021-10-04 RX ORDER — PANTOPRAZOLE SODIUM 40 MG/1
TABLET, DELAYED RELEASE ORAL
Qty: 90 TABLET | Refills: 3 | Status: SHIPPED | OUTPATIENT
Start: 2021-10-04 | End: 2022-08-30

## 2021-10-04 RX ORDER — FUROSEMIDE 40 MG/1
TABLET ORAL
Qty: 90 TABLET | Refills: 3 | Status: SHIPPED | OUTPATIENT
Start: 2021-10-04 | End: 2022-08-30

## 2021-10-04 RX ORDER — INSULIN ASPART 100 [IU]/ML
INJECTION, SOLUTION INTRAVENOUS; SUBCUTANEOUS
Qty: 15 ML | Refills: 3 | Status: SHIPPED | OUTPATIENT
Start: 2021-10-04 | End: 2022-09-01

## 2021-10-04 RX ORDER — BUPROPION HYDROCHLORIDE 75 MG/1
TABLET ORAL
Qty: 180 TABLET | Refills: 3 | Status: SHIPPED | OUTPATIENT
Start: 2021-10-04 | End: 2022-08-30

## 2021-10-04 RX ORDER — FLUTICASONE PROPIONATE 50 MCG
SPRAY, SUSPENSION (ML) NASAL
Qty: 48 G | Refills: 3 | Status: SHIPPED | OUTPATIENT
Start: 2021-10-04

## 2021-10-04 RX ORDER — CLOPIDOGREL BISULFATE 75 MG/1
TABLET ORAL
Qty: 90 TABLET | Refills: 3 | Status: SHIPPED | OUTPATIENT
Start: 2021-10-04 | End: 2022-08-30

## 2021-10-04 RX ORDER — AMITRIPTYLINE HYDROCHLORIDE 25 MG/1
TABLET, FILM COATED ORAL
Qty: 90 TABLET | Refills: 3 | Status: SHIPPED | OUTPATIENT
Start: 2021-10-04 | End: 2022-08-30

## 2021-10-04 RX ORDER — AMLODIPINE BESYLATE 5 MG/1
TABLET ORAL
Qty: 90 TABLET | Refills: 3 | Status: SHIPPED | OUTPATIENT
Start: 2021-10-04 | End: 2021-10-26

## 2021-10-04 RX ORDER — CITALOPRAM 10 MG/1
TABLET ORAL
Qty: 90 TABLET | Refills: 3 | Status: SHIPPED | OUTPATIENT
Start: 2021-10-04 | End: 2022-08-30

## 2021-10-04 RX ORDER — LISINOPRIL 10 MG/1
TABLET ORAL
Qty: 90 TABLET | Refills: 3 | Status: SHIPPED | OUTPATIENT
Start: 2021-10-04 | End: 2021-10-20 | Stop reason: ALTCHOICE

## 2021-10-11 ENCOUNTER — TELEPHONE (OUTPATIENT)
Dept: INTERNAL MEDICINE | Age: 71
End: 2021-10-11
Payer: MEDICARE

## 2021-10-11 DIAGNOSIS — E11.42 TYPE 2 DIABETES MELLITUS WITH DIABETIC POLYNEUROPATHY, WITH LONG-TERM CURRENT USE OF INSULIN (HCC): ICD-10-CM

## 2021-10-11 DIAGNOSIS — Z79.4 TYPE 2 DIABETES MELLITUS WITH DIABETIC POLYNEUROPATHY, WITH LONG-TERM CURRENT USE OF INSULIN (HCC): ICD-10-CM

## 2021-10-11 DIAGNOSIS — M54.41 CHRONIC RIGHT-SIDED LOW BACK PAIN WITH RIGHT-SIDED SCIATICA: ICD-10-CM

## 2021-10-11 DIAGNOSIS — K74.60 HEPATIC CIRRHOSIS, UNSPECIFIED HEPATIC CIRRHOSIS TYPE, UNSPECIFIED WHETHER ASCITES PRESENT (HCC): Primary | ICD-10-CM

## 2021-10-11 DIAGNOSIS — G89.29 CHRONIC RIGHT-SIDED LOW BACK PAIN WITH RIGHT-SIDED SCIATICA: ICD-10-CM

## 2021-10-11 PROCEDURE — G0179 MD RECERTIFICATION HHA PT: HCPCS | Performed by: INTERNAL MEDICINE

## 2021-10-11 NOTE — TELEPHONE ENCOUNTER
[facility] Recertification for [facility] on 10/11/2021

## 2021-10-13 ENCOUNTER — OFFICE VISIT (OUTPATIENT)
Dept: PULMONOLOGY | Age: 71
End: 2021-10-13
Payer: MEDICARE

## 2021-10-13 VITALS
OXYGEN SATURATION: 96 % | HEART RATE: 55 BPM | WEIGHT: 265 LBS | BODY MASS INDEX: 46.95 KG/M2 | HEIGHT: 63 IN | DIASTOLIC BLOOD PRESSURE: 74 MMHG | TEMPERATURE: 97.7 F | SYSTOLIC BLOOD PRESSURE: 112 MMHG

## 2021-10-13 DIAGNOSIS — E66.01 MORBID OBESITY WITH BMI OF 45.0-49.9, ADULT (HCC): ICD-10-CM

## 2021-10-13 DIAGNOSIS — G47.33 OSA ON CPAP: Primary | ICD-10-CM

## 2021-10-13 DIAGNOSIS — Z99.89 OSA ON CPAP: Primary | ICD-10-CM

## 2021-10-13 DIAGNOSIS — J47.9 BRONCHIECTASIS WITHOUT COMPLICATION (HCC): ICD-10-CM

## 2021-10-13 PROCEDURE — 3017F COLORECTAL CA SCREEN DOC REV: CPT | Performed by: NURSE PRACTITIONER

## 2021-10-13 PROCEDURE — G8417 CALC BMI ABV UP PARAM F/U: HCPCS | Performed by: NURSE PRACTITIONER

## 2021-10-13 PROCEDURE — 99213 OFFICE O/P EST LOW 20 MIN: CPT | Performed by: NURSE PRACTITIONER

## 2021-10-13 PROCEDURE — G8400 PT W/DXA NO RESULTS DOC: HCPCS | Performed by: NURSE PRACTITIONER

## 2021-10-13 PROCEDURE — 1036F TOBACCO NON-USER: CPT | Performed by: NURSE PRACTITIONER

## 2021-10-13 PROCEDURE — G8427 DOCREV CUR MEDS BY ELIG CLIN: HCPCS | Performed by: NURSE PRACTITIONER

## 2021-10-13 PROCEDURE — G8484 FLU IMMUNIZE NO ADMIN: HCPCS | Performed by: NURSE PRACTITIONER

## 2021-10-13 PROCEDURE — 1123F ACP DISCUSS/DSCN MKR DOCD: CPT | Performed by: NURSE PRACTITIONER

## 2021-10-13 PROCEDURE — 1090F PRES/ABSN URINE INCON ASSESS: CPT | Performed by: NURSE PRACTITIONER

## 2021-10-13 PROCEDURE — 4040F PNEUMOC VAC/ADMIN/RCVD: CPT | Performed by: NURSE PRACTITIONER

## 2021-10-13 RX ORDER — TRAMADOL HYDROCHLORIDE 50 MG/1
1 TABLET ORAL PRN
COMMUNITY
Start: 2021-09-20 | End: 2021-10-26

## 2021-10-13 RX ORDER — ALBUTEROL SULFATE 90 UG/1
2 AEROSOL, METERED RESPIRATORY (INHALATION) 4 TIMES DAILY
Qty: 54 G | Refills: 3 | Status: SHIPPED | OUTPATIENT
Start: 2021-10-13 | End: 2022-08-30 | Stop reason: SDUPTHER

## 2021-10-13 RX ORDER — DILTIAZEM HYDROCHLORIDE 60 MG/1
2 TABLET, FILM COATED ORAL 2 TIMES DAILY
Qty: 3 EACH | Refills: 3 | Status: SHIPPED | OUTPATIENT
Start: 2021-10-13 | End: 2021-11-01 | Stop reason: SDUPTHER

## 2021-10-13 ASSESSMENT — ENCOUNTER SYMPTOMS
RHINORRHEA: 0
DIARRHEA: 0
NAUSEA: 0
SHORTNESS OF BREATH: 0
CONSTIPATION: 0
COUGH: 0
VOMITING: 0
SINUS PAIN: 0
SINUS PRESSURE: 0
WHEEZING: 0
CHEST TIGHTNESS: 0
STRIDOR: 0

## 2021-10-15 DIAGNOSIS — R53.82 CHRONIC FATIGUE: ICD-10-CM

## 2021-10-15 DIAGNOSIS — M81.0 OSTEOPOROSIS, UNSPECIFIED OSTEOPOROSIS TYPE, UNSPECIFIED PATHOLOGICAL FRACTURE PRESENCE: ICD-10-CM

## 2021-10-15 NOTE — TELEPHONE ENCOUNTER
Patient requesting a refill on magnesium oxide and calcium carbonate-vitamin D3 to [facility] in [place]     Last appointment: 8/16/2021  Next appointment: 2/17/2022 6 month fu

## 2021-10-18 DIAGNOSIS — E11.42 TYPE 2 DIABETES MELLITUS WITH DIABETIC POLYNEUROPATHY, WITH LONG-TERM CURRENT USE OF INSULIN (HCC): ICD-10-CM

## 2021-10-18 DIAGNOSIS — Z79.4 TYPE 2 DIABETES MELLITUS WITH DIABETIC POLYNEUROPATHY, WITH LONG-TERM CURRENT USE OF INSULIN (HCC): ICD-10-CM

## 2021-10-18 RX ORDER — INSULIN GLARGINE 100 [IU]/ML
INJECTION, SOLUTION SUBCUTANEOUS
Qty: 3 EACH | Refills: 3 | Status: SHIPPED | OUTPATIENT
Start: 2021-10-18 | End: 2021-10-26 | Stop reason: SDUPTHER

## 2021-10-18 RX ORDER — PEN NEEDLE, DIABETIC 31 GX5/16"
NEEDLE, DISPOSABLE MISCELLANEOUS
Qty: 180 EACH | Refills: 3 | Status: SHIPPED | OUTPATIENT
Start: 2021-10-18

## 2021-10-19 DIAGNOSIS — Z79.4 TYPE 2 DIABETES MELLITUS WITH DIABETIC POLYNEUROPATHY, WITH LONG-TERM CURRENT USE OF INSULIN (HCC): ICD-10-CM

## 2021-10-19 DIAGNOSIS — E11.42 TYPE 2 DIABETES MELLITUS WITH DIABETIC POLYNEUROPATHY, WITH LONG-TERM CURRENT USE OF INSULIN (HCC): ICD-10-CM

## 2021-10-19 RX ORDER — INSULIN GLARGINE 100 [IU]/ML
INJECTION, SOLUTION SUBCUTANEOUS
Qty: 3 EACH | Refills: 3 | Status: SHIPPED | OUTPATIENT
Start: 2021-10-19 | End: 2022-10-07 | Stop reason: SDUPTHER

## 2021-10-19 RX ORDER — BLOOD SUGAR DIAGNOSTIC
1 STRIP MISCELLANEOUS 2 TIMES DAILY
Qty: 200 EACH | Refills: 3 | Status: SHIPPED | OUTPATIENT
Start: 2021-10-19

## 2021-10-19 NOTE — TELEPHONE ENCOUNTER
Patient needs a new script for her lantus and her syringes sent to [address] [place] [address]  Original script from 10/18/21 had missing information.     Scripts re pended if approved

## 2021-10-20 ENCOUNTER — OFFICE VISIT (OUTPATIENT)
Dept: CARDIOLOGY | Age: 71
End: 2021-10-20
Payer: MEDICARE

## 2021-10-20 VITALS
WEIGHT: 263.4 LBS | HEART RATE: 69 BPM | DIASTOLIC BLOOD PRESSURE: 54 MMHG | BODY MASS INDEX: 46.67 KG/M2 | SYSTOLIC BLOOD PRESSURE: 95 MMHG | HEIGHT: 63 IN

## 2021-10-20 DIAGNOSIS — I10 ESSENTIAL HYPERTENSION: ICD-10-CM

## 2021-10-20 DIAGNOSIS — I25.10 CORONARY ARTERY DISEASE INVOLVING NATIVE CORONARY ARTERY OF NATIVE HEART WITHOUT ANGINA PECTORIS: Primary | ICD-10-CM

## 2021-10-20 DIAGNOSIS — E78.5 HYPERLIPIDEMIA, UNSPECIFIED HYPERLIPIDEMIA TYPE: ICD-10-CM

## 2021-10-20 PROCEDURE — 1036F TOBACCO NON-USER: CPT | Performed by: INTERNAL MEDICINE

## 2021-10-20 PROCEDURE — 93005 ELECTROCARDIOGRAM TRACING: CPT | Performed by: INTERNAL MEDICINE

## 2021-10-20 PROCEDURE — 1090F PRES/ABSN URINE INCON ASSESS: CPT | Performed by: INTERNAL MEDICINE

## 2021-10-20 PROCEDURE — 4040F PNEUMOC VAC/ADMIN/RCVD: CPT | Performed by: INTERNAL MEDICINE

## 2021-10-20 PROCEDURE — G8400 PT W/DXA NO RESULTS DOC: HCPCS | Performed by: INTERNAL MEDICINE

## 2021-10-20 PROCEDURE — G8427 DOCREV CUR MEDS BY ELIG CLIN: HCPCS | Performed by: INTERNAL MEDICINE

## 2021-10-20 PROCEDURE — G8484 FLU IMMUNIZE NO ADMIN: HCPCS | Performed by: INTERNAL MEDICINE

## 2021-10-20 PROCEDURE — 3017F COLORECTAL CA SCREEN DOC REV: CPT | Performed by: INTERNAL MEDICINE

## 2021-10-20 PROCEDURE — 1123F ACP DISCUSS/DSCN MKR DOCD: CPT | Performed by: INTERNAL MEDICINE

## 2021-10-20 PROCEDURE — 99214 OFFICE O/P EST MOD 30 MIN: CPT | Performed by: INTERNAL MEDICINE

## 2021-10-20 PROCEDURE — 99213 OFFICE O/P EST LOW 20 MIN: CPT | Performed by: INTERNAL MEDICINE

## 2021-10-20 PROCEDURE — 93010 ELECTROCARDIOGRAM REPORT: CPT | Performed by: INTERNAL MEDICINE

## 2021-10-20 PROCEDURE — G8417 CALC BMI ABV UP PARAM F/U: HCPCS | Performed by: INTERNAL MEDICINE

## 2021-10-20 NOTE — PROGRESS NOTES
Today's Date: 10/20/2021  Patient's Name: Kuldip Clayton  Patient's age: 66 y. o., 1950    Subjective:  Kuldip Clayton is being seen in clinic today regarding CAD, HTN, HL    she is here for follow up. She denies any chest pain. She has chronic dyspnea on exertion. She had COPD and sees pulmonary. No PND, no syncope or pre-syncope, no orthopnea. She reports feeling tired. Today her BP is low. She has been checking BP which has been running 100-129/44-74        Past Medical History:   has a past medical history of Arthritis, Asthma, CAD (coronary artery disease), Cancer ([address] Carotid artery disease ([address] Cataracts, bilateral, COPD (chronic obstructive pulmonary disease) ([address] Coronary artery disease, Eczema, Fibromyalgia, Glaucoma, H/O blood clots, Headache, Heart attack ([address] Hx of Bell's palsy, Hypertension, Liver disease, Mild dementia ([address] Neuropathy, Osteoporosis, Sleep apnea, Tremor, and Type 2 diabetes mellitus with hyperglycemia ([address] Past Surgical History:   has a past surgical history that includes Appendectomy (); Induced  (); Tonsillectomy (); Bariatric Surgery (); Colonoscopy (2012); pr exc skin benig 1.1-2cm face,facial (Left, 2018); pr njx aa&/strd tfrml epi lumbar/sacral ea addl (Right, 10/5/2018); pr njx aa&/strd tfrml epi lumbar/sacral ea addl (Right, 10/26/2018); Lumbar spine surgery (Right, 2019); Coronary angioplasty with stent; Lumbar spine surgery (Right, 2019); Lumbar spine surgery (Right, 2019); Anesthesia Nerve Block (Right, 2019); greer and bso (cervix removed) (); Anesthesia Nerve Block (Right, 2019); RADIOFREQUENCY ABLATION NERVES (Right, 2019); Colonoscopy (N/A, 2019); Lumbar spine surgery (Right, 2019); Pain management procedure (Right, 2020); and Pain management procedure (Right, 2020).     Home Medications:  Prior to Admission medications    Medication Sig Start Date End Date TABLET EVERY DAY 10/4/21   Toni Prince MD   citalopram (CELEXA) 10 MG tablet TAKE 1 TABLET EVERY DAY 10/4/21   Toni Prince MD   fluticasone Cori Job) 50 MCG/ACT nasal spray USE 2 SPRAYS NASALLY EVERY DAY 10/4/21   Toni Prince MD   Misc. Devices MISC It is in my medical opinion that Rachel Bowman be allowed to have a kitten in her apartment at all times for companionship to help with her Depression, Anxiety, Fibromyalgia and Osteoarthritis. 8/17/21   Toni Prince MD   dorzolamide (TRUSOPT) 2 % ophthalmic solution Apply to eye 3 times daily 7/19/21   Historical Provider, MD   Ascorbic Acid (VITAMIN C) 500 MG CAPS Take 500 mg by mouth 2 times daily 6/7/21   Toni Prince MD   metoprolol succinate (TOPROL XL) 25 MG extended release tablet Take 0.5 tablets by mouth daily 4/30/21   Jeremiah Lei MD   gabapentin (NEURONTIN) 300 MG capsule TAKE 2 CAPSULES TWICE DAILY 3/8/21 10/13/21  Toni Prince MD   senna (SENOKOT) 8.6 MG TABS tablet TAKE 1 TABLET BY MOUTH TWICE DAILY 2/2/21   Toni Prince MD   spironolactone (ALDACTONE) 50 MG tablet TAKE 1 TABLET TWICE DAILY 1/25/21   Toni Prince MD   vitamin D3 (CHOLECALCIFEROL) 25 MCG (1000 UT) TABS tablet TAKE 3 TABLETS BY MOUTH TWICE A DAY. 1/18/21   Toni Prince MD   potassium chloride (MICRO-K) 10 MEQ extended release capsule TAKE 1 CAPSULE TWICE DAILY 12/17/20   Toni Prince MD   CONTOUR NEXT TEST strip TEST FOUR TIMES DAILY 12/11/20   Toni Prince MD   alendronate (FOSAMAX) 70 MG tablet TAKE 1 TABLET EVERY WEEK 12/7/20   Toni Prince MD   atorvastatin (LIPITOR) 40 MG tablet TAKE 1 TABLET EVERY DAY 11/30/20   Toni Prince MD   Microlet Lancets MISC USE TO TEST 4 TIMES A DAY.  11/23/20   Toni Prince MD   pyridoxine (B-6) 100 MG tablet Take 50 mg by mouth daily    Historical Provider, MD   Biotin 72063 MCG TABS Take by mouth    Historical Provider, MD   Respiratory Therapy Supplies [facility] 8/13/20 Fredy Ackerman MD   ibuprofen (ADVIL;MOTRIN) 400 MG tablet TAKE 1 TABLET EVERY 6 HOURS AS NEEDED FOR PAIN 7/27/20   Fredy Ackerman MD   vitamin C (ASCORBIC ACID) 500 MG tablet TAKE 1 TABLET BY MOUTH TWICE A DAY. 6/1/20   Fredy Ackerman MD   nitroGLYCERIN (NITROSTAT) 0.4 MG SL tablet PLACE 1 TABLET UNER THE TONGUE EVERY 5 MINUTES AS NEEDED FOR CHEST PAIN AT FIRST SIGN OF CHEST PAIN.   Patient not taking: Reported on 10/13/2021 5/14/20   Fredy Ackerman MD   albuterol (PROVENTIL) (2.5 MG/3ML) 0.083% nebulizer solution Take 3 mLs by nebulization every 6 hours as needed for Wheezing 6/12/19   DAVID Borjas CNP   triamcinolone (KENALOG) 0.1 % cream APPLY TOPICALLY TWICE DAILY 9/18/18   Fredy Ackerman MD   VITAMIN A PO Take 2,400 mcg by mouth daily    Historical Provider, MD   vitamin E 400 UNIT capsule Take 400 Units by mouth daily    Historical Provider, MD   COMBIGAN 0.2-0.5 % ophthalmic solution PLACE 1 DROP INTO BOTH EYES DAILY 1/2/18   Fredy Ackerman MD   Compression Stockings MISC by Does not apply route 15-20mmHg  Knee high  Open tow  With assist device to help put them on  Patient not taking: Reported on 10/13/2021 5/10/17   Gray Bains DO   Zinc 50 MG TABS Take 50 mg by mouth daily    Historical Provider, MD   niacin 500 MG extended release capsule Take 500 mg by mouth daily    Historical Provider, MD   folic acid (FOLVITE) 864 MCG tablet Take 800 mcg by mouth daily    Historical Provider, MD   Cyanocobalamin (VITAMIN B12 PO) Take 2,500 mcg by mouth daily    Historical Provider, MD   B Complex-C (SUPER B COMPLEX PO) Take 1 tablet by mouth daily     Historical Provider, MD   travoprost, benzalkonium, (TRAVATAN) 0.004 % ophthalmic solution Place 1 drop into both eyes daily 10/7/16   Fredy Ackerman MD   CPAP Machine MISC by Does not apply route Pressure of 13 (AirSense 10)  [facility].    Historical Provider, MD   Multiple Vitamin (MULTI VITAMIN DAILY) TABS Take by mouth daily Historical Provider, MD       Allergies:  Patient has no known allergies. Social History:   reports that she quit smoking about 20 years ago. She has a 35.00 pack-year smoking history. She has never used smokeless tobacco. She reports that she does not drink alcohol and does not use drugs. Family History: family history includes Cancer in her father; Early Death in her father; Glaucoma in her mother; Heart Disease in her mother; High Blood Pressure in her mother; Deidra Lull / [place] in her maternal uncle; Stroke in her mother. No h/o sudden cardiac death. No for premature CAD    REVIEW OF SYSTEMS:    · Constitutional: there has been no unanticipated weight loss. There's been No change in energy level, No change in activity level. · Eyes: No visual changes or diplopia. No scleral icterus. · ENT: No Headaches, hearing loss or vertigo. No mouth sores or sore throat. · Cardiovascular: see above  · Respiratory: see above  · Gastrointestinal: No abdominal pain, appetite loss, blood in stools. · Genitourinary: No dysuria, trouble voiding, or hematuria. · Musculoskeletal:  No gait disturbance, No weakness or joint complaints. · Integumentary: No rash or pruritis. · Neurological: No headache or diplopia. No tingling  · Psychiatric: No anxiety, or depression. · Endocrine: No temperature intolerance. · Hematologic/Lymphatic: No abnormal bruising or bleeding, blood clots or swollen lymph nodes. · Allergic/Immunologic: No nasal congestion or hives. PHYSICAL EXAM:      [facility] 12/07/1995 (Approximate)    Vitals:    10/20/21 1352   BP: (!) 95/54   Pulse: 69       Constitutional and General Appearance: alert, cooperative, no distress and appears stated age  HEENT: PERRL, no cervical lymphadenopathy. No masses palpable. Normal oral mucosa  Respiratory:  · Normal excursion and expansion without use of accessory muscles  · Resp Auscultation: Good respiratory effort. No for increased work of breathing.  On auscultation: clear to auscultation bilaterally  Cardiovascular:  · Heart tones are crisp and normal. regular S1 and S2.  · Jugular venous pulsation Normal  · The carotid upstroke is normal in amplitude and contour without delay or bruit   Abdomen:   · soft  · Bowel sounds present  Extremities:  ·  No edema  Neurological:  · Alert and oriented. Cardiac Data:  EKG: SR, inferior infarction, Poor R wave progression    Labs:     CBC: No results for input(s): WBC, HGB, HCT, PLT in the last 72 hours. BMP: No results for input(s): NA, K, CO2, BUN, CREATININE, LABGLOM, GLUCOSE in the last 72 hours. PT/INR: No results for input(s): PROTIME, INR in the last 72 hours. FASTING LIPID PANEL:  Lab Results   Component Value Date    HDL 55 02/18/2021    LDLCALC 80 05/19/2016    TRIG 66 07/27/2020     LIVER PROFILE:No results for input(s): AST, ALT, LABALBU in the last 72 hours.     Problem List:  Patient Active Problem List   Diagnosis    Obstructive sleep apnea    Chronic fatigue    Coronary artery disease due to lipid rich plaque    Vitamin D deficiency    Type 2 diabetes mellitus with diabetic polyneuropathy, with long-term current use of insulin (HCC)    Peripheral polyneuropathy    Bilateral leg edema    Right hip pain    Muscle ache    Hx of Bell's palsy    Memory problem    Gait abnormality    Balance problem    H/O falling    Dizziness    Intractable episodic tension-type headache    Essential tremor    Anxiety and depression    Elevated hemoglobin A1c    VBI (vertebrobasilar insufficiency)    Chronic cerebral ischemia    TIA (transient ischemic attack)    Chronic tension headache    Morbid obesity with BMI of 45.0-49.9, adult (HCC)    Chronic right-sided low back pain with right-sided sciatica    Lumbar radiculopathy    Encounter for chronic pain management    Carotid stenosis, asymptomatic, bilateral    Chronic tension-type headache, not intractable    Lumbosacral spondylosis without myelopathy    DDD (degenerative disc disease), lumbar    Acquired spondylolisthesis    Lumbar facet joint syndrome    Chronic diastolic congestive heart failure (HCC)    [address]chiectasis without complication [address] [address] [facility]        LAST CATH 6/15- minimal non obstructive CAD,  mRCA 5%, EF 65%     Holter 1/9/18- 7 beat run of PAT      TTE 1/9/18  CONCLUSIONS:  1.  Normal LV size and function with ejection fraction of 65%. 2.  Grade 1 diastolic dysfunction. 3.  Mild concentric LVH. 4.  Mild biatrial enlargement. 5.  No significant valvulopathies. 6.  No effusion. 7.  Mildly dilated RV with normal function.     Nuclear Stress 5/2019:  IMPRESSION:  1. No ischemia or infarction. 2. Normal LV systolic function. EF 57%.      TTE 12/7/2020  Summary  Normal left ventricular diameter. Left ventricular systolic function is normal. Left ventricular ejectionfraction 65 %. Right ventricular dilatation with normal systolic function. Aortic valve is sclerotic but opens well. Significant mild mitral valve thickening with annular calcification. Trivial mitral regurgitation. Mild tricuspid regurgitation. Estimated right ventricular systolic pressure is 20 mmHg. Holter 4/2021- PAT     Assessment and Plan:     1. CAD with Hx of BMS to RCA in 2002, Cath in 2015- non obstructive CAD. Negative Nuclear Stress 5/2019. Stable denies any chest pain. Continue plavix  2. TTE 12/7/2020 showed LVEF 65%. Had negative stress test 5/2019  3. HFpEF- stable. On lasix and aldactone. Elevated leg, compression stocking. Take norvasc at night time. Extra lasix dose PRN for swelling.   4. Palpitations- PAT. Continue small dose of metoprolol  5. H/o Orthostatic hypotension. Wear compression stockings  6. LEANDRO/COPD- on CPAP- follows with pulmonary  7. Patient to follow up with PCP and neurology for falls. Has history of orthostatic hypotension. Risk of bleeding discussed with patient on plavix. Patient wants to continue plavix. 8. HTN- BP low.  on metoprolol, ACEi and norvasc. DC lisinopril. Monitor BP at home  9. DL- LDL 44 on labs 2/18/21- continue statin.   10.  RTC 1 month    Sincere Collado [id number] [facility]  [phone]

## 2021-10-26 ENCOUNTER — OFFICE VISIT (OUTPATIENT)
Dept: INTERNAL MEDICINE | Age: 71
End: 2021-10-26
Payer: MEDICARE

## 2021-10-26 VITALS
HEART RATE: 51 BPM | DIASTOLIC BLOOD PRESSURE: 78 MMHG | HEIGHT: 63 IN | SYSTOLIC BLOOD PRESSURE: 122 MMHG | RESPIRATION RATE: 16 BRPM | WEIGHT: 265 LBS | BODY MASS INDEX: 46.95 KG/M2

## 2021-10-26 DIAGNOSIS — I25.10 CORONARY ARTERY DISEASE DUE TO LIPID RICH PLAQUE: ICD-10-CM

## 2021-10-26 DIAGNOSIS — I25.83 CORONARY ARTERY DISEASE DUE TO LIPID RICH PLAQUE: ICD-10-CM

## 2021-10-26 DIAGNOSIS — M79.671 BILATERAL FOOT PAIN: ICD-10-CM

## 2021-10-26 DIAGNOSIS — I50.32 CHF (CONGESTIVE HEART FAILURE), NYHA CLASS I, CHRONIC, DIASTOLIC (HCC): Primary | ICD-10-CM

## 2021-10-26 DIAGNOSIS — E11.42 TYPE 2 DIABETES MELLITUS WITH DIABETIC POLYNEUROPATHY, WITH LONG-TERM CURRENT USE OF INSULIN (HCC): ICD-10-CM

## 2021-10-26 DIAGNOSIS — Z79.4 TYPE 2 DIABETES MELLITUS WITH DIABETIC POLYNEUROPATHY, WITH LONG-TERM CURRENT USE OF INSULIN (HCC): ICD-10-CM

## 2021-10-26 DIAGNOSIS — M79.672 BILATERAL FOOT PAIN: ICD-10-CM

## 2021-10-26 DIAGNOSIS — R60.0 BILATERAL LEG EDEMA: ICD-10-CM

## 2021-10-26 DIAGNOSIS — I50.32 CHRONIC DIASTOLIC CONGESTIVE HEART FAILURE (HCC): Primary | ICD-10-CM

## 2021-10-26 DIAGNOSIS — D64.9 ANEMIA, UNSPECIFIED TYPE: ICD-10-CM

## 2021-10-26 DIAGNOSIS — L03.115 CELLULITIS OF RIGHT LOWER EXTREMITY: ICD-10-CM

## 2021-10-26 PROCEDURE — 3017F COLORECTAL CA SCREEN DOC REV: CPT | Performed by: INTERNAL MEDICINE

## 2021-10-26 PROCEDURE — 99214 OFFICE O/P EST MOD 30 MIN: CPT | Performed by: INTERNAL MEDICINE

## 2021-10-26 PROCEDURE — 1123F ACP DISCUSS/DSCN MKR DOCD: CPT | Performed by: INTERNAL MEDICINE

## 2021-10-26 PROCEDURE — G8417 CALC BMI ABV UP PARAM F/U: HCPCS | Performed by: INTERNAL MEDICINE

## 2021-10-26 PROCEDURE — 4040F PNEUMOC VAC/ADMIN/RCVD: CPT | Performed by: INTERNAL MEDICINE

## 2021-10-26 PROCEDURE — G8484 FLU IMMUNIZE NO ADMIN: HCPCS | Performed by: INTERNAL MEDICINE

## 2021-10-26 PROCEDURE — 1090F PRES/ABSN URINE INCON ASSESS: CPT | Performed by: INTERNAL MEDICINE

## 2021-10-26 PROCEDURE — 1036F TOBACCO NON-USER: CPT | Performed by: INTERNAL MEDICINE

## 2021-10-26 PROCEDURE — G8427 DOCREV CUR MEDS BY ELIG CLIN: HCPCS | Performed by: INTERNAL MEDICINE

## 2021-10-26 PROCEDURE — 2022F DILAT RTA XM EVC RTNOPTHY: CPT | Performed by: INTERNAL MEDICINE

## 2021-10-26 PROCEDURE — 3051F HG A1C>EQUAL 7.0%<8.0%: CPT | Performed by: INTERNAL MEDICINE

## 2021-10-26 PROCEDURE — G8400 PT W/DXA NO RESULTS DOC: HCPCS | Performed by: INTERNAL MEDICINE

## 2021-10-26 RX ORDER — HYDROCODONE BITARTRATE AND ACETAMINOPHEN 5; 325 MG/1; MG/1
1 TABLET ORAL EVERY 4 HOURS PRN
Qty: 60 TABLET | Refills: 0 | Status: SHIPPED | OUTPATIENT
Start: 2021-10-26 | End: 2021-10-26 | Stop reason: SDUPTHER

## 2021-10-26 RX ORDER — AMOXICILLIN AND CLAVULANATE POTASSIUM 875; 125 MG/1; MG/1
1 TABLET, FILM COATED ORAL 2 TIMES DAILY
Qty: 20 TABLET | Refills: 0 | Status: SHIPPED | OUTPATIENT
Start: 2021-10-26 | End: 2021-11-05

## 2021-10-26 RX ORDER — HYDROCODONE BITARTRATE AND ACETAMINOPHEN 5; 325 MG/1; MG/1
1 TABLET ORAL EVERY 4 HOURS PRN
Qty: 60 TABLET | Refills: 0 | Status: SHIPPED | OUTPATIENT
Start: 2021-10-26 | End: 2021-11-15 | Stop reason: SDUPTHER

## 2021-10-26 RX ORDER — INSULIN GLARGINE 100 [IU]/ML
INJECTION, SOLUTION SUBCUTANEOUS
Qty: 3 EACH | Refills: 3 | Status: SHIPPED | OUTPATIENT
Start: 2021-10-26 | End: 2022-02-03 | Stop reason: SDUPTHER

## 2021-10-26 RX ORDER — GABAPENTIN 600 MG/1
600 TABLET ORAL 3 TIMES DAILY
Qty: 90 TABLET | Refills: 2 | Status: SHIPPED | OUTPATIENT
Start: 2021-10-26 | End: 2022-05-03 | Stop reason: SDUPTHER

## 2021-10-26 ASSESSMENT — ENCOUNTER SYMPTOMS
SHORTNESS OF BREATH: 0
ABDOMINAL PAIN: 0
COUGH: 0
BLOOD IN STOOL: 0
CONSTIPATION: 0
EYE PAIN: 0
VOMITING: 0
NAUSEA: 0
DIARRHEA: 0
BACK PAIN: 0

## 2021-10-26 NOTE — PROGRESS NOTES
[place] [address]  Dept: [phone]  Dept Fax: [phone]  Loc: [phone]     Mariusz Huerta is a 66 y.o. female who presents today for her medical conditions/complaintsas noted below. Mariusz Huerta is c/o of   Chief Complaint   Patient presents with    Congestive Heart Failure     hospital f/u    Coronary Artery Disease    Diabetes         HPI:     Congestive Heart Failure  Presents for follow-up visit. Pertinent negatives include no abdominal pain, chest pain, chest pressure, palpitations or shortness of breath. The symptoms have been stable. Her past medical history is significant for CAD. Compliance with total regimen is %. Compliance with diet is %. Compliance with exercise is %. Coronary Artery Disease  Presents for follow-up visit. Pertinent negatives include no chest pain, chest pressure, dizziness, leg swelling, palpitations or shortness of breath. Her past medical history is significant for CHF. The symptoms have been stable. Compliance with diet is good. Compliance with exercise is good. Compliance with medications is good. Diabetes  She presents for her follow-up diabetic visit. She has type 2 diabetes mellitus. Her disease course has been fluctuating. Pertinent negatives for hypoglycemia include no confusion, dizziness, headaches, nervousness/anxiousness or pallor. Pertinent negatives for diabetes include no chest pain, no polydipsia, no polyuria and no weakness. Hemoglobin A1C (%)   Date Value   02/18/2021 7.0 (H)   01/28/2021 6.5 (H)   07/27/2020 7.1 (H)            Microalb/Crt.  Ratio (mcg/mg creat)   Date Value   02/18/2021 CANNOT BE CALCULATED     LDL Cholesterol (mg/dL)   Date Value   02/18/2021 44   07/27/2020 34   07/22/2019 45     LDL Calculated (mg/dL)   Date Value   05/19/2016 80   09/23/2014 80         AST (U/L)   Date Value 02/18/2021 40 (H)     ALT (U/L)   Date Value   02/18/2021 39 (H)     BUN (mg/dL)   Date Value   02/18/2021 11     BP Readings from Last 3 Encounters:   10/26/21 122/78   10/20/21 (!) 95/54   10/13/21 112/74              Past Medical History:   Diagnosis Date    Arthritis     Asthma     CAD (coronary artery disease) 1989    [facility]    cervical    Carotid artery disease ([facility]     Cataracts, bilateral     COPD (chronic obstructive pulmonary disease) (HCC)     Coronary artery disease     Eczema     Fibromyalgia     Glaucoma     H/O blood clots     Headache     Heart attack ([facility]     Hx of Bell's palsy     Mild right facial paralysis    Hypertension     Liver disease     Mild dementia [facility]     Neuropathy     Osteoporosis     Sleep apnea 11/07/2014    sleep study done in [place]    [facility]     Type 2 diabetes mellitus with hyperglycemia ([facility]       Past Surgical History:   Procedure Laterality Date    ANESTHESIA NERVE BLOCK Right 6/28/2019    Right L2 L3 L4 L5 Diagnostic Medial BB performed by Kelly Guan MD at [facility] Right 7/26/2019    Right L2 L3 L4 L5 Confirmatory Medial BB performed by Kelly Guan MD at [address]  2002    COLONOSCOPY  09/2012    COLONOSCOPY N/A 9/30/2019    COLONOSCOPY performed by Ivory Cage MD at [facility]  internal hemorrhoids and few diverticuli    CORONARY ANGIOPLASTY WITH STENT PLACEMENT      INDUCED [facility] Right 2/8/2019    Right L2 L3 TRANSFORAMINAL performed by Kelly Guan MD at [place] [address] Right 5/7/2019    Right L2 L3 TRANSFORAMINAL performed by Kelly Guan MD at [place] [address] Right 6/4/2019    Right L3 & L4 TRANSFORAMINAL performed by Kelly Guan MD at [place] [address] Right 12/12/2019    Right L4 L5 TRANSFORAMINAL performed by Kelly Guan MD at [place] [address]. MANAGEMENT PROCEDURE Right 2020    RIGHT L4 L5  TRANSFORAMINAL performed by Laureen Adames MD at [address] Right 2020    Right L4 L5 TRANSFORAMINAL performed by Laureen Adames MD at [address] 1.1-2CM FACE,FACIAL Left 2018    Excision Cyst Left Chest, Middle Back performed by Armaan Villareal MD at [address] AA&/STRD TFRML EPI LUMBAR/SACRAL EA ADDL Right 10/5/2018    Right L2 & L3 TRANSFORAMINAL performed by Laureen Adames MD at [address] AA&/STRD TFRML EPI LUMBAR/SACRAL EA ADDL Right 10/26/2018    Right L2 L3 TRANSFORAMINAL performed by Laureen Adames MD at [address] Right 2019    Right  L2 L3 L4 L5 RADIOFREQUENCY performed by Laureen Adames MD at [address] [facility]    Cervical cancer. Had XRT    TONSILLECTOMY         Family History   Problem Relation Age of Onset    Heart Disease Mother     High Blood Pressure Mother    Lorna Blank Stroke Mother     Glaucoma Mother     Early Death Father     Cancer Father         stomach    Miscarriages / Stillbirths Maternal Uncle           Social History     Tobacco Use    Smoking status: Former Smoker     Packs/day: 1.00     Years: 35.00     Pack years: 35.00     Quit date: 2001     Years since quittin.0    Smokeless tobacco: Never Used   Substance Use Topics    Alcohol use: No         Current Outpatient Medications   Medication Sig Dispense Refill    amoxicillin-clavulanate (AUGMENTIN) 875-125 MG per tablet Take 1 tablet by mouth 2 times daily for 10 days 20 tablet 0    insulin glargine (LANTUS) 100 UNIT/ML injection vial Dispense 3 vials 3 each 3    gabapentin (NEURONTIN) 600 MG tablet Take 1 tablet by mouth 3 times daily for 30 days. 90 tablet 2    HYDROcodone-acetaminophen (NORCO) 5-325 MG per tablet Take 1 tablet by mouth every 4 hours as needed for Pain for up to 30 days. Intended supply: 7 days.  Take lowest dose possible to manage pain 60 tablet 0    insulin glargine (LANTUS) 100 UNIT/ML injection vial Dispense 3 Vials. Inject 15 units into the skin 2 x daily. 3 each 3    Insulin Syringe-Needle U-100 (DROPLET INSULIN SYRINGE) 31G X 5/16\" 0.5 ML MISC Inject 1 each into the skin 2 times daily 200 each 3    Insulin Pen Needle (B-D ULTRAFINE III SHORT PEN) 31G X 8 MM MISC USE TWICE DAILY 180 each 3    calcium carbonate-vitamin D3 (CALCIUM 600+D3) 600-400 MG-UNIT TABS per tab TAKE 1 TABLET BY MOUTH TWICE DAILY 180 tablet 3    magnesium oxide (MAGNESIUM-OXIDE) 400 (241.3 Mg) MG TABS tablet Take 1 tablet by mouth 2 times daily 180 tablet 3    albuterol sulfate  (90 Base) MCG/ACT inhaler Inhale 2 puffs into the lungs 4 times daily 54 g 3    SYMBICORT 80-4.5 MCG/ACT AERO Inhale 2 puffs into the lungs 2 times daily 3 each 3    NOVOLOG FLEXPEN 100 UNIT/ML injection pen INJECT 4 UNITS AT LUNCH AND  6 UNITS AT SUPPER (EACH PEN EXPIRES 28 DAYS AFTER 1ST USE) 15 mL 3    clopidogrel (PLAVIX) 75 MG tablet TAKE 1 TABLET EVERY DAY 90 tablet 3    amitriptyline (ELAVIL) 25 MG tablet TAKE 1 TABLET AT BEDTIME 90 tablet 3    furosemide (LASIX) 40 MG tablet TAKE 1 TABLET EVERY DAY 90 tablet 3    buPROPion (WELLBUTRIN) 75 MG tablet TAKE 1 TABLET TWICE DAILY 180 tablet 3    pantoprazole (PROTONIX) 40 MG tablet TAKE 1 TABLET EVERY DAY 90 tablet 3    citalopram (CELEXA) 10 MG tablet TAKE 1 TABLET EVERY DAY 90 tablet 3    fluticasone (FLONASE) 50 MCG/ACT nasal spray USE 2 SPRAYS NASALLY EVERY DAY 48 g 3    Misc. Devices MISC It is in my medical opinion that Tiesha Shelby be allowed to have a kitten in her apartment at all times for companionship to help with her Depression, Anxiety, Fibromyalgia and Osteoarthritis.  1 Device 0    dorzolamide (TRUSOPT) 2 % ophthalmic solution Apply to eye 3 times daily      Ascorbic Acid (VITAMIN C) 500 MG CAPS Take 500 mg by mouth 2 times daily 180 capsule 3    metoprolol succinate (TOPROL XL) 25 MG extended release tablet Take 0.5 tablets by mouth daily 45 tablet 1    gabapentin (NEURONTIN) 300 MG capsule TAKE 2 CAPSULES TWICE DAILY 360 capsule 3    senna (SENOKOT) 8.6 MG TABS tablet TAKE 1 TABLET BY MOUTH TWICE DAILY 180 tablet 3    spironolactone (ALDACTONE) 50 MG tablet TAKE 1 TABLET TWICE DAILY 180 tablet 3    vitamin D3 (CHOLECALCIFEROL) 25 MCG (1000 UT) TABS tablet TAKE 3 TABLETS BY MOUTH TWICE A DAY. 540 tablet 3    potassium chloride (MICRO-K) 10 MEQ extended release capsule TAKE 1 CAPSULE TWICE DAILY 180 capsule 3    CONTOUR NEXT TEST strip TEST FOUR TIMES DAILY 400 strip 5    alendronate (FOSAMAX) 70 MG tablet TAKE 1 TABLET EVERY WEEK 12 tablet 3    atorvastatin (LIPITOR) 40 MG tablet TAKE 1 TABLET EVERY DAY 90 tablet 3    Microlet Lancets MISC USE TO TEST 4 TIMES A DAY. 400 each 3    pyridoxine (B-6) 100 MG tablet Take 50 mg by mouth daily      Biotin 29459 MCG TABS Take by mouth      Respiratory Therapy Supplies MISC CPAP supplies 1 each 0    ibuprofen (ADVIL;MOTRIN) 400 MG tablet TAKE 1 TABLET EVERY 6 HOURS AS NEEDED FOR PAIN 360 tablet 3    vitamin C (ASCORBIC ACID) 500 MG tablet TAKE 1 TABLET BY MOUTH TWICE A DAY. 180 tablet 3    nitroGLYCERIN (NITROSTAT) 0.4 MG SL tablet PLACE 1 TABLET UNER THE TONGUE EVERY 5 MINUTES AS NEEDED FOR CHEST PAIN AT FIRST SIGN OF CHEST PAIN.  25 tablet 1    albuterol (PROVENTIL) (2.5 MG/3ML) 0.083% nebulizer solution Take 3 mLs by nebulization every 6 hours as needed for Wheezing 120 each 3    triamcinolone (KENALOG) 0.1 % cream APPLY TOPICALLY TWICE DAILY 80 g 5    VITAMIN A PO Take 2,400 mcg by mouth daily      vitamin E 400 UNIT capsule Take 400 Units by mouth daily      COMBIGAN 0.2-0.5 % ophthalmic solution PLACE 1 DROP INTO BOTH EYES DAILY 15 mL 3    Compression Stockings MISC by Does not apply route 15-20mmHg  Knee high  Open tow  With assist device to help put them on 1 each 0    Zinc 50 MG TABS Take 50 mg by mouth daily      niacin 500 MG extended release capsule Take 500 mg by mouth daily      folic acid (FOLVITE) 741 MCG tablet Take 800 mcg by mouth daily      Cyanocobalamin (VITAMIN B12 PO) Take 2,500 mcg by mouth daily      B Complex-C (SUPER B COMPLEX PO) Take 1 tablet by mouth daily       travoprost, benzalkonium, (TRAVATAN) 0.004 % ophthalmic solution Place 1 drop into both eyes daily 3 Bottle 3    CPAP Machine MISC by Does not apply route Pressure of 13 (AirSense 10)  [employer].      Multiple Vitamin (MULTI VITAMIN DAILY) TABS Take by mouth daily       No current facility-administered medications for this visit. No Known Allergies    Health Maintenance   Topic Date Due    Hepatitis A vaccine (1 of 2 - Risk 2-dose series) Never done    Diabetic retinal exam  05/03/2020    COVID-19 Vaccine (3 - Pfizer booster) 08/25/2021    Flu vaccine (1) 09/01/2021    Annual Wellness Visit (AWV)  02/13/2022    A1C test (Diabetic or Prediabetic)  02/18/2022    Diabetic microalbuminuria test  02/18/2022    Lipid screen  02/18/2022    Potassium monitoring  02/18/2022    Creatinine monitoring  02/18/2022    Diabetic foot exam  08/16/2022    Breast cancer screen  12/01/2022    DTaP/Tdap/Td vaccine (2 - Td or Tdap) 06/18/2027    Colon cancer screen colonoscopy  09/30/2029    DEXA (modify frequency per FRAX score)  Completed    Shingles Vaccine  Completed    Pneumococcal 65+ years Vaccine  Completed    Hepatitis C screen  Completed    Hib vaccine  Aged Out    Meningococcal (ACWY) vaccine  Aged Out       Subjective:      Review of Systems   Constitutional: Negative for chills and fever. HENT: Negative for hearing loss. Eyes: Negative for pain and visual disturbance. Respiratory: Negative for cough and shortness of breath. Cardiovascular: Negative for chest pain, palpitations and leg swelling. Gastrointestinal: Negative for abdominal pain, blood in stool, constipation, diarrhea, nausea and vomiting.    Endocrine: Negative for cold intolerance, polydipsia and polyuria. Genitourinary: Negative for difficulty urinating, dysuria and hematuria. Musculoskeletal: Negative for arthralgias, back pain, gait problem and neck pain. Skin: Negative for pallor and rash. Neurological: Negative for dizziness, weakness, numbness and headaches. Hematological: Negative for adenopathy. Does not bruise/bleed easily. Psychiatric/Behavioral: Negative for confusion. The patient is not nervous/anxious. Objective:     Physical Exam  Vitals reviewed. Constitutional:       Appearance: She is well-developed. HENT:      Head: Normocephalic and atraumatic. Eyes:      Pupils: Pupils are equal, round, and reactive to light. Cardiovascular:      Rate and Rhythm: Normal rate and regular rhythm. Heart sounds: No murmur heard. No friction rub. No gallop. Pulmonary:      Effort: Pulmonary effort is normal.      Breath sounds: Normal breath sounds. No wheezing or rales. Abdominal:      General: There is no distension. Palpations: Abdomen is soft. There is no mass. Tenderness: There is no abdominal tenderness. There is no rebound. Musculoskeletal:         General: Normal range of motion. Cervical back: Neck supple. Comments: + right lower leg edema and erythema   Lymphadenopathy:      Cervical: No cervical adenopathy. Skin:     General: Skin is warm and dry. Findings: No rash. Neurological:      Mental Status: She is alert and oriented to person, place, and time. Cranial Nerves: No cranial nerve deficit (grossly). Psychiatric:         Thought Content: Thought content normal.        /78 (Site: Left Upper Arm, Position: Sitting, Cuff Size: Large Adult)   Pulse 51   Resp 16   Ht 5' 3\" (1.6 m)   Wt 265 lb (120.2 kg)   LMP 12/07/1995 (Approximate)   BMI 46.94 kg/m²     Assessment:       Diagnosis Orders   1. CHF (congestive heart failure), NYHA class I, chronic, diastolic ([facility]     2.  Bilateral leg edema     3. Coronary artery disease due to lipid rich plaque     4. Type 2 diabetes mellitus with diabetic polyneuropathy, with long-term current use of insulin (HCC)  insulin glargine (LANTUS) 100 UNIT/ML injection vial    gabapentin (NEURONTIN) 600 MG tablet    HYDROcodone-acetaminophen (NORCO) 5-325 MG per tablet   5. Cellulitis of right lower extremity  amoxicillin-clavulanate (AUGMENTIN) 875-125 MG per tablet   6. Anemia, unspecified type  CBC Auto Differential   7. Bilateral foot pain  gabapentin (NEURONTIN) 600 MG tablet    HYDROcodone-acetaminophen (NORCO) 5-325 MG per tablet      Reviewed multiple test results for this appointment. 10/21/2021 low hemoglobin at 10.5 with platelet count 83.    10/21/2021 normal creatinine 0.8.    10/20/2021 Covid test was negative. Per pt  hosp check for DVT was negative. Patient told to continue Lasix. We are discontinuing amlodipine and lisinopril. We will recheck a BMP and CBC with differential 1 week. Plan:       Return if symptoms worsen or fail to improve, for Diabetes, CAD, CHF. Orders Placed This Encounter   Procedures    CBC Auto Differential     Standing Status:   Future     Standing Expiration Date:   10/26/2022     Orders Placed This Encounter   Medications    amoxicillin-clavulanate (AUGMENTIN) 875-125 MG per tablet     Sig: Take 1 tablet by mouth 2 times daily for 10 days     Dispense:  20 tablet     Refill:  0    insulin glargine (LANTUS) 100 UNIT/ML injection vial     Sig: Dispense 3 vials     Dispense:  3 each     Refill:  3    gabapentin (NEURONTIN) 600 MG tablet     Sig: Take 1 tablet by mouth 3 times daily for 30 days. Dispense:  90 tablet     Refill:  2    HYDROcodone-acetaminophen (NORCO) 5-325 MG per tablet     Sig: Take 1 tablet by mouth every 4 hours as needed for Pain for up to 30 days. Intended supply: 7 days.  Take lowest dose possible to manage pain     Dispense:  60 tablet     Refill:  0     Reduce doses taken as pain becomes manageable       Patientgiven educational materials - see patient instructions. Discussed use, benefit,and side effects of prescribed medications. All patient questions answered. Ptvoiced understanding. Reviewed health maintenance. Instructed to continue currentmedications, diet and exercise. Patient agreed with treatment plan. Follow up asdirected.      Electronically signed by Ryan Wiggins MD on 10/26/2021 at 4:33 PM

## 2021-11-01 DIAGNOSIS — J47.9 BRONCHIECTASIS WITHOUT COMPLICATION (HCC): ICD-10-CM

## 2021-11-01 RX ORDER — DILTIAZEM HYDROCHLORIDE 60 MG/1
2 TABLET, FILM COATED ORAL 2 TIMES DAILY
Qty: 3 EACH | Refills: 6 | Status: SHIPPED | OUTPATIENT
Start: 2021-11-01 | End: 2022-10-12

## 2021-11-01 NOTE — TELEPHONE ENCOUNTER
----- Message from April Nagle sent at 11/1/2021  3:35 PM EDT -----  Subject: Medication Problem    QUESTIONS  Name of Medication? SYMBICORT 80-4.5 MCG/ACT AERO  Patient-reported dosage and instructions? Inhale 2 puffs into the lungs 2   times daily  What question or problem do you have with the medication? patient would   like to talk with physicians nurse about this medication, would not tell   me what specifically it is about. please call patient as soon as possible. thank you   [facility] [address] [place] [address] [phone] - F [phone]  Pharmacy phone number (if available)? [phone]  Additional Information for Provider?   ---------------------------------------------------------------------------  --------------  [address]  What is the best way for the office to contact you? OK to leave message on   voicemail  Preferred Call Back Phone Number? [phone]  ---------------------------------------------------------------------------  --------------  SCRIPT ANSWERS  Relationship to Patient?  Self

## 2021-11-15 ENCOUNTER — TELEPHONE (OUTPATIENT)
Dept: INTERNAL MEDICINE | Age: 71
End: 2021-11-15

## 2021-11-15 DIAGNOSIS — Z79.4 TYPE 2 DIABETES MELLITUS WITH DIABETIC POLYNEUROPATHY, WITH LONG-TERM CURRENT USE OF INSULIN (HCC): ICD-10-CM

## 2021-11-15 DIAGNOSIS — G89.29 CHRONIC BILATERAL LOW BACK PAIN WITHOUT SCIATICA: Primary | ICD-10-CM

## 2021-11-15 DIAGNOSIS — M54.50 CHRONIC BILATERAL LOW BACK PAIN WITHOUT SCIATICA: Primary | ICD-10-CM

## 2021-11-15 DIAGNOSIS — E11.42 TYPE 2 DIABETES MELLITUS WITH DIABETIC POLYNEUROPATHY, WITH LONG-TERM CURRENT USE OF INSULIN (HCC): ICD-10-CM

## 2021-11-15 DIAGNOSIS — M79.671 BILATERAL FOOT PAIN: ICD-10-CM

## 2021-11-15 DIAGNOSIS — M79.672 BILATERAL FOOT PAIN: ICD-10-CM

## 2021-11-15 RX ORDER — HYDROCODONE BITARTRATE AND ACETAMINOPHEN 5; 325 MG/1; MG/1
1 TABLET ORAL EVERY 4 HOURS PRN
Qty: 60 TABLET | Refills: 0 | Status: SHIPPED | OUTPATIENT
Start: 2021-11-15 | End: 2021-11-15

## 2021-11-15 RX ORDER — HYDROCODONE BITARTRATE AND ACETAMINOPHEN 10; 325 MG/1; MG/1
1 TABLET ORAL EVERY 8 HOURS PRN
Qty: 90 TABLET | Refills: 0 | Status: SHIPPED | OUTPATIENT
Start: 2021-11-15 | End: 2021-11-16 | Stop reason: SDUPTHER

## 2021-11-15 NOTE — TELEPHONE ENCOUNTER
We can do the 10/325 only if she gets 90 instead of 120 and agrees to not take them more often than every 8 hours

## 2021-11-15 NOTE — TELEPHONE ENCOUNTER
Patient called requesting a refill. She is wondering if she could get a higher dose or something else. Patient is in pain from cellulitis. Patient would also like a tube that goes on her leg for compression. Not compression socks. She received this device at [address] [place] [address] is requesting a refill on the following medication(s):  Requested Prescriptions     Pending Prescriptions Disp Refills    HYDROcodone-acetaminophen (NORCO) 5-325 MG per tablet 60 tablet 0     Sig: Take 1 tablet by mouth every 4 hours as needed for Pain for up to 30 days. Intended supply: 7 days.  Take lowest dose possible to manage pain       Last Visit Date (If Applicable):  08/10/6276    Next Visit Date:    2/17/2022

## 2021-11-15 NOTE — TELEPHONE ENCOUNTER
Please advise     Patient was wanting to know if you would feel comfortable in increasing the dosage of her pain medication from a hydrocodone 5/325 to a 10/325 every 8 hours? It looks like they had they had the 5/325 pended.

## 2021-11-15 NOTE — TELEPHONE ENCOUNTER
oaars checked 10/4/21    I have to do some research on the compression device that she is requesting

## 2021-11-16 DIAGNOSIS — M54.50 CHRONIC BILATERAL LOW BACK PAIN WITHOUT SCIATICA: ICD-10-CM

## 2021-11-16 DIAGNOSIS — E11.42 TYPE 2 DIABETES MELLITUS WITH DIABETIC POLYNEUROPATHY, WITH LONG-TERM CURRENT USE OF INSULIN (HCC): ICD-10-CM

## 2021-11-16 DIAGNOSIS — M79.671 BILATERAL FOOT PAIN: ICD-10-CM

## 2021-11-16 DIAGNOSIS — Z79.4 TYPE 2 DIABETES MELLITUS WITH DIABETIC POLYNEUROPATHY, WITH LONG-TERM CURRENT USE OF INSULIN (HCC): ICD-10-CM

## 2021-11-16 DIAGNOSIS — M79.672 BILATERAL FOOT PAIN: ICD-10-CM

## 2021-11-16 DIAGNOSIS — G89.29 CHRONIC BILATERAL LOW BACK PAIN WITHOUT SCIATICA: ICD-10-CM

## 2021-11-16 RX ORDER — HYDROCODONE BITARTRATE AND ACETAMINOPHEN 10; 325 MG/1; MG/1
1 TABLET ORAL EVERY 8 HOURS PRN
Qty: 90 TABLET | Refills: 0 | Status: CANCELLED | OUTPATIENT
Start: 2021-11-16 | End: 2021-12-16

## 2021-11-16 RX ORDER — HYDROCODONE BITARTRATE AND ACETAMINOPHEN 10; 325 MG/1; MG/1
1 TABLET ORAL EVERY 8 HOURS PRN
Qty: 30 TABLET | Refills: 0 | Status: SHIPPED | OUTPATIENT
Start: 2021-11-16 | End: 2021-11-23 | Stop reason: SDUPTHER

## 2021-11-16 NOTE — TELEPHONE ENCOUNTER
Dr Abbi Rucker sent this script into [address] [place] [address]  yesterday when it was suppose to go to [facility] in [facility]. Dr Abbi Rucker will not be back until Thursday. Would you be willing to send it to [facility]? Script has been cancelled thru [address] [place] [address] .   If not I will have him do it on Thursday when he is back

## 2021-11-16 NOTE — TELEPHONE ENCOUNTER
norco 5/325 was cancelled cause he sent in the wrong dosage and then the 10/325 was pended. with only to take 1 tablet every 8 hours prn for a 30 day supply. Then sent that into [address] [place] [address]  instead of [facility]. The 10/325 is the correct dosage that she was to have. The 5/325 was cancelled at [facility] yesterday when the 10/325 script was sent.

## 2021-11-17 ENCOUNTER — OFFICE VISIT (OUTPATIENT)
Dept: CARDIOLOGY | Age: 71
End: 2021-11-17
Payer: MEDICARE

## 2021-11-17 ENCOUNTER — TELEPHONE (OUTPATIENT)
Dept: INTERNAL MEDICINE | Age: 71
End: 2021-11-17

## 2021-11-17 VITALS
BODY MASS INDEX: 47.31 KG/M2 | HEIGHT: 63 IN | WEIGHT: 267 LBS | HEART RATE: 54 BPM | SYSTOLIC BLOOD PRESSURE: 137 MMHG | DIASTOLIC BLOOD PRESSURE: 45 MMHG

## 2021-11-17 DIAGNOSIS — I25.10 CORONARY ARTERY DISEASE INVOLVING NATIVE CORONARY ARTERY OF NATIVE HEART WITHOUT ANGINA PECTORIS: Primary | ICD-10-CM

## 2021-11-17 DIAGNOSIS — I10 ESSENTIAL HYPERTENSION: ICD-10-CM

## 2021-11-17 DIAGNOSIS — R06.09 DYSPNEA ON EXERTION: ICD-10-CM

## 2021-11-17 PROCEDURE — 93010 ELECTROCARDIOGRAM REPORT: CPT | Performed by: INTERNAL MEDICINE

## 2021-11-17 PROCEDURE — 1090F PRES/ABSN URINE INCON ASSESS: CPT | Performed by: INTERNAL MEDICINE

## 2021-11-17 PROCEDURE — G8417 CALC BMI ABV UP PARAM F/U: HCPCS | Performed by: INTERNAL MEDICINE

## 2021-11-17 PROCEDURE — 3017F COLORECTAL CA SCREEN DOC REV: CPT | Performed by: INTERNAL MEDICINE

## 2021-11-17 PROCEDURE — G8484 FLU IMMUNIZE NO ADMIN: HCPCS | Performed by: INTERNAL MEDICINE

## 2021-11-17 PROCEDURE — 99214 OFFICE O/P EST MOD 30 MIN: CPT | Performed by: INTERNAL MEDICINE

## 2021-11-17 PROCEDURE — G8427 DOCREV CUR MEDS BY ELIG CLIN: HCPCS | Performed by: INTERNAL MEDICINE

## 2021-11-17 PROCEDURE — 93005 ELECTROCARDIOGRAM TRACING: CPT | Performed by: INTERNAL MEDICINE

## 2021-11-17 PROCEDURE — G8400 PT W/DXA NO RESULTS DOC: HCPCS | Performed by: INTERNAL MEDICINE

## 2021-11-17 PROCEDURE — 1123F ACP DISCUSS/DSCN MKR DOCD: CPT | Performed by: INTERNAL MEDICINE

## 2021-11-17 PROCEDURE — 1036F TOBACCO NON-USER: CPT | Performed by: INTERNAL MEDICINE

## 2021-11-17 PROCEDURE — 4040F PNEUMOC VAC/ADMIN/RCVD: CPT | Performed by: INTERNAL MEDICINE

## 2021-11-17 NOTE — PROGRESS NOTES
[address] [place] [id number]  Dept: [phone]  Loc: [phone]    Subjective: The patient is a 66y.o. year old, , female is in the office for a follow up visit history of CAD history of stent in  hypertension sleep apnea. She ari any chest pain or discomfort. No orthopnea or PND. Ari any palpitation, dizziness or syncope. She is completely asymptomatic from cardiac stand point. The only complaint is getting short of breath on activity denies any ankle swellings. According to her she had a history of sleep apnea has been using CPAP regularly    Past Medical History:   has a past medical history of Arthritis, Asthma, CAD (coronary artery disease), Cancer ([address] Carotid artery disease ([address] Cataracts, bilateral, COPD (chronic obstructive pulmonary disease) ([address] Coronary artery disease, Eczema, Fibromyalgia, Glaucoma, H/O blood clots, Headache, Heart attack ([address] Hx of Bell's palsy, Hypertension, Liver disease, Mild dementia ([address] Neuropathy, Osteoporosis, Sleep apnea, Tremor, and Type 2 diabetes mellitus with hyperglycemia ([address] Past Surgical History:   has a past surgical history that includes Appendectomy (); Induced  (); Tonsillectomy (); Bariatric Surgery (); Colonoscopy (2012); pr exc skin benig 1.1-2cm face,facial (Left, 2018); pr njx aa&/strd tfrml epi lumbar/sacral ea addl (Right, 10/5/2018); pr njx aa&/strd tfrml epi lumbar/sacral ea addl (Right, 10/26/2018); Lumbar spine surgery (Right, 2019); Coronary angioplasty with stent; Lumbar spine surgery (Right, 2019); Lumbar spine surgery (Right, 2019); Anesthesia Nerve Block (Right, 2019); greer and bso (cervix removed) (); Anesthesia Nerve Block (Right, 2019); RADIOFREQUENCY ABLATION NERVES (Right, 2019); Colonoscopy (N/A, 2019);  Lumbar spine surgery TABLET AT BEDTIME 10/4/21  Yes Julio Estrella MD   furosemide (LASIX) 40 MG tablet TAKE 1 TABLET EVERY DAY 10/4/21  Yes Julio Estrella MD   buPROPion (WELLBUTRIN) 75 MG tablet TAKE 1 TABLET TWICE DAILY 10/4/21  Yes Julio Estrella MD   pantoprazole (PROTONIX) 40 MG tablet TAKE 1 TABLET EVERY DAY 10/4/21  Yes Julio Estrella MD   citalopram (CELEXA) 10 MG tablet TAKE 1 TABLET EVERY DAY 10/4/21  Yes Julio Estrella MD   fluticasone (FLONASE) 50 MCG/ACT nasal spray USE 2 SPRAYS NASALLY EVERY DAY 10/4/21  Yes Julio Estrella MD   Misc. Devices MISC It is in my medical opinion that Stef Marie be allowed to have a kitten in her apartment at all times for companionship to help with her Depression, Anxiety, Fibromyalgia and Osteoarthritis.  8/17/21  Yes Julio Estrella MD   dorzolamide (TRUSOPT) 2 % ophthalmic solution Apply to eye 3 times daily 7/19/21  Yes Marce Barrios MD   Ascorbic Acid (VITAMIN C) 500 MG CAPS Take 500 mg by mouth 2 times daily 6/7/21  Yes Julio Estrella MD   metoprolol succinate (TOPROL XL) 25 MG extended release tablet Take 0.5 tablets by mouth daily 4/30/21  Yes Junior Evelyne MD   gabapentin (NEURONTIN) 300 MG capsule TAKE 2 CAPSULES TWICE DAILY 3/8/21 11/17/21 Yes Julio Estrella MD   senna (SENOKOT) 8.6 MG TABS tablet TAKE 1 TABLET BY MOUTH TWICE DAILY 2/2/21  Yes Julio Estrella MD   spironolactone (ALDACTONE) 50 MG tablet TAKE 1 TABLET TWICE DAILY 1/25/21  Yes Julio Estrella MD   vitamin D3 (CHOLECALCIFEROL) 25 MCG (1000 UT) TABS tablet TAKE 3 TABLETS BY MOUTH TWICE A DAY. 1/18/21  Yes Julio Estrella MD   potassium chloride (MICRO-K) 10 MEQ extended release capsule TAKE 1 CAPSULE TWICE DAILY 12/17/20  Yes Julio Estrella MD   CONTOUR NEXT TEST strip TEST FOUR TIMES DAILY 12/11/20  Yes Julio Estrella MD   alendronate (FOSAMAX) 70 MG tablet TAKE 1 TABLET EVERY WEEK 12/7/20  Yes Julio Estrella MD   atorvastatin (LIPITOR) 40 MG tablet TAKE 1 TABLET EVERY DAY 11/30/20  Yes Dora Hendricks MD   Microlet Lancets MISC USE TO TEST 4 TIMES A DAY.  11/23/20  Yes Dora Hendricks MD   pyridoxine (B-6) 100 MG tablet Take 50 mg by mouth daily   Yes Historical Provider, MD   Biotin 02308 MCG TABS Take by mouth   Yes Historical Provider, MD   Respiratory Therapy Supplies [facility] CPAP supplies 8/13/20  Yes Dora Hendricks MD   ibuprofen (ADVIL;MOTRIN) 400 MG tablet TAKE 1 TABLET EVERY 6 HOURS AS NEEDED FOR PAIN 7/27/20  Yes Dora Hendricks MD   vitamin C (ASCORBIC ACID) 500 MG tablet TAKE 1 TABLET BY MOUTH TWICE A DAY. 6/1/20  Yes Dora Hendricks MD   nitroGLYCERIN (NITROSTAT) 0.4 MG SL tablet PLACE 1 TABLET UNER THE TONGUE EVERY 5 MINUTES AS NEEDED FOR CHEST PAIN AT FIRST SIGN OF CHEST PAIN. 5/14/20  Yes Dora Hendricks MD   albuterol (PROVENTIL) (2.5 MG/3ML) 0.083% nebulizer solution Take 3 mLs by nebulization every 6 hours as needed for Wheezing 6/12/19  Yes DAVID Mendoza - CNP   triamcinolone (KENALOG) 0.1 % cream APPLY TOPICALLY TWICE DAILY 9/18/18  Yes Dora Hendricks MD   VITAMIN A PO Take 2,400 mcg by mouth daily   Yes Historical Provider, MD   vitamin E 400 UNIT capsule Take 400 Units by mouth daily   Yes Historical Provider, MD   COMBIGAN 0.2-0.5 % ophthalmic solution PLACE 1 DROP INTO BOTH EYES DAILY 1/2/18  Yes Dora Hendricks MD   Compression Stockings MISC by Does not apply route 15-20mmHg  Knee high  Open tow  With assist device to help put them on 5/10/17  Yes Gray Bains, DO   Zinc 50 MG TABS Take 50 mg by mouth daily   Yes Historical Provider, MD   niacin 500 MG extended release capsule Take 500 mg by mouth daily   Yes Historical Provider, MD   folic acid (FOLVITE) 390 MCG tablet Take 800 mcg by mouth daily   Yes Historical Provider, MD   Cyanocobalamin (VITAMIN B12 PO) Take 2,500 mcg by mouth daily   Yes Historical Provider, MD   B Complex-C (SUPER B COMPLEX PO) Take 1 tablet by mouth daily    Yes Historical Provider, MD   travoprost, benzalkonium, (TRAVATAN) 0.004 % ophthalmic solution Place 1 drop into both eyes daily 10/7/16  Yes Corbin Miranda MD   CPAP Machine MISC by Does not apply route Pressure of 13 (AirSense 10)  [facility].   Yes Historical Provider, MD   Multiple Vitamin (MULTI VITAMIN DAILY) TABS Take by mouth daily   Yes Historical Provider, MD       Allergies:  Patient has no known allergies. Social History:   reports that she quit smoking about 20 years ago. She has a 35.00 pack-year smoking history. She has never used smokeless tobacco. She reports that she does not drink alcohol and does not use drugs. Review of Systems:  · Constitutional: there has been no unanticipated weight loss. There's been No change in energy level, No change in activity level. · Eyes: No visual changes or diplopia. No scleral icterus. · ENT: No Headaches, hearing loss or vertigo. No mouth sores or sore throat. · Cardiovascular: As above. · Respiratory: No SOB, cough or hemoptysis. · Gastrointestinal: No abdominal pain, appetite loss, blood in stools. No change in bowel or bladder habits. · Genitourinary: No dysuria, trouble voiding, or hematuria. · Musculoskeletal:  No gait disturbance, No weakness or joint complaints. · Integumentary: No rash or pruritis. · Psychiatric: No anxiety, or depression. · Hematologic/Lymphatic: No abnormal bruising or bleeding, blood clots or swollen lymph nodes. · Allergic/Immunologic: No nasal congestion or hives. Physical Exam:  BP (!) 137/45 (Cuff Size: Large Adult)   Pulse 54   Ht 5' 3\" (1.6 m)   Wt 267 lb (121.1 kg)   LMP 12/07/1995 (Approximate)   BMI 47.30 kg/m²     Constitutional and General Appearance: alert, cooperative, no distress and appears stated age  [de-identified]: PERRL, no cervical lymphadenopathy. No masses palpable. Normal oral mucosa  Respiratory:  · Normal excursion and expansion without use of accessory muscles  · Resp Auscultation: Good respiratory effort.  No for increased work of breathing. On auscultation: clear to auscultation bilaterally  Cardiovascular:  · The apical impulse is not displaced  · Heart tones are crisp and normal. regular S1 and S2.  · Jugular venous pulsation Normal  · The carotid upstroke is normal in amplitude and contour without delay or bruit  · Peripheral pulses are symmetrical and full   Abdomen:   · No masses or tenderness  · Bowel sounds present  Extremities:  ·  No Cyanosis or Clubbing  ·  Lower extremity edema: No  ·  Skin: Warm and dry    Cardiac Data:  EKG: NSR Old inf infart       LAST CATH 6/15- minimal non obstructive CAD,  mRCA 5%, EF 65%     Holter 1/9/18- 7 beat run of PAT      TTE 1/9/18  CONCLUSIONS:  1. Normal LV size and function with ejection fraction of 65%. 2.  Grade 1 diastolic dysfunction. 3.  Mild concentric LVH. 4.  Mild biatrial enlargement. 5.  No significant valvulopathies. 6.  No effusion. 7.  Mildly dilated RV with normal function. Nuclear Stress 5/2019:  IMPRESSION:  1. No ischemia or infarction. 2. Normal LV systolic function. EF 81%. TTE 12/7/2020  Summary  Normal left ventricular diameter. Left ventricular systolic function is normal. Left ventricular ejectionfraction 65 %. Right ventricular dilatation with normal systolic function. Aortic valve is sclerotic but opens well. Significant mild mitral valve thickening with annular calcification. Trivial mitral regurgitation. Mild tricuspid regurgitation. Estimated right ventricular systolic pressure is 20 mmHg. Holter 4/2021- PAT    Labs:     CBC: No results for input(s): WBC, HGB, HCT, PLT in the last 72 hours. BMP: No results for input(s): NA, K, CO2, BUN, CREATININE, LABGLOM, GLUCOSE in the last 72 hours. PT/INR: No results for input(s): PROTIME, INR in the last 72 hours.   FASTING LIPID PANEL:  Lab Results   Component Value Date    CHOL 105 07/27/2020    CHOL 186 05/19/2016    HDL 55 02/18/2021    LDLCHOLESTEROL 44 02/18/2021    TRIG 66 07/27/2020 CARLTONHDLRKEIKO 2.1 02/18/2021     LIVER PROFILE:No results for input(s): AST, ALT, LABALBU in the last 72 hours.       IMPRESSION:    Patient Active Problem List   Diagnosis    Obstructive sleep apnea    Chronic fatigue    Coronary artery disease due to lipid rich plaque    Vitamin D deficiency    Type 2 diabetes mellitus with diabetic polyneuropathy, with long-term current use of insulin (HCC)    Peripheral polyneuropathy    Bilateral leg edema    Right hip pain    Muscle ache    Hx of Bell's palsy    Memory problem    Gait abnormality    Balance problem    H/O falling    Dizziness    Intractable episodic tension-type headache    Essential tremor    Anxiety and depression    Elevated hemoglobin A1c    VBI (vertebrobasilar insufficiency)    Chronic cerebral ischemia    TIA (transient ischemic attack)    Chronic tension headache    Morbid obesity with BMI of 45.0-49.9, adult (HCC)    Chronic right-sided low back pain with right-sided sciatica    Lumbar radiculopathy    Encounter for chronic pain management    Carotid stenosis, asymptomatic, bilateral    Chronic tension-type headache, not intractable    Lumbosacral spondylosis without myelopathy    DDD (degenerative disc disease), lumbar    Acquired spondylolisthesis    Lumbar facet joint syndrome    Chronic diastolic congestive heart failure (HCC)    Bronchiectasis without complication (HCC)    CHF (congestive heart failure), NYHA class I, chronic, diastolic (HCC)       RECOMMENDATIONS:   CAD- History of  BMX RCA 2002  · NO CHEST PAIN, CONTINUE CURRENT MEDICATIONS  · Last cath in 2015 nonobstructed CAD, last nuclear stress test 5/19 negative     HYPERTENSION- WELL CONTROLLED, WILL CONTINUE CURRENT MEDICATIONS     HYPERLIPIDEMIA- ON STATIN, NEEDS TO WATCH LIPID PROFILE AND LFT'S    Sleep apnea has been using CPAP    Short of breath multifactorial due to sleep apnea possible pulmonary hypertension in activity obesity    Patient is advised to increase physical activity    DISCUSSED IN DETAILS ABOUT RISK MODIFICATION    RETURN VISIT IN 6 MONTHS, IF ANY SYMPTOM CHANGE PATIENT ADVISED TO GO TO THE EMERGENCY ROOM.           Tess Howell MD, MD  [facility]           [phone]

## 2021-11-17 NOTE — TELEPHONE ENCOUNTER
Please call [facility] [phone]  Ref # [id number]  Regarding script for Hydrocodone they received on the 15th

## 2021-11-18 NOTE — TELEPHONE ENCOUNTER
I spoke with the pharmacist at [facility], [facility] and the script for the norco 10/325 was cancelled.

## 2021-11-23 DIAGNOSIS — M54.50 CHRONIC BILATERAL LOW BACK PAIN WITHOUT SCIATICA: ICD-10-CM

## 2021-11-23 DIAGNOSIS — G89.29 CHRONIC BILATERAL LOW BACK PAIN WITHOUT SCIATICA: ICD-10-CM

## 2021-11-23 RX ORDER — HYDROCODONE BITARTRATE AND ACETAMINOPHEN 10; 325 MG/1; MG/1
1 TABLET ORAL EVERY 8 HOURS PRN
Qty: 90 TABLET | Refills: 0 | Status: SHIPPED | OUTPATIENT
Start: 2021-11-23 | End: 2021-12-21 | Stop reason: SDUPTHER

## 2021-11-23 NOTE — TELEPHONE ENCOUNTER
Patient called in and needs a refill on her Norco  On 11/15/21 we sent a script in for her norco 10/325 it was suppose to go to [facility] in [place] but got sent to [facility]. The script to [facility], [facility] was cancelled and [facility] sent in a 10 day supply on Tuesday 11/16/2021 to [facility] in [place]. She needs a refill on the script for a 30 day supply.     oaars checked 10/4/2021

## 2021-11-29 ENCOUNTER — NURSE TRIAGE (OUTPATIENT)
Dept: OTHER | Facility: CLINIC | Age: 71
End: 2021-11-29

## 2021-11-29 NOTE — TELEPHONE ENCOUNTER
Received call from Elle Ward at [facility] with [facility]. Brief description of triage: Recent history of hospitalization for cat scratch - IV antibiotics. Then seen by PCP for same and put on oral antibiotics. Patient states leg is now black. Triage indicates for patient to go to ED/UC or PCP with approval. 2nd level triage transfer to [facility] at [facility], the office assumes care of the patient. Care advice provided, patient verbalizes understanding; denies any other questions or concerns; instructed to call back for any new or worsening symptoms. Attention Provider: Thank you for allowing me to participate in the care of your patient. The patient was connected to triage in response to information provided to the ECC/PSC. Please do not respond through this encounter as the response is not directed to a shared pool. Reason for Disposition   Wound looks infected   Black (necrotic) or blisters develop in wound    Answer Assessment - Initial Assessment Questions  1. APPEARANCE of INJURY: \"What does the injury look like? \"       *No Answer*    2. SIZE: \"How large is the cut?\"       *No Answer*    3. BLEEDING: \"Is it bleeding now? \" If so, ask: \"Is it difficult to stop? \"       *No Answer*    4. LOCATION: \"Where is the injury located? \"       *No Answer*    5. ONSET: \"How long ago did the injury occur? \"       *No Answer*    6. MECHANISM: \"Tell me how it happened. \"       *No Answer*    7. TETANUS: \"When was the last tetanus booster? \"      *No Answer*    8. PREGNANCY: \"Is there any chance you are pregnant? \" \"When was your last menstrual period? \"      NA    Answer Assessment - Initial Assessment Questions  1. LOCATION: \"Where is the wound located? \"       Right foot and ankle    2. WOUND APPEARANCE: \"What does the wound look like? \"       Swelling (although better than before), leg is turning dark purple to black    3. SIZE: If redness is present, ask: \"What is the size of the red area? \" (Inches, centimeters, or compare to size of a coin)       Was red, now turning purpleish    4. SPREAD: \"What's changed in the last day? \"  \"Do you see any red streaks coming from the wound? \"      Color has changed - home care nurse looked and it and was concerned. 5. ONSET: \"When did it start to look infected? \"       Color change started around the 19th - very gradual    6. MECHANISM: \"How did the wound start, what was the cause? \"      Cat scratch on upper leg    7. PAIN: \"Is there any pain? \" If so, ask: \"How bad is the pain? \"   (Scale 1-10; or mild, moderate, severe)      Yes - moderate    8. FEVER: \"Do you have a fever? \" If so, ask: \"What is your temperature, how was it measured, and when did it start? \"      No    9. OTHER SYMPTOMS: \"Do you have any other symptoms? \" (e.g., shaking chills, weakness, rash elsewhere on body)      Fatigue    10. PREGNANCY: \"Is there any chance you are pregnant? \" \"When was your last menstrual period? \"        No    Protocols used: SKIN INJURY-ADULT-OH, WOUND INFECTION-ADULT-[place]

## 2021-11-30 DIAGNOSIS — R00.2 PALPITATIONS: ICD-10-CM

## 2021-11-30 RX ORDER — METOPROLOL SUCCINATE 25 MG/1
12.5 TABLET, EXTENDED RELEASE ORAL DAILY
Qty: 45 TABLET | Refills: 1 | Status: SHIPPED | OUTPATIENT
Start: 2021-11-30 | End: 2022-04-29

## 2021-12-10 ENCOUNTER — TELEPHONE (OUTPATIENT)
Dept: INTERNAL MEDICINE | Age: 71
End: 2021-12-10
Payer: MEDICARE

## 2021-12-10 DIAGNOSIS — E11.42 TYPE 2 DIABETES MELLITUS WITH DIABETIC POLYNEUROPATHY, WITH LONG-TERM CURRENT USE OF INSULIN (HCC): ICD-10-CM

## 2021-12-10 DIAGNOSIS — M54.41 ACUTE BACK PAIN WITH SCIATICA, RIGHT: ICD-10-CM

## 2021-12-10 DIAGNOSIS — I25.10 CORONARY ARTERY DISEASE DUE TO LIPID RICH PLAQUE: ICD-10-CM

## 2021-12-10 DIAGNOSIS — I25.83 CORONARY ARTERY DISEASE DUE TO LIPID RICH PLAQUE: ICD-10-CM

## 2021-12-10 DIAGNOSIS — M47.817 LUMBOSACRAL SPONDYLOSIS WITHOUT MYELOPATHY: ICD-10-CM

## 2021-12-10 DIAGNOSIS — Z79.4 TYPE 2 DIABETES MELLITUS WITH DIABETIC POLYNEUROPATHY, WITH LONG-TERM CURRENT USE OF INSULIN (HCC): ICD-10-CM

## 2021-12-10 DIAGNOSIS — M31.0 HYPERSENSITIVITY ANGIITIS (HCC): Primary | ICD-10-CM

## 2021-12-10 PROCEDURE — G0179 MD RECERTIFICATION HHA PT: HCPCS | Performed by: INTERNAL MEDICINE

## 2021-12-21 DIAGNOSIS — G89.29 CHRONIC BILATERAL LOW BACK PAIN WITHOUT SCIATICA: ICD-10-CM

## 2021-12-21 DIAGNOSIS — M54.50 CHRONIC BILATERAL LOW BACK PAIN WITHOUT SCIATICA: ICD-10-CM

## 2021-12-21 RX ORDER — ATORVASTATIN CALCIUM 40 MG/1
TABLET, FILM COATED ORAL
Qty: 90 TABLET | Refills: 3 | Status: SHIPPED | OUTPATIENT
Start: 2021-12-21 | End: 2022-10-03

## 2021-12-21 RX ORDER — HYDROCODONE BITARTRATE AND ACETAMINOPHEN 10; 325 MG/1; MG/1
1 TABLET ORAL EVERY 8 HOURS PRN
Qty: 90 TABLET | Refills: 0 | Status: SHIPPED | OUTPATIENT
Start: 2021-12-21 | End: 2022-02-25 | Stop reason: SDUPTHER

## 2021-12-21 RX ORDER — LANCETS
EACH MISCELLANEOUS
Qty: 400 EACH | Refills: 3 | Status: SHIPPED | OUTPATIENT
Start: 2021-12-21 | End: 2022-02-07 | Stop reason: SDUPTHER

## 2021-12-21 RX ORDER — IBUPROFEN 400 MG/1
TABLET ORAL
Qty: 360 TABLET | Refills: 3 | Status: SHIPPED | OUTPATIENT
Start: 2021-12-21

## 2021-12-21 NOTE — TELEPHONE ENCOUNTER
Patient called and requested refills on some scripts, she is requesting the ones that go to [facility] be printed and the others sent to [facility]     oaars checked 10/4/2021

## 2021-12-23 ENCOUNTER — HOSPITAL ENCOUNTER (OUTPATIENT)
Dept: MAMMOGRAPHY | Age: 71
Discharge: HOME | End: 2021-12-25
Payer: MEDICARE

## 2021-12-23 ENCOUNTER — OFFICE VISIT (OUTPATIENT)
Dept: INTERNAL MEDICINE | Age: 71
End: 2021-12-23
Payer: MEDICARE

## 2021-12-23 VITALS
RESPIRATION RATE: 16 BRPM | WEIGHT: 258 LBS | HEART RATE: 68 BPM | BODY MASS INDEX: 45.71 KG/M2 | HEIGHT: 63 IN | SYSTOLIC BLOOD PRESSURE: 114 MMHG | DIASTOLIC BLOOD PRESSURE: 70 MMHG

## 2021-12-23 DIAGNOSIS — Z12.31 OTHER SCREENING MAMMOGRAM: ICD-10-CM

## 2021-12-23 DIAGNOSIS — I25.83 CORONARY ARTERY DISEASE DUE TO LIPID RICH PLAQUE: Primary | ICD-10-CM

## 2021-12-23 DIAGNOSIS — I50.32 CHRONIC DIASTOLIC CONGESTIVE HEART FAILURE (HCC): ICD-10-CM

## 2021-12-23 DIAGNOSIS — I25.10 CORONARY ARTERY DISEASE DUE TO LIPID RICH PLAQUE: Primary | ICD-10-CM

## 2021-12-23 DIAGNOSIS — Z79.4 TYPE 2 DIABETES MELLITUS WITH DIABETIC POLYNEUROPATHY, WITH LONG-TERM CURRENT USE OF INSULIN (HCC): ICD-10-CM

## 2021-12-23 DIAGNOSIS — E11.42 TYPE 2 DIABETES MELLITUS WITH DIABETIC POLYNEUROPATHY, WITH LONG-TERM CURRENT USE OF INSULIN (HCC): ICD-10-CM

## 2021-12-23 PROCEDURE — 1036F TOBACCO NON-USER: CPT | Performed by: INTERNAL MEDICINE

## 2021-12-23 PROCEDURE — 90694 VACC AIIV4 NO PRSRV 0.5ML IM: CPT | Performed by: INTERNAL MEDICINE

## 2021-12-23 PROCEDURE — 3051F HG A1C>EQUAL 7.0%<8.0%: CPT | Performed by: INTERNAL MEDICINE

## 2021-12-23 PROCEDURE — G8484 FLU IMMUNIZE NO ADMIN: HCPCS | Performed by: INTERNAL MEDICINE

## 2021-12-23 PROCEDURE — 1123F ACP DISCUSS/DSCN MKR DOCD: CPT | Performed by: INTERNAL MEDICINE

## 2021-12-23 PROCEDURE — G8427 DOCREV CUR MEDS BY ELIG CLIN: HCPCS | Performed by: INTERNAL MEDICINE

## 2021-12-23 PROCEDURE — PBSHW INFLUENZA, QUADV, ADJUVANTED, 65 YRS +, IM, PF, PREFILL SYR, 0.5ML (FLUAD): Performed by: INTERNAL MEDICINE

## 2021-12-23 PROCEDURE — G8400 PT W/DXA NO RESULTS DOC: HCPCS | Performed by: INTERNAL MEDICINE

## 2021-12-23 PROCEDURE — 99214 OFFICE O/P EST MOD 30 MIN: CPT | Performed by: INTERNAL MEDICINE

## 2021-12-23 PROCEDURE — G8417 CALC BMI ABV UP PARAM F/U: HCPCS | Performed by: INTERNAL MEDICINE

## 2021-12-23 PROCEDURE — 4040F PNEUMOC VAC/ADMIN/RCVD: CPT | Performed by: INTERNAL MEDICINE

## 2021-12-23 PROCEDURE — 1090F PRES/ABSN URINE INCON ASSESS: CPT | Performed by: INTERNAL MEDICINE

## 2021-12-23 PROCEDURE — 3017F COLORECTAL CA SCREEN DOC REV: CPT | Performed by: INTERNAL MEDICINE

## 2021-12-23 PROCEDURE — 2022F DILAT RTA XM EVC RTNOPTHY: CPT | Performed by: INTERNAL MEDICINE

## 2021-12-23 PROCEDURE — 77063 BREAST TOMOSYNTHESIS BI: CPT

## 2021-12-23 ASSESSMENT — ENCOUNTER SYMPTOMS
BLOOD IN STOOL: 0
NAUSEA: 0
BACK PAIN: 0
ABDOMINAL PAIN: 0
VOMITING: 0
SHORTNESS OF BREATH: 0
CONSTIPATION: 0
COUGH: 0
DIARRHEA: 0
EYE PAIN: 0

## 2021-12-23 NOTE — PROGRESS NOTES
[place] [address]  Dept: [phone]  Dept Fax: [phone]  Loc: [phone]     Nazario Mendieta is a 66 y.o. female who presents today for her medical conditions/complaintsas noted below. Nazario Mendieta is c/o of   Chief Complaint   Patient presents with    Coronary Artery Disease     Er follow up right lower leg pain    Diabetes    Congestive Heart Failure         HPI:     Coronary Artery Disease  Presents for follow-up visit. Pertinent negatives include no chest pain, chest pressure, dizziness, leg swelling, palpitations or shortness of breath. Her past medical history is significant for CHF. The symptoms have been stable. Compliance with diet is good. Compliance with exercise is good. Compliance with medications is good. Diabetes  She presents for her follow-up diabetic visit. She has type 2 diabetes mellitus. Her disease course has been fluctuating. Pertinent negatives for hypoglycemia include no confusion, dizziness, headaches, nervousness/anxiousness or pallor. Pertinent negatives for diabetes include no chest pain, no polydipsia, no polyuria and no weakness. Congestive Heart Failure  Presents for follow-up visit. Pertinent negatives include no abdominal pain, chest pain, chest pressure, palpitations or shortness of breath. The symptoms have been stable. Her past medical history is significant for CAD. Compliance with total regimen is %. Compliance with diet is %. Compliance with exercise is %. Compliance with medications is %. Hemoglobin A1C (%)   Date Value   02/18/2021 7.0 (H)   01/28/2021 6.5 (H)   07/27/2020 7.1 (H)            Microalb/Crt.  Ratio (mcg/mg creat)   Date Value   02/18/2021 CANNOT BE CALCULATED     LDL Cholesterol (mg/dL)   Date Value   02/18/2021 44   07/27/2020 34   07/22/2019 45     LDL Calculated (mg/dL)   Date Value 05/19/2016 80   09/23/2014 80         AST (U/L)   Date Value   02/18/2021 40 (H)     ALT (U/L)   Date Value   02/18/2021 39 (H)     BUN (mg/dL)   Date Value   02/18/2021 11     BP Readings from Last 3 Encounters:   12/23/21 114/70   11/17/21 (!) 137/45   10/26/21 122/78              Past Medical History:   Diagnosis Date    Arthritis     Asthma     CAD (coronary artery disease) 1989    Cancer ([address] 1971    cervical    Carotid artery disease ([address]     Cataracts, bilateral     COPD (chronic obstructive pulmonary disease) (HCC)     Coronary artery disease     Eczema     Fibromyalgia     Glaucoma     H/O blood clots     Headache     Heart attack ([address]     Hx of Bell's palsy     Mild right facial paralysis    Hypertension     Liver disease     Mild dementia ([address]     Neuropathy     Osteoporosis     Sleep apnea 11/07/2014    sleep study done in [facility]     Type 2 diabetes mellitus with hyperglycemia ([address]       Past Surgical History:   Procedure Laterality Date    ANESTHESIA NERVE BLOCK Right 6/28/2019    Right L2 L3 L4 L5 Diagnostic Medial BB performed by Eva Ponce MD at [facility] Right 7/26/2019    Right L2 L3 L4 L5 Confirmatory Medial BB performed by Eva Ponce MD at [address]  2002    COLONOSCOPY  09/2012    COLONOSCOPY N/A 9/30/2019    COLONOSCOPY performed by Rashel Warren MD at [facility]  internal hemorrhoids and few diverticuli    CORONARY ANGIOPLASTY WITH STENT PLACEMENT      INDUCED [id number] [facility] Right 2/8/2019    Right L2 L3 TRANSFORAMINAL performed by Eva Ponce MD at [place] [address] Right 5/7/2019    Right L2 L3 TRANSFORAMINAL performed by Eva Ponce MD at [place] [address] Right 6/4/2019    Right L3 & L4 TRANSFORAMINAL performed by Eva Ponce MD at [place] [address] Right 12/12/2019    Right L4 L5 TRANSFORAMINAL performed by Laureen Adames MD at [facility] [address] Right 2020    RIGHT L4 L5  TRANSFORAMINAL performed by Laureen Adames MD at [facility] [address] Right 2020    Right L4 L5 TRANSFORAMINAL performed by Laureen Adames MD at [address] 1.1-2CM FACE,FACIAL Left 2018    Excision Cyst Left Chest, Middle Back performed by Armaan Villareal MD at [address] [facility] AA&/STRD TFRML EPI LUMBAR/SACRAL EA ADDL Right 10/5/2018    Right L2 & L3 TRANSFORAMINAL performed by Laureen Adames MD at [address] [facility] AA&/STRD TFRML EPI LUMBAR/SACRAL EA ADDL Right 10/26/2018    Right L2 L3 TRANSFORAMINAL performed by Laureen Adames MD at [address] [facility] Right 2019    Right  L2 L3 L4 L5 RADIOFREQUENCY performed by Laureen Adames MD at [address] [facility]    Cervical cancer. Had XRT    TONSILLECTOMY         Family History   Problem Relation Age of Onset    Heart Disease Mother     High Blood Pressure Mother    [facility] Stroke Mother     Glaucoma Mother     Early Death Father     Cancer Father         stomach    Miscarriages / Stillbirths Maternal Uncle           Social History     Tobacco Use    Smoking status: Former Smoker     Packs/day: 1.00     Years: 35.00     Pack years: 35.00     Quit date: 2001     Years since quittin.2    Smokeless tobacco: Never Used   Substance Use Topics    Alcohol use: No         Current Outpatient Medications   Medication Sig Dispense Refill     MG tablet TAKE 1 TABLET EVERY 6 HOURS AS NEEDED FOR PAIN 360 tablet 3    Microlet Lancets MISC USE TO TEST 4 TIMES A DAY. 400 each 3    atorvastatin (LIPITOR) 40 MG tablet TAKE 1 TABLET EVERY DAY 90 tablet 3    HYDROcodone-acetaminophen (NORCO)  MG per tablet Take 1 tablet by mouth every 8 hours as needed for Pain for up to 30 days.  Intended supply: 30 days 90 tablet 0    metoprolol succinate (TOPROL XL) 25 MG extended release tablet Take 0.5 tablets by mouth daily 45 tablet 1    SYMBICORT 80-4.5 MCG/ACT AERO Inhale 2 puffs into the lungs 2 times daily 3 each 6    insulin glargine (LANTUS) 100 UNIT/ML injection vial Dispense 3 vials 3 each 3    gabapentin (NEURONTIN) 600 MG tablet Take 1 tablet by mouth 3 times daily for 30 days. 90 tablet 2    insulin glargine (LANTUS) 100 UNIT/ML injection vial Dispense 3 Vials. Inject 15 units into the skin 2 x daily. 3 each 3    Insulin Syringe-Needle U-100 (DROPLET INSULIN SYRINGE) 31G X 5/16\" 0.5 ML MISC Inject 1 each into the skin 2 times daily 200 each 3    Insulin Pen Needle (B-D ULTRAFINE III SHORT PEN) 31G X 8 MM MISC USE TWICE DAILY 180 each 3    calcium carbonate-vitamin D3 (CALCIUM 600+D3) 600-400 MG-UNIT TABS per tab TAKE 1 TABLET BY MOUTH TWICE DAILY 180 tablet 3    magnesium oxide (MAGNESIUM-OXIDE) 400 (241.3 Mg) MG TABS tablet Take 1 tablet by mouth 2 times daily 180 tablet 3    albuterol sulfate  (90 Base) MCG/ACT inhaler Inhale 2 puffs into the lungs 4 times daily 54 g 3    NOVOLOG FLEXPEN 100 UNIT/ML injection pen INJECT 4 UNITS AT LUNCH AND  6 UNITS AT SUPPER (EACH PEN EXPIRES 28 DAYS AFTER 1ST USE) 15 mL 3    clopidogrel (PLAVIX) 75 MG tablet TAKE 1 TABLET EVERY DAY 90 tablet 3    amitriptyline (ELAVIL) 25 MG tablet TAKE 1 TABLET AT BEDTIME 90 tablet 3    furosemide (LASIX) 40 MG tablet TAKE 1 TABLET EVERY DAY 90 tablet 3    buPROPion (WELLBUTRIN) 75 MG tablet TAKE 1 TABLET TWICE DAILY 180 tablet 3    pantoprazole (PROTONIX) 40 MG tablet TAKE 1 TABLET EVERY DAY 90 tablet 3    citalopram (CELEXA) 10 MG tablet TAKE 1 TABLET EVERY DAY 90 tablet 3    fluticasone (FLONASE) 50 MCG/ACT nasal spray USE 2 SPRAYS NASALLY EVERY DAY 48 g 3    Misc. Devices MISC It is in my medical opinion that Emilia Duran be allowed to have a kitten in her apartment at all times for companionship to help with her Depression, Anxiety, Fibromyalgia and Osteoarthritis. 1 Device 0    dorzolamide (TRUSOPT) 2 % ophthalmic solution Apply to eye 3 times daily      Ascorbic Acid (VITAMIN C) 500 MG CAPS Take 500 mg by mouth 2 times daily 180 capsule 3    gabapentin (NEURONTIN) 300 MG capsule TAKE 2 CAPSULES TWICE DAILY 360 capsule 3    senna (SENOKOT) 8.6 MG TABS tablet TAKE 1 TABLET BY MOUTH TWICE DAILY 180 tablet 3    spironolactone (ALDACTONE) 50 MG tablet TAKE 1 TABLET TWICE DAILY 180 tablet 3    vitamin D3 (CHOLECALCIFEROL) 25 MCG (1000 UT) TABS tablet TAKE 3 TABLETS BY MOUTH TWICE A DAY. 540 tablet 3    potassium chloride (MICRO-K) 10 MEQ extended release capsule TAKE 1 CAPSULE TWICE DAILY 180 capsule 3    CONTOUR NEXT TEST strip TEST FOUR TIMES DAILY 400 strip 5    alendronate (FOSAMAX) 70 MG tablet TAKE 1 TABLET EVERY WEEK 12 tablet 3    pyridoxine (B-6) 100 MG tablet Take 50 mg by mouth daily      Biotin 75543 MCG TABS Take by mouth      Respiratory Therapy Supplies MISC CPAP supplies 1 each 0    vitamin C (ASCORBIC ACID) 500 MG tablet TAKE 1 TABLET BY MOUTH TWICE A DAY. 180 tablet 3    nitroGLYCERIN (NITROSTAT) 0.4 MG SL tablet PLACE 1 TABLET UNER THE TONGUE EVERY 5 MINUTES AS NEEDED FOR CHEST PAIN AT FIRST SIGN OF CHEST PAIN.  25 tablet 1    albuterol (PROVENTIL) (2.5 MG/3ML) 0.083% nebulizer solution Take 3 mLs by nebulization every 6 hours as needed for Wheezing 120 each 3    triamcinolone (KENALOG) 0.1 % cream APPLY TOPICALLY TWICE DAILY 80 g 5    VITAMIN A PO Take 2,400 mcg by mouth daily      vitamin E 400 UNIT capsule Take 400 Units by mouth daily      COMBIGAN 0.2-0.5 % ophthalmic solution PLACE 1 DROP INTO BOTH EYES DAILY 15 mL 3    Compression Stockings MISC by Does not apply route 15-20mmHg  Knee high  Open tow  With assist device to help put them on 1 each 0    Zinc 50 MG TABS Take 50 mg by mouth daily      niacin 500 MG extended release capsule Take 500 mg by mouth daily      folic acid (FOLVITE) 190 MCG tablet Take 800 mcg by mouth daily  Cyanocobalamin (VITAMIN B12 PO) Take 2,500 mcg by mouth daily      B Complex-C (SUPER B COMPLEX PO) Take 1 tablet by mouth daily       travoprost, benzalkonium, (TRAVATAN) 0.004 % ophthalmic solution Place 1 drop into both eyes daily 3 Bottle 3    CPAP Machine MISC by Does not apply route Pressure of 13 (AirSense 10)  [employer].      Multiple Vitamin (MULTI VITAMIN DAILY) TABS Take by mouth daily       No current facility-administered medications for this visit. No Known Allergies    Health Maintenance   Topic Date Due    Hepatitis A vaccine (1 of 2 - Risk 2-dose series) Never done    Diabetic retinal exam  05/03/2020    COVID-19 Vaccine (3 - Booster for Weeks [place] series) 08/25/2021    Annual Wellness Visit (AWV)  02/13/2022    A1C test (Diabetic or Prediabetic)  02/18/2022    Diabetic microalbuminuria test  02/18/2022    Lipid screen  02/18/2022    Potassium monitoring  02/18/2022    Creatinine monitoring  02/18/2022    Diabetic foot exam  08/16/2022    Breast cancer screen  12/01/2022    DTaP/Tdap/Td vaccine (2 - Td or Tdap) 06/18/2027    Colon cancer screen colonoscopy  09/30/2029    DEXA (modify frequency per FRAX score)  Completed    Flu vaccine  Completed    Shingles Vaccine  Completed    Pneumococcal 65+ years Vaccine  Completed    Hepatitis C screen  Completed    Hib vaccine  Aged Out    Meningococcal (ACWY) vaccine  Aged Out       Subjective:      Review of Systems   Constitutional: Negative for chills and fever. HENT: Negative for hearing loss. Eyes: Negative for pain and visual disturbance. Respiratory: Negative for cough and shortness of breath. Cardiovascular: Negative for chest pain, palpitations and leg swelling. Gastrointestinal: Negative for abdominal pain, blood in stool, constipation, diarrhea, nausea and vomiting. Endocrine: Negative for cold intolerance, polydipsia and polyuria.    Genitourinary: Negative for difficulty urinating, dysuria and hematuria. Musculoskeletal: Negative for arthralgias, back pain, gait problem and neck pain. Skin: Negative for pallor and rash. Neurological: Negative for dizziness, weakness, numbness and headaches. Hematological: Negative for adenopathy. Does not bruise/bleed easily. Psychiatric/Behavioral: Negative for confusion. The patient is not nervous/anxious. Objective:     Physical Exam  Vitals reviewed. Constitutional:       Appearance: She is well-developed. HENT:      Head: Normocephalic and atraumatic. Eyes:      Pupils: Pupils are equal, round, and reactive to light. Cardiovascular:      Rate and Rhythm: Normal rate and regular rhythm. Heart sounds: No murmur heard. No friction rub. No gallop. Pulmonary:      Effort: Pulmonary effort is normal.      Breath sounds: Normal breath sounds. No wheezing or rales. Abdominal:      General: There is no distension. Palpations: Abdomen is soft. There is no mass. Tenderness: There is no abdominal tenderness. There is no rebound. Musculoskeletal:         General: Normal range of motion. Cervical back: Neck supple. Lymphadenopathy:      Cervical: No cervical adenopathy. Skin:     General: Skin is warm and dry. Findings: No rash. Neurological:      Mental Status: She is alert and oriented to person, place, and time. Cranial Nerves: No cranial nerve deficit (grossly). Psychiatric:         Thought Content: Thought content normal.        /70 (Site: Right Upper Arm, Position: Sitting, Cuff Size: Large Adult)   Pulse 68   Resp 16   Ht 5' 3\" (1.6 m)   Wt 258 lb (117 kg)   LMP 12/07/1995 (Approximate)   BMI 45.70 kg/m²     Assessment:       Diagnosis Orders   1. Coronary artery disease due to lipid rich plaque     2. Chronic diastolic congestive heart failure ([address]     3.  Type 2 diabetes mellitus with diabetic polyneuropathy, with long-term current use of insulin ([address]     Reviewed multiple test results for this appointment. 11/29/2021 outside hospital right leg Doppler study showed no DVT. 10/21/2021 low hemoglobin at 10.5.    10/21/2021 normal creatinine 0.8. Patient told to continue metoprolol and Lantus. Plan:       Return if symptoms worsen or fail to improve, for medicare AWV, CAD, Diabetes, CHF. Orders Placed This Encounter   Procedures    INFLUENZA, QUADV, ADJUVANTED, 72 YRS =, IM, PF, PREFILL SYR, 0.5ML (FLUAD)     No orders of the defined types were placed in this encounter. Patientgiven educational materials - see patient instructions. Discussed use, benefit,and side effects of prescribed medications. All patient questions answered. Ptvoiced understanding. Reviewed health maintenance. Instructed to continue currentmedications, diet and exercise. Patient agreed with treatment plan. Follow up asdirected.      Electronically signed by Blair Han MD on 12/23/2021 at 9:31 AM

## 2021-12-23 NOTE — PROGRESS NOTES
Have you had an allergic reaction to the flu (influenza) shot? no  Are you allergic to eggs or any component of the flu vaccine? no  Do you have a history of Guillain-Pillager Syndrome (GBS), which is paralysis after receiving the flu vaccine? no  Are you feeling well today? yes  Flu vaccine given as ordered. Patient tolerated it well. No questions re: VIS information.

## 2022-01-17 RX ORDER — PERPHENAZINE 16 MG/1
TABLET, FILM COATED ORAL
Qty: 400 STRIP | Refills: 3 | Status: SHIPPED | OUTPATIENT
Start: 2022-01-17

## 2022-01-21 DIAGNOSIS — E55.9 VITAMIN D DEFICIENCY: ICD-10-CM

## 2022-01-21 DIAGNOSIS — M81.0 OSTEOPOROSIS, UNSPECIFIED OSTEOPOROSIS TYPE, UNSPECIFIED PATHOLOGICAL FRACTURE PRESENCE: ICD-10-CM

## 2022-01-21 RX ORDER — MELATONIN
Qty: 540 TABLET | Refills: 3 | Status: SHIPPED | OUTPATIENT
Start: 2022-01-21 | End: 2022-02-07 | Stop reason: SDUPTHER

## 2022-01-24 NOTE — TELEPHONE ENCOUNTER
Pharmacy requesting a refill of the below medication which has been pended for you:     Requested Prescriptions     Pending Prescriptions Disp Refills    calcium carbonate-vitamin D3 (CALCIUM 600+D3) 600-400 MG-UNIT TABS per tab [Pharmacy Med Name: CALCIUM 600MG + D3 TABLETS] 180 tablet 3     Sig: TAKE 1 TABLET BY MOUTH TWICE DAILY       Last Appointment Date: 12/23/2021  Next Appointment Date: 2/17/2022    No Known Allergies

## 2022-01-27 RX ORDER — UMECLIDINIUM BROMIDE AND VILANTEROL TRIFENATATE 62.5; 25 UG/1; UG/1
POWDER RESPIRATORY (INHALATION)
Qty: 1 EACH | Refills: 11 | Status: SHIPPED | OUTPATIENT
Start: 2022-01-27 | End: 2022-02-03 | Stop reason: SDUPTHER

## 2022-02-03 ENCOUNTER — TELEPHONE (OUTPATIENT)
Dept: INTERNAL MEDICINE | Age: 72
End: 2022-02-03
Payer: MEDICARE

## 2022-02-03 DIAGNOSIS — Z79.4 TYPE 2 DIABETES MELLITUS WITH DIABETIC POLYNEUROPATHY, WITH LONG-TERM CURRENT USE OF INSULIN (HCC): ICD-10-CM

## 2022-02-03 DIAGNOSIS — E55.9 VITAMIN D DEFICIENCY: ICD-10-CM

## 2022-02-03 DIAGNOSIS — E11.42 TYPE 2 DIABETES MELLITUS WITH DIABETIC POLYNEUROPATHY, WITH LONG-TERM CURRENT USE OF INSULIN (HCC): ICD-10-CM

## 2022-02-03 DIAGNOSIS — I50.32 CHRONIC DIASTOLIC CONGESTIVE HEART FAILURE (HCC): ICD-10-CM

## 2022-02-03 DIAGNOSIS — K74.60 HEPATIC CIRRHOSIS, UNSPECIFIED HEPATIC CIRRHOSIS TYPE, UNSPECIFIED WHETHER ASCITES PRESENT (HCC): Primary | ICD-10-CM

## 2022-02-03 DIAGNOSIS — G45.9 TIA (TRANSIENT ISCHEMIC ATTACK): ICD-10-CM

## 2022-02-03 DIAGNOSIS — M47.817 LUMBOSACRAL SPONDYLOSIS WITHOUT MYELOPATHY: ICD-10-CM

## 2022-02-03 PROCEDURE — G0179 MD RECERTIFICATION HHA PT: HCPCS | Performed by: INTERNAL MEDICINE

## 2022-02-03 RX ORDER — HYDROCODONE BITARTRATE AND ACETAMINOPHEN 10; 325 MG/1; MG/1
1 TABLET ORAL EVERY 8 HOURS PRN
COMMUNITY
End: 2022-08-16

## 2022-02-03 RX ORDER — UMECLIDINIUM BROMIDE AND VILANTEROL TRIFENATATE 62.5; 25 UG/1; UG/1
POWDER RESPIRATORY (INHALATION)
Qty: 1 EACH | Refills: 11 | Status: SHIPPED | OUTPATIENT
Start: 2022-02-03 | End: 2022-02-17

## 2022-02-03 NOTE — TELEPHONE ENCOUNTER
[facility] AT [facility] Re-Cert forms completed; 01/30/22 to 03/30/22;  Tamiko NEIL

## 2022-02-03 NOTE — TELEPHONE ENCOUNTER
Recommend using the Symbicort, that she should already have. Also, ask her to call the pulmonary clinic to see if they would recommend additional medication( besides the Symbicort). She does already follow with pulmonary, and they are the ones who ordered the Anoro Ellipta.

## 2022-02-03 NOTE — TELEPHONE ENCOUNTER
Patient called in and stated that her Anora Ellipta inhailer is no longer covered and will be over $1000 out of pocket. She is wondering if you can send something else in to the pharmacy for her that will be just as good. Thank you.  [facility]

## 2022-02-07 DIAGNOSIS — E55.9 VITAMIN D DEFICIENCY: ICD-10-CM

## 2022-02-07 RX ORDER — MELATONIN
Qty: 540 TABLET | Refills: 3 | Status: SHIPPED | OUTPATIENT
Start: 2022-02-07 | End: 2022-10-07 | Stop reason: SDUPTHER

## 2022-02-07 RX ORDER — LANCETS
EACH MISCELLANEOUS
Qty: 400 EACH | Refills: 3 | Status: SHIPPED | OUTPATIENT
Start: 2022-02-07 | End: 2022-10-07 | Stop reason: SDUPTHER

## 2022-02-10 ENCOUNTER — HOSPITAL ENCOUNTER (OUTPATIENT)
Dept: LAB | Age: 72
Discharge: HOME | End: 2022-02-10
Payer: MEDICARE

## 2022-02-10 DIAGNOSIS — E03.9 HYPOTHYROIDISM (ACQUIRED): ICD-10-CM

## 2022-02-10 DIAGNOSIS — E11.42 TYPE 2 DIABETES MELLITUS WITH DIABETIC POLYNEUROPATHY, WITH LONG-TERM CURRENT USE OF INSULIN (HCC): ICD-10-CM

## 2022-02-10 DIAGNOSIS — E55.9 VITAMIN D DEFICIENCY: ICD-10-CM

## 2022-02-10 DIAGNOSIS — D64.9 ANEMIA, UNSPECIFIED TYPE: ICD-10-CM

## 2022-02-10 DIAGNOSIS — Z79.4 TYPE 2 DIABETES MELLITUS WITH DIABETIC POLYNEUROPATHY, WITH LONG-TERM CURRENT USE OF INSULIN (HCC): ICD-10-CM

## 2022-02-10 DIAGNOSIS — I25.83 CORONARY ARTERY DISEASE DUE TO LIPID RICH PLAQUE: ICD-10-CM

## 2022-02-10 DIAGNOSIS — I25.10 CORONARY ARTERY DISEASE DUE TO LIPID RICH PLAQUE: ICD-10-CM

## 2022-02-10 DIAGNOSIS — E53.8 B12 DEFICIENCY: ICD-10-CM

## 2022-02-10 DIAGNOSIS — I50.32 CHRONIC DIASTOLIC CONGESTIVE HEART FAILURE (HCC): ICD-10-CM

## 2022-02-10 DIAGNOSIS — D50.9 IRON DEFICIENCY ANEMIA, UNSPECIFIED IRON DEFICIENCY ANEMIA TYPE: ICD-10-CM

## 2022-02-10 LAB
ABSOLUTE EOS #: 0.27 K/UL (ref 0–0.44)
ABSOLUTE IMMATURE GRANULOCYTE: 0.03 K/UL (ref 0–0.3)
ABSOLUTE LYMPH #: 1.28 K/UL (ref 1.1–3.7)
ABSOLUTE MONO #: 0.86 K/UL (ref 0.1–1.2)
ALBUMIN SERPL-MCNC: 4.2 G/DL (ref 3.5–5.2)
ALBUMIN/GLOBULIN RATIO: 1.2 (ref 1–2.5)
ALP BLD-CCNC: 84 U/L (ref 35–104)
ALT SERPL-CCNC: 54 U/L (ref 5–33)
ANION GAP SERPL CALCULATED.3IONS-SCNC: 12 MMOL/L (ref 9–17)
AST SERPL-CCNC: 53 U/L
BASOPHILS # BLD: 1 % (ref 0–2)
BASOPHILS ABSOLUTE: 0.08 K/UL (ref 0–0.2)
BILIRUB SERPL-MCNC: 0.69 MG/DL (ref 0.3–1.2)
BUN BLDV-MCNC: 25 MG/DL (ref 8–23)
BUN/CREAT BLD: 28 (ref 9–20)
CALCIUM SERPL-MCNC: 10 MG/DL (ref 8.6–10.4)
CHLORIDE BLD-SCNC: 99 MMOL/L (ref 98–107)
CHOLESTEROL/HDL RATIO: 2.3
CHOLESTEROL: 132 MG/DL
CO2: 26 MMOL/L (ref 20–31)
CREAT SERPL-MCNC: 0.88 MG/DL (ref 0.5–0.9)
CREATININE URINE: 121.8 MG/DL (ref 28–217)
DIFFERENTIAL TYPE: ABNORMAL
EOSINOPHILS RELATIVE PERCENT: 4 % (ref 1–4)
ESTIMATED AVERAGE GLUCOSE: 151 MG/DL
GFR AFRICAN AMERICAN: >60 ML/MIN
GFR NON-AFRICAN AMERICAN: >60 ML/MIN
GFR SERPL CREATININE-BSD FRML MDRD: ABNORMAL ML/MIN/{1.73_M2}
GFR SERPL CREATININE-BSD FRML MDRD: ABNORMAL ML/MIN/{1.73_M2}
GLUCOSE BLD-MCNC: 133 MG/DL (ref 70–99)
HBA1C MFR BLD: 6.9 % (ref 4–6)
HCT VFR BLD CALC: 42.3 % (ref 36.3–47.1)
HDLC SERPL-MCNC: 57 MG/DL
HEMOGLOBIN: 13.7 G/DL (ref 11.9–15.1)
IMMATURE GRANULOCYTES: 0 %
IRON SATURATION: 36 % (ref 20–55)
IRON: 123 UG/DL (ref 37–145)
LDL CHOLESTEROL: 56 MG/DL (ref 0–130)
LYMPHOCYTES # BLD: 17 % (ref 24–43)
MCH RBC QN AUTO: 29.8 PG (ref 25.2–33.5)
MCHC RBC AUTO-ENTMCNC: 32.4 G/DL (ref 25.2–33.5)
MCV RBC AUTO: 92.2 FL (ref 82.6–102.9)
MICROALBUMIN/CREAT 24H UR: 21 MG/L
MICROALBUMIN/CREAT UR-RTO: 17 MCG/MG CREAT
MONOCYTES # BLD: 11 % (ref 3–12)
NRBC AUTOMATED: 0 PER 100 WBC
PDW BLD-RTO: 14.8 % (ref 11.8–14.4)
PLATELET # BLD: 158 K/UL (ref 138–453)
PLATELET ESTIMATE: ABNORMAL
PMV BLD AUTO: 10.5 FL (ref 8.1–13.5)
POTASSIUM SERPL-SCNC: 4.1 MMOL/L (ref 3.7–5.3)
RBC # BLD: 4.59 M/UL (ref 3.95–5.11)
RBC # BLD: ABNORMAL 10*6/UL
SEG NEUTROPHILS: 67 % (ref 36–65)
SEGMENTED NEUTROPHILS ABSOLUTE COUNT: 5.1 K/UL (ref 1.5–8.1)
SODIUM BLD-SCNC: 137 MMOL/L (ref 135–144)
THYROXINE, FREE: 1.05 NG/DL (ref 0.93–1.7)
TOTAL IRON BINDING CAPACITY: 345 UG/DL (ref 250–450)
TOTAL PROTEIN: 7.8 G/DL (ref 6.4–8.3)
TRIGL SERPL-MCNC: 97 MG/DL
TSH SERPL DL<=0.05 MIU/L-ACNC: 2.42 MIU/L (ref 0.3–5)
UNSATURATED IRON BINDING CAPACITY: 222 UG/DL (ref 112–347)
VITAMIN B-12: >2000 PG/ML (ref 232–1245)
VLDLC SERPL CALC-MCNC: NORMAL MG/DL (ref 1–30)
WBC # BLD: 7.6 K/UL (ref 3.5–11.3)
WBC # BLD: ABNORMAL 10*3/UL

## 2022-02-10 PROCEDURE — 36415 COLL VENOUS BLD VENIPUNCTURE: CPT

## 2022-02-10 PROCEDURE — 85025 COMPLETE CBC W/AUTO DIFF WBC: CPT

## 2022-02-10 PROCEDURE — 83550 IRON BINDING TEST: CPT

## 2022-02-10 PROCEDURE — 80053 COMPREHEN METABOLIC PANEL: CPT

## 2022-02-10 PROCEDURE — 82043 UR ALBUMIN QUANTITATIVE: CPT

## 2022-02-10 PROCEDURE — 83036 HEMOGLOBIN GLYCOSYLATED A1C: CPT

## 2022-02-10 PROCEDURE — 84439 ASSAY OF FREE THYROXINE: CPT

## 2022-02-10 PROCEDURE — 82607 VITAMIN B-12: CPT

## 2022-02-10 PROCEDURE — 80061 LIPID PANEL: CPT

## 2022-02-10 PROCEDURE — 83540 ASSAY OF IRON: CPT

## 2022-02-10 PROCEDURE — 82570 ASSAY OF URINE CREATININE: CPT

## 2022-02-10 PROCEDURE — 82306 VITAMIN D 25 HYDROXY: CPT

## 2022-02-10 PROCEDURE — 84443 ASSAY THYROID STIM HORMONE: CPT

## 2022-02-12 LAB — VITAMIN D 25-HYDROXY: 55.2 NG/ML (ref 30–100)

## 2022-02-14 RX ORDER — POTASSIUM CHLORIDE 750 MG/1
CAPSULE, EXTENDED RELEASE ORAL
Qty: 180 CAPSULE | Refills: 3 | Status: SHIPPED | OUTPATIENT
Start: 2022-02-14

## 2022-02-14 NOTE — TELEPHONE ENCOUNTER
Pharmacy requesting a refill of the below medication which has been pended for you:     Requested Prescriptions     Pending Prescriptions Disp Refills    potassium chloride (MICRO-K) 10 MEQ extended release capsule [Pharmacy Med Name: POTASSIUM CHLORIDE ER 10 MEQ Capsule Extended Release] 180 capsule 3     Sig: TAKE 1 CAPSULE TWICE DAILY       Last Appointment Date: 12/23/2021  Next Appointment Date: 2/17/2022    No Known Allergies

## 2022-02-15 ENCOUNTER — TELEPHONE (OUTPATIENT)
Dept: PULMONOLOGY | Age: 72
End: 2022-02-15

## 2022-02-15 NOTE — TELEPHONE ENCOUNTER
Pt is calling in stating that insurance anoro ellipta inhaler is not covered any longer. Please advise. Pt does not want to take symbicort and stioloto respimat daily. Only wants to take it as needed. Please advise. Pt uses mail in [facility] mail deliver.      Last visit 10/13/2021  Nest visit 10/20/2022

## 2022-02-16 NOTE — TELEPHONE ENCOUNTER
So not quite understand. Anoro and Stioto are almost identical and each can be taken daily. Best for COPD. Symbicort taken twice daily and best for asthma. Definitely, do not use symbicort and anoro/stiolto together. Chose one or other. None of these medications work fast enough to use as rescue medication (that is what albuterol if for), but if breathing mostly OK, then she can chose to use one of these if things get bad would and recommend that she continue to use daily for a while until breathing better and then she can chose to stop. Best to use one of these medications daily and then add albuterol when needed.

## 2022-02-17 ENCOUNTER — OFFICE VISIT (OUTPATIENT)
Dept: INTERNAL MEDICINE | Age: 72
End: 2022-02-17
Payer: MEDICARE

## 2022-02-17 VITALS
DIASTOLIC BLOOD PRESSURE: 78 MMHG | SYSTOLIC BLOOD PRESSURE: 118 MMHG | HEIGHT: 63 IN | WEIGHT: 261 LBS | HEART RATE: 50 BPM | RESPIRATION RATE: 16 BRPM | BODY MASS INDEX: 46.25 KG/M2

## 2022-02-17 DIAGNOSIS — E11.42 TYPE 2 DIABETES MELLITUS WITH DIABETIC POLYNEUROPATHY, WITH LONG-TERM CURRENT USE OF INSULIN (HCC): ICD-10-CM

## 2022-02-17 DIAGNOSIS — D50.9 IRON DEFICIENCY ANEMIA, UNSPECIFIED IRON DEFICIENCY ANEMIA TYPE: ICD-10-CM

## 2022-02-17 DIAGNOSIS — Z00.00 MEDICARE ANNUAL WELLNESS VISIT, SUBSEQUENT: ICD-10-CM

## 2022-02-17 DIAGNOSIS — Z79.4 TYPE 2 DIABETES MELLITUS WITH DIABETIC POLYNEUROPATHY, WITH LONG-TERM CURRENT USE OF INSULIN (HCC): ICD-10-CM

## 2022-02-17 DIAGNOSIS — J47.9 BRONCHIECTASIS WITHOUT COMPLICATION (HCC): ICD-10-CM

## 2022-02-17 DIAGNOSIS — I25.83 CORONARY ARTERY DISEASE DUE TO LIPID RICH PLAQUE: ICD-10-CM

## 2022-02-17 DIAGNOSIS — K59.00 CONSTIPATION, UNSPECIFIED CONSTIPATION TYPE: ICD-10-CM

## 2022-02-17 DIAGNOSIS — I25.10 CORONARY ARTERY DISEASE DUE TO LIPID RICH PLAQUE: ICD-10-CM

## 2022-02-17 DIAGNOSIS — I50.32 CHF (CONGESTIVE HEART FAILURE), NYHA CLASS I, CHRONIC, DIASTOLIC (HCC): ICD-10-CM

## 2022-02-17 DIAGNOSIS — Z00.00 GENERAL MEDICAL EXAM: Primary | ICD-10-CM

## 2022-02-17 PROCEDURE — G8417 CALC BMI ABV UP PARAM F/U: HCPCS | Performed by: INTERNAL MEDICINE

## 2022-02-17 PROCEDURE — G8400 PT W/DXA NO RESULTS DOC: HCPCS | Performed by: INTERNAL MEDICINE

## 2022-02-17 PROCEDURE — 99214 OFFICE O/P EST MOD 30 MIN: CPT | Performed by: INTERNAL MEDICINE

## 2022-02-17 PROCEDURE — G8427 DOCREV CUR MEDS BY ELIG CLIN: HCPCS | Performed by: INTERNAL MEDICINE

## 2022-02-17 PROCEDURE — G8484 FLU IMMUNIZE NO ADMIN: HCPCS | Performed by: INTERNAL MEDICINE

## 2022-02-17 PROCEDURE — 1036F TOBACCO NON-USER: CPT | Performed by: INTERNAL MEDICINE

## 2022-02-17 PROCEDURE — G0463 HOSPITAL OUTPT CLINIC VISIT: HCPCS | Performed by: INTERNAL MEDICINE

## 2022-02-17 PROCEDURE — 1123F ACP DISCUSS/DSCN MKR DOCD: CPT | Performed by: INTERNAL MEDICINE

## 2022-02-17 PROCEDURE — 1090F PRES/ABSN URINE INCON ASSESS: CPT | Performed by: INTERNAL MEDICINE

## 2022-02-17 PROCEDURE — 2022F DILAT RTA XM EVC RTNOPTHY: CPT | Performed by: INTERNAL MEDICINE

## 2022-02-17 PROCEDURE — 3017F COLORECTAL CA SCREEN DOC REV: CPT | Performed by: INTERNAL MEDICINE

## 2022-02-17 PROCEDURE — 4040F PNEUMOC VAC/ADMIN/RCVD: CPT | Performed by: INTERNAL MEDICINE

## 2022-02-17 PROCEDURE — 3044F HG A1C LEVEL LT 7.0%: CPT | Performed by: INTERNAL MEDICINE

## 2022-02-17 PROCEDURE — 99397 PER PM REEVAL EST PAT 65+ YR: CPT | Performed by: INTERNAL MEDICINE

## 2022-02-17 PROCEDURE — G0439 PPPS, SUBSEQ VISIT: HCPCS | Performed by: INTERNAL MEDICINE

## 2022-02-17 RX ORDER — SENNOSIDES 8.6 MG
TABLET ORAL
Qty: 180 TABLET | Refills: 3 | Status: SHIPPED | OUTPATIENT
Start: 2022-02-17

## 2022-02-17 ASSESSMENT — ENCOUNTER SYMPTOMS
EYE PAIN: 0
DIARRHEA: 0
SHORTNESS OF BREATH: 0
BLOOD IN STOOL: 0
NAUSEA: 0
VOMITING: 0
CONSTIPATION: 0
COUGH: 0
BACK PAIN: 0
ABDOMINAL PAIN: 0

## 2022-02-17 ASSESSMENT — PATIENT HEALTH QUESTIONNAIRE - PHQ9
2. FEELING DOWN, DEPRESSED OR HOPELESS: 0
SUM OF ALL RESPONSES TO PHQ9 QUESTIONS 1 & 2: 0
SUM OF ALL RESPONSES TO PHQ QUESTIONS 1-9: 0
SUM OF ALL RESPONSES TO PHQ QUESTIONS 1-9: 0
1. LITTLE INTEREST OR PLEASURE IN DOING THINGS: 0
SUM OF ALL RESPONSES TO PHQ QUESTIONS 1-9: 0
SUM OF ALL RESPONSES TO PHQ QUESTIONS 1-9: 0

## 2022-02-17 ASSESSMENT — LIFESTYLE VARIABLES: HOW OFTEN DO YOU HAVE A DRINK CONTAINING ALCOHOL: NEVER

## 2022-02-17 NOTE — PROGRESS NOTES
[place] [address]  Dept: [phone]  Dept Fax: [phone]  Loc: [phone]     Marry Shrestha is a 66 y.o. female who presents today for her medical conditions/complaintsas noted below. Marry Shrestha is c/o of   Chief Complaint   Patient presents with    Medicare AWV     6 month    Coronary Artery Disease    Diabetes    Congestive Heart Failure         HPI:     Coronary Artery Disease  Presents for follow-up visit. Pertinent negatives include no chest pain, chest pressure, dizziness, leg swelling, palpitations or shortness of breath. Her past medical history is significant for CHF. The symptoms have been stable. Compliance with diet is good. Compliance with exercise is good. Compliance with medications is good. Diabetes  She presents for her follow-up diabetic visit. She has type 2 diabetes mellitus. Her disease course has been fluctuating. Pertinent negatives for hypoglycemia include no confusion, dizziness, headaches, nervousness/anxiousness or pallor. Pertinent negatives for diabetes include no chest pain, no polydipsia, no polyuria and no weakness. Congestive Heart Failure  Presents for follow-up (Chronic diastolic CHF) visit. Pertinent negatives include no abdominal pain, chest pain, chest pressure, palpitations or shortness of breath. The symptoms have been stable. Her past medical history is significant for CAD. Compliance with total regimen is %. Compliance with diet is %. Compliance with exercise is %. Compliance with medications is %. Other  This is a recurrent (4-General medical exam,  5-bronchiectasis without complication) problem. The current episode started today. The problem occurs intermittently. The problem has been waxing and waning.  Pertinent negatives include no abdominal pain, arthralgias, chest pain, chills, coughing, fever, headaches, nausea, neck pain, numbness, rash, vomiting or weakness. Hemoglobin A1C (%)   Date Value   02/10/2022 6.9 (H)   02/18/2021 7.0 (H)   01/28/2021 6.5 (H)            Microalb/Crt.  Ratio (mcg/mg creat)   Date Value   02/10/2022 17     LDL Cholesterol (mg/dL)   Date Value   02/10/2022 56   02/18/2021 44   07/27/2020 34     LDL Calculated (mg/dL)   Date Value   05/19/2016 80   09/23/2014 80         AST (U/L)   Date Value   02/10/2022 53 (H)     ALT (U/L)   Date Value   02/10/2022 54 (H)     BUN (mg/dL)   Date Value   02/10/2022 25 (H)     BP Readings from Last 3 Encounters:   02/17/22 118/78   12/23/21 114/70   11/17/21 (!) 137/45              Past Medical History:   Diagnosis Date    Arthritis     Asthma     CAD (coronary artery disease) 1989    Cancer ([address] 1971    cervical    Carotid artery disease ([address]     Cataracts, bilateral     COPD (chronic obstructive pulmonary disease) ([address]     Coronary artery disease     Eczema     Fibromyalgia     Glaucoma     H/O blood clots     Headache     Heart attack ([address]     Hx of Bell's palsy     Mild right facial paralysis    Hypertension     Liver disease     Mild dementia ([address]     Neuropathy     Osteoporosis     Sleep apnea 11/07/2014    sleep [facility] done in [facility]     Type 2 diabetes mellitus with hyperglycemia ([address]       Past Surgical History:   Procedure Laterality Date    ANESTHESIA NERVE BLOCK Right 6/28/2019    Right L2 L3 L4 L5 Diagnostic Medial BB performed by Paige Espana MD at [facility] Right 7/26/2019    Right L2 L3 L4 L5 Confirmatory Medial BB performed by Paige Espana MD at [address] [place] [address] SURGERY  2002    COLONOSCOPY  09/2012    COLONOSCOPY N/A 9/30/2019    COLONOSCOPY performed by Ute Will MD at [address] [place] [address]  internal hemorrhoids and few diverticuli   [address] [place] [address] SPINE SURGERY Right 2019    Right L2 L3 TRANSFORAMINAL performed by Girish Allen MD at [address] [place] [address] Dr Hamilton 2019    Right L2 L3 TRANSFORAMINAL performed by Girish Allen MD at [address] [place] [address] Dr Hamilton 2019    Right L3 & L4 TRANSFORAMINAL performed by Girish Allen MD at [address] [place] [address] Dr Hamilton 2019    Right L4 L5 TRANSFORAMINAL performed by Girish Allen MD at [address] [facility] Right 2020    RIGHT L4 L5  TRANSFORAMINAL performed by Girish Allen MD at [address] [facility] Right 2020    Right L4 L5 TRANSFORAMINAL performed by Girish Allen MD at [address] 1.1-2CM FACE,FACIAL Left 2018    Excision Cyst Left Chest, Middle Back performed by Charlene Hickman MD at [address] [facility] AA&/STRD TFRML EPI LUMBAR/SACRAL EA ADDL Right 10/5/2018    Right L2 & L3 TRANSFORAMINAL performed by Girish Allen MD at [address] [facility] AA&/STRD TFRML EPI LUMBAR/SACRAL EA ADDL Right 10/26/2018    Right L2 L3 TRANSFORAMINAL performed by Girish Allen MD at [address] Right 2019    Right  L2 L3 L4 L5 RADIOFREQUENCY performed by Girish Allen MD at [address] [facility]    Cervical cancer.   Had XRT    TONSILLECTOMY  1983       Family History   Problem Relation Age of Onset    Heart Disease Mother     High Blood Pressure Mother    [place] Stroke Mother     Glaucoma Mother     Early Death Father     Cancer Father         stomach    Miscarriages / Stillbirths Maternal Uncle           Social History     Tobacco Use    Smoking status: Former Smoker     Packs/day: 1.00     Years: 35.00     Pack years: 35.00     Quit date: 2001     Years since quittin.3    Smokeless tobacco: Never Used   Substance Use Topics    Alcohol use: No         Current Outpatient Medications   Medication Sig Dispense Refill    senna (SENOKOT) 8.6 MG TABS tablet TAKE 1 TABLET BY MOUTH TWICE DAILY 180 tablet 3    potassium chloride (MICRO-K) 10 MEQ extended release capsule TAKE 1 CAPSULE TWICE DAILY 180 capsule 3    Microlet Lancets MISC USE TO TEST 4 TIMES A DAY. 400 each 3    vitamin D3 (CHOLECALCIFEROL) 25 MCG (1000 UT) TABS tablet TAKE 3 TABLETS BY MOUTH TWICE DAILY 540 tablet 3    tiotropium-olodaterol (STIOLTO RESPIMAT) 2.5-2.5 MCG/ACT AERS Inhale 2 puffs into the lungs daily 3 each 3    HYDROcodone-acetaminophen (NORCO)  MG per tablet Take 1 tablet by mouth every 8 hours as needed for Pain.  calcium carbonate-vitamin D3 (CALCIUM 600+D3) 600-400 MG-UNIT TABS per tab TAKE 1 TABLET BY MOUTH TWICE DAILY 180 tablet 3    CONTOUR NEXT TEST strip USE TO TEST 4 TIMES A DAY. 400 strip 3     MG tablet TAKE 1 TABLET EVERY 6 HOURS AS NEEDED FOR PAIN 360 tablet 3    atorvastatin (LIPITOR) 40 MG tablet TAKE 1 TABLET EVERY DAY 90 tablet 3    metoprolol succinate (TOPROL XL) 25 MG extended release tablet Take 0.5 tablets by mouth daily 45 tablet 1    SYMBICORT 80-4.5 MCG/ACT AERO Inhale 2 puffs into the lungs 2 times daily 3 each 6    gabapentin (NEURONTIN) 600 MG tablet Take 1 tablet by mouth 3 times daily for 30 days. 90 tablet 2    insulin glargine (LANTUS) 100 UNIT/ML injection vial Dispense 3 Vials. Inject 15 units into the skin 2 x daily.  3 each 3    Insulin Syringe-Needle U-100 (DROPLET INSULIN SYRINGE) 31G X 5/16\" 0.5 ML MISC Inject 1 each into the skin 2 times daily 200 each 3    Insulin Pen Needle (B-D ULTRAFINE III SHORT PEN) 31G X 8 MM MISC USE TWICE DAILY 180 each 3    magnesium oxide (MAGNESIUM-OXIDE) 400 (241.3 Mg) MG TABS tablet Take 1 tablet by mouth 2 times daily 180 tablet 3    albuterol sulfate  (90 Base) MCG/ACT inhaler Inhale 2 puffs into the lungs 4 times daily 54 g 3    NOVOLOG FLEXPEN 100 UNIT/ML injection pen INJECT 4 UNITS AT LUNCH AND  6 UNITS AT SUPPER (EACH PEN EXPIRES 28 DAYS AFTER 1ST USE) 15 mL 3    clopidogrel (PLAVIX) 75 MG tablet TAKE 1 TABLET EVERY DAY 90 tablet 3    amitriptyline (ELAVIL) 25 MG tablet TAKE 1 TABLET AT BEDTIME 90 tablet 3    furosemide (LASIX) 40 MG tablet TAKE 1 TABLET EVERY DAY 90 tablet 3    buPROPion (WELLBUTRIN) 75 MG tablet TAKE 1 TABLET TWICE DAILY 180 tablet 3    pantoprazole (PROTONIX) 40 MG tablet TAKE 1 TABLET EVERY DAY 90 tablet 3    citalopram (CELEXA) 10 MG tablet TAKE 1 TABLET EVERY DAY 90 tablet 3    fluticasone (FLONASE) 50 MCG/ACT nasal spray USE 2 SPRAYS NASALLY EVERY DAY 48 g 3    Misc. Devices MISC It is in my medical opinion that Chantelle Veras be allowed to have a kitten in her apartment at all times for companionship to help with her Depression, Anxiety, Fibromyalgia and Osteoarthritis. 1 Device 0    dorzolamide (TRUSOPT) 2 % ophthalmic solution Apply to eye 3 times daily      gabapentin (NEURONTIN) 300 MG capsule TAKE 2 CAPSULES TWICE DAILY 360 capsule 3    spironolactone (ALDACTONE) 50 MG tablet TAKE 1 TABLET TWICE DAILY 180 tablet 3    alendronate (FOSAMAX) 70 MG tablet TAKE 1 TABLET EVERY WEEK 12 tablet 3    pyridoxine (B-6) 100 MG tablet Take 50 mg by mouth daily      Biotin 24807 MCG TABS Take by mouth      Respiratory Therapy Supplies MISC CPAP supplies 1 each 0    vitamin C (ASCORBIC ACID) 500 MG tablet TAKE 1 TABLET BY MOUTH TWICE A DAY. 180 tablet 3    nitroGLYCERIN (NITROSTAT) 0.4 MG SL tablet PLACE 1 TABLET UNER THE TONGUE EVERY 5 MINUTES AS NEEDED FOR CHEST PAIN AT FIRST SIGN OF CHEST PAIN.  25 tablet 1    albuterol (PROVENTIL) (2.5 MG/3ML) 0.083% nebulizer solution Take 3 mLs by nebulization every 6 hours as needed for Wheezing 120 each 3    triamcinolone (KENALOG) 0.1 % cream APPLY TOPICALLY TWICE DAILY 80 g 5    VITAMIN A PO Take 2,400 mcg by mouth daily      vitamin E 400 UNIT capsule Take 400 Units by mouth daily      COMBIGAN 0.2-0.5 % ophthalmic solution PLACE 1 DROP INTO BOTH EYES DAILY 15 mL 3    Compression Stockings MISC by Does not apply route 15-20mmHg  Knee high  Open tow  With assist device to help put them on 1 each 0    Zinc 50 MG TABS Take 50 mg by mouth daily      niacin 500 MG extended release capsule Take 500 mg by mouth daily      folic acid (FOLVITE) 060 MCG tablet Take 800 mcg by mouth daily      Cyanocobalamin (VITAMIN B12 PO) Take 2,500 mcg by mouth daily      B Complex-C (SUPER B COMPLEX PO) Take 1 tablet by mouth daily       travoprost, benzalkonium, (TRAVATAN) 0.004 % ophthalmic solution Place 1 drop into both eyes daily 3 Bottle 3    CPAP Machine MISC by Does not apply route Pressure of 13 (AirSense 10)  [employer].      Multiple Vitamin (MULTI VITAMIN DAILY) TABS Take by mouth daily       No current facility-administered medications for this visit. No Known Allergies    Health Maintenance   Topic Date Due    Hepatitis A vaccine (1 of 2 - Risk 2-dose series) Never done    Depression Monitoring  Never done    Diabetic retinal exam  05/03/2020    Annual Wellness Visit (AWV)  02/13/2022    Diabetic foot exam  08/16/2022    A1C test (Diabetic or Prediabetic)  02/10/2023    Diabetic microalbuminuria test  02/10/2023    Lipid screen  02/10/2023    Potassium monitoring  02/10/2023    Creatinine monitoring  02/10/2023    Breast cancer screen  12/23/2023    DTaP/Tdap/Td vaccine (2 - Td or Tdap) 06/18/2027    Colorectal Cancer Screen  09/30/2029    DEXA (modify frequency per FRAX score)  Completed    Flu vaccine  Completed    Shingles Vaccine  Completed    Pneumococcal 65+ years Vaccine  Completed    COVID-19 Vaccine  Completed    Hepatitis C screen  Completed    Hib vaccine  Aged Out    Meningococcal (ACWY) vaccine  Aged Out       Subjective:      Review of Systems   Constitutional: Negative for chills and fever. HENT: Negative for hearing loss. Eyes: Negative for pain and visual disturbance. Respiratory: Negative for cough and shortness of breath.     Cardiovascular: Negative for chest pain, palpitations and leg swelling. Gastrointestinal: Negative for abdominal pain, blood in stool, constipation, diarrhea, nausea and vomiting. Endocrine: Negative for cold intolerance, polydipsia and polyuria. Genitourinary: Negative for difficulty urinating, dysuria and hematuria. Musculoskeletal: Negative for arthralgias, back pain, gait problem and neck pain. Skin: Negative for pallor and rash. Neurological: Negative for dizziness, weakness, numbness and headaches. Hematological: Negative for adenopathy. Does not bruise/bleed easily. Psychiatric/Behavioral: Negative for confusion. The patient is not nervous/anxious. Objective:     Physical Exam  Vitals reviewed. Constitutional:       Appearance: She is well-developed. HENT:      Head: Normocephalic and atraumatic. Eyes:      Pupils: Pupils are equal, round, and reactive to light. Cardiovascular:      Rate and Rhythm: Normal rate and regular rhythm. Heart sounds: No murmur heard. No friction rub. No gallop. Pulmonary:      Effort: Pulmonary effort is normal.      Breath sounds: Normal breath sounds. No wheezing or rales. Abdominal:      General: There is no distension. Palpations: Abdomen is soft. There is no mass. Tenderness: There is no abdominal tenderness. There is no rebound. Musculoskeletal:         General: Normal range of motion. Cervical back: Neck supple. Lymphadenopathy:      Cervical: No cervical adenopathy. Skin:     General: Skin is warm and dry. Findings: No rash. Neurological:      Mental Status: She is alert and oriented to person, place, and time. Cranial Nerves: No cranial nerve deficit (grossly). Psychiatric:         Thought Content:  Thought content normal.        /78 (Site: Left Upper Arm, Position: Sitting, Cuff Size: Large Adult)   Pulse 50   Resp 16   Ht 5' 3\" (1.6 m)   Wt 261 lb (118.4 kg)   LMP 12/07/1995 (Approximate)   BMI 46.23 kg/m²     Assessment: Diagnosis Orders   1. General medical exam     2. Medicare annual wellness visit, subsequent     3. Type 2 diabetes mellitus with diabetic polyneuropathy, with long-term current use of insulin (HCC)  Hemoglobin A1C   4. CHF (congestive heart failure), NYHA class I, chronic, diastolic ([facility])     5. Coronary artery disease due to lipid rich plaque     6. Bronchiectasis without complication ([facility]     7. Iron deficiency anemia, unspecified iron deficiency anemia type  Hemoglobin    Iron and TIBC   8. Constipation, unspecified constipation type  senna (SENOKOT) 8.6 MG TABS tablet   Reviewed multiple test results this appointment. 2/10/2022 normal creatinine 0.88.    2/10/2022 normal total cholesterol 132    2/10/2022 okay hemoglobin A1c at 6.9. Patient told to continue Lantus insulin. Plan:       Return in about 26 weeks (around 8/18/2022) for CAD, Diabetes, CHF. Orders Placed This Encounter   Procedures    Hemoglobin     Standing Status:   Future     Standing Expiration Date:   2/17/2023    Hemoglobin A1C     Standing Status:   Future     Standing Expiration Date:   2/17/2023    Iron and TIBC     Standing Status:   Future     Standing Expiration Date:   2/17/2023     Order Specific Question:   Is Patient Fasting? Answer:   na     Order Specific Question:   No of Hours? Answer:   na     Orders Placed This Encounter   Medications    senna (SENOKOT) 8.6 MG TABS tablet     Sig: TAKE 1 TABLET BY MOUTH TWICE DAILY     Dispense:  180 tablet     Refill:  3       Patientgiven educational materials - see patient instructions. Discussed use, benefit,and side effects of prescribed medications. All patient questions answered. Ptvoiced understanding. Reviewed health maintenance. Instructed to continue currentmedications, diet and exercise. Patient agreed with treatment plan. Follow up asdirected.      Electronically signed by Edgardo Marc MD on 2/17/2022 at 3:26 PM

## 2022-02-17 NOTE — PROGRESS NOTES
Medicare Annual Wellness Visit    Raffi Redmond is here for Medicare AWV (6 month), Coronary Artery Disease, Diabetes, and Congestive Heart Failure    Assessment & Plan   Sissy Beck was seen today for medicare awv, coronary artery disease, diabetes and congestive heart failure. Diagnoses and all orders for this visit:    General medical exam    Medicare annual wellness visit, subsequent    Type 2 diabetes mellitus with diabetic polyneuropathy, with long-term current use of insulin ([facility] [address]  -     Hemoglobin A1C; Future    CHF (congestive heart failure), NYHA class I, chronic, diastolic (HCC)    Coronary artery disease due to lipid rich plaque    Bronchiectasis without complication (HCC)    Iron deficiency anemia, unspecified iron deficiency anemia type  -     Hemoglobin; Future  -     Iron and TIBC; Future    Constipation, unspecified constipation type  -     senna (SENOKOT) 8.6 MG TABS tablet; TAKE 1 TABLET BY MOUTH TWICE DAILY         Recommendations for Preventive Services Due: see orders and patient instructions/AVS.  Recommended screening schedule for the next 5-10 years is provided to the patient in written form: see Patient Instructions/AVS.     Return in about 26 weeks (around 8/18/2022) for CAD, Diabetes, CHF. Reviewed and updated this visit by clinical staff:  Tobacco  Allergies  Meds  Problems  Med Hx  Surg Hx  Soc Hx  Fam Hx        Subjective       Patient's complete Health Risk Assessment and screening values have been reviewed and are found in Flowsheets. The following problems were reviewed today and where indicated follow up appointments were made and/or referrals ordered. Positive Risk Factor Screenings with Interventions:             Opioid Risk: (Low risk score <55)  Opioid risk score: 28    Patient is low risk for opioid use disorder or overdose.   Last PDMP Rui Funk as Reviewed:  Review User Review Instant Review Result          Last Controlled Substance Monitoring Documentation Refill from 11/16/2021 in [facility] department of [facility]   Periodic Controlled Substance Monitoring No signs of potential drug abuse or diversion identified. filed at 11/16/2021 [id number]                      Health Habits/Nutrition:     Physical Activity: Insufficiently Active    Days of Exercise per Week: 2 days    Minutes of Exercise per Session: 10 min     Have you lost any weight without trying in the past 3 months?: No    Body mass index: (!) 46.23    Have you seen the dentist within the past year?: Yes      Health Habits/Nutrition Interventions:  · Inadequate physical activity:  attempts to excercise but due to leg pain is unable to do as much as she would like to  · . ADLs:  In the past 7 days, did you need help from others to perform any of the following everyday activities: Eating, dressing, grooming, bathing, toileting, or walking/balance?: No  In the past 7 days, did you need help from others to take care of any of the following: Laundry, housekeeping, banking/finances, shopping, telephone use, food preparation, transportation, or taking medications?: (!) Yes  Select all that apply: (!) Shopping,Transportation    ADL Interventions:  · daughter and home health nurse helps out with ADL's       Objective               No Known Allergies  Prior to Visit Medications    Medication Sig Taking? Authorizing Provider   senna (SENOKOT) 8.6 MG TABS tablet TAKE 1 TABLET BY MOUTH TWICE DAILY Yes Stuart Finley MD   potassium chloride (MICRO-K) 10 MEQ extended release capsule TAKE 2001 Bridger Arzola MD   Microlet Lancets MISC USE TO TEST 4 TIMES A DAY.   Stuart Finley MD   vitamin D3 (CHOLECALCIFEROL) 25 MCG (1000 UT) TABS tablet TAKE 3 TABLETS BY MOUTH TWICE DAILY  Stuart Finley MD   tiotropium-olodaterol (STIOLTO RESPIMAT) 2.5-2.5 MCG/ACT AERS Inhale 2 puffs into the lungs daily  Noah Whyte DO   HYDROcodone-acetaminophen (NORCO)  MG per tablet Take 1 tablet by mouth every 8 hours as needed for Pain. Historical Provider, MD   calcium carbonate-vitamin D3 (CALCIUM 600+D3) 600-400 MG-UNIT TABS per tab TAKE 1 TABLET BY MOUTH TWICE DAILY  Rody Marrero MD   CONTOUR NEXT TEST strip USE TO TEST 4 TIMES A DAY. Rody Marrero MD    MG tablet TAKE 1 TABLET EVERY 6 HOURS AS NEEDED FOR PAIN  Rody Marrero MD   atorvastatin (LIPITOR) 40 MG tablet TAKE 1 TABLET EVERY DAY  Rody Marrero MD   metoprolol succinate (TOPROL XL) 25 MG extended release tablet Take 0.5 tablets by mouth daily  Deidra Singh MD   SYMBICORT 80-4.5 MCG/ACT AERO Inhale 2 puffs into the lungs 2 times daily  Rody Marrero MD   gabapentin (NEURONTIN) 600 MG tablet Take 1 tablet by mouth 3 times daily for 30 days. Rody Marrero MD   insulin glargine (LANTUS) 100 UNIT/ML injection vial Dispense 3 Vials. Inject 15 units into the skin 2 x daily.   Rody Marrero MD   Insulin Syringe-Needle U-100 (DROPLET INSULIN SYRINGE) 31G X 5/16\" 0.5 ML MISC Inject 1 each into the skin 2 times daily  Rody Marrero MD   Insulin Pen Needle (B-D ULTRAFINE III SHORT PEN) 31G X 8 MM MISC USE TWICE DAILY  Rody Marrero MD   magnesium oxide (MAGNESIUM-OXIDE) 400 (241.3 Mg) MG TABS tablet Take 1 tablet by mouth 2 times daily  Rody Marrero MD   albuterol sulfate  (90 Base) MCG/ACT inhaler Inhale 2 puffs into the lungs 4 times daily  Stephanie Avalos APRN - CNP   NOVOLOG FLEXPEN 100 UNIT/ML injection pen INJECT 4 UNITS AT LUNCH AND  6 UNITS AT SUPPER (EACH PEN EXPIRES 28 DAYS AFTER 1ST USE)  Rody Marrero MD   clopidogrel (PLAVIX) 75 MG tablet TAKE 1 TABLET EVERY DAY  Rody Marrero MD   amitriptyline (ELAVIL) 25 MG tablet TAKE 1 TABLET AT BEDTIME  Rody Marrero MD   furosemide (LASIX) 40 MG tablet TAKE 1 TABLET EVERY DAY  Rody Marrero MD   buPROPion (WELLBUTRIN) 75 MG tablet TAKE 1 TABLET TWICE DAILY  Rody Marrero MD   pantoprazole (PROTONIX) 40 MG tablet TAKE 1 TABLET EVERY DAY  Stuart Finley MD   citalopram (CELEXA) 10 MG tablet TAKE 1 TABLET EVERY DAY  Stuart Finley MD   fluticasone (FLONASE) 50 MCG/ACT nasal spray USE 2 SPRAYS NASALLY EVERY DAY  Stuart Finley MD   [facility] [place] [facility]. Devices MISC It is in my medical opinion that Zaynab Rivera be allowed to have a kitten in her apartment at all times for companionship to help with her Depression, Anxiety, Fibromyalgia and Osteoarthritis. Stuart Finley MD   dorzolamide (TRUSOPT) 2 % ophthalmic solution Apply to eye 3 times daily  Historical Provider, MD   gabapentin (NEURONTIN) 300 MG capsule TAKE 2 CAPSULES TWICE DAILY  Stuart Finley MD   spironolactone (ALDACTONE) 50 MG tablet TAKE 1 TABLET TWICE DAILY  Stuart Finley MD   alendronate (FOSAMAX) 70 MG tablet TAKE 1 TABLET EVERY WEEK  Stuart Finley MD   pyridoxine (B-6) 100 MG tablet Take 50 mg by mouth daily  Historical Provider, MD   Biotin 11702 MCG TABS Take by mouth  Historical Provider, MD   Respiratory Therapy Supplies [facility] CPAP supplies  Stuart Finley MD   vitamin C (ASCORBIC ACID) 500 MG tablet TAKE 1 TABLET BY MOUTH TWICE A DAY. Stuart Finley MD   nitroGLYCERIN (NITROSTAT) 0.4 MG SL tablet PLACE 1 TABLET UNER THE TONGUE EVERY 5 MINUTES AS NEEDED FOR CHEST PAIN AT FIRST SIGN OF CHEST PAIN.   Stuart Finley MD   albuterol (PROVENTIL) (2.5 MG/3ML) 0.083% nebulizer solution Take 3 mLs by nebulization every 6 hours as needed for Wheezing  Renato Diaz APRN - CNP   triamcinolone (KENALOG) 0.1 % cream APPLY TOPICALLY TWICE DAILY  Stuart Finley MD   VITAMIN A PO Take 2,400 mcg by mouth daily  Historical Provider, MD   vitamin E 400 UNIT capsule Take 400 Units by mouth daily  Historical Provider, MD   COMBIGAN 0.2-0.5 % ophthalmic solution PLACE 1 DROP INTO BOTH EYES DAILY  Stuart Finley MD   Compression Stockings MISC by Does not apply route 15-20mmHg  Knee high  Open tow  With assist device to help put them on  [facility] HARVINDER Pecko, DO   Zinc 50 MG TABS Take 50 mg by mouth daily  Historical Provider, MD   niacin 500 MG extended release capsule Take 500 mg by mouth daily  Historical Provider, MD   folic acid (FOLVITE) 759 MCG tablet Take 800 mcg by mouth daily  Historical Provider, MD   Cyanocobalamin (VITAMIN B12 PO) Take 2,500 mcg by mouth daily  Historical Provider, MD   B Complex-C (SUPER B COMPLEX PO) Take 1 tablet by mouth daily   Historical Provider, MD   travoprost, benzalkonium, (TRAVATAN) 0.004 % ophthalmic solution Place 1 drop into both eyes daily  Gray Morgan MD   CPAP Machine MISC by Does not apply route Pressure of 13 (AirSense 10)  [employer].  Historical Provider, MD   Multiple Vitamin (MULTI VITAMIN DAILY) TABS Take by mouth daily  Historical Provider, MD Lindsey (Including outside providers/suppliers regularly involved in providing care):   Patient Care Team:  Gray Morgan MD as PCP - General (Internal Medicine)  Gray Morgan MD as P[facility] - [facility] Empaneled Provider  Esther Navas MD as Neurologist (Neurology)             I, Dr. Jeff Higgins, directly supervised the performance of this Medicare annual wellness visit.

## 2022-02-25 DIAGNOSIS — G89.29 CHRONIC BILATERAL LOW BACK PAIN WITHOUT SCIATICA: ICD-10-CM

## 2022-02-25 DIAGNOSIS — M54.50 CHRONIC BILATERAL LOW BACK PAIN WITHOUT SCIATICA: ICD-10-CM

## 2022-02-25 RX ORDER — HYDROCODONE BITARTRATE AND ACETAMINOPHEN 10; 325 MG/1; MG/1
1 TABLET ORAL EVERY 8 HOURS PRN
Qty: 90 TABLET | Refills: 0 | Status: SHIPPED | OUTPATIENT
Start: 2022-02-25 | End: 2022-04-07 | Stop reason: SDUPTHER

## 2022-02-25 NOTE — TELEPHONE ENCOUNTER
Patient called in requesting a refill on her Norco for the cellulitis pain she gets in her legs.     oaars printed

## 2022-03-11 DIAGNOSIS — R53.82 CHRONIC FATIGUE: ICD-10-CM

## 2022-03-11 RX ORDER — ASCORBIC ACID 500 MG
TABLET ORAL
Qty: 180 TABLET | Refills: 0 | Status: SHIPPED | OUTPATIENT
Start: 2022-03-11 | End: 2022-07-08

## 2022-03-11 NOTE — TELEPHONE ENCOUNTER
Pharmacy requesting a refill of the below medication which has been pended for you:     Requested Prescriptions     Pending Prescriptions Disp Refills    ascorbic acid (VITAMIN C) 500 MG tablet [Pharmacy Med Name: VITAMIN C 500MG TABLETS] 180 tablet 3     Sig: TAKE 1 TABLET BY MOUTH TWICE DAILY       Last Appointment Date: 2/17/2022  Next Appointment Date: 8/22/2022    No Known Allergies

## 2022-04-05 RX ORDER — ALENDRONATE SODIUM 70 MG/1
TABLET ORAL
Qty: 12 TABLET | Refills: 3 | Status: SHIPPED | OUTPATIENT
Start: 2022-04-05

## 2022-04-05 RX ORDER — SPIRONOLACTONE 50 MG/1
TABLET, FILM COATED ORAL
Qty: 180 TABLET | Refills: 3 | Status: SHIPPED | OUTPATIENT
Start: 2022-04-05

## 2022-04-07 ENCOUNTER — TELEPHONE (OUTPATIENT)
Dept: INTERNAL MEDICINE | Age: 72
End: 2022-04-07
Payer: MEDICARE

## 2022-04-07 DIAGNOSIS — M43.16 SPONDYLOLISTHESIS OF LUMBAR REGION: ICD-10-CM

## 2022-04-07 DIAGNOSIS — E11.42 TYPE 2 DIABETES MELLITUS WITH DIABETIC POLYNEUROPATHY, WITH LONG-TERM CURRENT USE OF INSULIN (HCC): ICD-10-CM

## 2022-04-07 DIAGNOSIS — M54.50 CHRONIC BILATERAL LOW BACK PAIN WITHOUT SCIATICA: ICD-10-CM

## 2022-04-07 DIAGNOSIS — Z79.4 TYPE 2 DIABETES MELLITUS WITH DIABETIC POLYNEUROPATHY, WITH LONG-TERM CURRENT USE OF INSULIN (HCC): ICD-10-CM

## 2022-04-07 DIAGNOSIS — M43.07 SPONDYLOLYSIS, LUMBOSACRAL: ICD-10-CM

## 2022-04-07 DIAGNOSIS — M47.817 LUMBOSACRAL SPONDYLOSIS WITHOUT MYELOPATHY: Primary | ICD-10-CM

## 2022-04-07 DIAGNOSIS — G89.29 CHRONIC BILATERAL LOW BACK PAIN WITHOUT SCIATICA: ICD-10-CM

## 2022-04-07 PROCEDURE — G0179 MD RECERTIFICATION HHA PT: HCPCS | Performed by: INTERNAL MEDICINE

## 2022-04-07 RX ORDER — HYDROCODONE BITARTRATE AND ACETAMINOPHEN 10; 325 MG/1; MG/1
1 TABLET ORAL EVERY 8 HOURS PRN
Qty: 90 TABLET | Refills: 0 | Status: SHIPPED | OUTPATIENT
Start: 2022-04-07 | End: 2022-05-05 | Stop reason: SDUPTHER

## 2022-04-07 NOTE — TELEPHONE ENCOUNTER
Patient called in for a refill on her [address]emi [address] pended if approved.     oaars checked 2/25/2022

## 2022-04-29 DIAGNOSIS — R00.2 PALPITATIONS: ICD-10-CM

## 2022-04-29 RX ORDER — METOPROLOL SUCCINATE 25 MG/1
TABLET, EXTENDED RELEASE ORAL
Qty: 45 TABLET | Refills: 3 | Status: SHIPPED | OUTPATIENT
Start: 2022-04-29 | End: 2022-05-06 | Stop reason: SDUPTHER

## 2022-05-03 DIAGNOSIS — E11.42 TYPE 2 DIABETES MELLITUS WITH DIABETIC POLYNEUROPATHY, WITH LONG-TERM CURRENT USE OF INSULIN (HCC): ICD-10-CM

## 2022-05-03 DIAGNOSIS — M79.671 BILATERAL FOOT PAIN: ICD-10-CM

## 2022-05-03 DIAGNOSIS — M79.672 BILATERAL FOOT PAIN: ICD-10-CM

## 2022-05-03 DIAGNOSIS — Z79.4 TYPE 2 DIABETES MELLITUS WITH DIABETIC POLYNEUROPATHY, WITH LONG-TERM CURRENT USE OF INSULIN (HCC): ICD-10-CM

## 2022-05-03 RX ORDER — GABAPENTIN 600 MG/1
600 TABLET ORAL 3 TIMES DAILY
Qty: 90 TABLET | Refills: 2 | Status: SHIPPED | OUTPATIENT
Start: 2022-05-03 | End: 2022-05-06 | Stop reason: SDUPTHER

## 2022-05-05 DIAGNOSIS — G89.29 CHRONIC BILATERAL LOW BACK PAIN WITHOUT SCIATICA: ICD-10-CM

## 2022-05-05 DIAGNOSIS — M54.50 CHRONIC BILATERAL LOW BACK PAIN WITHOUT SCIATICA: ICD-10-CM

## 2022-05-05 RX ORDER — HYDROCODONE BITARTRATE AND ACETAMINOPHEN 10; 325 MG/1; MG/1
1 TABLET ORAL EVERY 8 HOURS PRN
Qty: 90 TABLET | Refills: 0 | Status: SHIPPED | OUTPATIENT
Start: 2022-05-05 | End: 2022-06-02 | Stop reason: SDUPTHER

## 2022-05-06 DIAGNOSIS — M79.672 BILATERAL FOOT PAIN: ICD-10-CM

## 2022-05-06 DIAGNOSIS — M79.671 BILATERAL FOOT PAIN: ICD-10-CM

## 2022-05-06 DIAGNOSIS — R00.2 PALPITATIONS: ICD-10-CM

## 2022-05-06 DIAGNOSIS — Z79.4 TYPE 2 DIABETES MELLITUS WITH DIABETIC POLYNEUROPATHY, WITH LONG-TERM CURRENT USE OF INSULIN (HCC): ICD-10-CM

## 2022-05-06 DIAGNOSIS — E11.42 TYPE 2 DIABETES MELLITUS WITH DIABETIC POLYNEUROPATHY, WITH LONG-TERM CURRENT USE OF INSULIN (HCC): ICD-10-CM

## 2022-05-06 RX ORDER — GABAPENTIN 600 MG/1
600 TABLET ORAL 3 TIMES DAILY
Qty: 270 TABLET | Refills: 1 | Status: SHIPPED | OUTPATIENT
Start: 2022-05-06 | End: 2022-09-16 | Stop reason: SDUPTHER

## 2022-05-06 RX ORDER — METOPROLOL SUCCINATE 25 MG/1
TABLET, EXTENDED RELEASE ORAL
Qty: 45 TABLET | Refills: 3 | Status: SHIPPED | OUTPATIENT
Start: 2022-05-06

## 2022-05-06 NOTE — TELEPHONE ENCOUNTER
Patient is requesting to have her gabapentin in the full 3 month suplly vs doing 1 month at a time. Script pended for the full 3 months    oaars checked 2/25/2022    Also needs a refill on her metoprolol.   Script pended

## 2022-05-18 ENCOUNTER — OFFICE VISIT (OUTPATIENT)
Dept: CARDIOLOGY | Age: 72
End: 2022-05-18
Payer: MEDICARE

## 2022-05-18 VITALS
HEART RATE: 50 BPM | BODY MASS INDEX: 45.89 KG/M2 | HEIGHT: 63 IN | DIASTOLIC BLOOD PRESSURE: 51 MMHG | WEIGHT: 259 LBS | SYSTOLIC BLOOD PRESSURE: 125 MMHG

## 2022-05-18 DIAGNOSIS — I25.10 CORONARY ARTERY DISEASE INVOLVING NATIVE CORONARY ARTERY OF NATIVE HEART WITHOUT ANGINA PECTORIS: ICD-10-CM

## 2022-05-18 DIAGNOSIS — I50.32 CHRONIC DIASTOLIC CONGESTIVE HEART FAILURE (HCC): ICD-10-CM

## 2022-05-18 DIAGNOSIS — E78.5 HYPERLIPIDEMIA, UNSPECIFIED HYPERLIPIDEMIA TYPE: ICD-10-CM

## 2022-05-18 DIAGNOSIS — I10 ESSENTIAL HYPERTENSION: Primary | ICD-10-CM

## 2022-05-18 PROCEDURE — G8427 DOCREV CUR MEDS BY ELIG CLIN: HCPCS | Performed by: INTERNAL MEDICINE

## 2022-05-18 PROCEDURE — G8417 CALC BMI ABV UP PARAM F/U: HCPCS | Performed by: INTERNAL MEDICINE

## 2022-05-18 PROCEDURE — 99214 OFFICE O/P EST MOD 30 MIN: CPT | Performed by: INTERNAL MEDICINE

## 2022-05-18 PROCEDURE — 93005 ELECTROCARDIOGRAM TRACING: CPT | Performed by: INTERNAL MEDICINE

## 2022-05-18 PROCEDURE — 3017F COLORECTAL CA SCREEN DOC REV: CPT | Performed by: INTERNAL MEDICINE

## 2022-05-18 PROCEDURE — 1090F PRES/ABSN URINE INCON ASSESS: CPT | Performed by: INTERNAL MEDICINE

## 2022-05-18 PROCEDURE — 4040F PNEUMOC VAC/ADMIN/RCVD: CPT | Performed by: INTERNAL MEDICINE

## 2022-05-18 PROCEDURE — G8400 PT W/DXA NO RESULTS DOC: HCPCS | Performed by: INTERNAL MEDICINE

## 2022-05-18 PROCEDURE — 1036F TOBACCO NON-USER: CPT | Performed by: INTERNAL MEDICINE

## 2022-05-18 PROCEDURE — 1123F ACP DISCUSS/DSCN MKR DOCD: CPT | Performed by: INTERNAL MEDICINE

## 2022-05-18 PROCEDURE — 93010 ELECTROCARDIOGRAM REPORT: CPT | Performed by: INTERNAL MEDICINE

## 2022-05-18 NOTE — PROGRESS NOTES
Today's Date: 2022  Patient's Name: Darlin Clark  Patient's age: 66 y. o., 1950    Subjective:  Darlin Clark is being seen in clinic today regarding CAD, HTN, HL    she is here for follow up. She denies any chest pain. She is walking out since weather has improved. She reports stable dyspnea on moderate exertion. Sometimes feel dizzy. She denies any syncope. She denies any falls. BP log reviewed. Most BP are normal reading with occasional reading of 734-053 systolic    Past Medical History:   has a past medical history of Arthritis, Asthma, CAD (coronary artery disease), Cancer ([address] Carotid artery disease ([address] Cataracts, bilateral, COPD (chronic obstructive pulmonary disease) ([address] Coronary artery disease, Eczema, Fibromyalgia, Glaucoma, H/O blood clots, Headache, Heart attack ([address] Hx of Bell's palsy, Hypertension, Liver disease, Mild dementia ([address] Neuropathy, Osteoporosis, Sleep apnea, Tremor, and Type 2 diabetes mellitus with hyperglycemia ([address] Past Surgical History:   has a past surgical history that includes Appendectomy (); Induced  (); Tonsillectomy (); Bariatric Surgery (); Colonoscopy (2012); pr exc skin benig 1.1-2cm face,facial (Left, 2018); pr njx aa&/strd tfrml epi lumbar/sacral ea addl (Right, 10/5/2018); pr njx aa&/strd tfrml epi lumbar/sacral ea addl (Right, 10/26/2018); Lumbar spine surgery (Right, 2019); Coronary angioplasty with stent; Lumbar spine surgery (Right, 2019); Lumbar spine surgery (Right, 2019); Anesthesia Nerve Block (Right, 2019); Anesthesia Nerve Block (Right, 2019); RADIOFREQUENCY ABLATION NERVES (Right, 2019); Colonoscopy (N/A, 2019); Lumbar spine surgery (Right, 2019); Pain management procedure (Right, 2020); Pain management procedure (Right, 2020); greer and bso (cervix removed) (); and Hysterectomy.     Home Medications:  Prior to Admission medications Medication Sig Start Date End Date Taking? Authorizing Provider   gabapentin (NEURONTIN) 600 MG tablet Take 1 tablet by mouth 3 times daily for 30 days. 5/6/22 6/5/22  Roberto Tellez MD   metoprolol succinate (TOPROL XL) 25 MG extended release tablet TAKE 1/2 TABLET EVERY DAY 5/6/22   Roberto Tellez MD   HYDROcodone-acetaminophen (NORCO)  MG per tablet Take 1 tablet by mouth every 8 hours as needed for Pain for up to 30 days. Intended supply: 30 days 5/5/22 6/4/22  Roberto Tellez MD   alendronate (FOSAMAX) 70 MG tablet TAKE 1 TABLET EVERY WEEK 4/5/22   Roberto Tellez MD   spironolactone (ALDACTONE) 50 MG tablet TAKE 1 TABLET TWICE DAILY 4/5/22   Roberto Tellez MD   ascorbic acid (VITAMIN C) 500 MG tablet TAKE 1 TABLET BY MOUTH TWICE DAILY 3/11/22   Saturnino Colon DO   senna (SENOKOT) 8.6 MG TABS tablet TAKE 1 TABLET BY MOUTH TWICE DAILY 2/17/22   Roberto Tellez MD   potassium chloride (MICRO-K) 10 MEQ extended release capsule TAKE 1 CAPSULE TWICE DAILY 2/14/22   Roberto Tellez MD   Microlet Lancets MISC USE TO TEST 4 TIMES A DAY. 2/7/22   Roberto Tellez MD   vitamin D3 (CHOLECALCIFEROL) 25 MCG (1000 UT) TABS tablet TAKE 3 TABLETS BY MOUTH TWICE DAILY 2/7/22   Roberto Tellez MD   tiotropium-olodaterol (STIOLTO RESPIMAT) 2.5-2.5 MCG/ACT AERS Inhale 2 puffs into the lungs daily 2/4/22   Shmuel Marsahll DO   HYDROcodone-acetaminophen (NORCO)  MG per tablet Take 1 tablet by mouth every 8 hours as needed for Pain.     Historical Provider, MD   calcium carbonate-vitamin D3 (CALCIUM 600+D3) 600-400 MG-UNIT TABS per tab TAKE 1 TABLET BY MOUTH TWICE DAILY 1/24/22   Roberto Tellez MD   CONTOUR NEXT TEST strip USE TO TEST 4 TIMES A DAY. 1/17/22   Roberto Tellez MD    MG tablet TAKE 1 TABLET EVERY 6 HOURS AS NEEDED FOR PAIN 12/21/21   Roberto Tellez MD   atorvastatin (LIPITOR) 40 MG tablet TAKE 1 TABLET EVERY DAY 12/21/21   Roberto Tellez MD   SYMBICORT 80-4.5 MCG/ACT AERO Inhale 2 puffs into the lungs 2 times daily 11/1/21   Michel Rich MD   insulin glargine (LANTUS) 100 UNIT/ML injection vial Dispense 3 Vials. Inject 15 units into the skin 2 x daily. 10/19/21   Michel Rich MD   Insulin Syringe-Needle U-100 (DROPLET INSULIN SYRINGE) 31G X 5/16\" 0.5 ML MISC Inject 1 each into the skin 2 times daily 10/19/21   Michel Rich MD   Insulin Pen Needle (B-D ULTRAFINE III SHORT PEN) 31G X 8 MM MISC USE TWICE DAILY 10/18/21   Michel Rich MD   magnesium oxide (MAGNESIUM-OXIDE) 400 (241.3 Mg) MG TABS tablet Take 1 tablet by mouth 2 times daily 10/15/21   Michel Rich MD   albuterol sulfate  (90 Base) MCG/ACT inhaler Inhale 2 puffs into the lungs 4 times daily 10/13/21   Micheal Rahman, APRN - [facility]   NOVOLOG FLEXPEN 100 UNIT/ML injection pen INJECT 4 UNITS AT LUNCH AND  6 UNITS AT SUPPER [facility] PEN EXPIRES 28 DAYS AFTER 1ST USE) 10/4/21   Michel Rich MD   clopidogrel (PLAVIX) 75 MG tablet TAKE 1 TABLET EVERY DAY 10/4/21   Michel Rich MD   amitriptyline (ELAVIL) 25 MG tablet TAKE 1 TABLET AT BEDTIME 10/4/21   Michel Rich MD   furosemide (LASIX) 40 MG tablet TAKE 1 TABLET EVERY DAY 10/4/21   Michel Rich MD   buPROPion (WELLBUTRIN) 75 MG tablet TAKE 1 TABLET TWICE DAILY 10/4/21   Michel Rich MD   pantoprazole (PROTONIX) 40 MG tablet TAKE 1 TABLET EVERY DAY 10/4/21   Michel Rich MD   citalopram (CELEXA) 10 MG tablet TAKE 1 TABLET EVERY DAY 10/4/21   Michel Rich MD   fluticasone (FLONASE) 50 MCG/ACT nasal spray USE 2 SPRAYS NASALLY EVERY DAY 10/4/21   Michel Rich MD   Misc. Devices MISC It is in my medical opinion that Zaina Hernandez be allowed to have a kitten in her apartment at all times for companionship to help with her Depression, Anxiety, Fibromyalgia and Osteoarthritis.  8/17/21   Michel Rich MD   dorzolamide (TRUSOPT) 2 % ophthalmic solution Apply to eye 3 times daily 7/19/21   Historical Provider, MD pyridoxine (B-6) 100 MG tablet Take 50 mg by mouth daily    Historical Provider, MD   Biotin 71650 MCG TABS Take by mouth    Historical Provider, MD   Respiratory Therapy Supplies [facility] CPAP supplies 8/13/20   Magali Murphy MD   nitroGLYCERIN (NITROSTAT) 0.4 MG SL tablet PLACE 1 TABLET UNER THE TONGUE EVERY 5 MINUTES AS NEEDED FOR CHEST PAIN AT FIRST SIGN OF CHEST PAIN. 5/14/20   Magali Murphy MD   albuterol (PROVENTIL) (2.5 MG/3ML) 0.083% nebulizer solution Take 3 mLs by nebulization every 6 hours as needed for Wheezing 6/12/19   DAVID Rodriguez CNP   triamcinolone (KENALOG) 0.1 % cream APPLY TOPICALLY TWICE DAILY 9/18/18   Magali Murphy MD   VITAMIN A PO Take 2,400 mcg by mouth daily    Historical Provider, MD   vitamin E 400 UNIT capsule Take 400 Units by mouth daily    Historical Provider, MD   COMBIGAN 0.2-0.5 % ophthalmic solution PLACE 1 DROP INTO BOTH EYES DAILY 1/2/18   Magali Murphy MD   Compression Stockings MISC by Does not apply route 15-20mmHg  Knee high  Open tow  With assist device to help put them on 5/10/17   Gray Bains DO   Zinc 50 MG TABS Take 50 mg by mouth daily    Historical Provider, MD   niacin 500 MG extended release capsule Take 500 mg by mouth daily    Historical Provider, MD   folic acid (FOLVITE) 154 MCG tablet Take 800 mcg by mouth daily    Historical Provider, MD   Cyanocobalamin (VITAMIN B12 PO) Take 2,500 mcg by mouth daily    Historical Provider, MD   B Complex-C (SUPER B COMPLEX PO) Take 1 tablet by mouth daily     Historical Provider, MD   travoprost, benzalkonium, (TRAVATAN) 0.004 % ophthalmic solution Place 1 drop into both eyes daily 10/7/16   Magali Murphy MD   CPAP Machine MISC by Does not apply route Pressure of 13 (AirSense 10)  [employer].    Historical Provider, MD   Multiple Vitamin (MULTI VITAMIN DAILY) TABS Take by mouth daily    Historical Provider, MD       Allergies:  Patient has no known allergies.     Social History:   reports that she quit smoking about 20 years ago. She has a 35.00 pack-year smoking history. She has never used smokeless tobacco. She reports that she does not drink alcohol and does not use drugs. Family History: family history includes Cancer in her father; Early Death in her father; Glaucoma in her mother; Heart Disease in her mother; High Blood Pressure in her mother; Domi Evans / [place] in her maternal uncle; Stroke in her mother. No h/o sudden cardiac death. No for premature CAD    REVIEW OF SYSTEMS:    · Constitutional: there has been no unanticipated weight loss. There's been No change in energy level, No change in activity level. · Eyes: No visual changes or diplopia. No scleral icterus. · ENT: No Headaches, hearing loss or vertigo. No mouth sores or sore throat. · Cardiovascular: see above  · Respiratory: see above  · Gastrointestinal: No abdominal pain, appetite loss, blood in stools. · Genitourinary: No dysuria, trouble voiding, or hematuria. · Musculoskeletal:  No gait disturbance, No weakness or joint complaints. · Integumentary: No rash or pruritis. · Neurological: No headache or diplopia. No tingling  · Psychiatric: No anxiety, or depression. · Endocrine: No temperature intolerance. · Hematologic/Lymphatic: No abnormal bruising or bleeding, blood clots or swollen lymph nodes. · Allergic/Immunologic: No nasal congestion or hives. PHYSICAL EXAM:      Vitals:    05/18/22 1307   Pulse: 50       Constitutional and General Appearance: alert, cooperative, no distress and appears stated age  HEENT: PERRL, no cervical lymphadenopathy. No masses palpable. Normal oral mucosa  Respiratory:  · Normal excursion and expansion without use of accessory muscles  · Resp Auscultation: Good respiratory effort. No for increased work of breathing.  On auscultation: clear to auscultation bilaterally  Cardiovascular:  · Heart tones are crisp and normal. regular S1 and S2.  · Jugular venous pulsation Normal  · The carotid upstroke is normal in amplitude and contour without delay or bruit   Abdomen:   · soft  · Bowel sounds present  Extremities:  ·  No edema  Neurological:  · Alert and oriented. Cardiac Data:  EKG: Sinus bradycardia, inferior infarction, low voltage- unchanged    Labs:     CBC: No results for input(s): WBC, HGB, HCT, PLT in the last 72 hours. BMP: No results for input(s): NA, K, CO2, BUN, CREATININE, LABGLOM, GLUCOSE in the last 72 hours. PT/INR: No results for input(s): PROTIME, INR in the last 72 hours. FASTING LIPID PANEL:  Lab Results   Component Value Date    HDL 57 02/10/2022    LDLCALC 80 05/19/2016    TRIG 97 02/10/2022     LIVER PROFILE:No results for input(s): AST, ALT, LABALBU in the last 72 hours.     Problem List:  Patient Active Problem List   Diagnosis    Obstructive sleep apnea    Chronic fatigue    Coronary artery disease due to lipid rich plaque    Vitamin D deficiency    Type 2 diabetes mellitus with diabetic polyneuropathy, with long-term current use of insulin ([facility])    Peripheral polyneuropathy    Bilateral leg edema    Right hip pain    Muscle ache    Hx of Bell's palsy    Memory problem    Gait abnormality    Balance problem    H/O falling    Dizziness    Intractable episodic tension-type headache    Essential tremor    Anxiety and depression    Elevated hemoglobin A1c    VBI (vertebrobasilar insufficiency)    Chronic cerebral ischemia    TIA (transient ischemic attack)    Chronic tension headache    Morbid obesity with BMI of 45.0-49.9, adult (HCC)    Chronic right-sided low back pain with right-sided sciatica    Lumbar radiculopathy    Encounter for chronic pain management    Carotid stenosis, asymptomatic, bilateral    Chronic tension-type headache, not intractable    Lumbosacral spondylosis without myelopathy    DDD (degenerative disc disease), lumbar    Acquired spondylolisthesis    Lumbar facet joint syndrome    Chronic diastolic congestive heart failure (HCC)    Bronchiectasis without complication [facility]    CHF (congestive heart failure), NYHA class I, chronic, diastolic (HCC)      LAST CATH 6/15- minimal non obstructive CAD,  mRCA 5%, EF 65%     Holter 1/9/18- 7 beat run of PAT      TTE 1/9/18  CONCLUSIONS:  1.  Normal LV size and function with ejection fraction of 65%. 2.  Grade 1 diastolic dysfunction. 3.  Mild concentric LVH. 4.  Mild biatrial enlargement. 5.  No significant valvulopathies. 6.  No effusion. 7.  Mildly dilated RV with normal function.     Nuclear Stress 5/2019:  IMPRESSION:  1. No ischemia or infarction. 2. Normal LV systolic function. EF 24%.      TTE 12/7/2020  Summary  Normal left ventricular diameter. Left ventricular systolic function is normal. Left ventricular ejectionfraction 65 %. Right ventricular dilatation with normal systolic function. Aortic valve is sclerotic but opens well. Significant mild mitral valve thickening with annular calcification. Trivial mitral regurgitation. Mild tricuspid regurgitation. Estimated right ventricular systolic pressure is 20 mmHg.     Holter 4/2021- PAT       Assessment and plan:    1. CAD with Hx of BMS to RCA in 2002, Cath in 2015- non obstructive CAD. Negative Nuclear Stress 5/2019. Stable denies any chest pain. Continue plavix  2. TTE 12/7/2020 showed LVEF 65%. Had negative stress test 5/2019  3. HFpEF- stable. On lasix and aldactone. Elevated leg, compression stocking. Will ask PCP to consider Jardiance 10mg po qday for CHF benefit. 4. Palpitations- PAT. Continue small dose of metoprolol  5. H/o Orthostatic hypotension. Wear compression stockings  6. LEANDRO/COPD- on CPAP- follows with pulmonary  7. Frequent falls- Follows with PCP and neurology. 8. HTN- Continue metoprolol and aldactone. Monitor BP at home  9. DL- LDL 44 on labs 2/18/21- continue statin.   10. DM- Management per primary  11.  RTC 6 month      [facility] [address] [facility]  [phone]

## 2022-05-20 ENCOUNTER — TELEPHONE (OUTPATIENT)
Dept: CARDIOLOGY | Age: 72
End: 2022-05-20

## 2022-05-20 NOTE — TELEPHONE ENCOUNTER
The patient called because she thought that she was supposed to be starting a new medication per Dr. Genevieve Mendoza, however, this does not reflect in her chart. Please contact the patient to let her know if and what she should be adding for cardiology medication.      Last Appt:  5/18/2022  Next Appt:   Visit date not found  Med verified in [address] [place] [address] Rd

## 2022-06-02 DIAGNOSIS — M54.50 CHRONIC BILATERAL LOW BACK PAIN WITHOUT SCIATICA: ICD-10-CM

## 2022-06-02 DIAGNOSIS — G89.29 CHRONIC BILATERAL LOW BACK PAIN WITHOUT SCIATICA: ICD-10-CM

## 2022-06-02 RX ORDER — HYDROCODONE BITARTRATE AND ACETAMINOPHEN 10; 325 MG/1; MG/1
1 TABLET ORAL EVERY 8 HOURS PRN
Qty: 90 TABLET | Refills: 0 | Status: SHIPPED | OUTPATIENT
Start: 2022-06-02 | End: 2022-07-02

## 2022-06-02 NOTE — TELEPHONE ENCOUNTER
Patient called in requesting a refill on her Norco.  Script pended if approved    Last appt 4/7/2022  Next appt 8/15/2022    Oaars printed and on your desk

## 2022-06-03 ENCOUNTER — TELEPHONE (OUTPATIENT)
Dept: INTERNAL MEDICINE | Age: 72
End: 2022-06-03
Payer: MEDICARE

## 2022-06-03 DIAGNOSIS — M47.817 LUMBOSACRAL SPONDYLOSIS WITHOUT MYELOPATHY: ICD-10-CM

## 2022-06-03 DIAGNOSIS — M43.07 SPONDYLOLYSIS, LUMBOSACRAL: ICD-10-CM

## 2022-06-03 DIAGNOSIS — M54.41 CHRONIC RIGHT-SIDED LOW BACK PAIN WITH RIGHT-SIDED SCIATICA: Primary | ICD-10-CM

## 2022-06-03 DIAGNOSIS — Z79.4 TYPE 2 DIABETES MELLITUS WITH DIABETIC POLYNEUROPATHY, WITH LONG-TERM CURRENT USE OF INSULIN (HCC): ICD-10-CM

## 2022-06-03 DIAGNOSIS — E11.42 TYPE 2 DIABETES MELLITUS WITH DIABETIC POLYNEUROPATHY, WITH LONG-TERM CURRENT USE OF INSULIN (HCC): ICD-10-CM

## 2022-06-03 DIAGNOSIS — G89.29 CHRONIC RIGHT-SIDED LOW BACK PAIN WITH RIGHT-SIDED SCIATICA: Primary | ICD-10-CM

## 2022-06-03 PROCEDURE — G0179 MD RECERTIFICATION HHA PT: HCPCS | Performed by: INTERNAL MEDICINE

## 2022-06-24 ENCOUNTER — TELEPHONE (OUTPATIENT)
Dept: INTERNAL MEDICINE | Age: 72
End: 2022-06-24

## 2022-06-24 DIAGNOSIS — M54.50 CHRONIC BILATERAL LOW BACK PAIN WITHOUT SCIATICA: Primary | ICD-10-CM

## 2022-06-24 DIAGNOSIS — G89.29 CHRONIC BILATERAL LOW BACK PAIN WITHOUT SCIATICA: Primary | ICD-10-CM

## 2022-06-24 DIAGNOSIS — J47.9 BRONCHIECTASIS WITHOUT COMPLICATION (HCC): ICD-10-CM

## 2022-06-24 RX ORDER — ALBUTEROL SULFATE 2.5 MG/3ML
2.5 SOLUTION RESPIRATORY (INHALATION) EVERY 6 HOURS PRN
Qty: 120 EACH | Refills: 3 | Status: SHIPPED | OUTPATIENT
Start: 2022-06-24

## 2022-06-24 RX ORDER — OXYCODONE AND ACETAMINOPHEN 10; 325 MG/1; MG/1
1 TABLET ORAL EVERY 8 HOURS PRN
Qty: 90 TABLET | Refills: 0 | Status: SHIPPED | OUTPATIENT
Start: 2022-06-24 | End: 2022-07-19 | Stop reason: SDUPTHER

## 2022-06-24 RX ORDER — TRIAMCINOLONE ACETONIDE 1 MG/G
CREAM TOPICAL
Qty: 15 G | Refills: 5 | Status: CANCELLED | OUTPATIENT
Start: 2022-06-24

## 2022-06-24 RX ORDER — ALBUTEROL SULFATE 2.5 MG/3ML
2.5 SOLUTION RESPIRATORY (INHALATION) EVERY 6 HOURS PRN
Qty: 120 EACH | Refills: 3 | Status: CANCELLED | OUTPATIENT
Start: 2022-06-24

## 2022-06-24 RX ORDER — TRIAMCINOLONE ACETONIDE 1 MG/G
CREAM TOPICAL
Qty: 15 G | Refills: 5 | Status: SHIPPED | OUTPATIENT
Start: 2022-06-24

## 2022-06-24 NOTE — TELEPHONE ENCOUNTER
Please Advise    [facility] pharmacy called with a couple questions. Patient is currently on Norco 10/325 1 tablet every 8 hours prn. Patient is going on vacation with her daughter and will be out of the medication before she comes back. Kristyn West wants to know if its okay to fill script early along with switching the medication to percocet 10/325 1 tablet every 8 hours as their supply for Norco 10/325mg is on back order and it wont be back in stock before she leaves for vacation with her daughter.

## 2022-07-06 LAB — DIABETIC RETINOPATHY: NEGATIVE

## 2022-07-08 DIAGNOSIS — R53.82 CHRONIC FATIGUE: ICD-10-CM

## 2022-07-08 RX ORDER — ASCORBIC ACID 500 MG
TABLET ORAL
Qty: 180 TABLET | Refills: 0 | Status: SHIPPED | OUTPATIENT
Start: 2022-07-08 | End: 2022-10-07 | Stop reason: SDUPTHER

## 2022-07-08 NOTE — TELEPHONE ENCOUNTER
Pharmacy requesting a refill of the below medication which has been pended for you:     Requested Prescriptions     Pending Prescriptions Disp Refills    ascorbic acid (VITAMIN C) 500 MG tablet [Pharmacy Med Name: VITAMIN C 500MG TABLETS] 180 tablet 0     Sig: TAKE 1 TABLET BY MOUTH TWICE DAILY         Next Appointment Date: 8/22/22  No Known Allergies

## 2022-07-19 DIAGNOSIS — G89.29 CHRONIC BILATERAL LOW BACK PAIN WITHOUT SCIATICA: ICD-10-CM

## 2022-07-19 DIAGNOSIS — M54.50 CHRONIC BILATERAL LOW BACK PAIN WITHOUT SCIATICA: ICD-10-CM

## 2022-07-19 RX ORDER — OXYCODONE AND ACETAMINOPHEN 10; 325 MG/1; MG/1
1 TABLET ORAL EVERY 8 HOURS PRN
Qty: 90 TABLET | Refills: 0 | Status: SHIPPED | OUTPATIENT
Start: 2022-07-19 | End: 2022-08-16 | Stop reason: CLARIF

## 2022-07-19 NOTE — TELEPHONE ENCOUNTER
Patient called requesting a refill on her pain medication. When she got it filled last month they were not able to fill the entire script due to not having enough med in stock so they did a partial fill. Script pended if approved.

## 2022-08-04 ENCOUNTER — TELEPHONE (OUTPATIENT)
Dept: INTERNAL MEDICINE | Age: 72
End: 2022-08-04
Payer: MEDICARE

## 2022-08-04 DIAGNOSIS — G89.29 CHRONIC RIGHT-SIDED LOW BACK PAIN WITH RIGHT-SIDED SCIATICA: Primary | ICD-10-CM

## 2022-08-04 DIAGNOSIS — M43.16 SPONDYLOLISTHESIS OF LUMBAR REGION: ICD-10-CM

## 2022-08-04 DIAGNOSIS — Z79.4 TYPE 2 DIABETES MELLITUS WITH DIABETIC POLYNEUROPATHY, WITH LONG-TERM CURRENT USE OF INSULIN (HCC): ICD-10-CM

## 2022-08-04 DIAGNOSIS — M54.41 CHRONIC RIGHT-SIDED LOW BACK PAIN WITH RIGHT-SIDED SCIATICA: Primary | ICD-10-CM

## 2022-08-04 DIAGNOSIS — M43.07 SPONDYLOLYSIS, LUMBOSACRAL: ICD-10-CM

## 2022-08-04 DIAGNOSIS — E11.42 TYPE 2 DIABETES MELLITUS WITH DIABETIC POLYNEUROPATHY, WITH LONG-TERM CURRENT USE OF INSULIN (HCC): ICD-10-CM

## 2022-08-04 PROCEDURE — G0179 MD RECERTIFICATION HHA PT: HCPCS | Performed by: INTERNAL MEDICINE

## 2022-08-15 ENCOUNTER — HOSPITAL ENCOUNTER (OUTPATIENT)
Dept: LAB | Age: 72
Discharge: HOME | End: 2022-08-15
Payer: MEDICARE

## 2022-08-15 DIAGNOSIS — E11.42 TYPE 2 DIABETES MELLITUS WITH DIABETIC POLYNEUROPATHY, WITH LONG-TERM CURRENT USE OF INSULIN (HCC): ICD-10-CM

## 2022-08-15 DIAGNOSIS — G89.29 CHRONIC NECK PAIN: Primary | ICD-10-CM

## 2022-08-15 DIAGNOSIS — M54.2 CHRONIC NECK PAIN: Primary | ICD-10-CM

## 2022-08-15 DIAGNOSIS — D50.9 IRON DEFICIENCY ANEMIA, UNSPECIFIED IRON DEFICIENCY ANEMIA TYPE: ICD-10-CM

## 2022-08-15 DIAGNOSIS — Z79.4 TYPE 2 DIABETES MELLITUS WITH DIABETIC POLYNEUROPATHY, WITH LONG-TERM CURRENT USE OF INSULIN (HCC): ICD-10-CM

## 2022-08-15 LAB
HEMOGLOBIN: 14.2 G/DL (ref 11.9–15.1)
IRON SATURATION: 39 % (ref 20–55)
IRON: 145 UG/DL (ref 37–145)
TOTAL IRON BINDING CAPACITY: 369 UG/DL (ref 250–450)
UNSATURATED IRON BINDING CAPACITY: 224 UG/DL (ref 112–347)

## 2022-08-15 PROCEDURE — 83036 HEMOGLOBIN GLYCOSYLATED A1C: CPT

## 2022-08-15 PROCEDURE — 36415 COLL VENOUS BLD VENIPUNCTURE: CPT

## 2022-08-15 PROCEDURE — 83550 IRON BINDING TEST: CPT

## 2022-08-15 PROCEDURE — 83540 ASSAY OF IRON: CPT

## 2022-08-15 PROCEDURE — 85018 HEMOGLOBIN: CPT

## 2022-08-15 RX ORDER — OXYCODONE HYDROCHLORIDE AND ACETAMINOPHEN 5; 325 MG/1; MG/1
1 TABLET ORAL EVERY 4 HOURS PRN
Qty: 120 TABLET | Refills: 0 | Status: SHIPPED | OUTPATIENT
Start: 2022-08-15 | End: 2022-08-16

## 2022-08-15 NOTE — TELEPHONE ENCOUNTER
Patient called in to get a refill on her pain medication. Script is due to be refilled on Wednesday.     Script pended if approved    Next appt 8/22/2022

## 2022-08-16 LAB
ESTIMATED AVERAGE GLUCOSE: 137 MG/DL
HBA1C MFR BLD: 6.4 % (ref 4–6)

## 2022-08-22 ENCOUNTER — OFFICE VISIT (OUTPATIENT)
Dept: INTERNAL MEDICINE | Age: 72
End: 2022-08-22
Payer: MEDICARE

## 2022-08-22 VITALS
WEIGHT: 231 LBS | DIASTOLIC BLOOD PRESSURE: 70 MMHG | BODY MASS INDEX: 40.93 KG/M2 | HEART RATE: 52 BPM | RESPIRATION RATE: 16 BRPM | HEIGHT: 63 IN | SYSTOLIC BLOOD PRESSURE: 116 MMHG

## 2022-08-22 DIAGNOSIS — E03.9 HYPOTHYROIDISM (ACQUIRED): ICD-10-CM

## 2022-08-22 DIAGNOSIS — I50.32 CHF (CONGESTIVE HEART FAILURE), NYHA CLASS I, CHRONIC, DIASTOLIC (HCC): ICD-10-CM

## 2022-08-22 DIAGNOSIS — I25.83 CORONARY ARTERY DISEASE DUE TO LIPID RICH PLAQUE: ICD-10-CM

## 2022-08-22 DIAGNOSIS — E53.8 VITAMIN B 12 DEFICIENCY: ICD-10-CM

## 2022-08-22 DIAGNOSIS — E55.9 VITAMIN D DEFICIENCY DISEASE: ICD-10-CM

## 2022-08-22 DIAGNOSIS — Z79.4 TYPE 2 DIABETES MELLITUS WITH DIABETIC POLYNEUROPATHY, WITH LONG-TERM CURRENT USE OF INSULIN (HCC): Primary | ICD-10-CM

## 2022-08-22 DIAGNOSIS — M54.50 CHRONIC BILATERAL LOW BACK PAIN WITHOUT SCIATICA: Primary | ICD-10-CM

## 2022-08-22 DIAGNOSIS — E11.42 TYPE 2 DIABETES MELLITUS WITH DIABETIC POLYNEUROPATHY, WITH LONG-TERM CURRENT USE OF INSULIN (HCC): Primary | ICD-10-CM

## 2022-08-22 DIAGNOSIS — I25.10 CORONARY ARTERY DISEASE DUE TO LIPID RICH PLAQUE: ICD-10-CM

## 2022-08-22 DIAGNOSIS — G89.29 CHRONIC BILATERAL LOW BACK PAIN WITHOUT SCIATICA: Primary | ICD-10-CM

## 2022-08-22 DIAGNOSIS — Z12.31 OTHER SCREENING MAMMOGRAM: ICD-10-CM

## 2022-08-22 PROCEDURE — 3044F HG A1C LEVEL LT 7.0%: CPT | Performed by: INTERNAL MEDICINE

## 2022-08-22 PROCEDURE — 1090F PRES/ABSN URINE INCON ASSESS: CPT | Performed by: INTERNAL MEDICINE

## 2022-08-22 PROCEDURE — G8417 CALC BMI ABV UP PARAM F/U: HCPCS | Performed by: INTERNAL MEDICINE

## 2022-08-22 PROCEDURE — G8427 DOCREV CUR MEDS BY ELIG CLIN: HCPCS | Performed by: INTERNAL MEDICINE

## 2022-08-22 PROCEDURE — 1123F ACP DISCUSS/DSCN MKR DOCD: CPT | Performed by: INTERNAL MEDICINE

## 2022-08-22 PROCEDURE — 2022F DILAT RTA XM EVC RTNOPTHY: CPT | Performed by: INTERNAL MEDICINE

## 2022-08-22 PROCEDURE — 99214 OFFICE O/P EST MOD 30 MIN: CPT | Performed by: INTERNAL MEDICINE

## 2022-08-22 PROCEDURE — G8400 PT W/DXA NO RESULTS DOC: HCPCS | Performed by: INTERNAL MEDICINE

## 2022-08-22 PROCEDURE — 3017F COLORECTAL CA SCREEN DOC REV: CPT | Performed by: INTERNAL MEDICINE

## 2022-08-22 PROCEDURE — 1036F TOBACCO NON-USER: CPT | Performed by: INTERNAL MEDICINE

## 2022-08-22 RX ORDER — HYDROCODONE BITARTRATE AND ACETAMINOPHEN 10; 325 MG/1; MG/1
1 TABLET ORAL EVERY 8 HOURS PRN
Qty: 90 TABLET | Refills: 0 | Status: SHIPPED | OUTPATIENT
Start: 2022-08-22 | End: 2022-09-19 | Stop reason: SDUPTHER

## 2022-08-22 SDOH — ECONOMIC STABILITY: FOOD INSECURITY: WITHIN THE PAST 12 MONTHS, YOU WORRIED THAT YOUR FOOD WOULD RUN OUT BEFORE YOU GOT MONEY TO BUY MORE.: NEVER TRUE

## 2022-08-22 SDOH — ECONOMIC STABILITY: FOOD INSECURITY: WITHIN THE PAST 12 MONTHS, THE FOOD YOU BOUGHT JUST DIDN'T LAST AND YOU DIDN'T HAVE MONEY TO GET MORE.: NEVER TRUE

## 2022-08-22 ASSESSMENT — SOCIAL DETERMINANTS OF HEALTH (SDOH): HOW HARD IS IT FOR YOU TO PAY FOR THE VERY BASICS LIKE FOOD, HOUSING, MEDICAL CARE, AND HEATING?: NOT HARD AT ALL

## 2022-08-22 ASSESSMENT — ENCOUNTER SYMPTOMS
COUGH: 0
EYE PAIN: 0
BLOOD IN STOOL: 0
NAUSEA: 0
VOMITING: 0
BACK PAIN: 0
CONSTIPATION: 0
SHORTNESS OF BREATH: 0
ABDOMINAL PAIN: 0
DIARRHEA: 0

## 2022-08-22 NOTE — PROGRESS NOTES
[address] [address]  Dept: [phone]  Dept Fax: [phone]  Loc: [phone]     Barbara Carrillo is a 66 y.o. female who presents today for her medical conditions/complaintsas noted below. Barbara Carrillo is c/o of   Chief Complaint   Patient presents with    Coronary Artery Disease     6 month    Diabetes    Congestive Heart Failure         HPI:     Coronary Artery Disease  Presents for follow-up visit. Pertinent negatives include no chest pain, chest pressure, dizziness, leg swelling, palpitations or shortness of breath. Her past medical history is significant for CHF. The symptoms have been stable. Compliance with diet is good. Compliance with exercise is good. Compliance with medications is good. Diabetes  She presents for her follow-up diabetic visit. She has type 2 diabetes mellitus. The initial diagnosis of diabetes was made 12 years ago. Her disease course has been fluctuating. Pertinent negatives for hypoglycemia include no confusion, dizziness, headaches, nervousness/anxiousness or pallor. Pertinent negatives for diabetes include no chest pain, no polydipsia, no polyuria and no weakness. Congestive Heart Failure  Presents for follow-up visit. Pertinent negatives include no abdominal pain, chest pain, chest pressure, palpitations or shortness of breath. The symptoms have been stable. Her past medical history is significant for CAD. Compliance with total regimen is %. Compliance with diet is %. Compliance with exercise is %. Compliance with medications is %. Hemoglobin A1C (%)   Date Value   08/15/2022 6.4 (H)   02/10/2022 6.9 (H)   02/18/2021 7.0 (H)            Microalb/Crt.  Ratio (mcg/mg creat)   Date Value   02/10/2022 17     LDL Cholesterol (mg/dL)   Date Value   02/10/2022 56   02/18/2021 44   07/27/2020 34     LDL Calculated (mg/dL)   Date Value 05/19/2016 80   09/23/2014 80         AST (U/L)   Date Value   02/10/2022 53 (H)     ALT (U/L)   Date Value   02/10/2022 54 (H)     BUN (mg/dL)   Date Value   02/10/2022 25 (H)     BP Readings from Last 3 Encounters:   08/22/22 116/70   05/18/22 (!) 125/51   02/17/22 118/78              Past Medical History:   Diagnosis Date    Arthritis     Asthma     CAD (coronary artery disease) 1989    Cancer ([facility] 1971    cervical    Carotid artery disease (HCC)     Cataracts, bilateral     COPD (chronic obstructive pulmonary disease) (HCC)     Coronary artery disease     Eczema     Fibromyalgia     Glaucoma     H/O blood clots     Headache     Heart attack (HCC)     Hx of Bell's palsy     Mild right facial paralysis    Hypertension     Liver disease     Mild dementia ([facility]     Neuropathy     Osteoporosis     Sleep apnea 11/07/2014    sleep study done in [facility]    Tremor     Type 2 diabetes mellitus with hyperglycemia ([facility]       Past Surgical History:   Procedure Laterality Date    ANESTHESIA NERVE BLOCK Right 6/28/2019    Right L2 L3 L4 L5 Diagnostic Medial BB performed by Rosa Rosado MD at [address] [place] [address] Right 7/26/2019    Right L2 L3 L4 L5 Confirmatory Medial BB performed by Rosa Rosado MD at . [facility]  2002    COLONOSCOPY  09/2012    COLONOSCOPY N/A 9/30/2019    COLONOSCOPY performed by Kai Fletcher MD at [address] [place] [address]  internal hemorrhoids and few diverticuli    CORONARY ANGIOPLASTY WITH STENT PLACEMENT      HYSTERECTOMY (CERVIX STATUS UNKNOWN)      [address] [place] [address] Right 2/8/2019    Right L2 L3 TRANSFORAMINAL performed by Rosa Rosado MD at [address] [place] [address] Dr Hamilton 5/7/2019    Right L2 L3 TRANSFORAMINAL performed by Rosa Rosado MD at [address] [place] [address] Dr Hamilton 6/4/2019    Right L3 & L4 TRANSFORAMINAL performed by Rosa Rosado MD at [address] [place] [address] Dr Hamilton 12/12/2019    Right L4 L5 TRANSFORAMINAL performed by Rachel Wright MD at [address] Right 2020    RIGHT L4 L5  TRANSFORAMINAL performed by Rachel Wright MD at [address] [place] [address] Right 2020    Right L4 L5 TRANSFORAMINAL performed by Rachel Wright MD at [address] [place] [address] 1.1-2CM FACE,FACIAL Left 2018    Excision Cyst Left Chest, Middle Back [facility] AA&/STRD TFRML EPI LUMBAR/SACRAL EA ADDL Right 10/5/2018    Right L2 & L3 TRANSFORAMINAL performed by Rachel Wright MD at [address] AA&/STRD TFRML EPI LUMBAR/SACRAL EA ADDL Right 10/26/2018    Right L2 L3 TRANSFORAMINAL performed by Rachel Wright MD at [place] [address] Right 2019    Right  L2 L3 L4 L5 RADIOFREQUENCY performed by Rachel Wright MD at [address] [place] [address] (CERVIX REMOVED)      Cervical cancer.   Had XRT    TONSILLECTOMY         Family History   Problem Relation Age of Onset    Heart Disease Mother     High Blood Pressure Mother     Stroke Mother     Glaucoma Mother     Early Death Father     Cancer Father         stomach    Miscarriages / Stillbirths Maternal Uncle           Social History     Tobacco Use    Smoking status: Former     Packs/day: 1.00     Years: 35.00     Pack years: 35.00     Types: Cigarettes     Quit date: 2001     Years since quittin.9    Smokeless tobacco: Never   Substance Use Topics    Alcohol use: No         Current Outpatient Medications   Medication Sig Dispense Refill    ascorbic acid (VITAMIN C) 500 MG tablet TAKE 1 TABLET BY MOUTH TWICE DAILY 180 tablet 0    albuterol (PROVENTIL) (2.5 MG/3ML) 0.083% nebulizer solution Take 3 mLs by nebulization every 6 hours as needed for Wheezing 120 each 3    triamcinolone (KENALOG) 0.1 % cream APPLY TOPICALLY TWICE DAILY 15 g 5    empagliflozin (JARDIANCE) 10 MG tablet Take 1 tablet by mouth daily 90 tablet 3    gabapentin (NEURONTIN) 600 MG tablet Take 1 tablet by mouth 3 times daily for 30 days. 270 tablet 1    metoprolol succinate (TOPROL XL) 25 MG extended release tablet TAKE 1/2 TABLET EVERY DAY 45 tablet 3    alendronate (FOSAMAX) 70 MG tablet TAKE 1 TABLET EVERY WEEK 12 tablet 3    spironolactone (ALDACTONE) 50 MG tablet TAKE 1 TABLET TWICE DAILY 180 tablet 3    senna (SENOKOT) 8.6 MG TABS tablet TAKE 1 TABLET BY MOUTH TWICE DAILY 180 tablet 3    potassium chloride (MICRO-K) 10 MEQ extended release capsule TAKE 1 CAPSULE TWICE DAILY 180 capsule 3    Microlet Lancets MISC USE TO TEST 4 TIMES A DAY. 400 each 3    vitamin D3 (CHOLECALCIFEROL) 25 MCG (1000 UT) TABS tablet TAKE 3 TABLETS BY MOUTH TWICE DAILY 540 tablet 3    tiotropium-olodaterol (STIOLTO RESPIMAT) 2.5-2.5 MCG/ACT AERS Inhale 2 puffs into the lungs daily 3 each 3    calcium carbonate-vitamin D3 (CALCIUM 600+D3) 600-400 MG-UNIT TABS per tab TAKE 1 TABLET BY MOUTH TWICE DAILY 180 tablet 3    CONTOUR NEXT TEST strip USE TO TEST 4 TIMES A DAY. 400 strip 3     MG tablet TAKE 1 TABLET EVERY 6 HOURS AS NEEDED FOR PAIN 360 tablet 3    atorvastatin (LIPITOR) 40 MG tablet TAKE 1 TABLET EVERY DAY 90 tablet 3    SYMBICORT 80-4.5 MCG/ACT AERO Inhale 2 puffs into the lungs 2 times daily 3 each 6    insulin glargine (LANTUS) 100 UNIT/ML injection vial Dispense 3 Vials. Inject 15 units into the skin 2 x daily.  3 each 3    Insulin Syringe-Needle U-100 (DROPLET INSULIN SYRINGE) 31G X 5/16\" 0.5 ML MISC Inject 1 each into the skin 2 times daily 200 each 3    Insulin Pen Needle (B-D ULTRAFINE III SHORT PEN) 31G X 8 MM MISC USE TWICE DAILY 180 each 3    magnesium oxide (MAGNESIUM-OXIDE) 400 (241.3 Mg) MG TABS tablet Take 1 tablet by mouth 2 times daily 180 tablet 3    albuterol sulfate  (90 Base) MCG/ACT inhaler Inhale 2 puffs into the lungs 4 times daily 54 g 3    NOVOLOG FLEXPEN 100 UNIT/ML injection pen INJECT 4 UNITS AT LUNCH AND  6 UNITS AT SUPPER (EACH PEN EXPIRES 28 DAYS AFTER 1ST USE) 15 mL 3    clopidogrel (PLAVIX) 75 MG tablet TAKE 1 TABLET EVERY DAY 90 tablet 3    amitriptyline (ELAVIL) 25 MG tablet TAKE 1 TABLET AT BEDTIME 90 tablet 3    furosemide (LASIX) 40 MG tablet TAKE 1 TABLET EVERY DAY 90 tablet 3    buPROPion (WELLBUTRIN) 75 MG tablet TAKE 1 TABLET TWICE DAILY 180 tablet 3    pantoprazole (PROTONIX) 40 MG tablet TAKE 1 TABLET EVERY DAY 90 tablet 3    citalopram (CELEXA) 10 MG tablet TAKE 1 TABLET EVERY DAY 90 tablet 3    fluticasone (FLONASE) 50 MCG/ACT nasal spray USE 2 SPRAYS NASALLY EVERY DAY 48 g 3    Misc. Devices MISC It is in my medical opinion that Noble Tavares be allowed to have a kitten in her apartment at all times for companionship to help with her Depression, Anxiety, Fibromyalgia and Osteoarthritis. 1 Device 0    dorzolamide (TRUSOPT) 2 % ophthalmic solution Apply to eye 3 times daily      pyridoxine (B-6) 100 MG tablet Take 50 mg by mouth daily      Biotin 41931 MCG TABS Take by mouth      Respiratory Therapy Supplies MISC CPAP supplies 1 each 0    nitroGLYCERIN (NITROSTAT) 0.4 MG SL tablet PLACE 1 TABLET UNER THE TONGUE EVERY 5 MINUTES AS NEEDED FOR CHEST PAIN AT FIRST SIGN OF CHEST PAIN.  25 tablet 1    VITAMIN A PO Take 2,400 mcg by mouth daily      vitamin E 400 UNIT capsule Take 400 Units by mouth daily      COMBIGAN 0.2-0.5 % ophthalmic solution PLACE 1 DROP INTO BOTH EYES DAILY 15 mL 3    Compression Stockings MISC by Does not apply route 15-20mmHg  Knee high  Open tow  With assist device to help put them on 1 each 0    Zinc 50 MG TABS Take 50 mg by mouth daily      niacin 500 MG extended release capsule Take 500 mg by mouth daily      folic acid (FOLVITE) 826 MCG tablet Take 800 mcg by mouth daily      Cyanocobalamin (VITAMIN B12 PO) Take 2,500 mcg by mouth daily      B Complex-C (SUPER B COMPLEX PO) Take 1 tablet by mouth daily       travoprost, benzalkonium, (TRAVATAN) 0.004 % ophthalmic solution Place 1 drop into both eyes daily 3 Bottle 3    CPAP Machine MISC by Does not apply route Pressure of 13 (AirSense 10)  [employer] [facility].      Multiple Vitamin (MULTI VITAMIN DAILY) TABS Take by mouth daily       No current facility-administered medications for this visit. No Known Allergies    Health Maintenance   Topic Date Due    Hepatitis A vaccine (1 of 2 - Risk 2-dose series) Never done    COVID-19 Vaccine (4 - Booster for Pfizer series) 05/05/2022    Flu vaccine (1) 09/01/2022    Diabetic microalbuminuria test  02/10/2023    Lipids  02/10/2023    Depression Monitoring  02/17/2023    Annual Wellness Visit (AWV)  02/18/2023    A1C test (Diabetic or Prediabetic)  08/15/2023    Diabetic foot exam  08/22/2023    Breast cancer screen  12/23/2023    Diabetic retinal exam  07/06/2024    DTaP/Tdap/Td vaccine (2 - Td or Tdap) 06/18/2027    Colorectal Cancer Screen  09/30/2029    DEXA (modify frequency per FRAX score)  Completed    Shingles vaccine  Completed    Pneumococcal 65+ years Vaccine  Completed    Hepatitis C screen  Completed    Hib vaccine  Aged Out    Meningococcal (ACWY) vaccine  Aged Out       Subjective:      Review of Systems   Constitutional:  Negative for chills and fever. HENT:  Negative for hearing loss. Eyes:  Negative for pain and visual disturbance. Respiratory:  Negative for cough and shortness of breath. Cardiovascular:  Negative for chest pain, palpitations and leg swelling. Gastrointestinal:  Negative for abdominal pain, blood in stool, constipation, diarrhea, nausea and vomiting. Endocrine: Negative for cold intolerance, polydipsia and polyuria. Genitourinary:  Negative for difficulty urinating, dysuria and hematuria. Musculoskeletal:  Negative for arthralgias, back pain, gait problem and neck pain. Skin:  Negative for pallor and rash. Neurological:  Negative for dizziness, weakness, numbness and headaches. Hematological:  Negative for adenopathy. Does not bruise/bleed easily. Psychiatric/Behavioral:  Negative for confusion.  The patient is not nervous/anxious. Objective:     Physical Exam  Vitals reviewed. Constitutional:       Appearance: She is well-developed. HENT:      Head: Normocephalic and atraumatic. Eyes:      Pupils: Pupils are equal, round, and reactive to light. Cardiovascular:      Rate and Rhythm: Normal rate and regular rhythm. Heart sounds: No murmur heard. No friction rub. No gallop. Pulmonary:      Effort: Pulmonary effort is normal.      Breath sounds: Normal breath sounds. No wheezing or rales. Abdominal:      General: There is no distension. Palpations: Abdomen is soft. There is no mass. Tenderness: There is no abdominal tenderness. There is no rebound. Musculoskeletal:         General: Normal range of motion. Cervical back: Neck supple. Lymphadenopathy:      Cervical: No cervical adenopathy. Skin:     General: Skin is warm and dry. Findings: No rash. Neurological:      Mental Status: She is alert and oriented to person, place, and time. Cranial Nerves: No cranial nerve deficit (grossly). Comments: DM foot exam with some numbness per monofilament, with intact vibration sense, and normal pulses bilaterally. Psychiatric:         Thought Content: Thought content normal.      /70 (Site: Left Upper Arm, Position: Sitting, Cuff Size: Large Adult)   Pulse 52   Resp 16   Ht 5' 3\" (1.6 m)   Wt 231 lb (104.8 kg)   LMP 12/07/1995 (Approximate)   BMI 40.92 kg/m²     Assessment:       Diagnosis Orders   1. Type 2 diabetes mellitus with diabetic polyneuropathy, with long-term current use of insulin ([facility])  Hemoglobin A1C    Microalbumin, Ur    HM DIABETES FOOT EXAM      2. CHF (congestive heart failure), NYHA class I, chronic, diastolic ([facility])  Comprehensive Metabolic Panel      3. Coronary artery disease due to lipid rich plaque  Lipid Panel      4. Other screening mammogram  [facility] DARCI DIGITAL SCREEN BILATERAL      5.  Hypothyroidism (acquired)  TSH    T4, Free Follow up asdirected.      Electronically signed by Alcides Brown MD on 8/22/2022 at 3:09 PM

## 2022-08-22 NOTE — TELEPHONE ENCOUNTER
Patient called in and said that her daughter went to go  her pain medication today after her appt today and the wrong script was sent into the pharmacy last week. Patient was previously on the Frankey Baston but the medication was on back order and unsure of when it would be back in. It is finally back in stock So she wanted the Kayenta to be sent in not the percocet since we switched her to the percocet while it was on back order. Patient does not like the way the percocet makes her feel and wants to go back to the Yemi Yusuf [address] pended if approved for daughter to come and . [facility] pharmacy notified to cancel the other script since the patient did not .

## 2022-08-29 DIAGNOSIS — K21.9 GASTROESOPHAGEAL REFLUX DISEASE WITHOUT ESOPHAGITIS: ICD-10-CM

## 2022-08-29 DIAGNOSIS — I10 ESSENTIAL HYPERTENSION: ICD-10-CM

## 2022-08-29 DIAGNOSIS — I67.82 CHRONIC CEREBRAL ISCHEMIA: ICD-10-CM

## 2022-08-29 DIAGNOSIS — F32.A DEPRESSION, UNSPECIFIED DEPRESSION TYPE: ICD-10-CM

## 2022-08-29 DIAGNOSIS — G45.9 TIA (TRANSIENT ISCHEMIC ATTACK): ICD-10-CM

## 2022-08-30 RX ORDER — PANTOPRAZOLE SODIUM 40 MG/1
TABLET, DELAYED RELEASE ORAL
Qty: 90 TABLET | Refills: 3 | Status: SHIPPED | OUTPATIENT
Start: 2022-08-30

## 2022-08-30 RX ORDER — AMITRIPTYLINE HYDROCHLORIDE 25 MG/1
TABLET, FILM COATED ORAL
Qty: 90 TABLET | Refills: 3 | Status: SHIPPED | OUTPATIENT
Start: 2022-08-30

## 2022-08-30 RX ORDER — FUROSEMIDE 40 MG/1
TABLET ORAL
Qty: 90 TABLET | Refills: 3 | Status: SHIPPED | OUTPATIENT
Start: 2022-08-30

## 2022-08-30 RX ORDER — CITALOPRAM 10 MG/1
TABLET ORAL
Qty: 90 TABLET | Refills: 3 | Status: SHIPPED | OUTPATIENT
Start: 2022-08-30

## 2022-08-30 RX ORDER — BUPROPION HYDROCHLORIDE 75 MG/1
TABLET ORAL
Qty: 180 TABLET | Refills: 3 | Status: SHIPPED | OUTPATIENT
Start: 2022-08-30

## 2022-08-30 RX ORDER — CLOPIDOGREL BISULFATE 75 MG/1
TABLET ORAL
Qty: 90 TABLET | Refills: 3 | Status: SHIPPED | OUTPATIENT
Start: 2022-08-30

## 2022-08-30 RX ORDER — ALBUTEROL SULFATE 90 UG/1
2 AEROSOL, METERED RESPIRATORY (INHALATION) 4 TIMES DAILY
Qty: 54 G | Refills: 3 | Status: SHIPPED | OUTPATIENT
Start: 2022-08-30

## 2022-09-01 DIAGNOSIS — Z79.4 TYPE 2 DIABETES MELLITUS WITH DIABETIC POLYNEUROPATHY, WITH LONG-TERM CURRENT USE OF INSULIN (HCC): ICD-10-CM

## 2022-09-01 DIAGNOSIS — E11.42 TYPE 2 DIABETES MELLITUS WITH DIABETIC POLYNEUROPATHY, WITH LONG-TERM CURRENT USE OF INSULIN (HCC): ICD-10-CM

## 2022-09-01 RX ORDER — INSULIN ASPART 100 [IU]/ML
INJECTION, SOLUTION INTRAVENOUS; SUBCUTANEOUS
Qty: 15 ML | Refills: 3 | Status: SHIPPED | OUTPATIENT
Start: 2022-09-01

## 2022-09-16 DIAGNOSIS — M79.672 BILATERAL FOOT PAIN: ICD-10-CM

## 2022-09-16 DIAGNOSIS — Z79.4 TYPE 2 DIABETES MELLITUS WITH DIABETIC POLYNEUROPATHY, WITH LONG-TERM CURRENT USE OF INSULIN (HCC): ICD-10-CM

## 2022-09-16 DIAGNOSIS — M79.671 BILATERAL FOOT PAIN: ICD-10-CM

## 2022-09-16 DIAGNOSIS — E11.42 TYPE 2 DIABETES MELLITUS WITH DIABETIC POLYNEUROPATHY, WITH LONG-TERM CURRENT USE OF INSULIN (HCC): ICD-10-CM

## 2022-09-16 RX ORDER — GABAPENTIN 600 MG/1
600 TABLET ORAL 3 TIMES DAILY
Qty: 42 TABLET | Refills: 0 | Status: SHIPPED | OUTPATIENT
Start: 2022-09-16 | End: 2022-10-06 | Stop reason: SDUPTHER

## 2022-09-16 RX ORDER — GABAPENTIN 600 MG/1
600 TABLET ORAL 3 TIMES DAILY
Qty: 270 TABLET | Refills: 1 | Status: SHIPPED | OUTPATIENT
Start: 2022-09-16 | End: 2022-10-16

## 2022-09-16 NOTE — TELEPHONE ENCOUNTER
Patient called to make sure prescription was being sent in she is very anxious and needs it before the weekend.

## 2022-09-16 NOTE — TELEPHONE ENCOUNTER
Short term supply     Short term supply pended if agreeable    Last Appt:  8/22/2022  Next Appt:   2/23/2023  Med verified in Epic

## 2022-09-16 NOTE — TELEPHONE ENCOUNTER
Refill request     Medication pended if agreeable    Last Appt:  8/22/2022  Next Appt:   2/23/2023  Med verified in Epic

## 2022-09-19 DIAGNOSIS — G89.29 CHRONIC BILATERAL LOW BACK PAIN WITHOUT SCIATICA: ICD-10-CM

## 2022-09-19 DIAGNOSIS — M54.50 CHRONIC BILATERAL LOW BACK PAIN WITHOUT SCIATICA: ICD-10-CM

## 2022-09-19 RX ORDER — HYDROCODONE BITARTRATE AND ACETAMINOPHEN 10; 325 MG/1; MG/1
1 TABLET ORAL EVERY 8 HOURS PRN
Qty: 90 TABLET | Refills: 0 | Status: SHIPPED | OUTPATIENT
Start: 2022-09-19 | End: 2022-10-19

## 2022-09-19 NOTE — TELEPHONE ENCOUNTER
Patient called in needing a refill on her Norco to have her daughter .     Script pended if approved

## 2022-09-20 NOTE — PROGRESS NOTES
Subjective:      Patient ID: Harriett Williamson is a 74 y.o. female. HPI  Patient here for follow-up for LEANDRO on CPAP. Patient was last seen in the office in October 2021 for me and in October 2020 per Dr. Dewey Sy. Compliance data 7/11/22 thru 10/2/2022- 90 out of 90 days used, all for more than 4 hours. Residual AHI 4.5. Patient on auto-titrating CPAP 4-16 cmH20. Patient states she sleeps very well and wakes up feeling rested. Occasionally she does wake up during the night with shoulder pain and takes her pain medication and goes right back to sleep with no difficulties. She denies any shortness of breath with rest but does note some exertional dyspnea. No significant cough or sputum production. Denies any purulent secretions or hemoptysis. Medications:   Albuterol HFA:  Stiolto     PRIOR WORKUP:  PFT:  PFT 12/7/2016: Flow volume loop is normal patient's body box was suboptimal. Diffusion capacity is normal. Spirometry is normal. FEV1 111% of predicted. FVC 95% of predicted. Ratio 82. Diffusion capacity 113% of predicted. Lung volumes as measured diffusion capacity is total lung capacity 89% of predicted which is normal. Final impression normal pulmonary function test.     CT Imaging:  High resolution CT chest 6/1/2017: Minimal prominence of airways identified with no fibrotic change, mosaic attenuation or intralobular septal thickening. No concerning nodularity within the lung parenchyma. Sleep Study:  Sleep study 2/9/2017: Titration study. Patient was titrated onto a final pressure of 8 cm H2O with 13 respiratory events of which 4 were complete and 9 were partial and the lowest SPO2 of 91%. Sleep study 11/7/2014: Patient had a total of 45 abnormal respiratory events with 2 obstructive apneas, 43 obstructive hypopneas with an overall AHI 9.1. REM AHI was elevated at 39.3.      Laboratory Evaluation:       Immunizations:   Immunization History   Administered Date(s) Administered    COVID-19, PFIZER PURPLE top, DILUTE for use, (age 15 y+), 30mcg/0.3mL 02/04/2021, 02/25/2021, 01/05/2022    Influenza Vaccine, unspecified formulation 09/07/2016    Influenza Virus Vaccine 10/03/2014    Influenza, FLUAD, (age 72 y+), Adjuvanted, 0.5mL 12/23/2021    Influenza, FLUZONE (age 72 y+), High Dose, 0.7mL 08/26/2020    Influenza, High Dose (Fluzone 65 yrs and older) 09/16/2017, 09/19/2018    Influenza, Triv, inactivated, subunit, adjuvanted, IM (Fluad 65 yrs and older) 08/13/2019    Pneumococcal Conjugate 13-valent (Demetrio Section) 08/17/2015, 01/12/2017    Pneumococcal Polysaccharide ([id number]) 10/21/2002, 12/13/2012, 08/05/2013, 03/24/2018    Td, unspecified formulation 12/13/2012    Tdap (Boostrix, Adacel) 06/18/2017    Zoster Live (Zostavax) 08/05/2013    Zoster Recombinant (Shingrix) 05/04/2018, 07/21/2018        Sleep Medicine 10/31/2018 1/31/2018   Sitting and reading 0 1   Watching TV 2 2   Sitting, inactive in a public place (e.g. a theatre or a meeting) 0 1   As a passenger in a car for an hour without a break 0 0   Lying down to rest in the afternoon when circumstances permit 2 2   Sitting and talking to someone 0 0   Sitting quietly after a lunch without alcohol 3 1   In a car, while stopped for a few minutes in traffic 0 0   [place] Sleepiness Score 7 7       /64   Pulse 52   Temp 97.3 °F (36.3 °C)   Ht 5' 3\" (1.6 m)   Wt 251 lb 3.2 oz (113.9 kg)   LMP 12/07/1995 (Approximate)   SpO2 95%   BMI 44.50 kg/m²     Past Medical History:   Diagnosis Date    Arthritis     Asthma     CAD (coronary artery disease) 1989    Cancer ([facility] 1971    cervical    Carot[facility] ([facility]     Cataracts, bilateral     COPD (chronic obstructive pulmonary disease) (HCC)     Coronary artery disease     Eczema     Fibromyalgia     Glaucoma     H/O blood clots     Headache     Heart attack ([facility]     Hx of Bell's palsy     Mild right facial paralysis    Hypertension     Liver disease     Mild dementia ([facility] Neuropathy     Osteoporosis     Sleep apnea 11/07/2014    sleep study done in [place]    Tremor     Type 2 diabetes mellitus with hyperglycemia [facility])      Past Surgical History:   Procedure Laterality Date    ANESTHESIA NERVE BLOCK Right 6/28/2019    Right L2 L3 L4 L5 Diagnostic Medial BB performed by Zelda Toledo MD at [address]  [facility] Right 7/26/2019    Right L2 L3 L4 L5 Confirmatory Medial BB performed by Zelda Toledo MD at . [facility] [address]  2002    COLONOSCOPY  09/2012    COLONOSCOPY N/A 9/30/2019    COLONOSCOPY performed by Micaela Lee MD at [facility]  internal hemorrhoids and few diverticuli    CORONARY ANGIOPLASTY WITH STENT PLACEMENT      HYSTERECTOMY (CERVIX STATUS UNKNOWN)      [facility] Right 2/8/2019    Right L2 L3 TRANSFORAMINAL performed by Zelda Toledo MD at [address] [place] [address] Dr Right 5/7/2019    Right L2 L3 TRANSFORAMINAL performed by Zelda Toledo MD at [address] [place] [address] Dr Right 6/4/2019    Right L3 & L4 TRANSFORAMINAL performed by Zelda Toledo MD at [address] [place] [address] Dr Right 12/12/2019    Right L4 L5 TRANSFORAMINAL performed by Zelda Toledo MD at [address] Right 1/20/2020    RIGHT L4 L5  TRANSFORAMINAL performed by Zelda Toledo MD at [address] [place] [address] Right 2/17/2020    Right L4 L5 TRANSFORAMINAL performed by Zelda Toledo MD at [address] [place] [address] 1.1-2CM FACE,FACIAL Left 2/7/2018    Excision Cyst Left Chest, Middle Back performed by Micaela Lee MD at [address] AA&/STRD TFRML EPI LUMBAR/SACRAL EA ADDL Right 10/5/2018    Right L2 & L3 TRANSFORAMINAL performed by Zelda Toledo MD at [address] AA&/STRD TFRML EPI LUMBAR/SACRAL EA ADDL Right 10/26/2018    Right L2 L3 TRANSFORAMINAL performed by Zelda Toledo MD at [address] Right 8/1/2019    Right  L2 L3 L4 L5 RADIOFREQUENCY performed by Raul Bowers MD at [address] [place] [address] (CERVIX REMOVED)      Cervical cancer. Had XRT    TONSILLECTOMY       Family History   Problem Relation Age of Onset    Heart Disease Mother     High Blood Pressure Mother     Stroke Mother     Glaucoma Mother     Early Death Father     Cancer Father         stomach    Miscarriages / Stillbirths Maternal Uncle        Social History     Socioeconomic History    Marital status:      Spouse name: Not on file    Number of children: Not on file    Years of education: Not on file    Highest education level: Not on file   Occupational History    Not on file   Tobacco Use    Smoking status: Former     Packs/day: 1.00     Years: 35.00     Pack years: 35.00     Types: Cigarettes     Quit date: 2001     Years since quittin.0    Smokeless tobacco: Never   Vaping Use    Vaping Use: Never used   Substance and Sexual Activity    Alcohol use: No    Drug use: No    Sexual activity: Never   Other Topics Concern    Not on file   Social History Narrative    Not on file     Social Determinants of Health     Financial Resource Strain: Low Risk     Difficulty of Paying Living Expenses: Not hard at all   Food Insecurity: No Food Insecurity    Worried About Running Out of Food in the Last Year: Never true    Ran Out of Food in the Last Year: Never true   Transportation Needs: Not on file   Physical Activity: Insufficiently Active    Days of Exercise per Week: 2 days    Minutes of Exercise per Session: 10 min   Stress: Not on file   Social Connections: Not on file   Intimate Partner Violence: Not on file   Housing Stability: Not on file       Review of Systems   Constitutional:  Negative for fatigue and fever. HENT:  Negative for postnasal drip, rhinorrhea, sinus pressure and sinus pain. Respiratory:  Negative for chest tightness, wheezing and stridor. Patient endorses exertional dyspnea.   No significant cough or sputum production. Denies any purulent secretions or hemoptysis. Cardiovascular:  Negative for chest pain and leg swelling. Gastrointestinal:  Negative for constipation, diarrhea, nausea and vomiting. Genitourinary:  Negative for dysuria, frequency and urgency. Musculoskeletal:  Negative for arthralgias, joint swelling and myalgias. Skin:  Negative for rash. Neurological:  Negative for dizziness, speech difficulty and headaches. Hematological:  Does not bruise/bleed easily. Psychiatric/Behavioral:  Negative for agitation, hallucinations, sleep disturbance and suicidal ideas.       Objective:       Physical Exam  General appearance - alert, well appearing, and in no distress, oriented to person, place, and time, and overweight  Mental status - alert, oriented to person, place, and time, normal mood, behavior, speech, dress, motor activity, and thought processes  Eyes - pupils equal and reactive, extraocular eye movements intact  Ears -not examined  Nose - normal and patent, no erythema, discharge or polyps  Mouth - mucous membranes moist, pharynx normal without lesions  Neck - supple, no significant adenopathy  Chest - clear to auscultation, no wheezes, rales or rhonchi, symmetric air entry  Heart -normal rate, regular rhythm, normal S1, S2, no murmurs, rubs, clicks or gallops  Abdomen - soft, nontender, nondistended, no masses or organomegaly  Neuro- alert, oriented, normal speech, no focal findings or movement disorder noted}  Extremities - peripheral pulses normal, no pedal edema, no clubbing or cyanosis  Skin - normal coloration and turgor, no rashes, no suspicious skin lesions noted     Wt Readings from Last 3 Encounters:   10/12/22 251 lb 3.2 oz (113.9 kg)   08/22/22 231 lb (104.8 kg)   05/18/22 259 lb (117.5 kg)       Results for orders placed or performed during the hospital encounter of 08/15/22   Iron and TIBC   Result Value Ref Range    Iron 145 37 - 145 ug/dL    TIBC 369 250 - 450 ug/dL    Iron Saturation 39 20 - 55 %    UIBC 224 112 - 347 ug/dL   Hemoglobin A1C   Result Value Ref Range    Hemoglobin A1C 6.4 (H) 4.0 - 6.0 %    Estimated Avg Glucose 137 mg/dL   Hemoglobin   Result Value Ref Range    Hemoglobin 14.2 11.9 - 15.1 g/dL       No results found. Assessment:      1. LEANDRO on CPAP    2. Obesity, Class III, BMI 40-49.9 (morbid obesity) ([facility]    3. Bronchiectasis without complication ([facility]          Plan:      Medications reviewed, continue as ordered. Patient is on Stiolto and albuterol HFA, rarely uses albuterol HFA. Educated and clarified the medication use. Recommend flu vaccination in the fall annually. Patient is up-to-date with pneumococcal vaccinations from pulmonary perspective. Patient has received Covid vaccination. Recommended booster when eligible. Discussed strategies to protect against Covid 19. Maintain an active lifestyle. Patient's questions were answered to her satisfaction. Pulmonary function tests were reviewed  CT scan of the chest was reviewed  CPAP/BiPAP to be used for at least 4 hours every night. Use humidifier if needed. Weight loss was recommended and discussed. Recommended following good sleep hygiene instructions. Sleep hygiene instructions given to the patient. Explained importance of compliance with treatment of sleep apnea. Compliance data was reviewed and the patient is compliant per insurance requirement.   We'll see the patient back in 12 months          Electronically signed by DAVID Escobar CNP on 10/12/2022 at 8:50 PM

## 2022-09-27 RX ORDER — GABAPENTIN 600 MG/1
TABLET ORAL
Qty: 90 TABLET | Refills: 0 | OUTPATIENT
Start: 2022-09-27 | End: 2022-10-27

## 2022-09-27 NOTE — TELEPHONE ENCOUNTER
details are missing from the prescription.   Please find out how it was prescribed last time, and add the details for a full prescription --ready to be signed

## 2022-09-27 NOTE — TELEPHONE ENCOUNTER
Pharmacy requesting a refill of the below medication which has been pended for you:     Requested Prescriptions     Pending Prescriptions Disp Refills    gabapentin (NEURONTIN) 600 MG tablet [Pharmacy Med Name: GABAPENTIN 600 MG TABLET] 42 tablet      Sig: take 1 tablet by mouth three times a day       Last Appointment Date: 8/22/2022  Next Appointment Date: 2/23/2023    No Known Allergies

## 2022-09-29 ENCOUNTER — TELEPHONE (OUTPATIENT)
Dept: INTERNAL MEDICINE | Age: 72
End: 2022-09-29
Payer: MEDICARE

## 2022-09-29 DIAGNOSIS — M54.41 ACUTE BACK PAIN WITH SCIATICA, RIGHT: Primary | ICD-10-CM

## 2022-09-29 DIAGNOSIS — M47.817 LUMBOSACRAL SPONDYLOSIS WITHOUT MYELOPATHY: ICD-10-CM

## 2022-09-29 DIAGNOSIS — Z79.4 TYPE 2 DIABETES MELLITUS WITH DIABETIC POLYNEUROPATHY, WITH LONG-TERM CURRENT USE OF INSULIN (HCC): ICD-10-CM

## 2022-09-29 DIAGNOSIS — E11.42 TYPE 2 DIABETES MELLITUS WITH DIABETIC POLYNEUROPATHY, WITH LONG-TERM CURRENT USE OF INSULIN (HCC): ICD-10-CM

## 2022-09-29 PROCEDURE — G0179 MD RECERTIFICATION HHA PT: HCPCS | Performed by: INTERNAL MEDICINE

## 2022-10-03 RX ORDER — ATORVASTATIN CALCIUM 40 MG/1
TABLET, FILM COATED ORAL
Qty: 90 TABLET | Refills: 3 | Status: SHIPPED | OUTPATIENT
Start: 2022-10-03

## 2022-10-05 DIAGNOSIS — E11.42 TYPE 2 DIABETES MELLITUS WITH DIABETIC POLYNEUROPATHY, WITH LONG-TERM CURRENT USE OF INSULIN (HCC): ICD-10-CM

## 2022-10-05 DIAGNOSIS — M79.672 BILATERAL FOOT PAIN: ICD-10-CM

## 2022-10-05 DIAGNOSIS — Z79.4 TYPE 2 DIABETES MELLITUS WITH DIABETIC POLYNEUROPATHY, WITH LONG-TERM CURRENT USE OF INSULIN (HCC): ICD-10-CM

## 2022-10-05 DIAGNOSIS — M79.671 BILATERAL FOOT PAIN: ICD-10-CM

## 2022-10-05 NOTE — TELEPHONE ENCOUNTER
[facility] called requesting a refill of the below medication which has been pended for you:     Requested Prescriptions     Pending Prescriptions Disp Refills    gabapentin (NEURONTIN) 600 MG tablet 42 tablet 0     Sig: Take 1 tablet by mouth 3 times daily for 14 days.        Last Appointment Date: 8/22/2022  Next Appointment Date: 2/23/2023    No Known Allergies

## 2022-10-06 RX ORDER — GABAPENTIN 600 MG/1
600 TABLET ORAL 3 TIMES DAILY
Qty: 42 TABLET | Refills: 0 | Status: SHIPPED | OUTPATIENT
Start: 2022-10-06 | End: 2022-10-20

## 2022-10-07 ENCOUNTER — TELEPHONE (OUTPATIENT)
Dept: INTERNAL MEDICINE | Age: 72
End: 2022-10-07

## 2022-10-07 DIAGNOSIS — M54.50 LOW BACK PAIN, UNSPECIFIED BACK PAIN LATERALITY, UNSPECIFIED CHRONICITY, UNSPECIFIED WHETHER SCIATICA PRESENT: Primary | ICD-10-CM

## 2022-10-07 DIAGNOSIS — M81.0 OSTEOPOROSIS, UNSPECIFIED OSTEOPOROSIS TYPE, UNSPECIFIED PATHOLOGICAL FRACTURE PRESENCE: ICD-10-CM

## 2022-10-07 DIAGNOSIS — R53.82 CHRONIC FATIGUE: ICD-10-CM

## 2022-10-07 DIAGNOSIS — E55.9 VITAMIN D DEFICIENCY: ICD-10-CM

## 2022-10-07 RX ORDER — AMOXICILLIN AND CLAVULANATE POTASSIUM 875; 125 MG/1; MG/1
1 TABLET, FILM COATED ORAL 2 TIMES DAILY
Qty: 20 TABLET | Refills: 0 | Status: SHIPPED | OUTPATIENT
Start: 2022-10-07 | End: 2022-10-17

## 2022-10-07 RX ORDER — LANCETS
EACH MISCELLANEOUS
Qty: 400 EACH | Refills: 3 | Status: SHIPPED | OUTPATIENT
Start: 2022-10-07

## 2022-10-07 RX ORDER — ASCORBIC ACID 500 MG
TABLET ORAL
Qty: 180 TABLET | Refills: 0 | Status: SHIPPED | OUTPATIENT
Start: 2022-10-07

## 2022-10-07 RX ORDER — INSULIN GLARGINE 100 [IU]/ML
INJECTION, SOLUTION SUBCUTANEOUS
Qty: 3 EACH | Refills: 3 | Status: SHIPPED | OUTPATIENT
Start: 2022-10-07 | End: 2022-11-03

## 2022-10-07 RX ORDER — OXYCODONE HYDROCHLORIDE AND ACETAMINOPHEN 5; 325 MG/1; MG/1
1 TABLET ORAL EVERY 6 HOURS PRN
Qty: 120 TABLET | Refills: 0 | Status: SHIPPED | OUTPATIENT
Start: 2022-10-07 | End: 2022-11-01 | Stop reason: CLARIF

## 2022-10-07 RX ORDER — MAGNESIUM OXIDE 400 MG/1
400 TABLET ORAL DAILY
Qty: 90 TABLET | Refills: 3 | Status: SHIPPED | OUTPATIENT
Start: 2022-10-07

## 2022-10-07 RX ORDER — MELATONIN
Qty: 540 TABLET | Refills: 3 | Status: SHIPPED | OUTPATIENT
Start: 2022-10-07

## 2022-10-07 NOTE — TELEPHONE ENCOUNTER
Patient called in is wondering if we can send in any thing for her cough. She is having a runny nose and sore throat also. Patient report no fever. Patient is not sleeping very well at night because of her cough. Patient report that she cough up  grey color sputum. Patient uses [facility] in [place].

## 2022-10-07 NOTE — TELEPHONE ENCOUNTER
Along with needing refills on the medications that have been pended. Patient is also wondering if we can send in something for her cough. She is c/o cough, runny nose, head congestion and chest congestion along with a sore throat. Patient report no fever. Patient is not sleeping very well at night d/t the cough. Patient reports that she has also been coughing up greenish/yellow sputum    Patient uses [facility] in [place].

## 2022-10-07 NOTE — TELEPHONE ENCOUNTER
Sherry Shannon from [employer] called and said the patient went to go fill her [place] script but the medication is out of stock and on back order. Pharmacist is wanting to know if its okay to switch the script from [address] [facility] to [address] [facility]. Patient notified and agreed with the change of the prescription. Script is pended if approved. Daughter will pick the script up.

## 2022-10-12 ENCOUNTER — OFFICE VISIT (OUTPATIENT)
Dept: PULMONOLOGY | Age: 72
End: 2022-10-12
Payer: MEDICARE

## 2022-10-12 VITALS
OXYGEN SATURATION: 95 % | HEART RATE: 52 BPM | TEMPERATURE: 97.3 F | WEIGHT: 251.2 LBS | SYSTOLIC BLOOD PRESSURE: 110 MMHG | BODY MASS INDEX: 44.51 KG/M2 | DIASTOLIC BLOOD PRESSURE: 64 MMHG | HEIGHT: 63 IN

## 2022-10-12 DIAGNOSIS — G47.33 OSA ON CPAP: Primary | ICD-10-CM

## 2022-10-12 DIAGNOSIS — E66.01 OBESITY, CLASS III, BMI 40-49.9 (MORBID OBESITY) (HCC): ICD-10-CM

## 2022-10-12 DIAGNOSIS — J47.9 BRONCHIECTASIS WITHOUT COMPLICATION (HCC): ICD-10-CM

## 2022-10-12 DIAGNOSIS — Z99.89 OSA ON CPAP: Primary | ICD-10-CM

## 2022-10-12 PROCEDURE — 1090F PRES/ABSN URINE INCON ASSESS: CPT | Performed by: NURSE PRACTITIONER

## 2022-10-12 PROCEDURE — 99214 OFFICE O/P EST MOD 30 MIN: CPT | Performed by: NURSE PRACTITIONER

## 2022-10-12 PROCEDURE — G8427 DOCREV CUR MEDS BY ELIG CLIN: HCPCS | Performed by: NURSE PRACTITIONER

## 2022-10-12 PROCEDURE — G8417 CALC BMI ABV UP PARAM F/U: HCPCS | Performed by: NURSE PRACTITIONER

## 2022-10-12 PROCEDURE — 99213 OFFICE O/P EST LOW 20 MIN: CPT | Performed by: NURSE PRACTITIONER

## 2022-10-12 PROCEDURE — 1036F TOBACCO NON-USER: CPT | Performed by: NURSE PRACTITIONER

## 2022-10-12 PROCEDURE — 1123F ACP DISCUSS/DSCN MKR DOCD: CPT | Performed by: NURSE PRACTITIONER

## 2022-10-12 PROCEDURE — G8484 FLU IMMUNIZE NO ADMIN: HCPCS | Performed by: NURSE PRACTITIONER

## 2022-10-12 PROCEDURE — 3017F COLORECTAL CA SCREEN DOC REV: CPT | Performed by: NURSE PRACTITIONER

## 2022-10-12 PROCEDURE — G8400 PT W/DXA NO RESULTS DOC: HCPCS | Performed by: NURSE PRACTITIONER

## 2022-10-12 ASSESSMENT — ENCOUNTER SYMPTOMS
RHINORRHEA: 0
SINUS PRESSURE: 0
CHEST TIGHTNESS: 0
STRIDOR: 0
NAUSEA: 0
CONSTIPATION: 0
VOMITING: 0
WHEEZING: 0
SINUS PAIN: 0
DIARRHEA: 0

## 2022-10-18 DIAGNOSIS — M54.50 CHRONIC BILATERAL LOW BACK PAIN WITHOUT SCIATICA: Primary | ICD-10-CM

## 2022-10-18 DIAGNOSIS — G89.29 CHRONIC BILATERAL LOW BACK PAIN WITHOUT SCIATICA: Primary | ICD-10-CM

## 2022-10-18 RX ORDER — OXYCODONE AND ACETAMINOPHEN 10; 325 MG/1; MG/1
1 TABLET ORAL EVERY 8 HOURS PRN
Qty: 90 TABLET | Refills: 0 | Status: SHIPPED | OUTPATIENT
Start: 2022-10-18 | End: 2022-11-17

## 2022-11-01 DIAGNOSIS — M54.50 LOW BACK PAIN, UNSPECIFIED BACK PAIN LATERALITY, UNSPECIFIED CHRONICITY, UNSPECIFIED WHETHER SCIATICA PRESENT: ICD-10-CM

## 2022-11-01 RX ORDER — OXYCODONE HYDROCHLORIDE AND ACETAMINOPHEN 5; 325 MG/1; MG/1
1 TABLET ORAL EVERY 6 HOURS PRN
Qty: 120 TABLET | Refills: 0 | OUTPATIENT
Start: 2022-11-01 | End: 2022-12-01

## 2022-11-01 RX ORDER — OXYCODONE HYDROCHLORIDE AND ACETAMINOPHEN 5; 325 MG/1; MG/1
1 TABLET ORAL EVERY 6 HOURS PRN
Qty: 120 TABLET | Refills: 0 | Status: CANCELLED | OUTPATIENT
Start: 2022-11-01 | End: 2022-12-01

## 2022-11-01 NOTE — TELEPHONE ENCOUNTER
Patient called in and said we switched her pain medication to the Percocet 10/325. However the script is too strong and she is wanting to go back to the Percocet 5/325mg    Script pended if approved.

## 2022-11-03 ENCOUNTER — TELEPHONE (OUTPATIENT)
Dept: INTERNAL MEDICINE | Age: 72
End: 2022-11-03

## 2022-11-03 DIAGNOSIS — M54.50 CHRONIC BILATERAL LOW BACK PAIN WITHOUT SCIATICA: Primary | ICD-10-CM

## 2022-11-03 DIAGNOSIS — G89.29 CHRONIC BILATERAL LOW BACK PAIN WITHOUT SCIATICA: Primary | ICD-10-CM

## 2022-11-03 RX ORDER — OXYCODONE HYDROCHLORIDE AND ACETAMINOPHEN 5; 325 MG/1; MG/1
1 TABLET ORAL EVERY 6 HOURS PRN
Qty: 120 TABLET | Refills: 0 | Status: CANCELLED | OUTPATIENT
Start: 2022-11-03 | End: 2022-12-03

## 2022-11-03 RX ORDER — OXYCODONE HYDROCHLORIDE AND ACETAMINOPHEN 5; 325 MG/1; MG/1
1 TABLET ORAL EVERY 6 HOURS PRN
Qty: 120 TABLET | Refills: 0 | Status: SHIPPED | OUTPATIENT
Start: 2022-11-03 | End: 2022-11-29 | Stop reason: SDUPTHER

## 2022-11-03 RX ORDER — INSULIN GLARGINE 100 [IU]/ML
INJECTION, SOLUTION SUBCUTANEOUS
Qty: 90 ML | Refills: 3 | Status: SHIPPED | OUTPATIENT
Start: 2022-11-03

## 2022-11-03 NOTE — TELEPHONE ENCOUNTER
Patient called in and said she does not want to mess with cutting her pain medication in half. Therefore she stated she took the percocet 10/325 to the pharmacy for them to dispose of the medication. Writer confirmed with [employer] and spoke with the Pharmacist Kevyn Will that patient did return the pain medication to be disposed of. Will you send in script for the lower dosage?

## 2022-11-03 NOTE — TELEPHONE ENCOUNTER
Patient called in and voiced she does not want to mess with cutting her pain medication in half. Therefore she took her percocet 10/325 to the pharmacy for them to dispose of it. Writer confirmed with [facility] and spoke with Pharmacist Jimbo Stephenson that patient did take pain medication to the pharmacy for them to dispose of it. Will you send in the script for the lower dosage?

## 2022-11-07 ENCOUNTER — TELEPHONE (OUTPATIENT)
Dept: INTERNAL MEDICINE | Age: 72
End: 2022-11-07

## 2022-11-09 DIAGNOSIS — E11.42 TYPE 2 DIABETES MELLITUS WITH DIABETIC POLYNEUROPATHY, WITH LONG-TERM CURRENT USE OF INSULIN (HCC): ICD-10-CM

## 2022-11-09 DIAGNOSIS — J01.00 ACUTE NON-RECURRENT MAXILLARY SINUSITIS: ICD-10-CM

## 2022-11-09 DIAGNOSIS — Z79.4 TYPE 2 DIABETES MELLITUS WITH DIABETIC POLYNEUROPATHY, WITH LONG-TERM CURRENT USE OF INSULIN (HCC): ICD-10-CM

## 2022-11-10 RX ORDER — IBUPROFEN 400 MG/1
TABLET ORAL
Qty: 360 TABLET | Refills: 3 | Status: SHIPPED | OUTPATIENT
Start: 2022-11-10 | End: 2022-11-11

## 2022-11-10 RX ORDER — FLUTICASONE PROPIONATE 50 MCG
SPRAY, SUSPENSION (ML) NASAL
Qty: 48 G | Refills: 3 | Status: SHIPPED | OUTPATIENT
Start: 2022-11-10 | End: 2022-11-11 | Stop reason: SDUPTHER

## 2022-11-10 RX ORDER — SYRGE-NDL,INS 0.5 ML HALF MARK 31 GX5/16"
SYRINGE, EMPTY DISPOSABLE MISCELLANEOUS
Qty: 200 EACH | Refills: 3 | Status: SHIPPED | OUTPATIENT
Start: 2022-11-10 | End: 2022-11-11 | Stop reason: SDUPTHER

## 2022-11-10 RX ORDER — PEN NEEDLE, DIABETIC 31 GX5/16"
NEEDLE, DISPOSABLE MISCELLANEOUS
Qty: 200 EACH | Refills: 3 | Status: SHIPPED | OUTPATIENT
Start: 2022-11-10 | End: 2022-11-11

## 2022-11-11 DIAGNOSIS — Z79.4 TYPE 2 DIABETES MELLITUS WITH DIABETIC POLYNEUROPATHY, WITH LONG-TERM CURRENT USE OF INSULIN (HCC): ICD-10-CM

## 2022-11-11 DIAGNOSIS — E11.42 TYPE 2 DIABETES MELLITUS WITH DIABETIC POLYNEUROPATHY, WITH LONG-TERM CURRENT USE OF INSULIN (HCC): ICD-10-CM

## 2022-11-11 DIAGNOSIS — J01.00 ACUTE NON-RECURRENT MAXILLARY SINUSITIS: ICD-10-CM

## 2022-11-11 RX ORDER — BLOOD SUGAR DIAGNOSTIC
STRIP MISCELLANEOUS
Qty: 200 EACH | Refills: 3 | Status: SHIPPED | OUTPATIENT
Start: 2022-11-11

## 2022-11-11 RX ORDER — IBUPROFEN 400 MG/1
TABLET ORAL
Qty: 360 TABLET | Refills: 3 | Status: SHIPPED | OUTPATIENT
Start: 2022-11-11

## 2022-11-11 RX ORDER — FLUTICASONE PROPIONATE 50 MCG
SPRAY, SUSPENSION (ML) NASAL
Qty: 48 G | Refills: 3 | Status: SHIPPED | OUTPATIENT
Start: 2022-11-11

## 2022-11-11 RX ORDER — PEN NEEDLE, DIABETIC 31 GX5/16"
NEEDLE, DISPOSABLE MISCELLANEOUS
Qty: 200 EACH | Refills: 3 | Status: SHIPPED | OUTPATIENT
Start: 2022-11-11

## 2022-11-11 RX ORDER — BLOOD SUGAR DIAGNOSTIC
STRIP MISCELLANEOUS
Qty: 200 EACH | Refills: 3 | Status: SHIPPED | OUTPATIENT
Start: 2022-11-11 | End: 2022-11-11

## 2022-11-14 RX ORDER — TIOTROPIUM BROMIDE AND OLODATEROL 3.124; 2.736 UG/1; UG/1
SPRAY, METERED RESPIRATORY (INHALATION)
Qty: 3 EACH | Refills: 3 | Status: SHIPPED | OUTPATIENT
Start: 2022-11-14

## 2022-11-23 ENCOUNTER — OFFICE VISIT (OUTPATIENT)
Dept: CARDIOLOGY | Age: 72
End: 2022-11-23
Payer: MEDICARE

## 2022-11-23 VITALS
BODY MASS INDEX: 44.47 KG/M2 | SYSTOLIC BLOOD PRESSURE: 130 MMHG | HEIGHT: 63 IN | WEIGHT: 251 LBS | HEART RATE: 55 BPM | DIASTOLIC BLOOD PRESSURE: 80 MMHG

## 2022-11-23 DIAGNOSIS — I25.118 CORONARY ARTERY DISEASE INVOLVING NATIVE HEART WITH OTHER FORM OF ANGINA PECTORIS, UNSPECIFIED VESSEL OR LESION TYPE (HCC): ICD-10-CM

## 2022-11-23 DIAGNOSIS — Z98.61 S/P PERCUTANEOUS TRANSLUMINAL CORONARY ANGIOPLASTY: ICD-10-CM

## 2022-11-23 DIAGNOSIS — R06.09 DYSPNEA ON EXERTION: ICD-10-CM

## 2022-11-23 DIAGNOSIS — I10 ESSENTIAL HYPERTENSION: Primary | ICD-10-CM

## 2022-11-23 PROCEDURE — 1123F ACP DISCUSS/DSCN MKR DOCD: CPT | Performed by: INTERNAL MEDICINE

## 2022-11-23 PROCEDURE — G8427 DOCREV CUR MEDS BY ELIG CLIN: HCPCS | Performed by: INTERNAL MEDICINE

## 2022-11-23 PROCEDURE — 3078F DIAST BP <80 MM HG: CPT | Performed by: INTERNAL MEDICINE

## 2022-11-23 PROCEDURE — G8484 FLU IMMUNIZE NO ADMIN: HCPCS | Performed by: INTERNAL MEDICINE

## 2022-11-23 PROCEDURE — G8417 CALC BMI ABV UP PARAM F/U: HCPCS | Performed by: INTERNAL MEDICINE

## 2022-11-23 PROCEDURE — 3017F COLORECTAL CA SCREEN DOC REV: CPT | Performed by: INTERNAL MEDICINE

## 2022-11-23 PROCEDURE — 1090F PRES/ABSN URINE INCON ASSESS: CPT | Performed by: INTERNAL MEDICINE

## 2022-11-23 PROCEDURE — 1036F TOBACCO NON-USER: CPT | Performed by: INTERNAL MEDICINE

## 2022-11-23 PROCEDURE — 99214 OFFICE O/P EST MOD 30 MIN: CPT | Performed by: INTERNAL MEDICINE

## 2022-11-23 PROCEDURE — 3074F SYST BP LT 130 MM HG: CPT | Performed by: INTERNAL MEDICINE

## 2022-11-23 PROCEDURE — 93010 ELECTROCARDIOGRAM REPORT: CPT | Performed by: INTERNAL MEDICINE

## 2022-11-23 PROCEDURE — G8400 PT W/DXA NO RESULTS DOC: HCPCS | Performed by: INTERNAL MEDICINE

## 2022-11-23 PROCEDURE — 93005 ELECTROCARDIOGRAM TRACING: CPT | Performed by: INTERNAL MEDICINE

## 2022-11-23 RX ORDER — SPIRONOLACTONE 50 MG/1
50 TABLET, FILM COATED ORAL DAILY
Qty: 90 TABLET | Refills: 1 | Status: SHIPPED | OUTPATIENT
Start: 2022-11-23

## 2022-11-23 NOTE — PROGRESS NOTES
Today's Date: 2022  Patient's Name: Ashwin Feldman  Patient's age: 74 y.o., 1950    CC: follow up for CAD. HPI:  Ashwin Feldman is being seen in clinic today regarding CAD, HTN, HL    she is here for follow up. Has some SOB. Has some lightheadedness and dizziness. Denies any cp. BP log from home reviewed- -130s. Past Medical History:   has a past medical history of Arthritis, Asthma, CAD (coronary artery disease), Cancer ([address] Carotid artery disease ([address] Cataracts, bilateral, COPD (chronic obstructive pulmonary disease) ([address] Coronary artery disease, Eczema, Fibromyalgia, Glaucoma, H/O blood clots, Headache, Heart attack ([address] Hx of Bell's palsy, Hypertension, Liver disease, Mild dementia, Neuropathy, Osteoporosis, Sleep apnea, Tremor, and Type 2 diabetes mellitus with hyperglycemia ([address] Past Surgical History:   has a past surgical history that includes Appendectomy (); Induced  (); Tonsillectomy (); Bariatric Surgery (); Colonoscopy (2012); pr exc skin benig 1.1-2cm face,facial (Left, 2018); pr njx aa&/strd tfrml epi lumbar/sacral ea addl (Right, 10/5/2018); pr njx aa&/strd tfrml epi lumbar/sacral ea addl (Right, 10/26/2018); Lumbar spine surgery (Right, 2019); Coronary angioplasty with stent; Lumbar spine surgery (Right, 2019); Lumbar spine surgery (Right, 2019); Anesthesia Nerve Block (Right, 2019); Anesthesia Nerve Block (Right, 2019); RADIOFREQUENCY ABLATION NERVES (Right, 2019); Colonoscopy (N/A, 2019); Lumbar spine surgery (Right, 2019); Pain management procedure (Right, 2020); Pain management procedure (Right, 2020); Total abdominal hysterectomy w/ bilateral salpingoophorectomy (); and Hysterectomy. Home Medications:  Prior to Admission medications    Medication Sig Start Date End Date Taking?  Authorizing Provider   STIOLTO RESPIMAT 2.5-2.5 MCG/ACT AERS INHALE 2 PUFFS INTO THE LUNGS DAILY 11/14/22  Yes Marcus Clark,    fluticasone (FLONASE) 50 MCG/ACT nasal spray USE 2 SPRAYS NASALLY EVERY DAY 11/11/22  Yes Ilene Burgos MD   ibuprofen (ADVIL;MOTRIN) 400 MG tablet TAKE 1 TABLET EVERY 6 HOURS AS NEEDED FOR PAIN 11/11/22  Yes Ilene Burgos MD   Insulin Syringe-Needle U-100 (DROPLET INSULIN SYRINGE) 31G X 5/16\" 0.5 ML MISC USE TO INJECT INTO THE SKIN TWO TIMES DAILY 11/11/22  Yes Ilene Burgos MD   DROPLET PEN NEEDLES 31G X 8 MM MISC USE  FOR  INJECTIONS TWICE DAILY 11/11/22  Yes Ilene Burgos MD    MG tablet TAKE 1 TABLET EVERY 6 HOURS AS NEEDED FOR PAIN 11/11/22  Yes Ilene Burgos MD   insulin glargine (LANTUS) 100 UNIT/ML injection vial INJECT 15 UNITS INTO THE SKIN TWO TIMES DAILY (DISCARD AND BEGIN A NEW VIAL AFTER 28 DAYS) 11/3/22  Yes Ilene Burgos MD   oxyCODONE-acetaminophen (PERCOCET) 5-325 MG per tablet Take 1 tablet by mouth every 6 hours as needed for Pain for up to 30 days. Take lowest dose possible to manage pain 11/3/22 12/3/22 Yes Ilene Burgos MD   ascorbic acid (VITAMIN C) 500 MG tablet TAKE 1 TABLET BY MOUTH TWICE DAILY 10/7/22  Yes Ilene Burgos MD   vitamin D3 (CHOLECALCIFEROL) 25 MCG (1000 UT) TABS tablet TAKE 3 TABLETS BY MOUTH TWICE DAILY 10/7/22  Yes Ilene Burgos MD   calcium carbonate-vitamin D3 (CALCIUM 600+D3) 600-400 MG-UNIT TABS per tab TAKE 1 TABLET BY MOUTH TWICE DAILY 10/7/22  Yes Ilene Burgos MD   Microlet Lancets MISC USE TO TEST 4 TIMES A DAY. 10/7/22  Yes Ilene Burgos MD   magnesium oxide (MAG-OX) 400 MG tablet Take 1 tablet by mouth daily 10/7/22  Yes Ilene Burgos MD   atorvastatin (LIPITOR) 40 MG tablet TAKE 1 TABLET EVERY DAY.  10/3/22  Yes Ilene Burgos MD   NOVOLOG FLEXPEN 100 UNIT/ML injection pen INJECT 4 UNITS AT LUNCH AND  6 UNITS AT SUPPER [facility] PEN EXPIRES 28 DAYS AFTER 1ST USE) 9/1/22  Yes Ilene Burgos MD   pantoprazole (PROTONIX) 40 MG tablet TAKE 1 TABLET EVERY DAY 8/30/22  Yes Concepcion Urban MD   furosemide (LASIX) 40 MG tablet TAKE 1 TABLET EVERY DAY 8/30/22  Yes Concepcion Urban MD   amitriptyline (ELAVIL) 25 MG tablet TAKE 1 TABLET AT BEDTIME 8/30/22  Yes Concepcion Urban MD   citalopram (CELEXA) 10 MG tablet TAKE 1 TABLET EVERY DAY 8/30/22  Yes Concepcion Urban MD   buPROPion (WELLBUTRIN) 75 MG tablet TAKE 1 TABLET TWICE DAILY 8/30/22  Yes Concepcion Urban MD   clopidogrel (PLAVIX) 75 MG tablet TAKE 1 TABLET EVERY DAY 8/30/22  Yes Concepcion Urban MD   albuterol sulfate HFA (PROVENTIL;VENTOLIN;PROAIR) 108 (90 Base) MCG/ACT inhaler Inhale 2 puffs into the lungs 4 times daily 8/30/22  Yes Shantal Prieto MD   albuterol (PROVENTIL) (2.5 MG/3ML) 0.083% nebulizer solution Take 3 mLs by nebulization every 6 hours as needed for Wheezing 6/24/22  Yes Concepcion Urban MD   triamcinolone (KENALOG) 0.1 % cream APPLY TOPICALLY TWICE DAILY 6/24/22  Yes Concepcion Urban MD   empagliflozin (JARDIANCE) 10 MG tablet Take 1 tablet by mouth daily 5/23/22  Yes Concepcion Urban MD   metoprolol succinate (TOPROL XL) 25 MG extended release tablet TAKE 1/2 TABLET EVERY DAY 5/6/22  Yes Concepcion Urban MD   alendronate (FOSAMAX) 70 MG tablet TAKE 1 TABLET EVERY WEEK 4/5/22  Yes Concepcion Urban MD   spironolactone (ALDACTONE) 50 MG tablet TAKE 1 TABLET TWICE DAILY 4/5/22  Yes Concepcion Urban MD   senna (SENOKOT) 8.6 MG TABS tablet TAKE 1 TABLET BY MOUTH TWICE DAILY 2/17/22  Yes Concepcion Urban MD   potassium chloride (MICRO-K) 10 MEQ extended release capsule TAKE 1 CAPSULE TWICE DAILY 2/14/22  Yes Concepcion Urban MD   CONTOUR NEXT TEST strip USE TO TEST 4 TIMES A DAY. 1/17/22  Yes Concepcion Urban MD   magnesium oxide (MAGNESIUM-OXIDE) 400 (241.3 Mg) MG TABS tablet Take 1 tablet by mouth 2 times daily 10/15/21  Yes Concepcion Urban MD   Misc.  Devices MISC It is in my medical opinion that Manju Fernández be allowed to have a kitten in her apartment at all times for companionship to help with her Depression, Anxiety, Fibromyalgia and Osteoarthritis.  8/17/21  Yes Rhonda Up MD   dorzolamide (TRUSOPT) 2 % ophthalmic solution Apply to eye 3 times daily 7/19/21  Yes Historical Provider, MD   pyridoxine (B-6) 100 MG tablet Take 50 mg by mouth daily   Yes Historical Provider, MD   Biotin 26806 MCG TABS Take by mouth   Yes Historical Provider, MD   Respiratory Therapy Supplies MISC CPAP supplies 8/13/20  Yes Rhonda Up MD   nitroGLYCERIN (NITROSTAT) 0.4 MG SL tablet PLACE 1 TABLET UNER THE TONGUE EVERY 5 MINUTES AS NEEDED FOR CHEST PAIN AT FIRST SIGN OF CHEST PAIN. 5/14/20  Yes Rhonda Up MD   VITAMIN A PO Take 2,400 mcg by mouth daily   Yes Historical Provider, MD   vitamin E 400 UNIT capsule Take 400 Units by mouth daily   Yes Historical Provider, MD   COMBIGAN 0.2-0.5 % ophthalmic solution PLACE 1 DROP INTO BOTH EYES DAILY 1/2/18  Yes Rhonda Up MD   Compression Stockings MISC by Does not apply route 15-20mmHg  Knee high  Open tow  With assist device to help put them on 5/10/17  Yes Gray Bains, DO   Zinc 50 MG TABS Take 50 mg by mouth daily   Yes Historical Provider, MD   niacin 500 MG extended release capsule Take 500 mg by mouth daily   Yes Historical Provider, MD   folic acid (FOLVITE) 969 MCG tablet Take 800 mcg by mouth daily   Yes Historical Provider, MD   Cyanocobalamin (VITAMIN B12 PO) Take 2,500 mcg by mouth daily   Yes Historical Provider, MD   B Complex-C (SUPER B COMPLEX PO) Take 1 tablet by mouth daily    Yes Historical Provider, MD   travoprost, benzalkonium, (TRAVATAN) 0.004 % ophthalmic solution Place 1 drop into both eyes daily 10/7/16  Yes Rhonda Up MD   CPAP Machine MISC by Does not apply route Pressure of 13 (AirSense 10)  [employer].   Yes Historical Provider, MD   Multiple Vitamin (MULTI VITAMIN DAILY) TABS Take by mouth daily   Yes Historical Provider, MD   gabapentin (NEURONTIN) 600 MG tablet Take 1 tablet by mouth 3 times daily for 14 days. 10/6/22 10/20/22  Tatyana Lala MD   gabapentin (NEURONTIN) 600 MG tablet Take 1 tablet by mouth 3 times daily for 30 days. 9/16/22 10/16/22  Tatyana Lala MD       Allergies:  Patient has no known allergies. Social History:   reports that she quit smoking about 21 years ago. Her smoking use included cigarettes. She has a 35.00 pack-year smoking history. She has never used smokeless tobacco. She reports that she does not drink alcohol and does not use drugs. Family History: family history includes Cancer in her father; Early Death in her father; Glaucoma in her mother; Heart Disease in her mother; High Blood Pressure in her mother; Lucretia Castellanos / Marcus Candelario in her maternal uncle; Stroke in her mother. No h/o sudden cardiac death. No for premature CAD    REVIEW OF SYSTEMS:    Constitutional: there has been no unanticipated weight loss. There's been No change in energy level, No change in activity level. Eyes: No visual changes or diplopia. No scleral icterus. ENT: No Headaches, hearing loss or vertigo. No mouth sores or sore throat. Cardiovascular: see above  Respiratory: see above  Gastrointestinal: No abdominal pain, appetite loss, blood in stools. Genitourinary: No dysuria, trouble voiding, or hematuria. Musculoskeletal:  No gait disturbance, No weakness or joint complaints. Integumentary: No rash or pruritis. Neurological: No headache or diplopia. No tingling  Psychiatric: No anxiety, or depression. Endocrine: No temperature intolerance. Hematologic/Lymphatic: No abnormal bruising or bleeding, blood clots or swollen lymph nodes. Allergic/Immunologic: No nasal congestion or hives. PHYSICAL EXAM:      Vitals:    11/23/22 1332   BP: 130/80   Pulse: 55     Constitutional and General Appearance: alert, cooperative, no distress and appears stated age  HEENT: PERRL, no cervical lymphadenopathy. No masses palpable.  Normal oral mucosa  Respiratory:  Normal excursion and expansion without use of accessory muscles  Resp Auscultation: Good respiratory effort. No for increased work of breathing. On auscultation: clear to auscultation bilaterally  Cardiovascular:  Heart tones are crisp and normal. regular S1 and S2.  Jugular venous pulsation Normal  The carotid upstroke is normal in amplitude and contour without delay or bruit   Abdomen:   soft  Bowel sounds present  Extremities:   No edema  Neurological:  Alert and oriented. Labs:     Lab Results   Component Value Date    CHOL 132 02/10/2022    TRIG 97 02/10/2022    HDL 57 02/10/2022    LDLCHOLESTEROL 56 02/10/2022    LDLCALC 80 05/19/2016    VLDL NOT REPORTED 02/10/2022    CHOLHDLRATIO 2.3 02/10/2022       Lab Results   Component Value Date     02/10/2022    K 4.1 02/10/2022    CL 99 02/10/2022    CO2 26 02/10/2022    BUN 25 (H) 02/10/2022    CREATININE 0.88 02/10/2022    GLUCOSE 133 (H) 02/10/2022    CALCIUM 10.0 02/10/2022    PROT 7.8 02/10/2022    LABALBU 4.2 02/10/2022    BILITOT 0.69 02/10/2022    ALKPHOS 84 02/10/2022    AST 53 (H) 02/10/2022    ALT 54 (H) 02/10/2022    LABGLOM >60 02/10/2022    GFRAA >60 02/10/2022       Cardiac Data:  EKG: Sinus  Bradycardia   -Old anterior infarct. Low voltage with rightward P-axis and rotation -possible pulmonary disease. LAST CATH 6/15- minimal non obstructive CAD,  mRCA 5%, EF 65%     Holter 1/9/18- 7 beat run of PAT      TTE 1/9/18  CONCLUSIONS:  1. Normal LV size and function with ejection fraction of 65%. 2.  Grade 1 diastolic dysfunction. 3.  Mild concentric LVH. 4.  Mild biatrial enlargement. 5.  No significant valvulopathies. 6.  No effusion. 7.  Mildly dilated RV with normal function. Nuclear Stress 5/2019:  IMPRESSION:  1. No ischemia or infarction. 2. Normal LV systolic function. EF 81%. TTE 12/7/2020  Summary  Normal left ventricular diameter.   Left ventricular systolic function is normal. Left ventricular ejectionfraction 65 %.  Right ventricular dilatation with normal systolic function. Aortic valve is sclerotic but opens well. Significant mild mitral valve thickening with annular calcification. Trivial mitral regurgitation. Mild tricuspid regurgitation. Estimated right ventricular systolic pressure is 20 mmHg. Holter 4/2021- PAT     Assessment and plan:    SOB. will check Lexiscan stress test to rule out ischemia/further risk stratify. will check 2d Echo to eval for structural heart disease  Dizziness- ? Due to diuretics. Will lower spirinolactone 50 qd. Check bmp. CAD with Hx of BMS to RCA in 2002, Cath in 2015- non obstructive CAD. Negative Nuclear Stress 5/2019. See above. Continue plavix  HFpEF- stable. On lasix and aldactone. Elevated leg, compression stocking. Palpitations- PAT. Continue small dose of metoprolol  H/o Orthostatic hypotension. Wear compression stockings  LEANDRO/COPD- on CPAP- follows with pulmonary  Frequent falls- Follows with PCP and neurology. HTN- Continue metoprolol and aldactone. Monitor BP at home  DL- continue statin. DM- Management per primary    The patient is to continue heart healthy diet, weight loss and exercise as tolerated. Patient's medications and side effects were discussed. Medication refills were provided if needed. Follow up appointment timing was discussed. All questions and concerns were addressed to patient's satisfaction. The patient is to follow up in 6 months or sooner if necessary. Thank you for allowing me to participate in the care of this patient, please do not hesitate to call if you have any questions. Sandeep Vu DO, [facility], [address], [address], [place] [address] [facility]  [facility]. [facility]  [phone]

## 2022-11-29 ENCOUNTER — HOSPITAL ENCOUNTER (OUTPATIENT)
Dept: LAB | Age: 72
Discharge: HOME | End: 2022-11-29
Payer: MEDICARE

## 2022-11-29 ENCOUNTER — TELEPHONE (OUTPATIENT)
Dept: INTERNAL MEDICINE | Age: 72
End: 2022-11-29

## 2022-11-29 DIAGNOSIS — G89.29 CHRONIC BILATERAL LOW BACK PAIN WITHOUT SCIATICA: ICD-10-CM

## 2022-11-29 DIAGNOSIS — M54.50 CHRONIC BILATERAL LOW BACK PAIN WITHOUT SCIATICA: ICD-10-CM

## 2022-11-29 DIAGNOSIS — I10 ESSENTIAL HYPERTENSION: ICD-10-CM

## 2022-11-29 LAB
ANION GAP SERPL CALCULATED.3IONS-SCNC: 13 MMOL/L (ref 9–17)
BUN BLDV-MCNC: 14 MG/DL (ref 8–23)
BUN/CREAT BLD: 16 (ref 9–20)
CALCIUM SERPL-MCNC: 9.5 MG/DL (ref 8.6–10.4)
CHLORIDE BLD-SCNC: 103 MMOL/L (ref 98–107)
CO2: 24 MMOL/L (ref 20–31)
CREAT SERPL-MCNC: 0.87 MG/DL (ref 0.5–0.9)
GFR SERPL CREATININE-BSD FRML MDRD: >60 ML/MIN/1.73M2
GLUCOSE BLD-MCNC: 136 MG/DL (ref 70–99)
POTASSIUM SERPL-SCNC: 3.8 MMOL/L (ref 3.7–5.3)
SODIUM BLD-SCNC: 140 MMOL/L (ref 135–144)

## 2022-11-29 PROCEDURE — 36415 COLL VENOUS BLD VENIPUNCTURE: CPT

## 2022-11-29 PROCEDURE — 80048 BASIC METABOLIC PNL TOTAL CA: CPT

## 2022-11-29 RX ORDER — OXYCODONE HYDROCHLORIDE AND ACETAMINOPHEN 5; 325 MG/1; MG/1
2 TABLET ORAL EVERY 4 HOURS PRN
Qty: 120 TABLET | Refills: 0 | Status: SHIPPED | OUTPATIENT
Start: 2022-11-29 | End: 2022-12-29

## 2022-11-29 NOTE — TELEPHONE ENCOUNTER
Last appt: 8/22/2022  Next appt: 2/23/2023     Patient is having nuclear stress test 12-   she was to contact PCP to get direction on insulin and meds for the test.  Please advise.    [phone]

## 2022-11-29 NOTE — TELEPHONE ENCOUNTER
Tell Patient take only half the usual Lantus dose both the night  before and  the morning of the test    Also, tell her not to take the NovoLog short acting insulin on the day of test until after the test ( with the first meal after test )    No Jardiance the morning of test    Tell her to ask cardiology about what to do with the metoprolol

## 2022-11-29 NOTE — TELEPHONE ENCOUNTER
Patient called in and said she is going to be leaving to head out of town and she will be out of her pain pain medication before she comes back. The medication is due to be refilled on Thursday and she is wanting to know if her daughter can pick the script up today and if its okay to let the pharmacy know that it is okay to fill early? Script is pended if approved.

## 2022-12-05 RX ORDER — POTASSIUM CHLORIDE 750 MG/1
CAPSULE, EXTENDED RELEASE ORAL
Qty: 180 CAPSULE | Refills: 3 | Status: SHIPPED | OUTPATIENT
Start: 2022-12-05

## 2022-12-05 NOTE — TELEPHONE ENCOUNTER
Pharmacy called requesting a refill of the below medication which has been pended for you:     Requested Prescriptions     Pending Prescriptions Disp Refills    potassium chloride (MICRO-K) 10 MEQ extended release capsule [Pharmacy Med Name: POTASSIUM CHLORIDE ER 10 MEQ Capsule Extended Release] 180 capsule 3     Sig: TAKE 1 CAPSULE TWICE DAILY       Last Appointment Date: 8/22/2022  Next Appointment Date: 2/23/2023    No Known Allergies

## 2022-12-07 ENCOUNTER — HOSPITAL ENCOUNTER (OUTPATIENT)
Dept: NON INVASIVE DIAGNOSTICS | Age: 72
Discharge: HOME | End: 2022-12-07
Payer: MEDICARE

## 2022-12-07 ENCOUNTER — HOSPITAL ENCOUNTER (OUTPATIENT)
Dept: NUCLEAR MEDICINE | Age: 72
Discharge: HOME | End: 2022-12-09
Payer: MEDICARE

## 2022-12-07 ENCOUNTER — TELEPHONE (OUTPATIENT)
Dept: CARDIOLOGY | Age: 72
End: 2022-12-07

## 2022-12-07 DIAGNOSIS — I25.118 CORONARY ARTERY DISEASE INVOLVING NATIVE HEART WITH OTHER FORM OF ANGINA PECTORIS, UNSPECIFIED VESSEL OR LESION TYPE (HCC): ICD-10-CM

## 2022-12-07 DIAGNOSIS — R06.09 DYSPNEA ON EXERTION: ICD-10-CM

## 2022-12-07 DIAGNOSIS — I10 ESSENTIAL HYPERTENSION: ICD-10-CM

## 2022-12-07 DIAGNOSIS — Z98.61 S/P PERCUTANEOUS TRANSLUMINAL CORONARY ANGIOPLASTY: ICD-10-CM

## 2022-12-07 PROCEDURE — 93017 CV STRESS TEST TRACING ONLY: CPT

## 2022-12-07 PROCEDURE — A9500 TC99M SESTAMIBI: HCPCS | Performed by: INTERNAL MEDICINE

## 2022-12-07 PROCEDURE — 78452 HT MUSCLE IMAGE SPECT MULT: CPT

## 2022-12-07 PROCEDURE — 3430000000 HC RX DIAGNOSTIC RADIOPHARMACEUTICAL: Performed by: INTERNAL MEDICINE

## 2022-12-07 PROCEDURE — 6360000002 HC RX W HCPCS: Performed by: INTERNAL MEDICINE

## 2022-12-07 PROCEDURE — 93306 TTE W/DOPPLER COMPLETE: CPT

## 2022-12-07 PROCEDURE — 93018 CV STRESS TEST I&R ONLY: CPT | Performed by: INTERNAL MEDICINE

## 2022-12-07 RX ORDER — TECHNETIUM TC-99M SESTAMIBI 1 MG/10ML
30 INJECTION INTRAVENOUS
Status: COMPLETED | OUTPATIENT
Start: 2022-12-07 | End: 2022-12-07

## 2022-12-07 RX ORDER — TECHNETIUM TC-99M SESTAMIBI 1 MG/10ML
10 INJECTION INTRAVENOUS
Status: COMPLETED | OUTPATIENT
Start: 2022-12-07 | End: 2022-12-07

## 2022-12-07 RX ADMIN — REGADENOSON 0.4 MG: 0.08 INJECTION, SOLUTION INTRAVENOUS at 11:00

## 2022-12-07 RX ADMIN — Medication 10 MILLICURIE: at 11:31

## 2022-12-07 RX ADMIN — Medication 30 MILLICURIE: at 11:31

## 2022-12-07 NOTE — PROGRESS NOTES
Patient Name:  Corby Michael MRN:  [id number]   :  1950  Age:  74 y.o. Sex: female     Ordering Provider: Dameon Ley DO    Primary Care Provider:  Oseas Salinas MD     Indications: CAD, Dyspnea      Medications:     Current Outpatient Medications:     potassium chloride (MICRO-K) 10 MEQ extended release capsule, TAKE 1 CAPSULE TWICE DAILY, Disp: 180 capsule, Rfl: 3    oxyCODONE-acetaminophen (PERCOCET) 5-325 MG per tablet, Take 2 tablets by mouth every 4 hours as needed for Pain for up to 30 days.  Take lowest dose possible to manage pain, Disp: 120 tablet, Rfl: 0    spironolactone (ALDACTONE) 50 MG tablet, Take 1 tablet by mouth daily, Disp: 90 tablet, Rfl: 1    STIOLTO RESPIMAT 2.5-2.5 MCG/ACT AERS, INHALE 2 PUFFS INTO THE LUNGS DAILY, Disp: 3 each, Rfl: 3    fluticasone (FLONASE) 50 MCG/ACT nasal spray, USE 2 SPRAYS NASALLY EVERY DAY, Disp: 48 g, Rfl: 3    ibuprofen (ADVIL;MOTRIN) 400 MG tablet, TAKE 1 TABLET EVERY 6 HOURS AS NEEDED FOR PAIN, Disp: 360 tablet, Rfl: 3    Insulin Syringe-Needle U-100 (DROPLET INSULIN SYRINGE) 31G X 5/16\" 0.5 ML MISC, USE TO INJECT INTO THE SKIN TWO TIMES DAILY, Disp: 200 each, Rfl: 3    DROPLET PEN NEEDLES 31G X 8 MM MISC, USE  FOR  INJECTIONS TWICE DAILY, Disp: 200 each, Rfl: 3     MG tablet, TAKE 1 TABLET EVERY 6 HOURS AS NEEDED FOR PAIN, Disp: 360 tablet, Rfl: 3    insulin glargine (LANTUS) 100 UNIT/ML injection vial, INJECT 15 UNITS INTO THE SKIN TWO TIMES DAILY (DISCARD AND BEGIN A NEW VIAL AFTER 28 DAYS), Disp: 90 mL, Rfl: 3    ascorbic acid (VITAMIN C) 500 MG tablet, TAKE 1 TABLET BY MOUTH TWICE DAILY, Disp: 180 tablet, Rfl: 0    vitamin D3 (CHOLECALCIFEROL) 25 MCG (1000 UT) TABS tablet, TAKE 3 TABLETS BY MOUTH TWICE DAILY, Disp: 540 tablet, Rfl: 3    calcium carbonate-vitamin D3 (CALCIUM 600+D3) 600-400 MG-UNIT TABS per tab, TAKE 1 TABLET BY MOUTH TWICE DAILY, Disp: 180 tablet, Rfl: 3    Microlet Lancets MISC, USE TO TEST 4 TIMES A DAY., Disp: 400 each, Rfl: 3    magnesium oxide (MAG-OX) 400 MG tablet, Take 1 tablet by mouth daily, Disp: 90 tablet, Rfl: 3    gabapentin (NEURONTIN) 600 MG tablet, Take 1 tablet by mouth 3 times daily for 14 days. , Disp: 42 tablet, Rfl: 0    atorvastatin (LIPITOR) 40 MG tablet, TAKE 1 TABLET EVERY DAY., Disp: 90 tablet, Rfl: 3    gabapentin (NEURONTIN) 600 MG tablet, Take 1 tablet by mouth 3 times daily for 30 days. , Disp: 270 tablet, Rfl: 1    NOVOLOG FLEXPEN 100 UNIT/ML injection pen, INJECT 4 UNITS AT LUNCH AND  6 UNITS AT SUPPER (EACH PEN EXPIRES 28 DAYS AFTER 1ST USE), Disp: 15 mL, Rfl: 3    pantoprazole (PROTONIX) 40 MG tablet, TAKE 1 TABLET EVERY DAY, Disp: 90 tablet, Rfl: 3    furosemide (LASIX) 40 MG tablet, TAKE 1 TABLET EVERY DAY, Disp: 90 tablet, Rfl: 3    amitriptyline (ELAVIL) 25 MG tablet, TAKE 1 TABLET AT BEDTIME, Disp: 90 tablet, Rfl: 3    citalopram (CELEXA) 10 MG tablet, TAKE 1 TABLET EVERY DAY, Disp: 90 tablet, Rfl: 3    buPROPion (WELLBUTRIN) 75 MG tablet, TAKE 1 TABLET TWICE DAILY, Disp: 180 tablet, Rfl: 3    clopidogrel (PLAVIX) 75 MG tablet, TAKE 1 TABLET EVERY DAY, Disp: 90 tablet, Rfl: 3    albuterol sulfate HFA (PROVENTIL;VENTOLIN;PROAIR) 108 (90 Base) MCG/ACT inhaler, Inhale 2 puffs into the lungs 4 times daily, Disp: 54 g, Rfl: 3    albuterol (PROVENTIL) (2.5 MG/3ML) 0.083% nebulizer solution, Take 3 mLs by nebulization every 6 hours as needed for Wheezing, Disp: 120 each, Rfl: 3    triamcinolone (KENALOG) 0.1 % cream, APPLY TOPICALLY TWICE DAILY, Disp: 15 g, Rfl: 5    empagliflozin (JARDIANCE) 10 MG tablet, Take 1 tablet by mouth daily, Disp: 90 tablet, Rfl: 3    metoprolol succinate (TOPROL XL) 25 MG extended release tablet, TAKE 1/2 TABLET EVERY DAY, Disp: 45 tablet, Rfl: 3    alendronate (FOSAMAX) 70 MG tablet, TAKE 1 TABLET EVERY WEEK, Disp: 12 tablet, Rfl: 3    senna (SENOKOT) 8.6 MG TABS tablet, TAKE 1 TABLET BY MOUTH TWICE DAILY, Disp: 180 tablet, Rfl: 3    CONTOUR NEXT TEST strip, USE TO TEST 4 TIMES A DAY., Disp: 400 strip, Rfl: 3    magnesium oxide (MAGNESIUM-OXIDE) 400 (241.3 Mg) MG TABS tablet, Take 1 tablet by mouth 2 times daily, Disp: 180 tablet, Rfl: 3    Misc.  Devices MISC, It is in my medical opinion that Zina Mendiola be allowed to have a kitten in her apartment at all times for companionship to help with her Depression, Anxiety, Fibromyalgia and Osteoarthritis., Disp: 1 Device, Rfl: 0    dorzolamide (TRUSOPT) 2 % ophthalmic solution, Apply to eye 3 times daily, Disp: , Rfl:     pyridoxine (B-6) 100 MG tablet, Take 50 mg by mouth daily, Disp: , Rfl:     Biotin 46141 MCG TABS, Take by mouth, Disp: , Rfl:     Respiratory Therapy Supplies MISC, CPAP supplies, Disp: 1 each, Rfl: 0    nitroGLYCERIN (NITROSTAT) 0.4 MG SL tablet, PLACE 1 TABLET UNER THE TONGUE EVERY 5 MINUTES AS NEEDED FOR CHEST PAIN AT FIRST SIGN OF CHEST PAIN., Disp: 25 tablet, Rfl: 1    VITAMIN A PO, Take 2,400 mcg by mouth daily, Disp: , Rfl:     vitamin E 400 UNIT capsule, Take 400 Units by mouth daily, Disp: , Rfl:     COMBIGAN 0.2-0.5 % ophthalmic solution, PLACE 1 DROP INTO BOTH EYES DAILY, Disp: 15 mL, Rfl: 3    Compression Stockings MISC, by Does not apply route 15-20mmHg Knee high Open tow With assist device to help put them on, Disp: 1 each, Rfl: 0    Zinc 50 MG TABS, Take 50 mg by mouth daily, Disp: , Rfl:     niacin 500 MG extended release capsule, Take 500 mg by mouth daily, Disp: , Rfl:     folic acid (FOLVITE) 489 MCG tablet, Take 800 mcg by mouth daily, Disp: , Rfl:     Cyanocobalamin (VITAMIN B12 PO), Take 2,500 mcg by mouth daily, Disp: , Rfl:     B Complex-C (SUPER B COMPLEX PO), Take 1 tablet by mouth daily , Disp: , Rfl:     travoprost, benzalkonium, (TRAVATAN) 0.004 % ophthalmic solution, Place 1 drop into both eyes daily, Disp: 3 Bottle, Rfl: 3    CPAP Machine MISC, by Does not apply route Pressure of 13 (AirSense 10) [employer]., Disp: , Rfl:     Multiple Vitamin (MULTI VITAMIN DAILY) TABS, Take by mouth daily, Disp: , Rfl:     Current Facility-Administered Medications:     regadenoson (LEXISCAN) injection 0.4 mg, 0.4 mg, IntraVENous, Once, Ruben Cline MD    Facility-Administered Medications Ordered in Other Encounters:     technetium sestamibi (CARDIOLITE) injection 10 millicurie, 10 millicurie, IntraVENous, ONCE PRN, Isaias Bains DO    technetium sestamibi (CARDIOLITE) injection 30 millicurie, 30 millicurie, IntraVENous, ONCE PRN, Isaias Bains, DO      ----------------------------------------------------------------------------------------------------------                Lexiscan 0.4 mg injected over 10 seconds. IV Cardiolite injected 20 seconds post Lexiscan injection. Heart Rate:  54  Resting Blood Pressure:  135/58   ----------------------------------------------------------------------------------------------------------     HR BP   1 min 59 136/69   2 min     3 min 58 139/61   4 min     5 min 58 143/74   6 min     7 min 56 125/58   8 min     9 min 55 146/67   10 min       Symptoms:  Chest Pain:  No      Nausea:  No     Headache:  No    Shortness of Breath:  Yes     Other:  No        Electronically signed by Kimberly Javier RCP on 12/7/22 at 10:48 AM EST    ----------------------------------------------------------------------------------------------------------  Resting EKG:  SB, PACs, inferior infarction, low voltage    Arrhythmias:  none     EKG Changes:  No ischemic changes noted. Interpretation:  No ischemic changes noted. Cardiolite results to follow.        Supervising Physician:  Ruben Cline MD

## 2022-12-08 ENCOUNTER — TELEPHONE (OUTPATIENT)
Dept: INTERNAL MEDICINE | Age: 72
End: 2022-12-08
Payer: MEDICARE

## 2022-12-08 ENCOUNTER — TELEPHONE (OUTPATIENT)
Dept: CARDIOLOGY | Age: 72
End: 2022-12-08

## 2022-12-08 DIAGNOSIS — G89.29 CHRONIC BILATERAL LOW BACK PAIN WITHOUT SCIATICA: ICD-10-CM

## 2022-12-08 DIAGNOSIS — M47.817 LUMBOSACRAL SPONDYLOSIS WITHOUT MYELOPATHY: ICD-10-CM

## 2022-12-08 DIAGNOSIS — R94.39 ABNORMAL STRESS TEST: Primary | ICD-10-CM

## 2022-12-08 DIAGNOSIS — I25.83 CORONARY ARTERY DISEASE DUE TO LIPID RICH PLAQUE: Primary | ICD-10-CM

## 2022-12-08 DIAGNOSIS — M54.50 CHRONIC BILATERAL LOW BACK PAIN WITHOUT SCIATICA: ICD-10-CM

## 2022-12-08 DIAGNOSIS — I25.10 CORONARY ARTERY DISEASE DUE TO LIPID RICH PLAQUE: Primary | ICD-10-CM

## 2022-12-08 PROCEDURE — G0179 MD RECERTIFICATION HHA PT: HCPCS | Performed by: INTERNAL MEDICINE

## 2022-12-08 NOTE — TELEPHONE ENCOUNTER
Abnormal stress test with ischemia. Cardiac cath ordered. The following orders will be implemented. Orders with a [] are choices and are NOT implemented unless the box is checked. Pre-Procedure Orders    Branden Velasquez     1950     Diagnosis:Abnormal Stress Test      Procedure scheduled for:   Procedure ordered was discussed with the patient  including risks, benefits and alternative therapies. [] Left Heart Catheterization and coronary angiography    [x] Left Heart Catheterization and coronary angiography w/possible PCI/Coronary Stent PCI    [] Right Heart Catheterization  [] With Flolan  [] Aortic Root    [] MOISES  [] Cardioversion    [] EPS  [] Tilt Table Study per guidelines  [] With Isuprel  [] With NTG    [] Without medication    [] ILR[]    ICD: [] Replacement  [] New implant  [] Lead Extraction    Permanent Pacemaker:  [] Replacement  [] New Implant    [] Lead Extraction    Irrigation:  [] Vancomycin 1 gram ( Check mariia for PPM/ICD)    Ablation:   [] CRYO  [] SVT  [] A-Fib  [] A-Flutter  [] PVC  [] VT    [] With Anesthesia  [] W/O Anesthesia [] With OZZIE  [] W/O OZZIE     [] With CARTO  [] W/O CARTO    ORDERS    [x] EKG   [x] Point of care testing  [] Urine Pregnancy  [x] PLT    [x] PT/INR if on Coumadin  [x] NA, K, Glucose, CL, Creat, Calc, HBG/HCT    IV:  [x] Start Peripheral IV: [x] N/S at  100   ml/hr. [] D5 1/2 N/S at     ml/hr.      Patient has history of contrast reaction; give below meds as prophylaxis 30-60 min prior to procedure:    [] Benadryl (diphenhydramine) 25 mg IV x 1 dose    [] Benadryl (diphenhydramine) 50 mg IV x 1 dose    [] Solu-Medrol (methylprednisolone) 125 mg IV x 1 dose    [] Hydrocortisone (SOLU-CORTEF) 100 mg. IV x 1 dose    [] Lidocaine 1% 0.4 ml subq for IV start    Electronically signed by provider ___Isaias Bains___________12/8/2022 5:06 PM

## 2022-12-08 NOTE — TELEPHONE ENCOUNTER
Patient notified of abnormal stress in details. She is in agreement with heart cath if recommended. She was instructed to go to ER for any chest pain/shortness of breath. She is asymptomatic at this time.  Please advise

## 2022-12-08 NOTE — PROCEDURES
[address]                 [address] [place] [id number]                              CARDIAC STRESS TEST    PATIENT NAME: Nellie Aguirre                 :        1950  MED REC NO:   [id number]                             ROOM:  ACCOUNT NO:   [de-identified]                           ADMIT DATE: 2022  PROVIDER:     Chelsea Hall DO    DATE OF STUDY:  2022    STRESS TEST    Ordering Provider:  Chelsea Hall DO    Primary Care Provider:  John King MD    Patient's Age:  74  Height:  5 feet, 3 inches     Weight:  251 pounds    INDICATION:  Shortness of breath, coronary artery disease, dyspnea on  exertion, hypertension. Lexiscan 0.4 mg injected over 10 seconds. IV Cardiolite injected 20 seconds post Lexiscan injection. Heart Rate:  54     Resting blood pressure:  135/58              HR   BP  1 min          59   136/69  2 min  3 min          58   139/61  4 min  5 min          58   143/74  6 min  7 min          56   125/58  8 min  9 min          55   146/67  10 min    Symptoms:  Chest Pain:  No.  Nausea:  No.  Headache:  No.  Shortness of breath:  Yes. Other:  No.    Resting EKG:  Sinus bradycardia, PACs, inferior infarction, low voltage. Arrhythmias:  None. EKG changes:  No ischemic changes noted. INTERPRETATION:  1. No ischemic changes noted. 2.  Cardiolite results to follow. Nuclear Myocardial Perfusion Imaging (SPECT)    TESTING METHOD  STRESS:   Lexiscan  AGENT:    Cardiolite  REST:          Injection Date:  22  Time:  0954  Amount:  10.2 mCi  STRESS:   Injection Date:  22  Time:  1100  Amount:  31.36 mCi  IMAGE TIME:    Rest:  1029  Stress:  1130    EF:  N/A  TID:  N/A  LHR:  0.38    FINDINGS:  Ischemia (Reversible Defect):           Yes. Infarction (Irreversible Defect):       No.    IMPRESSION:  1. Mild size/mild intensity anteroseptal ischemia. 2.  No infarct.   3.  No left ventricular ejection fraction-gating issue.         Jazmine Velasquez DO    D: 12/08/2022 15:44:41       T: 12/08/2022 15:46:43     /[email or web address]_[email or web address]  Job#: [id number]     Doc#: Unknown    CC:  Ruth Vizcarra

## 2022-12-09 NOTE — TELEPHONE ENCOUNTER
Pt notified of cath ordered. Discussed procedure and instructions. Pt is very familiar--She states that she use to work at [employer]. I gave her the [employer] 's number. Orders are faxed.

## 2022-12-15 ENCOUNTER — TELEPHONE (OUTPATIENT)
Dept: INTERNAL MEDICINE | Age: 72
End: 2022-12-15

## 2022-12-15 NOTE — TELEPHONE ENCOUNTER
Very important she speak to [employer]. She has to have a heart cath January 4th and needs to talk to  you today if you  could please call her back.

## 2022-12-16 ENCOUNTER — HOSPITAL ENCOUNTER (INPATIENT)
Age: 72
LOS: 5 days | Discharge: HOME | End: 2022-12-21
Attending: STUDENT IN AN ORGANIZED HEALTH CARE EDUCATION/TRAINING PROGRAM | Admitting: INTERNAL MEDICINE
Payer: MEDICARE

## 2022-12-16 ENCOUNTER — APPOINTMENT (OUTPATIENT)
Dept: GENERAL RADIOLOGY | Age: 72
End: 2022-12-16
Payer: MEDICARE

## 2022-12-16 ENCOUNTER — HOSPITAL ENCOUNTER (EMERGENCY)
Age: 72
Discharge: ANOTHER HOSPITAL | End: 2022-12-16
Attending: EMERGENCY MEDICINE
Payer: MEDICARE

## 2022-12-16 VITALS
RESPIRATION RATE: 18 BRPM | OXYGEN SATURATION: 94 % | WEIGHT: 250 LBS | DIASTOLIC BLOOD PRESSURE: 58 MMHG | TEMPERATURE: 99.6 F | BODY MASS INDEX: 42.68 KG/M2 | SYSTOLIC BLOOD PRESSURE: 134 MMHG | HEART RATE: 63 BPM | HEIGHT: 64 IN

## 2022-12-16 DIAGNOSIS — M54.50 CHRONIC LOW BACK PAIN, UNSPECIFIED BACK PAIN LATERALITY, UNSPECIFIED WHETHER SCIATICA PRESENT: Primary | ICD-10-CM

## 2022-12-16 DIAGNOSIS — I20.0 UNSTABLE ANGINA (HCC): Primary | ICD-10-CM

## 2022-12-16 DIAGNOSIS — G89.29 CHRONIC LOW BACK PAIN, UNSPECIFIED BACK PAIN LATERALITY, UNSPECIFIED WHETHER SCIATICA PRESENT: Primary | ICD-10-CM

## 2022-12-16 DIAGNOSIS — D50.8 OTHER IRON DEFICIENCY ANEMIA: ICD-10-CM

## 2022-12-16 LAB
ABSOLUTE BANDS #: 0.55 K/UL
ABSOLUTE EOS #: 0 K/UL (ref 0–0.4)
ABSOLUTE IMMATURE GRANULOCYTE: 0 K/UL (ref 0–0.3)
ABSOLUTE LYMPH #: 0.44 K/UL (ref 1–4.8)
ABSOLUTE MONO #: 1.42 K/UL (ref 0.1–1.2)
ALBUMIN SERPL-MCNC: 3.5 G/DL (ref 3.5–5.2)
ALBUMIN/GLOBULIN RATIO: 1 (ref 1–2.5)
ALP BLD-CCNC: 86 U/L (ref 35–104)
ALT SERPL-CCNC: 28 U/L (ref 5–33)
ANION GAP SERPL CALCULATED.3IONS-SCNC: 10 MMOL/L (ref 9–17)
AST SERPL-CCNC: 46 U/L
BANDS: 5 %
BASOPHILS # BLD: 0 % (ref 0–1)
BASOPHILS ABSOLUTE: 0 K/UL (ref 0–0.2)
BILIRUB SERPL-MCNC: 0.7 MG/DL (ref 0.3–1.2)
BUN BLDV-MCNC: 17 MG/DL (ref 8–23)
BUN/CREAT BLD: 21 (ref 9–20)
CALCIUM SERPL-MCNC: 8.9 MG/DL (ref 8.6–10.4)
CHLORIDE BLD-SCNC: 105 MMOL/L (ref 98–107)
CO2: 23 MMOL/L (ref 20–31)
CREAT SERPL-MCNC: 0.81 MG/DL (ref 0.5–0.9)
EOSINOPHILS RELATIVE PERCENT: 0 % (ref 1–7)
FLU A ANTIGEN: NEGATIVE
FLU B ANTIGEN: NEGATIVE
GFR SERPL CREATININE-BSD FRML MDRD: >60 ML/MIN/1.73M2
GLUCOSE BLD-MCNC: 115 MG/DL (ref 65–105)
GLUCOSE BLD-MCNC: 157 MG/DL (ref 70–99)
GLUCOSE BLD-MCNC: 207 MG/DL (ref 65–105)
HCT VFR BLD CALC: 43.5 % (ref 36.3–47.1)
HCT VFR BLD CALC: 44.4 % (ref 36.3–47.1)
HEMOGLOBIN: 13.2 G/DL (ref 11.9–15.1)
HEMOGLOBIN: 14.2 G/DL (ref 11.9–15.1)
IMMATURE GRANULOCYTES: 0 %
LIPASE: 22 U/L (ref 13–60)
LYMPHOCYTES # BLD: 4 % (ref 16–46)
MCH RBC QN AUTO: 30.3 PG (ref 25.2–33.5)
MCH RBC QN AUTO: 30.6 PG (ref 25.2–33.5)
MCHC RBC AUTO-ENTMCNC: 30.3 G/DL (ref 25.2–33.5)
MCHC RBC AUTO-ENTMCNC: 32 G/DL (ref 28.4–34.8)
MCV RBC AUTO: 100.7 FL (ref 82.6–102.9)
MCV RBC AUTO: 94.9 FL (ref 82.6–102.9)
MONOCYTES # BLD: 13 % (ref 4–11)
MORPHOLOGY: ABNORMAL
MORPHOLOGY: ABNORMAL
NRBC AUTOMATED: 0 PER 100 WBC
NRBC AUTOMATED: 0 PER 100 WBC
PARTIAL THROMBOPLASTIN TIME: 33.3 SEC (ref 23.9–33.8)
PARTIAL THROMBOPLASTIN TIME: 43 SEC (ref 20.5–30.5)
PDW BLD-RTO: 14.6 % (ref 11.8–14.4)
PDW BLD-RTO: 14.6 % (ref 11.8–14.4)
PLATELET # BLD: 130 K/UL (ref 138–453)
PLATELET # BLD: 136 K/UL (ref 138–453)
PMV BLD AUTO: 10.7 FL (ref 8.1–13.5)
PMV BLD AUTO: 11 FL (ref 8.1–13.5)
POTASSIUM SERPL-SCNC: 4.4 MMOL/L (ref 3.7–5.3)
PROCALCITONIN: 0.51 NG/ML
RBC # BLD: 4.32 M/UL (ref 3.95–5.11)
RBC # BLD: 4.68 M/UL (ref 3.95–5.11)
SARS-COV-2, RAPID: NOT DETECTED
SEG NEUTROPHILS: 78 % (ref 43–77)
SEGMENTED NEUTROPHILS ABSOLUTE COUNT: 8.49 K/UL (ref 1.5–8.1)
SODIUM BLD-SCNC: 138 MMOL/L (ref 135–144)
SPECIMEN DESCRIPTION: NORMAL
TOTAL PROTEIN: 7.1 G/DL (ref 6.4–8.3)
TROPONIN, HIGH SENSITIVITY: 6 NG/L (ref 0–14)
TROPONIN, HIGH SENSITIVITY: 7 NG/L (ref 0–14)
TROPONIN, HIGH SENSITIVITY: 8 NG/L (ref 0–14)
WBC # BLD: 10.9 K/UL (ref 3.5–11.3)
WBC # BLD: 17.6 K/UL (ref 3.5–11.3)

## 2022-12-16 PROCEDURE — 36415 COLL VENOUS BLD VENIPUNCTURE: CPT

## 2022-12-16 PROCEDURE — 87040 BLOOD CULTURE FOR BACTERIA: CPT

## 2022-12-16 PROCEDURE — 2500000003 HC RX 250 WO HCPCS: Performed by: EMERGENCY MEDICINE

## 2022-12-16 PROCEDURE — 6360000002 HC RX W HCPCS: Performed by: EMERGENCY MEDICINE

## 2022-12-16 PROCEDURE — 84484 ASSAY OF TROPONIN QUANT: CPT

## 2022-12-16 PROCEDURE — 82947 ASSAY GLUCOSE BLOOD QUANT: CPT

## 2022-12-16 PROCEDURE — 99285 EMERGENCY DEPT VISIT HI MDM: CPT

## 2022-12-16 PROCEDURE — 2060000000 HC ICU INTERMEDIATE R&B

## 2022-12-16 PROCEDURE — 85027 COMPLETE CBC AUTOMATED: CPT

## 2022-12-16 PROCEDURE — 85730 THROMBOPLASTIN TIME PARTIAL: CPT

## 2022-12-16 PROCEDURE — 71045 X-RAY EXAM CHEST 1 VIEW: CPT

## 2022-12-16 PROCEDURE — 87635 SARS-COV-2 COVID-19 AMP PRB: CPT

## 2022-12-16 PROCEDURE — 85025 COMPLETE CBC W/AUTO DIFF WBC: CPT

## 2022-12-16 PROCEDURE — 6370000000 HC RX 637 (ALT 250 FOR IP): Performed by: STUDENT IN AN ORGANIZED HEALTH CARE EDUCATION/TRAINING PROGRAM

## 2022-12-16 PROCEDURE — 6370000000 HC RX 637 (ALT 250 FOR IP): Performed by: INTERNAL MEDICINE

## 2022-12-16 PROCEDURE — 85379 FIBRIN DEGRADATION QUANT: CPT

## 2022-12-16 PROCEDURE — 93005 ELECTROCARDIOGRAM TRACING: CPT | Performed by: EMERGENCY MEDICINE

## 2022-12-16 PROCEDURE — 85652 RBC SED RATE AUTOMATED: CPT

## 2022-12-16 PROCEDURE — 6360000002 HC RX W HCPCS: Performed by: STUDENT IN AN ORGANIZED HEALTH CARE EDUCATION/TRAINING PROGRAM

## 2022-12-16 PROCEDURE — 6370000000 HC RX 637 (ALT 250 FOR IP): Performed by: EMERGENCY MEDICINE

## 2022-12-16 PROCEDURE — 2580000003 HC RX 258: Performed by: STUDENT IN AN ORGANIZED HEALTH CARE EDUCATION/TRAINING PROGRAM

## 2022-12-16 PROCEDURE — 87804 INFLUENZA ASSAY W/OPTIC: CPT

## 2022-12-16 PROCEDURE — 84145 PROCALCITONIN (PCT): CPT

## 2022-12-16 PROCEDURE — 2500000003 HC RX 250 WO HCPCS: Performed by: STUDENT IN AN ORGANIZED HEALTH CARE EDUCATION/TRAINING PROGRAM

## 2022-12-16 PROCEDURE — 6360000002 HC RX W HCPCS: Performed by: NURSE PRACTITIONER

## 2022-12-16 PROCEDURE — 80053 COMPREHEN METABOLIC PANEL: CPT

## 2022-12-16 PROCEDURE — 96374 THER/PROPH/DIAG INJ IV PUSH: CPT

## 2022-12-16 PROCEDURE — 83690 ASSAY OF LIPASE: CPT

## 2022-12-16 RX ORDER — ATORVASTATIN CALCIUM 40 MG/1
40 TABLET, FILM COATED ORAL DAILY
Status: DISCONTINUED | OUTPATIENT
Start: 2022-12-16 | End: 2022-12-21 | Stop reason: HOSPADM

## 2022-12-16 RX ORDER — MORPHINE SULFATE 4 MG/ML
4 INJECTION, SOLUTION INTRAMUSCULAR; INTRAVENOUS EVERY 4 HOURS PRN
Status: DISCONTINUED | OUTPATIENT
Start: 2022-12-16 | End: 2022-12-16

## 2022-12-16 RX ORDER — CITALOPRAM 10 MG/1
10 TABLET ORAL DAILY
Status: DISCONTINUED | OUTPATIENT
Start: 2022-12-16 | End: 2022-12-21 | Stop reason: HOSPADM

## 2022-12-16 RX ORDER — AMITRIPTYLINE HYDROCHLORIDE 25 MG/1
25 TABLET, FILM COATED ORAL NIGHTLY
Status: DISCONTINUED | OUTPATIENT
Start: 2022-12-16 | End: 2022-12-21 | Stop reason: HOSPADM

## 2022-12-16 RX ORDER — MORPHINE SULFATE 4 MG/ML
4 INJECTION, SOLUTION INTRAMUSCULAR; INTRAVENOUS
Status: DISCONTINUED | OUTPATIENT
Start: 2022-12-16 | End: 2022-12-16

## 2022-12-16 RX ORDER — METOPROLOL SUCCINATE 25 MG/1
12.5 TABLET, EXTENDED RELEASE ORAL DAILY
Status: DISCONTINUED | OUTPATIENT
Start: 2022-12-16 | End: 2022-12-21 | Stop reason: HOSPADM

## 2022-12-16 RX ORDER — LANOLIN ALCOHOL/MO/W.PET/CERES
50 CREAM (GRAM) TOPICAL DAILY
Status: DISCONTINUED | OUTPATIENT
Start: 2022-12-17 | End: 2022-12-21 | Stop reason: HOSPADM

## 2022-12-16 RX ORDER — POLYETHYLENE GLYCOL 3350 17 G/17G
17 POWDER, FOR SOLUTION ORAL DAILY PRN
Status: DISCONTINUED | OUTPATIENT
Start: 2022-12-16 | End: 2022-12-21 | Stop reason: HOSPADM

## 2022-12-16 RX ORDER — MORPHINE SULFATE 2 MG/ML
2 INJECTION, SOLUTION INTRAMUSCULAR; INTRAVENOUS
Status: DISCONTINUED | OUTPATIENT
Start: 2022-12-16 | End: 2022-12-16

## 2022-12-16 RX ORDER — DORZOLAMIDE HCL 20 MG/ML
1 SOLUTION/ DROPS OPHTHALMIC 3 TIMES DAILY
Status: DISCONTINUED | OUTPATIENT
Start: 2022-12-16 | End: 2022-12-21 | Stop reason: HOSPADM

## 2022-12-16 RX ORDER — ONDANSETRON 4 MG/1
4 TABLET, ORALLY DISINTEGRATING ORAL EVERY 8 HOURS PRN
Status: DISCONTINUED | OUTPATIENT
Start: 2022-12-16 | End: 2022-12-21 | Stop reason: HOSPADM

## 2022-12-16 RX ORDER — FLUTICASONE PROPIONATE 50 MCG
2 SPRAY, SUSPENSION (ML) NASAL DAILY
Status: DISCONTINUED | OUTPATIENT
Start: 2022-12-17 | End: 2022-12-21 | Stop reason: HOSPADM

## 2022-12-16 RX ORDER — MORPHINE SULFATE 2 MG/ML
2 INJECTION, SOLUTION INTRAMUSCULAR; INTRAVENOUS
Status: DISCONTINUED | OUTPATIENT
Start: 2022-12-16 | End: 2022-12-21 | Stop reason: HOSPADM

## 2022-12-16 RX ORDER — OXYCODONE AND ACETAMINOPHEN 10; 325 MG/1; MG/1
1 TABLET ORAL EVERY 8 HOURS PRN
Qty: 90 TABLET | Refills: 0 | Status: ON HOLD | OUTPATIENT
Start: 2022-12-16 | End: 2023-01-15

## 2022-12-16 RX ORDER — INSULIN LISPRO 100 [IU]/ML
4 INJECTION, SOLUTION INTRAVENOUS; SUBCUTANEOUS
Status: DISCONTINUED | OUTPATIENT
Start: 2022-12-16 | End: 2022-12-21 | Stop reason: HOSPADM

## 2022-12-16 RX ORDER — PANTOPRAZOLE SODIUM 40 MG/1
40 TABLET, DELAYED RELEASE ORAL DAILY
Status: DISCONTINUED | OUTPATIENT
Start: 2022-12-16 | End: 2022-12-21 | Stop reason: HOSPADM

## 2022-12-16 RX ORDER — HEPARIN SODIUM AND DEXTROSE 10000; 5 [USP'U]/100ML; G/100ML
5-30 INJECTION INTRAVENOUS CONTINUOUS
Status: DISCONTINUED | OUTPATIENT
Start: 2022-12-16 | End: 2022-12-19 | Stop reason: ALTCHOICE

## 2022-12-16 RX ORDER — TIMOLOL MALEATE 5 MG/ML
1 SOLUTION/ DROPS OPHTHALMIC DAILY
Status: DISCONTINUED | OUTPATIENT
Start: 2022-12-17 | End: 2022-12-21 | Stop reason: HOSPADM

## 2022-12-16 RX ORDER — OXYCODONE HYDROCHLORIDE AND ACETAMINOPHEN 5; 325 MG/1; MG/1
2 TABLET ORAL EVERY 4 HOURS PRN
Status: DISCONTINUED | OUTPATIENT
Start: 2022-12-16 | End: 2022-12-21 | Stop reason: HOSPADM

## 2022-12-16 RX ORDER — CHOLECALCIFEROL (VITAMIN D3) 125 MCG
2500 CAPSULE ORAL DAILY
Status: DISCONTINUED | OUTPATIENT
Start: 2022-12-17 | End: 2022-12-21 | Stop reason: HOSPADM

## 2022-12-16 RX ORDER — INSULIN LISPRO 100 [IU]/ML
4 INJECTION, SOLUTION INTRAVENOUS; SUBCUTANEOUS 2 TIMES DAILY WITH MEALS
Status: DISCONTINUED | OUTPATIENT
Start: 2022-12-17 | End: 2022-12-16

## 2022-12-16 RX ORDER — HEPARIN SODIUM 1000 [USP'U]/ML
4000 INJECTION, SOLUTION INTRAVENOUS; SUBCUTANEOUS PRN
Status: DISCONTINUED | OUTPATIENT
Start: 2022-12-16 | End: 2022-12-19 | Stop reason: ALTCHOICE

## 2022-12-16 RX ORDER — ACETAMINOPHEN 650 MG/1
650 SUPPOSITORY RECTAL EVERY 6 HOURS PRN
Status: DISCONTINUED | OUTPATIENT
Start: 2022-12-16 | End: 2022-12-21 | Stop reason: HOSPADM

## 2022-12-16 RX ORDER — HEPARIN SODIUM 10000 [USP'U]/100ML
5-30 INJECTION, SOLUTION INTRAVENOUS CONTINUOUS
Status: DISCONTINUED | OUTPATIENT
Start: 2022-12-16 | End: 2022-12-16 | Stop reason: HOSPADM

## 2022-12-16 RX ORDER — SODIUM CHLORIDE 0.9 % (FLUSH) 0.9 %
10 SYRINGE (ML) INJECTION EVERY 12 HOURS SCHEDULED
Status: DISCONTINUED | OUTPATIENT
Start: 2022-12-16 | End: 2022-12-21 | Stop reason: HOSPADM

## 2022-12-16 RX ORDER — SPIRONOLACTONE 25 MG/1
50 TABLET ORAL DAILY
Status: DISCONTINUED | OUTPATIENT
Start: 2022-12-16 | End: 2022-12-21 | Stop reason: HOSPADM

## 2022-12-16 RX ORDER — HEPARIN SODIUM 1000 [USP'U]/ML
4000 INJECTION, SOLUTION INTRAVENOUS; SUBCUTANEOUS ONCE
Status: DISCONTINUED | OUTPATIENT
Start: 2022-12-16 | End: 2022-12-19 | Stop reason: ALTCHOICE

## 2022-12-16 RX ORDER — INSULIN GLARGINE 100 [IU]/ML
15 INJECTION, SOLUTION SUBCUTANEOUS 2 TIMES DAILY
Status: DISCONTINUED | OUTPATIENT
Start: 2022-12-16 | End: 2022-12-21 | Stop reason: HOSPADM

## 2022-12-16 RX ORDER — CLOPIDOGREL BISULFATE 75 MG/1
75 TABLET ORAL DAILY
Status: DISCONTINUED | OUTPATIENT
Start: 2022-12-16 | End: 2022-12-21

## 2022-12-16 RX ORDER — FUROSEMIDE 40 MG/1
40 TABLET ORAL DAILY
Status: DISCONTINUED | OUTPATIENT
Start: 2022-12-16 | End: 2022-12-21 | Stop reason: HOSPADM

## 2022-12-16 RX ORDER — NITROGLYCERIN 20 MG/100ML
5-200 INJECTION INTRAVENOUS CONTINUOUS
Status: DISCONTINUED | OUTPATIENT
Start: 2022-12-16 | End: 2022-12-19 | Stop reason: ALTCHOICE

## 2022-12-16 RX ORDER — SODIUM CHLORIDE 0.9 % (FLUSH) 0.9 %
10 SYRINGE (ML) INJECTION PRN
Status: DISCONTINUED | OUTPATIENT
Start: 2022-12-16 | End: 2022-12-21 | Stop reason: HOSPADM

## 2022-12-16 RX ORDER — MORPHINE SULFATE 4 MG/ML
4 INJECTION, SOLUTION INTRAMUSCULAR; INTRAVENOUS
Status: DISCONTINUED | OUTPATIENT
Start: 2022-12-16 | End: 2022-12-21 | Stop reason: HOSPADM

## 2022-12-16 RX ORDER — BRIMONIDINE TARTRATE AND TIMOLOL MALEATE 2; 5 MG/ML; MG/ML
1 SOLUTION OPHTHALMIC DAILY
Status: DISCONTINUED | OUTPATIENT
Start: 2022-12-17 | End: 2022-12-16

## 2022-12-16 RX ORDER — ONDANSETRON 2 MG/ML
4 INJECTION INTRAMUSCULAR; INTRAVENOUS EVERY 6 HOURS PRN
Status: DISCONTINUED | OUTPATIENT
Start: 2022-12-16 | End: 2022-12-21 | Stop reason: HOSPADM

## 2022-12-16 RX ORDER — INSULIN LISPRO 100 [IU]/ML
6 INJECTION, SOLUTION INTRAVENOUS; SUBCUTANEOUS
Status: DISCONTINUED | OUTPATIENT
Start: 2022-12-16 | End: 2022-12-21 | Stop reason: HOSPADM

## 2022-12-16 RX ORDER — ALBUTEROL SULFATE 2.5 MG/3ML
2.5 SOLUTION RESPIRATORY (INHALATION) EVERY 6 HOURS PRN
Status: DISCONTINUED | OUTPATIENT
Start: 2022-12-16 | End: 2022-12-21 | Stop reason: HOSPADM

## 2022-12-16 RX ORDER — OXYCODONE HYDROCHLORIDE AND ACETAMINOPHEN 5; 325 MG/1; MG/1
2 TABLET ORAL EVERY 4 HOURS PRN
Status: DISCONTINUED | OUTPATIENT
Start: 2022-12-16 | End: 2022-12-16

## 2022-12-16 RX ORDER — DEXTROSE MONOHYDRATE 100 MG/ML
INJECTION, SOLUTION INTRAVENOUS CONTINUOUS PRN
Status: DISCONTINUED | OUTPATIENT
Start: 2022-12-16 | End: 2022-12-21 | Stop reason: HOSPADM

## 2022-12-16 RX ORDER — SODIUM CHLORIDE 9 MG/ML
INJECTION, SOLUTION INTRAVENOUS PRN
Status: DISCONTINUED | OUTPATIENT
Start: 2022-12-16 | End: 2022-12-19 | Stop reason: SDUPTHER

## 2022-12-16 RX ORDER — BUPROPION HYDROCHLORIDE 75 MG/1
75 TABLET ORAL 2 TIMES DAILY
Status: DISCONTINUED | OUTPATIENT
Start: 2022-12-16 | End: 2022-12-21 | Stop reason: HOSPADM

## 2022-12-16 RX ORDER — NITROGLYCERIN 20 MG/100ML
5-200 INJECTION INTRAVENOUS CONTINUOUS
Status: DISCONTINUED | OUTPATIENT
Start: 2022-12-16 | End: 2022-12-16 | Stop reason: HOSPADM

## 2022-12-16 RX ORDER — ACETAMINOPHEN 325 MG/1
650 TABLET ORAL EVERY 6 HOURS PRN
Status: DISCONTINUED | OUTPATIENT
Start: 2022-12-16 | End: 2022-12-21 | Stop reason: HOSPADM

## 2022-12-16 RX ORDER — HEPARIN SODIUM 1000 [USP'U]/ML
4000 INJECTION, SOLUTION INTRAVENOUS; SUBCUTANEOUS ONCE
Status: COMPLETED | OUTPATIENT
Start: 2022-12-16 | End: 2022-12-16

## 2022-12-16 RX ORDER — LANOLIN ALCOHOL/MO/W.PET/CERES
400 CREAM (GRAM) TOPICAL DAILY
Status: DISCONTINUED | OUTPATIENT
Start: 2022-12-17 | End: 2022-12-21 | Stop reason: HOSPADM

## 2022-12-16 RX ORDER — BRIMONIDINE TARTRATE 2 MG/ML
1 SOLUTION/ DROPS OPHTHALMIC DAILY
Status: DISCONTINUED | OUTPATIENT
Start: 2022-12-17 | End: 2022-12-21 | Stop reason: HOSPADM

## 2022-12-16 RX ORDER — LATANOPROST 50 UG/ML
1 SOLUTION/ DROPS OPHTHALMIC NIGHTLY
Status: DISCONTINUED | OUTPATIENT
Start: 2022-12-16 | End: 2022-12-21 | Stop reason: HOSPADM

## 2022-12-16 RX ORDER — HEPARIN SODIUM 1000 [USP'U]/ML
2000 INJECTION, SOLUTION INTRAVENOUS; SUBCUTANEOUS PRN
Status: DISCONTINUED | OUTPATIENT
Start: 2022-12-16 | End: 2022-12-19 | Stop reason: ALTCHOICE

## 2022-12-16 RX ORDER — SENNA PLUS 8.6 MG/1
1 TABLET ORAL 2 TIMES DAILY
Status: DISCONTINUED | OUTPATIENT
Start: 2022-12-16 | End: 2022-12-19

## 2022-12-16 RX ORDER — ACETAMINOPHEN 500 MG
1000 TABLET ORAL ONCE
Status: COMPLETED | OUTPATIENT
Start: 2022-12-16 | End: 2022-12-16

## 2022-12-16 RX ADMIN — MORPHINE SULFATE 2 MG: 2 INJECTION, SOLUTION INTRAMUSCULAR; INTRAVENOUS at 22:20

## 2022-12-16 RX ADMIN — HEPARIN SODIUM 8.8 UNITS/KG/HR: 10000 INJECTION, SOLUTION INTRAVENOUS at 19:31

## 2022-12-16 RX ADMIN — LIDOCAINE HYDROCHLORIDE: 20 SOLUTION ORAL; TOPICAL at 23:57

## 2022-12-16 RX ADMIN — METOPROLOL SUCCINATE 12.5 MG: 25 TABLET, FILM COATED, EXTENDED RELEASE ORAL at 23:48

## 2022-12-16 RX ADMIN — ACETAMINOPHEN 650 MG: 325 TABLET ORAL at 19:24

## 2022-12-16 RX ADMIN — DORZOLAMIDE HYDROCHLORIDE 1 DROP: 20 SOLUTION/ DROPS OPHTHALMIC at 23:59

## 2022-12-16 RX ADMIN — NITROGLYCERIN 5 MCG/MIN: 20 INJECTION INTRAVENOUS at 15:06

## 2022-12-16 RX ADMIN — BUPROPION HYDROCHLORIDE 75 MG: 75 TABLET, FILM COATED ORAL at 23:48

## 2022-12-16 RX ADMIN — HEPARIN SODIUM AND DEXTROSE 8 UNITS/KG/HR: 10000; 5 INJECTION INTRAVENOUS at 15:17

## 2022-12-16 RX ADMIN — ACETAMINOPHEN 1000 MG: 500 TABLET ORAL at 14:11

## 2022-12-16 RX ADMIN — HEPARIN SODIUM 4000 UNITS: 1000 INJECTION INTRAVENOUS; SUBCUTANEOUS at 15:11

## 2022-12-16 RX ADMIN — LATANOPROST 1 DROP: 50 SOLUTION OPHTHALMIC at 23:59

## 2022-12-16 RX ADMIN — AMITRIPTYLINE HYDROCHLORIDE 25 MG: 25 TABLET, FILM COATED ORAL at 23:48

## 2022-12-16 RX ADMIN — NITROGLYCERIN 5 MCG/MIN: 20 INJECTION INTRAVENOUS at 19:30

## 2022-12-16 RX ADMIN — HEPARIN SODIUM 2000 UNITS: 1000 INJECTION INTRAVENOUS; SUBCUTANEOUS at 23:03

## 2022-12-16 RX ADMIN — SODIUM CHLORIDE, PRESERVATIVE FREE 10 ML: 5 INJECTION INTRAVENOUS at 22:21

## 2022-12-16 ASSESSMENT — PAIN SCALES - GENERAL
PAINLEVEL_OUTOF10: 6
PAINLEVEL_OUTOF10: 6
PAINLEVEL_OUTOF10: 5
PAINLEVEL_OUTOF10: 3
PAINLEVEL_OUTOF10: 6

## 2022-12-16 ASSESSMENT — ENCOUNTER SYMPTOMS
NAUSEA: 1
ABDOMINAL PAIN: 0
COUGH: 0
SHORTNESS OF BREATH: 0
CONSTIPATION: 0
BACK PAIN: 0
VOMITING: 0
EYE PAIN: 0
BLOOD IN STOOL: 0
DIARRHEA: 1

## 2022-12-16 ASSESSMENT — PAIN DESCRIPTION - LOCATION
LOCATION: HEAD
LOCATION: HEAD
LOCATION: CHEST

## 2022-12-16 ASSESSMENT — LIFESTYLE VARIABLES
HOW MANY STANDARD DRINKS CONTAINING ALCOHOL DO YOU HAVE ON A TYPICAL DAY: PATIENT DOES NOT DRINK
HOW OFTEN DO YOU HAVE A DRINK CONTAINING ALCOHOL: NEVER
HOW OFTEN DO YOU HAVE A DRINK CONTAINING ALCOHOL: NEVER

## 2022-12-16 ASSESSMENT — PAIN - FUNCTIONAL ASSESSMENT: PAIN_FUNCTIONAL_ASSESSMENT: 0-10

## 2022-12-16 ASSESSMENT — PAIN DESCRIPTION - DESCRIPTORS: DESCRIPTORS: ACHING

## 2022-12-16 NOTE — TELEPHONE ENCOUNTER
Patient called in stating a couple of months ago we had her on the Percocet 10/325 1 tab Q 8 Hrs. She felt at that time it was too strong. So we switched her to the 5/325. She has taken that for a couple of months now. Just recently she started having increased pain and asked if she could go back to the 10/325. Patient was advised to take 2 of the 5/325 to finish that up. Patient was taking it every 6 hours instead of every 8 hrs. Patient is out of the medication now and would like to go back to the 10/325. Advised patient it would only be 1 tablet Q 8 hrs and she agreed and voiced understanding.       Script is pended if approved

## 2022-12-16 NOTE — PLAN OF CARE
Patient Presented to the [place] ER with chest pain. Patient had a recent positive stress test and was planned for cardiac cath in early January. Patient not have any EKG changes, troponins were negative. Case was discussed with cardiology due to persistent chest pain and they recommended transfer to [facility] for cardiac cath. Patient continues on heparin drip and nitro drip for chest pain relief. Please inform [facility] once patient arrives on the floor.

## 2022-12-16 NOTE — ED PROVIDER NOTES
[facility]  eMERGENCY dEPARTMENT eNCOUnter      Pt Name: Rosemarie Gil  MRN: [id number]  [place] 1950  Date of evaluation: 12/16/2022      CHIEF COMPLAINT       Chief Complaint   Patient presents with    Chest Pain    Diarrhea     Started last night         HISTORY OF PRESENT ILLNESS    Rosemarie Gil is a 74 y.o. female who presents with chief complaint of diarrhea began last night most soon could not sleep very well this morning she checked her sugar and there was no urine in the shower taking her insulin but she developed chest pain sharp substernal got worse with time she called squad she took nitro and they also gave her 4 aspirin for the pain just above on she has had history of a abnormal stress test fairly recently and is scheduled for cardiac cath on January 4 her last cardiac cath in 2015 showed only a 5% lesion in the RCA  Has been no fever but she is felt cold she was little nauseated but that is gone now she has a bit of a headache    REVIEW OF SYSTEMS         Review of Systems   Constitutional:  Positive for chills. Negative for fever. HENT:  Negative for congestion and ear pain. Eyes:  Negative for pain and visual disturbance. Respiratory:  Negative for cough and shortness of breath. Cardiovascular:  Positive for chest pain and leg swelling. Negative for palpitations. Patient says she has chronic leg swelling with no worse today   Gastrointestinal:  Positive for diarrhea and nausea. Negative for abdominal pain, blood in stool, constipation and vomiting. Endocrine: Negative for polydipsia and polyuria. Genitourinary:  Negative for difficulty urinating, dysuria, frequency, vaginal bleeding and vaginal discharge. Musculoskeletal:  Negative for back pain, joint swelling, myalgias, neck pain and neck stiffness. Skin:  Negative for rash. Neurological:  Positive for headaches. Negative for dizziness and weakness. Hematological:  Negative for adenopathy. Does not bruise/bleed easily. Psychiatric/Behavioral:  Negative for confusion, self-injury and suicidal ideas. PAST MEDICAL HISTORY    has a past medical history of Arthritis, Asthma, CAD (coronary artery disease), Cancer ([address] Carotid artery disease ([address] Cataracts, bilateral, COPD (chronic obstructive pulmonary disease) ([address] Coronary artery disease, Eczema, Fibromyalgia, Glaucoma, H/O blood clots, Headache, Heart attack ([address] Hx of Bell's palsy, Hypertension, Liver disease, Mild dementia, Neuropathy, Osteoporosis, Sleep apnea, Tremor, and Type 2 diabetes mellitus with hyperglycemia ([address] SURGICAL HISTORY      has a past surgical history that includes Appendectomy (); Induced  (); Tonsillectomy (); Bariatric Surgery (); Colonoscopy (2012); pr exc skin benig 1.1-2cm face,facial (Left, 2018); pr njx aa&/strd tfrml epi lumbar/sacral ea addl (Right, 10/5/2018); pr njx aa&/strd tfrml epi lumbar/sacral ea addl (Right, 10/26/2018); Lumbar spine surgery (Right, 2019); Coronary angioplasty with stent; Lumbar spine surgery (Right, 2019); Lumbar spine surgery (Right, 2019); Anesthesia Nerve Block (Right, 2019); Anesthesia Nerve Block (Right, 2019); RADIOFREQUENCY ABLATION NERVES (Right, 2019); Colonoscopy (N/A, 2019); Lumbar spine surgery (Right, 2019); Pain management procedure (Right, 2020); Pain management procedure (Right, 2020); Total abdominal hysterectomy w/ bilateral salpingoophorectomy (); and Hysterectomy.     CURRENT MEDICATIONS       Previous Medications    ALBUTEROL (PROVENTIL) (2.5 MG/3ML) 0.083% NEBULIZER SOLUTION    Take 3 mLs by nebulization every 6 hours as needed for Wheezing    ALBUTEROL SULFATE HFA (PROVENTIL;VENTOLIN;PROAIR) 108 (90 BASE) MCG/ACT INHALER    Inhale 2 puffs into the lungs 4 times daily    ALENDRONATE (FOSAMAX) 70 MG TABLET    TAKE 1 TABLET EVERY WEEK    AMITRIPTYLINE (ELAVIL) 25 MG TABLET TAKE 1 TABLET AT BEDTIME    ASCORBIC ACID (VITAMIN C) 500 MG TABLET    TAKE 1 TABLET BY MOUTH TWICE DAILY    ATORVASTATIN (LIPITOR) 40 MG TABLET    TAKE 1 TABLET EVERY DAY. B COMPLEX-C (SUPER B COMPLEX PO)    Take 1 tablet by mouth daily     BIOTIN 89610 MCG TABS    Take by mouth    BUPROPION (WELLBUTRIN) 75 MG TABLET    TAKE 1 TABLET TWICE DAILY    CALCIUM CARBONATE-VITAMIN D3 (CALCIUM 600+D3) 600-400 MG-UNIT TABS PER TAB    TAKE 1 TABLET BY MOUTH TWICE DAILY    CITALOPRAM (CELEXA) 10 MG TABLET    TAKE 1 TABLET EVERY DAY    CLOPIDOGREL (PLAVIX) 75 MG TABLET    TAKE 1 TABLET EVERY DAY    COMBIGAN 0.2-0.5 % OPHTHALMIC SOLUTION    PLACE 1 DROP INTO BOTH EYES DAILY    COMPRESSION STOCKINGS MISC    by Does not apply route 15-20mmHg  Knee high  Open tow  With assist device to help put them on    CONTOUR NEXT TEST STRIP    USE TO TEST 4 TIMES A DAY. CPAP MACHINE MISC    by Does not apply route Pressure of 13 (AirSense 10)  [employer] [facility].    CYANOCOBALAMIN (VITAMIN B12 PO)    Take 2,500 mcg by mouth daily    DORZOLAMIDE (TRUSOPT) 2 % OPHTHALMIC SOLUTION    Apply to eye 3 times daily    DROPLET PEN NEEDLES 31G X 8 MM MISC    USE  FOR  INJECTIONS TWICE DAILY    EMPAGLIFLOZIN (JARDIANCE) 10 MG TABLET    Take 1 tablet by mouth daily    FLUTICASONE (FLONASE) 50 MCG/ACT NASAL SPRAY    USE 2 SPRAYS NASALLY EVERY DAY    FOLIC ACID (FOLVITE) 993 MCG TABLET    Take 800 mcg by mouth daily    FUROSEMIDE (LASIX) 40 MG TABLET    TAKE 1 TABLET EVERY DAY    GABAPENTIN (NEURONTIN) 600 MG TABLET    Take 1 tablet by mouth 3 times daily for 30 days. GABAPENTIN (NEURONTIN) 600 MG TABLET    Take 1 tablet by mouth 3 times daily for 14 days.      MG TABLET    TAKE 1 TABLET EVERY 6 HOURS AS NEEDED FOR PAIN    IBUPROFEN (ADVIL;MOTRIN) 400 MG TABLET    TAKE 1 TABLET EVERY 6 HOURS AS NEEDED FOR PAIN    INSULIN GLARGINE (LANTUS) 100 UNIT/ML INJECTION VIAL    INJECT 15 UNITS INTO THE SKIN TWO TIMES DAILY (DISCARD AND BEGIN A NEW VIAL AFTER 28 DAYS)    INSULIN SYRINGE-NEEDLE U-100 (DROPLET INSULIN SYRINGE) 31G X 5/16\" 0.5 ML MISC    USE TO INJECT INTO THE SKIN TWO TIMES DAILY    MAGNESIUM OXIDE (MAG-OX) 400 MG TABLET    Take 1 tablet by mouth daily    MAGNESIUM OXIDE (MAGNESIUM-OXIDE) 400 (241.3 MG) MG TABS TABLET    Take 1 tablet by mouth 2 times daily    METOPROLOL SUCCINATE (TOPROL XL) 25 MG EXTENDED RELEASE TABLET    TAKE 1/2 TABLET EVERY DAY    MICROLET LANCETS MISC    USE TO TEST 4 TIMES A DAY. MISC. DEVICES MISC    It is in my medical opinion that Alexey Guerra be allowed to have a kitten in her apartment at all times for companionship to help with her Depression, Anxiety, Fibromyalgia and Osteoarthritis. MULTIPLE VITAMIN (MULTI VITAMIN DAILY) TABS    Take by mouth daily    NIACIN 500 MG EXTENDED RELEASE CAPSULE    Take 500 mg by mouth daily    NITROGLYCERIN (NITROSTAT) 0.4 MG SL TABLET    PLACE 1 TABLET UNER THE TONGUE EVERY 5 MINUTES AS NEEDED FOR CHEST PAIN AT FIRST SIGN OF CHEST PAIN. NOVOLOG FLEXPEN 100 UNIT/ML INJECTION PEN    INJECT 4 UNITS AT LUNCH AND  6 UNITS AT SUPPER (EACH PEN EXPIRES 28 DAYS AFTER 1ST USE)    OXYCODONE-ACETAMINOPHEN (PERCOCET)  MG PER TABLET    Take 1 tablet by mouth every 8 hours as needed for Pain for up to 30 days. Intended supply: 30 days    OXYCODONE-ACETAMINOPHEN (PERCOCET) 5-325 MG PER TABLET    Take 2 tablets by mouth every 4 hours as needed for Pain for up to 30 days.  Take lowest dose possible to manage pain    PANTOPRAZOLE (PROTONIX) 40 MG TABLET    TAKE 1 TABLET EVERY DAY    POTASSIUM CHLORIDE (MICRO-K) 10 MEQ EXTENDED RELEASE CAPSULE    TAKE 1 CAPSULE TWICE DAILY    PYRIDOXINE (B-6) 100 MG TABLET    Take 50 mg by mouth daily    RESPIRATORY THERAPY SUPPLIES [facility]    CPAP supplies    SENNA (SENOKOT) 8.6 MG TABS TABLET    TAKE 1 TABLET BY MOUTH TWICE DAILY    SPIRONOLACTONE (ALDACTONE) 50 MG TABLET    Take 1 tablet by mouth daily    STIOLTO RESPIMAT 2.5-2.5 MCG/ACT AERS    INHALE 2 PUFFS INTO THE LUNGS DAILY    TRAVOPROST, BENZALKONIUM, (TRAVATAN) 0.004 % OPHTHALMIC SOLUTION    Place 1 drop into both eyes daily    TRIAMCINOLONE (KENALOG) 0.1 % CREAM    APPLY TOPICALLY TWICE DAILY    VITAMIN A PO    Take 2,400 mcg by mouth daily    VITAMIN D3 (CHOLECALCIFEROL) 25 MCG (1000 UT) TABS TABLET    TAKE 3 TABLETS BY MOUTH TWICE DAILY    VITAMIN E 400 UNIT CAPSULE    Take 400 Units by mouth daily    ZINC 50 MG TABS    Take 50 mg by mouth daily       ALLERGIES     has No Known Allergies. FAMILY HISTORY     She indicated that the status of her mother is unknown. She indicated that her father is alive. She indicated that the status of her maternal uncle is unknown.     family history includes Cancer in her father; Early Death in her father; Glaucoma in her mother; Heart Disease in her mother; High Blood Pressure in her mother; Higinio Mowers / [place] in her maternal uncle; Stroke in her mother. SOCIAL HISTORY      reports that she quit smoking about 21 years ago. Her smoking use included cigarettes. She has a 35.00 pack-year smoking history. She has never used smokeless tobacco. She reports that she does not drink alcohol and does not use drugs. PHYSICAL EXAM     INITIAL VITALS:  height is 5' 4\" (1.626 m) and weight is 250 lb (113.4 kg). Her tympanic temperature is 99.6 °F (37.6 °C). Her blood pressure is 134/58 (abnormal) and her pulse is 63. Her respiration is 18 and oxygen saturation is 94%. Physical Exam  Constitutional:       General: She is not in acute distress. Appearance: Normal appearance. She is well-developed. She is not ill-appearing, toxic-appearing or diaphoretic. HENT:      Head: Normocephalic and atraumatic. Right Ear: External ear normal.      Left Ear: External ear normal.   Eyes:      Conjunctiva/sclera: Conjunctivae normal.      Pupils: Pupils are equal, round, and reactive to light. Cardiovascular:      Rate and Rhythm: Normal rate and regular rhythm.    Pulmonary: Effort: Pulmonary effort is normal.      Breath sounds: Normal breath sounds. Abdominal:      General: Bowel sounds are normal. There is no distension. Palpations: Abdomen is soft. Tenderness: There is no abdominal tenderness. Musculoskeletal:         General: No tenderness. Cervical back: Normal range of motion. Right lower leg: Edema present. Left lower leg: Edema present. Skin:     General: Skin is warm and dry. Neurological:      General: No focal deficit present. Mental Status: She is alert and oriented to person, place, and time. Psychiatric:         Behavior: Behavior normal.             DIFFERENTIAL DIAGNOSIS/ MDM:     Diarrhea low-grade fever chest pain with a history of abnormal stress test we will do a work-up discussed with cardiology    DIAGNOSTIC RESULTS     EKG: All EKG's are interpreted by the Emergency Department Physician who either signs or Co-signs this chart in the absence of a cardiologist.  Normal sinus rhythm at 62 bpm LA was 176 ms QRS duration 66 ms QT corrected 440 ms axis is -34 there is no acute ST or T wave changes seen      RADIOLOGY:   I directly visualized the following  images and reviewed the radiologist interpretations:       EXAMINATION:   ONE X-RAY VIEW OF THE CHEST       12/16/2022 12:03 pm       COMPARISON:   04/16/2019       HISTORY:   ORDERING SYSTEM PROVIDED HISTORY: chest pain   TECHNOLOGIST PROVIDED HISTORY:   chest pain   Reason for Exam: chest pain       FINDINGS:   Cardiomediastinal silhouette and pulmonary vasculature are within normal   limits. No focal airspace consolidation, pneumothorax, or pleural effusion. No free air beneath the diaphragm. No acute osseous abnormality. Left   shoulder arthroplasty is partially visualized. Impression   No acute intrathoracic process.          ED BEDSIDE ULTRASOUND:       LABS:  Labs Reviewed   CBC WITH AUTO DIFFERENTIAL - Abnormal; Notable for the following components: Result Value    RDW 14.6 (*)     Platelets 242 (*)     Monocytes 13 (*)     Lymphocytes 4 (*)     Seg Neutrophils 78 (*)     Eosinophils % 0 (*)     Bands 5 (*)     Absolute Mono # 1.42 (*)     Absolute Lymph # 0.44 (*)     Segs Absolute 8.49 (*)     All other components within normal limits   COMPREHENSIVE METABOLIC PANEL W/ REFLEX TO MG FOR LOW K - Abnormal; Notable for the following components:    Glucose 157 (*)     Bun/Cre Ratio 21 (*)     AST 46 (*)     All other components within normal limits   POC GLUCOSE FINGERSTICK - Abnormal; Notable for the following components:    POC Glucose 115 (*)     All other components within normal limits   COVID-19, RAPID   RAPID INFLUENZA A/B ANTIGENS   LIPASE   TROPONIN   TROPONIN   APTT   APTT   APTT           EMERGENCY DEPARTMENT COURSE:   Vitals:    Vitals:    12/16/22 1500 12/16/22 1530 12/16/22 1600 12/16/22 1630   BP: 129/74 122/65 127/64 (!) 134/58   Pulse: 64 61 68 63   Resp: 15 23 25 18   Temp:       TempSrc:       SpO2: 95% 92% 91% 94%   Weight:       Height:         -------------------------  BP: (!) 134/58, Temp: 99.6 °F (37.6 °C), Heart Rate: 63, Resp: 18        Re-evaluation Notes    Patient appears to be no distress patient says the pain is intermittent now  EKG on repeat shows sinus bradycardia with sinus arrhythmia rate of 59 bpm [place] was 1 74 QRS duration 68 ms QT corrected 439 axis 31 negative there is no acute ST or T wave changes with no change from prior  CRITICAL CARE:   None        CONSULTS: Case was discussed with cardiology Dr. Karen Meeks he requested transfer to [facility]. Rajendra's  I discussed the case with the hospitalist Dr. Paul Sanchez, who accepts the patient in transfer    PROCEDURES:  None    FINAL IMPRESSION      1. Unstable angina (HCC)          DISPOSITION/PLAN   DISPOSITION Decision To Transfer  Transferred    Condition on Disposition    Stable    PATIENT REFERRED TO:  No follow-up provider specified.     DISCHARGE MEDICATIONS:  New Prescriptions    No medications on file       (Please note that portions of this note were completed with a voice recognition program.  Efforts were made to edit the dictations but occasionally words are mis-transcribed.)    Roosevelt De La Torre MD,, MD, F.A.A.E.M.   Attending Emergency Physician                           Roosevelt De La Torre MD  12/16/22 [id number]

## 2022-12-17 LAB
ABSOLUTE EOS #: <0.03 K/UL (ref 0–0.44)
ABSOLUTE IMMATURE GRANULOCYTE: 0.11 K/UL (ref 0–0.3)
ABSOLUTE LYMPH #: 0.81 K/UL (ref 1.1–3.7)
ABSOLUTE MONO #: 1.16 K/UL (ref 0.1–1.2)
ANION GAP SERPL CALCULATED.3IONS-SCNC: 14 MMOL/L (ref 9–17)
BASOPHILS # BLD: 0 % (ref 0–2)
BASOPHILS ABSOLUTE: 0.05 K/UL (ref 0–0.2)
BUN BLDV-MCNC: 13 MG/DL (ref 8–23)
CALCIUM SERPL-MCNC: 8.8 MG/DL (ref 8.6–10.4)
CHLORIDE BLD-SCNC: 102 MMOL/L (ref 98–107)
CHOLESTEROL/HDL RATIO: 1.9
CHOLESTEROL: 89 MG/DL
CO2: 19 MMOL/L (ref 20–31)
CREAT SERPL-MCNC: 0.65 MG/DL (ref 0.5–0.9)
D-DIMER QUANTITATIVE: 4.8 MG/L FEU
EOSINOPHILS RELATIVE PERCENT: 0 % (ref 1–4)
GFR SERPL CREATININE-BSD FRML MDRD: >60 ML/MIN/1.73M2
GLUCOSE BLD-MCNC: 171 MG/DL (ref 65–105)
GLUCOSE BLD-MCNC: 179 MG/DL (ref 65–105)
GLUCOSE BLD-MCNC: 179 MG/DL (ref 70–99)
GLUCOSE BLD-MCNC: 202 MG/DL (ref 65–105)
GLUCOSE BLD-MCNC: 297 MG/DL (ref 65–105)
HCT VFR BLD CALC: 43.4 % (ref 36.3–47.1)
HDLC SERPL-MCNC: 48 MG/DL
HEMOGLOBIN: 14.3 G/DL (ref 11.9–15.1)
IMMATURE GRANULOCYTES: 1 %
LDL CHOLESTEROL: 30 MG/DL (ref 0–130)
LYMPHOCYTES # BLD: 5 % (ref 24–43)
MCH RBC QN AUTO: 30.5 PG (ref 25.2–33.5)
MCHC RBC AUTO-ENTMCNC: 32.9 G/DL (ref 28.4–34.8)
MCV RBC AUTO: 92.5 FL (ref 82.6–102.9)
MONOCYTES # BLD: 6 % (ref 3–12)
NRBC AUTOMATED: 0 PER 100 WBC
PARTIAL THROMBOPLASTIN TIME: 48 SEC (ref 20.5–30.5)
PARTIAL THROMBOPLASTIN TIME: 49.7 SEC (ref 20.5–30.5)
PARTIAL THROMBOPLASTIN TIME: 55 SEC (ref 20.5–30.5)
PDW BLD-RTO: 14.8 % (ref 11.8–14.4)
PLATELET # BLD: ABNORMAL K/UL (ref 138–453)
PLATELET, FLUORESCENCE: 129 K/UL (ref 138–453)
PLATELET, IMMATURE FRACTION: 2.5 % (ref 1.1–10.3)
POTASSIUM SERPL-SCNC: 3.8 MMOL/L (ref 3.7–5.3)
PROCALCITONIN: 0.46 NG/ML
RBC # BLD: 4.69 M/UL (ref 3.95–5.11)
RBC # BLD: ABNORMAL 10*6/UL
SEDIMENTATION RATE, ERYTHROCYTE: 43 MM/HR (ref 0–30)
SEG NEUTROPHILS: 88 % (ref 36–65)
SEGMENTED NEUTROPHILS ABSOLUTE COUNT: 15.99 K/UL (ref 1.5–8.1)
SODIUM BLD-SCNC: 135 MMOL/L (ref 135–144)
TRIGL SERPL-MCNC: 55 MG/DL
WBC # BLD: 18.1 K/UL (ref 3.5–11.3)

## 2022-12-17 PROCEDURE — 84145 PROCALCITONIN (PCT): CPT

## 2022-12-17 PROCEDURE — 85055 RETICULATED PLATELET ASSAY: CPT

## 2022-12-17 PROCEDURE — 6370000000 HC RX 637 (ALT 250 FOR IP): Performed by: INTERNAL MEDICINE

## 2022-12-17 PROCEDURE — 82947 ASSAY GLUCOSE BLOOD QUANT: CPT

## 2022-12-17 PROCEDURE — 2580000003 HC RX 258: Performed by: STUDENT IN AN ORGANIZED HEALTH CARE EDUCATION/TRAINING PROGRAM

## 2022-12-17 PROCEDURE — 6360000002 HC RX W HCPCS: Performed by: INTERNAL MEDICINE

## 2022-12-17 PROCEDURE — 99223 1ST HOSP IP/OBS HIGH 75: CPT | Performed by: INTERNAL MEDICINE

## 2022-12-17 PROCEDURE — 6370000000 HC RX 637 (ALT 250 FOR IP): Performed by: STUDENT IN AN ORGANIZED HEALTH CARE EDUCATION/TRAINING PROGRAM

## 2022-12-17 PROCEDURE — 85730 THROMBOPLASTIN TIME PARTIAL: CPT

## 2022-12-17 PROCEDURE — 80048 BASIC METABOLIC PNL TOTAL CA: CPT

## 2022-12-17 PROCEDURE — 80061 LIPID PANEL: CPT

## 2022-12-17 PROCEDURE — 94761 N-INVAS EAR/PLS OXIMETRY MLT: CPT

## 2022-12-17 PROCEDURE — 85025 COMPLETE CBC W/AUTO DIFF WBC: CPT

## 2022-12-17 PROCEDURE — 93970 EXTREMITY STUDY: CPT

## 2022-12-17 PROCEDURE — 2060000000 HC ICU INTERMEDIATE R&B

## 2022-12-17 PROCEDURE — 94640 AIRWAY INHALATION TREATMENT: CPT

## 2022-12-17 PROCEDURE — 36415 COLL VENOUS BLD VENIPUNCTURE: CPT

## 2022-12-17 RX ADMIN — MORPHINE SULFATE 2 MG: 2 INJECTION, SOLUTION INTRAMUSCULAR; INTRAVENOUS at 03:18

## 2022-12-17 RX ADMIN — MORPHINE SULFATE 4 MG: 4 INJECTION, SOLUTION INTRAMUSCULAR; INTRAVENOUS at 21:57

## 2022-12-17 RX ADMIN — Medication 50 MG: at 09:44

## 2022-12-17 RX ADMIN — DORZOLAMIDE HYDROCHLORIDE 1 DROP: 20 SOLUTION/ DROPS OPHTHALMIC at 21:36

## 2022-12-17 RX ADMIN — DESMOPRESSIN ACETATE 40 MG: 0.2 TABLET ORAL at 09:43

## 2022-12-17 RX ADMIN — INSULIN LISPRO 4 UNITS: 100 INJECTION, SOLUTION INTRAVENOUS; SUBCUTANEOUS at 13:27

## 2022-12-17 RX ADMIN — PANTOPRAZOLE SODIUM 40 MG: 40 TABLET, DELAYED RELEASE ORAL at 09:44

## 2022-12-17 RX ADMIN — FUROSEMIDE 40 MG: 40 TABLET ORAL at 09:44

## 2022-12-17 RX ADMIN — TIMOLOL MALEATE 1 DROP: 5 SOLUTION OPHTHALMIC at 21:36

## 2022-12-17 RX ADMIN — SENNOSIDES 8.6 MG: 8.6 TABLET, COATED ORAL at 09:44

## 2022-12-17 RX ADMIN — LATANOPROST 1 DROP: 50 SOLUTION OPHTHALMIC at 21:35

## 2022-12-17 RX ADMIN — Medication 2500 MCG: at 09:44

## 2022-12-17 RX ADMIN — CITALOPRAM 10 MG: 10 TABLET, FILM COATED ORAL at 09:44

## 2022-12-17 RX ADMIN — INSULIN GLARGINE 15 UNITS: 100 INJECTION, SOLUTION SUBCUTANEOUS at 11:43

## 2022-12-17 RX ADMIN — MAGNESIUM GLUCONATE 500 MG ORAL TABLET 400 MG: 500 TABLET ORAL at 09:44

## 2022-12-17 RX ADMIN — FLUTICASONE PROPIONATE 2 SPRAY: 50 SPRAY, METERED NASAL at 09:44

## 2022-12-17 RX ADMIN — CLOPIDOGREL 75 MG: 75 TABLET, FILM COATED ORAL at 09:44

## 2022-12-17 RX ADMIN — BUPROPION HYDROCHLORIDE 75 MG: 75 TABLET, FILM COATED ORAL at 09:43

## 2022-12-17 RX ADMIN — EMPAGLIFLOZIN 10 MG: 10 TABLET, FILM COATED ORAL at 11:41

## 2022-12-17 RX ADMIN — BUPROPION HYDROCHLORIDE 75 MG: 75 TABLET, FILM COATED ORAL at 21:35

## 2022-12-17 RX ADMIN — SODIUM CHLORIDE, PRESERVATIVE FREE 10 ML: 5 INJECTION INTRAVENOUS at 09:46

## 2022-12-17 RX ADMIN — AMITRIPTYLINE HYDROCHLORIDE 25 MG: 25 TABLET, FILM COATED ORAL at 21:35

## 2022-12-17 RX ADMIN — TIOTROPIUM BROMIDE AND OLODATEROL 2 PUFF: 3.124; 2.736 SPRAY, METERED RESPIRATORY (INHALATION) at 08:03

## 2022-12-17 RX ADMIN — MORPHINE SULFATE 2 MG: 2 INJECTION, SOLUTION INTRAMUSCULAR; INTRAVENOUS at 13:40

## 2022-12-17 RX ADMIN — BRIMONIDINE TARTRATE 1 DROP: 2 SOLUTION OPHTHALMIC at 09:49

## 2022-12-17 RX ADMIN — MORPHINE SULFATE 2 MG: 2 INJECTION, SOLUTION INTRAMUSCULAR; INTRAVENOUS at 10:04

## 2022-12-17 RX ADMIN — INSULIN GLARGINE 7 UNITS: 100 INJECTION, SOLUTION SUBCUTANEOUS at 00:04

## 2022-12-17 RX ADMIN — SPIRONOLACTONE 50 MG: 25 TABLET ORAL at 09:44

## 2022-12-17 RX ADMIN — METOPROLOL SUCCINATE 12.5 MG: 25 TABLET, FILM COATED, EXTENDED RELEASE ORAL at 09:45

## 2022-12-17 ASSESSMENT — PAIN SCALES - GENERAL
PAINLEVEL_OUTOF10: 6
PAINLEVEL_OUTOF10: 5
PAINLEVEL_OUTOF10: 6

## 2022-12-17 ASSESSMENT — PAIN DESCRIPTION - LOCATION
LOCATION: HEAD
LOCATION: HEAD

## 2022-12-17 ASSESSMENT — PAIN DESCRIPTION - ORIENTATION
ORIENTATION: MID
ORIENTATION: MID

## 2022-12-17 ASSESSMENT — PAIN DESCRIPTION - DESCRIPTORS: DESCRIPTORS: ACHING

## 2022-12-17 NOTE — CONSULTS
Attestation signed by      Attending Physician Statement:    I have discussed the care of  Tom Ponce , including pertinent history and exam findings, with the Cardiology fellow/resident. I have seen and examined the patient and the key elements of all parts of the encounter have been performed by me. I agree with the assessment, plan and orders as documented by the fellow/resident, after I modified exam findings and plan of treatments, and the final version is my approved version of the assessment. Additional Comments:     CAD with hx of BMS-RCA in 2022, patent on last cath in 2015 with nonobstructive disease, recently evaluated in [place] office for dyspnea on exertion, underwent nuclear stress test which was positive for small size mild intensity anteroseptal ischemia, cardiac cath was planned, Admitted with episode of substernal chest pain. Currently chest pain free and hemodynamically stable, ECG with no new changes. Will proceed with coronary angiogram Monday. Start heparin drip. Continue Plavix, statin and BB. [facility]    Consult / H&P               Today's Date: 12/17/2022  Patient Name: Tom Ponce  Date of admission: 12/16/2022  7:07 PM  Patient's age: 74 y.o., 1950  Admission Dx: Unstable angina ([facility] [address] [I20.0]    Reason for Consult:  Cardiac evaluation    Requesting Physician: Ahsan Lozoya MD    CHIEF COMPLAINT:  chest pain     History Obtained From:  pateint and chart review     HISTORY OF PRESENT ILLNESS:      The patient is a 74 y.o. CAD with Hx of BMS to RCA in 2002, Cath in 2015- non obstructive CAD, recent stress test done i on seventh of this month showing anteroseptal ischemia and was planned for elective cardiac cath on January 4. Patient developed substernal chest pain, called EMS. Was given nitro and 4 aspirin. Previously nitro used to relieve the pain, this time nitro did not. At [facility] patient was started on morphine. Brought in with heparin and nitro    Past Medical History:   has a past medical history of Arthritis, Asthma, Bell's palsy, CAD (coronary artery disease), Cancer ([address] Carotid artery disease ([address] Cataracts, bilateral, CHF (congestive heart failure) ([address] COPD (chronic obstructive pulmonary disease) ([address] Coronary artery disease, Eczema, Emphysema lung ([address] Fibromyalgia, Glaucoma, H/O blood clots, Headache, Heart attack ([address] Hx of Bell's palsy, Hypertension, Liver disease, Mild dementia, Neuropathy, Osteoporosis, Sleep apnea, Tremor, and Type 2 diabetes mellitus with hyperglycemia ([address] Past Surgical History:   has a past surgical history that includes Appendectomy (); Induced  (); Tonsillectomy (); Bariatric Surgery (); Colonoscopy (2012); pr exc skin benig 1.1-2cm face,facial (Left, 2018); pr njx aa&/strd tfrml epi lumbar/sacral ea addl (Right, 10/05/2018); pr njx aa&/strd tfrml epi lumbar/sacral ea addl (Right, 10/26/2018); Lumbar spine surgery (Right, 2019); Coronary angioplasty with stent; Lumbar spine surgery (Right, 2019); Lumbar spine surgery (Right, 2019); Anesthesia Nerve Block (Right, 2019); Anesthesia Nerve Block (Right, 2019); RADIOFREQUENCY ABLATION NERVES (Right, 2019); Colonoscopy (N/A, 2019); Lumbar spine surgery (Right, 2019); Pain management procedure (Right, 2020); Pain management procedure (Right, 2020); Total abdominal hysterectomy w/ bilateral salpingoophorectomy (); Hysterectomy; Cholecystectomy; and Endoscopy, colon, diagnostic. Home Medications:    Prior to Admission medications    Medication Sig Start Date End Date Taking? Authorizing Provider   oxyCODONE-acetaminophen (PERCOCET)  MG per tablet Take 1 tablet by mouth every 8 hours as needed for Pain for up to 30 days.  Intended supply: 30 days 12/16/22 1/15/23  Mildred Galeana MD   potassium chloride (MICRO-K) 10 MEQ extended release capsule TAKE 1 CAPSULE TWICE DAILY 12/5/22   Yocasta Vickers MD   oxyCODONE-acetaminophen (PERCOCET) 5-325 MG per tablet Take 2 tablets by mouth every 4 hours as needed for Pain for up to 30 days. Take lowest dose possible to manage pain 11/29/22 12/29/22  Yocasta Vickers MD   spironolactone (ALDACTONE) 50 MG tablet Take 1 tablet by mouth daily 11/23/22   Isaias Bains DO   STIOLTO RESPIMAT 2.5-2.5 MCG/ACT AERS INHALE 2 PUFFS INTO THE LUNGS DAILY 11/14/22   Balta Clark DO   fluticasone (FLONASE) 50 MCG/ACT nasal spray USE 2 SPRAYS NASALLY EVERY DAY 11/11/22   Yocasta Vickers MD   ibuprofen (ADVIL;MOTRIN) 400 MG tablet TAKE 1 TABLET EVERY 6 HOURS AS NEEDED FOR PAIN 11/11/22   Yocasta Vickers MD   Insulin Syringe-Needle U-100 (DROPLET INSULIN SYRINGE) 31G X 5/16\" 0.5 ML MISC USE TO INJECT INTO THE SKIN TWO TIMES DAILY 11/11/22   Yocasta Vickers MD   DROPLET PEN NEEDLES 31G X 8 MM MISC USE  FOR  INJECTIONS TWICE DAILY 11/11/22   Yocasta Vickers MD    MG tablet TAKE 1 TABLET EVERY 6 HOURS AS NEEDED FOR PAIN 11/11/22   Yocasta Vickers MD   insulin glargine (LANTUS) 100 UNIT/ML injection vial INJECT 15 UNITS INTO THE SKIN TWO TIMES DAILY (DISCARD AND BEGIN A NEW VIAL AFTER 28 DAYS) 11/3/22   Yocasta Vickers MD   ascorbic acid (VITAMIN C) 500 MG tablet TAKE 1 TABLET BY MOUTH TWICE DAILY 10/7/22   Yocasta Vickers MD   vitamin D3 (CHOLECALCIFEROL) 25 MCG (1000 UT) TABS tablet TAKE 3 TABLETS BY MOUTH TWICE DAILY 10/7/22   Yocasta Vickers MD   calcium carbonate-vitamin D3 (CALCIUM 600+D3) 600-400 MG-UNIT TABS per tab TAKE 1 TABLET BY MOUTH TWICE DAILY 10/7/22   Yocasta Vickers MD   Microlet Lancets MISC USE TO TEST 4 TIMES A DAY. 10/7/22   Yocasta Vickers MD   magnesium oxide (MAG-OX) 400 MG tablet Take 1 tablet by mouth daily 10/7/22   Yocasta Vickers MD   gabapentin (NEURONTIN) 600 MG tablet Take 1 tablet by mouth 3 times daily for 14 days.  10/6/22 12/16/22  Yocasta Vickers MD   atorvastatin (LIPITOR) 40 MG tablet TAKE 1 TABLET EVERY DAY. 10/3/22   Delbert Pool MD   gabapentin (NEURONTIN) 600 MG tablet Take 1 tablet by mouth 3 times daily for 30 days.  9/16/22 10/16/22  Delbert Pool MD   NOVOLOG FLEXPEN 100 UNIT/ML injection pen INJECT 4 UNITS AT LUNCH AND  6 UNITS AT SUPPER [facility] PEN EXPIRES 28 DAYS AFTER 1ST USE) 9/1/22   Delbert Pool MD   pantoprazole (PROTONIX) 40 MG tablet TAKE 1 TABLET EVERY DAY 8/30/22   Delbert Pool MD   furosemide (LASIX) 40 MG tablet TAKE 1 TABLET EVERY DAY 8/30/22   Delbert Pool MD   amitriptyline (ELAVIL) 25 MG tablet TAKE 1 TABLET AT BEDTIME 8/30/22   Delbert Pool MD   citalopram (CELEXA) 10 MG tablet TAKE 1 TABLET EVERY DAY 8/30/22   Delbert Pool MD   buPROPion (WELLBUTRIN) 75 MG tablet TAKE 1 TABLET TWICE DAILY 8/30/22   Delbert Pool MD   clopidogrel (PLAVIX) 75 MG tablet TAKE 1 TABLET EVERY DAY 8/30/22   Delbert Pool MD   albuterol sulfate HFA (PROVENTIL;VENTOLIN;PROAIR) 108 (90 Base) MCG/ACT inhaler Inhale 2 puffs into the lungs 4 times daily 8/30/22   Shawn Guzman MD   albuterol (PROVENTIL) (2.5 MG/3ML) 0.083% nebulizer solution Take 3 mLs by nebulization every 6 hours as needed for Wheezing 6/24/22   Delbert Pool MD   triamcinolone (KENALOG) 0.1 % cream APPLY TOPICALLY TWICE DAILY 6/24/22   Delbert Pool MD   empagliflozin (JARDIANCE) 10 MG tablet Take 1 tablet by mouth daily 5/23/22   Delbert Pool MD   metoprolol succinate (TOPROL XL) 25 MG extended release tablet TAKE 1/2 TABLET EVERY DAY 5/6/22   Delbert Pool MD   alendronate (FOSAMAX) 70 MG tablet TAKE 1 TABLET EVERY WEEK 4/5/22   Delbert Pool MD   senna (SENOKOT) 8.6 MG TABS tablet TAKE 1 TABLET BY MOUTH TWICE DAILY 2/17/22   Delbert Pool MD   CONTOUR NEXT TEST strip USE TO TEST 4 TIMES A DAY. 1/17/22   Delbert Pool MD   magnesium oxide (MAGNESIUM-OXIDE) 400 (241.3 Mg) MG TABS tablet Take 1 tablet by mouth 2 times daily 10/15/21   Prabha Finn MD   Mis. Devices MISC It is in my medical opinion that Gali Castrejon be allowed to have a kitten in her apartment at all times for companionship to help with her Depression, Anxiety, Fibromyalgia and Osteoarthritis.  8/17/21   Prabha Finn MD   dorzolamide (TRUSOPT) 2 % ophthalmic solution Apply to eye 3 times daily 7/19/21   Historical Provider, MD   pyridoxine (B-6) 100 MG tablet Take 50 mg by mouth daily    Historical Provider, MD   Biotin 60232 MCG TABS Take by mouth    Historical Provider, MD   Respiratory Therapy Supplies [facility] CPAP supplies 8/13/20   Prabha Finn MD   nitroGLYCERIN (NITROSTAT) 0.4 MG SL tablet PLACE 1 TABLET UNER THE TONGUE EVERY 5 MINUTES AS NEEDED FOR CHEST PAIN AT FIRST SIGN OF CHEST PAIN. 5/14/20   Prabha Finn MD   VITAMIN A PO Take 2,400 mcg by mouth daily    Historical Provider, MD   vitamin E 400 UNIT capsule Take 400 Units by mouth daily    Historical Provider, MD   COMBIGAN 0.2-0.5 % ophthalmic solution PLACE 1 DROP INTO BOTH EYES DAILY 1/2/18   Prabha Finn MD   Compression Stockings MISC by Does not apply route 15-20mmHg  Knee high  Open tow  With assist device to help put them on 5/10/17   Gray Bains DO   Zinc 50 MG TABS Take 50 mg by mouth daily    Historical Provider, MD   niacin 500 MG extended release capsule Take 500 mg by mouth daily    Historical Provider, MD   folic acid (FOLVITE) 650 MCG tablet Take 800 mcg by mouth daily    Historical Provider, MD   Cyanocobalamin (VITAMIN B12 PO) Take 2,500 mcg by mouth daily    Historical Provider, MD   B Complex-C (SUPER B COMPLEX PO) Take 1 tablet by mouth daily     Historical Provider, MD   travoprost, benzalkonium, (TRAVATAN) 0.004 % ophthalmic solution Place 1 drop into both eyes daily 10/7/16   Prabha Finn MD   CPAP Machine MISC by Does not apply route Pressure of 13 (AirSense 10)  [facility].    Historical Provider, MD   Multiple Vitamin (MULTI VITAMIN DAILY) TABS Take by mouth daily    Historical Provider, MD          Allergies:  Patient has no known allergies. Social History:   reports that she quit smoking about 21 years ago. Her smoking use included cigarettes. She has a 35.00 pack-year smoking history. She has never used smokeless tobacco. She reports that she does not drink alcohol and does not use drugs. Family History: family history includes Cancer in her father; Early Death in her father; Glaucoma in her mother; Heart Disease in her mother; High Blood Pressure in her mother; Lucretia Damme / [place] in her maternal uncle; Stroke in her mother. No h/o sudden cardiac death. No for premature CAD    REVIEW OF SYSTEMS:    Constitutional: there has been no unanticipated weight loss. There's been No change in energy level, No change in activity level. Eyes: No visual changes or diplopia. No scleral icterus. ENT: No Headaches  Cardiovascular: as above  cardiac history  Respiratory: No previous pulmonary problems, No cough  Gastrointestinal: No abdominal pain. No change in bowel or bladder habits. Genitourinary: No dysuria, trouble voiding, or hematuria. Musculoskeletal:  No gait disturbance, No weakness or joint complaints. Integumentary: No rash or pruritis. Neurological: No headache, diplopia, change in muscle strength, numbness or tingling. No change in gait, balance, coordination, mood, affect, memory, mentation, behavior. Psychiatric: No anxiety, or depression. Endocrine: No temperature intolerance. No excessive thirst, fluid intake, or urination. No tremor. Hematologic/Lymphatic: No abnormal bruising or bleeding, blood clots or swollen lymph nodes. Allergic/Immunologic: No nasal congestion or hives.       PHYSICAL EXAM:      /71   Pulse 69   Temp 98.1 °F (36.7 °C) (Oral)   Resp 16   Ht 5' 4\" (1.626 m)   Wt 241 lb (109.3 kg)   LMP 12/07/1995 (Approximate)   SpO2 91%   BMI 41.37 kg/m²    Constitutional and General Appearance: alert, cooperative, no distress and appears stated age  [de-identified]: PERRL, no cervical lymphadenopathy. No masses palpable. Normal oral mucosa  Respiratory:  Normal excursion and expansion without use of accessory muscles  Resp Auscultation: Good respiratory effort. No for increased work of breathing. On auscultation: clear to auscultation bilaterally  Cardiovascular: The apical impulse is not displaced  Heart tones are crisp and normal. regular S1 and S2.  Jugular venous pulsation Normal  The carotid upstroke is normal in amplitude and contour without delay or bruit  Peripheral pulses are symmetrical and full   Abdomen:   No masses or tenderness  Bowel sounds present  Extremities:   No Cyanosis or Clubbing   Lower extremity edema: No   Skin: Warm and dry  Neurological:  Alert and oriented. Moves all extremities well  No abnormalities of mood, affect, memory, mentation, or behavior are noted    DATA:    Diagnostics:      Stress Test: stress test showing posterior infarct 12/7/2022 IN [facility]     LAST CATH 6/15- minimal non obstructive CAD,  mRCA 5%, EF 65%     Holter 1/9/18- 7 beat run of PAT      TTE 1/9/18  CONCLUSIONS:  1. Normal LV size and function with ejection fraction of 65%. 2.  Grade 1 diastolic dysfunction. 3.  Mild concentric LVH. 4.  Mild biatrial enlargement. 5.  No significant valvulopathies. 6.  No effusion. 7.  Mildly dilated RV with normal function. Nuclear Stress 5/2019:  IMPRESSION:  1. No ischemia or infarction. 2. Normal LV systolic function. EF 81%. TTE 12/7/2020  Summary  Normal left ventricular diameter. Left ventricular systolic function is normal. Left ventricular ejectionfraction 65 %. Right ventricular dilatation with normal systolic function. Aortic valve is sclerotic but opens well. Significant mild mitral valve thickening with annular calcification. Trivial mitral regurgitation. Mild tricuspid regurgitation. Estimated right ventricular systolic pressure is 20 mmHg. Holter 4/2021- PAT      Stress test  12/7/2022  1. Mild size/mild intensity anteroseptal ischemia. 2.  No infarct. 3.  No left ventricular ejection fraction-gating issue. Labs:     CBC:   Recent Labs     12/16/22  1938 12/17/22  0438   WBC 17.6* 18.1*   HGB 14.2 14.3   HCT 44.4 43.4   * See Reflexed IPF Result     BMP:   Recent Labs     12/16/22  1132 12/17/22  0438    135   K 4.4 3.8   CO2 23 19*   BUN 17 13   CREATININE 0.81 0.65   LABGLOM >60 >60   GLUCOSE 157* 179*     BNP: No results for input(s): BNP in the last 72 hours. PT/INR: No results for input(s): PROTIME, INR in the last 72 hours. APTT:  Recent Labs     12/17/22 0438 12/17/22  1030   APTT 49.7* 48.0*     CARDIAC ENZYMES:No results for input(s): CKTOTAL, CKMB, CKMBINDEX, TROPONINI in the last 72 hours.   FASTING LIPID PANEL:  Lab Results   Component Value Date/Time    HDL 48 12/17/2022 04:38 AM    LDLCALC 80 05/19/2016 12:00 AM    TRIG 55 12/17/2022 04:38 AM     LIVER PROFILE:  Recent Labs     12/16/22  1132   AST 46*   ALT 28   LABALBU 3.5       IMPRESSION:    Patient Active Problem List   Diagnosis    Obstructive sleep apnea    Chronic fatigue    Coronary artery disease due to lipid rich plaque    Vitamin D deficiency    Type 2 diabetes mellitus with diabetic polyneuropathy, with long-term current use of insulin (HCC)    Peripheral polyneuropathy    Bilateral leg edema    Right hip pain    Muscle ache    Hx of Bell's palsy    Memory problem    Gait abnormality    Balance problem    H/O falling    Dizziness    Intractable episodic tension-type headache    Essential tremor    Anxiety and depression    Elevated hemoglobin A1c    VBI (vertebrobasilar insufficiency)    Chronic cerebral ischemia    TIA (transient ischemic attack)    Chronic tension headache    Morbid obesity with BMI of 45.0-49.9, adult ([facility] [address]    Chronic right-sided low back pain with right-sided sciatica    Lumbar radiculopathy    Encounter for chronic pain management    Carotid stenosis, asymptomatic, bilateral    Chronic tension-type headache, not intractable    Lumbosacral spondylosis without myelopathy    DDD (degenerative disc disease), lumbar    Acquired spondylolisthesis    Lumbar facet joint syndrome    Chronic diastolic congestive heart failure (HCC)    Bronchiectasis without complication (HCC)    CHF (congestive heart failure), NYHA class I, chronic, diastolic (HCC)    Unstable angina (HCC)     On Lipitor, Plavix, Jardiance, Lasix, Aldactone 5    RECOMMENDATIONS:    UNSTABLE ANGINA   Stress test 12/7 demonstrated mild reversible ischemia anterior septal disease with elective cardiac cath reommended jan -4  CAD with Hx of BMS to RCA in 2002, Cath in 2015- non obstructive CAD. Negative Nuclear Stress 5/2019. HFpEF- stable. On aldactone 50 . Elevated leg, compression stocking. Palpitations- PAT. on metoprolol at home   H/o Orthostatic hypotension. Wear compression stockings  LEANDRO/COPD- on CPAP- follows with pulmonar  HTN- Continue metoprolol and aldactone. Monitor BP at home  DL- continue statin. DM- Management per primary     Plan : We will go ahead with cardiac cath on momday am   Continue Lipitor, Plavix, heparin and nitro gtt. for now.      Xiang Noble MD  Internal Medicine Resident, PGY3   [address] [place] [address],  [phone]  12/17/2022,12:51 PM

## 2022-12-17 NOTE — H&P
[facility]  Office: [address], DO, Simon Blood, DO, Iraheta Jessicat, DO, Gaetano Robles Blood, DO, Jordana [place] MD, Tho Giordano MD, Arron Diop MD, Crow Wallace MD,  Rafi Woo MD, Edgar Marie MD, Esperanza Hill, DO, Sanya Harley MD,  Caprice Cha MD, Mercedez Bajwa MD, Christoph Otto DO, Nicole Rainey MD, Xi Del Valle MD, Urvashi Goodman DO, Jennifer Fisher MD, Catrina Fernandez MD, Cecelia Xie MD, Gurdeep Jean-Baptiste MD, Blanca Johnson DO, Hector Coulter MD, Renee Boateng MD, Rob Mccall, CNP,  Susy Scales CNP, Dayton Chawla, CNP, Idalmis Mckinley, CNP,  Angely Holt, [facility], Guillaume Trimble, CNP, Courtney Moncada, CNP, Rubén White, CNP, Jose Noguera, CNP, Easton Angel, CNP, Cedric Salazar PA-C, Kelly Olsen, CNS, Argenis Quiroz, CNP, Safia Giordano, [facility]    HISTORY AND PHYSICAL EXAMINATION            Date:   12/17/2022  Patient name:  Barbara Goldman  Date of admission:  12/16/2022  7:07 PM  MRN:   [id number]  Account:  [de-identified]  YOB: 1950  PCP:    Jami Joseph MD  Room:   [id number]/[id number]  Code Status:    Full Code    Chief Complaint:     Chest pain, diarrhea     History Obtained From:     patient, electronic medical record    History of Present Illness:     Barbara Goldman is a 74 y.o. F who presented with chest pain and diarrhea. Symptoms going on for the past 3 days. Chest pain dull, substernal in nature. Had recent stress that was positive, had cath scheduled. Presented to outside hospital, cardiology consulted, recommended transfer to [facility] for further evaluation. Diarrhea going on last few days, states improving. Labs done demonstrating elevated d-dimer, no history of clots per patient. Troponin stable.      Past Medical History:     Past Medical History:   Diagnosis Date    Arthritis     Asthma     Bell's palsy     CAD (coronary artery disease) 1989    Cancer ([facility] 1971    cervical    Carotid artery disease (HCC)     Cataracts, bilateral     CHF (congestive heart failure) (HCC)     COPD (chronic obstructive pulmonary disease) (HCC)     Coronary artery disease     Eczema     Emphysema lung (HCC)     Fibromyalgia     Glaucoma     H/O blood clots     Headache     Heart attack ([facility]     Hx of Bell's palsy     Mild right facial paralysis    Hypertension     Liver disease     Mild dementia     Neuropathy     Osteoporosis     Sleep apnea 11/07/2014    sleep study done in [place]    Tremor     Type 2 diabetes mellitus with hyperglycemia [facility])         Past Surgical History:     Past Surgical History:   Procedure Laterality Date    ANESTHESIA NERVE BLOCK Right 06/28/2019    Right L2 L3 L4 L5 Diagnostic Medial BB performed by Kristian Hurley MD at [address] [place] [address] Right 07/26/2019    Right L2 L3 L4 L5 Confirmatory Medial BB performed by Kristian Hurley MD at [place] [address]      COLONOSCOPY  09/2012    COLONOSCOPY N/A 09/30/2019    COLONOSCOPY performed by Wilson Noguera MD at [address] [place] [address]  internal hemorrhoids and few diverticuli    CORONARY ANGIOPLASTY WITH STENT PLACEMENT      ENDOSCOPY, COLON, DIAGNOSTIC      HYSTERECTOMY (CERVIX STATUS UNKNOWN)      [address] [address] Right 02/08/2019    Right L2 L3 TRANSFORAMINAL performed by Kristian Hurley MD at [address] [place] [address] Dr Hamilton 05/07/2019    Right L2 L3 TRANSFORAMINAL performed by Kristian Hurley MD at [address] [place] [address]  Right 06/04/2019    Right L3 & L4 TRANSFORAMINAL performed by Kristian Hurley MD at [address] [place] [address]  Right 12/12/2019    Right L4 L5 TRANSFORAMINAL performed by Kristian Hurley MD at [address] [place] [address] 01/20/2020    RIGHT L4 L5  TRANSFORAMINAL performed by Kristian Hurley MD at [address] [place] [address] 02/17/2020    Right L4 L5 TRANSFORAMINAL performed by Alecia Mcmahan MD at [facility] 1.1-2CM FACE,FACIAL Left 02/07/2018    Excision Cyst Left Chest, Middle Back performed by Emery Hirsch MD at [address] AA&/STRD TFRML EPI LUMBAR/SACRAL EA ADDL Right 10/05/2018    Right L2 & L3 TRANSFORAMINAL performed by Alecia Mcmahan MD at [address] AA&/STRD TFRML EPI LUMBAR/SACRAL EA ADDL Right 10/26/2018    Right L2 L3 TRANSFORAMINAL performed by Alecia Mcmahan MD at [place] [address] Right 08/01/2019    Right  L2 L3 L4 L5 RADIOFREQUENCY performed by Alecia Mcmahan MD at [facility] (CERVIX REMOVED)  1987    Cervical cancer. Had XRT    TONSILLECTOMY  1983        Medications Prior to Admission:     Prior to Admission medications    Medication Sig Start Date End Date Taking? Authorizing Provider   oxyCODONE-acetaminophen (PERCOCET)  MG per tablet Take 1 tablet by mouth every 8 hours as needed for Pain for up to 30 days. Intended supply: 30 days 12/16/22 1/15/23  Evans Malloy MD   potassium chloride (MICRO-K) 10 MEQ extended release capsule TAKE 1 CAPSULE TWICE DAILY 12/5/22   Evans Malloy MD   oxyCODONE-acetaminophen (PERCOCET) 5-325 MG per tablet Take 2 tablets by mouth every 4 hours as needed for Pain for up to 30 days.  Take lowest dose possible to manage pain 11/29/22 12/29/22  Evans Malloy MD   spironolactone (ALDACTONE) 50 MG tablet Take 1 tablet by mouth daily 11/23/22   Isaias Bains DO   STIOLTO RESPIMAT 2.5-2.5 MCG/ACT AERS INHALE 2 PUFFS INTO THE LUNGS DAILY 11/14/22   Tonio Clark DO   fluticasone (FLONASE) 50 MCG/ACT nasal spray USE 2 SPRAYS NASALLY EVERY DAY 11/11/22   Evans Malloy MD   ibuprofen (ADVIL;MOTRIN) 400 MG tablet TAKE 1 TABLET EVERY 6 HOURS AS NEEDED FOR PAIN 11/11/22   Evans Malloy MD   Insulin Syringe-Needle U-100 (DROPLET INSULIN SYRINGE) 31G X 5/16\" 0.5 ML MISC USE TO INJECT INTO THE SKIN TWO TIMES DAILY 11/11/22   Saran Spence MD   DROPLET PEN NEEDLES 31G X 8 MM MISC USE  FOR  INJECTIONS TWICE DAILY 11/11/22   Saran Spence MD    MG tablet TAKE 1 TABLET EVERY 6 HOURS AS NEEDED FOR PAIN 11/11/22   Saran Spence MD   insulin glargine (LANTUS) 100 UNIT/ML injection vial INJECT 15 UNITS INTO THE SKIN TWO TIMES DAILY (DISCARD AND BEGIN A NEW VIAL AFTER 28 DAYS) 11/3/22   Saran Spence MD   ascorbic acid (VITAMIN C) 500 MG tablet TAKE 1 TABLET BY MOUTH TWICE DAILY 10/7/22   Saran Spence MD   vitamin D3 (CHOLECALCIFEROL) 25 MCG (1000 UT) TABS tablet TAKE 3 TABLETS BY MOUTH TWICE DAILY 10/7/22   Saran Spence MD   calcium carbonate-vitamin D3 (CALCIUM 600+D3) 600-400 MG-UNIT TABS per tab TAKE 1 TABLET BY MOUTH TWICE DAILY 10/7/22   Saran Spence MD   Microlet Lancets MISC USE TO TEST 4 TIMES A DAY. 10/7/22   Saran Spence MD   magnesium oxide (MAG-OX) 400 MG tablet Take 1 tablet by mouth daily 10/7/22   Saran Spence MD   gabapentin (NEURONTIN) 600 MG tablet Take 1 tablet by mouth 3 times daily for 14 days. 10/6/22 12/16/22  Saran Spence MD   atorvastatin (LIPITOR) 40 MG tablet TAKE 1 TABLET EVERY DAY. 10/3/22   Saran Spence MD   gabapentin (NEURONTIN) 600 MG tablet Take 1 tablet by mouth 3 times daily for 30 days.  9/16/22 10/16/22  Saran Spence MD   NOVOLOG FLEXPEN 100 UNIT/ML injection pen INJECT 4 UNITS AT LUNCH AND  6 UNITS AT SUPPER [facility] PEN EXPIRES 28 DAYS AFTER 1ST USE) 9/1/22   Saran Spence MD   pantoprazole (PROTONIX) 40 MG tablet TAKE 1 TABLET EVERY DAY 8/30/22   Saran Spence MD   furosemide (LASIX) 40 MG tablet TAKE 1 TABLET EVERY DAY 8/30/22   Saran Spence MD   amitriptyline (ELAVIL) 25 MG tablet TAKE 1 TABLET AT BEDTIME 8/30/22   Saran Spence MD   citalopram (CELEXA) 10 MG tablet TAKE 1 TABLET EVERY DAY 8/30/22   Saran Spence MD   buPROPion [facility]) 75 MG tablet TAKE 1 TABLET TWICE DAILY 8/30/22   Saran Spence, MD   clopidogrel (PLAVIX) 75 MG tablet TAKE 1 TABLET EVERY DAY 8/30/22   Ynes Diego MD   albuterol sulfate HFA (PROVENTIL;VENTOLIN;PROAIR) 108 (90 Base) MCG/ACT inhaler Inhale 2 puffs into the lungs 4 times daily 8/30/22   Alana Krishnan MD   albuterol (PROVENTIL) (2.5 MG/3ML) 0.083% nebulizer solution Take 3 mLs by nebulization every 6 hours as needed for Wheezing 6/24/22   Ynes Diego MD   triamcinolone (KENALOG) 0.1 % cream APPLY TOPICALLY TWICE DAILY 6/24/22   Ynes Diego MD   empagliflozin (JARDIANCE) 10 MG tablet Take 1 tablet by mouth daily 5/23/22   Ynes Diego MD   metoprolol succinate (TOPROL XL) 25 MG extended release tablet TAKE 1/2 TABLET EVERY DAY 5/6/22   Ynes Diego MD   alendronate (FOSAMAX) 70 MG tablet TAKE 1 TABLET EVERY WEEK 4/5/22   Ynes Diego MD   senna (SENOKOT) 8.6 MG TABS tablet TAKE 1 TABLET BY MOUTH TWICE DAILY 2/17/22   Ynes Diego MD   CONTOUR NEXT TEST strip USE TO TEST 4 TIMES A DAY. 1/17/22   Ynes Diego MD   magnesium oxide (MAGNESIUM-OXIDE) 400 (241.3 Mg) MG TABS tablet Take 1 tablet by mouth 2 times daily 10/15/21   Ynes Diego MD   Misc. Devices MISC It is in my medical opinion that Sarah Gordon be allowed to have a kitten in her apartment at all times for companionship to help with her Depression, Anxiety, Fibromyalgia and Osteoarthritis.  8/17/21   Ynes Diego MD   dorzolamide (TRUSOPT) 2 % ophthalmic solution Apply to eye 3 times daily 7/19/21   Historical Provider, MD   pyridoxine (B-6) 100 MG tablet Take 50 mg by mouth daily    Historical Provider, MD   Biotin 07449 MCG TABS Take by mouth    Historical Provider, MD   Respiratory Therapy Supplies [facility] CPAP supplies 8/13/20   Ynes Diego MD   nitroGLYCERIN (NITROSTAT) 0.4 MG SL tablet PLACE 1 TABLET UNER THE TONGUE EVERY 5 MINUTES AS NEEDED FOR CHEST PAIN AT FIRST SIGN OF CHEST PAIN. 5/14/20   Ynes Diego MD   VITAMIN A PO Take 2,400 mcg by mouth daily Historical Provider, MD   vitamin E 400 UNIT capsule Take 400 Units by mouth daily    Historical Provider, MD   COMBIGAN 0.2-0.5 % ophthalmic solution PLACE 1 DROP INTO BOTH EYES DAILY 1/2/18   Ynes Diego MD   Compression Stockings MISC by Does not apply route 15-20mmHg  Knee high  Open tow  With assist device to help put them on 5/10/17   Gray Bains DO   Zinc 50 MG TABS Take 50 mg by mouth daily    Historical Provider, MD   niacin 500 MG extended release capsule Take 500 mg by mouth daily    Historical Provider, MD   folic acid (FOLVITE) 211 MCG tablet Take 800 mcg by mouth daily    Historical Provider, MD   Cyanocobalamin (VITAMIN B12 PO) Take 2,500 mcg by mouth daily    Historical Provider, MD   B Complex-C (SUPER B COMPLEX PO) Take 1 tablet by mouth daily     Historical Provider, MD   travoprost, benzalkonium, (TRAVATAN) 0.004 % ophthalmic solution Place 1 drop into both eyes daily 10/7/16   Ynes Diego MD   CPAP Machine MISC by Does not apply route Pressure of 13 (AirSense 10)  [employer] [facility].    Historical Provider, MD   Multiple Vitamin (MULTI VITAMIN DAILY) TABS Take by mouth daily    Historical Provider, MD        Allergies:     Patient has no known allergies. Social History:     Tobacco:    reports that she quit smoking about 21 years ago. Her smoking use included cigarettes. She has a 35.00 pack-year smoking history. She has never used smokeless tobacco.  Alcohol:      reports no history of alcohol use. Drug Use:  reports no history of drug use. Family History:     Family History   Problem Relation Age of Onset    Heart Disease Mother     High Blood Pressure Mother     Stroke Mother     Glaucoma Mother     Early Death Father     Cancer Father         stomach    Miscarriages / Stillbirths Maternal Uncle        Review of Systems:     Positive and Negative as described in HPI.     CONSTITUTIONAL:  negative for fevers, chills  HEENT:  negative for vision, hearing changes, runny nose, throat pain  RESPIRATORY:  negative for shortness of breath, cough, congestion, wheezing  CARDIOVASCULAR:  positive for chest pain  GASTROINTESTINAL:  positive for diarrhea   GENITOURINARY:  negative for difficulty of urination, burning with urination, frequency   INTEGUMENT:  negative for rash, skin lesions, easy bruising   HEMATOLOGIC/LYMPHATIC:  negative for swelling/edema   ALLERGIC/IMMUNOLOGIC:  negative for urticaria , itching  ENDOCRINE:  negative increase in drinking, increase in urination, hot or cold intolerance  MUSCULOSKELETAL:  negative joint pains, muscle aches, swelling of joints  NEUROLOGICAL:  negative for headaches, dizziness, lightheadedness, numbness, pain, tingling extremities  BEHAVIOR/PSYCH:  negative for depression, anxiety    Physical Exam:   BP (!) 147/80   Pulse 77   Temp 99 °F (37.2 °C) (Oral)   Resp 16   Ht 5' 4\" (1.626 m)   Wt 241 lb (109.3 kg)   LMP 1995 (Approximate)   SpO2 91%   BMI 41.37 kg/m²   Temp (24hrs), Av.2 °F (37.3 °C), Min:99 °F (37.2 °C), Max:99.6 °F (37.6 °C)    Recent Labs     22  1633 22  2224 22  0739   POCGLU 115* 207* 171*       Intake/Output Summary (Last 24 hours) at 2022 4972  Last data filed at 2022 0318  Gross per 24 hour   Intake 450 ml   Output 700 ml   Net -250 ml       General Appearance: alert, well appearing, and in no acute distress  Mental status: oriented to person, place, and time  Head: normocephalic, atraumatic  Eye: no icterus, redness, pupils equal and reactive, extraocular eye movements intact, conjunctiva clear  Ear: normal external ear, no discharge, hearing intact  Nose: no drainage noted  Mouth: mucous membranes moist  Lungs: Bilateral equal air entry, clear to ausculation, no wheezing, rales or rhonchi, normal effort  Cardiovascular: normal rate, regular rhythm  Abdomen: Soft, nontender, nondistended, normal bowel sounds  Neurologic: There are no new focal motor or sensory deficits, normal muscle tone and bulk, no abnormal sensation, normal speech, cranial nerves II through XII grossly intact  Skin: No gross lesions, rashes, bruising or bleeding on exposed skin area  Extremities: peripheral pulses palpable, no pedal edema or calf pain with palpation  Psych: normal affect    Investigations:      Laboratory Testing:  Recent Results (from the past 24 hour(s))   EKG 12 Lead    Collection Time: 12/16/22 10:58 AM   Result Value Ref Range    Ventricular Rate 62 BPM    Atrial Rate 62 BPM    P-R Interval 176 ms    QRS Duration 66 ms    Q-T Interval 434 ms    QTc Calculation (Bazett) 440 ms    P Axis 25 degrees    R Axis -34 degrees    T Axis 7 degrees   COVID-19, Rapid    Collection Time: 12/16/22 11:28 AM    Specimen: Nasopharyngeal Swab   Result Value Ref Range    Specimen Description . NASOPHARYNGEAL SWAB     SARS-CoV-2, Rapid Not Detected Not Detected   RAPID INFLUENZA A/B ANTIGENS    Collection Time: 12/16/22 11:28 AM    Specimen: Nasopharyngeal   Result Value Ref Range    Flu A Antigen NEGATIVE NEGATIVE    Flu B Antigen NEGATIVE NEGATIVE   CBC with Auto Differential    Collection Time: 12/16/22 11:32 AM   Result Value Ref Range    WBC 10.9 3.5 - 11.3 k/uL    RBC 4.32 3.95 - 5.11 m/uL    Hemoglobin 13.2 11.9 - 15.1 g/dL    Hematocrit 43.5 36.3 - 47.1 %    .7 82.6 - 102.9 fL    MCH 30.6 25.2 - 33.5 pg    MCHC 30.3 25.2 - 33.5 g/dL    RDW 14.6 (H) 11.8 - 14.4 %    Platelets 125 (L) 010 - 453 k/uL    MPV 10.7 8.1 - 13.5 fL    NRBC Automated 0.0 0.0 per 100 WBC    Monocytes 13 (H) 4 - 11 %    Lymphocytes 4 (L) 16 - 46 %    Seg Neutrophils 78 (H) 43 - 77 %    Eosinophils % 0 (L) 1 - 7 %    Basophils 0 0 - 1 %    Immature Granulocytes 0 0 %    Bands 5 (H) 0 %    Absolute Mono # 1.42 (H) 0.1 - 1.2 k/uL    Absolute Lymph # 0.44 (L) 1.0 - 4.8 k/uL    Segs Absolute 8.49 (H) 1.50 - 8.10 k/uL    Absolute Eos # 0.00 0.0 - 0.4 k/uL    Basophils Absolute 0.00 0.0 - 0.2 k/uL    Absolute Immature Granulocyte 0.00 0.00 - 0.30 k/uL    Absolute Bands # 0.55 k/uL    Morphology RBC morphology normal.     Morphology Platelet count adequate    Comprehensive Metabolic Panel w/ Reflex to MG    Collection Time: 12/16/22 11:32 AM   Result Value Ref Range    Glucose 157 (H) 70 - 99 mg/dL    BUN 17 8 - 23 mg/dL    Creatinine 0.81 0.50 - 0.90 mg/dL    Est, Glom Filt Rate >60 >60 mL/min/1.73m2    Bun/Cre Ratio 21 (H) 9 - 20    Calcium 8.9 8.6 - 10.4 mg/dL    Sodium 138 135 - 144 mmol/L    Potassium 4.4 3.7 - 5.3 mmol/L    Chloride 105 98 - 107 mmol/L    CO2 23 20 - 31 mmol/L    Anion Gap 10 9 - 17 mmol/L    Alkaline Phosphatase 86 35 - 104 U/L    ALT 28 5 - 33 U/L    AST 46 (H) <32 U/L    Total Bilirubin 0.7 0.3 - 1.2 mg/dL    Total Protein 7.1 6.4 - 8.3 g/dL    Albumin 3.5 3.5 - 5.2 g/dL    Albumin/Globulin Ratio 1.0 1.0 - 2.5   Lipase    Collection Time: 12/16/22 11:32 AM   Result Value Ref Range    Lipase 22 13 - 60 U/L   Troponin    Collection Time: 12/16/22 11:32 AM   Result Value Ref Range    Troponin, High Sensitivity 7 0 - 14 ng/L   APTT    Collection Time: 12/16/22 11:33 AM   Result Value Ref Range    PTT 33.3 23.9 - 33.8 sec   Troponin    Collection Time: 12/16/22  1:30 PM   Result Value Ref Range    Troponin, High Sensitivity 6 0 - 14 ng/L   EKG 12 Lead    Collection Time: 12/16/22  2:05 PM   Result Value Ref Range    Ventricular Rate 59 BPM    Atrial Rate 59 BPM    P-R Interval 174 ms    QRS Duration 68 ms    Q-T Interval 444 ms    QTc Calculation (Bazett) 439 ms    P Axis 42 degrees    R Axis -31 degrees    T Axis 17 degrees   POC Glucose Fingerstick    Collection Time: 12/16/22  4:33 PM   Result Value Ref Range    POC Glucose 115 (H) 65 - 105 mg/dL   CBC    Collection Time: 12/16/22  7:38 PM   Result Value Ref Range    WBC 17.6 (H) 3.5 - 11.3 k/uL    RBC 4.68 3.95 - 5.11 m/uL    Hemoglobin 14.2 11.9 - 15.1 g/dL    Hematocrit 44.4 36.3 - 47.1 %    MCV 94.9 82.6 - 102.9 fL    MCH 30.3 25.2 - 33.5 pg    MCHC 32.0 28.4 - 34.8 g/dL    RDW 14.6 (H) 11.8 - 14.4 %    Platelets 872 (L) 344 - 453 k/uL    MPV 11.0 8.1 - 13.5 fL    NRBC Automated 0.0 0.0 per 100 WBC   APTT    Collection Time: 12/16/22  7:38 PM   Result Value Ref Range    PTT 43.0 (H) 20.5 - 30.5 sec   POC Glucose Fingerstick    Collection Time: 12/16/22 10:24 PM   Result Value Ref Range    POC Glucose 207 (H) 65 - 105 mg/dL   Troponin    Collection Time: 12/16/22 10:53 PM   Result Value Ref Range    Troponin, High Sensitivity 8 0 - 14 ng/L   D-Dimer, Quantitative    Collection Time: 12/16/22 10:53 PM   Result Value Ref Range    D-Dimer, Quant 4.80 mg/L FEU   Procalcitonin    Collection Time: 12/16/22 10:53 PM   Result Value Ref Range    Procalcitonin 0.51 (H) <0.09 ng/mL   Culture, Blood 1    Collection Time: 12/16/22 10:53 PM    Specimen: Blood   Result Value Ref Range    Specimen Description . BLOOD     Special Requests  R HAND 1 ML     Culture NO GROWTH <24 HRS    Culture, Blood 1    Collection Time: 12/16/22 10:53 PM    Specimen: Blood   Result Value Ref Range    Specimen Description . BLOOD     Special Requests  L HAND 1 ML     Culture NO GROWTH <24 HRS    Sedimentation Rate    Collection Time: 12/16/22 10:53 PM   Result Value Ref Range    Sed Rate 43 (H) 0 - 30 mm/Hr   Basic Metabolic Panel w/ Reflex to MG    Collection Time: 12/17/22  4:38 AM   Result Value Ref Range    Glucose 179 (H) 70 - 99 mg/dL    BUN 13 8 - 23 mg/dL    Creatinine 0.65 0.50 - 0.90 mg/dL    Est, Glom Filt Rate >60 >60 mL/min/1.73m2    Calcium 8.8 8.6 - 10.4 mg/dL    Sodium 135 135 - 144 mmol/L    Potassium 3.8 3.7 - 5.3 mmol/L    Chloride 102 98 - 107 mmol/L    CO2 19 (L) 20 - 31 mmol/L    Anion Gap 14 9 - 17 mmol/L   CBC auto differential    Collection Time: 12/17/22  4:38 AM   Result Value Ref Range    WBC 18.1 (H) 3.5 - 11.3 k/uL    RBC 4.69 3.95 - 5.11 m/uL    Hemoglobin 14.3 11.9 - 15.1 g/dL    Hematocrit 43.4 36.3 - 47.1 %    MCV 92.5 82.6 - 102.9 fL    MCH 30.5 25.2 - 33.5 pg    MCHC 32.9 28.4 - 34.8 g/dL    RDW 14.8 (H) 11.8 - 14.4 %    Platelets See Reflexed IPF Result 138 - 453 k/uL    NRBC Automated 0.0 0.0 per 100 WBC    RBC Morphology ANISOCYTOSIS PRESENT     Seg Neutrophils 88 (H) 36 - 65 %    Lymphocytes 5 (L) 24 - 43 %    Monocytes 6 3 - 12 %    Eosinophils % 0 (L) 1 - 4 %    Basophils 0 0 - 2 %    Immature Granulocytes 1 (H) 0 %    Segs Absolute 15.99 (H) 1.50 - 8.10 k/uL    Absolute Lymph # 0.81 (L) 1.10 - 3.70 k/uL    Absolute Mono # 1.16 0.10 - 1.20 k/uL    Absolute Eos # <0.03 0.00 - 0.44 k/uL    Basophils Absolute 0.05 0.00 - 0.20 k/uL    Absolute Immature Granulocyte 0.11 0.00 - 0.30 k/uL   Lipid Panel    Collection Time: 12/17/22  4:38 AM   Result Value Ref Range    Cholesterol 89 <200 mg/dL    HDL 48 >40 mg/dL    LDL Cholesterol 30 0 - 130 mg/dL    Chol/HDL Ratio 1.9 <5    Triglycerides 55 <150 mg/dL   Procalcitonin    Collection Time: 12/17/22  4:38 AM   Result Value Ref Range    Procalcitonin 0.46 (H) <0.09 ng/mL   APTT    Collection Time: 12/17/22  4:38 AM   Result Value Ref Range    PTT 49.7 (H) 20.5 - 30.5 sec   Immature Platelet Fraction    Collection Time: 12/17/22  4:38 AM   Result Value Ref Range    Platelet, Immature Fraction 2.5 1.1 - 10.3 %    Platelet, Fluorescence 129 (L) 138 - 453 k/uL   POC Glucose Fingerstick    Collection Time: 12/17/22  7:39 AM   Result Value Ref Range    POC Glucose 171 (H) 65 - 105 mg/dL       Imaging/Diagnostics:  XR CHEST PORTABLE    Result Date: 12/16/2022  No acute intrathoracic process.        Assessment :      Hospital Problems             Last Modified POA    * (Principal) Unstable angina ([facility] 12/16/2022 Yes    Obstructive sleep apnea 12/16/2022 Yes    Coronary artery disease due to lipid rich plaque 12/16/2022 Yes    Type 2 diabetes mellitus with diabetic polyneuropathy, with long-term current use of insulin ([address] 12/16/2022 Yes    Peripheral polyneuropathy 12/16/2022 Yes    Bilateral leg edema 12/16/2022 Yes    Memory problem 12/16/2022 Yes Gait abnormality 12/16/2022 Yes    Essential tremor 12/16/2022 Yes    Anxiety and depression 12/16/2022 Yes    Chronic cerebral ischemia 12/16/2022 Yes    DDD (degenerative disc disease), lumbar 12/16/2022 Yes    Chronic diastolic congestive heart failure ([facility] 12/16/2022 Yes    Bronchiectasis without complication ([facility] [address] 71/96/6451 Yes       Plan:     Patient status inpatient in the Progressive Unit/Step down    - Vitals, labs, imaging, medications reviewed  - Cardiology consult  - Telemetry monitoring  - Continue nitro/heparin infusion  - Troponin negative  - Monitor leukocytosis  - Follow up cultures  - Check CT abd  - D-dimer elevated, check dopplers, CT chest PE protocol   - Monitor glucose, continue lantus, insulin correction   - Continue home medications  - Follow up cardiology recommendations   - ID consult if no improvement in leukocytosis and workup negative       Consultations:   IP CONSULT TO CARDIOLOGY     Patient is admitted as inpatient status because of co-morbidities listed above, severity of signs and symptoms as outlined, requirement for current medical therapies and most importantly because of direct risk to patient if care not provided in a hospital setting. Expected length of stay > 48 hours.     Nohelia Masterson MD  12/17/2022  8:33 AM    Copy sent to Dr. Ilene Burgos MD

## 2022-12-17 NOTE — PROGRESS NOTES
Physical Therapy Cancel Note      DATE: 2022    NAME: Branden Velasquez  MRN: [id number]   : 1950      Patient not seen this date for Physical Therapy due to:     Other: to have cardiac cath Monday; on heparin and  nitro drips; will hold PT evaluation until after cardiac cath; check       Electronically signed by Brittany Almanza PT on 2022 at 2:47 PM

## 2022-12-18 ENCOUNTER — APPOINTMENT (OUTPATIENT)
Dept: CT IMAGING | Age: 72
End: 2022-12-18
Attending: STUDENT IN AN ORGANIZED HEALTH CARE EDUCATION/TRAINING PROGRAM
Payer: MEDICARE

## 2022-12-18 LAB
ABSOLUTE EOS #: 0 K/UL (ref 0–0.4)
ABSOLUTE IMMATURE GRANULOCYTE: 0 K/UL (ref 0–0.3)
ABSOLUTE LYMPH #: 0.75 K/UL (ref 1–4.8)
ABSOLUTE MONO #: 1.5 K/UL (ref 0.1–0.8)
BASOPHILS # BLD: 0 % (ref 0–2)
BASOPHILS ABSOLUTE: 0 K/UL (ref 0–0.2)
EKG ATRIAL RATE: 59 BPM
EKG ATRIAL RATE: 62 BPM
EKG P AXIS: 25 DEGREES
EKG P AXIS: 42 DEGREES
EKG P-R INTERVAL: 174 MS
EKG P-R INTERVAL: 176 MS
EKG Q-T INTERVAL: 434 MS
EKG Q-T INTERVAL: 444 MS
EKG QRS DURATION: 66 MS
EKG QRS DURATION: 68 MS
EKG QTC CALCULATION (BAZETT): 439 MS
EKG QTC CALCULATION (BAZETT): 440 MS
EKG R AXIS: -31 DEGREES
EKG R AXIS: -34 DEGREES
EKG T AXIS: 17 DEGREES
EKG T AXIS: 7 DEGREES
EKG VENTRICULAR RATE: 59 BPM
EKG VENTRICULAR RATE: 62 BPM
EOSINOPHILS RELATIVE PERCENT: 0 % (ref 1–4)
GLUCOSE BLD-MCNC: 191 MG/DL (ref 65–105)
GLUCOSE BLD-MCNC: 201 MG/DL (ref 65–105)
GLUCOSE BLD-MCNC: 260 MG/DL (ref 65–105)
GLUCOSE BLD-MCNC: 280 MG/DL (ref 65–105)
HCT VFR BLD CALC: 39.6 % (ref 36.3–47.1)
HEMOGLOBIN: 12.9 G/DL (ref 11.9–15.1)
IMMATURE GRANULOCYTES: 0 %
LYMPHOCYTES # BLD: 4 % (ref 24–44)
MCH RBC QN AUTO: 30.1 PG (ref 25.2–33.5)
MCHC RBC AUTO-ENTMCNC: 32.6 G/DL (ref 28.4–34.8)
MCV RBC AUTO: 92.5 FL (ref 82.6–102.9)
MONOCYTES # BLD: 8 % (ref 1–7)
MORPHOLOGY: ABNORMAL
NRBC AUTOMATED: 0 PER 100 WBC
PARTIAL THROMBOPLASTIN TIME: 49.3 SEC (ref 20.5–30.5)
PARTIAL THROMBOPLASTIN TIME: 55.9 SEC (ref 20.5–30.5)
PARTIAL THROMBOPLASTIN TIME: 70.7 SEC (ref 20.5–30.5)
PDW BLD-RTO: 14.7 % (ref 11.8–14.4)
PLATELET # BLD: 136 K/UL (ref 138–453)
PMV BLD AUTO: 10.4 FL (ref 8.1–13.5)
RBC # BLD: 4.28 M/UL (ref 3.95–5.11)
SEG NEUTROPHILS: 88 % (ref 36–66)
SEGMENTED NEUTROPHILS ABSOLUTE COUNT: 16.45 K/UL (ref 1.8–7.7)
WBC # BLD: 18.7 K/UL (ref 3.5–11.3)

## 2022-12-18 PROCEDURE — 94640 AIRWAY INHALATION TREATMENT: CPT

## 2022-12-18 PROCEDURE — 99232 SBSQ HOSP IP/OBS MODERATE 35: CPT | Performed by: INTERNAL MEDICINE

## 2022-12-18 PROCEDURE — 6360000002 HC RX W HCPCS: Performed by: STUDENT IN AN ORGANIZED HEALTH CARE EDUCATION/TRAINING PROGRAM

## 2022-12-18 PROCEDURE — 6360000004 HC RX CONTRAST MEDICATION: Performed by: INTERNAL MEDICINE

## 2022-12-18 PROCEDURE — 74177 CT ABD & PELVIS W/CONTRAST: CPT

## 2022-12-18 PROCEDURE — 36415 COLL VENOUS BLD VENIPUNCTURE: CPT

## 2022-12-18 PROCEDURE — 6370000000 HC RX 637 (ALT 250 FOR IP): Performed by: STUDENT IN AN ORGANIZED HEALTH CARE EDUCATION/TRAINING PROGRAM

## 2022-12-18 PROCEDURE — 6370000000 HC RX 637 (ALT 250 FOR IP): Performed by: INTERNAL MEDICINE

## 2022-12-18 PROCEDURE — 2580000003 HC RX 258: Performed by: STUDENT IN AN ORGANIZED HEALTH CARE EDUCATION/TRAINING PROGRAM

## 2022-12-18 PROCEDURE — 71260 CT THORAX DX C+: CPT | Performed by: INTERNAL MEDICINE

## 2022-12-18 PROCEDURE — 82947 ASSAY GLUCOSE BLOOD QUANT: CPT

## 2022-12-18 PROCEDURE — 85025 COMPLETE CBC W/AUTO DIFF WBC: CPT

## 2022-12-18 PROCEDURE — 94761 N-INVAS EAR/PLS OXIMETRY MLT: CPT

## 2022-12-18 PROCEDURE — 2060000000 HC ICU INTERMEDIATE R&B

## 2022-12-18 PROCEDURE — 6360000002 HC RX W HCPCS: Performed by: INTERNAL MEDICINE

## 2022-12-18 PROCEDURE — 85730 THROMBOPLASTIN TIME PARTIAL: CPT

## 2022-12-18 RX ADMIN — EMPAGLIFLOZIN 10 MG: 10 TABLET, FILM COATED ORAL at 08:22

## 2022-12-18 RX ADMIN — DORZOLAMIDE HYDROCHLORIDE 1 DROP: 20 SOLUTION/ DROPS OPHTHALMIC at 08:22

## 2022-12-18 RX ADMIN — TIOTROPIUM BROMIDE AND OLODATEROL 2 PUFF: 3.124; 2.736 SPRAY, METERED RESPIRATORY (INHALATION) at 08:16

## 2022-12-18 RX ADMIN — INSULIN GLARGINE 15 UNITS: 100 INJECTION, SOLUTION SUBCUTANEOUS at 08:30

## 2022-12-18 RX ADMIN — CLOPIDOGREL 75 MG: 75 TABLET, FILM COATED ORAL at 08:22

## 2022-12-18 RX ADMIN — SENNOSIDES 8.6 MG: 8.6 TABLET, COATED ORAL at 20:51

## 2022-12-18 RX ADMIN — FLUTICASONE PROPIONATE 2 SPRAY: 50 SPRAY, METERED NASAL at 08:25

## 2022-12-18 RX ADMIN — HEPARIN SODIUM 2000 UNITS: 1000 INJECTION INTRAVENOUS; SUBCUTANEOUS at 04:29

## 2022-12-18 RX ADMIN — CITALOPRAM 10 MG: 10 TABLET, FILM COATED ORAL at 08:22

## 2022-12-18 RX ADMIN — SENNOSIDES 8.6 MG: 8.6 TABLET, COATED ORAL at 08:23

## 2022-12-18 RX ADMIN — Medication 50 MG: at 08:22

## 2022-12-18 RX ADMIN — MORPHINE SULFATE 4 MG: 4 INJECTION, SOLUTION INTRAMUSCULAR; INTRAVENOUS at 08:25

## 2022-12-18 RX ADMIN — IOPAMIDOL 75 ML: 755 INJECTION, SOLUTION INTRAVENOUS at 11:49

## 2022-12-18 RX ADMIN — LATANOPROST 1 DROP: 50 SOLUTION OPHTHALMIC at 20:53

## 2022-12-18 RX ADMIN — HEPARIN SODIUM 14.8 UNITS/KG/HR: 10000 INJECTION, SOLUTION INTRAVENOUS at 18:49

## 2022-12-18 RX ADMIN — DORZOLAMIDE HYDROCHLORIDE 1 DROP: 20 SOLUTION/ DROPS OPHTHALMIC at 20:52

## 2022-12-18 RX ADMIN — BUPROPION HYDROCHLORIDE 75 MG: 75 TABLET, FILM COATED ORAL at 08:22

## 2022-12-18 RX ADMIN — AMITRIPTYLINE HYDROCHLORIDE 25 MG: 25 TABLET, FILM COATED ORAL at 20:51

## 2022-12-18 RX ADMIN — MORPHINE SULFATE 2 MG: 2 INJECTION, SOLUTION INTRAMUSCULAR; INTRAVENOUS at 21:04

## 2022-12-18 RX ADMIN — BRIMONIDINE TARTRATE 1 DROP: 2 SOLUTION OPHTHALMIC at 08:18

## 2022-12-18 RX ADMIN — BUPROPION HYDROCHLORIDE 75 MG: 75 TABLET, FILM COATED ORAL at 20:51

## 2022-12-18 RX ADMIN — METOPROLOL SUCCINATE 12.5 MG: 25 TABLET, FILM COATED, EXTENDED RELEASE ORAL at 08:23

## 2022-12-18 RX ADMIN — PANTOPRAZOLE SODIUM 40 MG: 40 TABLET, DELAYED RELEASE ORAL at 08:22

## 2022-12-18 RX ADMIN — INSULIN LISPRO 6 UNITS: 100 INJECTION, SOLUTION INTRAVENOUS; SUBCUTANEOUS at 19:10

## 2022-12-18 RX ADMIN — MORPHINE SULFATE 4 MG: 4 INJECTION, SOLUTION INTRAMUSCULAR; INTRAVENOUS at 15:32

## 2022-12-18 RX ADMIN — FUROSEMIDE 40 MG: 40 TABLET ORAL at 08:22

## 2022-12-18 RX ADMIN — INSULIN GLARGINE 15 UNITS: 100 INJECTION, SOLUTION SUBCUTANEOUS at 21:09

## 2022-12-18 RX ADMIN — Medication 2500 MCG: at 08:23

## 2022-12-18 RX ADMIN — DORZOLAMIDE HYDROCHLORIDE 1 DROP: 20 SOLUTION/ DROPS OPHTHALMIC at 15:08

## 2022-12-18 RX ADMIN — HEPARIN SODIUM 12.8 UNITS/KG/HR: 10000 INJECTION, SOLUTION INTRAVENOUS at 03:39

## 2022-12-18 RX ADMIN — TIMOLOL MALEATE 1 DROP: 5 SOLUTION OPHTHALMIC at 08:18

## 2022-12-18 RX ADMIN — SPIRONOLACTONE 50 MG: 25 TABLET ORAL at 08:23

## 2022-12-18 RX ADMIN — SODIUM CHLORIDE, PRESERVATIVE FREE 10 ML: 5 INJECTION INTRAVENOUS at 08:24

## 2022-12-18 RX ADMIN — MAGNESIUM GLUCONATE 500 MG ORAL TABLET 400 MG: 500 TABLET ORAL at 08:23

## 2022-12-18 RX ADMIN — DESMOPRESSIN ACETATE 40 MG: 0.2 TABLET ORAL at 08:22

## 2022-12-18 RX ADMIN — MORPHINE SULFATE 4 MG: 4 INJECTION, SOLUTION INTRAMUSCULAR; INTRAVENOUS at 03:33

## 2022-12-18 ASSESSMENT — PAIN SCALES - GENERAL
PAINLEVEL_OUTOF10: 7
PAINLEVEL_OUTOF10: 8
PAINLEVEL_OUTOF10: 8
PAINLEVEL_OUTOF10: 7

## 2022-12-18 ASSESSMENT — PAIN DESCRIPTION - LOCATION
LOCATION: ABDOMEN
LOCATION: ABDOMEN

## 2022-12-18 ASSESSMENT — PAIN DESCRIPTION - ORIENTATION
ORIENTATION: UPPER;MID
ORIENTATION: UPPER;MID

## 2022-12-18 NOTE — PROGRESS NOTES
[facility]  Progress Note                   Date:   12/18/2022  Patient name: Nellie Montes  Date of admission:  12/16/2022  7:07 PM  MRN:   [id number]  YOB: 1950  PCP: Saran Spence MD    Reason for Admission: Unstable angina ([facility] [address] [I20.0]    Subjective:       Clinical Changes /Abnormalities:Patient seen and examined. Denies chest pain or shortness of breath. Tele/vitals/labs reviewed . Discussed case with RN.       Review of Systems    Medications:   Scheduled Meds:   amitriptyline  25 mg Oral Nightly    atorvastatin  40 mg Oral Daily    buPROPion  75 mg Oral BID    citalopram  10 mg Oral Daily    clopidogrel  75 mg Oral Daily    furosemide  40 mg Oral Daily    insulin glargine  15 Units SubCUTAneous BID    metoprolol succinate  12.5 mg Oral Daily    pantoprazole  40 mg Oral Daily    spironolactone  50 mg Oral Daily    sodium chloride flush  10 mL IntraVENous 2 times per day    heparin (porcine)  4,000 Units IntraVENous Once    vitamin B-12  2,500 mcg Oral Daily    dorzolamide  1 drop Ophthalmic TID    empagliflozin  10 mg Oral Daily    fluticasone  2 spray Each Nostril Daily    magnesium oxide  400 mg Oral Daily    pyridoxine  50 mg Oral Daily    senna  1 tablet Oral BID    tiotropium-olodaterol  2 puff Inhalation Daily    latanoprost  1 drop Both Eyes Nightly    brimonidine  1 drop Both Eyes Daily    And    timolol  1 drop Both Eyes Daily    insulin lispro  4 Units SubCUTAneous Lunch    insulin lispro  6 Units SubCUTAneous Dinner     Continuous Infusions:   sodium chloride      nitroGLYCERIN 15 mcg/min (12/17/22 1040)    heparin (PORCINE) Infusion 14.8 Units/kg/hr (12/18/22 0426)    dextrose       CBC:   Recent Labs     12/16/22  1938 12/17/22  0438 12/18/22  0320   WBC 17.6* 18.1* 18.7*   HGB 14.2 14.3 12.9   * See Reflexed IPF Result 136*     BMP:    Recent Labs     12/16/22  1132 12/17/22  0438    135   K 4.4 3.8    102   CO2 23 19*   BUN 17 13 CREATININE 0.81 0.65   GLUCOSE 157* 179*     Hepatic:  Recent Labs     12/16/22  1132   AST 46*   ALT 28   BILITOT 0.7   ALKPHOS 86     Troponin:   Recent Labs     12/16/22  1132 12/16/22  1330 12/16/22  2253   TROPHS 7 6 8     BNP: No results for input(s): BNP in the last 72 hours. Lipids:   Recent Labs     12/17/22  0438   CHOL 89   HDL 48     INR: No results for input(s): INR in the last 72 hours. DATA:    Diagnostics:       Stress Test: stress test showing posterior infarct 12/7/2022 IN MEDIA      LAST CATH 6/15- minimal non obstructive CAD,  mRCA 5%, EF 65%     Holter 1/9/18- 7 beat run of PAT      TTE 1/9/18  CONCLUSIONS:  1. Normal LV size and function with ejection fraction of 65%. 2.  Grade 1 diastolic dysfunction. 3.  Mild concentric LVH. 4.  Mild biatrial enlargement. 5.  No significant valvulopathies. 6.  No effusion. 7.  Mildly dilated RV with normal function. Nuclear Stress 5/2019:  IMPRESSION:  1. No ischemia or infarction. 2. Normal LV systolic function. EF 81%. TTE 12/7/2020  Summary  Normal left ventricular diameter. Left ventricular systolic function is normal. Left ventricular ejectionfraction 65 %. Right ventricular dilatation with normal systolic function. Aortic valve is sclerotic but opens well. Significant mild mitral valve thickening with annular calcification. Trivial mitral regurgitation. Mild tricuspid regurgitation. Estimated right ventricular systolic pressure is 20 mmHg. Holter 4/2021- PAT        Stress test  12/7/2022  1. Mild size/mild intensity anteroseptal ischemia. 2.  No infarct. 3.  No left ventricular ejection fraction-gating issue.              Objective:   Vitals: BP (!) 137/92   Pulse 85   Temp 97.9 °F (36.6 °C) (Oral)   Resp 18   Ht 5' 4\" (1.626 m)   Wt 242 lb (109.8 kg)   LMP 12/07/1995 (Approximate)   SpO2 94%   BMI 41.54 kg/m²   General appearance: alert and cooperative with exam  HEENT: Head: Normocephalic, no lesions, without obvious abnormality. Neck:no JVD, trachea midline, no adenopathy  Lungs: Clear to auscultation  Heart: Regular rate and rhythm, s1/s2 auscultated, no murmurs  Abdomen: soft, non-tender, bowel sounds active  Extremities: no edema  Neurologic: not done        Assessment / Acute Cardiac Problems:   UNSTABLE ANGINA   Stress test 12/7 demonstrated mild reversible ischemia anterior septal disease with elective cardiac cath reommended jan -4  CAD with Hx of BMS to RCA in 2002, Cath in 2015- non obstructive CAD. Negative Nuclear Stress 5/2019. HFpEF- stable. On aldactone 50 . Elevated leg, compression stocking. Palpitations- PAT. on metoprolol at home   H/o Orthostatic hypotension. Wear compression stockings  LEANDRO/COPD- on CPAP- follows with pulmonar  HTN- Continue metoprolol and aldactone. Monitor BP at home  DL- continue statin.    DM- Management per primary       Patient Active Problem List:     Obstructive sleep apnea     Chronic fatigue     Coronary artery disease due to lipid rich plaque     Vitamin D deficiency     Type 2 diabetes mellitus with diabetic polyneuropathy, with long-term current use of insulin ([facility])     Peripheral polyneuropathy     Bilateral leg edema     Right hip pain     Muscle ache     Hx of Bell's palsy     Memory problem     Gait abnormality     Balance problem     H/O falling     Dizziness     Intractable episodic tension-type headache     Essential tremor     Anxiety and depression     Elevated hemoglobin A1c     VBI (vertebrobasilar insufficiency)     Chronic cerebral ischemia     TIA (transient ischemic attack)     Chronic tension headache     Morbid obesity with BMI of 45.0-49.9, adult ([facility])     Chronic right-sided low back pain with right-sided sciatica     Lumbar radiculopathy     Encounter for chronic pain management     Carotid stenosis, asymptomatic, bilateral     Chronic tension-type headache, not intractable     Lumbosacral spondylosis without myelopathy     DDD (degenerative disc disease), lumbar     Acquired spondylolisthesis     Lumbar facet joint syndrome     Chronic diastolic congestive heart failure (HCC)     Bronchiectasis without complication (HCC)     CHF (congestive heart failure), NYHA class I, chronic, diastolic (HCC)     Unstable angina ([facility] [address]      Plan of Treatment:   Currently chest pain free and hemodynamically stable, ECG with no new changes. Will proceed with coronary angiogram Monday. Continue nitro drip and  heparin drip. Continue Plavix, statin and BB. I have discussed risks (including but not limited to vascular injury, infection, hematoma, contrast induced kidney dysfunction, CVA and MI), benefits, alternatives in detail. All questions answered. Patient agrees to proceed.       Electronically signed by DAVID Hector NP on 12/18/2022 at 1:13 PM  [address]  [phone]

## 2022-12-18 NOTE — PROGRESS NOTES
Occupational [address] [employer]  Occupational Therapy Not Seen Note    DATE: 2022    NAME: Emilia Moser  MRN: [id number]   : 1950      Patient not seen this date for Occupational Therapy due to:    Surgery/Procedure: Pt planned to have cardiac cath on Monday, hold OT eval until post-op. Next Scheduled Treatment: Attempt on  as appropriate.     Electronically signed by Abbi Vanegas OT on 2022 at 7:27 AM

## 2022-12-18 NOTE — PROGRESS NOTES
Pt states that they are having some trouble breathing. 2L of oxygen placed via nasal cannula. Writer notified provider, no further orders.

## 2022-12-18 NOTE — PROGRESS NOTES
[facility]  Office: [address], DO, Lyn Rhys, DO, Carlos Manuel Bunch, DO, Jenniferleticia Mcgee Blood, DO, Bhupinder Tejada MD, Scott Valencia MD, Daljit Narvaez MD, Juwan Harris MD,  Gage Damon MD, Ileana Brink MD, Angel Balbuena, DO, Obed Mendoza MD,  Miya Lawrence MD, Iker Edgar MD, Katharine Chappell DO, Jarocho Castrejon MD, Arely Martinez MD, Chi Lal DO, Alejandra Thomason MD, Socorro Winn MD, Shayna Laguerre MD, Fatemeh Mello MD, Ahmet Grant DO, Venkata Mccollum MD, Lelia Jaquez MD, Clifford Dupree, [facility],  Philip Gale, CNP, Vern Stoddard, CNP, Leilani Graham, CNP,  Jaswinder Ochoa, [facility], Roseanna Muller, CNP, Pamela Hernandes, CNP, Cy Mary, CNP, Sy Russell, CNP, Marlene Hernandez, CNP, Danish Hays PA-C, Asmita Shields, CNS, Jeremiah Ponce, CNP, Lorna Shin, CNP         [address]    Progress Note    12/18/2022    9:56 AM    Name:   Nohelia Diaz  MRN:     [id number]     Acct:      [de-identified]   Room:   [address] [place] [address] Day:  2  Admit Date:  12/16/2022  7:07 PM    PCP:   Ruth Vizcarra MD  Code Status:  Full Code    Subjective:     C/C: chest pain, diarrhea    Interval History Status: not changed. No issues overnight  Diarrhea resolved  Still having intermittent chest pain   Plans for CT today  WBC remains elevated, afebrile  Cath planning per cardiology    Brief History:     Nohelia Diaz is a 74 y.o. F who presented with chest pain and diarrhea. Symptoms going on for the past 3 days. Chest pain dull, substernal in nature. Had recent stress that was positive, had cath scheduled. Presented to outside hospital, cardiology consulted, recommended transfer to [facility] for further evaluation. Diarrhea going on last few days, states improving. Labs done demonstrating elevated d-dimer, no history of clots per patient. Troponin stable.      Review of Systems:     Constitutional: negative for chills, fevers, sweats  Respiratory:  negative for cough, dyspnea on exertion, shortness of breath, wheezing  Cardiovascular:  positive for chest pain   Gastrointestinal:  negative for abdominal pain, constipation, diarrhea, nausea, vomiting  Neurological:  negative for dizziness, headache    Medications:      Allergies:  No Known Allergies    Current Meds:   Scheduled Meds:    amitriptyline  25 mg Oral Nightly    atorvastatin  40 mg Oral Daily    buPROPion  75 mg Oral BID    citalopram  10 mg Oral Daily    clopidogrel  75 mg Oral Daily    furosemide  40 mg Oral Daily    insulin glargine  15 Units SubCUTAneous BID    metoprolol succinate  12.5 mg Oral Daily    pantoprazole  40 mg Oral Daily    spironolactone  50 mg Oral Daily    sodium chloride flush  10 mL IntraVENous 2 times per day    heparin (porcine)  4,000 Units IntraVENous Once    vitamin B-12  2,500 mcg Oral Daily    dorzolamide  1 drop Ophthalmic TID    empagliflozin  10 mg Oral Daily    fluticasone  2 spray Each Nostril Daily    magnesium oxide  400 mg Oral Daily    pyridoxine  50 mg Oral Daily    senna  1 tablet Oral BID    tiotropium-olodaterol  2 puff Inhalation Daily    latanoprost  1 drop Both Eyes Nightly    brimonidine  1 drop Both Eyes Daily    And    timolol  1 drop Both Eyes Daily    insulin lispro  4 Units SubCUTAneous Lunch    insulin lispro  6 Units SubCUTAneous Dinner     Continuous Infusions:    sodium chloride      nitroGLYCERIN 15 mcg/min (12/17/22 1040)    heparin (PORCINE) Infusion 14.8 Units/kg/hr (12/18/22 0426)    dextrose       PRN Meds: albuterol, sodium chloride flush, sodium chloride, ondansetron **OR** ondansetron, polyethylene glycol, acetaminophen **OR** acetaminophen, heparin (porcine), heparin (porcine), oxyCODONE-acetaminophen, morphine **OR** morphine, glucose, dextrose bolus **OR** dextrose bolus, glucagon (rDNA), dextrose    Data:     Past Medical History:   has a past medical history of Arthritis, Asthma, Bell's palsy, CAD (coronary artery disease), Cancer ([facility] [address] Carotid artery disease ([facility] [address] Cataracts, bilateral, CHF (congestive heart failure) ([facility] [address] COPD (chronic obstructive pulmonary disease) ([facility] [address] Coronary artery disease, Eczema, Emphysema lung ([facility] [address] Fibromyalgia, Glaucoma, H/O blood clots, Headache, Heart attack ([facility] [address] Hx of Bell's palsy, Hypertension, Liver disease, Mild dementia, Neuropathy, Osteoporosis, Sleep apnea, Tremor, and Type 2 diabetes mellitus with hyperglycemia ([facility] [address] Social History:   reports that she quit smoking about 21 years ago. Her smoking use included cigarettes. She has a 35.00 pack-year smoking history. She has never used smokeless tobacco. She reports that she does not drink alcohol and does not use drugs. Family History:   Family History   Problem Relation Age of Onset    Heart Disease Mother     High Blood Pressure Mother     Stroke Mother     Glaucoma Mother     Early Death Father     Cancer Father         stomach    Miscarriages / Stillbirths Maternal Uncle        Vitals:  BP (!) 138/90   Pulse 85   Temp 99.3 °F (37.4 °C) (Axillary)   Resp 18   Ht 5' 4\" (1.626 m)   Wt 242 lb (109.8 kg)   LMP 1995 (Approximate)   SpO2 94%   BMI 41.54 kg/m²   Temp (24hrs), Av.9 °F (37.2 °C), Min:98.1 °F (36.7 °C), Max:100.4 °F (38 °C)    Recent Labs     22  1223 22  1650 22  1950 22  0748   POCGLU 202* 179* 297* 201*       I/O (24Hr):   No intake or output data in the 24 hours ending 22 0956    Labs:  Hematology:  Recent Labs     22  1132 22  1938 22  2253 22  0438 22  0320   WBC 10.9 17.6*  --  18.1* 18.7*   RBC 4.32 4.68  --  4.69 4.28   HGB 13.2 14.2  --  14.3 12.9   HCT 43.5 44.4  --  43.4 39.6   .7 94.9  --  92.5 92.5   MCH 30.6 30.3  --  30.5 30.1   MCHC 30.3 32.0  --  32.9 32.6   RDW 14.6* 14.6*  --  14.8* 14.7*   * 136*  --  See Reflexed IPF Result 136*   MPV 10.7 11.0  --   --  10.4   SEDRATE --   --  37*  --   --    DDIMER  --   --  4.80  --   --      Chemistry:  Recent Labs     12/16/22  1132 12/16/22  1330 12/16/22  2253 12/17/22  0438     --   --  135   K 4.4  --   --  3.8     --   --  102   CO2 23  --   --  19*   GLUCOSE 157*  --   --  179*   BUN 17  --   --  13   CREATININE 0.81  --   --  0.65   ANIONGAP 10  --   --  14   LABGLOM >60  --   --  >60   CALCIUM 8.9  --   --  8.8   TROPHS 7 6 8  --      Recent Labs     12/16/22  1132 12/16/22  1633 12/16/22  2224 12/17/22  0438 12/17/22  0739 12/17/22  1223 12/17/22  1650 12/17/22  1950/22  0748   PROT 7.1  --   --   --   --   --   --   --   --    LABALBU 3.5  --   --   --   --   --   --   --   --    AST 46*  --   --   --   --   --   --   --   --    ALT 28  --   --   --   --   --   --   --   --    ALKPHOS 86  --   --   --   --   --   --   --   --    BILITOT 0.7  --   --   --   --   --   --   --   --    LIPASE 22  --   --   --   --   --   --   --   --    CHOL  --   --   --  89  --   --   --   --   --    HDL  --   --   --  48  --   --   --   --   --    LDLCHOLESTEROL  --   --   --  30  --   --   --   --   --    CHOLHDLRATIO  --   --   --  1.9  --   --   --   --   --    TRIG  --   --   --  55  --   --   --   --   --    POCGLU  --    < > 207*  --  171* 202* 179* 297* 201*    < > = values in this interval not displayed. ABG:No results found for: POCPH, PHART, PH, POCPCO2, IOZ3NDG, PCO2, POCPO2, PO2ART, PO2, POCHCO3, WWV3MAN, HCO3, NBEA, PBEA, BEART, BE, THGBART, THB, NFK6COO, YPWK2JBI, K5VWAEUC, O2SAT, FIO2  Lab Results   Component Value Date/Time    SPECIAL  R HAND 1 ML 12/16/2022 10:53 PM    SPECIAL  L HAND 1 ML 12/16/2022 10:53 PM     Lab Results   Component Value Date/Time    CULTURE NO GROWTH 1 DAY 12/16/2022 10:53 PM    CULTURE NO GROWTH 1 DAY 12/16/2022 10:53 PM       Radiology:  XR CHEST PORTABLE    Result Date: 12/16/2022  No acute intrathoracic process.        Physical Examination:        General appearance:  alert, cooperative and no distress  Mental Status:  oriented to person, place and time and normal affect  Lungs:  clear to auscultation bilaterally, normal effort  Heart:  regular rate and rhythm  Abdomen:  soft, nontender, nondistended, normal bowel sounds  Extremities:  no edema, redness, tenderness in the calves  Skin:  no gross lesions, rashes, induration    Assessment:        Hospital Problems             Last Modified POA    * (Principal) Unstable angina ([address] 12/16/2022 Yes    Obstructive sleep apnea 12/16/2022 Yes    Coronary artery disease due to lipid rich plaque 12/16/2022 Yes    Type 2 diabetes mellitus with diabetic polyneuropathy, with long-term current use of insulin ([address] 12/16/2022 Yes    Peripheral polyneuropathy 12/16/2022 Yes    Bilateral leg edema 12/16/2022 Yes    Memory problem 12/16/2022 Yes    Gait abnormality 12/16/2022 Yes    Essential tremor 12/16/2022 Yes    Anxiety and depression 12/16/2022 Yes    Chronic cerebral ischemia 12/16/2022 Yes    DDD (degenerative disc disease), lumbar 12/16/2022 Yes    Chronic diastolic congestive heart failure ([address] 12/16/2022 Yes    Bronchiectasis without complication ([address] 28/53/6278 Yes       Plan:        - Vitals, labs, imaging, medications reviewed  - Cardiology consult - plans for cath   - Telemetry monitoring  - Continue nitro/heparin infusion  - Troponin negative  - Monitor leukocytosis - stable  - Follow up cultures - negative  - Check CT abd  - D-dimer elevated, check dopplers, CT chest PE protocol - negative   - Monitor glucose, continue lantus, insulin correction   - Continue home medications  - Follow up cardiology recommendations        Andrew Sepulveda MD  12/18/2022  9:56 AM       CT abd reviewed, concern for colitis  Diarrhea improving, C diff pending  Hold antibiotics pending C diff results  ID consult for leukocytosis     Electronically signed by Andrew Sepulveda MD on 12/18/2022 at 5:26 PM

## 2022-12-18 NOTE — PLAN OF CARE
Problem: Discharge Planning  Goal: Discharge to home or other facility with appropriate resources  12/18/2022 0421 by Jesica Hackett RN  Outcome: Progressing  12/17/2022 1955 by Negra Brown RN  Outcome: Progressing     Problem: Safety - Adult  Goal: Free from fall injury  12/18/2022 0421 by Jesica Hackett RN  Outcome: Progressing  12/17/2022 1955 by Negra Brown RN  Outcome: Progressing     Problem: ABCDS Injury Assessment  Goal: Absence of physical injury  12/18/2022 0421 by Jesica Hackett RN  Outcome: Progressing  12/17/2022 1955 by Negra Brown RN  Outcome: Progressing     Problem: Chronic Conditions and Co-morbidities  Goal: Patient's chronic conditions and co-morbidity symptoms are monitored and maintained or improved  12/18/2022 0421 by Jesica Hackett RN  Outcome: Progressing  12/17/2022 1955 by Negra Brown RN  Outcome: Progressing

## 2022-12-18 NOTE — CARE COORDINATION
Case Management Assessment  Initial Evaluation    Date/Time of Evaluation: 12/18/2022 10:45AM  Assessment Completed by: Mario Cleveland RN    If patient is discharged prior to next notation, then this note serves as note for discharge by case management. Patient Name: Tavares Sigala                   YOB: 1950  Diagnosis: Unstable angina ([facility] [address] [I20.0]                   Date / Time: 12/16/2022  7:07 PM    Patient Admission Status: Inpatient   Readmission Risk (Low < 19, Mod (19-27), High > 27): Readmission Risk Score: 13.9    Current PCP: Trevor Gagnon MD  PCP verified by CM? (P) Yes Trevor Gagnon MD)    Chart Reviewed: Yes      History Provided by: (P) Patient  Patient Orientation: (P) Alert and Oriented, Person, Place, Situation    Patient Cognition: (P) Alert    Hospitalization in the last 30 days (Readmission):  No    If yes, Readmission Assessment in CM Navigator will be completed. Advance Directives:      Code Status: Full Code   Patient's Primary Decision Maker is: (P) Legal Next of Kin    Primary Decision Maker: [de-identified] - Child - [phone]    Discharge Planning:    Patient lives with: (P) Alone Type of Home: (P) Apartment  Primary Care Giver: (P) Self  Patient Support Systems include: (P) Children   Current Financial resources: (P) Medicaid, Medicare  Current community resources: (P) Transportation  Current services prior to admission: (P) [address], C-pap            Current DME: (P) Cpap, Cane, Glucometer            Type of Home Care services:  (P) None    ADLS  Prior functional level: (P) Independent in ADLs/IADLs  Current functional level: (P) Independent in ADLs/IADLs    PT AM-PAC:   /24  OT AM-PAC:   /24    Family can provide assistance at DC: Would you like Case Management to discuss the discharge plan with any other family members/significant others, and if so, who?     Plans to Return to Present Housing: (P) Yes  Other Identified Issues/Barriers to RETURNING to current housing: needs ride to [facility] so her daughter can pick her up-[place] too far for daughter  Potential Assistance needed at discharge: (P) Transportation            Potential DME:    Patient expects to discharge to: (P) [facility] [place] for transportation at discharge:      Financial    Payor: Caren Padilla / Plan: MEDICARE PART A AND B / Product Type: *No Product type* /     Does insurance require precert for SNF: No    Potential assistance Purchasing Medications: (P) No  Meds-to-Beds request: Yes      [facility] - [place] [phone] - F [phone]  [address] [facility] [address]  Phone: [phone] Fax: [phone] [place] [address] [phone] - F [phone]  [facility] [address]  Phone: [phone] Fax: [phone]    [address] [place] [address] #[id number] - [address] [phone] Stephanie Hollins [phone]  [facility] [address]  Phone: [phone] Fax: [phone] [facility],[address] [place] [address] Drive [facility], [address] [place] [address] [phone] Stephanie Hollins [phone] [address] [facility] [address]  Phone: [phone] Fax: [phone]      Notes:    Factors facilitating achievement of predicted outcomes: nothing    Barriers to discharge: needs ambulance/ambulette ride to [facility] so her daughter can pick her up. I informed her that she may have to pay for the ride. Her response-I'm not paying anything. She got upset when I told her that she may get a bill. Additional Case Management Notes: Transitional planning-talked with patient, plan is to go home alone. Needs ride, see note above. Verified address, emergency contact, and insurance with patient.     The Plan for Transition of Care is related to the following treatment goals of Unstable angina (HCC) [R22.3]    IF APPLICABLE: The Patient and/or patient representative Jany Rubalcava and her family were provided with a choice of provider and agrees with the discharge plan. Freedom of choice list with basic dialogue that supports the patient's individualized plan of care/goals and shares the quality data associated with the providers was provided to: (P) Patient   Patient Representative Name:       The Patient and/or Patient Representative Agree with the Discharge Plan?  (P) Yes    Ashley Woody RN  Case Management Department  Ph: [phone] Fax: [phone]

## 2022-12-19 ENCOUNTER — APPOINTMENT (OUTPATIENT)
Dept: CARDIAC CATH/INVASIVE PROCEDURES | Age: 72
End: 2022-12-19
Attending: STUDENT IN AN ORGANIZED HEALTH CARE EDUCATION/TRAINING PROGRAM
Payer: MEDICARE

## 2022-12-19 PROBLEM — K52.9 COLITIS: Status: ACTIVE | Noted: 2022-12-19

## 2022-12-19 PROBLEM — K75.81 LIVER CIRRHOSIS SECONDARY TO NASH (HCC): Status: ACTIVE | Noted: 2022-12-19

## 2022-12-19 PROBLEM — K74.60 LIVER CIRRHOSIS SECONDARY TO NASH (HCC): Status: ACTIVE | Noted: 2022-12-19

## 2022-12-19 PROBLEM — K92.1 HEMATOCHEZIA: Status: ACTIVE | Noted: 2022-12-19

## 2022-12-19 LAB
ABSOLUTE EOS #: 0.27 K/UL (ref 0–0.44)
ABSOLUTE IMMATURE GRANULOCYTE: 0.08 K/UL (ref 0–0.3)
ABSOLUTE LYMPH #: 1.27 K/UL (ref 1.1–3.7)
ABSOLUTE MONO #: 1.02 K/UL (ref 0.1–1.2)
ANION GAP SERPL CALCULATED.3IONS-SCNC: 14 MMOL/L (ref 9–17)
BASOPHILS # BLD: 1 % (ref 0–2)
BASOPHILS ABSOLUTE: 0.07 K/UL (ref 0–0.2)
BILIRUBIN URINE: NEGATIVE
BUN BLDV-MCNC: 13 MG/DL (ref 8–23)
CALCIUM SERPL-MCNC: 8.8 MG/DL (ref 8.6–10.4)
CASTS UA: ABNORMAL /LPF (ref 0–8)
CHLORIDE BLD-SCNC: 99 MMOL/L (ref 98–107)
CO2: 24 MMOL/L (ref 20–31)
COLOR: YELLOW
CREAT SERPL-MCNC: 0.73 MG/DL (ref 0.5–0.9)
EOSINOPHILS RELATIVE PERCENT: 3 % (ref 1–4)
EPITHELIAL CELLS UA: ABNORMAL /HPF (ref 0–5)
GFR SERPL CREATININE-BSD FRML MDRD: >60 ML/MIN/1.73M2
GLUCOSE BLD-MCNC: 145 MG/DL (ref 65–105)
GLUCOSE BLD-MCNC: 156 MG/DL (ref 65–105)
GLUCOSE BLD-MCNC: 160 MG/DL (ref 70–99)
GLUCOSE BLD-MCNC: 210 MG/DL (ref 65–105)
GLUCOSE URINE: ABNORMAL
HCT VFR BLD CALC: 38.2 % (ref 36.3–47.1)
HEMOGLOBIN: 12.3 G/DL (ref 11.9–15.1)
IMMATURE GRANULOCYTES: 1 %
KETONES, URINE: NEGATIVE
LEUKOCYTE ESTERASE, URINE: ABNORMAL
LYMPHOCYTES # BLD: 12 % (ref 24–43)
MAGNESIUM: 2.3 MG/DL (ref 1.6–2.6)
MCH RBC QN AUTO: 30.1 PG (ref 25.2–33.5)
MCHC RBC AUTO-ENTMCNC: 32.2 G/DL (ref 28.4–34.8)
MCV RBC AUTO: 93.6 FL (ref 82.6–102.9)
MONOCYTES # BLD: 10 % (ref 3–12)
NITRITE, URINE: NEGATIVE
NRBC AUTOMATED: 0 PER 100 WBC
PARTIAL THROMBOPLASTIN TIME: 60.2 SEC (ref 20.5–30.5)
PDW BLD-RTO: 14.6 % (ref 11.8–14.4)
PH UA: 6 (ref 5–8)
PLATELET # BLD: 130 K/UL (ref 138–453)
PMV BLD AUTO: 11 FL (ref 8.1–13.5)
POTASSIUM SERPL-SCNC: 3.2 MMOL/L (ref 3.7–5.3)
PROTEIN UA: NEGATIVE
RBC # BLD: 4.08 M/UL (ref 3.95–5.11)
RBC # BLD: ABNORMAL 10*6/UL
RBC UA: ABNORMAL /HPF (ref 0–4)
SEG NEUTROPHILS: 74 % (ref 36–65)
SEGMENTED NEUTROPHILS ABSOLUTE COUNT: 7.82 K/UL (ref 1.5–8.1)
SODIUM BLD-SCNC: 137 MMOL/L (ref 135–144)
SPECIFIC GRAVITY UA: 1.03 (ref 1–1.03)
TURBIDITY: CLEAR
URINE HGB: NEGATIVE
UROBILINOGEN, URINE: NORMAL
WBC # BLD: 10.5 K/UL (ref 3.5–11.3)
WBC UA: ABNORMAL /HPF (ref 0–5)
YEAST: ABNORMAL

## 2022-12-19 PROCEDURE — 99221 1ST HOSP IP/OBS SF/LOW 40: CPT | Performed by: INTERNAL MEDICINE

## 2022-12-19 PROCEDURE — 85730 THROMBOPLASTIN TIME PARTIAL: CPT

## 2022-12-19 PROCEDURE — 83735 ASSAY OF MAGNESIUM: CPT

## 2022-12-19 PROCEDURE — 99153 MOD SED SAME PHYS/QHP EA: CPT

## 2022-12-19 PROCEDURE — C1894 INTRO/SHEATH, NON-LASER: HCPCS

## 2022-12-19 PROCEDURE — C1760 CLOSURE DEV, VASC: HCPCS

## 2022-12-19 PROCEDURE — 6370000000 HC RX 637 (ALT 250 FOR IP): Performed by: STUDENT IN AN ORGANIZED HEALTH CARE EDUCATION/TRAINING PROGRAM

## 2022-12-19 PROCEDURE — 81001 URINALYSIS AUTO W/SCOPE: CPT

## 2022-12-19 PROCEDURE — 85025 COMPLETE CBC W/AUTO DIFF WBC: CPT

## 2022-12-19 PROCEDURE — 93458 L HRT ARTERY/VENTRICLE ANGIO: CPT

## 2022-12-19 PROCEDURE — 4A023N7 MEASUREMENT OF CARDIAC SAMPLING AND PRESSURE, LEFT HEART, PERCUTANEOUS APPROACH: ICD-10-PCS | Performed by: INTERNAL MEDICINE

## 2022-12-19 PROCEDURE — 2709999900 HC NON-CHARGEABLE SUPPLY

## 2022-12-19 PROCEDURE — 2500000003 HC RX 250 WO HCPCS

## 2022-12-19 PROCEDURE — 2500000003 HC RX 250 WO HCPCS: Performed by: STUDENT IN AN ORGANIZED HEALTH CARE EDUCATION/TRAINING PROGRAM

## 2022-12-19 PROCEDURE — B2151ZZ FLUOROSCOPY OF LEFT HEART USING LOW OSMOLAR CONTRAST: ICD-10-PCS | Performed by: INTERNAL MEDICINE

## 2022-12-19 PROCEDURE — 99222 1ST HOSP IP/OBS MODERATE 55: CPT | Performed by: INTERNAL MEDICINE

## 2022-12-19 PROCEDURE — 6370000000 HC RX 637 (ALT 250 FOR IP): Performed by: NURSE PRACTITIONER

## 2022-12-19 PROCEDURE — 2580000003 HC RX 258: Performed by: STUDENT IN AN ORGANIZED HEALTH CARE EDUCATION/TRAINING PROGRAM

## 2022-12-19 PROCEDURE — 6370000000 HC RX 637 (ALT 250 FOR IP): Performed by: INTERNAL MEDICINE

## 2022-12-19 PROCEDURE — 82947 ASSAY GLUCOSE BLOOD QUANT: CPT

## 2022-12-19 PROCEDURE — 99152 MOD SED SAME PHYS/QHP 5/>YRS: CPT

## 2022-12-19 PROCEDURE — 2060000000 HC ICU INTERMEDIATE R&B

## 2022-12-19 PROCEDURE — 6360000002 HC RX W HCPCS

## 2022-12-19 PROCEDURE — 36415 COLL VENOUS BLD VENIPUNCTURE: CPT

## 2022-12-19 PROCEDURE — 6360000004 HC RX CONTRAST MEDICATION

## 2022-12-19 PROCEDURE — B2111ZZ FLUOROSCOPY OF MULTIPLE CORONARY ARTERIES USING LOW OSMOLAR CONTRAST: ICD-10-PCS | Performed by: INTERNAL MEDICINE

## 2022-12-19 PROCEDURE — 99232 SBSQ HOSP IP/OBS MODERATE 35: CPT | Performed by: INTERNAL MEDICINE

## 2022-12-19 PROCEDURE — C1892 INTRO/SHEATH,FIXED,PEEL-AWAY: HCPCS

## 2022-12-19 PROCEDURE — 80048 BASIC METABOLIC PNL TOTAL CA: CPT

## 2022-12-19 PROCEDURE — 87086 URINE CULTURE/COLONY COUNT: CPT

## 2022-12-19 RX ORDER — SODIUM CHLORIDE 0.9 % (FLUSH) 0.9 %
5-40 SYRINGE (ML) INJECTION PRN
Status: DISCONTINUED | OUTPATIENT
Start: 2022-12-19 | End: 2022-12-21 | Stop reason: HOSPADM

## 2022-12-19 RX ORDER — SODIUM CHLORIDE 9 MG/ML
INJECTION, SOLUTION INTRAVENOUS PRN
Status: DISCONTINUED | OUTPATIENT
Start: 2022-12-19 | End: 2022-12-19 | Stop reason: SDUPTHER

## 2022-12-19 RX ORDER — SODIUM CHLORIDE 0.9 % (FLUSH) 0.9 %
5-40 SYRINGE (ML) INJECTION EVERY 12 HOURS SCHEDULED
Status: DISCONTINUED | OUTPATIENT
Start: 2022-12-19 | End: 2022-12-21 | Stop reason: HOSPADM

## 2022-12-19 RX ORDER — POTASSIUM CHLORIDE 20 MEQ/1
40 TABLET, EXTENDED RELEASE ORAL ONCE
Status: COMPLETED | OUTPATIENT
Start: 2022-12-19 | End: 2022-12-19

## 2022-12-19 RX ORDER — ACETAMINOPHEN 325 MG/1
650 TABLET ORAL EVERY 4 HOURS PRN
Status: DISCONTINUED | OUTPATIENT
Start: 2022-12-19 | End: 2022-12-19 | Stop reason: SDUPTHER

## 2022-12-19 RX ORDER — SODIUM CHLORIDE 9 MG/ML
INJECTION, SOLUTION INTRAVENOUS PRN
Status: DISCONTINUED | OUTPATIENT
Start: 2022-12-19 | End: 2022-12-21 | Stop reason: HOSPADM

## 2022-12-19 RX ADMIN — BUPROPION HYDROCHLORIDE 75 MG: 75 TABLET, FILM COATED ORAL at 20:32

## 2022-12-19 RX ADMIN — DORZOLAMIDE HYDROCHLORIDE 1 DROP: 20 SOLUTION/ DROPS OPHTHALMIC at 08:57

## 2022-12-19 RX ADMIN — LATANOPROST 1 DROP: 50 SOLUTION OPHTHALMIC at 20:33

## 2022-12-19 RX ADMIN — SODIUM CHLORIDE, PRESERVATIVE FREE 10 ML: 5 INJECTION INTRAVENOUS at 09:02

## 2022-12-19 RX ADMIN — AMITRIPTYLINE HYDROCHLORIDE 25 MG: 25 TABLET, FILM COATED ORAL at 20:32

## 2022-12-19 RX ADMIN — Medication 2500 MCG: at 09:03

## 2022-12-19 RX ADMIN — FLUTICASONE PROPIONATE 2 SPRAY: 50 SPRAY, METERED NASAL at 08:57

## 2022-12-19 RX ADMIN — BRIMONIDINE TARTRATE 1 DROP: 2 SOLUTION OPHTHALMIC at 08:56

## 2022-12-19 RX ADMIN — SENNOSIDES 8.6 MG: 8.6 TABLET, COATED ORAL at 08:59

## 2022-12-19 RX ADMIN — TIMOLOL MALEATE 1 DROP: 5 SOLUTION OPHTHALMIC at 08:57

## 2022-12-19 RX ADMIN — BUPROPION HYDROCHLORIDE 75 MG: 75 TABLET, FILM COATED ORAL at 08:54

## 2022-12-19 RX ADMIN — METOPROLOL SUCCINATE 12.5 MG: 25 TABLET, FILM COATED, EXTENDED RELEASE ORAL at 08:58

## 2022-12-19 RX ADMIN — BISACODYL 20 MG: 5 TABLET, COATED ORAL at 18:51

## 2022-12-19 RX ADMIN — NITROGLYCERIN 15 MCG/MIN: 20 INJECTION INTRAVENOUS at 06:42

## 2022-12-19 RX ADMIN — DORZOLAMIDE HYDROCHLORIDE 1 DROP: 20 SOLUTION/ DROPS OPHTHALMIC at 15:02

## 2022-12-19 RX ADMIN — POTASSIUM CHLORIDE 40 MEQ: 1500 TABLET, EXTENDED RELEASE ORAL at 15:05

## 2022-12-19 RX ADMIN — DESMOPRESSIN ACETATE 40 MG: 0.2 TABLET ORAL at 08:53

## 2022-12-19 RX ADMIN — CITALOPRAM 10 MG: 10 TABLET, FILM COATED ORAL at 08:54

## 2022-12-19 RX ADMIN — PANTOPRAZOLE SODIUM 40 MG: 40 TABLET, DELAYED RELEASE ORAL at 08:59

## 2022-12-19 RX ADMIN — EMPAGLIFLOZIN 10 MG: 10 TABLET, FILM COATED ORAL at 08:54

## 2022-12-19 RX ADMIN — INSULIN GLARGINE 15 UNITS: 100 INJECTION, SOLUTION SUBCUTANEOUS at 20:38

## 2022-12-19 RX ADMIN — FUROSEMIDE 40 MG: 40 TABLET ORAL at 08:54

## 2022-12-19 RX ADMIN — POLYETHYLENE GLYCOL 3350, SODIUM SULFATE ANHYDROUS, SODIUM BICARBONATE, SODIUM CHLORIDE, POTASSIUM CHLORIDE 4000 ML: 236; 22.74; 6.74; 5.86; 2.97 POWDER, FOR SOLUTION ORAL at 18:53

## 2022-12-19 RX ADMIN — SODIUM CHLORIDE, PRESERVATIVE FREE 10 ML: 5 INJECTION INTRAVENOUS at 20:34

## 2022-12-19 RX ADMIN — Medication 50 MG: at 09:03

## 2022-12-19 RX ADMIN — MAGNESIUM GLUCONATE 500 MG ORAL TABLET 400 MG: 500 TABLET ORAL at 08:59

## 2022-12-19 RX ADMIN — SPIRONOLACTONE 50 MG: 25 TABLET ORAL at 08:59

## 2022-12-19 RX ADMIN — CLOPIDOGREL 75 MG: 75 TABLET, FILM COATED ORAL at 08:53

## 2022-12-19 RX ADMIN — DORZOLAMIDE HYDROCHLORIDE 1 DROP: 20 SOLUTION/ DROPS OPHTHALMIC at 20:33

## 2022-12-19 NOTE — PLAN OF CARE
Problem: Discharge Planning  Goal: Discharge to home or other facility with appropriate resources  Outcome: Progressing     Problem: Safety - Adult  Goal: Free from fall injury  Outcome: Progressing     Problem: ABCDS Injury Assessment  Goal: Absence of physical injury  Outcome: Progressing     Problem: Chronic Conditions and Co-morbidities  Goal: Patient's chronic conditions and co-morbidity symptoms are monitored and maintained or improved  Outcome: Progressing     Problem: Respiratory - Adult  Goal: Achieves optimal ventilation and oxygenation  Outcome: Progressing     Problem: Cardiovascular - Adult  Goal: Maintains optimal cardiac output and hemodynamic stability  Outcome: Progressing  Goal: Absence of cardiac dysrhythmias or at baseline  Outcome: Progressing     Problem: Gastrointestinal - Adult  Goal: Minimal or absence of nausea and vomiting  Outcome: Progressing  Goal: Maintains or returns to baseline bowel function  Outcome: Progressing  Goal: Maintains adequate nutritional intake  Outcome: Progressing  Goal: Establish and maintain optimal ostomy function  Outcome: Progressing     Problem: Metabolic/Fluid and Electrolytes - Adult  Goal: Electrolytes maintained within normal limits  Outcome: Progressing  Goal: Hemodynamic stability and optimal renal function maintained  Outcome: Progressing  Goal: Glucose maintained within prescribed range  Outcome: Progressing     Problem: Infection - Adult  Goal: Absence of infection at discharge  Outcome: Progressing  Goal: Absence of infection during hospitalization  Outcome: Progressing  Goal: Absence of fever/infection during anticipated neutropenic period  Outcome: Progressing

## 2022-12-19 NOTE — PROGRESS NOTES
[phone] Patient woke up to use the restroom, and was very confused, agitated, and restless. Patient thought she was home, and calling out for daughter. Daughter called to help reorient patient. Patient became more agitated and hung up the phone on daughter. Patient assisted back into bed. All patient's vitals are stable and no complaints of pain or shortness of breath. 0636- Patient woke to use the restroom. Patient more orientated and calm. Patient able to follow direction and asking questions about her heart cath in the am. All vitals remain normal and no new complaints at this time.

## 2022-12-19 NOTE — PROGRESS NOTES
Physical Therapy        Physical Therapy Cancel Note      DATE: 2022    NAME: Emilia Moser  MRN: [id number]   : 1950      Patient not seen this date for Physical Therapy due to:    Patient independent with functional mobility. Will defer PT evaluation at this time. Please reorder PT if future needs arise. Patient Declined: Per RN pt up without AD, Pt denies need for PT intervention. PT to sign off RN notified.        Electronically signed by Iván Hendrix PT on 2022 at 9:03 AM

## 2022-12-19 NOTE — CONSULTS
[facility] - Initial Consult Note  Today's Date and Time: 12/19/2022, 10:47 AM    Impression :   Chest pain  Diarrhea  Leukocytosis  Coronary artery disease  History of congestive heart failure  COPD  History of prior bariatric surgery    Recommendations:   Monitor off antibiotics   Await results of stool studies  Suggest COVID booster after cardiac issues are resolved    Medical Decision Making/Summary/Discussion:12/19/2022       Infection Control Recommendations   [place] Precautions    Antimicrobial Stewardship Recommendations     Discontinuation of therapy  Coordination of Outpatient Care:   Estimated Length of IV antimicrobials:none  Patient will need Midline Catheter Insertion: no  Patient will need PICC line Insertion:no  Patient will need: Home IV , [place],  SNF,  LTAC: TBD  Patient will need outpatient wound care:no    Chief complaint/reason for consultation:   Leukocytosis  Diarrhea  Concern for colitis      History of Present Illness:   Merari Eastman is a 74y.o.-year-old  female who was initially admitted on 12/16/2022. Patient seen at the request of Carlos Alberto Marti. INITIAL HISTORY:  Patient was admitted on 12/16/2022 after she presented to the emergency room complaining of substernal chest pain of about 3 days duration. Has a history of pre-existing coronary artery disease with a prior stress test on 12/7/2022 which showed mild anteroseptal ischemia. The patient was a scheduled for a cardiac catheterization in January 2023 but she presented to the [place] ER with a chest pain and was transferred to [facility] for further management. She was considered to have unstable angina and received nitroglycerin drip and heparin. The patient also indicated that she had developed diarrhea for about the same length of time. She initially reported burgundy colored with her stools and a sensation of burning in the colon.   Her CT is scan showed some edema of the ascending colon suggestive of active colitis. On physical examination she has some tenderness of the abdomen and the umbilical and right lower quadrant areas. When she came to the hospital the diarrheal stools subsided. The patient also have a rise in her white count. Testing for influenza a and B was negative. SARS-CoV-2 was negative. Blood and urine cultures were negative. CURRENT EVALUATION : 12/19/2022      Afebrile  VS stable  Off nitroglycerin drip  Ruy on heparin drip    Patient feels better  No complaints  No new issues per RN    Patient is to undergo cardiac catheterization today. She has been evaluated by the GI service and stool for enteric pathogens is pending      Labs, X rays reviewed: 12/19/2022    BUN: 24  Cr: 0.73  Na 137  K 3.2    WBC: 10.9--> 18.7-->10.1  Hb: 12.3  Plat: 130    Cultures:  Urine:  12-19 -2022 pending  12/16/2022 no growth  Blood:    Sputum :    Wound:    Influenza A & B: negative     SARS-CoV-2 negative    Discussed with patient, RN, IM . I have personally reviewed the past medical history, past surgical history, medications, social history, and family history, and I have updated the database accordingly.   Past Medical History:     Past Medical History:   Diagnosis Date    Arthritis     Asthma     Bell's palsy     CAD (coronary artery disease) 1989    Cancer ([facility] [address] 1971    cervical    Carotid artery disease (HCC)     Cataracts, bilateral     CHF (congestive heart failure) (HCC)     COPD (chronic obstructive pulmonary disease) (HCC)     Coronary artery disease     Eczema     Emphysema lung (HCC)     Fibromyalgia     Glaucoma     H/O blood clots     Headache     Heart attack (HCC)     Hx of Bell's palsy     Mild right facial paralysis    Hypertension     Liver disease     Mild dementia     Neuropathy     Osteoporosis     Sleep apnea 11/07/2014    sleep study done in [facility]     Type 2 diabetes mellitus with hyperglycemia ([facility] [address]        Past Surgical  History:     Past Surgical History:   Procedure Laterality Date    ANESTHESIA NERVE BLOCK Right 2019    Right L2 L3 L4 L5 Diagnostic Medial BB performed by Jami Melchor MD at [address] [place] [address] Right 2019    Right L2 L3 L4 L5 Confirmatory Medial BB performed by Jami Melchor MD at [place] [address]    CHOLECYSTECTOMY      COLONOSCOPY  2012    COLONOSCOPY N/A 2019    COLONOSCOPY performed by Rebeca Mendoza MD at [address] [place] [address]  internal hemorrhoids and few diverticuli    CORONARY ANGIOPLASTY WITH STENT PLACEMENT      ENDOSCOPY, COLON, DIAGNOSTIC      HYSTERECTOMY (CERVIX STATUS UNKNOWN)      INDUCED   1970    LUMBAR SPINE SURGERY Right 2019    Right L2 L3 TRANSFORAMINAL performed by Jami Melchor MD at [address] [place] [address]  Right 2019    Right L2 L3 TRANSFORAMINAL performed by Jami Melchor MD at [address] [place] [address] Dr Right 2019    Right L3 & L4 TRANSFORAMINAL performed by Jami Melchor MD at [address] [place] [address] Dr Right 2019    Right L4 L5 TRANSFORAMINAL performed by Jami Melchor MD at [address] [place] [address] Right 2020    RIGHT L4 L5  TRANSFORAMINAL performed by Jami Melchor MD at [address] [place] [address] Right 2020    Right L4 L5 TRANSFORAMINAL performed by Jami Melchor MD at [address] [place] [address] 1.1-2CM FACE,FACIAL Left 2018    Excision Cyst Left Chest, Middle Back performed by Rebeca Mendoza MD at [address] AA&/STRD TFRML EPI LUMBAR/SACRAL EA ADDL Right 10/05/2018    Right L2 & L3 TRANSFORAMINAL performed by Jami Melchor MD at [address] AA&/STRD TFRML EPI LUMBAR/SACRAL EA ADDL Right 10/26/2018    Right L2 L3 TRANSFORAMINAL performed by Jami Melchor MD at [place] [address] Right 2019    Right  L2 L3 L4 L5 RADIOFREQUENCY performed by Jami Melchor MD at [facility] OR    HAILE AND BSO (CERVIX REMOVED)      Cervical cancer.   Had XRT    TONSILLECTOMY         Medications:      amitriptyline  25 mg Oral Nightly    atorvastatin  40 mg Oral Daily    buPROPion  75 mg Oral BID    citalopram  10 mg Oral Daily    clopidogrel  75 mg Oral Daily    furosemide  40 mg Oral Daily    insulin glargine  15 Units SubCUTAneous BID    metoprolol succinate  12.5 mg Oral Daily    pantoprazole  40 mg Oral Daily    spironolactone  50 mg Oral Daily    sodium chloride flush  10 mL IntraVENous 2 times per day    heparin (porcine)  4,000 Units IntraVENous Once    vitamin B-12  2,500 mcg Oral Daily    dorzolamide  1 drop Ophthalmic TID    empagliflozin  10 mg Oral Daily    fluticasone  2 spray Each Nostril Daily    magnesium oxide  400 mg Oral Daily    pyridoxine  50 mg Oral Daily    senna  1 tablet Oral BID    tiotropium-olodaterol  2 puff Inhalation Daily    latanoprost  1 drop Both Eyes Nightly    brimonidine  1 drop Both Eyes Daily    And    timolol  1 drop Both Eyes Daily    insulin lispro  4 Units SubCUTAneous Lunch    insulin lispro  6 Units SubCUTAneous Dinner       Social History:     Social History     Socioeconomic History    Marital status:      Spouse name: Not on file    Number of children: Not on file    Years of education: Not on file    Highest education level: Not on file   Occupational History    Not on file   Tobacco Use    Smoking status: Former     Packs/day: 1.00     Years: 35.00     Pack years: 35.00     Types: Cigarettes     Quit date: 2001     Years since quittin.2    Smokeless tobacco: Never   Vaping Use    Vaping Use: Never used   Substance and Sexual Activity    Alcohol use: No    Drug use: No    Sexual activity: Never   Other Topics Concern    Not on file   Social History Narrative    Not on file     Social Determinants of Health     Financial Resource Strain: Low Risk     Difficulty of Paying Living Expenses: Not hard at all   Food Insecurity: No Food Insecurity    Worried About Running Out of Food in the Last Year: Never true    Ran Out of Food in the Last Year: Never true   Transportation Needs: Not on file   Physical Activity: Insufficiently Active    Days of Exercise per Week: 2 days    Minutes of Exercise per Session: 10 min   Stress: Not on file   Social Connections: Not on file   Intimate Partner Violence: Not on file   Housing Stability: Not on file       Family History:     Family History   Problem Relation Age of Onset    Heart Disease Mother     High Blood Pressure Mother     Stroke Mother     Glaucoma Mother     Early Death Father     Cancer Father         stomach    Miscarriages / Stillbirths Maternal Uncle         Allergies:   Patient has no known allergies. Review of Systems:   Constitutional: No fevers or chills. No systemic complaints  Head: No headaches  Eyes: No double vision or blurry vision. No conjunctival inflammation. ENT: No sore throat or runny nose. . No hearing loss, tinnitus or vertigo. Cardiovascular: Chest pain, no palpitations. No shortness of breath. No LAUREANO  Lung: No shortness of breath or cough. No sputum production  Abdomen: No nausea, vomiting. Initially reported diarrhea, has abdominal pain. Sallyanne Chain No cramps. Genitourinary: No increased urinary frequency, or dysuria. No hematuria. No suprapubic or CVA pain  Musculoskeletal: No muscle aches or pains. No joint effusions, swelling or deformities  Hematologic: No bleeding or bruising. Neurologic: No headache, weakness, numbness, or tingling. Integument: No rash, no ulcers. Psychiatric: No depression. Endocrine: No polyuria, no polydipsia, no polyphagia.     Physical Examination :   Patient Vitals for the past 8 hrs:   BP Temp Temp src Pulse Resp   12/19/22 0806 (!) 142/88 -- -- 82 --   12/19/22 0805 -- 99.1 °F (37.3 °C) Oral 81 18   12/19/22 0400 126/70 98.6 °F (37 °C) Oral 70 16     General Appearance: Awake, alert, and in no apparent distress  Head:  Normocephalic, no trauma  Eyes: Pupils equal, round, reactive to light and accommodation; extraocular movements intact; sclera anicteric; conjunctivae pink. No embolic phenomena. ENT: Oropharynx clear, without erythema, exudate, or thrush. No tenderness of sinuses. Mouth/throat: mucosa pink and moist. No lesions. Dentition in good repair. Neck:Supple, without lymphadenopathy. Thyroid normal, No bruits. Pulmonary/Chest: Clear to auscultation, without wheezes, rales, or rhonchi. No dullness to percussion. Cardiovascular: Regular rate and rhythm without murmurs, rubs, or gallops. Abdomen: Soft, mildly  tender. Bowel sounds normal. No organomegaly  All four Extremities: No cyanosis, clubbing, edema, or effusions. Neurologic: No gross sensory or motor deficits. Skin: Warm and dry with good turgor. Signs of peripheral arterial insufficiency. No ulcerations. No open wounds. Medical Decision Making -Laboratory:   I have independently reviewed/ordered the following labs:    CBC with Differential:   Recent Labs     12/18/22  0320 12/19/22  0608   WBC 18.7* 10.5   HGB 12.9 12.3   HCT 39.6 38.2   * 130*   LYMPHOPCT 4* 12*   MONOPCT 8* 10     BMP:   Recent Labs     12/17/22  0438 12/19/22  0926    137   K 3.8 PENDING    99   CO2 19* 24   BUN 13 13   CREATININE 0.65 0.73     Hepatic Function Panel:   Recent Labs     12/16/22  1132   PROT 7.1   LABALBU 3.5   BILITOT 0.7   ALKPHOS 86   ALT 28   AST 46*     No results for input(s): RPR in the last 72 hours. No results for input(s): HIV in the last 72 hours. No results for input(s): BC in the last 72 hours.   Lab Results   Component Value Date/Time    RBC 4.08 12/19/2022 06:08 AM    WBC 10.5 12/19/2022 06:08 AM     Lab Results   Component Value Date/Time    CREATININE 0.73 12/19/2022 09:26 AM    GLUCOSE 160 12/19/2022 09:26 AM       Medical Decision Making-Imaging:     EXAMINATION:   CTA OF THE CHEST 12/18/2022 11:34 am       TECHNIQUE:   CTA of the chest was performed after the administration of intravenous   contrast.  Multiplanar reformatted images are provided for review. MIP   images are provided for review. Automated exposure control, iterative   reconstruction, and/or weight based adjustment of the mA/kV was utilized to   reduce the radiation dose to as low as reasonably achievable. COMPARISON:   1 June 2017       HISTORY:   ORDERING SYSTEM PROVIDED HISTORY: elevated d-dimer   TECHNOLOGIST PROVIDED HISTORY:   elevated d-dimer   Reason for Exam: elevated d-dimer       FINDINGS:   Pulmonary Arteries: Pulmonary arteries are adequately opacified for   evaluation. No evidence of intraluminal filling defect to suggest pulmonary   embolism. Main pulmonary artery is normal in caliber. Mediastinum: No evidence of mediastinal lymphadenopathy. The heart and   pericardium demonstrate no acute abnormality. There is no acute abnormality   of the thoracic aorta. Lungs/pleura: Emphysematous changes are present in the lungs. Respiratory   motion complicates assessment. Basilar atelectasis is noted. No significant   effusion, extrapleural air noted. Small area of plaque-like consolidation is   present dorsally in the right hemithorax which may represent minimal airspace   disease. Upper Abdomen: Hepatosplenomegaly with micro nodular liver. Small hiatal   hernia. Postsurgical gastric changes suggesting bariatric surgery. Soft Tissues/Bones: Left shoulder           Impression   No evidence of pulmonary embolus. Emphysema. Atelectasis and small focus of   plaque-like consolidation right lower lobe.              Medical Decision [id number]:       Medical Decision Making-Other:     Note:  Labs, medications, radiologic studies were reviewed with personal review of films  Large amounts of data were reviewed  Discussed with nursing Staff, Discharge planner  Infection Control and Prevention measures reviewed  All prior entries were reviewed  Administer medications as ordered  Prognosis: Fair  Discharge planning reviewed  Follow up as outpatient. Thank you for allowing us to participate in the care of this patient. Please call with questions.     Estelle Post MD  Pager: [phone] - Office: [phone]

## 2022-12-19 NOTE — PLAN OF CARE
Problem: Discharge Planning  Goal: Discharge to home or other facility with appropriate resources  12/19/2022 1050 by Esperanza Corley RN  Outcome: Progressing  12/19/2022 1049 by Esperanza Corley RN  Outcome: Progressing     Problem: Safety - Adult  Goal: Free from fall injury  12/19/2022 1050 by Esperanza Corley RN  Outcome: Progressing  12/19/2022 1049 by Esperanza Corley RN  Outcome: Progressing  Flowsheets (Taken 12/19/2022 0800)  Free From Fall Injury:   Instruct family/caregiver on patient safety   Based on caregiver fall risk screen, instruct family/caregiver to ask for assistance with transferring infant if caregiver noted to have fall risk factors     Problem: ABCDS Injury Assessment  Goal: Absence of physical injury  12/19/2022 1050 by Esperanza Corley RN  Outcome: Progressing  12/19/2022 1049 by Esperanza Corley RN  Outcome: Progressing  Flowsheets (Taken 12/19/2022 0800)  Absence of Physical Injury: Implement safety measures based on patient assessment     Problem: Chronic Conditions and Co-morbidities  Goal: Patient's chronic conditions and co-morbidity symptoms are monitored and maintained or improved  12/19/2022 1050 by Esperanza Corley RN  Outcome: Progressing  12/19/2022 1049 by Esperanza Corley RN  Outcome: Progressing     Problem: Respiratory - Adult  Goal: Achieves optimal ventilation and oxygenation  12/19/2022 1050 by Esperanza Corley RN  Outcome: Progressing  12/19/2022 1049 by Esperanza Corley RN  Outcome: Progressing     Problem: Cardiovascular - Adult  Goal: Maintains optimal cardiac output and hemodynamic stability  12/19/2022 1050 by Esperanza Corley RN  Outcome: Progressing  12/19/2022 1049 by Esperanza Corley RN  Outcome: Progressing  Goal: Absence of cardiac dysrhythmias or at baseline  12/19/2022 1050 by Esperanza Corley RN  Outcome: Progressing  12/19/2022 1049 by Esperanza Corley RN  Outcome: Progressing     Problem: Gastrointestinal - Adult  Goal: Minimal or absence of nausea and vomiting  12/19/2022 1050 by Julita Wahl RN  Outcome: Progressing  12/19/2022 1049 by Julita Wahl RN  Outcome: Progressing  Goal: Maintains or returns to baseline bowel function  12/19/2022 1050 by Julita Wahl RN  Outcome: Progressing  12/19/2022 1049 by Julita Wahl RN  Outcome: Progressing  Goal: Maintains adequate nutritional intake  12/19/2022 1050 by Julita Wahl RN  Outcome: Progressing  12/19/2022 1049 by Julita Wahl RN  Outcome: Progressing  Goal: Establish and maintain optimal ostomy function  12/19/2022 1050 by Julita Wahl RN  Outcome: Progressing  12/19/2022 1049 by Julita Wahl RN  Outcome: Progressing     Problem: Metabolic/Fluid and Electrolytes - Adult  Goal: Electrolytes maintained within normal limits  12/19/2022 1050 by Julita Wahl RN  Outcome: Progressing  12/19/2022 1049 by Julita Wahl RN  Outcome: Progressing  Goal: Hemodynamic stability and optimal renal function maintained  12/19/2022 1050 by Julita Wahl RN  Outcome: Progressing  12/19/2022 1049 by Julita Wahl RN  Outcome: Progressing  Goal: Glucose maintained within prescribed range  12/19/2022 1050 by Julita Wahl RN  Outcome: Progressing  12/19/2022 1049 by Julita Wahl RN  Outcome: Progressing     Problem: Infection - Adult  Goal: Absence of infection at discharge  12/19/2022 1050 by Julita Wahl RN  Outcome: Progressing  12/19/2022 1049 by Julita Wahl RN  Outcome: Progressing  Flowsheets (Taken 12/19/2022 0800)  Absence of infection at discharge: Assess and monitor for signs and symptoms of infection  Goal: Absence of infection during hospitalization  12/19/2022 1050 by Julita Wahl RN  Outcome: Progressing  12/19/2022 1049 by [place] Ann Seaman RN  Outcome: Progressing  Flowsheets (Taken 12/19/2022 0800)  Absence of infection during hospitalization: Assess and monitor for signs and symptoms of infection  Goal: Absence of fever/infection during anticipated neutropenic period  12/19/2022 1050 by Beatriz Payan RN  Outcome: Progressing  12/19/2022 1049 by Beatriz Payan RN  Outcome: Progressing  Flowsheets (Taken 12/19/2022 0800)  Absence of fever/infection during anticipated neutropenic period: Monitor white blood cell count     Problem: Pain  Goal: Verbalizes/displays adequate comfort level or baseline comfort level  12/19/2022 1050 by Beatriz Payan RN  Outcome: Progressing  12/19/2022 1049 by Beatriz Payan RN  Outcome: Progressing

## 2022-12-19 NOTE — CONSULTS
THE [facility]  Consultation Note     . REASON FOR CONSULTATION: Concern for colitis on CT abdomen      HISTORY OF PRESENT ILLNESS:     This is a 74 y.o. female who presents with chief complaints of chest pain and diarrhea. The patient has past medical history of coronary artery disease, COPD, CHF, bariatric surgery. According to the patient, the patient started having chest pain 3 days ago which was substernal in location and was dull in character. Patient recently had stress test done on 12/7/2022 which showed mild anteroseptal ischemia and the patient was scheduled for cardiac catheterization in early January. The patient again presented to [facility] ER with chest pain on 12/16/2020. The patient was then transferred to [facility] for further management. It was noted that the patient had stable troponins but increased D-dimer. CT PE was negative for pulmonary embolus. Patient was then started on nitro and heparin drip after cardiology consultation for unstable angina. During the admission, the patient also reported having diarrhea since last week. The patient reported having loose burgundy colored stools and felt \"burning up\". It was noted that the patient's WBC count increased from 10.9 during the ER presentation to 18.7 on 12/18/2022. During the admission, the blood culture showed no growth and C. difficile toxin/antigen was sent for diarrhea with leukocytosis and abdominal pain. Since yesterday, the patient's diarrhea resolved and WBC count normalized. CT abdomen was done yesterday which showed edematous ascending colon/greater than expected for portal hypertensive colopathy indicating infectious/inflammatory colitis. During bedside examination, the patient was awake, alert, oriented X4. Patient was hemodynamically stable with blood pressure 142/88 mmHg and MAP of 105. Patient was breathing comfortably on room air.   Patient reported having substernal mild chest pain especially during coughing and breathing deeply. The patient denied any abdominal pain. Abdominal examination revealed tenderness in the umbilical and right lower quadrant area. The patient denied any diarrhea episode today. She denied any headache, fever, chills, nausea, vomiting, cough, muscular pains, vision problems, hearing problems. Previous GI history: Cholecystectomy, bariatric surgery, appendectomy, colonoscopy.     PAST MEDICAL HISTORY:  Past Medical History:   Diagnosis Date    Arthritis     Asthma     Bell's palsy     CAD (coronary artery disease)     Cancer ([address]     cervical    Carotid artery disease ([address]     Cataracts, bilateral     CHF (congestive heart failure) (HCC)     COPD (chronic obstructive pulmonary disease) (HCC)     Coronary artery disease     Eczema     Emphysema lung (HCC)     Fibromyalgia     Glaucoma     H/O blood clots     Headache     Heart attack ([address]     Hx of Bell's palsy     Mild right facial paralysis    Hypertension     Liver disease     Mild dementia     Neuropathy     Osteoporosis     Sleep apnea 2014    sleep study done in [facility]    Tremor     Type 2 diabetes mellitus with hyperglycemia [facility])      Past Surgical History:   Procedure Laterality Date    ANESTHESIA NERVE BLOCK Right 2019    Right L2 L3 L4 L5 Diagnostic Medial BB performed by Sudeep Pruett MD at [address] [place] [address] Right 2019    Right L2 L3 L4 L5 Confirmatory Medial BB performed by Sudeep Pruett MD at [facility] 22      CHOLECYSTECTOMY      COLONOSCOPY  2012    COLONOSCOPY N/A 2019    COLONOSCOPY performed by Toni Isaacs MD at [address] [place] [address]  internal hemorrhoids and few diverticuli    CORONARY ANGIOPLASTY WITH STENT PLACEMENT      ENDOSCOPY, COLON, DIAGNOSTIC      HYSTERECTOMY (CERVIX STATUS UNKNOWN)      INDUCED   1970    LUMBAR SPINE SURGERY Right 2019    Right L2 L3 TRANSFORAMINAL performed by Jeremie Adamson MD at [address] [place] [address]  Right 05/07/2019    Right L2 L3 TRANSFORAMINAL performed by Jeremie Adamson MD at [address] [place] [address] Dr [place] 06/04/2019    Right L3 & L4 TRANSFORAMINAL performed by Jeremie Adamson MD at [address] [place] [address] Dr Right 12/12/2019    Right L4 L5 TRANSFORAMINAL performed by Jeremie Adamson MD at [facility] Right 01/20/2020    RIGHT L4 L5  TRANSFORAMINAL performed by Jeremie Adamson MD at [facility] Right 02/17/2020    Right L4 L5 TRANSFORAMINAL performed by Jeremie Adamson MD at [address] [place] [address] 1.1-2CM FACE,FACIAL Left 02/07/2018    Excision Cyst Left Chest, Middle Back performed by Jewel Leal MD at [address] AA&/STRD TFRML EPI LUMBAR/SACRAL EA ADDL Right 10/05/2018    Right L2 & L3 TRANSFORAMINAL performed by Jeremie Adamson MD at [address] AA&/STRD TFRML EPI LUMBAR/SACRAL EA ADDL Right 10/26/2018    Right L2 L3 TRANSFORAMINAL performed by Jeremie Adamson MD at [place] [address] Right 08/01/2019    Right  L2 L3 L4 L5 RADIOFREQUENCY performed by Jeremie Adamson MD at [address] [place] [address] (CERVIX REMOVED)  1987    Cervical cancer. Had XRT    TONSILLECTOMY  1983       ALLERGIES:  No Known Allergies    HOME MEDICATIONS:  Prior to Admission medications    Medication Sig Start Date End Date Taking? Authorizing Provider   oxyCODONE-acetaminophen (PERCOCET)  MG per tablet Take 1 tablet by mouth every 8 hours as needed for Pain for up to 30 days. Intended supply: 30 days 12/16/22 1/15/23  Ynes Diego MD   potassium chloride (MICRO-K) 10 MEQ extended release capsule TAKE 1 CAPSULE TWICE DAILY 12/5/22   Ynes Diego MD   oxyCODONE-acetaminophen (PERCOCET) 5-325 MG per tablet Take 2 tablets by mouth every 4 hours as needed for Pain for up to 30 days.  Take lowest dose possible to manage pain 11/29/22 12/29/22  [facility] MD Stephany   spironolactone (ALDACTONE) 50 MG tablet Take 1 tablet by mouth daily 11/23/22   Isaias Bains DO   STIOLTO RESPIMAT 2.5-2.5 MCG/ACT AERS INHALE 2 PUFFS INTO THE LUNGS DAILY 11/14/22   Donnie Clark DO   fluticasone (FLONASE) 50 MCG/ACT nasal spray USE 2 SPRAYS NASALLY EVERY DAY 11/11/22   Enid Landry MD   ibuprofen (ADVIL;MOTRIN) 400 MG tablet TAKE 1 TABLET EVERY 6 HOURS AS NEEDED FOR PAIN 11/11/22   Enid Landry MD   Insulin Syringe-Needle U-100 (DROPLET INSULIN SYRINGE) 31G X 5/16\" 0.5 ML MISC USE TO INJECT INTO THE SKIN TWO TIMES DAILY 11/11/22   Enid Landry MD   DROPLET PEN NEEDLES 31G X 8 MM MISC USE  FOR  INJECTIONS TWICE DAILY 11/11/22   Enid Landry MD    MG tablet TAKE 1 TABLET EVERY 6 HOURS AS NEEDED FOR PAIN 11/11/22   Enid Landry MD   insulin glargine (LANTUS) 100 UNIT/ML injection vial INJECT 15 UNITS INTO THE SKIN TWO TIMES DAILY (DISCARD AND BEGIN A NEW VIAL AFTER 28 DAYS) 11/3/22   Enid Landry MD   ascorbic acid (VITAMIN C) 500 MG tablet TAKE 1 TABLET BY MOUTH TWICE DAILY 10/7/22   Enid Landry MD   vitamin D3 (CHOLECALCIFEROL) 25 MCG (1000 UT) TABS tablet TAKE 3 TABLETS BY MOUTH TWICE DAILY 10/7/22   Enid Landry MD   calcium carbonate-vitamin D3 (CALCIUM 600+D3) 600-400 MG-UNIT TABS per tab TAKE 1 TABLET BY MOUTH TWICE DAILY 10/7/22   Enid Landry MD   Microlet Lancets MISC USE TO TEST 4 TIMES A DAY. 10/7/22   Enid Landry MD   magnesium oxide (MAG-OX) 400 MG tablet Take 1 tablet by mouth daily 10/7/22   Enid Landry MD   gabapentin (NEURONTIN) 600 MG tablet Take 1 tablet by mouth 3 times daily for 14 days. 10/6/22 12/16/22  Enid Landry MD   atorvastatin (LIPITOR) 40 MG tablet TAKE 1 TABLET EVERY DAY. 10/3/22   Enid Landry MD   gabapentin (NEURONTIN) 600 MG tablet Take 1 tablet by mouth 3 times daily for 30 days.  9/16/22 10/16/22  Enid Landry MD   NOVOLOG FLEXPEN 100 UNIT/ML injection pen INJECT 4 UNITS AT LUNCH AND  6 UNITS AT [facility] PEN EXPIRES 28 DAYS AFTER 1ST USE) 9/1/22   Marcellus Khan MD   pantoprazole (PROTONIX) 40 MG tablet TAKE 1 TABLET EVERY DAY 8/30/22   Marcellus Khan MD   furosemide (LASIX) 40 MG tablet TAKE 1 TABLET EVERY DAY 8/30/22   Marcellus Khan MD   amitriptyline (ELAVIL) 25 MG tablet TAKE 1 TABLET AT BEDTIME 8/30/22   Marcellus Khan MD   citalopram (CELEXA) 10 MG tablet TAKE 1 TABLET EVERY DAY 8/30/22   Marcellus Khan MD   buPROPion (WELLBUTRIN) 75 MG tablet TAKE 1 TABLET TWICE DAILY 8/30/22   Marcellus Khan MD   clopidogrel (PLAVIX) 75 MG tablet TAKE 1 TABLET EVERY DAY 8/30/22   Marcellus Khan MD   albuterol sulfate HFA (PROVENTIL;VENTOLIN;PROAIR) 108 (90 Base) MCG/ACT inhaler Inhale 2 puffs into the lungs 4 times daily 8/30/22   Osito Schwartz MD   albuterol (PROVENTIL) (2.5 MG/3ML) 0.083% nebulizer solution Take 3 mLs by nebulization every 6 hours as needed for Wheezing 6/24/22   Marcellus Khan MD   triamcinolone (KENALOG) 0.1 % cream APPLY TOPICALLY TWICE DAILY 6/24/22   Marcellus Khan MD   empagliflozin (JARDIANCE) 10 MG tablet Take 1 tablet by mouth daily 5/23/22   Marcellus Khan MD   metoprolol succinate (TOPROL XL) 25 MG extended release tablet TAKE 1/2 TABLET EVERY DAY 5/6/22   Marcellus Khan MD   alendronate (FOSAMAX) 70 MG tablet TAKE 1 TABLET EVERY WEEK 4/5/22   Marcellus Khan MD   senna (SENOKOT) 8.6 MG TABS tablet TAKE 1 TABLET BY MOUTH TWICE DAILY 2/17/22   Marcellus Khan MD   CONTOUR NEXT TEST strip USE TO TEST 4 TIMES A DAY. 1/17/22   Marcellus Khan MD   magnesium oxide (MAGNESIUM-OXIDE) 400 (241.3 Mg) MG TABS tablet Take 1 tablet by mouth 2 times daily 10/15/21   Marcellus Khan MD   Misc. Devices MISC It is in my medical opinion that Brittany Godinez be allowed to have a kitten in her apartment at all times for companionship to help with her Depression, Anxiety, Fibromyalgia and Osteoarthritis.  8/17/21   Marcellus Khan MD dorzolamide (TRUSOPT) 2 % ophthalmic solution Apply to eye 3 times daily 7/19/21   Historical Provider, MD   pyridoxine (B-6) 100 MG tablet Take 50 mg by mouth daily    Historical Provider, MD   Biotin 77608 MCG TABS Take by mouth    Historical Provider, MD   Respiratory Therapy Supplies [facility] CPAP supplies 8/13/20   Prabha Finn MD   nitroGLYCERIN (NITROSTAT) 0.4 MG SL tablet PLACE 1 TABLET UNER THE TONGUE EVERY 5 MINUTES AS NEEDED FOR CHEST PAIN AT FIRST SIGN OF CHEST PAIN. 5/14/20   Prabha Finn MD   VITAMIN A PO Take 2,400 mcg by mouth daily    Historical Provider, MD   vitamin E 400 UNIT capsule Take 400 Units by mouth daily    Historical Provider, MD   COMBIGAN 0.2-0.5 % ophthalmic solution PLACE 1 DROP INTO BOTH EYES DAILY 1/2/18   Prabha Finn MD   Compression Stockings MISC by Does not apply route 15-20mmHg  Knee high  Open tow  With assist device to help put them on 5/10/17   Gray Bains DO   Zinc 50 MG TABS Take 50 mg by mouth daily    Historical Provider, MD   niacin 500 MG extended release capsule Take 500 mg by mouth daily    Historical Provider, MD   folic acid (FOLVITE) 449 MCG tablet Take 800 mcg by mouth daily    Historical Provider, MD   Cyanocobalamin (VITAMIN B12 PO) Take 2,500 mcg by mouth daily    Historical Provider, MD   B Complex-C (SUPER B COMPLEX PO) Take 1 tablet by mouth daily     Historical Provider, MD   travoprost, benzalkonium, (TRAVATAN) 0.004 % ophthalmic solution Place 1 drop into both eyes daily 10/7/16   Prabha Finn MD   CPAP Machine MISC by Does not apply route Pressure of 13 (AirSense 10)  [facility].    Historical Provider, MD   Multiple Vitamin (MULTI VITAMIN DAILY) TABS Take by mouth daily    Historical Provider, MD     .  CURRENT MEDICATIONS:  Scheduled Meds:   amitriptyline  25 mg Oral Nightly    atorvastatin  40 mg Oral Daily    buPROPion  75 mg Oral BID    citalopram  10 mg Oral Daily    clopidogrel  75 mg Oral Daily    furosemide  40 mg Oral Daily    insulin glargine  15 Units SubCUTAneous BID    metoprolol succinate  12.5 mg Oral Daily    pantoprazole  40 mg Oral Daily    spironolactone  50 mg Oral Daily    sodium chloride flush  10 mL IntraVENous 2 times per day    heparin (porcine)  4,000 Units IntraVENous Once    vitamin B-12  2,500 mcg Oral Daily    dorzolamide  1 drop Ophthalmic TID    empagliflozin  10 mg Oral Daily    fluticasone  2 spray Each Nostril Daily    magnesium oxide  400 mg Oral Daily    pyridoxine  50 mg Oral Daily    senna  1 tablet Oral BID    tiotropium-olodaterol  2 puff Inhalation Daily    latanoprost  1 drop Both Eyes Nightly    brimonidine  1 drop Both Eyes Daily    And    timolol  1 drop Both Eyes Daily    insulin lispro  4 Units SubCUTAneous Lunch    insulin lispro  6 Units SubCUTAneous Dinner     . Continuous Infusions:   sodium chloride      nitroGLYCERIN 15 mcg/min (22 0642)    heparin (PORCINE) Infusion 14.8 Units/kg/hr (22 1849)    dextrose       . PRN Meds:albuterol, sodium chloride flush, sodium chloride, ondansetron **OR** ondansetron, polyethylene glycol, acetaminophen **OR** acetaminophen, heparin (porcine), heparin (porcine), oxyCODONE-acetaminophen, morphine **OR** morphine, glucose, dextrose bolus **OR** dextrose bolus, glucagon (rDNA), dextrose  .   SOCIAL HISTORY:     Social History     Socioeconomic History    Marital status:      Spouse name: Not on file    Number of children: Not on file    Years of education: Not on file    Highest education level: Not on file   Occupational History    Not on file   Tobacco Use    Smoking status: Former     Packs/day: 1.00     Years: 35.00     Pack years: 35.00     Types: Cigarettes     Quit date: 2001     Years since quittin.2    Smokeless tobacco: Never   Vaping Use    Vaping Use: Never used   Substance and Sexual Activity    Alcohol use: No    Drug use: No    Sexual activity: Never   Other Topics Concern    Not on file   Social History Narrative    Not on file     Social Determinants of Health     Financial Resource Strain: Low Risk     Difficulty of Paying Living Expenses: Not hard at all   Food Insecurity: No Food Insecurity    Worried About Running Out of Food in the Last Year: Never true    Ran Out of Food in the Last Year: Never true   Transportation Needs: Not on file   Physical Activity: Insufficiently Active    Days of Exercise per Week: 2 days    Minutes of Exercise per Session: 10 min   Stress: Not on file   Social Connections: Not on file   Intimate Partner Violence: Not on file   Housing Stability: Not on file       FAMILY HISTORY:   Family History   Problem Relation Age of Onset    Heart Disease Mother     High Blood Pressure Mother     Stroke Mother     Glaucoma Mother     Early Death Father     Cancer Father         stomach    Miscarriages / Stillbirths Maternal Uncle        REVIEW OF SYSTEMS:     Constitutional: No fever, no chills, no lethargy, no weakness. HEENT:  No headache, otalgia, itchy eyes, nasal discharge or sore throat. Cardiac:  Patient reported having chest pain and shortness of breath. Chest:   No cough, phlegm or wheezing. Abdomen:  No abdominal pain, nausea or vomiting. Denies any diarrhea today. Neuro:  No focal weakness, abnormal movements or seizure like activity. Skin:   No rashes, no itching. :   No hematuria, no pyuria, no dysuria, no flank pain. Extremities:  No swelling or joint pains. ROS was otherwise negative except as mentioned in the [address] PHYSICAL EXAM:    BP (!) 142/88   Pulse 82   Temp 99.1 °F (37.3 °C) (Oral)   Resp 18   Ht 5' 4\" (1.626 m)   Wt 242 lb (109.8 kg)   LMP 12/07/1995 (Approximate)   SpO2 91%   BMI 41.54 kg/m²   . TMAX[24]    General: Well developed, Well nourished, No apparent distress  Head:  Normocephalic, Atraumatic  EENT: EOMI, Sclera not icteric, Oropharynx moist  Neck:  Supple, Trachea midline  Lungs:CTA Bilaterally  Heart: RRR, No murmur, No rub, No gallop, PMI nondisplaced. Abdomen: No distention organomegaly. No guarding or rigidity. Tenderness more in umbilical and right lower quadrant area. Ext:No clubbing. No cyanosis. No edema. Skin: No rashes. No jaundice. No stigmata of liver disease. Neuro:  A&O x Three, No focal neurological deficits    LABS and IMAGING:     Hemotological labs:   ANEMIA STUDIES  No results for input(s): LABIRON, TIBC, FERRITIN, [id number], FOLATE, OCCULTBLD in the last 72 hours. CBC  Recent Labs     12/16/22  1132 12/16/22  1938 12/17/22  0438 12/18/22  0320 12/19/22  0608   WBC 10.9 17.6* 18.1* 18.7* 10.5   HGB 13.2 14.2 14.3 12.9 12.3   .7 94.9 92.5 92.5 93.6   RDW 14.6* 14.6* 14.8* 14.7* 14.6*   * 136* See Reflexed IPF Result 136* 130*       PT/INR  No results for input(s): PROTIME, INR in the last 72 hours.     BMP  Recent Labs     12/16/22  1132 12/17/22  0438    135   K 4.4 3.8    102   CO2 23 19*   BUN 17 13   CREATININE 0.81 0.65   GLUCOSE 157* 179*   CALCIUM 8.9 8.8       LIVER WORK UP  Hepatitis Functional Panel:  Recent Labs     12/16/22  1132   ALKPHOS 86   ALT 28   AST 46*   PROT 7.1   BILITOT 0.7   LABALBU 3.5       AMYLASE/LIPASE/AMMONIA  Recent Labs     12/16/22  1132   LIPASE 22       Acute Hepatitis Panel   Lab Results   Component Value Date/Time    HEPCAB NONREACTIVE 01/12/2017 11:15 AM       HCV Genotype   No results found for: HEPATITISCGENOTYPE    HCV Quantitative   No results found for: HCVQNT    Metabolic work up:  Ceruloplasmin  AA work up:  Alpha 1 antitrypsin   No results found for: A1A  AMA:  No results found for: MITOAB    ASMA:  No results found for: SMOOTHMUSCAB    ANCA:    MIRNA:  No results found for: MIRNA    Anti - Liver/Kidney Ab:  No results found for: LIVER-KIDNEYMICROSOMALAB    Ceruloplasmin:  No results found for: CERULOPLSM    Celiac panel:  No results found for: TISSTRNTIIGG, TTGIGA, IGA    Cancer Markers:  CEA:    No results for input(s): CEA in the last 72 hours. Ca 125:   No results for input(s):  in the last 72 hours. Ca 19-9:     Invalid input(s):   AFP: No results for input(s): AFP in the last 72 hours. ASSESSMENT and PLAN:     Diarrhea and abdominal pain secondary to inflammatory/infectious colitis:  -Reports having burgundy colored loose stools. Diarrhea resolved today  -Leukocytosis stable. WBC count 36.8.  -Has umbilical and right lower quadrant abdominal tenderness  -Denies any fever, chills, pain during defecation  -Can consider oral vancomycin if diarrhea recurs or leukocytosis worsens  -Patient on Protonix 40 mg oral daily  - Will order molecular stool culture  - Patient will need EGD and colonoscopy    Unstable angina:  -Patient having substernal chest pain  -Cardiology following  -Management as per primary and cardiology    Diabetes mellitus type 2:  -Management as per primary        This plan was formulated in collaboration with Dr. Sakshi Kaiser. Awaiting attending attestation. Electronically signed by:  Jaswinder Guerrero MD  Internal Medicine Resident, PGY-1  [facility];  [place], [place]  12/19/2022,9:27 AM

## 2022-12-19 NOTE — PROGRESS NOTES
[facility]  Office: [address], DO, Otto Souza, DO, Oma Estrada, DO, Stormy Clyde Blood, DO, Andi Argueta MD, Marianela Stinson MD, Tor Multani MD, Fredo Cummings MD,  Rhonda Peterson MD, Blade Barrera MD, Megan Davis, DO, Tito Galloway MD,  Chrissie Rogers MD, Scotty Valle MD, Eunice Luna, DO, Leslee Estrada MD, Joan Biggs MD, Gianna Meehan, DO, Evans Reyes MD, Eduin Riojas MD, True Cortes MD, Tammy Ramirez MD, Bobby Campbell, DO, Shaquille Louis MD, Blane Pulliam MD, Stacie Dill, Juan Darnell, CNP, Craig Sever, CNP, Giovany Camarena, CNP,  Yina Christianson, [facility], Dacia Simeon, CNP, Sylvia Morgan, CNP, Flores Mcknight, CNP, Dony Nguyen, CNP, Ronnie Bunch, CNP, Whitney Washington PA-C, Carlos Oneil, CNS, Tanika Degroot, CNP, Cedric Mazariegos, [address] [place] [address]    Progress Note    12/19/2022    1:34 PM    Name:   Ashwin Feldman  MRN:     [id number]     [place]:      [de-identified]   Room:   [address] [place] [address] Day:  3  Admit Date:  12/16/2022  7:07 PM    PCP:   Esther Junior MD  Code Status:  Full Code    Subjective:     C/C: chest pain, diarrhea    Interval History Status:     Patient's diarrhea better today. Plan for cardiac catheterization today. Denies coughing, urinalysis concerning for possible UTI. Brief History:     Per my colleague: Ashwin Feldman is a 74 y.o. F who presented with chest pain and diarrhea. Symptoms going on for the past 3 days. Chest pain dull, substernal in nature. Had recent stress that was positive, had cath scheduled. Presented to outside hospital, cardiology consulted, recommended transfer to [facility] for further evaluation. Diarrhea going on last few days, states improving. Labs done demonstrating elevated d-dimer, no history of clots per patient. CT PE and venous ultrasound were negative. Troponin stable.      Review of Systems: Constitutional:  negative for chills, fevers, sweats  Respiratory:  negative for cough, dyspnea on exertion, shortness of breath, wheezing  Cardiovascular:  positive for chest pain   Gastrointestinal:  negative for abdominal pain, constipation, diarrhea is improving   neurological:  negative for dizziness, headache    Medications:      Allergies:  No Known Allergies    Current Meds:   Scheduled Meds:    sodium chloride flush  5-40 mL IntraVENous 2 times per day    sodium chloride flush  5-40 mL IntraVENous 2 times per day    amitriptyline  25 mg Oral Nightly    atorvastatin  40 mg Oral Daily    buPROPion  75 mg Oral BID    citalopram  10 mg Oral Daily    clopidogrel  75 mg Oral Daily    furosemide  40 mg Oral Daily    insulin glargine  15 Units SubCUTAneous BID    metoprolol succinate  12.5 mg Oral Daily    pantoprazole  40 mg Oral Daily    spironolactone  50 mg Oral Daily    sodium chloride flush  10 mL IntraVENous 2 times per day    heparin (porcine)  4,000 Units IntraVENous Once    vitamin B-12  2,500 mcg Oral Daily    dorzolamide  1 drop Ophthalmic TID    empagliflozin  10 mg Oral Daily    fluticasone  2 spray Each Nostril Daily    magnesium oxide  400 mg Oral Daily    pyridoxine  50 mg Oral Daily    senna  1 tablet Oral BID    tiotropium-olodaterol  2 puff Inhalation Daily    latanoprost  1 drop Both Eyes Nightly    brimonidine  1 drop Both Eyes Daily    And    timolol  1 drop Both Eyes Daily    insulin lispro  4 Units SubCUTAneous Lunch    insulin lispro  6 Units SubCUTAneous Dinner     Continuous Infusions:    sodium chloride      nitroGLYCERIN Stopped (12/19/22 1017)    heparin (PORCINE) Infusion 14.8 Units/kg/hr (12/18/22 1849)    dextrose       PRN Meds: sodium chloride flush, sodium chloride flush, sodium chloride, albuterol, sodium chloride flush, ondansetron **OR** ondansetron, polyethylene glycol, acetaminophen **OR** acetaminophen, heparin (porcine), heparin (porcine), oxyCODONE-acetaminophen, morphine **OR** morphine, glucose, dextrose bolus **OR** dextrose bolus, glucagon (rDNA), dextrose    Data:     Past Medical History:   has a past medical history of Arthritis, Asthma, Bell's palsy, CAD (coronary artery disease), Cancer ([facility] Carotid artery disease ([facility] Cataracts, bilateral, CHF (congestive heart failure) ([facility] COPD (chronic obstructive pulmonary disease) ([facility] Coronary artery disease, Eczema, Emphysema lung ([facility] Fibromyalgia, Glaucoma, H/O blood clots, Headache, Heart attack ([facility] Hx of Bell's palsy, Hypertension, Liver disease, Mild dementia, Neuropathy, Osteoporosis, Sleep apnea, Tremor, and Type 2 diabetes mellitus with hyperglycemia [facility] Social History:   reports that she quit smoking about 21 years ago. Her smoking use included cigarettes. She has a 35.00 pack-year smoking history. She has never used smokeless tobacco. She reports that she does not drink alcohol and does not use drugs. Family History:   Family History   Problem Relation Age of Onset    Heart Disease Mother     High Blood Pressure Mother     Stroke Mother     Glaucoma Mother     Early Death Father     Cancer Father         stomach    Miscarriages / Stillbirths Maternal Uncle        Vitals:  /69   Pulse 75   Temp 98.2 °F (36.8 °C) (Oral)   Resp 18   Ht 5' 4\" (1.626 m)   Wt 242 lb (109.8 kg)   LMP 1995 (Approximate)   SpO2 94%   BMI 41.54 kg/m²   Temp (24hrs), Av.7 °F (37.1 °C), Min:98.2 °F (36.8 °C), Max:99.1 °F (37.3 °C)    Recent Labs     22  1246 22  1605 22  1928 22  0809   POCGLU 191* 280* 260* 156*         I/O (24Hr):     Intake/Output Summary (Last 24 hours) at 2022 1334  Last data filed at 2022 0809  Gross per 24 hour   Intake 50 ml   Output --   Net 50 ml       Labs:  Hematology:  Recent Labs     22  1938 22  2253 22  0438 22  0320 22  0608   WBC 17.6*  --  18.1* 18.7* 10.5   RBC 4.68  --  4.69 4.28 4.08   HGB 14.2 --  14.3 12.9 12.3   HCT 44.4  --  43.4 39.6 38.2   MCV 94.9  --  92.5 92.5 93.6   MCH 30.3  --  30.5 30.1 30.1   MCHC 32.0  --  32.9 32.6 32.2   RDW 14.6*  --  14.8* 14.7* 14.6*   *  --  See Reflexed IPF Result 136* 130*   MPV 11.0  --   --  10.4 11.0   SEDRATE  --  43*  --   --   --    DDIMER  --  4.80  --   --   --        Chemistry:  Recent Labs     12/16/22  2253 12/17/22  0438 12/19/22  0926   NA  --  135 137   K  --  3.8 PENDING   CL  --  102 99   CO2  --  19* 24   GLUCOSE  --  179* 160*   BUN  --  13 13   CREATININE  --  0.65 0.73   ANIONGAP  --  14 14   LABGLOM  --  >60 >60   CALCIUM  --  8.8 8.8   TROPHS 8  --   --        Recent Labs     12/17/22  0438 12/17/22  0739 12/17/22  1948/22  0748 12/18/22  1246 12/18/22  1605 12/18/22  1928 12/19/22  0809   CHOL 89  --   --   --   --   --   --   --    HDL 48  --   --   --   --   --   --   --    LDLCHOLESTEROL 30  --   --   --   --   --   --   --    CHOLHDLRATIO 1.9  --   --   --   --   --   --   --    TRIG 55  --   --   --   --   --   --   --    POCGLU  --    < > 297* 201* 191* 280* 260* 156*    < > = values in this interval not displayed. ABG:No results found for: POCPH, PHART, PH, POCPCO2, CVE0OBN, PCO2, POCPO2, PO2ART, PO2, POCHCO3, IXH3RYQ, HCO3, NBEA, PBEA, BEART, BE, THGBART, THB, TKK5KSP, ZLHK1QZX, D3EGQFQX, O2SAT, FIO2  Lab Results   Component Value Date/Time    SPECIAL  R HAND 1 ML 12/16/2022 10:53 PM    SPECIAL  L HAND 1 ML 12/16/2022 10:53 PM     Lab Results   Component Value Date/Time    CULTURE NO GROWTH 2 DAYS 12/16/2022 10:53 PM    CULTURE NO GROWTH 2 DAYS 12/16/2022 10:53 PM       Radiology:  XR CHEST PORTABLE    Result Date: 12/16/2022  No acute intrathoracic process.        Physical Examination:        General appearance:  alert, cooperative and no distress  Mental Status:  oriented to person, place and time and normal affect  Lungs:  clear to auscultation bilaterally, normal effort  Heart:  regular rate and rhythm  Abdomen: soft, nontender, nondistended, normal bowel sounds  Extremities:  chronic edema BL LE, no lesions  Skin:  no gross lesions, rashes, induration    Assessment:        Hospital Problems             Last Modified POA    * (Principal) Unstable angina ([address] 12/16/2022 Yes    Obstructive sleep apnea 12/16/2022 Yes    Coronary artery disease due to lipid rich plaque 12/16/2022 Yes    Type 2 diabetes mellitus with diabetic polyneuropathy, with long-term current use of insulin ([address] 12/16/2022 Yes    Peripheral polyneuropathy 12/16/2022 Yes    Bilateral leg edema 12/16/2022 Yes    Memory problem 12/16/2022 Yes    Gait abnormality 12/16/2022 Yes    Essential tremor 12/16/2022 Yes    Anxiety and depression 12/16/2022 Yes    Chronic cerebral ischemia 12/16/2022 Yes    DDD (degenerative disc disease), lumbar 12/16/2022 Yes    Chronic diastolic congestive heart failure ([address] 12/16/2022 Yes    Bronchiectasis without complication ([address] 29/93/4381 Yes     Plan:      Continue statin, Plavix, heparin drip.   Plan for cardiac catheterization today  Continue telemetry monitoring  Urinalysis concerning for UTI, however, patient does not have any symptoms, ID following  Leukocytosis is improving, ID is following  GI evaluation for colitis, follow-up on the stool studies  Continue Lantus and monitor blood sugars  CT PE and venous ultrasound were negative  Patient is on Lasix      Dean Mckeon MD  12/19/2022  1:34 PM       Electronically signed by Dean Mckeon MD on 12/19/2022 at 1:34 PM

## 2022-12-19 NOTE — PROGRESS NOTES
Patient admitted, consent signed and questions answered. Patient ready for procedure. Call light to reach with side rails up 2 of 2. Bilateral groins clipped with writer and Inna present. No family at bedside with patient. History and physical updated.

## 2022-12-20 ENCOUNTER — ANESTHESIA EVENT (OUTPATIENT)
Dept: OPERATING ROOM | Age: 72
End: 2022-12-20
Payer: MEDICARE

## 2022-12-20 PROBLEM — D72.825 BANDEMIA: Status: ACTIVE | Noted: 2022-12-20

## 2022-12-20 PROBLEM — R19.7 DIARRHEA OF PRESUMED INFECTIOUS ORIGIN: Status: ACTIVE | Noted: 2022-12-20

## 2022-12-20 LAB
ABSOLUTE EOS #: 0.19 K/UL (ref 0–0.44)
ABSOLUTE IMMATURE GRANULOCYTE: 0.12 K/UL (ref 0–0.3)
ABSOLUTE LYMPH #: 1.2 K/UL (ref 1.1–3.7)
ABSOLUTE MONO #: 1.19 K/UL (ref 0.1–1.2)
AFP: 1.7 UG/L
ANION GAP SERPL CALCULATED.3IONS-SCNC: 15 MMOL/L (ref 9–17)
BASOPHILS # BLD: 1 % (ref 0–2)
BASOPHILS ABSOLUTE: 0.09 K/UL (ref 0–0.2)
BUN BLDV-MCNC: 13 MG/DL (ref 8–23)
CALCIUM SERPL-MCNC: 8.5 MG/DL (ref 8.6–10.4)
CHLORIDE BLD-SCNC: 97 MMOL/L (ref 98–107)
CO2: 24 MMOL/L (ref 20–31)
CREAT SERPL-MCNC: 0.65 MG/DL (ref 0.5–0.9)
CULTURE: NORMAL
EOSINOPHILS RELATIVE PERCENT: 2 % (ref 1–4)
GFR SERPL CREATININE-BSD FRML MDRD: >60 ML/MIN/1.73M2
GLUCOSE BLD-MCNC: 123 MG/DL (ref 65–105)
GLUCOSE BLD-MCNC: 128 MG/DL (ref 65–105)
GLUCOSE BLD-MCNC: 129 MG/DL (ref 65–105)
GLUCOSE BLD-MCNC: 133 MG/DL (ref 70–99)
GLUCOSE BLD-MCNC: 143 MG/DL (ref 65–105)
GLUCOSE BLD-MCNC: 144 MG/DL (ref 65–105)
HCT VFR BLD CALC: 40.7 % (ref 36.3–47.1)
HEMOGLOBIN: 13.1 G/DL (ref 11.9–15.1)
IMMATURE GRANULOCYTES: 1 %
LYMPHOCYTES # BLD: 14 % (ref 24–43)
MAGNESIUM: 2.2 MG/DL (ref 1.6–2.6)
MCH RBC QN AUTO: 30 PG (ref 25.2–33.5)
MCHC RBC AUTO-ENTMCNC: 32.2 G/DL (ref 28.4–34.8)
MCV RBC AUTO: 93.3 FL (ref 82.6–102.9)
MONOCYTES # BLD: 14 % (ref 3–12)
NRBC AUTOMATED: 0 PER 100 WBC
PDW BLD-RTO: 14.4 % (ref 11.8–14.4)
PLATELET # BLD: 180 K/UL (ref 138–453)
PMV BLD AUTO: 10.7 FL (ref 8.1–13.5)
POTASSIUM SERPL-SCNC: 2.9 MMOL/L (ref 3.7–5.3)
RBC # BLD: 4.36 M/UL (ref 3.95–5.11)
SEG NEUTROPHILS: 68 % (ref 36–65)
SEGMENTED NEUTROPHILS ABSOLUTE COUNT: 6.05 K/UL (ref 1.5–8.1)
SODIUM BLD-SCNC: 136 MMOL/L (ref 135–144)
SPECIMEN DESCRIPTION: NORMAL
WBC # BLD: 8.8 K/UL (ref 3.5–11.3)

## 2022-12-20 PROCEDURE — 6370000000 HC RX 637 (ALT 250 FOR IP): Performed by: INTERNAL MEDICINE

## 2022-12-20 PROCEDURE — 6370000000 HC RX 637 (ALT 250 FOR IP): Performed by: STUDENT IN AN ORGANIZED HEALTH CARE EDUCATION/TRAINING PROGRAM

## 2022-12-20 PROCEDURE — 82105 ALPHA-FETOPROTEIN SERUM: CPT

## 2022-12-20 PROCEDURE — 94640 AIRWAY INHALATION TREATMENT: CPT

## 2022-12-20 PROCEDURE — 2580000003 HC RX 258: Performed by: INTERNAL MEDICINE

## 2022-12-20 PROCEDURE — 99232 SBSQ HOSP IP/OBS MODERATE 35: CPT | Performed by: INTERNAL MEDICINE

## 2022-12-20 PROCEDURE — 83735 ASSAY OF MAGNESIUM: CPT

## 2022-12-20 PROCEDURE — 6360000002 HC RX W HCPCS: Performed by: INTERNAL MEDICINE

## 2022-12-20 PROCEDURE — 2580000003 HC RX 258: Performed by: STUDENT IN AN ORGANIZED HEALTH CARE EDUCATION/TRAINING PROGRAM

## 2022-12-20 PROCEDURE — 80048 BASIC METABOLIC PNL TOTAL CA: CPT

## 2022-12-20 PROCEDURE — 36415 COLL VENOUS BLD VENIPUNCTURE: CPT

## 2022-12-20 PROCEDURE — 2060000000 HC ICU INTERMEDIATE R&B

## 2022-12-20 PROCEDURE — 6370000000 HC RX 637 (ALT 250 FOR IP): Performed by: NURSE PRACTITIONER

## 2022-12-20 PROCEDURE — 85025 COMPLETE CBC W/AUTO DIFF WBC: CPT

## 2022-12-20 PROCEDURE — 82947 ASSAY GLUCOSE BLOOD QUANT: CPT

## 2022-12-20 RX ORDER — POTASSIUM CHLORIDE 20 MEQ/1
40 TABLET, EXTENDED RELEASE ORAL PRN
Status: DISCONTINUED | OUTPATIENT
Start: 2022-12-20 | End: 2022-12-21 | Stop reason: HOSPADM

## 2022-12-20 RX ORDER — POTASSIUM CHLORIDE 7.45 MG/ML
10 INJECTION INTRAVENOUS PRN
Status: DISCONTINUED | OUTPATIENT
Start: 2022-12-20 | End: 2022-12-21 | Stop reason: HOSPADM

## 2022-12-20 RX ORDER — SODIUM CHLORIDE, SODIUM LACTATE, POTASSIUM CHLORIDE, CALCIUM CHLORIDE 600; 310; 30; 20 MG/100ML; MG/100ML; MG/100ML; MG/100ML
INJECTION, SOLUTION INTRAVENOUS CONTINUOUS
Status: DISCONTINUED | OUTPATIENT
Start: 2022-12-20 | End: 2022-12-21 | Stop reason: HOSPADM

## 2022-12-20 RX ADMIN — FUROSEMIDE 40 MG: 40 TABLET ORAL at 08:30

## 2022-12-20 RX ADMIN — CLOPIDOGREL 75 MG: 75 TABLET, FILM COATED ORAL at 08:30

## 2022-12-20 RX ADMIN — SODIUM CHLORIDE, PRESERVATIVE FREE 10 ML: 5 INJECTION INTRAVENOUS at 08:45

## 2022-12-20 RX ADMIN — Medication 2500 MCG: at 08:28

## 2022-12-20 RX ADMIN — DORZOLAMIDE HYDROCHLORIDE 1 DROP: 20 SOLUTION/ DROPS OPHTHALMIC at 08:26

## 2022-12-20 RX ADMIN — SODIUM CHLORIDE, PRESERVATIVE FREE 10 ML: 5 INJECTION INTRAVENOUS at 22:00

## 2022-12-20 RX ADMIN — EMPAGLIFLOZIN 10 MG: 10 TABLET, FILM COATED ORAL at 08:31

## 2022-12-20 RX ADMIN — POTASSIUM BICARBONATE 40 MEQ: 782 TABLET, EFFERVESCENT ORAL at 20:36

## 2022-12-20 RX ADMIN — Medication 10 ML: at 08:46

## 2022-12-20 RX ADMIN — SPIRONOLACTONE 50 MG: 25 TABLET ORAL at 08:32

## 2022-12-20 RX ADMIN — BUPROPION HYDROCHLORIDE 75 MG: 75 TABLET, FILM COATED ORAL at 21:49

## 2022-12-20 RX ADMIN — AMITRIPTYLINE HYDROCHLORIDE 25 MG: 25 TABLET, FILM COATED ORAL at 21:50

## 2022-12-20 RX ADMIN — SODIUM CHLORIDE, POTASSIUM CHLORIDE, SODIUM LACTATE AND CALCIUM CHLORIDE: 600; 310; 30; 20 INJECTION, SOLUTION INTRAVENOUS at 17:33

## 2022-12-20 RX ADMIN — LATANOPROST 1 DROP: 50 SOLUTION OPHTHALMIC at 20:31

## 2022-12-20 RX ADMIN — CITALOPRAM 10 MG: 10 TABLET, FILM COATED ORAL at 08:29

## 2022-12-20 RX ADMIN — FLUTICASONE PROPIONATE 2 SPRAY: 50 SPRAY, METERED NASAL at 08:28

## 2022-12-20 RX ADMIN — POTASSIUM BICARBONATE 40 MEQ: 782 TABLET, EFFERVESCENT ORAL at 14:39

## 2022-12-20 RX ADMIN — DORZOLAMIDE HYDROCHLORIDE 1 DROP: 20 SOLUTION/ DROPS OPHTHALMIC at 20:31

## 2022-12-20 RX ADMIN — DORZOLAMIDE HYDROCHLORIDE 1 DROP: 20 SOLUTION/ DROPS OPHTHALMIC at 17:16

## 2022-12-20 RX ADMIN — TIOTROPIUM BROMIDE AND OLODATEROL 2 PUFF: 3.124; 2.736 SPRAY, METERED RESPIRATORY (INHALATION) at 08:44

## 2022-12-20 RX ADMIN — OXYCODONE HYDROCHLORIDE AND ACETAMINOPHEN 2 TABLET: 5; 325 TABLET ORAL at 23:36

## 2022-12-20 RX ADMIN — POTASSIUM CHLORIDE 10 MEQ: 7.46 INJECTION, SOLUTION INTRAVENOUS at 11:02

## 2022-12-20 RX ADMIN — ACETAMINOPHEN 650 MG: 325 TABLET ORAL at 20:28

## 2022-12-20 RX ADMIN — Medication 50 MG: at 08:30

## 2022-12-20 RX ADMIN — INSULIN GLARGINE 15 UNITS: 100 INJECTION, SOLUTION SUBCUTANEOUS at 21:51

## 2022-12-20 RX ADMIN — MAGNESIUM GLUCONATE 500 MG ORAL TABLET 400 MG: 500 TABLET ORAL at 08:29

## 2022-12-20 RX ADMIN — METOPROLOL SUCCINATE 12.5 MG: 25 TABLET, FILM COATED, EXTENDED RELEASE ORAL at 08:30

## 2022-12-20 RX ADMIN — POLYETHYLENE GLYCOL 3350, SODIUM SULFATE ANHYDROUS, SODIUM BICARBONATE, SODIUM CHLORIDE, POTASSIUM CHLORIDE 4000 ML: 236; 22.74; 6.74; 5.86; 2.97 POWDER, FOR SOLUTION ORAL at 19:32

## 2022-12-20 RX ADMIN — TIMOLOL MALEATE 1 DROP: 5 SOLUTION OPHTHALMIC at 08:27

## 2022-12-20 RX ADMIN — Medication 10 ML: at 08:47

## 2022-12-20 RX ADMIN — DESMOPRESSIN ACETATE 40 MG: 0.2 TABLET ORAL at 08:32

## 2022-12-20 RX ADMIN — PANTOPRAZOLE SODIUM 40 MG: 40 TABLET, DELAYED RELEASE ORAL at 08:31

## 2022-12-20 RX ADMIN — BISACODYL 20 MG: 5 TABLET, COATED ORAL at 17:22

## 2022-12-20 RX ADMIN — BRIMONIDINE TARTRATE 1 DROP: 2 SOLUTION OPHTHALMIC at 08:27

## 2022-12-20 RX ADMIN — BUPROPION HYDROCHLORIDE 75 MG: 75 TABLET, FILM COATED ORAL at 08:31

## 2022-12-20 ASSESSMENT — COPD QUESTIONNAIRES: CAT_SEVERITY: MODERATE

## 2022-12-20 ASSESSMENT — PAIN DESCRIPTION - DESCRIPTORS
DESCRIPTORS: ACHING
DESCRIPTORS: ACHING

## 2022-12-20 ASSESSMENT — PAIN - FUNCTIONAL ASSESSMENT
PAIN_FUNCTIONAL_ASSESSMENT: ACTIVITIES ARE NOT PREVENTED
PAIN_FUNCTIONAL_ASSESSMENT: ACTIVITIES ARE NOT PREVENTED

## 2022-12-20 ASSESSMENT — PAIN SCALES - GENERAL
PAINLEVEL_OUTOF10: 4
PAINLEVEL_OUTOF10: 6
PAINLEVEL_OUTOF10: 8
PAINLEVEL_OUTOF10: 6

## 2022-12-20 ASSESSMENT — PAIN DESCRIPTION - LOCATION
LOCATION: HEAD
LOCATION: HEAD

## 2022-12-20 NOTE — PROGRESS NOTES
[address] [facility] [address]  PROGRESS NOTE    Shift date: 12/19/2022  Shift day: Monday   Shift # 3    Room # 2012/0222-39   Name: Suyapa Bolivar                Quaker: Stasjimmy [address] 33 of Baptism: Unknown    Referral: Routine Visit    Admit Date & Time: 12/16/2022  7:07 PM    Assessment:  Suyapa Bolivar is a 74 y.o. female in the hospital because of \"Chest Pain. \" Upon entering the room writer observes patient resting in hospital bed. Intervention:  Writer introduced self and title as . Patient shared that she was transferred to the hospital from an outlying hospital. Patient underwent a \"Heart Catheterization today,\" and will undergo more procedures tomorrow. Patient was coping well throughout visit, however, complained of the drink she needed to consume overnight to prepare for her \"colonoscopy. \" Patient confirmed that her daughter is her main support and that she won't be able to be here in the morning, due to work. Patient was in good spirits and thanked  for visit. Outcome:  Patient thanked  for visit and care. Plan:  Chaplains will remain available to offer spiritual and emotional support as needed. 12/19/22 2156   Encounter Summary   Service Provided For: Patient   Referral/Consult From: [facility]   Last Encounter  12/19/22   Complexity of Encounter Low   Begin Time 2156   End Time  2208   Total Time Calculated 12 min   Encounter    Type Initial Screen/Assessment   Spiritual/Emotional needs   Type Spiritual Support   Assessment/Intervention/Outcome   Assessment Calm;Coping  (Experiencing Discomfort)   Intervention Active listening;Discussed belief system/Worship practices/martha;Discussed illness injury and its impact; Explored/Affirmed feelings, thoughts, concerns;Explored Coping Skills/Resources;Nurtured Hope;Sustaining Presence/Ministry of presence   Outcome Comfort;Coping;Receptive   Plan and Referrals   Plan/Referrals Continue to visit, (comment)  (as needed)     Electronically signed by Sulaiman Doyle, on 12/19/2022 at 10:42 PM.  Rajendra Sergey  [phone]

## 2022-12-20 NOTE — PLAN OF CARE
Problem: Discharge Planning  Goal: Discharge to home or other facility with appropriate resources  Outcome: Progressing     Problem: Safety - Adult  Goal: Free from fall injury  Outcome: Progressing     Problem: ABCDS Injury Assessment  Goal: Absence of physical injury  Outcome: Progressing     Problem: Chronic Conditions and Co-morbidities  Goal: Patient's chronic conditions and co-morbidity symptoms are monitored and maintained or improved  Outcome: Progressing     Problem: Respiratory - Adult  Goal: Achieves optimal ventilation and oxygenation  Outcome: Progressing     Problem: Cardiovascular - Adult  Goal: Maintains optimal cardiac output and hemodynamic stability  Outcome: Progressing  Goal: Absence of cardiac dysrhythmias or at baseline  Outcome: Progressing     Problem: Gastrointestinal - Adult  Goal: Minimal or absence of nausea and vomiting  Outcome: Progressing  Goal: Maintains or returns to baseline bowel function  Outcome: Progressing  Goal: Maintains adequate nutritional intake  Outcome: Progressing  Goal: Establish and maintain optimal ostomy function  Outcome: Progressing     Problem: Metabolic/Fluid and Electrolytes - Adult  Goal: Electrolytes maintained within normal limits  Outcome: Progressing  Goal: Hemodynamic stability and optimal renal function maintained  Outcome: Progressing  Goal: Glucose maintained within prescribed range  Outcome: Progressing     Problem: Infection - Adult  Goal: Absence of infection at discharge  Outcome: Progressing  Goal: Absence of infection during hospitalization  Outcome: Progressing    Goal: Absence of fever/infection during anticipated neutropenic period  Outcome: Progressing    Problem: Pain  Goal: Verbalizes/displays adequate comfort level or baseline comfort level  Outcome: Progressing

## 2022-12-20 NOTE — PROGRESS NOTES
[facility]. [facility]   Gastroenterology Progress Note    Rosemarie Gil is a 74 y.o. female patient. Hospitalization Day:4      Chief consult reason:   Concern for colitis on CT abdomen      Subjective:    -Patient examined at bedside. Patient awake, alert, oriented X4. Acute distress.  -Hemodynamically stable with blood pressure 120/70 mmHg and MAP of 89. -Breathing comfortably on room air.  -Patient still reports having abdominal pain and tenderness.  -Abdominal tenderness noted on palpation.  -Patient n.p.o.      VITALS:  /70   Pulse 65   Temp 99 °F (37.2 °C) (Oral)   Resp 18   Ht 5' 4\" (1.626 m)   Wt 241 lb 2.9 oz (109.4 kg)   LMP 1995 (Approximate)   SpO2 95%   BMI 41.40 kg/m²   TEMPERATURE:  Current - Temp: 99 °F (37.2 °C); Max - Temp  Av.6 °F (37 °C)  Min: 97.9 °F (36.6 °C)  Max: 99.2 °F (37.3 °C)    History of present illness: This is a 74 y.o. female who presents with chief complaints of chest pain and diarrhea. The patient has past medical history of coronary artery disease, COPD, CHF, bariatric surgery. According to the patient, the patient started having chest pain 3 days ago which was substernal in location and was dull in character. Patient recently had stress test done on 2022 which showed mild anteroseptal ischemia and the patient was scheduled for cardiac catheterization in early January. The patient again presented to [facility] with chest pain on 2020. The patient was then transferred to [facility] for further management. It was noted that the patient had stable troponins but increased D-dimer. CT PE was negative for pulmonary embolus. Patient was then started on nitro and heparin drip after cardiology consultation for unstable angina. During the admission, the patient also reported having diarrhea since last week. The patient reported having loose burgundy colored stools and felt \"burning up\".   It was noted that the patient's WBC count increased from 10.9 during the ER presentation to 18.7 on 12/18/2022. During the admission, the blood culture showed no growth and C. difficile toxin/antigen was sent for diarrhea with leukocytosis and abdominal pain. Since yesterday, the patient's diarrhea resolved and WBC count normalized. CT abdomen was done yesterday which showed edematous ascending colon/greater than expected for portal hypertensive colopathy indicating infectious/inflammatory colitis. During bedside examination, the patient was awake, alert, oriented X4. Patient was hemodynamically stable with blood pressure 142/88 mmHg and MAP of 105. Patient was breathing comfortably on room air. Patient reported having substernal mild chest pain especially during coughing and breathing deeply. The patient denied any abdominal pain. Abdominal examination revealed tenderness in the umbilical and right lower quadrant area. The patient denied any diarrhea episode today. She denied any headache, fever, chills, nausea, vomiting, cough, muscular pains, vision problems, hearing problems. Physical Assessment:  General appearance:  alert, cooperative and no distress  Mental Status:  oriented to person, place and time and normal affect  Lungs:  clear to auscultation bilaterally, normal effort  Heart:  regular rate and rhythm, no murmur  Abdomen:  soft, nontender, nondistended, normal bowel sounds, no masses, hepatomegaly, splenomegaly  Extremities:  no edema, redness, tenderness in the calves  Skin:  no gross lesions, rashes, induration    Data Review:    Labs and Imaging:   CT ABDOMEN PELVIS W IV CONTRAST Additional Contrast? None    Result Date: 12/18/2022  1. Cirrhotic liver morphology. Trace abdominopelvic ascites. 2.  Edematous ascending colon, greater than expected for portal hypertensive colopathy, therefore an infectious or inflammatory colitis is favored.      XR CHEST PORTABLE    Result Date: 12/16/2022  No acute intrathoracic process. CT CHEST PULMONARY EMBOLISM W CONTRAST    Result Date: 12/18/2022  No evidence of pulmonary embolus. Emphysema. Atelectasis and small focus of plaque-like consolidation right lower lobe. CBC:  Recent Labs     12/18/22  0320 12/19/22  0608 12/20/22  0340   WBC 18.7* 10.5 8.8   HGB 12.9 12.3 13.1   MCV 92.5 93.6 93.3   RDW 14.7* 14.6* 14.4   * 130* 180       ANEMIA STUDIES:  No results for input(s): LABIRON, TIBC, FERRITIN, OZAJVRPW20, FOLATE, OCCULTBLD in the last 72 hours. BMP:  Recent Labs     12/19/22  0926 12/20/22  0913    136   K 3.2* 2.9*   CL 99 97*   CO2 24 24   BUN 13 13   CREATININE 0.73 0.65   GLUCOSE 160* 133*   CALCIUM 8.8 8.5*       LFTS:  No results for input(s): ALKPHOS, ALT, AST, BILITOT, BILIDIR, LABALBU in the last 72 hours. Amylase/Lipase and Ammonia:  No results for input(s): AMYLASE, LIPASE, AMMONIA in the last 72 hours. Acute Hepatitis Panel:  Lab Results   Component Value Date/Time    HEPCAB NONREACTIVE 01/12/2017 11:15 AM       HCV Genotype:  No results found for: HEPATITISCGENOTYPE    HCV Quantitative:  No results found for: HCVQNT    LIVER WORK UP:    AFP  No results found for: AFP    Alpha 1 antitrypsin   No results found for: A1A    MIRNA  No results found for: MIRNA    AMA  No results found for: MITOAB    ASMA  No results found for: SMOOTHMUSCAB    PT/INR  No results for input(s): PROTIME, INR in the last 72 hours. Cancer Markers:  CEA:  No results for input(s): CEA in the last 72 hours. Ca 125:  No results for input(s):  in the last 72 hours. Ca 19-9:   Invalid input(s):   AFP: No results for input(s): AFP in the last 72 hours. Lactic acid:Invalid input(s): LACTIC ACID    Radiology Review:    No results found.       Principal Problem:    Unstable angina (HCC)  Active Problems:    Colitis    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)    Hematochezia    Obstructive sleep apnea    Coronary artery disease due to lipid rich plaque    Type 2 diabetes mellitus with diabetic polyneuropathy, with long-term current use of insulin (HCC)    Peripheral polyneuropathy    Bilateral leg edema    Memory problem    Gait abnormality    Essential tremor    Anxiety and depression    Chronic cerebral ischemia    DDD (degenerative disc disease), lumbar    Chronic diastolic congestive heart failure (HCC)    Bronchiectasis without complication ([facility]  Resolved Problems:    * No resolved hospital problems. *       Assessment and plan:    Diarrhea and abdominal pain secondary to inflammatory/infectious colitis:  -Reports having burgundy colored loose stools. Diarrhea resolved today  -Leukocytosis stable. WBC count 89.1.  -Has umbilical and right lower quadrant abdominal tenderness  -Denies any fever, chills, pain during defecation  -Can consider oral vancomycin if diarrhea recurs or leukocytosis worsens  -Patient on Protonix 40 mg oral daily  -Multiple results stool culture and fecal calprotectin ordered. Awaiting results. -CT abdomen showed cirrhotic liver morphology and trace abdominopelvic ascites  - Patient will need EGD and colonoscopy and EGD for variceal screening. -AFP ordered for [facility] screening     Unstable angina:  -Patient having substernal chest pain  -Cardiology following  -Management as per primary and cardiology     Diabetes mellitus type 2:  -Management as per primary         This plan was formulated in collaboration with Dr. Jayna Perea MD    Awaiting attending attestation. Thank you for allowing me to participate in the care of your patient. Please feel free to contact me with any questions or concerns.      [address] [facility]   Chester Sigala MD PGY-1  12/20/2022 12:03 PM    This note was created with the assistance of a speech-recognition program.  Although the intention is to generate a document that actually reflects the content of the visit, no guarantees can be provided that every mistake has been identified and corrected by editing.

## 2022-12-20 NOTE — PROGRESS NOTES
[facility]  Office: [address], DO, Leroy Martin, DO, Ari Fry, DO, Joe Zelaya Blood, DO, Regino Johnson MD, Yusuf Longo MD, Praveen Durbin MD, Darylene Sparks, MD,  Surinder Jarrell MD, Jon Eugene MD, Samira García, DO, Barry Glass MD,  Lalo Jacobs MD, Jeremiah Hernandez MD, Isaura Day DO, Carolina Martini MD, Dayton Garcia MD, Joann Caballero, DO, Stark Mcardle, MD, Fernando Camacho MD, Iveth Chambers MD, Hillis Kocher, MD, Amie Arzate, DO, Ana Cody MD, Judy Loya MD, Nino Andrew, Gracie Haywood, CNP, Adrienne Jefferson, CNP, Ovi Ritter, CNP,  Addie Hurley, [facility], Danyelle Will, CNP, Surinder Stafford, CNP, Candee Dakin, CNP, Dayton Álvarez, CNP, Eun Mcgraw, CNP, NISH FreyC, Dimitri Chatterjee, CNS, Vinita Mccurdy, CNP, Nato Lopez, [address] [place] [address]    Progress Note    12/20/2022    3:44 PM    Name:   Beto Saab  MRN:     [id number]     [place]:      [de-identified]   Room:   [address] [place] [address] Day:  4  Admit Date:  12/16/2022  7:07 PM    PCP:   Doug Kirkland MD  Code Status:  Full Code    Subjective:     C/C: chest pain, diarrhea    Interval History Status:     Patient underwent cardiac catheterization  and did not need any stents  Current multiple bowel movements with colonoscopy prep. Otherwise denies any acute issues. Brief History:     Per my colleague: Beto Saab is a 74 y.o. F who presented with chest pain and diarrhea. Symptoms going on for the past 3 days. Chest pain dull, substernal in nature. Had recent stress that was positive, had cath scheduled. Presented to outside hospital, cardiology consulted, recommended transfer to [facility] for further evaluation. Diarrhea going on last few days, states improving. Labs done demonstrating elevated d-dimer, no history of clots per patient. CT PE and venous ultrasound were negative.   Troponin stable. S/p cardiac catheterization on 12/19/2022. No intervention needed. Continue Plavix and statin. Review of Systems:     Constitutional:  negative for chills, fevers, sweats  Respiratory:  negative for cough, dyspnea on exertion, shortness of breath, wheezing  Cardiovascular: Negative for chest pain, palpitations  Gastrointestinal:  negative for abdominal pain, constipation, diarrhea is improving   neurological:  negative for dizziness, headache    Medications:      Allergies:  No Known Allergies    Current Meds:   Scheduled Meds:    bisacodyl  20 mg Oral Once    polyethylene glycol  4,000 mL Oral Once    sodium chloride flush  5-40 mL IntraVENous 2 times per day    sodium chloride flush  5-40 mL IntraVENous 2 times per day    sodium chloride flush  5-40 mL IntraVENous 2 times per day    amitriptyline  25 mg Oral Nightly    atorvastatin  40 mg Oral Daily    buPROPion  75 mg Oral BID    citalopram  10 mg Oral Daily    clopidogrel  75 mg Oral Daily    furosemide  40 mg Oral Daily    insulin glargine  15 Units SubCUTAneous BID    metoprolol succinate  12.5 mg Oral Daily    pantoprazole  40 mg Oral Daily    spironolactone  50 mg Oral Daily    sodium chloride flush  10 mL IntraVENous 2 times per day    vitamin B-12  2,500 mcg Oral Daily    dorzolamide  1 drop Ophthalmic TID    empagliflozin  10 mg Oral Daily    fluticasone  2 spray Each Nostril Daily    magnesium oxide  400 mg Oral Daily    pyridoxine  50 mg Oral Daily    tiotropium-olodaterol  2 puff Inhalation Daily    latanoprost  1 drop Both Eyes Nightly    brimonidine  1 drop Both Eyes Daily    And    timolol  1 drop Both Eyes Daily    insulin lispro  4 Units SubCUTAneous Lunch    insulin lispro  6 Units SubCUTAneous Dinner     Continuous Infusions:    lactated ringers      sodium chloride      dextrose       PRN Meds: potassium chloride **OR** potassium alternative oral replacement **OR** potassium chloride, sodium chloride flush, sodium chloride flush, sodium chloride flush, sodium chloride, albuterol, sodium chloride flush, ondansetron **OR** ondansetron, polyethylene glycol, acetaminophen **OR** acetaminophen, oxyCODONE-acetaminophen, morphine **OR** morphine, glucose, dextrose bolus **OR** dextrose bolus, glucagon (rDNA), dextrose    Data:     Past Medical History:   has a past medical history of Arthritis, Asthma, Bell's palsy, CAD (coronary artery disease), Cancer ([facility] Carotid artery disease ([facility] Cataracts, bilateral, CHF (congestive heart failure) ([facility] COPD (chronic obstructive pulmonary disease) ([facility] Coronary artery disease, Eczema, Emphysema lung ([facility] Fibromyalgia, Glaucoma, H/O blood clots, Headache, Heart attack ([facility] Hx of Bell's palsy, Hypertension, Liver disease, Mild dementia, Neuropathy, Osteoporosis, Sleep apnea, Tremor, and Type 2 diabetes mellitus with hyperglycemia ([facility] Social History:   reports that she quit smoking about 21 years ago. Her smoking use included cigarettes. She has a 35.00 pack-year smoking history. She has never used smokeless tobacco. She reports that she does not drink alcohol and does not use drugs. Family History:   Family History   Problem Relation Age of Onset    Heart Disease Mother     High Blood Pressure Mother     Stroke Mother     Glaucoma Mother     Early Death Father     Cancer Father         stomach    Miscarriages / Stillbirths Maternal Uncle        Vitals:  /70   Pulse 65   Temp 99 °F (37.2 °C) (Oral)   Resp 18   Ht 5' 4\" (1.626 m)   Wt 241 lb 2.9 oz (109.4 kg)   LMP 1995 (Approximate)   SpO2 95%   BMI 41.40 kg/m²   Temp (24hrs), Av.6 °F (37 °C), Min:97.9 °F (36.6 °C), Max:99.2 °F (37.3 °C)    Recent Labs     22  1937 22  0722 22  1045 22  1409   POCGLU 210* 128* 129* 143*         I/O (24Hr):     Intake/Output Summary (Last 24 hours) at 2022 1544  Last data filed at 2022 0000  Gross per 24 hour   Intake 1750 ml   Output 500 ml   Net 1250 ml         Labs:  Hematology:  Recent Labs     12/18/22  0320 12/19/22  0608 12/20/22  0340   WBC 18.7* 10.5 8.8   RBC 4.28 4.08 4.36   HGB 12.9 12.3 13.1   HCT 39.6 38.2 40.7   MCV 92.5 93.6 93.3   MCH 30.1 30.1 30.0   MCHC 32.6 32.2 32.2   RDW 14.7* 14.6* 14.4   * 130* 180   MPV 10.4 11.0 10.7       Chemistry:  Recent Labs     12/19/22  0926 12/20/22  0913    136   K 3.2* 2.9*   CL 99 97*   CO2 24 24   GLUCOSE 160* 133*   BUN 13 13   CREATININE 0.73 0.65   MG 2.3 2.2   ANIONGAP 14 15   LABGLOM >60 >60   CALCIUM 8.8 8.5*       Recent Labs     12/19/22  0809 12/19/22  1516 12/19/22  1937 12/20/22  0722 12/20/22  1045 12/20/22  1409   POCGLU 156* 145* 210* 128* 129* 143*       ABG:No results found for: POCPH, PHART, PH, POCPCO2, RKT4PEM, PCO2, POCPO2, PO2ART, PO2, POCHCO3, ZTU9NEA, HCO3, NBEA, PBEA, BEART, BE, THGBART, THB, BJS6PDU, JZGO7GTS, F8DJNOME, O2SAT, FIO2  Lab Results   Component Value Date/Time    SPECIAL  R HAND 1 ML 12/16/2022 10:53 PM    SPECIAL  L HAND 1 ML 12/16/2022 10:53 PM     Lab Results   Component Value Date/Time    CULTURE NO SIGNIFICANT GROWTH 12/19/2022 11:09 AM       Radiology:  XR CHEST PORTABLE    Result Date: 12/16/2022  No acute intrathoracic process.        Physical Examination:        General appearance:  alert, cooperative and no distress  Mental Status:  oriented to person, place and time and normal affect  Lungs:  clear to auscultation bilaterally, normal effort  Heart:  regular rate and rhythm  Abdomen:  soft, nontender, nondistended, normal bowel sounds  Extremities:  chronic edema BL LE, no lesions  Skin:  no gross lesions, rashes, induration    Assessment:        Hospital Problems             Last Modified POA    * (Principal) Unstable angina ([facility] 12/16/2022 Yes    Colitis 12/19/2022 Yes    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 ([facility] 12/19/2022 Yes    Hematochezia 12/19/2022 Yes    Obstructive sleep apnea 12/16/2022 Yes    Coronary artery disease due to lipid rich plaque 12/16/2022 Yes    Type 2 diabetes mellitus with diabetic polyneuropathy, with long-term current use of insulin ([facility] 12/16/2022 Yes    Peripheral polyneuropathy 12/16/2022 Yes    Bilateral leg edema 12/16/2022 Yes    Memory problem 12/16/2022 Yes    Gait abnormality 12/16/2022 Yes    Essential tremor 12/16/2022 Yes    Anxiety and depression 12/16/2022 Yes    Chronic cerebral ischemia 12/16/2022 Yes    DDD (degenerative disc disease), lumbar 12/16/2022 Yes    Chronic diastolic congestive heart failure ([facility] 12/16/2022 Yes    Bronchiectasis without complication [facility] 69/49/7923 Yes     Plan:      S/p cardiac catheterization on 12/19/2022. No intervention needed. Continue Plavix and statin.   Urinalysis concerning for UTI, however, patient does not have any symptoms, ID following  Leukocytosis is improving, ID is following  GI evaluation for colitis, follow-up on the stool studies, plan for EGD and colonoscopy  Continue Lantus and monitor blood sugars  CT PE and venous ultrasound were negative  gentle IV fluids while inpatient and n.p.o.  Replace potassium      Pablo Payne MD  12/20/2022  3:44 PM       Electronically signed by Pablo Payne MD on 12/20/2022 at 3:44 PM

## 2022-12-20 NOTE — PLAN OF CARE
Problem: Respiratory - Adult  Goal: Achieves optimal ventilation and oxygenation  12/20/2022 0906 by El Bernal RCP  Outcome: Progressing     BRONCHOSPASM/BRONCHOCONSTRICTION     [x]         IMPROVE AERATION/BREATH SOUNDS  [x]   ADMINISTER BRONCHODILATOR THERAPY AS APPROPRIATE  [x]   ASSESS BREATH SOUNDS  []   IMPLEMENT AEROSOL/MDI PROTOCOL  [x]   PATIENT EDUCATION AS NEEDED

## 2022-12-20 NOTE — PLAN OF CARE
Problem: Discharge Planning  Goal: Discharge to home or other facility with appropriate resources  12/20/2022 1024 by Yuridia Rodarte RN  Outcome: Progressing  12/20/2022 0211 by Julieth Gillespie RN  Outcome: Progressing  Flowsheets (Taken 12/19/2022 1545 by [facility] [place] Ann Seaman RN)  Discharge to home or other facility with appropriate resources: Identify barriers to discharge with patient and caregiver     Problem: Safety - Adult  Goal: Free from fall injury  12/20/2022 1024 by Yuridia Rodarte RN  Outcome: Progressing  12/20/2022 0211 by Julieth Gillespie RN  Outcome: Progressing     Problem: ABCDS Injury Assessment  Goal: Absence of physical injury  12/20/2022 1024 by Yuridia Rodarte RN  Outcome: Progressing  12/20/2022 0211 by Julieth Gillespie RN  Outcome: Progressing     Problem: Chronic Conditions and Co-morbidities  Goal: Patient's chronic conditions and co-morbidity symptoms are monitored and maintained or improved  12/20/2022 1024 by Yuridia Rodarte RN  Outcome: Progressing  12/20/2022 0211 by Julieth Gillespie RN  Outcome: Progressing  Flowsheets (Taken 12/19/2022 1545 by Yuridia Rodarte RN)  Care Plan - Patient's Chronic Conditions and Co-Morbidity Symptoms are Monitored and Maintained or Improved: Monitor and assess patient's chronic conditions and comorbid symptoms for stability, deterioration, or improvement     Problem: Respiratory - Adult  Goal: Achieves optimal ventilation and oxygenation  12/20/2022 1024 by Yuridia Rodarte RN  Outcome: Progressing  12/20/2022 0906 by Garrett Ayala RCP  Outcome: Progressing  12/20/2022 0211 by Julieth Gillespie RN  Outcome: Progressing  Flowsheets (Taken 12/19/2022 1545 by Yuridia Rodarte RN)  Achieves optimal ventilation and oxygenation: Assess for changes in respiratory status     Problem: Cardiovascular - Adult  Goal: Maintains optimal cardiac output and hemodynamic stability  12/20/2022 1024 by [place] Ann Seaman RN  Outcome: Progressing  12/20/2022 0211 by Martin Steinberg RN  Outcome: Progressing  Goal: Absence of cardiac dysrhythmias or at baseline  12/20/2022 1024 by Douglas Beltran RN  Outcome: Progressing  12/20/2022 0211 by Martin Steinberg RN  Outcome: Progressing     Problem: Gastrointestinal - Adult  Goal: Minimal or absence of nausea and vomiting  12/20/2022 1024 by Douglas Beltran RN  Outcome: Progressing  12/20/2022 0211 by Martin Steinberg RN  Outcome: Progressing  Flowsheets (Taken 12/19/2022 1545 by Douglas Beltran RN)  Minimal or absence of nausea and vomiting: Administer IV fluids as ordered to ensure adequate hydration  Goal: Maintains or returns to baseline bowel function  12/20/2022 1024 by Douglas Beltran RN  Outcome: Progressing  12/20/2022 0211 by Martin Steinberg RN  Outcome: Progressing  Flowsheets (Taken 12/19/2022 1545 by Douglas Beltran RN)  Maintains or returns to baseline bowel function: Assess bowel function  Goal: Maintains adequate nutritional intake  12/20/2022 1024 by Dougals Beltran RN  Outcome: Progressing  12/20/2022 0211 by Martin Steinberg RN  Outcome: Progressing  Flowsheets (Taken 12/19/2022 1545 by Douglas Beltran RN)  Maintains adequate nutritional intake: Monitor percentage of each meal consumed  Goal: Establish and maintain optimal ostomy function  12/20/2022 1024 by Douglas Beltran RN  Outcome: Progressing  12/20/2022 0211 by Martin Steinberg RN  Outcome: Progressing  Flowsheets (Taken 12/19/2022 1545 by Douglas Beltran RN)  Establish and maintain optimal ostomy function: Monitor output from ostomies     Problem: Metabolic/Fluid and Electrolytes - Adult  Goal: Electrolytes maintained within normal limits  12/20/2022 1024 by Douglas Beltran RN  Outcome: Progressing  12/20/2022 0211 by Martin Steinberg RN  Outcome: Progressing  Flowsheets (Taken 12/19/2022 1545 by [place] Ann Seaman RN)  Electrolytes maintained within normal limits: Monitor labs and assess patient for signs and symptoms of electrolyte imbalances  Goal: Hemodynamic stability and optimal renal function maintained  12/20/2022 1024 by Serge Vargas RN  Outcome: Progressing  12/20/2022 0211 by Sameer Correa RN  Outcome: Progressing  Flowsheets (Taken 12/19/2022 1545 by Serge Vargas RN)  Hemodynamic stability and optimal renal function maintained: Monitor labs and assess for signs and symptoms of volume excess or deficit  Goal: Glucose maintained within prescribed range  12/20/2022 1024 by Serge Vargas RN  Outcome: Progressing  12/20/2022 0211 by Sameer Correa RN  Outcome: Progressing  Flowsheets (Taken 12/19/2022 1545 by [facility] [place] Ann Seaman RN)  Glucose maintained within prescribed range: Monitor blood glucose as ordered     Problem: Infection - Adult  Goal: Absence of infection at discharge  12/20/2022 1024 by Serge Vargas RN  Outcome: Progressing  12/20/2022 0211 by Sameer Correa RN  Outcome: Progressing  Flowsheets (Taken 12/19/2022 1545 by Serge Vargas RN)  Absence of infection at discharge: Assess and monitor for signs and symptoms of infection  Goal: Absence of infection during hospitalization  12/20/2022 1024 by Serge Vargas RN  Outcome: Progressing  12/20/2022 0211 by Sameer Correa RN  Outcome: Progressing  Flowsheets (Taken 12/19/2022 1545 by Serge Vargas RN)  Absence of infection during hospitalization: Assess and monitor for signs and symptoms of infection  Goal: Absence of fever/infection during anticipated neutropenic period  12/20/2022 1024 by Serge Vargas RN  Outcome: Progressing  12/20/2022 0211 by Sameer Correa RN  Outcome: Progressing  [address] (Taken 12/19/2022 1545 by Sisi Seaman RN)  Absence of fever/infection during anticipated neutropenic period: Monitor white blood cell count     Problem: Pain  Goal: Verbalizes/displays adequate comfort level or baseline comfort level  12/20/2022 1024 by Esperanza Jory RN  Outcome: Progressing  12/20/2022 0211 by Luz Washington RN  Outcome: Progressing

## 2022-12-20 NOTE — ANESTHESIA PRE PROCEDURE
Department of Anesthesiology  Preprocedure Note       Name:  Prince Soto   Age:  74 y.o.  :  1950                                          MRN:  [id number]         Date:  2022      Surgeon: Finn Whiteside):  Justen Jorge MD    Procedure: Procedure(s):  COLONOSCOPY DIAGNOSTIC  EGD ESOPHAGOGASTRODUODENOSCOPY    Medications prior to admission:   Prior to Admission medications    Medication Sig Start Date End Date Taking? Authorizing Provider   oxyCODONE-acetaminophen (PERCOCET)  MG per tablet Take 1 tablet by mouth every 8 hours as needed for Pain for up to 30 days. Intended supply: 30 days 12/16/22 1/15/23  Ilene Burgos MD   potassium chloride (MICRO-K) 10 MEQ extended release capsule TAKE 1 CAPSULE TWICE DAILY 22   Ilene Burgos MD   oxyCODONE-acetaminophen (PERCOCET) 5-325 MG per tablet Take 2 tablets by mouth every 4 hours as needed for Pain for up to 30 days.  Take lowest dose possible to manage pain 22  Ilene Burgos MD   spironolactone (ALDACTONE) 50 MG tablet Take 1 tablet by mouth daily 22   Isaias Bains DO   STIOLTO RESPIMAT 2.5-2.5 MCG/ACT AERS INHALE 2 PUFFS INTO THE LUNGS DAILY 22   Nigel Clark DO   fluticasone (FLONASE) 50 MCG/ACT nasal spray USE 2 SPRAYS NASALLY EVERY DAY 22   Ilene Burgos MD   ibuprofen (ADVIL;MOTRIN) 400 MG tablet TAKE 1 TABLET EVERY 6 HOURS AS NEEDED FOR PAIN 22   Ilene Burgos MD   Insulin Syringe-Needle U-100 (DROPLET INSULIN SYRINGE) 31G X 5/16\" 0.5 ML MISC USE TO INJECT INTO THE SKIN TWO TIMES DAILY 22   Ilene Burgos MD   DROPLET PEN NEEDLES 31G X 8 MM MISC USE  FOR  INJECTIONS TWICE DAILY 22   Ilene Burgos MD    MG tablet TAKE 1 TABLET EVERY 6 HOURS AS NEEDED FOR PAIN 22   Ilene Burgos MD   insulin glargine (LANTUS) 100 UNIT/ML injection vial INJECT 15 UNITS INTO THE SKIN TWO TIMES DAILY (DISCARD AND BEGIN A NEW VIAL AFTER 28 DAYS) 11/3/22   Claudy MD Stephany   ascorbic acid (VITAMIN C) 500 MG tablet TAKE 1 TABLET BY MOUTH TWICE DAILY 10/7/22   Won Mak MD   vitamin D3 (CHOLECALCIFEROL) 25 MCG (1000 UT) TABS tablet TAKE 3 TABLETS BY MOUTH TWICE DAILY 10/7/22   Won Mak MD   calcium carbonate-vitamin D3 (CALCIUM 600+D3) 600-400 MG-UNIT TABS per tab TAKE 1 TABLET BY MOUTH TWICE DAILY 10/7/22   Won Mak MD   Microlet Lancets MISC USE TO TEST 4 TIMES A DAY. 10/7/22   Won Mak MD   magnesium oxide (MAG-OX) 400 MG tablet Take 1 tablet by mouth daily 10/7/22   Won Mak MD   gabapentin (NEURONTIN) 600 MG tablet Take 1 tablet by mouth 3 times daily for 14 days. 10/6/22 12/16/22  Won Mak MD   atorvastatin (LIPITOR) 40 MG tablet TAKE 1 TABLET EVERY DAY. 10/3/22   Won Mak MD   gabapentin (NEURONTIN) 600 MG tablet Take 1 tablet by mouth 3 times daily for 30 days.  9/16/22 10/16/22  Won Mak MD   NOVOLOG FLEXPEN 100 UNIT/ML injection pen INJECT 4 UNITS AT LUNCH AND  6 UNITS AT SUPPER [facility] PEN EXPIRES 28 DAYS AFTER 1ST USE) 9/1/22   Won Mak MD   pantoprazole (PROTONIX) 40 MG tablet TAKE 1 TABLET EVERY DAY 8/30/22   Won Mak MD   furosemide (LASIX) 40 MG tablet TAKE 1 TABLET EVERY DAY 8/30/22   Won Mak MD   amitriptyline (ELAVIL) 25 MG tablet TAKE 1 TABLET AT BEDTIME 8/30/22   Won Mak MD   citalopram (CELEXA) 10 MG tablet TAKE 1 TABLET EVERY DAY 8/30/22   Won Mak MD   buPROPion (WELLBUTRIN) 75 MG tablet TAKE 1 TABLET TWICE DAILY 8/30/22   Won Mak MD   clopidogrel (PLAVIX) 75 MG tablet TAKE 1 TABLET EVERY DAY 8/30/22   Won Mak MD   albuterol sulfate HFA (PROVENTIL;VENTOLIN;PROAIR) 108 (90 Base) MCG/ACT inhaler Inhale 2 puffs into the lungs 4 times daily 8/30/22   Gregorio Forman MD   albuterol (PROVENTIL) (2.5 MG/3ML) 0.083% nebulizer solution Take 3 mLs by nebulization every 6 hours as needed for Wheezing 6/24/22   [facility] by mouth daily    Historical Provider, MD   niacin 500 MG extended release capsule Take 500 mg by mouth daily    Historical Provider, MD   folic acid (FOLVITE) 124 MCG tablet Take 800 mcg by mouth daily    Historical Provider, MD   Cyanocobalamin (VITAMIN B12 PO) Take 2,500 mcg by mouth daily    Historical Provider, MD   B Complex-C (SUPER B COMPLEX PO) Take 1 tablet by mouth daily     Historical Provider, MD   travoprost, benzalkonium, (TRAVATAN) 0.004 % ophthalmic solution Place 1 drop into both eyes daily 10/7/16   Eddie Robison MD   CPAP Machine MISC by Does not apply route Pressure of 13 (AirSense 10)  [facility].    Historical Provider, MD   Multiple Vitamin (MULTI VITAMIN DAILY) TABS Take by mouth daily    Historical Provider, MD       Current medications:    Current Facility-Administered Medications   Medication Dose Route Frequency Provider Last Rate Last Admin    potassium chloride (KLOR-CON M) extended release tablet 40 mEq  40 mEq Oral PRN Elma Dumas MD        Or    potassium bicarb-citric acid (EFFER-K) effervescent tablet 40 mEq  40 mEq Oral PRN Elma Dumas MD        Or    potassium chloride 10 mEq/100 mL IVPB (Peripheral Line)  10 mEq IntraVENous PRN Elma Dumas  mL/hr at 12/20/22 1102 10 mEq at 12/20/22 1102    sodium chloride flush 0.9 % injection 5-40 mL  5-40 mL IntraVENous 2 times per day Mya Valdivia MD   10 mL at 12/20/22 0847    sodium chloride flush 0.9 % injection 5-40 mL  5-40 mL IntraVENous PRN Mya Valdivia MD        sodium chloride flush 0.9 % injection 5-40 mL  5-40 mL IntraVENous 2 times per day Mya Valdivia MD   10 mL at 12/20/22 0846    sodium chloride flush 0.9 % injection 5-40 mL  5-40 mL IntraVENous PRN Mya Valdivia MD        sodium chloride flush 0.9 % injection 5-40 mL  5-40 mL IntraVENous 2 times per day Claudette Baron MD   10 mL at 12/20/22 0846    sodium chloride flush 0.9 % injection 5-40 mL  5-40 mL IntraVENous PRN Claudette Baron MD        0.9 % sodium chloride infusion   IntraVENous PRN Tamika Quiñonez MD        albuterol (PROVENTIL) nebulizer solution 2.5 mg  2.5 mg Nebulization Q6H PRN Osmel Burr MD        amitriptyline (ELAVIL) tablet 25 mg  25 mg Oral Nightly Osmel Burr MD   25 mg at 12/19/22 2032    atorvastatin (LIPITOR) tablet 40 mg  40 mg Oral Daily Osmel Burr MD   40 mg at 12/20/22 1417    buPROPion [facility]) tablet 75 mg  75 mg Oral BID Osmel Burr MD   75 mg at 12/20/22 0831    citalopram (CELEXA) tablet 10 mg  10 mg Oral Daily Osmel Burr MD   10 mg at 12/20/22 0829    clopidogrel (PLAVIX) tablet 75 mg  75 mg Oral Daily Osmel Burr MD   75 mg at 12/20/22 0830    furosemide (LASIX) tablet 40 mg  40 mg Oral Daily Osmel Burr MD   40 mg at 12/20/22 0830    insulin glargine (LANTUS) injection vial 15 Units  15 Units SubCUTAneous BID Osmel Burr MD   15 Units at 12/19/22 2038    metoprolol succinate (TOPROL XL) extended release tablet 12.5 mg  12.5 mg Oral Daily Osmel Burr MD   12.5 mg at 12/20/22 0830    pantoprazole (PROTONIX) tablet 40 mg  40 mg Oral Daily Osmel Burr MD   40 mg at 12/20/22 0831    spironolactone (ALDACTONE) tablet 50 mg  50 mg Oral Daily Osmel Burr MD   50 mg at 12/20/22 0067    sodium chloride flush 0.9 % injection 10 mL  10 mL IntraVENous 2 times per day Osmel Burr MD   10 mL at 12/20/22 0845    sodium chloride flush 0.9 % injection 10 mL  10 mL IntraVENous PRN Osmel Burr MD        ondansetron (ZOFRAN-ODT) disintegrating tablet 4 mg  4 mg Oral Q8H PRN Osmel Burr MD        Or    ondansetron (ZOFRAN) injection 4 mg  4 mg IntraVENous Q6H PRN Osmel Burr MD        polyethylene glycol (GLYCOLAX) packet 17 g  17 g Oral Daily PRN Osmel Burr MD        acetaminophen (TYLENOL) tablet 650 mg  650 mg Oral Q6H PRN Osmel Burr MD   650 mg at 12/16/22 1924    Or    acetaminophen (TYLENOL) suppository 650 mg  650 mg Rectal Q6H PRN Osmel Burr MD        vitamin B-12 (CYANOCOBALAMIN) tablet 2,500 mcg  2,500 mcg Oral Daily Ruth Campbell MD   2,500 mcg at 12/20/22 0828    dorzolamide (TRUSOPT) 2 % ophthalmic solution 1 drop  1 drop Ophthalmic TID Ruth Campebll MD   1 drop at 12/20/22 0283    empagliflozin (JARDIANCE) tablet 10 mg  10 mg Oral Daily Ruth Campbell MD   10 mg at 12/20/22 0831    fluticasone (FLONASE) 50 MCG/ACT nasal spray 2 spray  2 spray Each Nostril Daily Ruth Campbell MD   2 spray at 12/20/22 0828    magnesium oxide (MAG-OX) tablet 400 mg  400 mg Oral Daily Ruth Campbell MD   400 mg at 12/20/22 2886    vitamin B-6 (PYRIDOXINE) tablet 50 mg  50 mg Oral Daily Ruth Campbell MD   50 mg at 12/20/22 0830    tiotropium-olodaterol (STIOLTO) 2.5-2.5 MCG/ACT inhaler 2 puff  2 puff Inhalation Daily Ruth Campbell MD   2 puff at 12/20/22 0844    latanoprost (XALATAN) 0.005 % ophthalmic solution 1 drop  1 drop Both Eyes Nightly Ruth Campbell MD   1 drop at 12/19/22 2033    oxyCODONE-acetaminophen (PERCOCET) 5-325 MG per tablet 2 tablet  2 tablet Oral Q4H PRN Ruth Campbell MD        morphine (PF) injection 2 mg  2 mg IntraVENous Q3H PRN Ruth Campbell MD   2 mg at 12/18/22 2104    Or    morphine injection 4 mg  4 mg IntraVENous Q3H PRN Ruth Campbell MD   4 mg at 12/18/22 1532    brimonidine (ALPHAGAN) 0.2 % ophthalmic solution 1 drop  1 drop Both Eyes Daily Ruth Campbell MD   1 drop at 12/20/22 0827    And    timolol (TIMOPTIC) 0.5 % ophthalmic solution 1 drop  1 drop Both Eyes Daily Ruth Campbell MD   1 drop at 12/20/22 0827    insulin lispro (HUMALOG) injection vial 4 Units  4 Units SubCUTAneous Lunch Ruth Campbell MD   4 Units at 12/17/22 1327    insulin lispro (HUMALOG) injection vial 6 Units  6 Units SubCUTAneous Dinner Ruth Campbell MD   6 Units at 12/18/22 1910    glucose chewable tablet 16 g  4 tablet Oral PRN Ruth Campbell MD  dextrose bolus 10% 125 mL  125 mL IntraVENous SOFI Tobin MD        Or    dextrose bolus 10% 250 mL  250 mL IntraVENous PRN Guera Tobin MD        glucagon (rDNA) injection 1 mg  1 mg SubCUTAneous PRN Guera Tobin MD        dextrose 10 % infusion   IntraVENous Continuous SOFI Tobin MD           Allergies:  No Known Allergies    Problem List:    Patient Active Problem List   Diagnosis Code    Obstructive sleep apnea G47.33    Chronic fatigue R53.82    Coronary artery disease due to lipid rich plaque I25.10, I25.83    Vitamin D deficiency E55.9    Type 2 diabetes mellitus with diabetic polyneuropathy, with long-term current use of insulin ([facility]) E11.42, Z79.4    Peripheral polyneuropathy G62.9    Bilateral leg edema R60.0    Right hip pain M25.551    Muscle ache M79.10    Hx of Bell's palsy Z86.69    Memory problem R41.3    Gait abnormality R26.9    Balance problem R26.89    H/O falling Z91.81    Dizziness R42    Intractable episodic tension-type headache G44. 211    Essential tremor G25.0    Anxiety and depression F41.9, F32. A    Elevated hemoglobin A1c R73.09    VBI (vertebrobasilar insufficiency) G45.0    Chronic cerebral ischemia I67.82    TIA (transient ischemic attack) G45.9    Chronic tension headache G44.229    Morbid obesity with BMI of 45.0-49.9, adult ([facility]) E66.01, Z68.42    Chronic right-sided low back pain with right-sided sciatica M54.41, G89.29    Lumbar radiculopathy M54.16    Encounter for chronic pain management G89.29    Carotid stenosis, asymptomatic, bilateral I65.23    Chronic tension-type headache, not intractable G44.229    Lumbosacral spondylosis without myelopathy M47.817    DDD (degenerative disc disease), lumbar M51.36    Acquired spondylolisthesis M43.10    Lumbar facet joint syndrome M47.816    Chronic diastolic congestive heart failure ([facility]) I50.32    Bronchiectasis without complication ([facility]) G83.7    CHF (congestive heart failure), NYHA class I, chronic, diastolic (HCC) G40.53    Unstable angina (HCC) I20.0    Colitis K52.9    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 ([address] K75.81, K74.60    Hematochezia K92.1       Past Medical History:        Diagnosis Date    Arthritis     Asthma     Bell's palsy     CAD (coronary artery disease)     Cancer ([facility]     cervical    Carotid artery disease ([facility]     Cataracts, bilateral     CHF (congestive heart failure) (HCC)     COPD (chronic obstructive pulmonary disease) (HCC)     Coronary artery disease     Eczema     Emphysema lung (HCC)     Fibromyalgia     Glaucoma     H/O blood clots     Headache     Heart attack ([address]     Hx of Bell's palsy     Mild right facial paralysis    Hypertension     Liver disease     Mild dementia     Neuropathy     Osteoporosis     Sleep apnea 2014    sleep [facility] in [facility]     Type 2 diabetes mellitus with hyperglycemia ([facility]        Past Surgical History:        Procedure Laterality Date    ANESTHESIA NERVE BLOCK Right 2019    Right L2 L3 L4 L5 Diagnostic Medial BB performed by Daisy Montana MD at [address] [facility] Right 2019    Right L2 L3 L4 L5 Confirmatory Medial BB performed by Daisy Montana MD at [address]      CHOLECYSTECTOMY      COLONOSCOPY  2012    COLONOSCOPY N/A 2019    COLONOSCOPY performed by Hanna Perez MD at [facility]  internal hemorrhoids and few diverticuli    CORONARY ANGIOPLASTY WITH STENT PLACEMENT      ENDOSCOPY, COLON, DIAGNOSTIC      HYSTERECTOMY (CERVIX STATUS UNKNOWN)      INDUCED   1970    LUMBAR SPINE SURGERY Right 2019    Right L2 L3 TRANSFORAMINAL performed by Daisy Montana MD at [address] [place] [address] Dr [address] 2019    Right L2 L3 TRANSFORAMINAL performed by Daisy Montana MD at [address] [place] [address] Dr [address] 2019    Right L3 & L4 TRANSFORAMINAL performed by Queen Marcell MD at [address] [place] [address] Right 2019    Right L4 L5 TRANSFORAMINAL performed by Queen Marcell MD at [address] [place] [address] Right 2020    RIGHT L4 L5  TRANSFORAMINAL performed by Queen Marcell MD at [address] [place] [address] Right 2020    Right L4 L5 TRANSFORAMINAL performed by Queen Marcell MD at [address] 1.1-2CM FACE,FACIAL Left 2018    Excision Cyst Left Chest, Middle Back performed by [place] Schwab, [place] at [address] [facility] AA&/STRD TFRML EPI LUMBAR/SACRAL EA ADDL Right 10/05/2018    Right L2 & L3 TRANSFORAMINAL performed by Queen Marcell MD at [address] [facility] AA&/STRD TFRML EPI LUMBAR/SACRAL EA ADDL Right 10/26/2018    Right L2 L3 TRANSFORAMINAL performed by Queen Marcell MD at [address] Right 2019    Right  L2 L3 L4 L5 RADIOFREQUENCY performed by Queen Marcell MD at /[facility] (CERVIX REMOVED)      Cervical cancer. Had XRT    TONSILLECTOMY         Social History:    Social History     Tobacco Use    Smoking status: Former     Packs/day: 1.00     Years: 35.00     Pack years: 35.00     Types: Cigarettes     Quit date: 2001     Years since quittin.2    Smokeless tobacco: Never   Substance Use Topics    Alcohol use:  No                                Counseling given: Not Answered      Vital Signs (Current):   Vitals:    22 0718 22 0742 22 0845 22 1118   BP: 111/76 (!) 142/74  120/70   Pulse: 73 77 74 65   Resp: 18   18   Temp: 37 °C (98.6 °F) 37.1 °C (98.8 °F)  37.2 °C (99 °F)   TempSrc: Oral Oral  Oral   SpO2:  94% 95% 95%   Weight:       Height:                                                  BP Readings from Last 3 Encounters:   22 120/70   22 (!) 134/58   22 130/80       NPO Status: BMI:   Wt Readings from Last 3 Encounters:   12/20/22 241 lb 2.9 oz (109.4 kg)   12/16/22 250 lb (113.4 kg)   11/23/22 251 lb (113.9 kg)     Body mass index is 41.4 kg/m².     CBC:   Lab Results   Component Value Date/Time    WBC 8.8 12/20/2022 03:40 AM    RBC 4.36 12/20/2022 03:40 AM    HGB 13.1 12/20/2022 03:40 AM    HCT 40.7 12/20/2022 03:40 AM    MCV 93.3 12/20/2022 03:40 AM    RDW 14.4 12/20/2022 03:40 AM     12/20/2022 03:40 AM       CMP:   Lab Results   Component Value Date/Time     12/20/2022 09:13 AM    K 2.9 12/20/2022 09:13 AM    CL 97 12/20/2022 09:13 AM    CO2 24 12/20/2022 09:13 AM    BUN 13 12/20/2022 09:13 AM    CREATININE 0.65 12/20/2022 09:13 AM    GFRAA >60 02/10/2022 10:00 AM    LABGLOM >60 12/20/2022 09:13 AM    GLUCOSE 133 12/20/2022 09:13 AM    PROT 7.1 12/16/2022 11:32 AM    CALCIUM 8.5 12/20/2022 09:13 AM    BILITOT 0.7 12/16/2022 11:32 AM    ALKPHOS 86 12/16/2022 11:32 AM    AST 46 12/16/2022 11:32 AM    ALT 28 12/16/2022 11:32 AM       POC Tests:   Recent Labs     12/20/22  1045   POCGLU 129*       Coags:   Lab Results   Component Value Date/Time    PROTIME 11.3 01/15/2019 09:23 AM    INR 1.1 01/15/2019 09:23 AM    APTT 60.2 12/19/2022 09:26 AM       HCG (If Applicable): No results found for: PREGTESTUR, PREGSERUM, HCG, HCGQUANT     ABGs: No results found for: PHART, PO2ART, YFC8NZW, SVE3ERR, BEART, E2KBLMIP     Type & Screen (If Applicable):  No results found for: LABABO, LABRH    Drug/Infectious Status (If Applicable):  Lab Results   Component Value Date/Time    HEPCAB NONREACTIVE 01/12/2017 11:15 AM       COVID-19 Screening (If Applicable):   Lab Results   Component Value Date/Time    COVID19 Not Detected 12/16/2022 11:28 AM           Anesthesia Evaluation  Patient summary reviewed and Nursing notes reviewed no history of anesthetic complications:   Airway: Mallampati: II  TM distance: <3 FB   Neck ROM: full  Mouth opening: > = 3 FB   Dental: normal exam Comment: No upper teeth  8 teeth lower    Pulmonary: breath sounds clear to auscultation  (+) COPD: moderate,  sleep apnea: on CPAP,  asthma:                            Cardiovascular:  Exercise tolerance: poor (<4 METS),   (+) hypertension:, angina:, past MI:, CAD:, CHF:,       ECG reviewed  Rhythm: regular  Rate: normal    Stress test reviewed  Cleared by cardiology     Beta Blocker:  Dose within 24 Hrs      ROS comment: Stress test 12/7/22  Mild reversible anteroseptal ischemia    Heart cath- no intervention needed     Neuro/Psych:   (+) neuromuscular disease:, TIA, headaches:, psychiatric history:            GI/Hepatic/Renal:   (+) liver disease:, bowel prep, morbid obesity          Endo/Other:    (+) DiabetesType II DM, , .                 Abdominal:   (+) obese,           Vascular: Other Findings:           Anesthesia Plan      MAC     ASA 4     (NPO at midnight  RN states potassium level is progress with oral replacement)  Induction: intravenous. Anesthetic plan and risks discussed with patient. Plan discussed with CRNA.                     DAVID Trejo - CRNA   12/20/2022

## 2022-12-20 NOTE — PROGRESS NOTES
[facility]  Progress Note                   Date:   12/20/2022  Patient name: Carlyn Feliz  Date of admission:  12/16/2022  7:07 PM  MRN:   [id number]  YOB: 1950  PCP: Sara Archer MD    Reason for Admission: Unstable angina ([facility] [address] [I20.0]    Subjective:       Clinical Changes /Abnormalities:Patient seen and examined. Denies chest pain or shortness of breath. Tele/vitals/labs reviewed . Discussed case with RN.       Review of Systems    Medications:   Scheduled Meds:   sodium chloride flush  5-40 mL IntraVENous 2 times per day    sodium chloride flush  5-40 mL IntraVENous 2 times per day    sodium chloride flush  5-40 mL IntraVENous 2 times per day    amitriptyline  25 mg Oral Nightly    atorvastatin  40 mg Oral Daily    buPROPion  75 mg Oral BID    citalopram  10 mg Oral Daily    clopidogrel  75 mg Oral Daily    furosemide  40 mg Oral Daily    insulin glargine  15 Units SubCUTAneous BID    metoprolol succinate  12.5 mg Oral Daily    pantoprazole  40 mg Oral Daily    spironolactone  50 mg Oral Daily    sodium chloride flush  10 mL IntraVENous 2 times per day    vitamin B-12  2,500 mcg Oral Daily    dorzolamide  1 drop Ophthalmic TID    empagliflozin  10 mg Oral Daily    fluticasone  2 spray Each Nostril Daily    magnesium oxide  400 mg Oral Daily    pyridoxine  50 mg Oral Daily    tiotropium-olodaterol  2 puff Inhalation Daily    latanoprost  1 drop Both Eyes Nightly    brimonidine  1 drop Both Eyes Daily    And    timolol  1 drop Both Eyes Daily    insulin lispro  4 Units SubCUTAneous Lunch    insulin lispro  6 Units SubCUTAneous Dinner     Continuous Infusions:   sodium chloride      dextrose       CBC:   Recent Labs     12/18/22  0320 12/19/22  0608 12/20/22  0340   WBC 18.7* 10.5 8.8   HGB 12.9 12.3 13.1   * 130* 180       BMP:    Recent Labs     12/19/22  0926 12/20/22  0913    136   K 3.2* 2.9*   CL 99 97*   CO2 24 24   BUN 13 13   CREATININE 0.73 0.65 GLUCOSE 160* 133*       Hepatic:  No results for input(s): AST, ALT, ALB, BILITOT, ALKPHOS in the last 72 hours. Troponin:   No results for input(s): TROPHS in the last 72 hours. BNP: No results for input(s): BNP in the last 72 hours. Lipids:   No results for input(s): CHOL, HDL in the last 72 hours. Invalid input(s): LDLCALCU    INR: No results for input(s): INR in the last 72 hours. DATA:    Diagnostics:       Stress Test: stress test showing posterior infarct 12/7/2022 IN [facility]      LAST CATH 6/15- minimal non obstructive CAD,  mRCA 5%, EF 65%     Holter 1/9/18- 7 beat run of PAT      TTE 1/9/18  CONCLUSIONS:  1. Normal LV size and function with ejection fraction of 65%. 2.  Grade 1 diastolic dysfunction. 3.  Mild concentric LVH. 4.  Mild biatrial enlargement. 5.  No significant valvulopathies. 6.  No effusion. 7.  Mildly dilated RV with normal function. Nuclear Stress 5/2019:  IMPRESSION:  1. No ischemia or infarction. 2. Normal LV systolic function. EF 81%. TTE 12/7/2020  Summary  Normal left ventricular diameter. Left ventricular systolic function is normal. Left ventricular ejectionfraction 65 %. Right ventricular dilatation with normal systolic function. Aortic valve is sclerotic but opens well. Significant mild mitral valve thickening with annular calcification. Trivial mitral regurgitation. Mild tricuspid regurgitation. Estimated right ventricular systolic pressure is 20 mmHg. Holter 4/2021- PAT        Stress test  12/7/2022  1. Mild size/mild intensity anteroseptal ischemia. 2.  No infarct. 3.  No left ventricular ejection fraction-gating issue.              Objective:   Vitals: /70   Pulse 65   Temp 99 °F (37.2 °C) (Oral)   Resp 18   Ht 5' 4\" (1.626 m)   Wt 241 lb 2.9 oz (109.4 kg)   LMP 12/07/1995 (Approximate)   SpO2 95%   BMI 41.40 kg/m²   General appearance: alert and cooperative with exam  HEENT: Head: Normocephalic, no lesions, without obvious abnormality. Neck:no JVD, trachea midline, no adenopathy  Lungs: Clear to auscultation  Heart: Regular rate and rhythm, s1/s2 auscultated, no murmurs  Abdomen: soft, non-tender, bowel sounds active  Extremities: no edema  Neurologic: not done        Assessment / Acute Cardiac Problems:   UNSTABLE ANGINA   Stress test 12/7 demonstrated mild reversible ischemia anterior septal disease with elective cardiac cath reommended jan -4  CAD with Hx of BMS to RCA in 2002, Cath in 2015- non obstructive CAD. Negative Nuclear Stress 5/2019. HFpEF- stable. On aldactone 50 . Elevated leg, compression stocking. Palpitations- PAT. on metoprolol at home   H/o Orthostatic hypotension. Wear compression stockings  LEANDRO/COPD- on CPAP- follows with pulmonar  HTN- Continue metoprolol and aldactone. Monitor BP at home  DL- continue statin.    DM- Management per primary       Patient Active Problem List:     Obstructive sleep apnea     Chronic fatigue     Coronary artery disease due to lipid rich plaque     Vitamin D deficiency     Type 2 diabetes mellitus with diabetic polyneuropathy, with long-term current use of insulin ([facility])     Peripheral polyneuropathy     Bilateral leg edema     Right hip pain     Muscle ache     Hx of Bell's palsy     Memory problem     Gait abnormality     Balance problem     H/O falling     Dizziness     Intractable episodic tension-type headache     Essential tremor     Anxiety and depression     Elevated hemoglobin A1c     VBI (vertebrobasilar insufficiency)     Chronic cerebral ischemia     TIA (transient ischemic attack)     Chronic tension headache     Morbid obesity with BMI of 45.0-49.9, adult ([facility])     Chronic right-sided low back pain with right-sided sciatica     Lumbar radiculopathy     Encounter for chronic pain management     Carotid stenosis, asymptomatic, bilateral     Chronic tension-type headache, not intractable     Lumbosacral spondylosis without myelopathy     DDD (degenerative disc disease), lumbar     Acquired spondylolisthesis     Lumbar facet joint syndrome     Chronic diastolic congestive heart failure (HCC)     Bronchiectasis without complication (HCC)     CHF (congestive heart failure), NYHA class I, chronic, diastolic (HCC)     Unstable angina ([facility] [address]      Plan of Treatment:   S/p cardiac cath with no intervention -right groin no hematoma or bleeding noted . Continue Plavix, statin and BB.   Cardiology is signing off, no further cardiac workup needed   Electronically signed by DAVID Woods NP on 12/20/2022 at 1:29 PM  [address]  [phone]

## 2022-12-21 ENCOUNTER — ANESTHESIA (OUTPATIENT)
Dept: OPERATING ROOM | Age: 72
End: 2022-12-21
Payer: MEDICARE

## 2022-12-21 VITALS
WEIGHT: 240 LBS | HEIGHT: 64 IN | HEART RATE: 74 BPM | OXYGEN SATURATION: 92 % | SYSTOLIC BLOOD PRESSURE: 144 MMHG | BODY MASS INDEX: 40.97 KG/M2 | DIASTOLIC BLOOD PRESSURE: 83 MMHG | RESPIRATION RATE: 20 BRPM | TEMPERATURE: 98.6 F

## 2022-12-21 PROBLEM — R19.7 DIARRHEA OF PRESUMED INFECTIOUS ORIGIN: Status: RESOLVED | Noted: 2022-12-20 | Resolved: 2022-12-21

## 2022-12-21 PROBLEM — I20.0 UNSTABLE ANGINA (HCC): Status: RESOLVED | Noted: 2022-12-16 | Resolved: 2022-12-21

## 2022-12-21 PROBLEM — K92.1 HEMATOCHEZIA: Status: RESOLVED | Noted: 2022-12-19 | Resolved: 2022-12-21

## 2022-12-21 LAB
ABSOLUTE EOS #: 0.19 K/UL (ref 0–0.44)
ABSOLUTE IMMATURE GRANULOCYTE: 0.12 K/UL (ref 0–0.3)
ABSOLUTE LYMPH #: 1.38 K/UL (ref 1.1–3.7)
ABSOLUTE MONO #: 1.14 K/UL (ref 0.1–1.2)
ANION GAP SERPL CALCULATED.3IONS-SCNC: 12 MMOL/L (ref 9–17)
BASOPHILS # BLD: 1 % (ref 0–2)
BASOPHILS ABSOLUTE: 0.07 K/UL (ref 0–0.2)
BUN BLDV-MCNC: 11 MG/DL (ref 8–23)
CALCIUM SERPL-MCNC: 8.5 MG/DL (ref 8.6–10.4)
CHLORIDE BLD-SCNC: 97 MMOL/L (ref 98–107)
CO2: 26 MMOL/L (ref 20–31)
CREAT SERPL-MCNC: 0.63 MG/DL (ref 0.5–0.9)
EOSINOPHILS RELATIVE PERCENT: 2 % (ref 1–4)
GFR SERPL CREATININE-BSD FRML MDRD: >60 ML/MIN/1.73M2
GLUCOSE BLD-MCNC: 107 MG/DL (ref 70–99)
GLUCOSE BLD-MCNC: 127 MG/DL (ref 65–105)
GLUCOSE BLD-MCNC: 129 MG/DL (ref 65–105)
GLUCOSE BLD-MCNC: 132 MG/DL (ref 65–105)
GLUCOSE BLD-MCNC: 137 MG/DL (ref 65–105)
HCT VFR BLD CALC: 38.7 % (ref 36.3–47.1)
HEMOGLOBIN: 12.3 G/DL (ref 11.9–15.1)
IMMATURE GRANULOCYTES: 2 %
LYMPHOCYTES # BLD: 18 % (ref 24–43)
MAGNESIUM: 2.2 MG/DL (ref 1.6–2.6)
MCH RBC QN AUTO: 29.9 PG (ref 25.2–33.5)
MCHC RBC AUTO-ENTMCNC: 31.8 G/DL (ref 28.4–34.8)
MCV RBC AUTO: 93.9 FL (ref 82.6–102.9)
MONOCYTES # BLD: 15 % (ref 3–12)
NRBC AUTOMATED: 0 PER 100 WBC
PDW BLD-RTO: 14.5 % (ref 11.8–14.4)
PLATELET # BLD: 167 K/UL (ref 138–453)
PMV BLD AUTO: 10.3 FL (ref 8.1–13.5)
POTASSIUM SERPL-SCNC: 3.3 MMOL/L (ref 3.7–5.3)
POTASSIUM SERPL-SCNC: 3.4 MMOL/L (ref 3.7–5.3)
RBC # BLD: 4.12 M/UL (ref 3.95–5.11)
RBC # BLD: ABNORMAL 10*6/UL
SEG NEUTROPHILS: 62 % (ref 36–65)
SEGMENTED NEUTROPHILS ABSOLUTE COUNT: 4.97 K/UL (ref 1.5–8.1)
SODIUM BLD-SCNC: 135 MMOL/L (ref 135–144)
WBC # BLD: 7.9 K/UL (ref 3.5–11.3)

## 2022-12-21 PROCEDURE — 88305 TISSUE EXAM BY PATHOLOGIST: CPT

## 2022-12-21 PROCEDURE — 3609010300 HC COLONOSCOPY W/BIOPSY SINGLE/MULTIPLE: Performed by: INTERNAL MEDICINE

## 2022-12-21 PROCEDURE — 3609017100 HC EGD: Performed by: INTERNAL MEDICINE

## 2022-12-21 PROCEDURE — 7100000000 HC PACU RECOVERY - FIRST 15 MIN: Performed by: INTERNAL MEDICINE

## 2022-12-21 PROCEDURE — 3700000000 HC ANESTHESIA ATTENDED CARE: Performed by: INTERNAL MEDICINE

## 2022-12-21 PROCEDURE — 80048 BASIC METABOLIC PNL TOTAL CA: CPT

## 2022-12-21 PROCEDURE — 82947 ASSAY GLUCOSE BLOOD QUANT: CPT

## 2022-12-21 PROCEDURE — 36415 COLL VENOUS BLD VENIPUNCTURE: CPT

## 2022-12-21 PROCEDURE — 99222 1ST HOSP IP/OBS MODERATE 55: CPT | Performed by: STUDENT IN AN ORGANIZED HEALTH CARE EDUCATION/TRAINING PROGRAM

## 2022-12-21 PROCEDURE — 45380 COLONOSCOPY AND BIOPSY: CPT | Performed by: INTERNAL MEDICINE

## 2022-12-21 PROCEDURE — 7100000001 HC PACU RECOVERY - ADDTL 15 MIN: Performed by: INTERNAL MEDICINE

## 2022-12-21 PROCEDURE — 85025 COMPLETE CBC W/AUTO DIFF WBC: CPT

## 2022-12-21 PROCEDURE — 84132 ASSAY OF SERUM POTASSIUM: CPT

## 2022-12-21 PROCEDURE — 3700000001 HC ADD 15 MINUTES (ANESTHESIA): Performed by: INTERNAL MEDICINE

## 2022-12-21 PROCEDURE — 43235 EGD DIAGNOSTIC BRUSH WASH: CPT | Performed by: INTERNAL MEDICINE

## 2022-12-21 PROCEDURE — 99232 SBSQ HOSP IP/OBS MODERATE 35: CPT | Performed by: INTERNAL MEDICINE

## 2022-12-21 PROCEDURE — 2500000003 HC RX 250 WO HCPCS: Performed by: NURSE ANESTHETIST, CERTIFIED REGISTERED

## 2022-12-21 PROCEDURE — 0DJ08ZZ INSPECTION OF UPPER INTESTINAL TRACT, VIA NATURAL OR ARTIFICIAL OPENING ENDOSCOPIC: ICD-10-PCS | Performed by: INTERNAL MEDICINE

## 2022-12-21 PROCEDURE — 6370000000 HC RX 637 (ALT 250 FOR IP): Performed by: INTERNAL MEDICINE

## 2022-12-21 PROCEDURE — 6360000002 HC RX W HCPCS: Performed by: NURSE ANESTHETIST, CERTIFIED REGISTERED

## 2022-12-21 PROCEDURE — 2709999900 HC NON-CHARGEABLE SUPPLY: Performed by: INTERNAL MEDICINE

## 2022-12-21 PROCEDURE — 0DBK8ZX EXCISION OF ASCENDING COLON, VIA NATURAL OR ARTIFICIAL OPENING ENDOSCOPIC, DIAGNOSTIC: ICD-10-PCS | Performed by: INTERNAL MEDICINE

## 2022-12-21 PROCEDURE — 2580000003 HC RX 258: Performed by: NURSE ANESTHETIST, CERTIFIED REGISTERED

## 2022-12-21 PROCEDURE — 83735 ASSAY OF MAGNESIUM: CPT

## 2022-12-21 RX ORDER — SODIUM CHLORIDE 0.9 % (FLUSH) 0.9 %
5-40 SYRINGE (ML) INJECTION PRN
Status: DISCONTINUED | OUTPATIENT
Start: 2022-12-21 | End: 2022-12-21 | Stop reason: HOSPADM

## 2022-12-21 RX ORDER — POTASSIUM CHLORIDE 7.45 MG/ML
10 INJECTION INTRAVENOUS
Status: ACTIVE | OUTPATIENT
Start: 2022-12-21 | End: 2022-12-21

## 2022-12-21 RX ORDER — SODIUM CHLORIDE, SODIUM LACTATE, POTASSIUM CHLORIDE, CALCIUM CHLORIDE 600; 310; 30; 20 MG/100ML; MG/100ML; MG/100ML; MG/100ML
INJECTION, SOLUTION INTRAVENOUS CONTINUOUS PRN
Status: DISCONTINUED | OUTPATIENT
Start: 2022-12-21 | End: 2022-12-21 | Stop reason: SDUPTHER

## 2022-12-21 RX ORDER — PROPOFOL 10 MG/ML
INJECTION, EMULSION INTRAVENOUS CONTINUOUS PRN
Status: DISCONTINUED | OUTPATIENT
Start: 2022-12-21 | End: 2022-12-21 | Stop reason: SDUPTHER

## 2022-12-21 RX ORDER — ONDANSETRON 2 MG/ML
4 INJECTION INTRAMUSCULAR; INTRAVENOUS
Status: DISCONTINUED | OUTPATIENT
Start: 2022-12-21 | End: 2022-12-21 | Stop reason: HOSPADM

## 2022-12-21 RX ORDER — DOCUSATE SODIUM 100 MG/1
100 CAPSULE, LIQUID FILLED ORAL 2 TIMES DAILY
Status: DISCONTINUED | OUTPATIENT
Start: 2022-12-21 | End: 2022-12-21 | Stop reason: HOSPADM

## 2022-12-21 RX ORDER — PROPOFOL 10 MG/ML
INJECTION, EMULSION INTRAVENOUS PRN
Status: DISCONTINUED | OUTPATIENT
Start: 2022-12-21 | End: 2022-12-21 | Stop reason: SDUPTHER

## 2022-12-21 RX ORDER — HYDRALAZINE HYDROCHLORIDE 20 MG/ML
10 INJECTION INTRAMUSCULAR; INTRAVENOUS
Status: DISCONTINUED | OUTPATIENT
Start: 2022-12-21 | End: 2022-12-21 | Stop reason: HOSPADM

## 2022-12-21 RX ORDER — PSEUDOEPHEDRINE HCL 30 MG
100 TABLET ORAL 2 TIMES DAILY
Qty: 60 CAPSULE | Refills: 1 | Status: SHIPPED | OUTPATIENT
Start: 2022-12-21

## 2022-12-21 RX ORDER — DOCUSATE SODIUM 100 MG/1
100 CAPSULE, LIQUID FILLED ORAL DAILY
Status: DISCONTINUED | OUTPATIENT
Start: 2022-12-21 | End: 2022-12-21

## 2022-12-21 RX ORDER — CLOPIDOGREL BISULFATE 75 MG/1
75 TABLET ORAL DAILY
Status: DISCONTINUED | OUTPATIENT
Start: 2022-12-22 | End: 2022-12-21 | Stop reason: HOSPADM

## 2022-12-21 RX ORDER — SODIUM CHLORIDE 0.9 % (FLUSH) 0.9 %
5-40 SYRINGE (ML) INJECTION EVERY 12 HOURS SCHEDULED
Status: DISCONTINUED | OUTPATIENT
Start: 2022-12-21 | End: 2022-12-21 | Stop reason: HOSPADM

## 2022-12-21 RX ORDER — LIDOCAINE HYDROCHLORIDE 10 MG/ML
INJECTION, SOLUTION INFILTRATION; PERINEURAL PRN
Status: DISCONTINUED | OUTPATIENT
Start: 2022-12-21 | End: 2022-12-21 | Stop reason: SDUPTHER

## 2022-12-21 RX ORDER — SODIUM CHLORIDE 9 MG/ML
INJECTION, SOLUTION INTRAVENOUS PRN
Status: DISCONTINUED | OUTPATIENT
Start: 2022-12-21 | End: 2022-12-21 | Stop reason: HOSPADM

## 2022-12-21 RX ADMIN — PROPOFOL 80 MG: 10 INJECTION, EMULSION INTRAVENOUS at 09:11

## 2022-12-21 RX ADMIN — OXYCODONE HYDROCHLORIDE AND ACETAMINOPHEN 2 TABLET: 5; 325 TABLET ORAL at 14:24

## 2022-12-21 RX ADMIN — POTASSIUM BICARBONATE 40 MEQ: 782 TABLET, EFFERVESCENT ORAL at 02:17

## 2022-12-21 RX ADMIN — PROPOFOL 100 MCG/KG/MIN: 10 INJECTION, EMULSION INTRAVENOUS at 09:11

## 2022-12-21 RX ADMIN — POTASSIUM CHLORIDE 40 MEQ: 1500 TABLET, EXTENDED RELEASE ORAL at 10:54

## 2022-12-21 RX ADMIN — LIDOCAINE HYDROCHLORIDE 50 MG: 10 INJECTION, SOLUTION INFILTRATION; PERINEURAL at 09:11

## 2022-12-21 RX ADMIN — SODIUM CHLORIDE, POTASSIUM CHLORIDE, SODIUM LACTATE AND CALCIUM CHLORIDE: 600; 310; 30; 20 INJECTION, SOLUTION INTRAVENOUS at 08:35

## 2022-12-21 RX ADMIN — DOCUSATE SODIUM 100 MG: 100 CAPSULE ORAL at 10:55

## 2022-12-21 ASSESSMENT — PAIN SCALES - GENERAL
PAINLEVEL_OUTOF10: 0
PAINLEVEL_OUTOF10: 6
PAINLEVEL_OUTOF10: 2

## 2022-12-21 ASSESSMENT — ENCOUNTER SYMPTOMS
BLOOD IN STOOL: 1
ABDOMINAL PAIN: 1
SHORTNESS OF BREATH: 0
SORE THROAT: 0

## 2022-12-21 ASSESSMENT — PAIN - FUNCTIONAL ASSESSMENT: PAIN_FUNCTIONAL_ASSESSMENT: 0-10

## 2022-12-21 NOTE — PLAN OF CARE
Problem: Discharge Planning  Goal: Discharge to home or other facility with appropriate resources  Outcome: Progressing  Flowsheets  Taken 12/20/2022 1600 by [place] Ann Seaman RN  Discharge to home or other facility with appropriate resources: Identify barriers to discharge with patient and caregiver  Taken 12/20/2022 1200 by [place] Ann Seaman RN  Discharge to home or other facility with appropriate resources: Identify barriers to discharge with patient and caregiver     Problem: Safety - Adult  Goal: Free from fall injury  Outcome: Progressing     Problem: ABCDS Injury Assessment  Goal: Absence of physical injury  Outcome: Progressing     Problem: Chronic Conditions and Co-morbidities  Goal: Patient's chronic conditions and co-morbidity symptoms are monitored and maintained or improved  Outcome: Progressing  Flowsheets  Taken 12/20/2022 1600 by [facility] [place] Moses Sous[address] - Patient's Chronic Conditions and Co-Morbidity Symptoms are Monitored and Maintained or Improved: Monitor and assess patient's chronic conditions and comorbid symptoms for stability, deterioration, or improvement  Taken 12/20/2022 1200 by [place] Moses [address] - Patient's Chronic Conditions and Co-Morbidity Symptoms are Monitored and Maintained or Improved: Monitor and assess patient's chronic conditions and comorbid symptoms for stability, deterioration, or improvement     Problem: Respiratory - Adult  Goal: Achieves optimal ventilation and oxygenation  Outcome: Progressing  Flowsheets  Taken 12/20/2022 1600 by [place] Ann Seaman RN  Achieves optimal ventilation and oxygenation: Assess for changes in respiratory status  Taken 12/20/2022 1200 by [place] Ann Seaman RN  Achieves optimal ventilation and oxygenation: Assess for changes in respiratory status     Problem: Cardiovascular - Adult  Goal: Maintains optimal cardiac output and hemodynamic stability  Outcome: Progressing  Flowsheets  Taken 12/20/2022 1600 by [place] Ann Seaman RN  Maintains optimal cardiac output and hemodynamic stability: Monitor blood pressure and heart rate  Taken 12/20/2022 1200 by [place] Ann Seaman RN  Maintains optimal cardiac output and hemodynamic stability: Monitor blood pressure and heart rate  Goal: Absence of cardiac dysrhythmias or at baseline  Outcome: Progressing  Flowsheets  Taken 12/20/2022 1600 by [place] Ann Seaman RN  Absence of cardiac dysrhythmias or at baseline: Monitor cardiac rate and rhythm  Taken 12/20/2022 1200 by [place] Ann Seaman RN  Absence of cardiac dysrhythmias or at baseline: Monitor cardiac rate and rhythm     Problem: Gastrointestinal - Adult  Goal: Minimal or absence of nausea and vomiting  Outcome: Progressing  Flowsheets  Taken 12/20/2022 1600 by [place] Ann Seaman RN  Minimal or absence of nausea and vomiting: Administer IV fluids as ordered to ensure adequate hydration  Taken 12/20/2022 1200 by [place] Ann Seaman RN  Minimal or absence of nausea and vomiting: Maintain NPO status until nausea and vomiting are resolved  Goal: Maintains or returns to baseline bowel function  Outcome: Progressing  Flowsheets  Taken 12/20/2022 1600 by [place] Ann Seaman RN  Maintains or returns to baseline bowel function: Assess bowel function  Taken 12/20/2022 1200 by [place] Ann Seaman RN  Maintains or returns to baseline bowel function: Assess bowel function  Goal: Maintains adequate nutritional intake  Outcome: Progressing  Flowsheets  Taken 12/20/2022 1600 by [place] Ann Seaman RN  Maintains adequate nutritional intake: Monitor percentage of each meal consumed  Taken 12/20/2022 1200 by [place] Ann Seaman RN  Maintains adequate nutritional intake: Monitor percentage of each meal consumed  Goal: Establish and maintain optimal ostomy function  Outcome: Progressing  Flowsheets  Taken 12/20/2022 1600 by [place] Ann Seaman RN  Establish and maintain optimal ostomy function: Monitor output from ostomies  Taken 12/20/2022 1200 by Jagdeep Bailey RN  Establish and maintain optimal ostomy function: Monitor output from ostomies     Problem: Metabolic/Fluid and Electrolytes - Adult  Goal: Electrolytes maintained within normal limits  Outcome: Progressing  Flowsheets  Taken 12/20/2022 1600 by [place] Ann Seaman RN  Electrolytes maintained within normal limits: Monitor labs and assess patient for signs and symptoms of electrolyte imbalances  Taken 12/20/2022 1200 by [place] Ann Seaman RN  Electrolytes maintained within normal limits: Monitor labs and assess patient for signs and symptoms of electrolyte imbalances  Goal: Hemodynamic stability and optimal renal function maintained  Outcome: Progressing  Flowsheets  Taken 12/20/2022 1600 by [place] Ann Seaman RN  Hemodynamic stability and optimal renal function maintained: Monitor labs and assess for signs and symptoms of volume excess or deficit  Taken 12/20/2022 1200 by [place] Ann Seaman RN  Hemodynamic stability and optimal renal function maintained: Monitor labs and assess for signs and symptoms of volume excess or deficit  Goal: Glucose maintained within prescribed range  Outcome: Progressing  Flowsheets  Taken 12/20/2022 1600 by [place] Ann Seaman RN  Glucose maintained within prescribed range: Monitor blood glucose as ordered  Taken 12/20/2022 1200 by [place] Ann Seaman RN  Glucose maintained within prescribed range: Monitor blood glucose as ordered     Problem: Infection - Adult  Goal: Absence of infection at discharge  Outcome: Progressing  Flowsheets  Taken 12/20/2022 1600 by [place] Ann Seaman RN  Absence of infection at discharge: Assess and monitor for signs and symptoms of infection  Taken 12/20/2022 1200 by [place] Ann Seaman RN  Absence of infection at discharge: Assess and monitor for signs and symptoms of infection  Goal: Absence of infection during hospitalization  Outcome: Progressing  Flowsheets  Taken 12/20/2022 1600 by [place] May

## 2022-12-21 NOTE — PROGRESS NOTES
[facility] - Progress Note      Today's Date and Time: 12/21/2022, 7:42 AM    Impression :   Chest pain  Diarrhea  Leukocytosis  Coronary artery disease  History of congestive heart failure  COPD  History of prior bariatric surgery    Recommendations:   Monitor off antibiotics   Await results of stool studies  Suggest COVID booster after cardiac issues are resolved    Medical Decision Making/Summary/Discussion:12/21/2022       Infection Control Recommendations   Anza Precautions    Antimicrobial Stewardship Recommendations     Discontinuation of therapy  Coordination of Outpatient Care:   Estimated Length of IV antimicrobials:none  Patient will need Midline Catheter Insertion: no  Patient will need PICC line Insertion:no  Patient will need: Home IV , [place],  SNF,  LTAC: TBD  Patient will need outpatient wound care:no    Chief complaint/reason for consultation:   Leukocytosis  Diarrhea  Concern for colitis      History of Present Illness:   Carlyn Feliz is a 74y.o.-year-old  female who was initially admitted on 12/16/2022. Patient seen at the request of Kaden Wan. INITIAL HISTORY:  Patient was admitted on 12/16/2022 after she presented to the emergency room complaining of substernal chest pain of about 3 days duration. Has a history of pre-existing coronary artery disease with a prior stress test on 12/7/2022 which showed mild anteroseptal ischemia. The patient was a scheduled for a cardiac catheterization in January 2023 but she presented to the [facility] ER with a chest pain and was transferred to [facility] for further management. She was considered to have unstable angina and received nitroglycerin drip and heparin. The patient also indicated that she had developed diarrhea for about the same length of time. She initially reported burgundy colored with her stools and a sensation of burning in the colon.   Her CT is scan showed some edema of the ascending colon suggestive of active colitis. On physical examination she has some tenderness of the abdomen and the umbilical and right lower quadrant areas. When she came to the hospital the diarrheal stools subsided. The patient also have a rise in her white count. Testing for influenza a and B was negative. SARS-CoV-2 was negative. Blood and urine cultures were negative. CURRENT EVALUATION : 12/21/2022      Afebrile  VS stable    Patient feels better  No complaints  No new issues per RN      Maintaining good saturations on room air  Patient underwent cardiac catheterization 12-19-22 with no stents placed. .  She has been evaluated by the GI service and stool for enteric pathogens is pending    GI planning for EGD and Colonoscopy today        Labs, X rays reviewed: 12/21/2022    BUN: 24> 11  Cr: 0.73> 0.63  Na 137> 135  K 3.2> 3.3    WBC: 10.9--> 18.7-->10.1> 7.9  Hb: 12.3>12.3  Plat: 130> 167    Cultures:  Urine:  12-19 -2022 pending  12/16/2022 no growth  Blood:    Sputum :    Wound:    Influenza A & B: negative     SARS-CoV-2 negative    GI molecular panel awaited. Imaging:    CT-PE: 12/18  No evidence of pulmonary embolus. Emphysema. Atelectasis and small focus of   plaque-like consolidation right lower lobe. CT abdomen and pelvis: 12/18:  1. Cirrhotic liver morphology. Trace abdominopelvic ascites. 2.  Edematous ascending colon, greater than expected for portal hypertensive   colopathy, therefore an infectious or inflammatory colitis is favored. Discussed with patient, RN, KIMBERLYN . I have personally reviewed the past medical history, past surgical history, medications, social history, and family history, and I have updated the database accordingly.   Past Medical History:     Past Medical History:   Diagnosis Date    Arthritis     Asthma     Bell's palsy     CAD (coronary artery disease) 1989    Cancer ([facility] [address] 1971    cervical    Carotid artery disease ([facility] [address] Cataracts, bilateral     CHF (congestive heart failure) (HCC)     COPD (chronic obstructive pulmonary disease) (HCC)     Coronary artery disease     Eczema     Emphysema lung (HCC)     Fibromyalgia     Glaucoma     H/O blood clots     Headache     Heart attack ([address]     Hx of Bell's palsy     Mild right facial paralysis    Hypertension     Liver disease     Mild dementia     Neuropathy     Osteoporosis     Sleep apnea 11/07/2014    sleep study done in [place]    Tremor     Type 2 diabetes mellitus with hyperglycemia [facility])        Past Surgical  History:     Past Surgical History:   Procedure Laterality Date    ANESTHESIA NERVE BLOCK Right 06/28/2019    Right L2 L3 L4 L5 Diagnostic Medial BB performed by Juanis Bridges MD at [address]  [facility] Right 07/26/2019    Right L2 L3 L4 L5 Confirmatory Medial BB performed by Juanis Bridges MD at [place] [address]      COLONOSCOPY  09/2012    COLONOSCOPY N/A 09/30/2019    COLONOSCOPY performed by Martha Gill MD at [address] [place] [address]  internal hemorrhoids and few diverticuli    CORONARY ANGIOPLASTY WITH STENT PLACEMENT      ENDOSCOPY, COLON, DIAGNOSTIC      HYSTERECTOMY (CERVIX STATUS UNKNOWN)      [address] [address] Right 02/08/2019    Right L2 L3 TRANSFORAMINAL performed by Juanis Bridges MD at [address] [place] [address] Dr Hamilton 05/07/2019    Right L2 L3 TRANSFORAMINAL performed by Juanis Bridges MD at [address] [place] [address] Dr Hamilton 06/04/2019    Right L3 & L4 TRANSFORAMINAL performed by Juanis Bridges MD at [address] [place] [address] Dr Hamilton 12/12/2019    Right L4 L5 TRANSFORAMINAL performed by Juanis Bridges MD at [address] [place] [address] 01/20/2020    RIGHT L4 L5  TRANSFORAMINAL performed by Juanis Bridges MD at [address] [place] [address] 02/17/2020    Right L4 L5 TRANSFORAMINAL performed by Juanis Bridges MD at [address] [facility] SKIN BENIG 1.1-2CM FACE,FACIAL Left 02/07/2018    Excision Cyst Left Chest, Middle Back performed by Tyrone Chance MD at [address] AA&/STRD TFRML EPI LUMBAR/SACRAL EA ADDL Right 10/05/2018    Right L2 & L3 TRANSFORAMINAL performed by Torri Cisneros MD at [address] AA&/STRD TFRML EPI LUMBAR/SACRAL EA ADDL Right 10/26/2018    Right L2 L3 TRANSFORAMINAL performed by Torri Cisneros MD at [facility] Right 08/01/2019    Right  L2 L3 L4 L5 RADIOFREQUENCY performed by Torri Cisneros MD at [address] [place] [address] (CERVIX REMOVED)  1987    Cervical cancer.   Had XRT    TONSILLECTOMY  1983       Medications:      sodium chloride flush  5-40 mL IntraVENous 2 times per day    sodium chloride flush  5-40 mL IntraVENous 2 times per day    sodium chloride flush  5-40 mL IntraVENous 2 times per day    amitriptyline  25 mg Oral Nightly    atorvastatin  40 mg Oral Daily    buPROPion  75 mg Oral BID    citalopram  10 mg Oral Daily    [Held by provider] clopidogrel  75 mg Oral Daily    furosemide  40 mg Oral Daily    insulin glargine  15 Units SubCUTAneous BID    metoprolol succinate  12.5 mg Oral Daily    pantoprazole  40 mg Oral Daily    spironolactone  50 mg Oral Daily    sodium chloride flush  10 mL IntraVENous 2 times per day    vitamin B-12  2,500 mcg Oral Daily    dorzolamide  1 drop Ophthalmic TID    empagliflozin  10 mg Oral Daily    fluticasone  2 spray Each Nostril Daily    magnesium oxide  400 mg Oral Daily    pyridoxine  50 mg Oral Daily    tiotropium-olodaterol  2 puff Inhalation Daily    latanoprost  1 drop Both Eyes Nightly    brimonidine  1 drop Both Eyes Daily    And    timolol  1 drop Both Eyes Daily    insulin lispro  4 Units SubCUTAneous Lunch    insulin lispro  6 Units SubCUTAneous Dinner       Social History:     Social History     Socioeconomic History    Marital status:      Spouse name: Not on file    Number of children: Not on file    Years of education: Not on file    Highest education level: Not on file   Occupational History    Not on file   Tobacco Use    Smoking status: Former     Packs/day: 1.00     Years: 35.00     Pack years: 35.00     Types: Cigarettes     Quit date: 2001     Years since quittin.2    Smokeless tobacco: Never   Vaping Use    Vaping Use: Never used   Substance and Sexual Activity    Alcohol use: No    Drug use: No    Sexual activity: Never   Other Topics Concern    Not on file   Social History Narrative    Not on file     Social Determinants of Health     Financial Resource Strain: Low Risk     Difficulty of Paying Living Expenses: Not hard at all   Food Insecurity: No Food Insecurity    Worried About Running Out of Food in the Last Year: Never true    Ran Out of Food in the Last Year: Never true   Transportation Needs: Not on file   Physical Activity: Insufficiently Active    Days of Exercise per Week: 2 days    Minutes of Exercise per Session: 10 min   Stress: Not on file   Social Connections: Not on file   Intimate Partner Violence: Not on file   Housing Stability: Not on file       Family History:     Family History   Problem Relation Age of Onset    Heart Disease Mother     High Blood Pressure Mother     Stroke Mother     Glaucoma Mother     Early Death Father     Cancer Father         stomach    Miscarriages / Stillbirths Maternal Uncle         Allergies:   Patient has no known allergies. Review of Systems:   Constitutional: No fevers or chills. No systemic complaints  Head: No headaches  Eyes: No double vision or blurry vision. No conjunctival inflammation. ENT: No sore throat or runny nose. . No hearing loss, tinnitus or vertigo. Cardiovascular: Chest pain, no palpitations. No shortness of breath. No LAUREANO  Lung: No shortness of breath or cough. No sputum production  Abdomen: No nausea, vomiting. Initially reported diarrhea, has abdominal pain. Lucyann Push No cramps. Genitourinary: No increased urinary frequency, or dysuria.  No hematuria. No suprapubic or CVA pain  Musculoskeletal: No muscle aches or pains. No joint effusions, swelling or deformities  Hematologic: No bleeding or bruising. Neurologic: No headache, weakness, numbness, or tingling. Integument: No rash, no ulcers. Psychiatric: No depression. Endocrine: No polyuria, no polydipsia, no polyphagia. Physical Examination :   Patient Vitals for the past 8 hrs:   BP Temp Temp src Pulse Resp SpO2 Weight   12/21/22 0509 -- -- -- -- -- -- 241 lb 6.5 oz (109.5 kg)   12/21/22 0335 (!) 141/60 99.1 °F (37.3 °C) Oral 60 18 92 % --   12/21/22 0006 -- -- -- -- 17 -- --     General Appearance: Awake, alert, and in no apparent distress  Head:  Normocephalic, no trauma  Eyes: Pupils equal, round, reactive to light and accommodation; extraocular movements intact; sclera anicteric; conjunctivae pink. No embolic phenomena. ENT: Oropharynx clear, without erythema, exudate, or thrush. No tenderness of sinuses. Mouth/throat: mucosa pink and moist. No lesions. Dentition in good repair. Neck:Supple, without lymphadenopathy. Thyroid normal, No bruits. Pulmonary/Chest: Clear to auscultation, without wheezes, rales, or rhonchi. No dullness to percussion. Cardiovascular: Regular rate and rhythm without murmurs, rubs, or gallops. Abdomen: Soft, mildly  tender. Bowel sounds normal. No organomegaly  All four Extremities: No cyanosis, clubbing, edema, or effusions. Neurologic: No gross sensory or motor deficits. Skin: Warm and dry with good turgor. Signs of peripheral arterial insufficiency. No ulcerations. No open wounds.     Medical Decision Making -Laboratory:   I have independently reviewed/ordered the following labs:    CBC with Differential:   Recent Labs     12/20/22  0340 12/21/22  0612   WBC 8.8 7.9   HGB 13.1 12.3   HCT 40.7 38.7    167   LYMPHOPCT 14* 18*   MONOPCT 14* 15*     BMP:   Recent Labs     12/20/22  0913 12/21/22  0019 12/21/22  0612     --  135   K 2.9* 3.4* 3.3* CL 97*  --  97*   CO2 24  --  26   BUN 13  --  11   CREATININE 0.65  --  0.63   MG 2.2  --  2.2     Hepatic Function Panel:   No results for input(s): PROT, LABALBU, BILIDIR, IBILI, BILITOT, ALKPHOS, ALT, AST in the last 72 hours. No results for input(s): RPR in the last 72 hours. No results for input(s): HIV in the last 72 hours. No results for input(s): BC in the last 72 hours. Lab Results   Component Value Date/Time    RBC 4.12 12/21/2022 06:12 AM    WBC 7.9 12/21/2022 06:12 AM    YEAST FEW 12/19/2022 10:27 AM    TURBIDITY Clear 12/19/2022 10:27 AM     Lab Results   Component Value Date/Time    CREATININE 0.63 12/21/2022 06:12 AM    GLUCOSE 107 12/21/2022 06:12 AM       Medical Decision Making-Imaging:     EXAMINATION:   CTA OF THE CHEST 12/18/2022 11:34 am       TECHNIQUE:   CTA of the chest was performed after the administration of intravenous   contrast.  Multiplanar reformatted images are provided for review. MIP   images are provided for review. Automated exposure control, iterative   reconstruction, and/or weight based adjustment of the mA/kV was utilized to   reduce the radiation dose to as low as reasonably achievable. COMPARISON:   1 June 2017       HISTORY:   ORDERING SYSTEM PROVIDED HISTORY: elevated d-dimer   TECHNOLOGIST PROVIDED HISTORY:   elevated d-dimer   Reason for Exam: elevated d-dimer       FINDINGS:   Pulmonary Arteries: Pulmonary arteries are adequately opacified for   evaluation. No evidence of intraluminal filling defect to suggest pulmonary   embolism. Main pulmonary artery is normal in caliber. Mediastinum: No evidence of mediastinal lymphadenopathy. The heart and   pericardium demonstrate no acute abnormality. There is no acute abnormality   of the thoracic aorta. Lungs/pleura: Emphysematous changes are present in the lungs. Respiratory   motion complicates assessment. Basilar atelectasis is noted. No significant   effusion, extrapleural air noted. Small area of plaque-like consolidation is   present dorsally in the right hemithorax which may represent minimal airspace   disease. Upper Abdomen: Hepatosplenomegaly with micro nodular liver. Small hiatal   hernia. Postsurgical gastric changes suggesting bariatric surgery. Soft Tissues/Bones: Left shoulder           Impression   No evidence of pulmonary embolus. Emphysema. Atelectasis and small focus of   plaque-like consolidation right lower lobe. Medical Decision Aisbzx-[id number]-[id number]:       Medical Decision Making-Other:     Note:  Labs, medications, radiologic studies were reviewed with personal review of films  Large amounts of data were reviewed  Discussed with nursing Staff, Discharge planner  Infection Control and Prevention measures reviewed  All prior entries were reviewed  Administer medications as ordered  Prognosis: [address]  Discharge planning reviewed  Follow up as outpatient. Thank you for allowing us to participate in the care of this patient. Please call with questions. Le Brizuela MD    ATTESTATION:    I have discussed the case, including pertinent history and exam findings with the residents and students. I have seen and examined the patient and the key elements of the encounter have been performed by me. I was present when the student obtained his information or examined the patient. I have reviewed the laboratory data, other diagnostic studies and discussed them with the residents. I have updated the medical record where necessary. I agree with the assessment, plan and orders as documented by the resident/ student.     Brent MD Vikram.    Pager: [phone] - Office: [phone]

## 2022-12-21 NOTE — OP NOTE
Operative Note      Patient: Ember Valera  YOB: 1950  MRN: [id number]    Date of Procedure: 12/21/2022    Pre-Op Diagnosis: ANEMIA    Post-Op Diagnosis: Same. Normal esophagus. No endoscopic evidence of varices. Evidence of Olena-en-Y gastric bypass with normal pouch and anastomosis. Right sided colitis suspect ischemic colitis  Impacted stool scattered in the right colon. Portal hypertensive colopathy  Sessile polyp in the descending colon. Procedure(s):  COLONOSCOPY WITH BIOPSY  EGD ESOPHAGOGASTRODUODENOSCOPY    Surgeon(s):  Du Powell MD    Assistant:   First Assistant: Danae Beebe RN    Anesthesia: Monitor Anesthesia Care    Estimated Blood Loss (mL): Minimal    Complications: None    Specimens:   ID Type Source Tests Collected by Time Destination   A : Ascending colon bx Tissue Colon-Ascending [facility] MD 12/21/2022 0774        Implants:  * No implants in log *      Drains: * No LDAs found *    Findings:   EGD  The esophagus appeared normal.  There was no endoscopic evidence of varices. The GE junction was noted at 40 cm. Evidence of Olena-en-Y gastric bypass. The pouch extended from 40 to 44 cm from the incisors. The mucosa in the pouch appeared normal without evidence of ulceration or fistula. The gastrojejunal anastomosis was incompetent and dilated and measured more than 20 cm in diameter. The mucosa at the anastomosis appeared normal without evidence of ulceration. The Olena limb was intubated up to 70 cm. The mucosa in the Olena limb appeared normal.  There was no evidence of bile or excess fluid in the Olena limb. The jejunojejunal anastomosis was not reached. Colonoscopy  The mucosa in the cecum, ascending colon up to the hepatic flexure appeared edematous, friable with patchy erythema interspersed with ulcers, and pale areas with submucosal bluish hemorrhagic nodules suggestive for ischemic colitis.   Biopsies were performed for histology. The mucosa in the transverse colon, splenic flexure, descending colon sigmoid colon and the rectum showed congestion and mosaic pattern suggestive of portal hypertensive colopathy. 8 6 mm sessile polyp was found in the proximal descending colon. Resection was deferred due to risk of bleeding since patient was on Plavix. Small internal hemorrhoids were noted in the distal rectum and at the anal verge on retroflexion. Recommendations  Ischemic colitis will heal spontaneously with supportive management. Continue gentle IV fluids and maintain MAP more than 65. Start stool softener such as Colace or Metamucil twice daily. Okay for soft diet. Increase fluid intake to 64 ounces daily. Please obtain mesenteric Doppler scan to assess for any stenosis. Please consult vascular surgery for further recommendations. Follow pathology results. Okay to resume Plavix. Patient will need repeat colonoscopy within 3 months for removal of polyp. Once patient is tolerating diet she can be discharged home from GI standpoint and follow-up. GI will sign off. Please call for any questions or concerns. Detailed Description of Procedure:   Informed consent was obtained from the patient after explanation of the procedure including indications, description of the procedure,  benefits and possible risks and complications of the procedure, and alternatives. Questions were answered. The patient's history was reviewed and a directed physical examination was performed prior to the procedure. Patient was monitored throughout the procedure with pulse oximetry and periodic assessment of vital signs. Patient was sedated as noted above. With the patient in the left lateral decubitus position, the Olympus videoendoscope was placed in the patient's mouth and under direct visualization passed into the esophagus.   Visualization of the esophagus, stomach pouch, and Olena limb was performed during both introduction and withdrawal of the endoscope     With the patient initially in the left lateral decubitus position, a digital rectal examination was performed and revealed negative without mass, lesions or tenderness. The Olympus video colonoscope was placed in the patient's rectum and advanced without difficulty  to the cecum, which was identified by the ileocecal valve and appendiceal orifice. The prep was good. Examination of the mucosa was performed during both introduction and withdrawal of the colonoscope. Retroflexed view of the rectum was performed. The patient  was taken to the recovery area in good condition. The patient tolerated the procedure well and was taken to the recovery area in good condition. The patient  was taken to the recovery area in good condition.      Electronically signed by Guillermina Medina MD on 12/21/2022 at 9:44 AM

## 2022-12-21 NOTE — CONSULTS
Division of Vascular Surgery        New Consult      Physician Requesting Consult:  Dr Henny Ruiz    Reason for Consult:   Recommendations for mesenteric thrombosis    Chief Complaint:      Chest pain    History of Present Illness:      Barber Palacios is a 74 y.o. female with extensive medical history as listed below, significant for cirrhosis who presented to the emergency department with complaints of chest pain. She also endorses abdominal pain and some slightly bloody stools over the past several days. The patient had cardiac work-up and was not found to have any acute infarct. Mild anteroseptal ischemia. She then underwent colonoscopy and was noted to have significant portal hypertensive colopathy as well as ascending colon ischemic colitis which according to gastroenterology is mild and likely will self resolve. Gastroenterology recommended vascular surgery consultation to evaluate for possible mesenteric venous thrombosis and need for anticoagulation.     Medical History:     Past Medical History:   Diagnosis Date    Arthritis     Asthma     Bell's palsy     CAD (coronary artery disease) 1989    Cancer ([facility] [address] 1971    cervical    Carotid artery disease (HCC)     Cataracts, bilateral     CHF (congestive heart failure) (HCC)     COPD (chronic obstructive pulmonary disease) (HCC)     Coronary artery disease     Eczema     Emphysema lung (HCC)     Fibromyalgia     Glaucoma     H/O blood clots     Headache     Heart attack ([address]     Hx of Bell's palsy     Mild right facial paralysis    Hypertension     Liver disease     Mild dementia     Neuropathy     Osteoporosis     Sleep apnea 11/07/2014    sleep study done in [place]    Tremor     Type 2 diabetes mellitus with hyperglycemia [facility])        Surgical History:     Past Surgical History:   Procedure Laterality Date    ANESTHESIA NERVE BLOCK Right 06/28/2019    Right L2 L3 L4 L5 Diagnostic Medial BB performed by Alvarado Valdez MD at [facility] BLOCK Right 2019    Right L2 L3 L4 L5 Confirmatory Medial BB performed by Coty Clinton MD at [place] [address]      COLONOSCOPY  2012    COLONOSCOPY N/A 2019    COLONOSCOPY performed by Cristal Hernandez MD at [address] [place] [address]  internal hemorrhoids and few diverticuli    COLONOSCOPY  2022    diagnostic    CORONARY ANGIOPLASTY WITH STENT PLACEMENT      ENDOSCOPY, COLON, DIAGNOSTIC      ESOPHAGOGASTRODUODENOSCOPY  2022    HYSTERECTOMY (CERVIX STATUS UNKNOWN)      INDUCED   1970    LUMBAR SPINE SURGERY Right 2019    Right L2 L3 TRANSFORAMINAL performed by Coty Clinton MD at [address] [place] [address] Dr Right 2019    Right L2 L3 TRANSFORAMINAL performed by Coty Clinton MD at [address] [place] [address] Dr Right 2019    Right L3 & L4 TRANSFORAMINAL performed by Coty Clinton MD at [address] [place] [address] Dr Right 2019    Right L4 L5 TRANSFORAMINAL performed by Coty Clinton MD at [facility] [address] Right 2020    RIGHT L4 L5  TRANSFORAMINAL performed by Coty Clinton MD at [facility] Right 2020    Right L4 L5 TRANSFORAMINAL performed by Coty Clinton MD at [address] [place] [address] 1.1-2CM FACE,FACIAL Left 2018    Excision Cyst Left Chest, Middle Back performed by Cristal Hernandez MD at [address] AA&/STRD TFRML EPI LUMBAR/SACRAL EA ADDL Right 10/05/2018    Right L2 & L3 TRANSFORAMINAL performed by Coty Clinton MD at [address] AA&/STRD TFRML EPI LUMBAR/SACRAL EA ADDL Right 10/26/2018    Right L2 L3 TRANSFORAMINAL performed by Coty Clinton MD at [facility] Right 2019    Right  L2 L3 L4 L5 RADIOFREQUENCY performed by Coty Clinton MD at [address] [place] [address] (CERVIX REMOVED)      Cervical cancer.   Had XRT    TONSILLECTOMY         Family History:     Family History Problem Relation Age of Onset    Heart Disease Mother     High Blood Pressure Mother     Stroke Mother     Glaucoma Mother     Early Death Father     Cancer Father         stomach    Miscarriages / Stillbirths Maternal Uncle        Allergies:       Patient has no known allergies.     Medications:      Current Facility-Administered Medications   Medication Dose Route Frequency Provider Last Rate Last Admin    [START ON 12/22/2022] clopidogrel (PLAVIX) tablet 75 mg  75 mg Oral Daily Caroline Lacey MD        docusate sodium (COLACE) capsule 100 mg  100 mg Oral BID Panda Rodriguez, APRN - CNP        potassium chloride (KLOR-CON M) extended release tablet 40 mEq  40 mEq Oral PRN Ofelia Santacruz MD   40 mEq at 12/21/22 1054    Or    potassium bicarb-citric acid (EFFER-K) effervescent tablet 40 mEq  40 mEq Oral PRN Ofelia Santacruz MD   40 mEq at 12/21/22 0217    Or    potassium chloride 10 mEq/100 mL IVPB (Peripheral Line)  10 mEq IntraVENous PRN Ofelia Santacruz  mL/hr at 12/20/22 1102 10 mEq at 12/20/22 1102    lactated ringers infusion   IntraVENous Continuous Ofelia Santacruz MD 40 mL/hr at 12/20/22 1733 New Bag at 12/20/22 1733    sodium chloride flush 0.9 % injection 5-40 mL  5-40 mL IntraVENous 2 times per day Ofelia Santacruz MD   10 mL at 12/20/22 0847    sodium chloride flush 0.9 % injection 5-40 mL  5-40 mL IntraVENous PRN Ofelia Santacruz MD        sodium chloride flush 0.9 % injection 5-40 mL  5-40 mL IntraVENous 2 times per day Ofelia Santacruz MD   10 mL at 12/20/22 0846    sodium chloride flush 0.9 % injection 5-40 mL  5-40 mL IntraVENous PRN Ofelia Santacruz MD        sodium chloride flush 0.9 % injection 5-40 mL  5-40 mL IntraVENous 2 times per day Ofelia Santacruz MD   10 mL at 12/20/22 0846    sodium chloride flush 0.9 % injection 5-40 mL  5-40 mL IntraVENous PRN Ofelia Santacruz MD        0.9 % sodium chloride infusion   IntraVENous PRN Ofelia Santacruz MD        albuterol (PROVENTIL) nebulizer solution 2.5 mg  2.5 mg Nebulization Q6H PRN Guillermina Medina MD        amitriptyline (ELAVIL) tablet 25 mg  25 mg Oral Nightly Guillermina Medina MD   25 mg at 12/20/22 2150    atorvastatin (LIPITOR) tablet 40 mg  40 mg Oral Daily Guillermina Medina MD   40 mg at 12/20/22 5068    buPROPion [facility]) tablet 75 mg  75 mg Oral BID Guillermina Medina MD   75 mg at 12/20/22 2149    citalopram (CELEXA) tablet 10 mg  10 mg Oral Daily Guillermina Medina MD   10 mg at 12/20/22 6010    furosemide (LASIX) tablet 40 mg  40 mg Oral Daily Guillermina Medina MD   40 mg at 12/20/22 0830    insulin glargine (LANTUS) injection vial 15 Units  15 Units SubCUTAneous BID Guillermina Medina MD   15 Units at 12/20/22 2151    metoprolol succinate (TOPROL XL) extended release tablet 12.5 mg  12.5 mg Oral Daily Guillermina Medina MD   12.5 mg at 12/20/22 0830    pantoprazole (PROTONIX) tablet 40 mg  40 mg Oral Daily Guillermina Medina MD   40 mg at 12/20/22 0831    spironolactone (ALDACTONE) tablet 50 mg  50 mg Oral Daily Guillermina Medina MD   50 mg at 12/20/22 7996    sodium chloride flush 0.9 % injection 10 mL  10 mL IntraVENous 2 times per day Guillermina Medina MD   10 mL at 12/20/22 2200    sodium chloride flush 0.9 % injection 10 mL  10 mL IntraVENous PRN Guillermina Medina MD        ondansetron (ZOFRAN-ODT) disintegrating tablet 4 mg  4 mg Oral Q8H PRN Guillermina Medina MD        Or    ondansetron (ZOFRAN) injection 4 mg  4 mg IntraVENous Q6H PRN Guillermina Medina MD        polyethylene glycol (GLYCOLAX) packet 17 g  17 g Oral Daily PRN Guillermina Medina MD        acetaminophen (TYLENOL) tablet 650 mg  650 mg Oral Q6H PRN Gulilermina Medina MD   650 mg at 12/20/22 2028    Or    acetaminophen (TYLENOL) suppository 650 mg  650 mg Rectal Q6H PRN Guillermina Medina MD        vitamin B-12 (CYANOCOBALAMIN) tablet 2,500 mcg  2,500 mcg Oral Daily Caroline Lacey MD   2,500 mcg at 12/20/22 0828    dorzolamide (TRUSOPT) 2 % ophthalmic solution 1 drop  1 drop Ophthalmic TID Livan Rivers MD   1 drop at 12/20/22 2031    empagliflozin (JARDIANCE) tablet 10 mg  10 mg Oral Daily Livan Rivers MD   10 mg at 12/20/22 0831    fluticasone (FLONASE) 50 MCG/ACT nasal spray 2 spray  2 spray Each Nostril Daily Livan Rivers MD   2 spray at 12/20/22 6947    magnesium oxide (MAG-OX) tablet 400 mg  400 mg Oral Daily Livan Rivers MD   400 mg at 12/20/22 3328    vitamin B-6 (PYRIDOXINE) tablet 50 mg  50 mg Oral Daily Caroline Lacey MD   50 mg at 12/20/22 0830    tiotropium-olodaterol (STIOLTO) 2.5-2.5 MCG/ACT inhaler 2 puff  2 puff Inhalation Daily Livan Rivers MD   2 puff at 12/20/22 0844    latanoprost (XALATAN) 0.005 % ophthalmic solution 1 drop  1 drop Both Eyes Nightly Livan Rivers MD   1 drop at 12/20/22 2031    oxyCODONE-acetaminophen (PERCOCET) 5-325 MG per tablet 2 tablet  2 tablet Oral Q4H PRN Livan Rivers MD   2 tablet at 12/20/22 2336    morphine (PF) injection 2 mg  2 mg IntraVENous Q3H PRN Livan Rivers MD   2 mg at 12/18/22 2104    Or    morphine injection 4 mg  4 mg IntraVENous Q3H PRN Livan Rivers MD   4 mg at 12/18/22 1532    brimonidine (ALPHAGAN) 0.2 % ophthalmic solution 1 drop  1 drop Both Eyes Daily Livan Rivers MD   1 drop at 12/20/22 0827    And    timolol (TIMOPTIC) 0.5 % ophthalmic solution 1 drop  1 drop Both Eyes Daily Livan Rivers MD   1 drop at 12/20/22 0827    insulin lispro (HUMALOG) injection vial 4 Units  4 Units SubCUTAneous Lunch Caroline Lacey MD   4 Units at 12/17/22 1327    insulin lispro (HUMALOG) injection vial 6 Units  6 Units SubCUTAneous Albin Joyce MD   6 Units at 12/18/22 1910    glucose chewable tablet 16 g  4 tablet Oral PRN Livan Rivers MD        dextrose bolus 10% 125 mL  125 mL IntraVENous PRN Livan Rivers MD        Or    dextrose bolus 10% 250 mL  250 mL IntraVENous PRN Livan Rivers MD        glucagon (rDNA) injection 1 mg  1 mg SubCUTAneous PRN Caroline Lacey, MD        dextrose 10 % infusion   IntraVENous Continuous PRN Kavon Rodriguez MD         Social History:     Tobacco:    reports that she quit smoking about 21 years ago. Her smoking use included cigarettes. She has a 35.00 pack-year smoking history. She has never used smokeless tobacco.  Alcohol:      reports no history of alcohol use. Drug Use:  reports no history of drug use. Review of Systems:     Review of Systems   Constitutional:  Positive for fatigue. HENT:  Negative for sore throat. Eyes:  Negative for visual disturbance. Respiratory:  Negative for shortness of breath. Cardiovascular:  Positive for chest pain. Gastrointestinal:  Positive for abdominal pain and blood in stool. Genitourinary:  Negative for dysuria. Skin:  Negative for rash. Neurological:  Negative for weakness. Psychiatric/Behavioral:  Negative for behavioral problems. Physical Exam:     Vitals:  BP (!) 149/85   Pulse 59   Temp 97.7 °F (36.5 °C) (Oral)   Resp 20   Ht 5' 4\" (1.626 m)   Wt 240 lb (108.9 kg)   LMP 12/07/1995 (Approximate)   SpO2 93%   PF (!) 18 L/min   BMI 41.20 kg/m²     Physical Exam  Constitutional:       Appearance: Normal appearance. HENT:      Head: Normocephalic and atraumatic. Mouth/Throat:      Mouth: Mucous membranes are moist.   Eyes:      Extraocular Movements: Extraocular movements intact. Pupils: Pupils are equal, round, and reactive to light. Cardiovascular:      Rate and Rhythm: Normal rate and regular rhythm. Pulses:           Femoral pulses are 2+ on the right side and 2+ on the left side. Dorsalis pedis pulses are 2+ on the right side and 2+ on the left side. Abdominal:      General: There is no distension. Palpations: Abdomen is soft. Tenderness: Tenderness: mild. Neurological:      General: No focal deficit present. Mental Status: She is alert.      Imaging/Labs:     Narrative   EXAMINATION:   CT OF THE ABDOMEN AND PELVIS WITH CONTRAST 12/18/2022 11:34 am       TECHNIQUE:   CT of the abdomen and pelvis was performed with the administration of   intravenous contrast. Multiplanar reformatted images are provided for review. Automated exposure control, iterative reconstruction, and/or weight based   adjustment of the mA/kV was utilized to reduce the radiation dose to as low   as reasonably achievable. COMPARISON:   Chest CT today. HISTORY:   ORDERING SYSTEM PROVIDED HISTORY: diarrhea   TECHNOLOGIST PROVIDED HISTORY:   diarrhea       Reason for Exam: diarrhea       FINDINGS:   Lower Chest: Minimal subsegmental atelectasis in the lung bases. Organs: Cirrhotic liver morphology. No focal liver lesion identified. The a   [place] and portal veins appear appropriately opacified. Spleen size within   normal limits. Cholecystectomy. Mild prominence of the biliary tree is   noted, suggestive of reservoir effect. .  The pancreas, adrenals and kidneys   reveal no acute findings. GI/Bowel: Edematous ascending colon with surrounding fat stranding, greater   than expected for portal hypertensive colopathy. No evidence for bowel   obstruction. No pneumatosis. No evidence for terminal ileitis. Pelvis: No acute findings. Peritoneum/Retroperitoneum: Trace abdominopelvic ascites. No free air. No   lymphadenopathy. Bones/Soft Tissues: No acute findings. Impression   1. Cirrhotic liver morphology. Trace abdominopelvic ascites. 2.  Edematous ascending colon, greater than expected for portal hypertensive   colopathy, therefore an infectious or inflammatory colitis is favored.        Assessment and Plan:     Continue medical management per primary  Continue conservative treatment of ischemic colitis  Did discuss with radiology who does not believe that there is any significant radiographic evidence of portal vein or mesenteric vein thrombosis  No plans for anticoagulation at this time  Vascular surgery to sign off    Discussed with Dr Shaan Dowling.     Electronically signed by Jie Crawford MD on 12/21/22 at 1:01 PM EST      [address] [facility]  Office: [phone]

## 2022-12-21 NOTE — PROGRESS NOTES
CLINICAL PHARMACY NOTE: MEDS TO BEDS    Total # of Prescriptions Filled: 1   The following medications were delivered to the patient:  [address]     Additional Documentation:

## 2022-12-21 NOTE — PROGRESS NOTES
[facility] - Progress Note      Today's Date and Time: 12/20/2022, 10:32 PM    Impression :   Chest pain  Diarrhea  Leukocytosis  Coronary artery disease  History of congestive heart failure  COPD  History of prior bariatric surgery    Recommendations:   Monitor off antibiotics   Await results of stool studies  Suggest COVID booster after cardiac issues are resolved    Medical Decision Making/Summary/Discussion:12/20/2022       Infection Control Recommendations   [place] Precautions    Antimicrobial Stewardship Recommendations     Discontinuation of therapy  Coordination of Outpatient Care:   Estimated Length of IV antimicrobials:none  Patient will need Midline Catheter Insertion: no  Patient will need PICC line Insertion:no  Patient will need: Home IV , [place],  SNF,  LTAC: TBD  Patient will need outpatient wound care:no    Chief complaint/reason for consultation:   Leukocytosis  Diarrhea  Concern for colitis      History of Present Illness:   Flaca Dutton is a 74y.o.-year-old  female who was initially admitted on 12/16/2022. Patient seen at the request of Cooper Deal. INITIAL HISTORY:  Patient was admitted on 12/16/2022 after she presented to the emergency room complaining of substernal chest pain of about 3 days duration. Has a history of pre-existing coronary artery disease with a prior stress test on 12/7/2022 which showed mild anteroseptal ischemia. The patient was a scheduled for a cardiac catheterization in January 2023 but she presented to the [place] ER with a chest pain and was transferred to [facility] for further management. She was considered to have unstable angina and received nitroglycerin drip and heparin. The patient also indicated that she had developed diarrhea for about the same length of time. She initially reported burgundy colored with her stools and a sensation of burning in the colon.   Her CT is scan showed some edema of the ascending colon suggestive of active colitis. On physical examination she has some tenderness of the abdomen and the umbilical and right lower quadrant areas. When she came to the hospital the diarrheal stools subsided. The patient also have a rise in her white count. Testing for influenza a and B was negative. SARS-CoV-2 was negative. Blood and urine cultures were negative. CURRENT EVALUATION : 12/20/2022      Afebrile  VS stable    Patient feels better  No complaints  No new issues per RN      Patient underwent cardiac catheterization 12-19-22 with no stents placed. .  She has been evaluated by the GI service and stool for enteric pathogens is pending      Labs, X rays reviewed: 12/20/2022    BUN: 24  Cr: 0.73  Na 137  K 3.2    WBC: 10.9--> 18.7-->10.1  Hb: 12.3  Plat: 130    Cultures:  Urine:  12-19 -2022 pending  12/16/2022 no growth  Blood:    Sputum :    Wound:    Influenza A & B: negative     SARS-CoV-2 negative    Discussed with patient, RN, IM . I have personally reviewed the past medical history, past surgical history, medications, social history, and family history, and I have updated the database accordingly.   Past Medical History:     Past Medical History:   Diagnosis Date    Arthritis     Asthma     Bell's palsy     CAD (coronary artery disease) 1989    Cancer ([facility] [address] 1971    cervical    Carotid artery disease (HCC)     Cataracts, bilateral     CHF (congestive heart failure) (HCC)     COPD (chronic obstructive pulmonary disease) (HCC)     Coronary artery disease     Eczema     Emphysema lung (HCC)     Fibromyalgia     Glaucoma     H/O blood clots     Headache     Heart attack (HCC)     Hx of Bell's palsy     Mild right facial paralysis    Hypertension     Liver disease     Mild dementia     Neuropathy     Osteoporosis     Sleep apnea 11/07/2014    sleep study done in [facility]     Type 2 diabetes mellitus with hyperglycemia ([facility] [address]        Past Surgical  History:     Past Surgical History:   Procedure Laterality Date    ANESTHESIA NERVE BLOCK Right 2019    Right L2 L3 L4 L5 Diagnostic Medial BB performed by Sudeep Pruett MD at [address] [place] [address] Right 2019    Right L2 L3 L4 L5 Confirmatory Medial BB performed by Sudeep Pruett MD at [place] [address]    CHOLECYSTECTOMY      COLONOSCOPY  2012    COLONOSCOPY N/A 2019    COLONOSCOPY performed by Toni Isaacs MD at [address] [place] [address]  internal hemorrhoids and few diverticuli    CORONARY ANGIOPLASTY WITH STENT PLACEMENT      ENDOSCOPY, COLON, DIAGNOSTIC      HYSTERECTOMY (CERVIX STATUS UNKNOWN)      INDUCED   1970    LUMBAR SPINE SURGERY Right 2019    Right L2 L3 TRANSFORAMINAL performed by Sudeep Pruett MD at [address] [place] [address]  Right 2019    Right L2 L3 TRANSFORAMINAL performed by Sudeep Pruett MD at [address] [place] [address] Dr Right 2019    Right L3 & L4 TRANSFORAMINAL performed by Sudeep Pruett MD at [address] [place] [address] Dr Right 2019    Right L4 L5 TRANSFORAMINAL performed by Sudeep Pruett MD at [address] [place] [address] Right 2020    RIGHT L4 L5  TRANSFORAMINAL performed by Sudeep Pruett MD at [address] [place] [address] Right 2020    Right L4 L5 TRANSFORAMINAL performed by Sudeep Pruett MD at [address] [place] [address] 1.1-2CM FACE,FACIAL Left 2018    Excision Cyst Left Chest, Middle Back performed by Toni Isaacs MD at [address] AA&/STRD TFRML EPI LUMBAR/SACRAL EA ADDL Right 10/05/2018    Right L2 & L3 TRANSFORAMINAL performed by Sudeep Pruett MD at [address] AA&/STRD TFRML EPI LUMBAR/SACRAL EA ADDL Right 10/26/2018    Right L2 L3 TRANSFORAMINAL performed by Sudeep Pruett MD at [place] [address] Right 2019    Right  L2 L3 L4 L5 RADIOFREQUENCY performed by Sudeep Pruett MD at [facility] (CERVIX REMOVED)      Cervical cancer.   Had XRT    TONSILLECTOMY         Medications:      sodium chloride flush  5-40 mL IntraVENous 2 times per day    sodium chloride flush  5-40 mL IntraVENous 2 times per day    sodium chloride flush  5-40 mL IntraVENous 2 times per day    amitriptyline  25 mg Oral Nightly    atorvastatin  40 mg Oral Daily    buPROPion  75 mg Oral BID    citalopram  10 mg Oral Daily    [Held by provider] clopidogrel  75 mg Oral Daily    furosemide  40 mg Oral Daily    insulin glargine  15 Units SubCUTAneous BID    metoprolol succinate  12.5 mg Oral Daily    pantoprazole  40 mg Oral Daily    spironolactone  50 mg Oral Daily    sodium chloride flush  10 mL IntraVENous 2 times per day    vitamin B-12  2,500 mcg Oral Daily    dorzolamide  1 drop Ophthalmic TID    empagliflozin  10 mg Oral Daily    fluticasone  2 spray Each Nostril Daily    magnesium oxide  400 mg Oral Daily    pyridoxine  50 mg Oral Daily    tiotropium-olodaterol  2 puff Inhalation Daily    latanoprost  1 drop Both Eyes Nightly    brimonidine  1 drop Both Eyes Daily    And    timolol  1 drop Both Eyes Daily    insulin lispro  4 Units SubCUTAneous Lunch    insulin lispro  6 Units SubCUTAneous Dinner       Social History:     Social History     Socioeconomic History    Marital status:      Spouse name: Not on file    Number of children: Not on file    Years of education: Not on file    Highest education level: Not on file   Occupational History    Not on file   Tobacco Use    Smoking status: Former     Packs/day: 1.00     Years: 35.00     Pack years: 35.00     Types: Cigarettes     Quit date: 2001     Years since quittin.2    Smokeless tobacco: Never   Vaping Use    Vaping Use: Never used   Substance and Sexual Activity    Alcohol use: No    Drug use: No    Sexual activity: Never   Other Topics Concern    Not on file   Social History Narrative    Not on file     Social Determinants of Health     Financial Resource Strain: Low Risk     Difficulty of Paying Living Expenses: Not hard at all   Food Insecurity: No Food Insecurity    Worried About Running Out of Food in the Last Year: Never true    Ran Out of Food in the Last Year: Never true   Transportation Needs: Not on file   Physical Activity: Insufficiently Active    Days of Exercise per Week: 2 days    Minutes of Exercise per Session: 10 min   Stress: Not on file   Social Connections: Not on file   Intimate Partner Violence: Not on file   Housing Stability: Not on file       Family History:     Family History   Problem Relation Age of Onset    Heart Disease Mother     High Blood Pressure Mother     Stroke Mother     Glaucoma Mother     Early Death Father     Cancer Father         stomach    Miscarriages / Stillbirths Maternal Uncle         Allergies:   Patient has no known allergies. Review of Systems:   Constitutional: No fevers or chills. No systemic complaints  Head: No headaches  Eyes: No double vision or blurry vision. No conjunctival inflammation. ENT: No sore throat or runny nose. . No hearing loss, tinnitus or vertigo. Cardiovascular: Chest pain, no palpitations. No shortness of breath. No LAUREANO  Lung: No shortness of breath or cough. No sputum production  Abdomen: No nausea, vomiting. Initially reported diarrhea, has abdominal pain. Renee Hidden No cramps. Genitourinary: No increased urinary frequency, or dysuria. No hematuria. No suprapubic or CVA pain  Musculoskeletal: No muscle aches or pains. No joint effusions, swelling or deformities  Hematologic: No bleeding or bruising. Neurologic: No headache, weakness, numbness, or tingling. Integument: No rash, no ulcers. Psychiatric: No depression. Endocrine: No polyuria, no polydipsia, no polyphagia.     Physical Examination :   Patient Vitals for the past 8 hrs:   BP Temp Temp src Pulse Resp SpO2   12/1950 (!) 140/95 99 °F (37.2 °C) Oral 73 18 94 %   12/20/22 1623 123/80 97.5 °F (36.4 °C) Axillary 62 18 93 % General Appearance: Awake, alert, and in no apparent distress  Head:  Normocephalic, no trauma  Eyes: Pupils equal, round, reactive to light and accommodation; extraocular movements intact; sclera anicteric; conjunctivae pink. No embolic phenomena. ENT: Oropharynx clear, without erythema, exudate, or thrush. No tenderness of sinuses. Mouth/throat: mucosa pink and moist. No lesions. Dentition in good repair. Neck:Supple, without lymphadenopathy. Thyroid normal, No bruits. Pulmonary/Chest: Clear to auscultation, without wheezes, rales, or rhonchi. No dullness to percussion. Cardiovascular: Regular rate and rhythm without murmurs, rubs, or gallops. Abdomen: Soft, mildly  tender. Bowel sounds normal. No organomegaly  All four Extremities: No cyanosis, clubbing, edema, or effusions. Neurologic: No gross sensory or motor deficits. Skin: Warm and dry with good turgor. Signs of peripheral arterial insufficiency. No ulcerations. No open wounds. Medical Decision Making -Laboratory:   I have independently reviewed/ordered the following labs:    CBC with Differential:   Recent Labs     12/19/22  0608 12/20/22  0340   WBC 10.5 8.8   HGB 12.3 13.1   HCT 38.2 40.7   * 180   LYMPHOPCT 12* 14*   MONOPCT 10 14*       BMP:   Recent Labs     12/19/22  0926 12/20/22  0913    136   K 3.2* 2.9*   CL 99 97*   CO2 24 24   BUN 13 13   CREATININE 0.73 0.65   MG 2.3 2.2       Hepatic Function Panel:   No results for input(s): PROT, LABALBU, BILIDIR, IBILI, BILITOT, ALKPHOS, ALT, AST in the last 72 hours. No results for input(s): RPR in the last 72 hours. No results for input(s): HIV in the last 72 hours. No results for input(s): BC in the last 72 hours.   Lab Results   Component Value Date/Time    RBC 4.36 12/20/2022 03:40 AM    WBC 8.8 12/20/2022 03:40 AM    YEAST FEW 12/19/2022 10:27 AM    TURBIDITY Clear 12/19/2022 10:27 AM     Lab Results   Component Value Date/Time    CREATININE 0.65 12/20/2022 09:13 AM GLUCOSE 133 12/20/2022 09:13 AM       Medical Decision Making-Imaging:     EXAMINATION:   CTA OF THE CHEST 12/18/2022 11:34 am       TECHNIQUE:   CTA of the chest was performed after the administration of intravenous   contrast.  Multiplanar reformatted images are provided for review. MIP   images are provided for review. Automated exposure control, iterative   reconstruction, and/or weight based adjustment of the mA/kV was utilized to   reduce the radiation dose to as low as reasonably achievable. COMPARISON:   1 June 2017       HISTORY:   ORDERING SYSTEM PROVIDED HISTORY: elevated d-dimer   TECHNOLOGIST PROVIDED HISTORY:   elevated d-dimer   Reason for Exam: elevated d-dimer       FINDINGS:   Pulmonary Arteries: Pulmonary arteries are adequately opacified for   evaluation. No evidence of intraluminal filling defect to suggest pulmonary   embolism. Main pulmonary artery is normal in caliber. Mediastinum: No evidence of mediastinal lymphadenopathy. The heart and   pericardium demonstrate no acute abnormality. There is no acute abnormality   of the thoracic aorta. Lungs/pleura: Emphysematous changes are present in the lungs. Respiratory   motion complicates assessment. Basilar atelectasis is noted. No significant   effusion, extrapleural air noted. Small area of plaque-like consolidation is   present dorsally in the right hemithorax which may represent minimal airspace   disease. Upper Abdomen: Hepatosplenomegaly with micro nodular liver. Small hiatal   hernia. Postsurgical gastric changes suggesting bariatric surgery. Soft Tissues/Bones: Left shoulder           Impression   No evidence of pulmonary embolus. Emphysema. Atelectasis and small focus of   plaque-like consolidation right lower lobe.              Medical Decision Azprey-[id number]-Mprjr:       Medical Decision Making-Other:     Note:  Labs, medications, radiologic studies were reviewed with personal review of

## 2022-12-21 NOTE — DISCHARGE SUMMARY
[facility]  Office: [address], DO, Shandra Mata, DO, Yumi Brown, DO, Lynette Kidd, DO, Skyler Greene MD, Tawana Seaman MD, Corbin Restrepo MD, Lynn Gonzalez MD,  Bernard Wong MD, Saundra Li MD, Jono Rizo, DO, Aditya Zaragoza MD,  Judy Rascon, DO, Kostas Stone MD, Arlen Garvey MD, Jennifer Perez, DO, Marilynn Yeboah MD, Leslye Ojeda MD, Alec Sanchez, DO, Lina Stoner MD, Dorothy Pfeiffer MD, Letty Jensen MD, Wolfgang Merchant MD, Tricia Hodges, DO, Shelby Bah MD, Hema Candelaria MD, Ana Castle, CNP,  Beulah Pizano, CNP, Donato Cox, CNP, Valentina Valle, CNP,  Krystyna Edgar, [facility], Timur Burton, CNP, Marcus Malin, CNP, John Antonio, CNP, Efe Stevenson, [facility], [facility], CNP, Aurora Ozuna PA-C, Cade Somers, MARLENE, Duke Eason, [facility], Buzz Graft,  [facility]    Discharge Summary     Patient ID: Ember Valera  :  1950   MRN: [id number]     ACCOUNT:  [de-identified]   Patient's PCP: Concepcion Urban MD  Admit Date: 2022   Discharge Date: 2022    Length of Stay: 5  Code Status:  Full Code  Admitting Physician: Saundra Li MD  Discharge Physician: Ivory Sotelo MD     Active Discharge Diagnoses:     Hospital Problem Lists:  Principal Problem (Resolved):    Unstable angina [facility])  Active Problems:    Colitis    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 [facility])    Bandemia    Obstructive sleep apnea    Coronary artery disease due to lipid rich plaque    Type 2 diabetes mellitus with diabetic polyneuropathy, with long-term current use of insulin (HCC)    Peripheral polyneuropathy    Bilateral leg edema    Memory problem    Gait abnormality    Essential tremor    Anxiety and depression    Chronic cerebral ischemia    DDD (degenerative disc disease), lumbar    Chronic diastolic congestive heart failure (HCC)    Bronchiectasis without complication [facility])  Resolved Problems:    Hematochezia    Diarrhea of presumed infectious origin         Discharged Condition: stable    Hospital Stay:     Hospital Course: This is 74years old female who presented because of chest pain and diarrhea patient's symptoms have been going on for 3 days. The chest pain is dull and substernal in nature. Patient had recently had a cardiac test test done which was positive. She was supposed to have a cardiac cath outpatient. However because of the chest pain she transferred over here to [facility].  Patient was admitted. She was started on heparin drip. Cardiology did cardiac catheterization on 12/19/2022 and no intervention was needed. She was trended to be continued on Plavix and statin. Patient also had diarrhea with CT scan demonstrating colitis. GI evaluated the patient they did scope and concerning for ischemic colitis. Vascular surgery was consulted. They recommended no further work-up needed signed off. Patient was stable and she wanted to go home. She was discharged home in stable condition. She was advised to hold her blood pressure medications if the blood pressure goes low. She was advised to follow-up with GI outpatient also with cardiology outpatient. Patient was advised to return if her symptoms return if there is any other concern. Review of systems: All systems were reviewed and found to be negative except for those mentioned elsewhere in the note  Physical examination    Respiratory exam: Bilateral air entry no rhonchi or wheezes  Cardiovascular examination: S1, S2  Abdominal examination: Soft, nontender, bowel sounds present  Extremities: nontender, chronic trace edema BL        Significant therapeutic interventions:     Cardiac catheterization on 12/19/2022 no intervention needed  Colonoscopy and EGD on 12/21 2022 EGD  The esophagus appeared normal.  There was no endoscopic evidence of varices.   The GE junction was noted at 40 cm.  Evidence of Olena-en-Y gastric bypass. The pouch extended from 40 to 44 cm from the incisors. The mucosa in the pouch appeared normal without evidence of ulceration or fistula. The gastrojejunal anastomosis was incompetent and dilated and measured more than 20 cm in diameter. The mucosa at the anastomosis appeared normal without evidence of ulceration. The Olena limb was intubated up to 70 cm. The mucosa in the Olena limb appeared normal.  There was no evidence of bile or excess fluid in the Olena limb. The jejunojejunal anastomosis was not reached. Colonoscopy  The mucosa in the cecum, ascending colon up to the hepatic flexure appeared edematous, friable with patchy erythema interspersed with ulcers, and pale areas with submucosal bluish hemorrhagic nodules suggestive for ischemic colitis. Biopsies were performed for histology. The mucosa in the transverse colon, splenic flexure, descending colon sigmoid colon and the rectum showed congestion and mosaic pattern suggestive of portal hypertensive colopathy. 8 6 mm sessile polyp was found in the proximal descending colon. Resection was deferred due to risk of bleeding since patient was on Plavix. Small internal hemorrhoids were noted in the distal rectum and at the anal verge on retroflexion.     Significant Diagnostic Studies:   Labs / Micro:  CBC:   Lab Results   Component Value Date/Time    WBC 7.9 12/21/2022 06:12 AM    RBC 4.12 12/21/2022 06:12 AM    HGB 12.3 12/21/2022 06:12 AM    HCT 38.7 12/21/2022 06:12 AM    MCV 93.9 12/21/2022 06:12 AM    MCH 29.9 12/21/2022 06:12 AM    MCHC 31.8 12/21/2022 06:12 AM    RDW 14.5 12/21/2022 06:12 AM     12/21/2022 06:12 AM     BMP:    Lab Results   Component Value Date/Time    GLUCOSE 107 12/21/2022 06:12 AM     12/21/2022 06:12 AM    K 3.3 12/21/2022 06:12 AM    CL 97 12/21/2022 06:12 AM    CO2 26 12/21/2022 06:12 AM    ANIONGAP 12 12/21/2022 06:12 AM    BUN 11 12/21/2022 06:12 AM CREATININE 0.63 12/21/2022 06:12 AM    BUNCRER 21 12/16/2022 11:32 AM    CALCIUM 8.5 12/21/2022 06:12 AM    LABGLOM >60 12/21/2022 06:12 AM    GFRAA >60 02/10/2022 10:00 AM    GFR      02/10/2022 10:00 AM    GFR NOT REPORTED 02/10/2022 10:00 AM     CMP:    Lab Results   Component Value Date/Time    GLUCOSE 107 12/21/2022 06:12 AM     12/21/2022 06:12 AM    K 3.3 12/21/2022 06:12 AM    CL 97 12/21/2022 06:12 AM    CO2 26 12/21/2022 06:12 AM    BUN 11 12/21/2022 06:12 AM    CREATININE 0.63 12/21/2022 06:12 AM    ANIONGAP 12 12/21/2022 06:12 AM    ALKPHOS 86 12/16/2022 11:32 AM    ALT 28 12/16/2022 11:32 AM    AST 46 12/16/2022 11:32 AM    BILITOT 0.7 12/16/2022 11:32 AM    LABALBU 3.5 12/16/2022 11:32 AM    ALBUMIN 1.0 12/16/2022 11:32 AM    LABGLOM >60 12/21/2022 06:12 AM    GFRAA >60 02/10/2022 10:00 AM    GFR      02/10/2022 10:00 AM    GFR NOT REPORTED 02/10/2022 10:00 AM    PROT 7.1 12/16/2022 11:32 AM    CALCIUM 8.5 12/21/2022 06:12 AM        Radiology:  CT ABDOMEN PELVIS W IV CONTRAST Additional Contrast? None    Result Date: 12/18/2022  1. Cirrhotic liver morphology. Trace abdominopelvic ascites. 2.  Edematous ascending colon, greater than expected for portal hypertensive colopathy, therefore an infectious or inflammatory colitis is favored. XR CHEST PORTABLE    Result Date: 12/16/2022  No acute intrathoracic process. CT CHEST PULMONARY EMBOLISM W CONTRAST    Result Date: 12/18/2022  No evidence of pulmonary embolus. Emphysema. Atelectasis and small focus of plaque-like consolidation right lower lobe. Consultations:    Consults:     Final Specialist Recommendations/Findings:   IP CONSULT TO CARDIOLOGY  IP CONSULT TO INFECTIOUS DISEASES  IP CONSULT TO GI  IP CONSULT TO HOME CARE NEEDS      The patient was seen and examined on day of discharge and this discharge summary is in conjunction with any daily progress note from day of discharge.     Discharge plan:     Disposition: Home    Physician Follow Up:     Kentrell Otto MD  [address]  [address]  [phone]    Schedule an appointment as soon as possible for a visit  Please call to make a follow up appt with Dr. Kentrell Otto because you need a repeat colonoscopy to remove a polyp. Prabha Finn MD  [facility]  [address] [facility]  [phone]    Follow up in 1 week(s)      Prabha Finn MD  [facility]  [address] [facility]  [phone]               Diet: cardiac diet and diabetic diet    Activity: As tolerated May    [facility]    Discharge Medications:      Medication List        START taking these medications      docusate 100 MG Caps  Commonly known as: COLACE, DULCOLAX  Take 100 mg by mouth 2 times daily            CONTINUE taking these medications      * albuterol (2.5 MG/3ML) 0.083% nebulizer solution  Commonly known as: PROVENTIL  Take 3 mLs by nebulization every 6 hours as needed for Wheezing     * albuterol sulfate  (90 Base) MCG/ACT inhaler  Commonly known as: PROVENTIL;VENTOLIN;PROAIR  Inhale 2 puffs into the lungs 4 times daily     alendronate 70 MG tablet  Commonly known as: FOSAMAX  TAKE 1 TABLET EVERY WEEK     amitriptyline 25 MG tablet  Commonly known as: ELAVIL  TAKE 1 TABLET AT BEDTIME     ascorbic acid 500 MG tablet  Commonly known as: VITAMIN C  TAKE 1 TABLET BY MOUTH TWICE DAILY     atorvastatin 40 MG tablet  Commonly known as: LIPITOR  TAKE 1 TABLET EVERY DAY.      Biotin 59956 MCG Tabs     buPROPion 75 MG tablet  Commonly known as: WELLBUTRIN  TAKE 1 TABLET TWICE DAILY     calcium carbonate-vitamin D3 600-400 MG-UNIT Tabs per tab  Commonly known as: Calcium 600+D3  TAKE 1 TABLET BY MOUTH TWICE DAILY     citalopram 10 MG tablet  Commonly known as: CELEXA  TAKE 1 TABLET EVERY DAY     clopidogrel 75 MG tablet  Commonly known as: PLAVIX  TAKE 1 TABLET EVERY DAY     Combigan 0.2-0.5 % ophthalmic solution  Generic drug: brimonidine-timolol  PLACE 1 DROP INTO BOTH EYES DAILY     Compression Stockings Misc  by Does not apply route 15-20mmHg  Knee high  Open tow  With assist device to help put them on     Contour Next Test strip  Generic drug: blood glucose test strips  USE TO TEST 4 TIMES A DAY. CPAP Machine Misc     dorzolamide 2 % ophthalmic solution  Commonly known as: TRUSOPT     Droplet Pen Needles 31G X 8 MM Misc  Generic drug: Insulin Pen Needle  USE  FOR  INJECTIONS TWICE DAILY     empagliflozin 10 MG tablet  Commonly known as: Jardiance  Take 1 tablet by mouth daily     fluticasone 50 MCG/ACT nasal spray  Commonly known as: FLONASE  USE 2 SPRAYS NASALLY EVERY DAY     folic acid 888 MCG tablet  Commonly known as: FOLVITE     furosemide 40 MG tablet  Commonly known as: LASIX  TAKE 1 TABLET EVERY DAY     * gabapentin 600 MG tablet  Commonly known as: Neurontin  Take 1 tablet by mouth 3 times daily for 30 days. * gabapentin 600 MG tablet  Commonly known as: Neurontin  Take 1 tablet by mouth 3 times daily for 14 days. insulin glargine 100 UNIT/ML injection vial  Commonly known as: Lantus  INJECT 15 UNITS INTO THE SKIN TWO TIMES DAILY (DISCARD AND BEGIN A NEW VIAL AFTER 28 DAYS)     Insulin Syringe-Needle U-100 31G X 5/16\" 0.5 ML Misc  Commonly known as: Droplet Insulin Syringe  USE TO INJECT INTO THE SKIN TWO TIMES DAILY     magnesium oxide 400 (241.3 Mg) MG Tabs tablet  Commonly known as: MAGnesium-Oxide  Take 1 tablet by mouth 2 times daily     magnesium oxide 400 MG tablet  Commonly known as: MAG-OX  Take 1 tablet by mouth daily     metoprolol succinate 25 MG extended release tablet  Commonly known as: TOPROL XL  TAKE 1/2 TABLET EVERY DAY     Microlet Lancets Misc  USE TO TEST 4 TIMES A DAY. Misc. Devices Misc  It is in my medical opinion that Lewis Molina be allowed to have a kitten in her apartment at all times for companionship to help with her Depression, Anxiety, Fibromyalgia and Osteoarthritis.      Multi Vitamin Daily Tabs     niacin 500 MG extended release capsule     nitroGLYCERIN 0.4 MG SL tablet  Commonly known as: NITROSTAT  PLACE 1 TABLET UNER THE TONGUE EVERY 5 MINUTES AS NEEDED FOR CHEST PAIN AT FIRST SIGN OF CHEST PAIN. NovoLOG FlexPen 100 UNIT/ML injection pen  Generic drug: insulin aspart  INJECT 4 UNITS AT LUNCH AND  6 UNITS AT SUPPER (EACH PEN EXPIRES 28 DAYS AFTER 1ST USE)     * oxyCODONE-acetaminophen 5-325 MG per tablet  Commonly known as: Percocet  Take 2 tablets by mouth every 4 hours as needed for Pain for up to 30 days. Take lowest dose possible to manage pain     * oxyCODONE-acetaminophen  MG per tablet  Commonly known as: Percocet  Take 1 tablet by mouth every 8 hours as needed for Pain for up to 30 days. Intended supply: 30 days     pantoprazole 40 MG tablet  Commonly known as: PROTONIX  TAKE 1 TABLET EVERY DAY     potassium chloride 10 MEQ extended release capsule  Commonly known as: MICRO-K  TAKE 1 CAPSULE TWICE DAILY     pyridoxine 100 MG tablet  Commonly known as: B-6     Respiratory Therapy Supplies Misc  CPAP supplies     senna 8.6 MG Tabs tablet  Commonly known as: SENOKOT  TAKE 1 TABLET BY MOUTH TWICE DAILY     spironolactone 50 MG tablet  Commonly known as: ALDACTONE  Take 1 tablet by mouth daily     Stiolto Respimat 2.5-2.5 MCG/ACT Aers  Generic drug: tiotropium-olodaterol  INHALE 2 PUFFS INTO THE LUNGS DAILY     SUPER B COMPLEX PO     travoprost (benzalkonium) 0.004 % ophthalmic solution  Commonly known as: TRAVATAN  Place 1 drop into both eyes daily     triamcinolone 0.1 % cream  Commonly known as: KENALOG  APPLY TOPICALLY TWICE DAILY     VITAMIN A PO     VITAMIN B12 PO     vitamin D3 25 MCG (1000 UT) Tabs tablet  Commonly known as: CHOLECALCIFEROL  TAKE 3 TABLETS BY MOUTH TWICE DAILY     vitamin E 400 UNIT capsule     zinc 50 MG Tabs tablet           * This list has 6 medication(s) that are the same as other medications prescribed for you.  Read the directions carefully, and ask your doctor or other care provider to review them with you. STOP taking these medications       MG tablet  Generic drug: ibuprofen     ibuprofen 400 MG tablet  Commonly known as: ADVIL;MOTRIN               Where to Get Your Medications        These medications were sent to [facility] [address] [facility], [address] [facility]  [address], [address] [place] [address]      Phone: [phone]   docusate 100 MG Caps         No discharge procedures on file. Time Spent on discharge is  28 mins in patient examination, evaluation, counseling as well as medication reconciliation, prescriptions for required medications, discharge plan and follow up. Electronically signed by   Oziel Griffith MD  12/21/2022  8:17 PM      Thank you Dr. Enid Landry MD for the opportunity to be involved in this patient's care.

## 2022-12-21 NOTE — DISCHARGE INSTR - COC
Continuity of Care Form    Patient Name: Krys Pelaez   :  1950  MRN:  [id number]    Admit date:  2022  Discharge date:  ***    Code Status Order: Full Code   Advance Directives:   [facility] Documentation       Date/Time Healthcare Directive Type of Healthcare Directive Copy in [facility] [address] Agent's Name Healthcare Agent's Phone Number    [phone] Yes, patient has an advance directive for healthcare treatment Living will Yes, copy in chart -- -- --            Admitting Physician:  Minnie Rao MD  PCP: Richard Smith MD    Discharging Nurse: [facility] Unit/Room#: 6565/0678-82  Discharging Unit Phone Number: ***    Emergency Contact:   Extended Emergency Contact Information  Primary Emergency Contact: Cari Martin of [address] [place] [address] Phone: [phone]  Work Phone: [phone]  Mobile Phone: [phone]  Relation: Child   needed?  No    Past Surgical History:  Past Surgical History:   Procedure Laterality Date    ANESTHESIA NERVE BLOCK Right 2019    Right L2 L3 L4 L5 Diagnostic Medial BB performed by Gilbert Haddad MD at [address] [place] [address] Right 2019    Right L2 L3 L4 L5 Confirmatory Medial BB performed by Gilbert Haddad MD at [place] [address]      COLONOSCOPY  2012    COLONOSCOPY N/A 2019    COLONOSCOPY performed by Aba Malloy MD at [address] [place] [address]  internal hemorrhoids and few diverticuli    COLONOSCOPY  2022    diagnostic    CORONARY ANGIOPLASTY WITH STENT PLACEMENT      ENDOSCOPY, COLON, DIAGNOSTIC      ESOPHAGOGASTRODUODENOSCOPY  2022    HYSTERECTOMY (CERVIX STATUS UNKNOWN)      INDUCED   1970    LUMBAR SPINE SURGERY Right 2019    Right L2 L3 TRANSFORAMINAL performed by Gilbert Haddad MD at [address] Right 2019    Right L2 L3 TRANSFORAMINAL performed by Chantal Leonardo Jackie Wu MD at [address] [place] [address]  Right 06/04/2019    Right L3 & L4 TRANSFORAMINAL performed by Melina Soni MD at [address] [place] [address]  Right 12/12/2019    Right L4 L5 TRANSFORAMINAL performed by Melina Soni MD at [address] Right 01/20/2020    RIGHT L4 L5  TRANSFORAMINAL performed by Melina Soni MD at [address] [place] [address] Right 02/17/2020    Right L4 L5 TRANSFORAMINAL performed by Melina Soni MD at [address] [facility] 1.1-2CM FACE,FACIAL Left 02/07/2018    Excision Cyst Left Chest, Middle Back performed by Asha Doan MD at [address] AA&/STRD TFRML EPI LUMBAR/SACRAL EA ADDL Right 10/05/2018    Right L2 & L3 TRANSFORAMINAL performed by Melina Soni MD at [address] AA&/STRD TFRML EPI LUMBAR/SACRAL EA ADDL Right 10/26/2018    Right L2 L3 TRANSFORAMINAL performed by Melina Soni MD at [place] [address] Right 08/01/2019    Right  L2 L3 L4 L5 RADIOFREQUENCY performed by Melina Soni MD at [address] [place] [address] (CERVIX REMOVED)  1987    Cervical cancer.   Had XRT    TONSILLECTOMY  1983       Immunization History:   Immunization History   Administered Date(s) Administered    COVID-19, PFIZER PURPLE top, DILUTE for use, (age 15 y+), 30mcg/0.3mL 02/04/2021, 02/25/2021, 01/05/2022    Influenza Vaccine, unspecified formulation 09/07/2016    Influenza Virus Vaccine 10/03/2014    Influenza, FLUAD, (age 72 y+), Adjuvanted, 0.5mL 12/23/2021    Influenza, FLUZONE (age 72 y+), High Dose, 0.7mL 08/26/2020    Influenza, High Dose (Fluzone 65 yrs and older) 09/16/2017, 09/19/2018    Influenza, Triv, inactivated, subunit, adjuvanted, IM (Fluad 65 yrs and older) 08/13/2019    Pneumococcal Conjugate 13-valent ([id number]) 08/17/2015, 01/12/2017    Pneumococcal Polysaccharide ([id number]) 10/21/2002, 12/13/2012, 08/05/2013, 03/24/2018    Td, unspecified formulation 12/13/2012    Tdap (Boostrix, Adacel) 06/18/2017    Zoster Live (Zostavax) 08/05/2013    Zoster Recombinant (Shingrix) 05/04/2018, 07/21/2018       Active Problems:  Patient Active Problem List   Diagnosis Code    Obstructive sleep apnea G47.33    Chronic fatigue R53.82    Coronary artery disease due to lipid rich plaque I25.10, I25.83    Vitamin D deficiency E55.9    Type 2 diabetes mellitus with diabetic polyneuropathy, with long-term current use of insulin (HCC) E11.42, Z79.4    Peripheral polyneuropathy G62.9    Bilateral leg edema R60.0    Right hip pain M25.551    Muscle ache M79.10    Hx of Bell's palsy Z86.69    Memory problem R41.3    Gait abnormality R26.9    Balance problem R26.89    H/O falling Z91.81    Dizziness R42    Intractable episodic tension-type headache G44.211    Essential tremor G25.0    Anxiety and depression F41.9, F32. A    Elevated hemoglobin A1c R73.09    VBI (vertebrobasilar insufficiency) G45.0    Chronic cerebral ischemia I67.82    TIA (transient ischemic attack) G45.9    Chronic tension headache G44.229    Morbid obesity with BMI of 45.0-49.9, adult ([facility]) E66.01, Z68.42    Chronic right-sided low back pain with right-sided sciatica M54.41, G89.29    Lumbar radiculopathy M54.16    Encounter for chronic pain management G89.29    Carotid stenosis, asymptomatic, bilateral I65.23    Chronic tension-type headache, not intractable G44.229    Lumbosacral spondylosis without myelopathy M47.817    DDD (degenerative disc disease), lumbar M51.36    Acquired spondylolisthesis M43.10    Lumbar facet joint syndrome M47.816    Chronic diastolic congestive heart failure (HCC) I50.32    Bronchiectasis without complication (HCC) P53.4    CHF (congestive heart failure), NYHA class I, chronic, diastolic (HCC) G62.11    Unstable angina (HCC) I20.0    Colitis K52.9    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 (HCC) K75.81, K74.60    Hematochezia K92.1    Diarrhea of presumed infectious origin R19.7    Bandemia D72.825       Isolation/Infection: Isolation            No Isolation          Patient Infection Status       Infection Onset Added Last Indicated Last Indicated By Review Planned Expiration Resolved Resolved By    None active    Resolved    C-diff Rule Out 22 C DIFF TOXIN/ANTIGEN (Ordered)   22 Rule-Out Test Canceled    C-diff Rule Out 22 C DIFF TOXIN/ANTIGEN (Ordered)   22 Jeet Whaley RN            Nurse Assessment:  Last Vital Signs: BP (!) 149/85   Pulse 59   Temp 98.2 °F (36.8 °C) (Oral)   Resp 20   Ht 5' 4\" (1.626 m)   Wt 240 lb (108.9 kg)   LMP 1995 (Approximate)   SpO2 93%   PF (!) 18 L/min   BMI 41.20 kg/m²     Last documented pain score (0-10 scale): Pain Level: 6  Last Weight:   Wt Readings from Last 1 Encounters:   22 240 lb (108.9 kg)     Mental Status:  {IP PT MENTAL STATUS:}    IV Access:  { MERLIN IV ACCESS:336482743}    Nursing Mobility/ADLs:  Walking   {P DME YAMW:681226772}  Transfer  {P DME NQQQ:779319294}  Bathing  {P DME CHVQ:567373623}  Dressing  {CHP DME SDTE:178894712}  Toileting  {CHP DME WSUD:156503893}  Feeding  {P DME ZRA}  Med Admin  {P DME QZNA:501083732}  Med Delivery   { MERLIN MED Delivery:311910526}    Wound Care Documentation and Therapy:  Puncture 20 Back Lower;Right (Active)   Number of days: 1038       Incision 22 Femoral Anterior;Proximal (Active)   Dressing Status Clean;Dry; Intact 22 1430   Incision Cleansed Not Cleansed 22 1430   Dressing/Treatment Gauze dressing/dressing sponge;Tegaderm/transparent film dressing 22 1430   Drainage Amount None 22 1430   Number of days: 2        Elimination:  Continence:    Bowel: {YES / ZP:55550}  Bladder: {YES / KV:27840}  Urinary Catheter: {Urinary Catheter:081345161}   Colostomy/Ileostomy/Ileal Conduit: {YES / SA:28188}       Date of Last BM: ***    Intake/Output Summary (Last 24 hours) at 2022 2819  Last data filed at 2022 1145  Gross per 24 hour   Intake 1200 ml   Output 725 ml   Net 475 ml     I/O last 3 completed shifts: In: [address] [place] [P.O.:2600]  Out: 500 [Urine:500]    Safety Concerns:     [address] [employer] Safety Concerns:683568920}    Impairments/Disabilities:      [address] [employer] Impairments/Disabilities:865184746}    Nutrition Therapy:  Current Nutrition Therapy:   [address] [employer] Diet List:457067235}    Routes of Feeding: {CHP DME Other Feedings:991762754}  Liquids: {Slp liquid thickness:72590}  Daily Fluid Restriction: {CHP DME Yes amt example:953997383}  Last Modified Barium Swallow with Video (Video Swallowing Test): {Done Not Done WNGS:692962178}    Treatments at the Time of Hospital Discharge:   Respiratory Treatments: ***  Oxygen Therapy:  {Therapy; copd oxygen:30489}  Ventilator:    { CC Vent WGCT:564709422}    Rehab Therapies: {THERAPEUTIC INTERVENTION:9353161315}  Weight Bearing Status/Restrictions: [address]  Weight Bearin}  Other Medical Equipment (for information only, NOT a DME order):  {EQUIPMENT:047651354}  Other Treatments: ***    Patient's personal belongings (please select all that are sent with patient):  {[facility] DME Belongings:786781921}    RN SIGNATURE:  {Esignature:085545662}    CASE MANAGEMENT/SOCIAL WORK SECTION    Inpatient Status Date: ***    Readmission Risk Assessment Score:  Readmission Risk              Risk of Unplanned Readmission:  28           Discharging to Facility/ Agency   Name:   Address:  Phone:  Fax:    Dialysis Facility (if applicable)   Name:  Address:  Dialysis Schedule:  Phone:  Fax:    / signature: {Esignature:744669336}    PHYSICIAN SECTION    Prognosis: Fair    Condition at Discharge: Stable    Rehab Potential (if transferring to Rehab):  Fair    Recommended Labs or Other Treatments After Discharge:   PT and OT eval and treat  Monitor vitals  Blood sugar monitoring 3 meals and bedtime  Monitor blood pressure and avoid hypotension    Follow-up with PCP and

## 2022-12-21 NOTE — ANESTHESIA POSTPROCEDURE EVALUATION
Department of Anesthesiology  Postprocedure Note    Patient: Branden Velasquez  MRN: [id number]  [place]: 1950  Date of evaluation: 12/21/2022      Procedure Summary     Date: 12/21/22 Room / Location: [address]    Anesthesia Start: 0902 Anesthesia Stop: 1233    Procedures:       COLONOSCOPY WITH BIOPSY      EGD ESOPHAGOGASTRODUODENOSCOPY Diagnosis:       Other iron deficiency anemia      (ANEMIA)    Surgeons: Sakshi Narvaez MD Responsible Provider: Andrews Jimenez MD    Anesthesia Type: MAC ASA Status: 4          Anesthesia Type: No value filed.     Vicky Phase I: Vicky Score: 10    Vicky Phase II:        Anesthesia Post Evaluation    Patient location during evaluation: bedside  Patient participation: complete - patient participated  Level of consciousness: awake  Airway patency: patent  Nausea & Vomiting: no nausea and no vomiting  Complications: no  Cardiovascular status: blood pressure returned to baseline  Respiratory status: acceptable  Hydration status: euvolemic  Comments: BP (!) 148/77   Pulse 57   Temp 97 °F (36.1 °C) (Temporal)   Resp 18   Ht 5' 4\" (1.626 m)   Wt 240 lb (108.9 kg)   LMP 12/07/1995 (Approximate)   SpO2 92%   PF (!) 18 L/min   BMI 41.20 kg/m²

## 2022-12-21 NOTE — PLAN OF CARE
Problem: Discharge Planning  Goal: Discharge to home or other facility with appropriate resources  Outcome: Completed     Problem: Safety - Adult  Goal: Free from fall injury  Outcome: Completed     Problem: ABCDS Injury Assessment  Goal: Absence of physical injury  Outcome: Completed     Problem: Chronic Conditions and Co-morbidities  Goal: Patient's chronic conditions and co-morbidity symptoms are monitored and maintained or improved  Outcome: Completed     Problem: Respiratory - Adult  Goal: Achieves optimal ventilation and oxygenation  Outcome: Completed     Problem: Cardiovascular - Adult  Goal: Maintains optimal cardiac output and hemodynamic stability  Outcome: Completed  Goal: Absence of cardiac dysrhythmias or at baseline  Outcome: Completed     Problem: Gastrointestinal - Adult  Goal: Minimal or absence of nausea and vomiting  Outcome: Completed  Goal: Maintains or returns to baseline bowel function  Outcome: Completed  Goal: Maintains adequate nutritional intake  Outcome: Completed  Goal: Establish and maintain optimal ostomy function  Outcome: Completed     Problem: Metabolic/Fluid and Electrolytes - Adult  Goal: Electrolytes maintained within normal limits  Outcome: Completed  Goal: Hemodynamic stability and optimal renal function maintained  Outcome: Completed  Goal: Glucose maintained within prescribed range  Outcome: Completed     Problem: Infection - Adult  Goal: Absence of infection at discharge  Outcome: Completed  Goal: Absence of infection during hospitalization  Outcome: Completed  Goal: Absence of fever/infection during anticipated neutropenic period  Outcome: Completed     Problem: Pain  Goal: Verbalizes/displays adequate comfort level or baseline comfort level  Outcome: Completed

## 2022-12-22 ENCOUNTER — CARE COORDINATION (OUTPATIENT)
Dept: CASE MANAGEMENT | Age: 72
End: 2022-12-22

## 2022-12-22 ENCOUNTER — TELEPHONE (OUTPATIENT)
Dept: INTERNAL MEDICINE | Age: 72
End: 2022-12-22

## 2022-12-22 DIAGNOSIS — K52.9 COLITIS: Primary | ICD-10-CM

## 2022-12-22 LAB
CULTURE: NORMAL
CULTURE: NORMAL
Lab: NORMAL
Lab: NORMAL
SPECIMEN DESCRIPTION: NORMAL
SPECIMEN DESCRIPTION: NORMAL
SURGICAL PATHOLOGY REPORT: NORMAL

## 2022-12-22 PROCEDURE — 1111F DSCHRG MED/CURRENT MED MERGE: CPT | Performed by: INTERNAL MEDICINE

## 2022-12-22 NOTE — TELEPHONE ENCOUNTER
Patient called back. Patient was requesting a refill on her pain medication. Script sent to [facility] in [place] already.

## 2022-12-22 NOTE — TELEPHONE ENCOUNTER
Please advise    Patient called in and said that last week we sent in her new script for her pain medication after she got the medication picked up the following day they had to call 911 due to [facility] having chest pains. Patient was then transferred to [address] [place] [address] Dr Pritchett where they did a heart cath. - she is scheduled for a follow up in January. Her daughter was at her house cleaning things up from things being all over the place from calling 911 and the daughter thought the bag that had her medication in it was trash and threw her medication away. Her daughter feels awful as now patient does not have any of her pain medication. Patient is wanting to know if she can have a new script and if its okay to let the pharmacy know what happened and if its okay to fill this script since her daughter threw the other one away?

## 2022-12-22 NOTE — CARE COORDINATION
[facility] Care Transitions Initial Follow Up Call    Call within 2 business days of discharge: Yes    Care Transition Nurse contacted the patient by telephone to perform post hospital discharge assessment. Verified name and  with patient as identifiers. Provided introduction to self, and explanation of the Care Transition Nurse role. Patient: Corby Michael Patient : 1950   MRN: [id number]  Reason for Admission: Unstable angina/colitis  Discharge Date: 22 RARS: Readmission Risk Score: 13.7      Last Discharge  Street       Date Complaint Diagnosis Description Type Department Provider    22  Other iron deficiency anemia Admission (Discharged) Keyla Ferrara MD    22 Chest Pain; Diarrhea Unstable angina [facility]) ED (TRANSFER) [facility] ED Keerthi Gibbons MD            Was this an external facility discharge? No Discharge Facility: [facility]    Challenges to be reviewed by the provider   Additional needs identified to be addressed with provider: Yes  7 day hospital follow up               Method of communication with provider: staff message. 1st attempt to reach patient for Care Transitions. [facility] requesting return call. Contact information provided. [phone]    Was able to contact Haylie Dotson for transitional outreach. She stated that she was doing better. She denied any further bleeding, has slight chest pain, and groin cath site without any problems. Reviewed medications and she stated she had all her medications. She has a follow up with PCP and does not want one sooner appointment. She had no questions/or concerns. Care Transition Nurse reviewed discharge instructions with patient who verbalized understanding. The patient was given an opportunity to ask questions and does not have any further questions or concerns at this time. Were discharge instructions available to patient? Yes.  Reviewed appropriate site of care based on symptoms and resources available to patient

## 2022-12-22 NOTE — TELEPHONE ENCOUNTER
Care Transitions Initial Follow Up Call    Outreach made within 2 business days of discharge: Yes    Patient: Barbara Godlman Patient : 1950   MRN: [id number]  Reason for Admission: There are no discharge diagnoses documented for the most recent discharge. Discharge Date: 22       Spoke with: Zina Mendiola     Discharge department/facility: [facility] Interactive Patient Contact:  Was patient able to fill all prescriptions: Yes  Was patient instructed to bring all medications to the follow-up visit: Yes  Is patient taking all medications as directed in the discharge summary?  Yes  Does patient understand their discharge instructions: Yes  Does patient have questions or concerns that need addressed prior to 7-14 day follow up office visit: no    Scheduled appointment with PCP within 7-14 days    Follow Up  Future Appointments   Date Time Provider Portia Nascimento   2022  2:00 PM Miracle [address] [place]   2023  4:00 PM MD AMAIRANI Salinas DPP   2023  2:30 PM MD AMAIRANI Salinas [facility]   2023  1:30 PM DAVID Bragg - CNP DCARDIO [facility]   2023  1:15 PM DO JUSTICE Corona DPP       Guillermo Osborne LPN

## 2022-12-22 NOTE — TELEPHONE ENCOUNTER
Patient would like a call back from Nurse Kamille Nice . Patient would not tell me what she would like to Talk to Kamille Nice about.      Patient is scheduled to see Dr Mary Ann Vazquez on Jan 24 at [place] [address].

## 2022-12-22 NOTE — TELEPHONE ENCOUNTER
----- Message from Amanda Hammer RN sent at 12/22/2022  9:36 AM EST -----  Pt will need 7 day hospital follow up appointment.   Was discharged from [facility] on 12/21  had unstable angina/colitis  Thank you

## 2022-12-27 ENCOUNTER — HOSPITAL ENCOUNTER (OUTPATIENT)
Dept: MAMMOGRAPHY | Age: 72
Discharge: HOME | End: 2022-12-29
Payer: MEDICARE

## 2022-12-27 DIAGNOSIS — Z12.31 OTHER SCREENING MAMMOGRAM: ICD-10-CM

## 2022-12-27 PROCEDURE — 77067 SCR MAMMO BI INCL CAD: CPT

## 2022-12-29 ENCOUNTER — CARE COORDINATION (OUTPATIENT)
Dept: CASE MANAGEMENT | Age: 72
End: 2022-12-29

## 2022-12-29 NOTE — CARE COORDINATION
[facility] Care Transitions Follow Up Call  Patient: Antonio Ryan  Patient : 1950   MRN: [id number]  Reason for Admission: Anemia, Angina  Discharge Date: 22 RARS: Readmission Risk Score: 13.7    Message left in compliance with HIPPA. Stated who I was, representing the [facility], Care Transitions Team. Instructed to call PCP with any immediate health needs/questions/concerns.       Follow Up  Future Appointments   Date Time Provider Portia Nascimento   2023  4:00 PM MD AMAIRANI Fernández [facility]   2023  2:30 PM MD AMAIRANI Fernández DPP   2023  1:30 PM DAVID Escalante - CNP DCARDIO [facility]   2023  1:15 PM DO JUSTICE Zhou DPP       Theodis Holstein, RN

## 2023-01-04 ENCOUNTER — CARE COORDINATION (OUTPATIENT)
Dept: CASE MANAGEMENT | Age: 73
End: 2023-01-04

## 2023-01-04 NOTE — CARE COORDINATION
[facility] Care Transitions Follow Up Call    Care Transition Nurse contacted the patient by telephone to follow up after admission on 22. Verified name and  with patient as identifiers. Patient: Lokesh Finney  Patient : 1950   MRN: [id number]  Reason for Admission: Unstable angina/colitis  Discharge Date: 22 RARS: Readmission Risk Score: 13.7      Needs to be reviewed by the provider   Additional needs identified to be addressed with provider: No  none             Method of communication with provider: none. Was able to contact Cong Morgan for [employer]. She stated that she continues with the fatigue. She denies chest pain, diarrhea is off & on,  no abdominal pain, shortness of breath and her blood pressures are fine. She denied any problems with the cath site. She will be following up with surgeon for possible repeat colonoscopy. Addressed changes since last contact:  none  Discussed follow-up appointments. If no appointment was previously scheduled, appointment scheduling offered: Yes. Is follow up appointment scheduled within 7 days of discharge? No.    Follow Up  Future Appointments   Date Time Provider Portia Nascimento   2023  3:10 PM Cristobal Pool MD [address] [place] [address]   2023  4:00 PM MD AMAIRANI Cunha [facility]   2023  2:30 PM MD AMAIRANI Cunha DP   2023  1:30 PM DAVID Nielsen - CNP DCAKRZYSZTOF [facility]   2023  1:15 PM DO [facility]N [facility]M [facility](s[facility]     Care Transition Nurse reviewed medical action plan and red flags with patient and discussed any barriers to care and/or understanding of plan of care after discharge. Discussed appropriate site of care based on symptoms and resources available to patient including: PCP  Specialist  When to call 911. The patient agrees to contact the PCP office for questions related to their healthcare.      Patients top risk factors for readmission: lack of knowledge about disease and medical condition-colitis /CHF/Cirrhosis/DM  Interventions to address risk factors: Obtained and reviewed discharge summary and/or continuity of care documents    Offered patient enrollment in the Remote Patient Monitoring (RPM) program for in-home monitoring: Patient declined. Care Transitions Subsequent and Final Call    Subsequent and Final Calls  Do you have any ongoing symptoms?: Yes  Onset of Patient-reported symptoms: In the past 7 days  Patient-reported symptoms: Fatigue  Have your medications changed?: No  Do you have any questions related to your medications?: No  Do you currently have any active services?: No  Do you have any needs or concerns that I can assist you with?: No  Care Transitions Interventions  Other Interventions:             Care Transition Nurse provided contact information for future needs. Plan for follow-up call in 7-10 days based on severity of symptoms and risk factors.   Plan for next call: symptom management-follow up on colitis, chest pain    Petra Olivarez RN

## 2023-01-10 DIAGNOSIS — M54.50 CHRONIC LOW BACK PAIN, UNSPECIFIED BACK PAIN LATERALITY, UNSPECIFIED WHETHER SCIATICA PRESENT: ICD-10-CM

## 2023-01-10 DIAGNOSIS — G89.29 CHRONIC LOW BACK PAIN, UNSPECIFIED BACK PAIN LATERALITY, UNSPECIFIED WHETHER SCIATICA PRESENT: ICD-10-CM

## 2023-01-10 RX ORDER — OXYCODONE AND ACETAMINOPHEN 10; 325 MG/1; MG/1
1 TABLET ORAL EVERY 8 HOURS PRN
Qty: 90 TABLET | Refills: 0 | Status: SHIPPED | OUTPATIENT
Start: 2023-01-10 | End: 2023-02-09

## 2023-01-10 NOTE — TELEPHONE ENCOUNTER
Patient called in requesting a refill on her pain medication. Medication is due to be refilled on Friday, but patient is leaving to go out of town and will be out of the medication before she gets back home. Is it okay to fill script early? Script pended to be printed to  in office if approved.

## 2023-01-11 ENCOUNTER — CARE COORDINATION (OUTPATIENT)
Dept: CASE MANAGEMENT | Age: 73
End: 2023-01-11

## 2023-01-11 NOTE — CARE COORDINATION
[facility] Care Transitions Follow Up Call    Care Transition Nurse contacted the patient by telephone to follow up after admission on 22.  Verified name and  with patient as identifiers.    Patient: Penelope Mayberry  Patient : 1950   MRN: [id number]  Reason for Admission: Unstable angina/colitis  Discharge Date: 22 RARS: Readmission Risk Score: 13.7      Needs to be reviewed by the provider   Additional needs identified to be addressed with provider: No  none             Method of communication with provider: none.    Was able to contact Penelope for transitional outreach, but she requested a call back a little later.  Will reattempt to contact pt at a later time as requested.      Recalled Penelope and she said that she was doing all right.  She denied chest pain, abdominal pain, or dizziness. She said that she was getting some swelling to her legs.  She is wearing a sock for it now.  She said that she does not weigh herself.  She said that she is taking her diuretics.  She said that she is currently experiencing diarrhea.  She said that every time she eats dry cereal like corn flakes, she has diarrhea. She said that her blood sugar this morning was 88 and last night blood pressure was in the 140's.  She had no questions or concerns.    Addressed changes since last contact:  none  Discussed follow-up appointments. If no appointment was previously scheduled, appointment scheduling offered: No.   Is follow up appointment scheduled within 7 days of discharge? No.    Follow Up  Future Appointments   Date Time Provider Department Center   2023  3:10 PM Cole Lacy MD DS[facility]G [facility]   2023  4:00 PM MD AMAIRANI Pugh [facility]   2023  2:30 PM MD AMAIRANI Pugh [facility]   2023  1:30 PM DAVID Minor - CNP DCAKRZYSZTOF [facility]   2023  1:15 PM DO JUSTICE Ohara [facility]     Non-[facility] follow up appointment(s):     Care Transition Nurse reviewed medical action plan with  patient and discussed any barriers to care and/or understanding of plan of care after discharge. Discussed appropriate site of care based on symptoms and resources available to patient including: PCP  Specialist  When to call 911. The patient agrees to contact the PCP office for questions related to their healthcare. Patients top risk factors for readmission: functional physical ability and medical condition-Colitis/CHF/DM  Interventions to address risk factors: Obtained and reviewed discharge summary and/or continuity of care documents    Offered patient enrollment in the Remote Patient Monitoring (RPM) program for in-home monitoring: Patient declined. Care Transitions Subsequent and Final Call    Subsequent and Final Calls  Care Transitions Interventions  Other Interventions:             Care Transition Nurse provided contact information for future needs. Plan for follow-up call in 7-10 days based on severity of symptoms and risk factors.   Plan for next call: symptom management-follow up  on chest pain/s/s of anemia  final call possible referral back to Jaclyn Kessler RN

## 2023-01-12 DIAGNOSIS — R53.82 CHRONIC FATIGUE: ICD-10-CM

## 2023-01-12 RX ORDER — ASCORBIC ACID 500 MG
TABLET ORAL
Qty: 180 TABLET | Refills: 3 | Status: SHIPPED | OUTPATIENT
Start: 2023-01-12

## 2023-01-13 RX ORDER — ALENDRONATE SODIUM 70 MG/1
TABLET ORAL
Qty: 12 TABLET | Refills: 3 | Status: SHIPPED | OUTPATIENT
Start: 2023-01-13

## 2023-01-18 ENCOUNTER — CARE COORDINATION (OUTPATIENT)
Dept: CASE MANAGEMENT | Age: 73
End: 2023-01-18

## 2023-01-18 NOTE — CARE COORDINATION
[facility] Care Transitions Follow Up Call    Care Transition Nurse contacted the patient by telephone to follow up after admission on 22. Verified name and  with patient as identifiers. Patient: Joshua Duggan  Patient : 1950   MRN: [id number]  Reason for Admission: Unstable angina/colitis  Discharge Date: 22 RARS: Readmission Risk Score: 13.7      Needs to be reviewed by the provider   Additional needs identified to be addressed with provider: No  none             Method of communication with provider: none. Was able to contact Tejinder Schuler for [employer] She stated that she was doing \"ok\". She denied , cough, no abdominal pain,  or dizziness. She said that she has occasional chest pressure and dyspnea with the pressure. She said that pressure comes on randomly. She said that she had an episode of diarrhea yesterday. She is unclear what may have caused it. She has swelling that \"comes and goes\". She had no other concerns/questions  Final outreach, pt agreeable to [facility] referral.    Addressed changes since last contact:  none  Discussed follow-up appointments. If no appointment was previously scheduled, appointment scheduling offered: No.   Is follow up appointment scheduled within 7 days of discharge? No.    Follow Up  Future Appointments   Date Time Provider Portia Nascimento   2023  3:10 PM Franchesca Tsang MD [address] [place], [place] [address]P   2023  4:00 PM MD AMAIRANI Hernandez DPP   2023  2:30 PM MD AMAIRANI Hernandez DPP   2023  1:30 PM DAVID Borja - CNP DCARDIO [facility]   2023  1:15 PM DO JUSTICE Schneider DPP     Non-[facility] follow up appointment(s):     Care Transition Nurse reviewed medical action plan and red flags with patient and discussed any barriers to care and/or understanding of plan of care after discharge. Discussed appropriate site of care based on symptoms and resources available to patient including: PCP  Specialist  When to call 911. The patient agrees to contact the PCP office for questions related to their healthcare. Advance Care Planning:   reviewed and current. Patients top risk factors for readmission: functional physical ability and medical condition-Colitis/CHF/DM  Interventions to address risk factors: Obtained and reviewed discharge summary and/or continuity of care documents    Offered patient enrollment in the Remote Patient Monitoring (RPM) program for in-home monitoring: Patient declined. Care Transitions Subsequent and Final Call    Subsequent and Final Calls  Do you have any ongoing symptoms?: No  Have your medications changed?: No  Do you have any questions related to your medications?: No  Do you currently have any active services?: No  Do you have any needs or concerns that I can assist you with?: No  Care Transitions Interventions  Other Interventions:             Care Transition Nurse provided contact information for future needs. No further follow-up call indicated based on severity of symptoms and risk factors. Plan for next call:  final call Referral to Benjamin BLANTON RN for further outreaches.     Jamie Lopez RN

## 2023-01-20 ENCOUNTER — CARE COORDINATION (OUTPATIENT)
Dept: CARE COORDINATION | Age: 73
End: 2023-01-20

## 2023-01-20 NOTE — CARE COORDINATION
Dee Levi was referred to [facility] from [facility].     1/20/2023- 11 am Left message requesting return call @ [phone].      Plan of Care :  [facility]roll in [facility]     Future Appointments   Date Time Provider Portia Nascimento   1/23/2023  3:10 PM Giselle Powers MD [address] [place], [place] [address]DPP   1/24/2023  4:00 PM MD AMAIRANI Mann DP   2/23/2023  2:30 PM MD AMAIRANI Mann DP   5/24/2023  1:30 PM Andrew Hernandez APRN - CNP DCARDIO DP   11/8/2023  1:15 PM DO JUSTICE Trejo [facility]

## 2023-01-21 DIAGNOSIS — Z79.4 TYPE 2 DIABETES MELLITUS WITH DIABETIC POLYNEUROPATHY, WITH LONG-TERM CURRENT USE OF INSULIN (HCC): ICD-10-CM

## 2023-01-21 DIAGNOSIS — E11.42 TYPE 2 DIABETES MELLITUS WITH DIABETIC POLYNEUROPATHY, WITH LONG-TERM CURRENT USE OF INSULIN (HCC): ICD-10-CM

## 2023-01-21 DIAGNOSIS — M79.672 BILATERAL FOOT PAIN: ICD-10-CM

## 2023-01-21 DIAGNOSIS — M79.671 BILATERAL FOOT PAIN: ICD-10-CM

## 2023-01-23 RX ORDER — GABAPENTIN 600 MG/1
TABLET ORAL
Qty: 270 TABLET | Refills: 1 | Status: SHIPPED | OUTPATIENT
Start: 2023-01-23 | End: 2023-01-25

## 2023-01-23 NOTE — TELEPHONE ENCOUNTER
Johanny Foote called requesting a refill of the below medication which has been pended for you:     Requested Prescriptions     Pending Prescriptions Disp Refills    gabapentin (NEURONTIN) 600 MG tablet [Pharmacy Med Name: GABAPENTIN 600 MG Tablet] 270 tablet 1     Sig: TAKE 1 TABLET THREE TIMES DAILY       Last Appointment Date: 8/22/2022  Next Appointment Date: 1/24/2023    No Known Allergies

## 2023-01-24 ENCOUNTER — OFFICE VISIT (OUTPATIENT)
Dept: INTERNAL MEDICINE | Age: 73
End: 2023-01-24
Payer: MEDICARE

## 2023-01-24 ENCOUNTER — HOSPITAL ENCOUNTER (OUTPATIENT)
Age: 73
Discharge: HOME | End: 2023-01-24
Payer: MEDICARE

## 2023-01-24 VITALS
HEART RATE: 58 BPM | SYSTOLIC BLOOD PRESSURE: 116 MMHG | BODY MASS INDEX: 41.83 KG/M2 | OXYGEN SATURATION: 96 % | DIASTOLIC BLOOD PRESSURE: 72 MMHG | RESPIRATION RATE: 16 BRPM | WEIGHT: 245 LBS | HEIGHT: 64 IN

## 2023-01-24 DIAGNOSIS — I25.10 CORONARY ARTERY DISEASE DUE TO LIPID RICH PLAQUE: ICD-10-CM

## 2023-01-24 DIAGNOSIS — E11.42 TYPE 2 DIABETES MELLITUS WITH DIABETIC POLYNEUROPATHY, WITH LONG-TERM CURRENT USE OF INSULIN (HCC): Primary | ICD-10-CM

## 2023-01-24 DIAGNOSIS — Z79.4 TYPE 2 DIABETES MELLITUS WITH DIABETIC POLYNEUROPATHY, WITH LONG-TERM CURRENT USE OF INSULIN (HCC): Primary | ICD-10-CM

## 2023-01-24 DIAGNOSIS — E87.6 HYPOKALEMIA: ICD-10-CM

## 2023-01-24 DIAGNOSIS — I25.83 CORONARY ARTERY DISEASE DUE TO LIPID RICH PLAQUE: ICD-10-CM

## 2023-01-24 DIAGNOSIS — I50.32 CHF (CONGESTIVE HEART FAILURE), NYHA CLASS I, CHRONIC, DIASTOLIC (HCC): ICD-10-CM

## 2023-01-24 LAB — POTASSIUM SERPL-SCNC: 4.4 MMOL/L (ref 3.7–5.3)

## 2023-01-24 PROCEDURE — 3046F HEMOGLOBIN A1C LEVEL >9.0%: CPT | Performed by: INTERNAL MEDICINE

## 2023-01-24 PROCEDURE — 1090F PRES/ABSN URINE INCON ASSESS: CPT | Performed by: INTERNAL MEDICINE

## 2023-01-24 PROCEDURE — G8417 CALC BMI ABV UP PARAM F/U: HCPCS | Performed by: INTERNAL MEDICINE

## 2023-01-24 PROCEDURE — 2022F DILAT RTA XM EVC RTNOPTHY: CPT | Performed by: INTERNAL MEDICINE

## 2023-01-24 PROCEDURE — G8400 PT W/DXA NO RESULTS DOC: HCPCS | Performed by: INTERNAL MEDICINE

## 2023-01-24 PROCEDURE — 84132 ASSAY OF SERUM POTASSIUM: CPT

## 2023-01-24 PROCEDURE — 1123F ACP DISCUSS/DSCN MKR DOCD: CPT | Performed by: INTERNAL MEDICINE

## 2023-01-24 PROCEDURE — G8427 DOCREV CUR MEDS BY ELIG CLIN: HCPCS | Performed by: INTERNAL MEDICINE

## 2023-01-24 PROCEDURE — G8484 FLU IMMUNIZE NO ADMIN: HCPCS | Performed by: INTERNAL MEDICINE

## 2023-01-24 PROCEDURE — 3017F COLORECTAL CA SCREEN DOC REV: CPT | Performed by: INTERNAL MEDICINE

## 2023-01-24 PROCEDURE — 1036F TOBACCO NON-USER: CPT | Performed by: INTERNAL MEDICINE

## 2023-01-24 PROCEDURE — 99212 OFFICE O/P EST SF 10 MIN: CPT | Performed by: INTERNAL MEDICINE

## 2023-01-24 PROCEDURE — 99214 OFFICE O/P EST MOD 30 MIN: CPT | Performed by: INTERNAL MEDICINE

## 2023-01-24 PROCEDURE — 36415 COLL VENOUS BLD VENIPUNCTURE: CPT

## 2023-01-24 ASSESSMENT — PATIENT HEALTH QUESTIONNAIRE - PHQ9
2. FEELING DOWN, DEPRESSED OR HOPELESS: 0
4. FEELING TIRED OR HAVING LITTLE ENERGY: 0
6. FEELING BAD ABOUT YOURSELF - OR THAT YOU ARE A FAILURE OR HAVE LET YOURSELF OR YOUR FAMILY DOWN: 0
9. THOUGHTS THAT YOU WOULD BE BETTER OFF DEAD, OR OF HURTING YOURSELF: 0
SUM OF ALL RESPONSES TO PHQ9 QUESTIONS 1 & 2: 0
SUM OF ALL RESPONSES TO PHQ QUESTIONS 1-9: 0
7. TROUBLE CONCENTRATING ON THINGS, SUCH AS READING THE NEWSPAPER OR WATCHING TELEVISION: 0
1. LITTLE INTEREST OR PLEASURE IN DOING THINGS: 0
8. MOVING OR SPEAKING SO SLOWLY THAT OTHER PEOPLE COULD HAVE NOTICED. OR THE OPPOSITE, BEING SO FIGETY OR RESTLESS THAT YOU HAVE BEEN MOVING AROUND A LOT MORE THAN USUAL: 0
5. POOR APPETITE OR OVEREATING: 0
SUM OF ALL RESPONSES TO PHQ QUESTIONS 1-9: 0
3. TROUBLE FALLING OR STAYING ASLEEP: 0
SUM OF ALL RESPONSES TO PHQ QUESTIONS 1-9: 0
10. IF YOU CHECKED OFF ANY PROBLEMS, HOW DIFFICULT HAVE THESE PROBLEMS MADE IT FOR YOU TO DO YOUR WORK, TAKE CARE OF THINGS AT HOME, OR GET ALONG WITH OTHER PEOPLE: 0
SUM OF ALL RESPONSES TO PHQ QUESTIONS 1-9: 0

## 2023-01-24 ASSESSMENT — ENCOUNTER SYMPTOMS
DIARRHEA: 0
ABDOMINAL PAIN: 0
BACK PAIN: 0
EYE PAIN: 0
SHORTNESS OF BREATH: 0
VOMITING: 0
NAUSEA: 0
BLOOD IN STOOL: 0
CONSTIPATION: 0
COUGH: 0

## 2023-01-24 NOTE — PROGRESS NOTES
[place] [address]  Dept: [phone]  Dept Fax: [phone]  Loc: [phone]     Barbara Carrillo is a 74 y.o. female who presents today for her medical conditions/complaintsas noted below. Barbara Carrillo is c/o of   Chief Complaint   Patient presents with    Coronary [facility]'s Cardiac Cath 12/16/22    Diabetes    Other     Angina         HPI:     Coronary Artery Disease  Presents for follow-up visit. Pertinent negatives include no chest pain, dizziness, leg swelling, palpitations or shortness of breath. Her past medical history is significant for CHF. The symptoms have been improving. Compliance with diet is good. Compliance with exercise is good. Compliance with medications is good. Diabetes  She presents for her follow-up diabetic visit. She has type 2 diabetes mellitus. The initial diagnosis of diabetes was made 20 years ago. Her disease course has been fluctuating. Pertinent negatives for hypoglycemia include no confusion, dizziness, headaches, nervousness/anxiousness or pallor. Pertinent negatives for diabetes include no chest pain, no polydipsia, no polyuria and no weakness. Congestive Heart Failure  Presents for follow-up visit. Pertinent negatives include no abdominal pain, chest pain, palpitations or shortness of breath. The symptoms have been stable. Her past medical history is significant for CAD. Compliance with total regimen is %. Compliance with diet is %. Compliance with exercise is %. Compliance with medications is %. Hemoglobin A1C (%)   Date Value   08/15/2022 6.4 (H)   02/10/2022 6.9 (H)   02/18/2021 7.0 (H)            Microalb/Crt.  Ratio (mcg/mg creat)   Date Value   02/10/2022 17     LDL Cholesterol (mg/dL)   Date Value   12/17/2022 30   02/10/2022 56   02/18/2021 44     LDL Calculated (mg/dL)   Date Value   2016 80   2014 80         AST (U/L)   Date Value   2022 46 (H)     ALT (U/L)   Date Value   2022 28     BUN (mg/dL)   Date Value   2022 11     BP Readings from Last 3 Encounters:   23 116/72   22 (!) 144/83   22 (!) 134/58              Past Medical History:   Diagnosis Date    Arthritis     Asthma     Bell's palsy     CAD (coronary artery disease)     Cancer ([address] 1971    cervical    Carotid artery disease ([address]     Cataracts, bilateral     CHF (congestive heart failure) (HCC)     COPD (chronic obstructive pulmonary disease) ([address]     Coronary artery disease     Eczema     Emphysema lung (HCC)     Fibromyalgia     Glaucoma     H/O blood clots     Headache     Heart attack ([address]     Hx of Bell's palsy     Mild right facial paralysis    Hypertension     Liver disease     Mild dementia     Neuropathy     Osteoporosis     Sleep apnea 2014    sleep study done in [facility]     Type 2 diabetes mellitus with hyperglycemia ([address]       Past Surgical History:   Procedure Laterality Date    ANESTHESIA NERVE BLOCK Right 2019    Right L2 L3 L4 L5 Diagnostic Medial BB performed by Kelly Guan MD at [address] [place] [address] Right 2019    Right L2 L3 L4 L5 Confirmatory Medial BB performed by Kelly Guan MD at . [facility] 2002    CHOLECYSTECTOMY      COLONOSCOPY  2012    COLONOSCOPY N/A 2019    COLONOSCOPY performed by Ivory Cage MD at [address] [place] [address]  internal hemorrhoids and few diverticuli    COLONOSCOPY  2022    diagnostic    COLONOSCOPY N/A 2022    COLONOSCOPY WITH BIOPSY performed by Minh Fortune MD at [facility], COLON, DIAGNOSTIC      ESOPHAGOGASTRODUODENOSCOPY  2022    HYSTERECTOMY (CERVIX STATUS UNKNOWN)      INDUCED   1970    LUMBAR SPINE SURGERY Right 2019    Right L2 L3 TRANSFORAMINAL performed by Manny Díaz MD at [facility] Right 2019    Right L2 L3 TRANSFORAMINAL performed by Manny Díaz MD at [address] [facility] Right 2019    Right L3 & L4 TRANSFORAMINAL performed by Manny Díaz MD at [address] [facility] Right 2019    Right L4 L5 TRANSFORAMINAL performed by Manny Díaz MD at [address] [place] [address] Right 2020    RIGHT L4 L5  TRANSFORAMINAL performed by Manny Díaz MD at [address] [place] [address] Right 2020    Right L4 L5 TRANSFORAMINAL performed by Manny Díaz MD at [address] [place] [address] TG F/E/E/N/L/M 1.1-2.0CM Left 2018    Excision Cyst Left Chest, Middle Back performed by Renata Rutherford MD at [facility] [address]77 AA&/STRD TFRML EPI LUMBAR/SACRAL EA ADDL Right 10/05/2018    Right L2 & L3 TRANSFORAMINAL performed by Manny Díaz MD at [place] [address] AA&/STRD TFRML EPI LUMBAR/SACRAL EA ADDL Right 10/26/2018    Right L2 L3 TRANSFORAMINAL performed by Manny Díaz MD at [place] [address] Right 2019    Right  L2 L3 L4 L5 RADIOFREQUENCY performed by Manny Díaz MD at [address] [place] [address] (CERVIX REMOVED)      Cervical cancer.   Had XRT    TONSILLECTOMY      UPPER GASTROINTESTINAL ENDOSCOPY N/A 2022    EGD ESOPHAGOGASTRODUODENOSCOPY performed by Consuelo Berg MD at [place] History   Problem Relation Age of Onset    Heart Disease Mother     High Blood Pressure Mother     Stroke Mother     Glaucoma Mother     Early Death Father     Cancer Father         stomach    Miscarriages / Stillbirths Maternal Uncle           Social History     Tobacco Use    Smoking status: Former     Packs/day: 1.00     Years: 35.00     Pack years: 35.00     Types: Cigarettes     Quit date: 2001     Years since quittin.3    Smokeless tobacco: Never   Substance Use Topics    Alcohol use: No         Current Outpatient Medications   Medication Sig Dispense Refill    gabapentin (NEURONTIN) 600 MG tablet Take 1 tablet by mouth 3 times daily for 30 days. 270 tablet 1    gabapentin (NEURONTIN) 600 MG tablet TAKE 1 TABLET THREE TIMES DAILY 270 tablet 1    alendronate (FOSAMAX) 70 MG tablet TAKE 1 TABLET EVERY WEEK 12 tablet 3    ascorbic acid (VITAMIN C) 500 MG tablet TAKE 1 TABLET BY MOUTH TWICE DAILY 180 tablet 3    oxyCODONE-acetaminophen (PERCOCET)  MG per tablet Take 1 tablet by mouth every 8 hours as needed for Pain for up to 30 days. Intended supply: 30 days Max Daily Amount: 3 tablets 90 tablet 0    docusate sodium (COLACE, DULCOLAX) 100 MG CAPS Take 100 mg by mouth 2 times daily 60 capsule 1    potassium chloride (MICRO-K) 10 MEQ extended release capsule TAKE 1 CAPSULE TWICE DAILY 180 capsule 3    spironolactone (ALDACTONE) 50 MG tablet Take 1 tablet by mouth daily 90 tablet 1    STIOLTO RESPIMAT 2.5-2.5 MCG/ACT AERS INHALE 2 PUFFS INTO THE LUNGS DAILY 3 each 3    fluticasone (FLONASE) 50 MCG/ACT nasal spray USE 2 SPRAYS NASALLY EVERY DAY 48 g 3    Insulin Syringe-Needle U-100 (DROPLET INSULIN SYRINGE) 31G X 5/16\" 0.5 ML MISC USE TO INJECT INTO THE SKIN TWO TIMES DAILY 200 each 3    DROPLET PEN NEEDLES 31G X 8 MM MISC USE  FOR  INJECTIONS TWICE DAILY 200 each 3    insulin glargine (LANTUS) 100 UNIT/ML injection vial INJECT 15 UNITS INTO THE SKIN TWO TIMES DAILY (DISCARD AND BEGIN A NEW VIAL AFTER 28 DAYS) 90 mL 3    vitamin D3 (CHOLECALCIFEROL) 25 MCG (1000 UT) TABS tablet TAKE 3 TABLETS BY MOUTH TWICE DAILY 540 tablet 3    calcium carbonate-vitamin D3 (CALCIUM 600+D3) 600-400 MG-UNIT TABS per tab TAKE 1 TABLET BY MOUTH TWICE DAILY 180 tablet 3    Microlet Lancets MISC USE TO TEST 4 TIMES A DAY. 400 each 3    magnesium oxide (MAG-OX) 400 MG tablet Take 1 tablet by mouth daily 90 tablet 3    atorvastatin (LIPITOR) 40 MG tablet TAKE 1 TABLET EVERY DAY.  90 tablet 3    NOVOLOG FLEXPEN 100 UNIT/ML injection pen INJECT 4 UNITS AT LUNCH AND  6 UNITS AT SUPPER (EACH PEN EXPIRES 28 DAYS AFTER 1ST USE) 15 mL 3    pantoprazole (PROTONIX) 40 MG tablet TAKE 1 TABLET EVERY DAY 90 tablet 3    furosemide (LASIX) 40 MG tablet TAKE 1 TABLET EVERY DAY 90 tablet 3    amitriptyline (ELAVIL) 25 MG tablet TAKE 1 TABLET AT BEDTIME 90 tablet 3    citalopram (CELEXA) 10 MG tablet TAKE 1 TABLET EVERY DAY 90 tablet 3    buPROPion (WELLBUTRIN) 75 MG tablet TAKE 1 TABLET TWICE DAILY 180 tablet 3    clopidogrel (PLAVIX) 75 MG tablet TAKE 1 TABLET EVERY DAY 90 tablet 3    albuterol sulfate HFA (PROVENTIL;VENTOLIN;PROAIR) 108 (90 Base) MCG/ACT inhaler Inhale 2 puffs into the lungs 4 times daily 54 g 3    albuterol (PROVENTIL) (2.5 MG/3ML) 0.083% nebulizer solution Take 3 mLs by nebulization every 6 hours as needed for Wheezing 120 each 3    triamcinolone (KENALOG) 0.1 % cream APPLY TOPICALLY TWICE DAILY 15 g 5    empagliflozin (JARDIANCE) 10 MG tablet Take 1 tablet by mouth daily 90 tablet 3    metoprolol succinate (TOPROL XL) 25 MG extended release tablet TAKE 1/2 TABLET EVERY DAY 45 tablet 3    senna (SENOKOT) 8.6 MG TABS tablet TAKE 1 TABLET BY MOUTH TWICE DAILY 180 tablet 3    CONTOUR NEXT TEST strip USE TO TEST 4 TIMES A DAY. 400 strip 3    magnesium oxide (MAGNESIUM-OXIDE) 400 (241.3 Mg) MG TABS tablet Take 1 tablet by mouth 2 times daily (Patient not taking: Reported on 12/22/2022) 180 tablet 3    Misc. Devices MISC It is in my medical opinion that Jeffrey Walker be allowed to have a kitten in her apartment at all times for companionship to help with her Depression, Anxiety, Fibromyalgia and Osteoarthritis.  1 Device 0    dorzolamide (TRUSOPT) 2 % ophthalmic solution Apply to eye 3 times daily      pyridoxine (B-6) 100 MG tablet Take 50 mg by mouth daily      Biotin 77672 MCG TABS Take by mouth      Respiratory Therapy Supplies MISC CPAP supplies 1 each 0    nitroGLYCERIN (NITROSTAT) 0.4 MG SL tablet PLACE 1 TABLET UNER THE TONGUE EVERY 5 MINUTES AS NEEDED FOR CHEST PAIN AT FIRST SIGN OF CHEST PAIN. 25 tablet 1    VITAMIN A PO Take 2,400 mcg by mouth daily      vitamin E 400 UNIT capsule Take 400 Units by mouth daily      COMBIGAN 0.2-0.5 % ophthalmic solution PLACE 1 DROP INTO BOTH EYES DAILY 15 mL 3    Compression Stockings MISC by Does not apply route 15-20mmHg  Knee high  Open tow  With assist device to help put them on 1 each 0    Zinc 50 MG TABS Take 50 mg by mouth daily      niacin 500 MG extended release capsule Take 500 mg by mouth daily      folic acid (FOLVITE) 000 MCG tablet Take 800 mcg by mouth daily      Cyanocobalamin (VITAMIN B12 PO) Take 2,500 mcg by mouth daily      B Complex-C (SUPER B COMPLEX PO) Take 1 tablet by mouth daily       travoprost, benzalkonium, (TRAVATAN) 0.004 % ophthalmic solution Place 1 drop into both eyes daily 3 Bottle 3    CPAP Machine MISC by Does not apply route Pressure of 13 (AirSense 10)  [employer].      Multiple Vitamin (MULTI VITAMIN DAILY) TABS Take by mouth daily       No current facility-administered medications for this visit.      No Known Allergies    Health Maintenance   Topic Date Due    Hepatitis A vaccine (1 of 2 - Risk 2-dose series) Never done    Hepatitis B vaccine (1 of 3 - Risk 3-dose series) Never done    COVID-19 Vaccine (4 - Booster for Pfizer series) 03/02/2022    Diabetic Alb to Cr ratio (uACR) test  02/10/2023    Depression Monitoring  02/17/2023    Annual Wellness Visit (AWV)  02/18/2023    A1C test (Diabetic or Prediabetic)  08/15/2023    Diabetic foot exam  08/22/2023    Lipids  12/17/2023    GFR test (Diabetes, CKD 3-4, OR last GFR 15-59)  12/21/2023    Diabetic retinal exam  07/06/2024    Breast cancer screen  12/27/2024    DTaP/Tdap/Td vaccine (2 - Td or Tdap) 06/18/2027    Colorectal Cancer Screen  12/21/2032    DEXA (modify frequency per FRAX score)  Completed    Flu vaccine  Completed    Shingles vaccine  Completed    Pneumococcal 65+ years Vaccine  Completed    Hepatitis C screen Completed    Hib vaccine  Aged Out    Meningococcal (ACWY) vaccine  Aged Out       Subjective:      Review of Systems   Constitutional:  Negative for chills and fever.   HENT:  Negative for hearing loss.    Eyes:  Negative for pain and visual disturbance.   Respiratory:  Negative for cough and shortness of breath.    Cardiovascular:  Negative for chest pain, palpitations and leg swelling.   Gastrointestinal:  Negative for abdominal pain, blood in stool, constipation, diarrhea, nausea and vomiting.   Endocrine: Negative for cold intolerance, polydipsia and polyuria.   Genitourinary:  Negative for difficulty urinating, dysuria and hematuria.   Musculoskeletal:  Negative for arthralgias, back pain, gait problem and neck pain.   Skin:  Negative for pallor and rash.   Neurological:  Negative for dizziness, weakness, numbness and headaches.   Hematological:  Negative for adenopathy. Does not bruise/bleed easily.   Psychiatric/Behavioral:  Negative for confusion. The patient is not nervous/anxious.      Objective:     Physical Exam  Vitals reviewed.   Constitutional:       Appearance: She is well-developed.   HENT:      Head: Normocephalic and atraumatic.   Eyes:      Pupils: Pupils are equal, round, and reactive to light.   Cardiovascular:      Rate and Rhythm: Normal rate and regular rhythm.      Heart sounds: No murmur heard.    No friction rub. No gallop.   Pulmonary:      Effort: Pulmonary effort is normal.      Breath sounds: Normal breath sounds. No wheezing or rales.   Abdominal:      General: There is no distension.      Palpations: Abdomen is soft. There is no mass.      Tenderness: There is no abdominal tenderness. There is no rebound.   Musculoskeletal:         General: Normal range of motion.      Cervical back: Neck supple.   Lymphadenopathy:      Cervical: No cervical adenopathy.   Skin:     General: Skin is warm and dry.      Findings: No rash.   Neurological:      Mental Status: She is alert and oriented  to person, place, and time. Cranial Nerves: No cranial nerve deficit (grossly). Psychiatric:         Thought Content: Thought content normal.      /72 (Site: Left Upper Arm, Position: Sitting, Cuff Size: Large Adult)   Pulse 58   Resp 16   Ht 5' 4\" (1.626 m)   Wt 245 lb (111.1 kg)   LMP 12/07/1995 (Approximate)   SpO2 96%   BMI 42.05 kg/m²     Assessment:       Diagnosis Orders   1. Type 2 diabetes mellitus with diabetic polyneuropathy, with long-term current use of insulin ([facility]        2. CHF (congestive heart failure), NYHA class I, chronic, diastolic ([address]        3. Coronary artery disease due to lipid rich plaque        4. Hypokalemia  Potassium      Test results. 12/21/2022 glucose okay at 137    12/17/2022 normal total cholesterol 89    12/21/2022 low potassium 3.3    12/19/2022 cardiac catheterization showed only nonobstructive minimal coronary artery disease. Patient told to continue Plavix, atorvastatin, and metoprolol. Plan:       Return if symptoms worsen or fail to improve, for Diabetes, CAD, CHF. Orders Placed This Encounter   Procedures    Potassium     Standing Status:   Future     Standing Expiration Date:   1/24/2024       No orders of the defined types were placed in this encounter. Patientgiven educational materials - see patient instructions. Discussed use, benefit,and side effects of prescribed medications. All patient questions answered. Ptvoiced understanding. Reviewed health maintenance. Instructed to continue currentmedications, diet and exercise. Patient agreed with treatment plan. Follow up asdirected.      Electronically signed by Melissa Denny MD on 1/24/2023 at 4:34 PM

## 2023-01-25 DIAGNOSIS — E11.42 TYPE 2 DIABETES MELLITUS WITH DIABETIC POLYNEUROPATHY, WITH LONG-TERM CURRENT USE OF INSULIN (HCC): ICD-10-CM

## 2023-01-25 DIAGNOSIS — M79.671 BILATERAL FOOT PAIN: ICD-10-CM

## 2023-01-25 DIAGNOSIS — M79.672 BILATERAL FOOT PAIN: ICD-10-CM

## 2023-01-25 DIAGNOSIS — Z79.4 TYPE 2 DIABETES MELLITUS WITH DIABETIC POLYNEUROPATHY, WITH LONG-TERM CURRENT USE OF INSULIN (HCC): ICD-10-CM

## 2023-01-25 RX ORDER — GABAPENTIN 600 MG/1
600 TABLET ORAL 3 TIMES DAILY
Qty: 42 TABLET | Refills: 0 | Status: SHIPPED | OUTPATIENT
Start: 2023-01-25 | End: 2023-02-08

## 2023-01-25 NOTE — TELEPHONE ENCOUNTER
Last appt: 1/24/2023  Next appt: 2/13/2023    Patient is completely out of the medication and is needing a two week supply sent to [facility].

## 2023-01-26 ENCOUNTER — CARE COORDINATION (OUTPATIENT)
Dept: CARE COORDINATION | Age: 73
End: 2023-01-26

## 2023-01-26 SDOH — ECONOMIC STABILITY: FOOD INSECURITY: WITHIN THE PAST 12 MONTHS, YOU WORRIED THAT YOUR FOOD WOULD RUN OUT BEFORE YOU GOT MONEY TO BUY MORE.: NEVER TRUE

## 2023-01-26 SDOH — ECONOMIC STABILITY: FOOD INSECURITY: WITHIN THE PAST 12 MONTHS, THE FOOD YOU BOUGHT JUST DIDN'T LAST AND YOU DIDN'T HAVE MONEY TO GET MORE.: NEVER TRUE

## 2023-01-26 SDOH — ECONOMIC STABILITY: HOUSING INSECURITY
IN THE LAST 12 MONTHS, WAS THERE A TIME WHEN YOU DID NOT HAVE A STEADY PLACE TO SLEEP OR SLEPT IN A SHELTER (INCLUDING NOW)?: NO

## 2023-01-26 SDOH — SOCIAL STABILITY: SOCIAL NETWORK: HOW OFTEN DO YOU ATTENT MEETINGS OF THE CLUB OR ORGANIZATION YOU BELONG TO?: NEVER

## 2023-01-26 SDOH — HEALTH STABILITY: PHYSICAL HEALTH: ON AVERAGE, HOW MANY MINUTES DO YOU ENGAGE IN EXERCISE AT THIS LEVEL?: 0 MIN

## 2023-01-26 SDOH — SOCIAL STABILITY: SOCIAL NETWORK: HOW OFTEN DO YOU ATTEND CHURCH OR RELIGIOUS SERVICES?: NEVER

## 2023-01-26 SDOH — SOCIAL STABILITY: SOCIAL NETWORK: HOW OFTEN DO YOU GET TOGETHER WITH FRIENDS OR RELATIVES?: MORE THAN THREE TIMES A WEEK

## 2023-01-26 SDOH — ECONOMIC STABILITY: TRANSPORTATION INSECURITY
IN THE PAST 12 MONTHS, HAS THE LACK OF TRANSPORTATION KEPT YOU FROM MEDICAL APPOINTMENTS OR FROM GETTING MEDICATIONS?: NO

## 2023-01-26 SDOH — ECONOMIC STABILITY: TRANSPORTATION INSECURITY
IN THE PAST 12 MONTHS, HAS LACK OF TRANSPORTATION KEPT YOU FROM MEETINGS, WORK, OR FROM GETTING THINGS NEEDED FOR DAILY LIVING?: NO

## 2023-01-26 SDOH — HEALTH STABILITY: MENTAL HEALTH: HOW MANY STANDARD DRINKS CONTAINING ALCOHOL DO YOU HAVE ON A TYPICAL DAY?: PATIENT DOES NOT DRINK

## 2023-01-26 SDOH — HEALTH STABILITY: MENTAL HEALTH
STRESS IS WHEN SOMEONE FEELS TENSE, NERVOUS, ANXIOUS, OR CAN'T SLEEP AT NIGHT BECAUSE THEIR MIND IS TROUBLED. HOW STRESSED ARE YOU?: ONLY A LITTLE

## 2023-01-26 SDOH — SOCIAL STABILITY: SOCIAL NETWORK: ARE YOU MARRIED, WIDOWED, DIVORCED, SEPARATED, NEVER MARRIED, OR LIVING WITH A PARTNER?: DIVORCED

## 2023-01-26 SDOH — HEALTH STABILITY: MENTAL HEALTH: HOW OFTEN DO YOU HAVE A DRINK CONTAINING ALCOHOL?: NEVER

## 2023-01-26 SDOH — ECONOMIC STABILITY: INCOME INSECURITY: HOW HARD IS IT FOR YOU TO PAY FOR THE VERY BASICS LIKE FOOD, HOUSING, MEDICAL CARE, AND HEATING?: NOT HARD AT ALL

## 2023-01-26 SDOH — SOCIAL STABILITY: SOCIAL NETWORK
DO YOU BELONG TO ANY CLUBS OR ORGANIZATIONS SUCH AS CHURCH GROUPS UNIONS, FRATERNAL OR ATHLETIC GROUPS, OR SCHOOL GROUPS?: NO

## 2023-01-26 SDOH — SOCIAL STABILITY: SOCIAL NETWORK
IN A TYPICAL WEEK, HOW MANY TIMES DO YOU TALK ON THE PHONE WITH FAMILY, FRIENDS, OR NEIGHBORS?: MORE THAN THREE TIMES A WEEK

## 2023-01-26 SDOH — ECONOMIC STABILITY: INCOME INSECURITY: IN THE LAST 12 MONTHS, WAS THERE A TIME WHEN YOU WERE NOT ABLE TO PAY THE MORTGAGE OR RENT ON TIME?: NO

## 2023-01-26 SDOH — HEALTH STABILITY: PHYSICAL HEALTH: ON AVERAGE, HOW MANY DAYS PER WEEK DO YOU ENGAGE IN MODERATE TO STRENUOUS EXERCISE (LIKE A BRISK WALK)?: 0 DAYS

## 2023-01-26 SDOH — ECONOMIC STABILITY: HOUSING INSECURITY: IN THE LAST 12 MONTHS, HOW MANY PLACES HAVE YOU LIVED?: 1

## 2023-01-26 NOTE — CARE COORDINATION
Stef Marie was referred to [facility] from [facility]. Plan of Care :  Enroll in [facility]     1/26/2023- 12:52 pm spoke with Stef Marie. She has [facility] and was completed last week. She is having Passport  in home soon to discuss next step. She is in agreement to this writer returning call. She prefers 1/31/2023 in the afternoon. She was given this writer's contact information.      Future Appointments   Date Time Provider Portia Nascimento   2/13/2023  2:10 PM Ivory Cage MD [address] [place] [address]   2/13/2023  3:00 [facility]   5/24/2023  1:30 PM DAVID Rivas - CNP DCARDIO [facility]   11/8/2023  1:15 PM Goldie London DO [facility]

## 2023-01-31 ENCOUNTER — CARE COORDINATION (OUTPATIENT)
Dept: CARE COORDINATION | Age: 73
End: 2023-01-31

## 2023-01-31 DIAGNOSIS — M54.50 CHRONIC BILATERAL LOW BACK PAIN WITHOUT SCIATICA: Primary | ICD-10-CM

## 2023-01-31 DIAGNOSIS — G89.29 CHRONIC BILATERAL LOW BACK PAIN WITHOUT SCIATICA: Primary | ICD-10-CM

## 2023-01-31 RX ORDER — OXYCODONE HYDROCHLORIDE AND ACETAMINOPHEN 5; 325 MG/1; MG/1
1 TABLET ORAL EVERY 6 HOURS PRN
Qty: 120 TABLET | Refills: 0 | Status: SHIPPED | OUTPATIENT
Start: 2023-01-31 | End: 2023-03-02

## 2023-01-31 ASSESSMENT — ENCOUNTER SYMPTOMS: DYSPNEA ASSOCIATED WITH: EXERTION

## 2023-01-31 NOTE — TELEPHONE ENCOUNTER
Patient is leaving town to go stay with her granddaughter for the next 2 weeks.   She will be due to have her pain medication refilled next Tuesday and will be out of it before she comes back.    She also stated that her insurance will no longer cover the 10/325 mg so she has to switch to the 5/325mg.    Is it okay to fill script early?    Script pended if approved

## 2023-01-31 NOTE — CARE COORDINATION
Ambulatory Care Coordination Note  1/31/2023    ACC: Jorden Brice was referred to [facility] from report. She has: COPD, LEANDRO- CPAP, on medications and follows with pulmonology    CHF, CAD, HTN, Hyperlipidemia- on medications and follows with cardiology    DM- Hgb A1c on 8/15 was 6.4- on medications and follows with PCP    Colon polyp- referred to surgeon    Plan of Care : Enrolled in ACM    Continue assessments, education and support    SDOH completed    Declined RPM    Medications- declined reviewed    COPD, DM, and CHF [facility]. decision maker is up to date and ACP on file    Review clinic, Urgent care and does not use My Chart    Care Gaps    Nutrition    Apt surgeon 2/13/2023    Apt PCP 2/13/2023    Apt cardiology 5/24/2023    Apt pulmonology 11/8/2023    F/U on BS, wts, and BP    F/U on dental apt- she has [facility] providers [facility] to [facility]. Lab work 2/8/2023 lab work    Apt 2/9/2023 podiatry       1/31/2023- 1:42 pm spoke with Moody Bolivar. She monitors BS QID- today 111, BP- WNL- she forgets to weigh self daily. Patient to weigh self daily; and record. Should weigh first thing in the morning, wearing the same type of clothes, after urinating, before breakfast.  Patient to report a weight gain of 2 pounds over night or 5 pounds in a week. Patient voiced understanding and in agreement. She is working with the [address] [place] [address]- they are working on getting assistance in the home. She declined Meals on Wheels. She uses [employer] for transportation. Diabetes Assessment    Medic Alert ID: No  Meal Planning: Carb counting   How often do you test your blood sugar?: Meals, Bedtime   Do you have barriers with adherence to non-pharmacologic self-management interventions?  (Nutrition/Exercise/Self-Monitoring): No   Have you ever had to go to the ED for symptoms of low blood sugar?: No       No patient-reported symptoms   Do you have hyperglycemia symptoms?: No   Do you have hypoglycemia symptoms?: No   Last Blood Sugar Value: 111   Blood Sugar Monitoring Regimen: Before Meals, At Bedtime   Blood Sugar Trends: No Change        ,   Congestive Heart Failure Assessment    Do you understand a low sodium diet?: Yes  Do you understand how to read food labels?: No  How many restaurant meals do you eat per week?: 0  Do you salt your food before tasting it?: No     No patient-reported symptoms      Symptoms:     Symptom course: stable  Patient-reported weight (lb): 245  Weight trend: stable  Salt intake watch compared to last visit: stable     ,   COPD Assessment    Does the patient understand envrionmental exposure?: Yes  Does the patient have a nebulizer?: Yes  Does the patient use a space with inhaled medications?: No     No patient-reported symptoms         Symptoms:     Symptom course: stable  Breathlessness: exertion  Increase use of rapid acting/rescue inhaled medications?: No  Change in chronic cough?: No/At Baseline  Change in sputum?: No/At Baseline  Sputum characteristics: Clear  Self Monitoring - SaO2: No     , and   General Assessment    Do you have any symptoms that are causing concern?: No            Offered patient enrollment in the Remote Patient Monitoring (RPM) program for in-home monitoring: Patient declined. Ambulatory Care Coordination Assessment    Care Coordination Protocol  Referral from Primary Care Provider: No  Week 1 - Initial Assessment     Do you have all of your prescriptions and are they filled?: Yes  Barriers to medication adherence: None  Are you able to afford your medications?: Yes  How often do you have trouble taking your medications the way you have been told to take them?: I always take them as prescribed. Do you have Home O2 Therapy?: No      Ability to seek help/take action for Emergent Urgent situations i.e. fire, crime, inclement weather or health crisis. : Independent  Ability to ambulate to restroom: Independent  Ability handle personal hygeine needs (bathing/dressing/grooming): Independent  Ability to manage Medications: Independent  Ability to prepare Food Preparation: Independent  Ability to maintain home (clean home, laundry): Independent  Ability to drive and/or has transportation: Dependent  Ability to do shopping: Independent  Ability to manage finances: Independent  Is patient able to live independently?: Yes     Current Housing: Independent Living/Senior Housing        Per the Fall Risk Screening, did the patient have 2 or more falls or 1 fall with injury in the past year?: No     Frequent urination at night?: No  Do you use rails/bars?: Yes  Do you have a non-slip tub mat?: Yes     Are you experiencing loss of meaning?: No  Are you experiencing loss of hope and peace?: No     Thinking about your patient's physical health needs, are there any symptoms or problems (risk indicators) you are unsure about that require further investigation?: No identified areas of uncertainly or problems already being investigated   Are the patients physical health problems impacting on their mental well-being?: No identified areas of concern   Are there any problems with your patients lifestyle behaviors (alcohol, drugs, diet, exercise) that are impacting on physical or mental well-being?: No identified areas of concern   Do you have any other concerns about your patients mental well-being?  How would you rate their severity and impact on the patient?: No identified areas of concern   How would you rate their home environment in terms of safety and stability (including domestic violence, insecure housing, neighbor harassment)?: Consistently safe, supportive, stable, no identified problems   How do daily activities impact on the patient's well-being? (include current or anticipated unemployment, work, caregiving, access to transportation or other): No identified problems or perceived positive benefits   How would you rate their social network (family, work, friends)?: Good participation with social networks   How would you rate their financial resources (including ability to afford all required medical care)?: Financially secure, resources adequate, no identified problems   How wells does the patient now understand their health and well-being (symptoms, signs or risk factors) and what they need to do to manage their health?: Reasonable to good understanding and already engages in managing health or is willing to undertake better management   How well do you think your patient can engage in healthcare discussions? (Barriers include language, deafness, aphasia, alcohol or drug problems, learning difficulties, concentration): Clear and open communication, no identified barriers   Do other services need to be involved to help this patient?: Other care/services not required at this time   Are current services involved with this patient well-coordinated? (Include coordination with other services you are now recommendation): All required care/services in place and well-coordinated   Suggested Interventions and [place]: Completed   Other Services or Interventions: Works with [facility] the [employer]   Social Work: Completed   Transportation Services: Completed   Zone Management Tools: In Process                  Prior to Admission medications    Medication Sig Start Date End Date Taking? Authorizing Provider   oxyCODONE-acetaminophen (PERCOCET) 5-325 MG per tablet Take 1 tablet by mouth every 6 hours as needed for Pain for up to 30 days. Take lowest dose possible to manage pain Max Daily Amount: 4 tablets 1/31/23 3/2/23  Gerda Riggs MD   gabapentin (NEURONTIN) 600 MG tablet Take 1 tablet by mouth 3 times daily for 14 days.  1/25/23 2/8/23  Sahra Calabrese APRN - CNP   alendronate (FOSAMAX) 70 MG tablet TAKE 1 TABLET EVERY WEEK 1/13/23   Gerda Riggs MD   ascorbic acid (VITAMIN C) 500 MG tablet TAKE 1 TABLET BY MOUTH TWICE DAILY 1/12/23   Evans Malloy MD   docusate sodium (COLACE, DULCOLAX) 100 MG CAPS Take 100 mg by mouth 2 times daily 12/21/22   Erickson Metz MD   potassium chloride (MICRO-K) 10 MEQ extended release capsule TAKE 1 CAPSULE TWICE DAILY 12/5/22   Evans Malloy MD   spironolactone (ALDACTONE) 50 MG tablet Take 1 tablet by mouth daily 11/23/22   Isaias Bains DO   STIOLTO RESPIMAT 2.5-2.5 MCG/ACT AERS INHALE 2 PUFFS INTO THE LUNGS DAILY 11/14/22   Tonio Clark DO   fluticasone (FLONASE) 50 MCG/ACT nasal spray USE 2 SPRAYS NASALLY EVERY DAY 11/11/22   Evans Malloy MD   Insulin Syringe-Needle U-100 (DROPLET INSULIN SYRINGE) 31G X 5/16\" 0.5 ML MISC USE TO INJECT INTO THE SKIN TWO TIMES DAILY 11/11/22   Evans Malloy MD   DROPLET PEN NEEDLES 31G X 8 MM MISC USE  FOR  INJECTIONS TWICE DAILY 11/11/22   Evans Malloy MD   insulin glargine (LANTUS) 100 UNIT/ML injection vial INJECT 15 UNITS INTO THE SKIN TWO TIMES DAILY (DISCARD AND BEGIN A NEW VIAL AFTER 28 DAYS) 11/3/22   Evans Malloy MD   vitamin D3 (CHOLECALCIFEROL) 25 MCG (1000 UT) TABS tablet TAKE 3 TABLETS BY MOUTH TWICE DAILY 10/7/22   Evans Malloy MD   calcium carbonate-vitamin D3 (CALCIUM 600+D3) 600-400 MG-UNIT TABS per tab TAKE 1 TABLET BY MOUTH TWICE DAILY 10/7/22   Evans Malloy MD   Microlet Lancets MISC USE TO TEST 4 TIMES A DAY. 10/7/22   Evans Malloy MD   magnesium oxide (MAG-OX) 400 MG tablet Take 1 tablet by mouth daily 10/7/22   Evans Malloy MD   atorvastatin (LIPITOR) 40 MG tablet TAKE 1 TABLET EVERY DAY.  10/3/22   Evans Malloy MD   NOVOLOG FLEXPEN 100 UNIT/ML injection pen INJECT 4 UNITS AT LUNCH AND  6 UNITS AT SUPPER [facility] PEN EXPIRES 28 DAYS AFTER 1ST USE) 9/1/22   Evans Malloy MD   pantoprazole (PROTONIX) 40 MG tablet TAKE 1 TABLET EVERY DAY 8/30/22   Evans Malloy MD   furosemide (LASIX) 40 MG tablet TAKE 1 TABLET EVERY DAY 8/30/22   Evans Malloy MD   amitriptyline (ELAVIL) 25 MG tablet TAKE 1 TABLET AT BEDTIME 8/30/22   Rhonda Up MD   citalopram (CELEXA) 10 MG tablet TAKE 1 TABLET EVERY DAY 8/30/22   Rhonda Up MD   buPROPion [facility]) 75 MG tablet TAKE 1 TABLET TWICE DAILY 8/30/22   Rhonda Up MD   clopidogrel (PLAVIX) 75 MG tablet TAKE 1 TABLET EVERY DAY 8/30/22   Rhonda Up MD   albuterol sulfate HFA (PROVENTIL;VENTOLIN;PROAIR) 108 (90 Base) MCG/ACT inhaler Inhale 2 puffs into the lungs 4 times daily 8/30/22   King Rivas MD   albuterol (PROVENTIL) (2.5 MG/3ML) 0.083% nebulizer solution Take 3 mLs by nebulization every 6 hours as needed for Wheezing 6/24/22   Rhonda Up MD   triamcinolone (KENALOG) 0.1 % cream APPLY TOPICALLY TWICE DAILY 6/24/22   Rhonda Up MD   empagliflozin (JARDIANCE) 10 MG tablet Take 1 tablet by mouth daily 5/23/22   Rhonda Up MD   metoprolol succinate (TOPROL XL) 25 MG extended release tablet TAKE 1/2 TABLET EVERY DAY 5/6/22   Rhonda Up MD   senna (SENOKOT) 8.6 MG TABS tablet TAKE 1 TABLET BY MOUTH TWICE DAILY 2/17/22   Rhonda Up MD   CONTOUR NEXT TEST strip USE TO TEST 4 TIMES A DAY. 1/17/22   Rhonda Up MD   magnesium oxide (MAGNESIUM-OXIDE) 400 (241.3 Mg) MG TABS tablet Take 1 tablet by mouth 2 times daily  Patient not taking: Reported on 12/22/2022 10/15/21   Rhonda Up MD   Misc. Devices MISC It is in my medical opinion that Real Rizvi be allowed to have a kitten in her apartment at all times for companionship to help with her Depression, Anxiety, Fibromyalgia and Osteoarthritis.  8/17/21   Rhonda Up MD   dorzolamide (TRUSOPT) 2 % ophthalmic solution Apply to eye 3 times daily 7/19/21   Historical Provider, MD   pyridoxine (B-6) 100 MG tablet Take 50 mg by mouth daily    Historical Provider, MD   Biotin 37463 MCG TABS Take by mouth    Historical Provider, MD   Respiratory Therapy Supplies [facility] CPAP supplies 8/13/20   Rhonda Up MD   nitroGLYCERIN (NITROSTAT) 0.4 MG SL tablet PLACE 1 TABLET UNER THE TONGUE EVERY 5 MINUTES AS NEEDED FOR CHEST PAIN AT FIRST SIGN OF CHEST PAIN. 5/14/20   Jesús Dugan MD   VITAMIN A PO Take 2,400 mcg by mouth daily    Historical Provider, MD   vitamin E 400 UNIT capsule Take 400 Units by mouth daily    Historical Provider, MD   COMBIGAN 0.2-0.5 % ophthalmic solution PLACE 1 DROP INTO BOTH EYES DAILY 1/2/18   Jesús Dugan MD   Compression Stockings MISC by Does not apply route 15-20mmHg  Knee high  Open tow  With assist device to help put them on 5/10/17   Gray Bains DO   Zinc 50 MG TABS Take 50 mg by mouth daily    Historical Provider, MD   niacin 500 MG extended release capsule Take 500 mg by mouth daily    Historical Provider, MD   folic acid (FOLVITE) 210 MCG tablet Take 800 mcg by mouth daily    Historical Provider, MD   Cyanocobalamin (VITAMIN B12 PO) Take 2,500 mcg by mouth daily    Historical Provider, MD   B Complex-C (SUPER B COMPLEX PO) Take 1 tablet by mouth daily     Historical Provider, MD   travoprost, benzalkonium, (TRAVATAN) 0.004 % ophthalmic solution Place 1 drop into both eyes daily 10/7/16   Jesús Dugan MD   CPAP Machine MISC by Does not apply route Pressure of 13 (AirSense 10)  [employer] [facility].    Historical Provider, MD   Multiple Vitamin (MULTI VITAMIN DAILY) TABS Take by mouth daily    Historical Provider, MD       Future Appointments   Date Time Provider Portia Pily   2/13/2023  2:10 PM Micaela Lee  [address] [place] [address]   2/13/2023  3:00 PM MD AMAIRANI Argueta [facility]   5/24/2023  1:30 PM Thelda [place], APRN - CNP DCARDIO [facility]   11/8/2023  1:15 PM DO JUSTICE No [facility]

## 2023-02-07 ENCOUNTER — TELEPHONE (OUTPATIENT)
Dept: INTERNAL MEDICINE | Age: 73
End: 2023-02-07

## 2023-02-07 ENCOUNTER — CARE COORDINATION (OUTPATIENT)
Dept: CARE COORDINATION | Age: 73
End: 2023-02-07

## 2023-02-07 NOTE — TELEPHONE ENCOUNTER
Can you please addend patients 8/22/2022 office note for her medicare diabetic documentation stating the following. Patient has been successfully trained on how to check her blood sugar and administer her insulin daily. Patient is testing her blood sugar 4 x daily. Patient injects Lantus 15 units 2 x daily and   Novolog 4 units at lunch time and 6 units at supper time.

## 2023-02-07 NOTE — CARE COORDINATION
Ambulatory Care Coordination Note  2/7/2023    ACC: Shannon Hudson was referred to [facility] from report. She has:          COPD, LEANDRO- CPAP, on medications and follows with pulmonology                          CHF, CAD, HTN, Hyperlipidemia- on medications and follows with cardiology                          DM- Hgb A1c on 8/15 was 6.4- on medications and follows with PCP                          Colon polyp- referred to surgeon     Plan of Care : Continue assessments, education and support                          SDOH completed                          Declined RPM                          Medications- declined reviewed                          COPD, DM, and CHF [facility] decision maker is up to date and ACP on file                          Reviewed clinic, Urgent care and does not use My Chart                          Care Gaps                          Nutrition                          Apt surgeon 2/13/2023                          Apt PCP 2/13/2023                          Apt cardiology 5/24/2023                          Apt pulmonology 11/8/2023                          F/U on BS, wts, and BP                          F/U on dental apt- she has [facility] providers from [facility] to call apt. Lab work 2/8/2023 lab work                          Apt 2/9/2023 podiatry     2/7/2023- 1:55 pm spoke with Khris Banda. She has apts next week.  this am. No concerns with BS readings. Wt stable and BP stable. Reviewed apts this week and next. She was in agreement and voiced understanding. Offered patient enrollment in the Remote Patient Monitoring (RPM) program for in-home monitoring: Patient declined.     Lab Results       None            Care Coordination Interventions    Referral from Primary Care Provider: No  Suggested Interventions and [address]: Completed  Social Work: Completed  Transportation Support: Completed  Zone Management Tools: In Process (Comment: CHF, COPD, DM)  Other Services or Interventions: Works with Area of the  Terell Cook    None         Prior to Admission medications    Medication Sig Start Date End Date Taking? Authorizing Provider   oxyCODONE-acetaminophen (PERCOCET) 5-325 MG per tablet Take 1 tablet by mouth every 6 hours as needed for Pain for up to 30 days. Take lowest dose possible to manage pain Max Daily Amount: 4 tablets 1/31/23 3/2/23  Natasha Hou MD   gabapentin (NEURONTIN) 600 MG tablet Take 1 tablet by mouth 3 times daily for 14 days.  1/25/23 2/8/23  DAVID Farley - CNP   alendronate (FOSAMAX) 70 MG tablet TAKE 1 TABLET EVERY WEEK 1/13/23   Natasha Hou MD   ascorbic acid (VITAMIN C) 500 MG tablet TAKE 1 TABLET BY MOUTH TWICE DAILY 1/12/23   Natasha Hou MD   docusate sodium (COLACE, DULCOLAX) 100 MG CAPS Take 100 mg by mouth 2 times daily 12/21/22   Ilsa Higgins MD   potassium chloride (MICRO-K) 10 MEQ extended release capsule TAKE 1 CAPSULE TWICE DAILY 12/5/22   Natasha Hou MD   spironolactone (ALDACTONE) 50 MG tablet Take 1 tablet by mouth daily 11/23/22   Isaias Bains DO   STIOLTO RESPIMAT 2.5-2.5 MCG/ACT AERS INHALE 2 PUFFS INTO THE LUNGS DAILY 11/14/22   Jose Clark DO   fluticasone (FLONASE) 50 MCG/ACT nasal spray USE 2 SPRAYS NASALLY EVERY DAY 11/11/22   Natasha Hou MD   Insulin Syringe-Needle U-100 (DROPLET INSULIN SYRINGE) 31G X 5/16\" 0.5 ML MISC USE TO INJECT INTO THE SKIN TWO TIMES DAILY 11/11/22   Natasha Hou MD   DROPLET PEN NEEDLES 31G X 8 MM MISC USE  FOR  INJECTIONS TWICE DAILY 11/11/22   Natasha Hou MD   insulin glargine (LANTUS) 100 UNIT/ML injection vial INJECT 15 UNITS INTO THE SKIN TWO TIMES DAILY (DISCARD AND BEGIN A NEW VIAL AFTER 28 DAYS) 11/3/22   Natasha Hou MD   vitamin D3 (CHOLECALCIFEROL) 25 MCG (1000 UT) TABS tablet TAKE 3 TABLETS BY MOUTH TWICE DAILY 10/7/22 Qi Valenzuela MD   calcium carbonate-vitamin D3 (CALCIUM 600+D3) 600-400 MG-UNIT TABS per tab TAKE 1 TABLET BY MOUTH TWICE DAILY 10/7/22   Qi Valenzuela MD   Microlet Lancets MISC USE TO TEST 4 TIMES A DAY. 10/7/22   Qi Valenzuela MD   magnesium oxide (MAG-OX) 400 MG tablet Take 1 tablet by mouth daily 10/7/22   Qi Valenzuela MD   atorvastatin (LIPITOR) 40 MG tablet TAKE 1 TABLET EVERY DAY. 10/3/22   Qi Valenzuela MD   NOVOLOG FLEXPEN 100 UNIT/ML injection pen INJECT 4 UNITS AT LUNCH AND  6 UNITS AT SUPPER [facility] PEN EXPIRES 28 DAYS AFTER 1ST USE) 9/1/22   Qi Valenzuela MD   pantoprazole (PROTONIX) 40 MG tablet TAKE 1 TABLET EVERY DAY 8/30/22   Qi Valenzuela MD   furosemide (LASIX) 40 MG tablet TAKE 1 TABLET EVERY DAY 8/30/22   Qi Valenzuela MD   amitriptyline (ELAVIL) 25 MG tablet TAKE 1 TABLET AT BEDTIME 8/30/22   Qi Valenzuela MD   citalopram (CELEXA) 10 MG tablet TAKE 1 TABLET EVERY DAY 8/30/22   Qi Valenzuela MD   buPROPion (WELLBUTRIN) 75 MG tablet TAKE 1 TABLET TWICE DAILY 8/30/22   Qi Valenzuela MD   clopidogrel (PLAVIX) 75 MG tablet TAKE 1 TABLET EVERY DAY 8/30/22   Qi Valenzuela MD   albuterol sulfate HFA (PROVENTIL;VENTOLIN;PROAIR) 108 (90 Base) MCG/ACT inhaler Inhale 2 puffs into the lungs 4 times daily 8/30/22   Melissa Ortiz MD   albuterol (PROVENTIL) (2.5 MG/3ML) 0.083% nebulizer solution Take 3 mLs by nebulization every 6 hours as needed for Wheezing 6/24/22   Qi Valenzuela MD   triamcinolone (KENALOG) 0.1 % cream APPLY TOPICALLY TWICE DAILY 6/24/22   Qi Valenzuela MD   empagliflozin (JARDIANCE) 10 MG tablet Take 1 tablet by mouth daily 5/23/22   Qi Valenzuela MD   metoprolol succinate (TOPROL XL) 25 MG extended release tablet TAKE 1/2 TABLET EVERY DAY 5/6/22   MD syed Cassidy (SENOKOT) 8.6 MG TABS tablet TAKE 1 TABLET BY MOUTH TWICE DAILY 2/17/22   Qi Valenzuela MD   CONTOUR NEXT TEST strip USE TO TEST 4 TIMES A DAY. 1/17/22   Melvi Gomez MD   magnesium oxide (MAGNESIUM-OXIDE) 400 (241.3 Mg) MG TABS tablet Take 1 tablet by mouth 2 times daily  Patient not taking: Reported on 12/22/2022 10/15/21   Melvi Gomez MD   Misc. Devices MISC It is in my medical opinion that Jose Randhawa be allowed to have a kitten in her apartment at all times for companionship to help with her Depression, Anxiety, Fibromyalgia and Osteoarthritis.  8/17/21   Melvi Gomez MD   dorzolamide (TRUSOPT) 2 % ophthalmic solution Apply to eye 3 times daily 7/19/21   Historical Provider, MD   pyridoxine (B-6) 100 MG tablet Take 50 mg by mouth daily    Historical Provider, MD   Biotin 30198 MCG TABS Take by mouth    Historical Provider, MD   Respiratory Therapy Supplies [facility] CPAP supplies 8/13/20   Melvi Gomez MD   nitroGLYCERIN (NITROSTAT) 0.4 MG SL tablet PLACE 1 TABLET UNER THE TONGUE EVERY 5 MINUTES AS NEEDED FOR CHEST PAIN AT FIRST SIGN OF CHEST PAIN. 5/14/20   Melvi Gomez MD   VITAMIN A PO Take 2,400 mcg by mouth daily    Historical Provider, MD   vitamin E 400 UNIT capsule Take 400 Units by mouth daily    Historical Provider, MD   COMBIGAN 0.2-0.5 % ophthalmic solution PLACE 1 DROP INTO BOTH EYES DAILY 1/2/18   Melvi Gomez MD   Compression Stockings MISC by Does not apply route 15-20mmHg  Knee high  Open tow  With assist device to help put them on 5/10/17   Gray Bains DO   Zinc 50 MG TABS Take 50 mg by mouth daily    Historical Provider, MD   niacin 500 MG extended release capsule Take 500 mg by mouth daily    Historical Provider, MD   folic acid (FOLVITE) 741 MCG tablet Take 800 mcg by mouth daily    Historical Provider, MD   Cyanocobalamin (VITAMIN B12 PO) Take 2,500 mcg by mouth daily    Historical Provider, MD   B Complex-C (SUPER B COMPLEX PO) Take 1 tablet by mouth daily     Historical Provider, MD   travoprost, benzalkonium, (TRAVATAN) 0.004 % ophthalmic solution Place 1 drop into both eyes daily 10/7/16   [facility] MD Stephany   CPAP Machine MISC by Does not apply route Pressure of 13 (AirSense 10)  [employer] [facility].    Historical Provider, MD   Multiple Vitamin (MULTI VITAMIN DAILY) TABS Take by mouth daily    Historical Provider, MD       Future Appointments   Date Time Provider Portia Nascimento   2/13/2023  2:10 PM Efren Pierce MD [address] [place] [address]   2/13/2023  3:00 PM MD AMAIRANI [facility]   5/24/2023  1:30 PM DAVID Hand - CNP DCARDIO [facility]   11/8/2023  1:15 PM [facility]

## 2023-02-13 ENCOUNTER — INITIAL CONSULT (OUTPATIENT)
Dept: SURGERY | Age: 73
End: 2023-02-13
Payer: MEDICARE

## 2023-02-13 ENCOUNTER — TELEPHONE (OUTPATIENT)
Dept: SURGERY | Age: 73
End: 2023-02-13

## 2023-02-13 ENCOUNTER — OFFICE VISIT (OUTPATIENT)
Dept: INTERNAL MEDICINE | Age: 73
End: 2023-02-13
Payer: MEDICARE

## 2023-02-13 VITALS
OXYGEN SATURATION: 98 % | SYSTOLIC BLOOD PRESSURE: 118 MMHG | RESPIRATION RATE: 16 BRPM | WEIGHT: 247 LBS | DIASTOLIC BLOOD PRESSURE: 80 MMHG | HEART RATE: 57 BPM | HEIGHT: 64 IN | BODY MASS INDEX: 42.17 KG/M2

## 2023-02-13 VITALS
DIASTOLIC BLOOD PRESSURE: 72 MMHG | BODY MASS INDEX: 42 KG/M2 | WEIGHT: 246 LBS | HEART RATE: 72 BPM | HEIGHT: 64 IN | SYSTOLIC BLOOD PRESSURE: 112 MMHG

## 2023-02-13 DIAGNOSIS — D50.9 IRON DEFICIENCY ANEMIA, UNSPECIFIED IRON DEFICIENCY ANEMIA TYPE: ICD-10-CM

## 2023-02-13 DIAGNOSIS — Z00.00 MEDICARE ANNUAL WELLNESS VISIT, SUBSEQUENT: ICD-10-CM

## 2023-02-13 DIAGNOSIS — M25.512 CHRONIC LEFT SHOULDER PAIN: ICD-10-CM

## 2023-02-13 DIAGNOSIS — Z00.00 GENERAL MEDICAL EXAM: ICD-10-CM

## 2023-02-13 DIAGNOSIS — B36.9 FUNGAL SKIN INFECTION: ICD-10-CM

## 2023-02-13 DIAGNOSIS — E66.01 OBESITY, CLASS III, BMI 40-49.9 (MORBID OBESITY) (HCC): ICD-10-CM

## 2023-02-13 DIAGNOSIS — I25.10 CORONARY ARTERY DISEASE DUE TO LIPID RICH PLAQUE: ICD-10-CM

## 2023-02-13 DIAGNOSIS — Z79.4 TYPE 2 DIABETES MELLITUS WITH DIABETIC POLYNEUROPATHY, WITH LONG-TERM CURRENT USE OF INSULIN (HCC): Primary | ICD-10-CM

## 2023-02-13 DIAGNOSIS — I50.32 CHF (CONGESTIVE HEART FAILURE), NYHA CLASS I, CHRONIC, DIASTOLIC (HCC): ICD-10-CM

## 2023-02-13 DIAGNOSIS — M25.511 CHRONIC RIGHT SHOULDER PAIN: ICD-10-CM

## 2023-02-13 DIAGNOSIS — G89.29 CHRONIC RIGHT SHOULDER PAIN: ICD-10-CM

## 2023-02-13 DIAGNOSIS — I25.83 CORONARY ARTERY DISEASE DUE TO LIPID RICH PLAQUE: ICD-10-CM

## 2023-02-13 DIAGNOSIS — E11.42 TYPE 2 DIABETES MELLITUS WITH DIABETIC POLYNEUROPATHY, WITH LONG-TERM CURRENT USE OF INSULIN (HCC): Primary | ICD-10-CM

## 2023-02-13 DIAGNOSIS — G89.29 CHRONIC LEFT SHOULDER PAIN: ICD-10-CM

## 2023-02-13 DIAGNOSIS — Z12.39 SCREENING BREAST EXAMINATION: ICD-10-CM

## 2023-02-13 DIAGNOSIS — K63.5 POLYP OF COLON, UNSPECIFIED PART OF COLON, UNSPECIFIED TYPE: Primary | ICD-10-CM

## 2023-02-13 DIAGNOSIS — E11.9 TYPE 2 DIABETES MELLITUS WITHOUT COMPLICATION, UNSPECIFIED WHETHER LONG TERM INSULIN USE (HCC): Primary | ICD-10-CM

## 2023-02-13 PROBLEM — I10 PRIMARY HYPERTENSION: Status: ACTIVE | Noted: 2023-02-13

## 2023-02-13 PROCEDURE — 3017F COLORECTAL CA SCREEN DOC REV: CPT | Performed by: INTERNAL MEDICINE

## 2023-02-13 PROCEDURE — 3074F SYST BP LT 130 MM HG: CPT | Performed by: INTERNAL MEDICINE

## 2023-02-13 PROCEDURE — 2022F DILAT RTA XM EVC RTNOPTHY: CPT | Performed by: INTERNAL MEDICINE

## 2023-02-13 PROCEDURE — 1090F PRES/ABSN URINE INCON ASSESS: CPT | Performed by: SURGERY

## 2023-02-13 PROCEDURE — 1123F ACP DISCUSS/DSCN MKR DOCD: CPT | Performed by: INTERNAL MEDICINE

## 2023-02-13 PROCEDURE — G8400 PT W/DXA NO RESULTS DOC: HCPCS | Performed by: SURGERY

## 2023-02-13 PROCEDURE — 99214 OFFICE O/P EST MOD 30 MIN: CPT | Performed by: INTERNAL MEDICINE

## 2023-02-13 PROCEDURE — 1036F TOBACCO NON-USER: CPT | Performed by: INTERNAL MEDICINE

## 2023-02-13 PROCEDURE — 3017F COLORECTAL CA SCREEN DOC REV: CPT | Performed by: SURGERY

## 2023-02-13 PROCEDURE — 1090F PRES/ABSN URINE INCON ASSESS: CPT | Performed by: INTERNAL MEDICINE

## 2023-02-13 PROCEDURE — G8427 DOCREV CUR MEDS BY ELIG CLIN: HCPCS | Performed by: INTERNAL MEDICINE

## 2023-02-13 PROCEDURE — 99214 OFFICE O/P EST MOD 30 MIN: CPT | Performed by: SURGERY

## 2023-02-13 PROCEDURE — G8484 FLU IMMUNIZE NO ADMIN: HCPCS | Performed by: SURGERY

## 2023-02-13 PROCEDURE — G8417 CALC BMI ABV UP PARAM F/U: HCPCS | Performed by: SURGERY

## 2023-02-13 PROCEDURE — 1036F TOBACCO NON-USER: CPT | Performed by: SURGERY

## 2023-02-13 PROCEDURE — 99215 OFFICE O/P EST HI 40 MIN: CPT | Performed by: SURGERY

## 2023-02-13 PROCEDURE — 1123F ACP DISCUSS/DSCN MKR DOCD: CPT | Performed by: SURGERY

## 2023-02-13 PROCEDURE — G8484 FLU IMMUNIZE NO ADMIN: HCPCS | Performed by: INTERNAL MEDICINE

## 2023-02-13 PROCEDURE — G8427 DOCREV CUR MEDS BY ELIG CLIN: HCPCS | Performed by: SURGERY

## 2023-02-13 PROCEDURE — 3044F HG A1C LEVEL LT 7.0%: CPT | Performed by: INTERNAL MEDICINE

## 2023-02-13 PROCEDURE — G8400 PT W/DXA NO RESULTS DOC: HCPCS | Performed by: INTERNAL MEDICINE

## 2023-02-13 PROCEDURE — 3078F DIAST BP <80 MM HG: CPT | Performed by: INTERNAL MEDICINE

## 2023-02-13 PROCEDURE — G8417 CALC BMI ABV UP PARAM F/U: HCPCS | Performed by: INTERNAL MEDICINE

## 2023-02-13 RX ORDER — CLOTRIMAZOLE AND BETAMETHASONE DIPROPIONATE 10; .64 MG/G; MG/G
CREAM TOPICAL
Qty: 8 G | Refills: 3 | Status: SHIPPED | OUTPATIENT
Start: 2023-02-13

## 2023-02-13 ASSESSMENT — PATIENT HEALTH QUESTIONNAIRE - PHQ9
SUM OF ALL RESPONSES TO PHQ QUESTIONS 1-9: 0
7. TROUBLE CONCENTRATING ON THINGS, SUCH AS READING THE NEWSPAPER OR WATCHING TELEVISION: 0
2. FEELING DOWN, DEPRESSED OR HOPELESS: 0
SUM OF ALL RESPONSES TO PHQ9 QUESTIONS 1 & 2: 0
SUM OF ALL RESPONSES TO PHQ9 QUESTIONS 1 & 2: 0
1. LITTLE INTEREST OR PLEASURE IN DOING THINGS: 0
SUM OF ALL RESPONSES TO PHQ QUESTIONS 1-9: 0
1. LITTLE INTEREST OR PLEASURE IN DOING THINGS: 0
SUM OF ALL RESPONSES TO PHQ QUESTIONS 1-9: 0
8. MOVING OR SPEAKING SO SLOWLY THAT OTHER PEOPLE COULD HAVE NOTICED. OR THE OPPOSITE, BEING SO FIGETY OR RESTLESS THAT YOU HAVE BEEN MOVING AROUND A LOT MORE THAN USUAL: 0
2. FEELING DOWN, DEPRESSED OR HOPELESS: 0
10. IF YOU CHECKED OFF ANY PROBLEMS, HOW DIFFICULT HAVE THESE PROBLEMS MADE IT FOR YOU TO DO YOUR WORK, TAKE CARE OF THINGS AT HOME, OR GET ALONG WITH OTHER PEOPLE: 0
SUM OF ALL RESPONSES TO PHQ QUESTIONS 1-9: 0
9. THOUGHTS THAT YOU WOULD BE BETTER OFF DEAD, OR OF HURTING YOURSELF: 0
5. POOR APPETITE OR OVEREATING: 0
SUM OF ALL RESPONSES TO PHQ QUESTIONS 1-9: 0
4. FEELING TIRED OR HAVING LITTLE ENERGY: 0
6. FEELING BAD ABOUT YOURSELF - OR THAT YOU ARE A FAILURE OR HAVE LET YOURSELF OR YOUR FAMILY DOWN: 0
SUM OF ALL RESPONSES TO PHQ QUESTIONS 1-9: 0
SUM OF ALL RESPONSES TO PHQ QUESTIONS 1-9: 0
3. TROUBLE FALLING OR STAYING ASLEEP: 0
SUM OF ALL RESPONSES TO PHQ QUESTIONS 1-9: 0

## 2023-02-13 ASSESSMENT — ENCOUNTER SYMPTOMS
BACK PAIN: 0
NAUSEA: 0
BLOOD IN STOOL: 0
COUGH: 0
DIARRHEA: 0
EYE PAIN: 0
SHORTNESS OF BREATH: 0
VOMITING: 0
ABDOMINAL PAIN: 0
CONSTIPATION: 0

## 2023-02-13 ASSESSMENT — LIFESTYLE VARIABLES
HOW OFTEN DO YOU HAVE A DRINK CONTAINING ALCOHOL: NEVER
HOW MANY STANDARD DRINKS CONTAINING ALCOHOL DO YOU HAVE ON A TYPICAL DAY: 1 OR 2

## 2023-02-13 NOTE — TELEPHONE ENCOUNTER
Is low risk for procedure. Patient hold Plavix x7 days before colonoscopy. Also hold aspirin and NSAIDs x7 days before colonoscopy    Hold Jardiance the morning of colonoscopy    Hold NovoLog the morning of colonoscopy. Take only half the usual dose of Lantus insulin both the night before and morning of. That is take 7.5 units the night before and morning of colonoscopy instead of the usual 15 units.

## 2023-02-13 NOTE — PROGRESS NOTES
[place] [address]  Dept: [phone]  Dept Fax: [phone]  Loc: [phone]     Jessica Cote is a 74 y.o. female who presents today for her medical conditions/complaintsas noted below. Jessica Cote is c/o of   Chief Complaint   Patient presents with    Medicare AW     6 month follow up    Diabetes     Type 2    Coronary Artery Disease     Mild    Congestive Heart Failure     Diastolic         HPI:     Diabetes  She presents for her follow-up diabetic visit. She has type 2 diabetes mellitus. The initial diagnosis of diabetes was made 11 years ago. Her disease course has been fluctuating. Pertinent negatives for hypoglycemia include no confusion, dizziness, headaches, nervousness/anxiousness or pallor. Pertinent negatives for diabetes include no chest pain, no polydipsia, no polyuria and no weakness. Coronary Artery Disease  Presents for follow-up visit. Pertinent negatives include no chest pain, chest pressure, dizziness, leg swelling, palpitations or shortness of breath. Her past medical history is significant for CHF. The symptoms have been stable. Compliance with diet is good. Compliance with exercise is good. Compliance with medications is good. Congestive Heart Failure  Presents for follow-up visit. Pertinent negatives include no abdominal pain, chest pain, chest pressure, palpitations or shortness of breath. The symptoms have been stable. Her past medical history is significant for CAD. Compliance with total regimen is %. Compliance with diet is %. Compliance with exercise is %. Compliance with medications is %. Other  This is a recurrent (4-general medical exam) problem. The current episode started today. The problem occurs intermittently. The problem has been waxing and waning.  Pertinent negatives include no abdominal pain, arthralgias, chest pain, chills, coughing, fever, headaches, nausea, neck pain, numbness, rash, vomiting or weakness. Hemoglobin A1C (%)   Date Value   02/08/2023 6.4   08/15/2022 6.4 (H)   02/10/2022 6.9 (H)            Microalb/Crt.  Ratio (mcg/mg creat)   Date Value   02/10/2022 17     LDL Cholesterol (mg/dL)   Date Value   12/17/2022 30   02/10/2022 56   02/18/2021 44     LDL Calculated (mg/dL)   Date Value   02/08/2023 44   05/19/2016 80   09/23/2014 80         AST (U/L)   Date Value   02/08/2023 44     ALT (U/L)   Date Value   02/08/2023 30     BUN (mg/dL)   Date Value   02/08/2023 14     BP Readings from Last 3 Encounters:   02/13/23 118/80   02/13/23 112/72   01/24/23 116/72              Past Medical History:   Diagnosis Date    Arthritis     Asthma     Bell's palsy     CAD (coronary artery disease) 1989    Cancer ([facility] [address] 1971    cervical    Carotid artery disease (HCC)     Cataracts, bilateral     CHF (congestive heart failure) (HCC)     COPD (chronic obstructive pulmonary disease) (HCC)     Coronary artery disease     Eczema     Emphysema lung (HCC)     Fibromyalgia     Glaucoma     H/O blood clots     Headache     Heart attack (HCC)     Hx of Bell's palsy     Mild right facial paralysis    Hypertension     Liver disease     Mild dementia     Neuropathy     Osteoporosis     Sleep apnea 11/07/2014    sleep study done in [facility]     Type 2 diabetes mellitus with hyperglycemia ([facility] [address]       Past Surgical History:   Procedure Laterality Date    ANESTHESIA NERVE BLOCK Right 06/28/2019    Right L2 L3 L4 L5 Diagnostic Medial BB performed by Vale Aldrich MD at [address]  [facility] Right 07/26/2019    Right L2 L3 L4 L5 Confirmatory Medial BB performed by Vale Aldrich MD at . [facility] 2002    CHOLECYSTECTOMY      COLONOSCOPY  09/2012    COLONOSCOPY N/A 09/30/2019    COLONOSCOPY performed by Cristobal Pool MD at [facility] OR  internal hemorrhoids and few diverticuli COLONOSCOPY  2022    diagnostic    COLONOSCOPY N/A 2022    COLONOSCOPY WITH BIOPSY performed by Kush Fitzpatrick MD at [facility], DIAGNOSTIC      ESOPHAGOGASTRODUODENOSCOPY  2022    HYSTERECTOMY (CERVIX STATUS UNKNOWN)      INDUCED   1970    LUMBAR SPINE SURGERY Right 2019    Right L2 L3 TRANSFORAMINAL performed by Fly Rodas MD at [address] Right 2019    Right L2 L3 TRANSFORAMINAL performed by Fly Rodas MD at [address] Right 2019    Right L3 & L4 TRANSFORAMINAL performed by Fly Rodas MD at [address] Right 2019    Right L4 L5 TRANSFORAMINAL performed by Fly Rodas MD at [address] [place] [address] Right 2020    RIGHT L4 L5  TRANSFORAMINAL performed by Fly Rodas MD at [address] [place] [address] Right 2020    Right L4 L5 TRANSFORAMINAL performed by Fly Rodas MD at [address] [place] [address] F/E/E/N/L/M 1.1-2.0CM Left 2018    Excision Cyst Left Chest, Middle Back [facility] AA&/STRD TFRML EPI LUMBAR/SACRAL EA ADDL Right 10/05/2018    Right L2 & L3 TRANSFORAMINAL performed [facility] AA&/STRD TFRML EPI LUMBAR/SACRAL EA ADDL Right 10/26/2018    Right L2 L3 TRANSFORAMINAL performed by Fly Rodas MD at [place] [address] Right 2019    Right  L2 L3 L4 L5 RADIOFREQUENCY performed by Fly Rodas MD at [address] [place] [address] (CERVIX REMOVED)      Cervical cancer.   Had XRT    TONSILLECTOMY      UPPER GASTROINTESTINAL ENDOSCOPY N/A 2022    EGD ESOPHAGOGASTRODUODENOSCOPY performed by Kush Fitzpatrick MD at [place] History   Problem Relation Age of Onset    Heart Disease Mother     High Blood Pressure Mother     Stroke Mother Glaucoma Mother     Early Death Father     Cancer Father         stomach    Miscarriages / Stillbirths Maternal Uncle           Social History     Tobacco Use    Smoking status: Former     Packs/day: 1.00     Years: 35.00     Pack years: 35.00     Types: Cigarettes     Quit date: 2001     Years since quittin.3    Smokeless tobacco: Never   Substance Use Topics    Alcohol use: No         Current Outpatient Medications   Medication Sig Dispense Refill    clotrimazole-betamethasone (LOTRISONE) 1-0.05 % cream Apply topically 2 times daily. 8 g 3    oxyCODONE-acetaminophen (PERCOCET) 5-325 MG per tablet Take 1 tablet by mouth every 6 hours as needed for Pain for up to 30 days.  Take lowest dose possible to manage pain Max Daily Amount: 4 tablets 120 tablet 0    alendronate (FOSAMAX) 70 MG tablet TAKE 1 TABLET EVERY WEEK 12 tablet 3    ascorbic acid (VITAMIN C) 500 MG tablet TAKE 1 TABLET BY MOUTH TWICE DAILY 180 tablet 3    docusate sodium (COLACE, DULCOLAX) 100 MG CAPS Take 100 mg by mouth 2 times daily 60 capsule 1    potassium chloride (MICRO-K) 10 MEQ extended release capsule TAKE 1 CAPSULE TWICE DAILY 180 capsule 3    spironolactone (ALDACTONE) 50 MG tablet Take 1 tablet by mouth daily 90 tablet 1    STIOLTO RESPIMAT 2.5-2.5 MCG/ACT AERS INHALE 2 PUFFS INTO THE LUNGS DAILY 3 each 3    fluticasone (FLONASE) 50 MCG/ACT nasal spray USE 2 SPRAYS NASALLY EVERY DAY 48 g 3    Insulin Syringe-Needle U-100 (DROPLET INSULIN SYRINGE) 31G X 5/16\" 0.5 ML MISC USE TO INJECT INTO THE SKIN TWO TIMES DAILY 200 each 3    DROPLET PEN NEEDLES 31G X 8 MM MISC USE  FOR  INJECTIONS TWICE DAILY 200 each 3    insulin glargine (LANTUS) 100 UNIT/ML injection vial INJECT 15 UNITS INTO THE SKIN TWO TIMES DAILY (DISCARD AND BEGIN A NEW VIAL AFTER 28 DAYS) 90 mL 3    vitamin D3 (CHOLECALCIFEROL) 25 MCG (1000 UT) TABS tablet TAKE 3 TABLETS BY MOUTH TWICE DAILY 540 tablet 3    calcium carbonate-vitamin D3 (CALCIUM 600+D3) 600-400 MG-UNIT TABS per tab TAKE 1 TABLET BY MOUTH TWICE DAILY 180 tablet 3    Microlet Lancets MISC USE TO TEST 4 TIMES A DAY. 400 each 3    magnesium oxide (MAG-OX) 400 MG tablet Take 1 tablet by mouth daily 90 tablet 3    atorvastatin (LIPITOR) 40 MG tablet TAKE 1 TABLET EVERY DAY. 90 tablet 3    NOVOLOG FLEXPEN 100 UNIT/ML injection pen INJECT 4 UNITS AT LUNCH AND  6 UNITS AT SUPPER (EACH PEN EXPIRES 28 DAYS AFTER 1ST USE) 15 mL 3    pantoprazole (PROTONIX) 40 MG tablet TAKE 1 TABLET EVERY DAY 90 tablet 3    furosemide (LASIX) 40 MG tablet TAKE 1 TABLET EVERY DAY 90 tablet 3    amitriptyline (ELAVIL) 25 MG tablet TAKE 1 TABLET AT BEDTIME 90 tablet 3    citalopram (CELEXA) 10 MG tablet TAKE 1 TABLET EVERY DAY 90 tablet 3    buPROPion (WELLBUTRIN) 75 MG tablet TAKE 1 TABLET TWICE DAILY 180 tablet 3    clopidogrel (PLAVIX) 75 MG tablet TAKE 1 TABLET EVERY DAY 90 tablet 3    albuterol sulfate HFA (PROVENTIL;VENTOLIN;PROAIR) 108 (90 Base) MCG/ACT inhaler Inhale 2 puffs into the lungs 4 times daily 54 g 3    albuterol (PROVENTIL) (2.5 MG/3ML) 0.083% nebulizer solution Take 3 mLs by nebulization every 6 hours as needed for Wheezing 120 each 3    triamcinolone (KENALOG) 0.1 % cream APPLY TOPICALLY TWICE DAILY 15 g 5    empagliflozin (JARDIANCE) 10 MG tablet Take 1 tablet by mouth daily 90 tablet 3    metoprolol succinate (TOPROL XL) 25 MG extended release tablet TAKE 1/2 TABLET EVERY DAY 45 tablet 3    senna (SENOKOT) 8.6 MG TABS tablet TAKE 1 TABLET BY MOUTH TWICE DAILY 180 tablet 3    CONTOUR NEXT TEST strip USE TO TEST 4 TIMES A DAY. 400 strip 3    magnesium oxide (MAGNESIUM-OXIDE) 400 (241.3 Mg) MG TABS tablet Take 1 tablet by mouth 2 times daily 180 tablet 3    Misc. Devices MISC It is in my medical opinion that Liv Watson be allowed to have a kitten in her apartment at all times for companionship to help with her Depression, Anxiety, Fibromyalgia and Osteoarthritis.  1 Device 0    dorzolamide (TRUSOPT) 2 % ophthalmic solution Apply to eye 3 times daily      pyridoxine (B-6) 100 MG tablet Take 50 mg by mouth daily      Biotin 08189 MCG TABS Take by mouth      Respiratory Therapy Supplies MISC CPAP supplies 1 each 0    nitroGLYCERIN (NITROSTAT) 0.4 MG SL tablet PLACE 1 TABLET UNER THE TONGUE EVERY 5 MINUTES AS NEEDED FOR CHEST PAIN AT FIRST SIGN OF CHEST PAIN. 25 tablet 1    VITAMIN A PO Take 2,400 mcg by mouth daily      vitamin E 400 UNIT capsule Take 400 Units by mouth daily      COMBIGAN 0.2-0.5 % ophthalmic solution PLACE 1 DROP INTO BOTH EYES DAILY 15 mL 3    Compression Stockings MISC by Does not apply route 15-20mmHg  Knee high  Open tow  With assist device to help put them on 1 each 0    Zinc 50 MG TABS Take 50 mg by mouth daily      niacin 500 MG extended release capsule Take 500 mg by mouth daily      folic acid (FOLVITE) 100 MCG tablet Take 800 mcg by mouth daily      Cyanocobalamin (VITAMIN B12 PO) Take 2,500 mcg by mouth daily      B Complex-C (SUPER B COMPLEX PO) Take 1 tablet by mouth daily       travoprost, benzalkonium, (TRAVATAN) 0.004 % ophthalmic solution Place 1 drop into both eyes daily 3 Bottle 3    CPAP Machine MISC by Does not apply route Pressure of 13 (AirSense 10)  [employer].      Multiple Vitamin (MULTI VITAMIN DAILY) TABS Take by mouth daily      gabapentin (NEURONTIN) 600 MG tablet Take 1 tablet by mouth 3 times daily for 14 days. 42 tablet 0     No current facility-administered medications for this visit.      No Known Allergies    Health Maintenance   Topic Date Due    Hepatitis A vaccine (1 of 2 - Risk 2-dose series) Never done    Hepatitis B vaccine (1 of 3 - Risk 3-dose series) Never done    COVID-19 Vaccine (4 - Booster for Pfizer series) 03/02/2022    Annual Wellness Visit (AWV)  02/18/2023    Diabetic foot exam  08/22/2023    GFR test (Diabetes, CKD 3-4, OR last GFR 15-59)  12/21/2023    Depression Monitoring  01/24/2024    A1C test (Diabetic or Prediabetic)  02/08/2024    Diabetic Alb to Cr ratio (uACR) test  02/08/2024    Lipids  02/08/2024    Diabetic retinal exam  07/06/2024    Breast cancer screen  12/27/2024    DTaP/Tdap/Td vaccine (2 - Td or Tdap) 06/18/2027    Colorectal Cancer Screen  12/21/2032    DEXA (modify frequency per FRAX score)  Completed    Flu vaccine  Completed    Shingles vaccine  Completed    Pneumococcal 65+ years Vaccine  Completed    Hepatitis C screen  Completed    Hib vaccine  Aged Out    Meningococcal (ACWY) vaccine  Aged Out       Subjective:      Review of Systems   Constitutional:  Negative for chills and fever. HENT:  Negative for hearing loss. Eyes:  Negative for pain and visual disturbance. Respiratory:  Negative for cough and shortness of breath. Cardiovascular:  Negative for chest pain, palpitations and leg swelling. Gastrointestinal:  Negative for abdominal pain, blood in stool, constipation, diarrhea, nausea and vomiting. Endocrine: Negative for cold intolerance, polydipsia and polyuria. Genitourinary:  Negative for difficulty urinating, dysuria and hematuria. Musculoskeletal:  Negative for arthralgias, back pain, gait problem and neck pain. Skin:  Negative for pallor and rash. Neurological:  Negative for dizziness, weakness, numbness and headaches. Hematological:  Negative for adenopathy. Does not bruise/bleed easily. Psychiatric/Behavioral:  Negative for confusion. The patient is not nervous/anxious. Objective:     Physical Exam  Vitals reviewed. Constitutional:       Appearance: She is well-developed. HENT:      Head: Normocephalic and atraumatic. Eyes:      Pupils: Pupils are equal, round, and reactive to light. Cardiovascular:      Rate and Rhythm: Normal rate and regular rhythm. Heart sounds: No murmur heard. No friction rub. No gallop. Pulmonary:      Effort: Pulmonary effort is normal.      Breath sounds: Normal breath sounds. No wheezing or rales. Abdominal:      General: There is no distension.       Palpations: Abdomen is soft. There is no mass. Tenderness: There is no abdominal tenderness. There is no rebound. Musculoskeletal:         General: Normal range of motion. Cervical back: Neck supple. Lymphadenopathy:      Cervical: No cervical adenopathy. Skin:     General: Skin is warm and dry. Findings: No rash. Neurological:      Mental Status: She is alert and oriented to person, place, and time. Cranial Nerves: No cranial nerve deficit (grossly). Psychiatric:         Thought Content: Thought content normal.      /80 (Site: Right Upper Arm, Position: Sitting, Cuff Size: Large Adult)   Pulse 57   Resp 16   Ht 5' 4\" (1.626 m)   Wt 247 lb (112 kg)   LMP 12/07/1995 (Approximate)   SpO2 98%   BMI 42.40 kg/m²     Assessment:       Diagnosis Orders   1. Type 2 diabetes mellitus with diabetic polyneuropathy, with long-term current use of insulin ([facility])  Hemoglobin A1C      2. Screening breast examination        3. Medicare annual wellness visit, subsequent        4. CHF (congestive heart failure), NYHA class I, chronic, diastolic ([facility])        5. Coronary artery disease due to lipid rich plaque        6. General medical exam        7. Iron deficiency anemia, unspecified iron deficiency anemia type  Hemoglobin    Iron and TIBC      8. Chronic left shoulder pain  [facility]      9. Chronic right shoulder pain  [facility]      10. Fungal skin infection  clotrimazole-betamethasone (LOTRISONE) 1-0.05 % cream      Reviewed multiple test results. 2/8/2023 normal creatinine 0.8    2/8/2023 normal total cholesterol 115    2/8/2023 normal A1c 6.4    Patient told to continue Lantus insulin. Plan:       Return in about 27 weeks (around 8/21/2023) for Diabetes, CAD, CHF.     Orders Placed This Encounter   Procedures    Hemoglobin     Standing Status:   Future     Standing Expiration Date:   2/13/2024    Iron and TIBC     Standing Status:   Future     Standing Expiration Date:   2/13/2024     Order Specific Question:   Is Patient Fasting? Answer:   yes     Order Specific Question:   No of Hours? Answer:   12    Hemoglobin A1C     Standing Status:   Future     Standing Expiration Date:   2/13/2024    [facility] Orthopedic Surgery     Referral Priority:   Routine     Referral Type:   Eval and Treat     Referral Reason:   Specialty Services Required     Requested Specialty:   Orthopedic Surgery     Number of Visits Requested:   1       Orders Placed This Encounter   Medications    clotrimazole-betamethasone (LOTRISONE) 1-0.05 % cream     Sig: Apply topically 2 times daily. Dispense:  8 g     Refill:  3         Patientgiven educational materials - see patient instructions. Discussed use, benefit,and side effects of prescribed medications. All patient questions answered. Ptvoiced understanding. Reviewed health maintenance. Instructed to continue currentmedications, diet and exercise. Patient agreed with treatment plan. Follow up asdirected.      Electronically signed by Patricia Feliz MD on 2/13/2023 at 4:01 PM

## 2023-02-13 NOTE — TELEPHONE ENCOUNTER
[address]           :1950           Surgical/Procedure Planned: Colonoscopy    Date & Location: 2023 at [facility]       Outpatient   Planned Length of OR: 30 min    Sedation: intravenous sedation    Estimated Cardiac Risk for Non-Cardiac Surgery/Procedure     Moderate___X (based on last cath)    Medication Instructions - Clarification needed by this date:     Plavix   Hold ___ Days      Resume medications:     Lovenox indicated: _____Yes ___X__NO    Provider:Dr. Asia Arreola of Provider Giving Orders for Medication holds:    _____________________________________________  Tenisha Ross DO, [facility], [address], [address] [facility]  [email or web address]  [phone]

## 2023-02-13 NOTE — TELEPHONE ENCOUNTER
[address]            :1950           Surgical/Procedure Planned: Colonoscopy    Date & Location: 2023 at [facility]      Outpatient   Planned Length of OR: 30 min    Sedation: intravenous sedation        Estimated Cardiac Risk for Non-Cardiac Surgery/Procedure     Low______ Moderate______ High______    Medication Instructions - Clarification needed by this date:   -Diabetic Medications:    Lantus  Hold ___ Days  Novolog Hold ___ Days      Resume medications:     Lovenox indicated: _____Yes _____NO    Provider:Dr. Jules Justice of Provider Giving Orders for Medication holds:    _____________________________________________

## 2023-02-13 NOTE — PROGRESS NOTES
Lokesh Finney is a 74 y.o. female      CC: Hx of recent colonoscopy with left colon polyp. Could not remove because she was on plavix . HISTORY OF PRESENT ILLNESS:    Patient is a 58-year-old female who had some changes on her stress test and was transferred up to [facility] for cardiac cath  During that hospitalization she had some rectal bleeding. She then had colonoscopy and was found to have a polyp in the colon. They did not remove it because she was on Plavix. She was on Plavix for history of stents. They referred her back for a colonoscopy 3 months after her discharge in her hometown for a polypectomy. She is here for that follow-up.         Review of Systems:    General:  Fever: Negative  Weight Change:Negative  Night Sweats: Negative    Eye:  Blurry Vision:Negative  Double Vision: Negative    Ent:  Headaches: Negative  Sore throat: Negative    Allergy/Immunology:  Hives: Negative  Latex allergy: Negative    Hematology/Lymphatic:  Bleeding Problems: Negative  Blood Clots: Negative  Swollen Lymph Nodes: Negative    Lungs:  Cough: Negative  SOB: Negative  Wheezing:Negative    Cardiovascular:  Chest Pain: Negative  Palpitations:Negative    GI:   Decreased Appetite: Negative  Heartburn: Negative  Dysphagia: Negative  Nausea/Vomiting: Negative  Abdominal Pain: Negative  Change in Bowels:Negative  Constipation: Negative  Diarrhea: Negative  Rectal Bleeding: Negative    :   Dysuria: Negative  Increase Urinary Frequency/Urgency: Negative    Neuro:  Seizures: Negative  Confusion: Negative        PAST MEDICAL HISTORY:      Family History   Problem Relation Age of Onset    Heart Disease Mother     High Blood Pressure Mother     Stroke Mother     Glaucoma Mother     Early Death Father     Cancer Father         stomach    Miscarriages / Stillbirths Maternal Uncle      Social History     Socioeconomic History    Marital status:      Spouse name: Not on file    Number of children: Not on file Years of education: Not on file    Highest education level: Not on file   Occupational History    Not on file   Tobacco Use    Smoking status: Former     Packs/day: 1.00     Years: 35.00     Pack years: 35.00     Types: Cigarettes     Quit date: 2001     Years since quittin.3    Smokeless tobacco: Never   Vaping Use    Vaping Use: Never used   Substance and Sexual Activity    Alcohol use: No    Drug use: No    Sexual activity: Never   Other Topics Concern    Not on file   Social History Narrative    Not on file     Social Determinants of Health     Financial Resource Strain: Low Risk     Difficulty of Paying Living Expenses: Not hard at all   Food Insecurity: No Food Insecurity    Worried About Running Out of Food in the Last Year: Never true    [address] in the Last Year: Never true   Transportation Needs: No Transportation Needs    Lack of Transportation (Medical): No    Lack of Transportation (Non-Medical): No   Physical Activity: Inactive    Days of Exercise per Week: 0 days    Minutes of Exercise per Session: 0 min   Stress: No Stress Concern Present    Feeling of Stress :  Only a little   Social Connections: Socially Isolated    Frequency of Communication with Friends and Family: More than three times a week    Frequency of Social Gatherings with Friends and Family: More than three times a week    Attends [employer] Services: Never    Active Member of Clubs or Organizations: No    Attends Club or Organization Meetings: Never    Marital Status:    Intimate Partner Violence: Not on file   Housing Stability: [address] to Pay for Housing in the Last Year: No    Number of [place] in the Last Year: 1    Unstable Housing in the Last Year: No     Past Surgical History:   Procedure Laterality Date    ANESTHESIA NERVE BLOCK Right 2019    Right L2 L3 L4 L5 Diagnostic Medial BB performed by Ana Pinto MD at [address] [place] [address] Right 2019    Right L2 L3 L4 L5 Confirmatory Medial BB performed by Milo Calix MD at [place] [address]    CHOLECYSTECTOMY      COLONOSCOPY  09/2012    COLONOSCOPY N/A 09/30/2019    COLONOSCOPY performed by Francia Lacey MD at [address] [place] [address]  internal hemorrhoids and few diverticuli    COLONOSCOPY  12/21/2022    diagnostic    COLONOSCOPY N/A 12/21/2022    COLONOSCOPY WITH BIOPSY performed by Tristan Pollard MD at [facility], COLON, DIAGNOSTIC      ESOPHAGOGASTRODUODENOSCOPY  12/21/2022    HYSTERECTOMY (CERVIX STATUS UNKNOWN)      [address] [place] [address] Right 02/08/2019    Right L2 L3 TRANSFORAMINAL performed by Milo Calix MD at [facility] Right 05/07/2019    Right L2 L3 TRANSFORAMINAL performed by Milo Calix MD at [facility] Right 06/04/2019    Right L3 & L4 TRANSFORAMINAL performed by Milo Calix MD at [facility] Right 12/12/2019    Right L4 L5 TRANSFORAMINAL performed by Milo Calix MD at [address] [place] [address] Right 01/20/2020    RIGHT L4 L5  TRANSFORAMINAL performed by Milo Calix MD at [address] [place] [address] Right 02/17/2020    Right L4 L5 TRANSFORAMINAL performed by Milo Calix MD at [address] [place] [address] TG F/E/E/N/L/M 1.1-2.0CM Left 02/07/2018    Excision Cyst Left Chest, Middle Back performed by Francia Lacey MD at [address] AA&/STRD TFRML EPI LUMBAR/SACRAL EA ADDL Right 10/05/2018    Right L2 & L3 TRANSFORAMINAL performed by Milo Calix MD at [address] AA&/STRD TFRML EPI LUMBAR/SACRAL EA ADDL Right 10/26/2018    Right L2 L3 TRANSFORAMINAL performed by Milo Calix MD at [place] [address] Right 08/01/2019    Right  L2 L3 L4 L5 RADIOFREQUENCY performed by Milo Calix MD at [address] [place] [address] (CERVIX REMOVED)  1987    Cervical cancer. Had XRT    TONSILLECTOMY  1983    UPPER GASTROINTESTINAL ENDOSCOPY N/A 12/21/2022    EGD ESOPHAGOGASTRODUODENOSCOPY performed by Kush Fitzpatrick MD at [facility]     Past Medical History:   Diagnosis Date    Arthritis     Asthma     Bell's palsy     CAD (coronary artery disease) 1989    Cancer ([facility] 1971    cervical    Carotid artery disease ([facility]     Cataracts, bilateral     CHF (congestive heart failure) (HCC)     COPD (chronic obstructive pulmonary disease) (HCC)     Coronary artery disease     Eczema     Emphysema lung (HCC)     Fibromyalgia     Glaucoma     H/O blood clots     Headache     Heart attack ([facility]     Hx of Bell's palsy     Mild right facial paralysis    Hypertension     Liver disease     Mild dementia     Neuropathy     Osteoporosis     Sleep apnea 11/07/2014    sleep study done in [facility]     Type 2 diabetes mellitus with hyperglycemia ([facility]      Current Outpatient Medications on File Prior to Visit   Medication Sig Dispense Refill    oxyCODONE-acetaminophen (PERCOCET) 5-325 MG per tablet Take 1 tablet by mouth every 6 hours as needed for Pain for up to 30 days.  Take lowest dose possible to manage pain Max Daily Amount: 4 tablets 120 tablet 0    alendronate (FOSAMAX) 70 MG tablet TAKE 1 TABLET EVERY WEEK 12 tablet 3    ascorbic acid (VITAMIN C) 500 MG tablet TAKE 1 TABLET BY MOUTH TWICE DAILY 180 tablet 3    docusate sodium (COLACE, DULCOLAX) 100 MG CAPS Take 100 mg by mouth 2 times daily 60 capsule 1    potassium chloride (MICRO-K) 10 MEQ extended release capsule TAKE 1 CAPSULE TWICE DAILY 180 capsule 3    spironolactone (ALDACTONE) 50 MG tablet Take 1 tablet by mouth daily 90 tablet 1    STIOLTO RESPIMAT 2.5-2.5 MCG/ACT AERS INHALE 2 PUFFS INTO THE LUNGS DAILY 3 each 3    fluticasone (FLONASE) 50 MCG/ACT nasal spray USE 2 SPRAYS NASALLY EVERY DAY 48 g 3    Insulin Syringe-Needle U-100 (DROPLET INSULIN SYRINGE) 31G X 5/16\" 0.5 ML MISC USE TO INJECT INTO THE SKIN TWO TIMES DAILY 200 each 3 DROPLET PEN NEEDLES 31G X 8 MM MISC USE  FOR  INJECTIONS TWICE DAILY 200 each 3    insulin glargine (LANTUS) 100 UNIT/ML injection vial INJECT 15 UNITS INTO THE SKIN TWO TIMES DAILY (DISCARD AND BEGIN A NEW VIAL AFTER 28 DAYS) 90 mL 3    vitamin D3 (CHOLECALCIFEROL) 25 MCG (1000 UT) TABS tablet TAKE 3 TABLETS BY MOUTH TWICE DAILY 540 tablet 3    calcium carbonate-vitamin D3 (CALCIUM 600+D3) 600-400 MG-UNIT TABS per tab TAKE 1 TABLET BY MOUTH TWICE DAILY 180 tablet 3    Microlet Lancets MISC USE TO TEST 4 TIMES A DAY. 400 each 3    magnesium oxide (MAG-OX) 400 MG tablet Take 1 tablet by mouth daily 90 tablet 3    atorvastatin (LIPITOR) 40 MG tablet TAKE 1 TABLET EVERY DAY. 90 tablet 3    NOVOLOG FLEXPEN 100 UNIT/ML injection pen INJECT 4 UNITS AT LUNCH AND  6 UNITS AT SUPPER (EACH PEN EXPIRES 28 DAYS AFTER 1ST USE) 15 mL 3    pantoprazole (PROTONIX) 40 MG tablet TAKE 1 TABLET EVERY DAY 90 tablet 3    furosemide (LASIX) 40 MG tablet TAKE 1 TABLET EVERY DAY 90 tablet 3    amitriptyline (ELAVIL) 25 MG tablet TAKE 1 TABLET AT BEDTIME 90 tablet 3    citalopram (CELEXA) 10 MG tablet TAKE 1 TABLET EVERY DAY 90 tablet 3    buPROPion (WELLBUTRIN) 75 MG tablet TAKE 1 TABLET TWICE DAILY 180 tablet 3    clopidogrel (PLAVIX) 75 MG tablet TAKE 1 TABLET EVERY DAY 90 tablet 3    albuterol sulfate HFA (PROVENTIL;VENTOLIN;PROAIR) 108 (90 Base) MCG/ACT inhaler Inhale 2 puffs into the lungs 4 times daily 54 g 3    albuterol (PROVENTIL) (2.5 MG/3ML) 0.083% nebulizer solution Take 3 mLs by nebulization every 6 hours as needed for Wheezing 120 each 3    triamcinolone (KENALOG) 0.1 % cream APPLY TOPICALLY TWICE DAILY 15 g 5    empagliflozin (JARDIANCE) 10 MG tablet Take 1 tablet by mouth daily 90 tablet 3    metoprolol succinate (TOPROL XL) 25 MG extended release tablet TAKE 1/2 TABLET EVERY DAY 45 tablet 3    CONTOUR NEXT TEST strip USE TO TEST 4 TIMES A DAY. 400 strip 3    Misc.  Devices MISC It is in my medical opinion that Cong Morgan be allowed to have a kitten in her apartment at all times for companionship to help with her Depression, Anxiety, Fibromyalgia and Osteoarthritis. 1 Device 0    dorzolamide (TRUSOPT) 2 % ophthalmic solution Apply to eye 3 times daily      pyridoxine (B-6) 100 MG tablet Take 50 mg by mouth daily      Biotin 52115 MCG TABS Take by mouth      Respiratory Therapy Supplies MISC CPAP supplies 1 each 0    nitroGLYCERIN (NITROSTAT) 0.4 MG SL tablet PLACE 1 TABLET UNER THE TONGUE EVERY 5 MINUTES AS NEEDED FOR CHEST PAIN AT FIRST SIGN OF CHEST PAIN. 25 tablet 1    VITAMIN A PO Take 2,400 mcg by mouth daily      vitamin E 400 UNIT capsule Take 400 Units by mouth daily      COMBIGAN 0.2-0.5 % ophthalmic solution PLACE 1 DROP INTO BOTH EYES DAILY 15 mL 3    Compression Stockings MISC by Does not apply route 15-20mmHg  Knee high  Open tow  With assist device to help put them on 1 each 0    Zinc 50 MG TABS Take 50 mg by mouth daily      niacin 500 MG extended release capsule Take 500 mg by mouth daily      folic acid (FOLVITE) 172 MCG tablet Take 800 mcg by mouth daily      Cyanocobalamin (VITAMIN B12 PO) Take 2,500 mcg by mouth daily      B Complex-C (SUPER B COMPLEX PO) Take 1 tablet by mouth daily       travoprost, benzalkonium, (TRAVATAN) 0.004 % ophthalmic solution Place 1 drop into both eyes daily 3 Bottle 3    CPAP Machine MISC by Does not apply route Pressure of 13 (AirSense 10)  [facility].      Multiple Vitamin (MULTI VITAMIN DAILY) TABS Take by mouth daily      gabapentin (NEURONTIN) 600 MG tablet Take 1 tablet by mouth 3 times daily for 14 days. 42 tablet 0    senna (SENOKOT) 8.6 MG TABS tablet TAKE 1 TABLET BY MOUTH TWICE DAILY (Patient not taking: Reported on 2/13/2023) 180 tablet 3    magnesium oxide (MAGNESIUM-OXIDE) 400 (241.3 Mg) MG TABS tablet Take 1 tablet by mouth 2 times daily (Patient not taking: No sig reported) 180 tablet 3     No current facility-administered medications on file prior to visit. Allergies as of 02/13/2023    (No Known Allergies)           PHYSICAL EXAM:    Blood pressure 112/72, pulse 72, height 5' 4\" (1.626 m), weight 246 lb (111.6 kg), last menstrual period 12/07/1995, not currently breastfeeding. Gen:  A and O x 3, NAD, well nourished  Eyes:  Sclera non icterus, PERRL  Head:  Normocephalic, non-tender  Neck:  Supple, no adenopathy, thyroid non tender and no masses,no carotid bruits  Lungs:  CTA, symmetrical  Chest:  RRR, no murmurs  Abd:  Soft, NT, ND, no HSM, no hernias, no bruits  Ext:  No edema, no cyanosis  Psych: reveals appropriate mood, memory and judgment,  Neuro:  Reveals no gross motor or sensory deficits,   Msk:  5/5 strength all 4 extremities, no joint tenderness            ASSESS MENT:     Hx of colon polyp on CS from dec 2022, that could not be removed due to pt being on plavix.       PLAN:    Set up for CS  Get clearance from cardiology to stop plavix

## 2023-02-13 NOTE — PROGRESS NOTES
Medicare Annual Wellness Visit    Jessica Cote is here for Medicare AWV (6 month follow up), Diabetes (Type 2), Coronary Artery Disease (Mild), and Congestive Heart Failure (Diastolic)    Assessment & Plan   Type 2 diabetes mellitus with diabetic polyneuropathy, with long-term current use of insulin (HCC)  -     Hemoglobin A1C; Future  Screening breast examination  Medicare annual wellness visit, subsequent  CHF (congestive heart failure), NYHA class I, chronic, diastolic (HCC)  Coronary artery disease due to lipid rich plaque  General medical exam  Iron deficiency anemia, unspecified iron deficiency anemia type  -     Hemoglobin; Future  -     Iron and TIBC; Future    Recommendations for Preventive Services Due: see orders and patient instructions/AVS.  Recommended screening schedule for the next 5-10 years is provided to the patient in written form: see Patient Instructions/AVS.     Return in about 27 weeks (around 8/21/2023) for Diabetes, CAD, CHF. Subjective       Patient's complete Health Risk Assessment and screening values have been reviewed and are found in Flowsheets. The following problems were reviewed today and where indicated follow up appointments were [place] [address]  Dept: [phone]  Dept Fax: [phone]  Loc: [phone]     Jessica Cote is a 74 y.o. female who presents today for her medical conditions/complaintsas noted below. Jessica Cote is c/o of   Chief Complaint   Patient presents with    Medicare AWV     6 month follow up    Diabetes     Type 2    Coronary Artery Disease     Mild    Congestive Heart Failure     Diastolic         HPI:     Diabetes  She presents for her follow-up diabetic visit. She has type 2 diabetes mellitus. The initial diagnosis of diabetes was made 11 years ago. Her disease course has been fluctuating.  Pertinent negatives for hypoglycemia include no confusion, dizziness, headaches, nervousness/anxiousness or pallor. Pertinent negatives for diabetes include no chest pain, no polydipsia, no polyuria and no weakness. Coronary Artery Disease  Presents for follow-up visit. Pertinent negatives include no chest pain, chest pressure, dizziness, leg swelling, palpitations or shortness of breath. Her past medical history is significant for CHF. The symptoms have been stable. Compliance with diet is good. Compliance with exercise is good. Compliance with medications is good. Congestive Heart Failure  Presents for follow-up visit. Pertinent negatives include no abdominal pain, chest pain, chest pressure, palpitations or shortness of breath. The symptoms have been stable. Her past medical history is significant for CAD. Compliance with total regimen is %. Compliance with diet is %. Compliance with exercise is %. Compliance with medications is %. Hemoglobin A1C (%)   Date Value   02/08/2023 6.4   08/15/2022 6.4 (H)   02/10/2022 6.9 (H)            Microalb/Crt.  Ratio (mcg/mg creat)   Date Value   02/10/2022 17     LDL Cholesterol (mg/dL)   Date Value   12/17/2022 30   02/10/2022 56   02/18/2021 44     LDL Calculated (mg/dL)   Date Value   02/08/2023 44   05/19/2016 80   09/23/2014 80         AST (U/L)   Date Value   02/08/2023 44     ALT (U/L)   Date Value   02/08/2023 30     BUN (mg/dL)   Date Value   02/08/2023 14     BP Readings from Last 3 Encounters:   02/13/23 118/80   02/13/23 112/72   01/24/23 116/72              Past Medical History:   Diagnosis Date    Arthritis     Asthma     Bell's palsy     CAD (coronary artery disease) 1989    Cancer ([facility] [address] 1971    cervical    Carotid artery disease (HCC)     Cataracts, bilateral     CHF (congestive heart failure) (HCC)     COPD (chronic obstructive pulmonary disease) (HCC)     Coronary artery disease     Eczema     Emphysema lung (HCC)     Fibromyalgia     Glaucoma H/O blood clots     Headache     Heart attack ([address]     Hx of Bell's palsy     Mild right facial paralysis    Hypertension     Liver disease     Mild dementia     Neuropathy     Osteoporosis     Sleep apnea 11/07/2014    sleep study done in [place]    Tremor     Type 2 diabetes mellitus with hyperglycemia [facility])       Past Surgical History:   Procedure Laterality Date    ANESTHESIA NERVE BLOCK Right 06/28/2019    Right L2 L3 L4 L5 Diagnostic Medial BB performed by Robb Jenkins MD at [address]  [facility] Right 07/26/2019    Right L2 L3 L4 L5 Confirmatory Medial BB performed by Robb Jenkins MD at [place] [address]      COLONOSCOPY  09/2012    COLONOSCOPY N/A 09/30/2019    COLONOSCOPY performed by Magdalena Rivas MD at [address] [place] [address]  internal hemorrhoids and few diverticuli    COLONOSCOPY  12/21/2022    diagnostic    COLONOSCOPY N/A 12/21/2022    COLONOSCOPY WITH BIOPSY performed by Catalino Fernández MD at [facility], COLON, DIAGNOSTIC      ESOPHAGOGASTRODUODENOSCOPY  12/21/2022    HYSTERECTOMY (CERVIX STATUS UNKNOWN)      [address] [facility] [address] Right 02/08/2019    Right L2 L3 TRANSFORAMINAL performed by Robb Jenkins MD at [address] [place] [address] Dr [place] 05/07/2019    Right L2 L3 TRANSFORAMINAL performed by Robb Jenkins MD at [address] [place] [address] Dr Right 06/04/2019    Right L3 & L4 TRANSFORAMINAL performed by Robb Jenkins MD at [address] [place] [address] Dr  12/12/2019    Right L4 L5 TRANSFORAMINAL performed by Robb Jenkins MD at [address] [place] [address] Right 01/20/2020    RIGHT L4 L5  TRANSFORAMINAL performed by Robb Jenkins MD at [address] [place] [address] Right 02/17/2020    Right L4 L5 TRANSFORAMINAL performed by Robb Jenkins MD at [address] [place] [address] TG F/E/E/N/L/M 1.1-2.0CM Left 02/07/2018 Excision Cyst Left Chest, Middle Back performed by Magdalena Rivas MD at [address] AA&/STRD TFRML EPI LUMBAR/SACRAL EA ADDL Right 10/05/2018    Right L2 & L3 TRANSFORAMINAL performed by Robb Jenkins MD at [address] AA&/STRD TFRML EPI LUMBAR/SACRAL EA ADDL Right 10/26/2018    Right L2 L3 TRANSFORAMINAL performed by Robb Jenkins MD at [facility] Right 2019    Right  L2 L3 L4 L5 RADIOFREQUENCY performed by Robb Jenkins MD at [address] (CERVIX REMOVED)      Cervical cancer. Had XRT    TONSILLECTOMY      UPPER GASTROINTESTINAL ENDOSCOPY N/A 2022    EGD ESOPHAGOGASTRODUODENOSCOPY performed by Catalino Fernández MD at [address] [facility] History   Problem Relation Age of Onset    Heart Disease Mother     High Blood Pressure Mother     Stroke Mother     Glaucoma Mother     Early Death Father     Cancer Father         stomach    Miscarriages / Stillbirths Maternal Uncle           Social History     Tobacco Use    Smoking status: Former     Packs/day: 1.00     Years: 35.00     Pack years: 35.00     Types: Cigarettes     Quit date: 2001     Years since quittin.3    Smokeless tobacco: Never   Substance Use Topics    Alcohol use: No         Current Outpatient Medications   Medication Sig Dispense Refill    oxyCODONE-acetaminophen (PERCOCET) 5-325 MG per tablet Take 1 tablet by mouth every 6 hours as needed for Pain for up to 30 days.  Take lowest dose possible to manage pain Max Daily Amount: 4 tablets 120 tablet 0    alendronate (FOSAMAX) 70 MG tablet TAKE 1 TABLET EVERY WEEK 12 tablet 3    ascorbic acid (VITAMIN C) 500 MG tablet TAKE 1 TABLET BY MOUTH TWICE DAILY 180 tablet 3    docusate sodium (COLACE, DULCOLAX) 100 MG CAPS Take 100 mg by mouth 2 times daily 60 capsule 1    potassium chloride (MICRO-K) 10 MEQ extended release capsule TAKE 1 CAPSULE TWICE DAILY 180 capsule 3    spironolactone (ALDACTONE) 50 MG tablet Take 1 tablet by mouth daily 90 tablet 1    STIOLTO RESPIMAT 2.5-2.5 MCG/ACT AERS INHALE 2 PUFFS INTO THE LUNGS DAILY 3 each 3    fluticasone (FLONASE) 50 MCG/ACT nasal spray USE 2 SPRAYS NASALLY EVERY DAY 48 g 3    Insulin Syringe-Needle U-100 (DROPLET INSULIN SYRINGE) 31G X 5/16\" 0.5 ML MISC USE TO INJECT INTO THE SKIN TWO TIMES DAILY 200 each 3    DROPLET PEN NEEDLES 31G X 8 MM MISC USE  FOR  INJECTIONS TWICE DAILY 200 each 3    insulin glargine (LANTUS) 100 UNIT/ML injection vial INJECT 15 UNITS INTO THE SKIN TWO TIMES DAILY (DISCARD AND BEGIN A NEW VIAL AFTER 28 DAYS) 90 mL 3    vitamin D3 (CHOLECALCIFEROL) 25 MCG (1000 UT) TABS tablet TAKE 3 TABLETS BY MOUTH TWICE DAILY 540 tablet 3    calcium carbonate-vitamin D3 (CALCIUM 600+D3) 600-400 MG-UNIT TABS per tab TAKE 1 TABLET BY MOUTH TWICE DAILY 180 tablet 3    Microlet Lancets MISC USE TO TEST 4 TIMES A DAY. 400 each 3    magnesium oxide (MAG-OX) 400 MG tablet Take 1 tablet by mouth daily 90 tablet 3    atorvastatin (LIPITOR) 40 MG tablet TAKE 1 TABLET EVERY DAY.  90 tablet 3    NOVOLOG FLEXPEN 100 UNIT/ML injection pen INJECT 4 UNITS AT LUNCH AND  6 UNITS AT SUPPER (EACH PEN EXPIRES 28 DAYS AFTER 1ST USE) 15 mL 3    pantoprazole (PROTONIX) 40 MG tablet TAKE 1 TABLET EVERY DAY 90 tablet 3    furosemide (LASIX) 40 MG tablet TAKE 1 TABLET EVERY DAY 90 tablet 3    amitriptyline (ELAVIL) 25 MG tablet TAKE 1 TABLET AT BEDTIME 90 tablet 3    citalopram (CELEXA) 10 MG tablet TAKE 1 TABLET EVERY DAY 90 tablet 3    buPROPion (WELLBUTRIN) 75 MG tablet TAKE 1 TABLET TWICE DAILY 180 tablet 3    clopidogrel (PLAVIX) 75 MG tablet TAKE 1 TABLET EVERY DAY 90 tablet 3    albuterol sulfate HFA (PROVENTIL;VENTOLIN;PROAIR) 108 (90 Base) MCG/ACT inhaler Inhale 2 puffs into the lungs 4 times daily 54 g 3    albuterol (PROVENTIL) (2.5 MG/3ML) 0.083% nebulizer solution Take 3 mLs by nebulization every 6 hours as needed for Wheezing 120 each 3    triamcinolone (KENALOG) 0.1 % cream APPLY TOPICALLY TWICE DAILY 15 g 5    empagliflozin (JARDIANCE) 10 MG tablet Take 1 tablet by mouth daily 90 tablet 3    metoprolol succinate (TOPROL XL) 25 MG extended release tablet TAKE 1/2 TABLET EVERY DAY 45 tablet 3    senna (SENOKOT) 8.6 MG TABS tablet TAKE 1 TABLET BY MOUTH TWICE DAILY 180 tablet 3    CONTOUR NEXT TEST strip USE TO TEST 4 TIMES A DAY. 400 strip 3    magnesium oxide (MAGNESIUM-OXIDE) 400 (241.3 Mg) MG TABS tablet Take 1 tablet by mouth 2 times daily 180 tablet 3    Misc. Devices MISC It is in my medical opinion that Surjit Herndon be allowed to have a kitten in her apartment at all times for companionship to help with her Depression, Anxiety, Fibromyalgia and Osteoarthritis. 1 Device 0    dorzolamide (TRUSOPT) 2 % ophthalmic solution Apply to eye 3 times daily      pyridoxine (B-6) 100 MG tablet Take 50 mg by mouth daily      Biotin 08895 MCG TABS Take by mouth      Respiratory Therapy Supplies MISC CPAP supplies 1 each 0    nitroGLYCERIN (NITROSTAT) 0.4 MG SL tablet PLACE 1 TABLET UNER THE TONGUE EVERY 5 MINUTES AS NEEDED FOR CHEST PAIN AT FIRST SIGN OF CHEST PAIN.  25 tablet 1    VITAMIN A PO Take 2,400 mcg by mouth daily      vitamin E 400 UNIT capsule Take 400 Units by mouth daily      COMBIGAN 0.2-0.5 % ophthalmic solution PLACE 1 DROP INTO BOTH EYES DAILY 15 mL 3    Compression Stockings MISC by Does not apply route 15-20mmHg  Knee high  Open tow  With assist device to help put them on 1 each 0    Zinc 50 MG TABS Take 50 mg by mouth daily      niacin 500 MG extended release capsule Take 500 mg by mouth daily      folic acid (FOLVITE) 760 MCG tablet Take 800 mcg by mouth daily      Cyanocobalamin (VITAMIN B12 PO) Take 2,500 mcg by mouth daily      B Complex-C (SUPER B COMPLEX PO) Take 1 tablet by mouth daily       travoprost, benzalkonium, (TRAVATAN) 0.004 % ophthalmic solution Place 1 drop into both eyes daily 3 Bottle 3    CPAP Machine MISC by Does not apply route Pressure of 13 (AirSense 10)  [employer]. Multiple Vitamin (MULTI VITAMIN DAILY) TABS Take by mouth daily      gabapentin (NEURONTIN) 600 MG tablet Take 1 tablet by mouth 3 times daily for 14 days. 42 tablet 0     No current facility-administered medications for this visit. No Known Allergies    Health Maintenance   Topic Date Due    Hepatitis A vaccine (1 of 2 - Risk 2-dose series) Never done    Hepatitis B vaccine (1 of 3 - Risk 3-dose series) Never done    COVID-19 Vaccine (4 - Booster for Pfizer series) 03/02/2022    Annual Wellness Visit (AWV)  02/18/2023    Diabetic foot exam  08/22/2023    GFR test (Diabetes, CKD 3-4, OR last GFR 15-59)  12/21/2023    Depression Monitoring  01/24/2024    A1C test (Diabetic or Prediabetic)  02/08/2024    Diabetic Alb to Cr ratio (uACR) test  02/08/2024    Lipids  02/08/2024    Diabetic retinal exam  07/06/2024    Breast cancer screen  12/27/2024    DTaP/Tdap/Td vaccine (2 - Td or Tdap) 06/18/2027    Colorectal Cancer Screen  12/21/2032    DEXA (modify frequency per FRAX score)  Completed    Flu vaccine  Completed    Shingles vaccine  Completed    Pneumococcal 65+ years Vaccine  Completed    Hepatitis C screen  Completed    Hib vaccine  Aged Out    Meningococcal (ACWY) vaccine  Aged Out       Subjective:      Review of Systems   Constitutional:  Negative for chills and fever. HENT:  Negative for hearing loss. Eyes:  Negative for pain and visual disturbance. Respiratory:  Negative for cough and shortness of breath. Cardiovascular:  Negative for chest pain, palpitations and leg swelling. Gastrointestinal:  Negative for abdominal pain, blood in stool, constipation, diarrhea, nausea and vomiting. Endocrine: Negative for cold intolerance, polydipsia and polyuria. Genitourinary:  Negative for difficulty urinating, dysuria and hematuria. Musculoskeletal:  Negative for arthralgias, back pain, gait problem and neck pain. Skin:  Negative for pallor and rash.    Neurological:  Negative for dizziness, weakness, numbness and headaches. Hematological:  Negative for adenopathy. Does not bruise/bleed easily. Psychiatric/Behavioral:  Negative for confusion. The patient is not nervous/anxious. Objective:     Physical Exam  Vitals reviewed. Constitutional:       Appearance: She is well-developed. HENT:      Head: Normocephalic and atraumatic. Eyes:      Pupils: Pupils are equal, round, and reactive to light. Cardiovascular:      Rate and Rhythm: Normal rate and regular rhythm. Heart sounds: No murmur heard. No friction rub. No gallop. Pulmonary:      Effort: Pulmonary effort is normal.      Breath sounds: Normal breath sounds. No wheezing or rales. Abdominal:      General: There is no distension. Palpations: Abdomen is soft. There is no mass. Tenderness: There is no abdominal tenderness. There is no rebound. Musculoskeletal:         General: Normal range of motion. Cervical back: Neck supple. Lymphadenopathy:      Cervical: No cervical adenopathy. Skin:     General: Skin is warm and dry. Findings: No rash. Neurological:      Mental Status: She is alert and oriented to person, place, and time. Cranial Nerves: No cranial nerve deficit (grossly). Psychiatric:         Thought Content: Thought content normal.      /80 (Site: Right Upper Arm, Position: Sitting, Cuff Size: Large Adult)   Pulse 57   Resp 16   Ht 5' 4\" (1.626 m)   Wt 247 lb (112 kg)   LMP 12/07/1995 (Approximate)   SpO2 98%   BMI 42.40 kg/m²     Assessment:       Diagnosis Orders   1. Type 2 diabetes mellitus with diabetic polyneuropathy, with long-term current use of insulin ([facility])  Hemoglobin A1C      2. Screening breast examination        3. Medicare annual wellness visit, subsequent        4. CHF (congestive heart failure), NYHA class I, chronic, diastolic ([facility])        5. Coronary artery disease due to lipid rich plaque        6. General medical exam        7.  Iron deficiency anemia, unspecified iron deficiency anemia type  Hemoglobin    Iron and TIBC         I reviewed multiple test results. 2/8/2023 normal creatinine 0.8    2/8/2023 normal total cholesterol 150    2/8/2023 okay A1c 6.4    Patient told to continue Lantus insulin. Plan:       Return in about 27 weeks (around 8/21/2023) for Diabetes, CAD, CHF. Orders Placed This Encounter   Procedures    Hemoglobin     Standing Status:   Future     Standing Expiration Date:   2/13/2024    Iron and TIBC     Standing Status:   Future     Standing Expiration Date:   2/13/2024     Order Specific Question:   Is Patient Fasting? Answer:   yes     Order Specific Question:   No of Hours? Answer:   12    Hemoglobin A1C     Standing Status:   Future     Standing Expiration Date:   2/13/2024     No orders of the defined types were placed in this encounter. Patientgiven educational materials - see patient instructions. Discussed use, benefit,and side effects of prescribed medications. All patient questions answered. Ptvoiced understanding. Reviewed health maintenance. Instructed to continue currentmedications, diet and exercise. Patient agreed with treatment plan. Follow up asdirected. Electronically signed by Melissa Denny MD on 2/13/2023 at 3:43 PMe and/or referrals ordered. Positive Risk Factor Screenings with Interventions:    Fall Risk:  Do you feel unsteady or are you worried about falling? : (!) yes  2 or more falls in past year?: (!) yes  Fall with injury in past year?: no     Interventions:    Patient advised to follow-up in this office for further evaluation and treatment             Opioid Risk: (Low risk score <55) Opioid risk score: 44    Patient is low risk for opioid use disorder or overdose.   Last PDMP Keegan Jackson as Reviewed:  Review User Review Instant Review Result              Last Controlled Substance Monitoring Documentation      Flowsheet Row Telephone from 11/3/2022 in [facility] department of [address]   Periodic Controlled Substance Monitoring No signs of potential drug abuse or diversion identified. filed at 11/03/2022 1006              General HRA Questions:  Select all that apply: (!) New or Increased Pain, New or Increased Fatigue, Social Isolation    Pain Interventions:  Patient advised to follow up in the office for further evaluation and treatment    Fatigue Interventions:  Patient advised to follow up in the office for further evaluation and treatment    Social Isolation Interventions:  Patient advised to follow up in the office for further evaluation and treatment      Social and Emotional Support:  Do you get the social and emotional support that you need?: (!) No  Interventions:  Patient advised to follow up in the office for further evaluation and treatment    Weight and Activity:  Physical Activity: Insufficiently Active    Days of Exercise per Week: 1 day    Minutes of Exercise per Session: 20 min     On average, how many days per week do you engage in moderate to strenuous exercise (like a brisk walk)?: 1 day  Have you lost any weight without trying in the past 3 months?: (!) Yes  Body mass index: (!) 42.39    Unintentional Weight Loss Interventions:  Patient advised to follow up in the office for further evaluation and treatment  Obesity Interventions:  Patient advised to follow-up in this office for further evaluation and treatment              ADL's:   Patient reports needing help with:  Select all that apply: [place], Transportation  Interventions:  Patient advised to follow up in the office for further evaluation and treatment                    Objective   Vitals:    02/13/23 1525   BP: 118/80   Site: Right Upper Arm   Position: Sitting   Cuff Size: Large Adult   Pulse: 57   Resp: 16   SpO2: 98%   Weight: 247 lb (112 kg)   Height: 5' 4\" (1.626 m)      Body mass index is 42.4 kg/m².              No Known Allergies  Prior to Visit Medications    Medication Sig Taking? Authorizing Provider   oxyCODONE-acetaminophen (PERCOCET) 5-325 MG per tablet Take 1 tablet by mouth every 6 hours as needed for Pain for up to 30 days. Take lowest dose possible to manage pain Max Daily Amount: 4 tablets Yes Adri Grullon MD   alendronate (FOSAMAX) 70 MG tablet TAKE 1 TABLET EVERY WEEK Yes Adri Grullon MD   ascorbic acid (VITAMIN C) 500 MG tablet TAKE 1 TABLET BY MOUTH TWICE DAILY Yes Adri Grullon MD   docusate sodium (COLACE, DULCOLAX) 100 MG CAPS Take 100 mg by mouth 2 times daily Yes Amy Farfan MD   potassium chloride (MICRO-K) 10 MEQ extended release capsule TAKE 1 CAPSULE TWICE DAILY Yes Adri Grullon MD   spironolactone (ALDACTONE) 50 MG tablet Take 1 tablet by mouth daily Yes Isaias Bains,    STIOLTO RESPIMAT 2.5-2.5 MCG/ACT AERS INHALE 2 PUFFS INTO THE LUNGS DAILY Yes Marcus Clark, DO   fluticasone (FLONASE) 50 MCG/ACT nasal spray USE 2 SPRAYS NASALLY EVERY DAY Yes Adri Grullon MD   Insulin Syringe-Needle U-100 (DROPLET INSULIN SYRINGE) 31G X 5/16\" 0.5 ML MISC USE TO INJECT INTO THE SKIN TWO TIMES DAILY Yes Adri Grullon MD   DROPLET PEN NEEDLES 31G X 8 MM MISC USE  FOR  INJECTIONS TWICE DAILY Yes Adri Grullon MD   insulin glargine (LANTUS) 100 UNIT/ML injection vial INJECT 15 UNITS INTO THE SKIN TWO TIMES DAILY (DISCARD AND BEGIN A NEW VIAL AFTER 28 DAYS) Yes Adri Grullon MD   vitamin D3 (CHOLECALCIFEROL) 25 MCG (1000 UT) TABS tablet TAKE 3 TABLETS BY MOUTH TWICE DAILY Yes Adri Grullon MD   calcium carbonate-vitamin D3 (CALCIUM 600+D3) 600-400 MG-UNIT TABS per tab TAKE 1 TABLET BY MOUTH TWICE DAILY Yes Adri Grullon MD   Microlet Lancets MISC USE TO TEST 4 TIMES A DAY. Yes Adri Grullon MD   magnesium oxide (MAG-OX) 400 MG tablet Take 1 tablet by mouth daily Yes Adri Grullon MD   atorvastatin (LIPITOR) 40 MG tablet TAKE 1 TABLET EVERY DAY.  Yes Adri Grullon MD   NOVOLOG FLEXPEN 100 UNIT/ML injection pen INJECT 4 UNITS AT LUNCH AND  6 UNITS AT SUPPER (EACH PEN EXPIRES 28 DAYS AFTER 1ST USE) Yes Fredy Ackerman MD   pantoprazole (PROTONIX) 40 MG tablet TAKE 1 TABLET EVERY DAY Yes Fredy Ackerman MD   furosemide (LASIX) 40 MG tablet TAKE 1 TABLET EVERY DAY Yes Fredy Ackerman MD   amitriptyline (ELAVIL) 25 MG tablet TAKE 1 TABLET AT BEDTIME Yes Fredy Ackerman MD   citalopram (CELEXA) 10 MG tablet TAKE 1 TABLET EVERY DAY Yes Fredy Ackerman MD   buPROPion (WELLBUTRIN) 75 MG tablet TAKE 1 TABLET TWICE DAILY Yes Fredy Ackerman MD   clopidogrel (PLAVIX) 75 MG tablet TAKE 1 TABLET EVERY DAY Yes Fredy Ackerman MD   albuterol sulfate HFA (PROVENTIL;VENTOLIN;PROAIR) 108 (90 Base) MCG/ACT inhaler Inhale 2 puffs into the lungs 4 times daily Yes Ilsa Angulo MD   albuterol (PROVENTIL) (2.5 MG/3ML) 0.083% nebulizer solution Take 3 mLs by nebulization every 6 hours as needed for Wheezing Yes Norm MD Meka   triamcinolone (KENALOG) 0.1 % cream APPLY TOPICALLY TWICE DAILY Yes Norm MD Meka   empagliflozin (JARDIANCE) 10 MG tablet Take 1 tablet by mouth daily Yes Norm MD Meka   metoprolol succinate (TOPROL XL) 25 MG extended release tablet TAKE 1/2 Vernona Amita Yes Norm MD Meka   senna (SENOKOT) 8.6 MG TABS tablet TAKE 1 TABLET BY MOUTH TWICE DAILY Yes Norm MD Meka   CONTOUR NEXT TEST strip USE TO TEST 4 TIMES A DAY. Yes Fredy Ackerman MD   magnesium oxide (MAGNESIUM-OXIDE) 400 (241.3 Mg) MG TABS tablet Take 1 tablet by mouth 2 times daily Yes Fredy Ackerman MD   Misc. Devices MISC It is in my medical opinion that Adrian Tang be allowed to have a kitten in her apartment at all times for companionship to help with her Depression, Anxiety, Fibromyalgia and Osteoarthritis.  Yes Norm MD Meka   dorzolamide (TRUSOPT) 2 % ophthalmic solution Apply to eye 3 times daily Yes Historical Provider, MD   pyridoxine (B-6) 100 MG tablet Take 50 mg by mouth daily Yes Historical Provider, MD   Biotin 84634 MCG TABS Take by mouth Yes Historical Provider, MD   Respiratory Therapy Supplies MISC CPAP supplies Yes Julio Estrella MD   nitroGLYCERIN (NITROSTAT) 0.4 MG SL tablet PLACE 1 TABLET UNER THE TONGUE EVERY 5 MINUTES AS NEEDED FOR CHEST PAIN AT FIRST SIGN OF CHEST PAIN. Yes Julio Estrella MD   VITAMIN A PO Take 2,400 mcg by mouth daily Yes Historical Provider, MD   vitamin E 400 UNIT capsule Take 400 Units by mouth daily Yes Historical Provider, MD   COMBIGAN 0.2-0.5 % ophthalmic solution PLACE 1 DROP INTO BOTH EYES DAILY Yes Julio Estrella MD   Compression Stockings MISC by Does not apply route 15-20mmHg  Knee high  Open tow  With assist device to help put them on Yes Gray Bains, DO   Zinc 50 MG TABS Take 50 mg by mouth daily Yes Historical Provider, MD   niacin 500 MG extended release capsule Take 500 mg by mouth daily Yes Historical Provider, MD   folic acid (FOLVITE) 514 MCG tablet Take 800 mcg by mouth daily Yes Historical Provider, MD   Cyanocobalamin (VITAMIN B12 PO) Take 2,500 mcg by mouth daily Yes Historical Provider, MD   B Complex-C (SUPER B COMPLEX PO) Take 1 tablet by mouth daily  Yes Historical Provider, MD   travoprost, benzalkonium, (TRAVATAN) 0.004 % ophthalmic solution Place 1 drop into both eyes daily Yes Julio Estrella MD   CPAP Machine MISC by Does not apply route Pressure of 13 (AirSense 10)  [employer]. Yes Historical Provider, MD   Multiple Vitamin (MULTI VITAMIN DAILY) TABS Take by mouth daily Yes Historical Provider, MD   gabapentin (NEURONTIN) 600 MG tablet Take 1 tablet by mouth 3 times daily for 14 days.   DAVID Nelson - [facility] (Including outside providers/suppliers regularly involved in providing care):   Patient Care Team:  Julio Estrella MD as PCP - General (Internal Medicine)  Julio Estrella MD as PCP - Empaneled Provider  Jimmy Villareal MD as Neurologist (Neurology)  Luis Alberto Salazar RN as Ambulatory Care Manager  Paige Hernandes DO as Consulting Physician (Pulmonary Disease)  Alberto Houston (Podiatry)  Joe Srivastava [facility]-Religion)     Reviewed and updated this visit:  Tobacco  Allergies  Meds  Problems  Med Hx  Surg Hx  Soc Hx  Fam Hx                 Gem Pineda MD

## 2023-02-13 NOTE — PATIENT INSTRUCTIONS
Personalized Preventive Plan for Geoffrey Fierro - 2/13/2023  Medicare offers a range of preventive health benefits. Some of the tests and screenings are paid in full while other may be subject to a deductible, co-insurance, and/or copay. Some of these benefits include a comprehensive review of your medical history including lifestyle, illnesses that may run in your family, and various assessments and screenings as appropriate. After reviewing your medical record and screening and assessments performed today your provider may have ordered immunizations, labs, imaging, and/or referrals for you. A list of these orders (if applicable) as well as your Preventive Care list are included within your After Visit Summary for your review. Other Preventive Recommendations:    A preventive eye exam performed by an eye specialist is recommended every 1-2 years to screen for glaucoma; cataracts, macular degeneration, and other eye disorders. A preventive dental visit is recommended every 6 months. Try to get at least 150 minutes of exercise per week or 10,000 steps per day on a pedometer . Order or download the FREE \"Exercise & Physical Activity: Your Everyday Guide\" from [employer]. Call 2-715.436.4721 or search [employer] online. You need 1621-3975 mg of calcium and 2415-7813 IU of vitamin D per day. It is possible to meet your calcium requirement with diet alone, but a vitamin D supplement is usually necessary to meet this goal.  When exposed to the sun, use a sunscreen that protects against both UVA and UVB radiation with an SPF of 30 or greater. Reapply every 2 to 3 hours or after sweating, drying off with a towel, or swimming. Always wear a seat belt when traveling in a car. Always wear a helmet when riding a bicycle or motorcycle.

## 2023-02-14 NOTE — TELEPHONE ENCOUNTER
LM on patient's voicemail for patient to call back to review medication hold instructions for upcoming procedure. Clinic number provided on voicemail.

## 2023-02-17 ENCOUNTER — CARE COORDINATION (OUTPATIENT)
Dept: CARE COORDINATION | Age: 73
End: 2023-02-17

## 2023-02-17 NOTE — CARE COORDINATION
Left HIPAA compliant voice message requesting return call @ [phone] re: ACM F/U call.       Future Appointments   Date Time Provider Portia Nascimento   2/27/2023 11:45 AM Luz Ramirez MD [facility]   5/24/2023  1:30 PM DAVID Troncoso - CNP DCARD[facility]   8/15/2023  3:00 PM Arcadio Fuentes MD [facility]   11/8/2023  1:15 PM [facility]

## 2023-02-20 DIAGNOSIS — M25.511 ACUTE PAIN OF BOTH SHOULDERS: Primary | ICD-10-CM

## 2023-02-20 DIAGNOSIS — M25.512 ACUTE PAIN OF BOTH SHOULDERS: Primary | ICD-10-CM

## 2023-02-22 ENCOUNTER — CARE COORDINATION (OUTPATIENT)
Dept: CARE COORDINATION | Age: 73
End: 2023-02-22

## 2023-02-22 NOTE — CARE COORDINATION
Ambulatory Care Coordination Note  2023    Patient Current Location:  Home: [address] [address]     ACM contacted the patient by telephone. Verified name and  with patient as identifiers. Provided introduction to self, and explanation of the ACM role. Challenges to be reviewed by the provider   Additional needs identified to be addressed with provider: No  none               Method of communication with provider: phone. ACM: GIOVANNY George was referred to whoactually [facility] from report. She has:          COPD, LEANDRO- CPAP, on medications and follows with pulmonology                          CHF, CAD, HTN, Hyperlipidemia- on medications and follows with cardiology                          DM- Hgb A1c on 2023 was 6.4- on medications and follows with PCP                          Colon polyp- following with surgeon     Plan of Care : Continue assessments, education and support                          SDOH completed                          Declined RPM                          Medications- declined reviewed                          COPD, DM, and CHF [facility]. decision maker is up to date and ACP on file                          Reviewed clinic, Urgent care and does not use My Chart                          Care Gaps                          Nutrition                          Apt surgeon 2023 completed- Colonoscopy 2023    Apt ortho- chele shoulder pain 2023. H/O left shoulder replacement                          Apt PCP 8/15/2023                          Apt cardiology 2023                          Apt pulmonology 2023                          F/U on BS, wts, and BP                          F/U on dental apt- she has list of providers from [facility] to call apt. Lab work 2023 lab work                          Apt 2023 podiatry completed    2023- 11:20 am spoke with Maria Antonia Tiwari.  She is scheduled with ortho for chele shoulder pain. No concerns with BS, wt, and BP per Patient. BS- 118,95,111, 121, /57, HR 55, wt 247. Offered patient enrollment in the Remote Patient Monitoring (RPM) program for in-home monitoring: Patient declined. Lab Results       None            Care Coordination Interventions    Referral from Primary Care Provider: No  Suggested Interventions and Community Resources  Home Health Services: Completed  Social Work: Completed  Transportation Support: Completed  Zone Management Tools: In Process (Comment: CHF, COPD, DM)  Other Services or Interventions: Works with [facility]          Goals Addressed                   This Visit's Progress     Conditions and Symptoms   On track     I will schedule office visits, as directed by my provider. I will keep my appointment or reschedule if I have to cancel. I will notify my provider of any barriers to my plan of care. I will follow my Zone Management tool to seek urgent or emergent care. I will notify my provider of any symptoms that indicate a worsening of my condition.     Barriers: lack of support and lack of education  Plan for overcoming my barriers: Care Coordination Intervention   Confidence: 10/10  Anticipated Goal Completion Date: 4/30/2023                Future Appointments   Date Time Provider Portia Nascimento   2/27/2023 11:45 AM Casi Bosch MD [facility]   5/24/2023  1:30 PM DAVID Choudhary - CNP [facility]   8/15/2023  3:00 PM MD AMAIRANI Nur [facility]   11/8/2023  1:15 PM DO [facility]

## 2023-02-27 ENCOUNTER — TELEPHONE (OUTPATIENT)
Dept: INTERNAL MEDICINE | Age: 73
End: 2023-02-27

## 2023-02-27 ENCOUNTER — OFFICE VISIT (OUTPATIENT)
Dept: ORTHOPEDIC SURGERY | Age: 73
End: 2023-02-27
Payer: MEDICARE

## 2023-02-27 ENCOUNTER — HOSPITAL ENCOUNTER (OUTPATIENT)
Dept: GENERAL RADIOLOGY | Age: 73
Discharge: HOME | End: 2023-03-01
Payer: MEDICARE

## 2023-02-27 VITALS
WEIGHT: 247 LBS | BODY MASS INDEX: 42.17 KG/M2 | DIASTOLIC BLOOD PRESSURE: 84 MMHG | HEIGHT: 64 IN | HEART RATE: 57 BPM | SYSTOLIC BLOOD PRESSURE: 135 MMHG

## 2023-02-27 DIAGNOSIS — M19.019 ARTHRITIS OF SHOULDER: ICD-10-CM

## 2023-02-27 DIAGNOSIS — G89.29 CHRONIC BILATERAL LOW BACK PAIN WITHOUT SCIATICA: ICD-10-CM

## 2023-02-27 DIAGNOSIS — G89.29 CHRONIC PAIN OF BOTH SHOULDERS: Primary | ICD-10-CM

## 2023-02-27 DIAGNOSIS — Z96.612 HISTORY OF LEFT SHOULDER REPLACEMENT: ICD-10-CM

## 2023-02-27 DIAGNOSIS — M25.511 CHRONIC PAIN OF BOTH SHOULDERS: Primary | ICD-10-CM

## 2023-02-27 DIAGNOSIS — M25.512 ACUTE PAIN OF BOTH SHOULDERS: ICD-10-CM

## 2023-02-27 DIAGNOSIS — M25.511 ACUTE PAIN OF BOTH SHOULDERS: ICD-10-CM

## 2023-02-27 DIAGNOSIS — M25.512 CHRONIC PAIN OF BOTH SHOULDERS: Primary | ICD-10-CM

## 2023-02-27 DIAGNOSIS — M54.50 CHRONIC BILATERAL LOW BACK PAIN WITHOUT SCIATICA: ICD-10-CM

## 2023-02-27 PROCEDURE — 99203 OFFICE O/P NEW LOW 30 MIN: CPT | Performed by: ORTHOPAEDIC SURGERY

## 2023-02-27 PROCEDURE — 1090F PRES/ABSN URINE INCON ASSESS: CPT | Performed by: ORTHOPAEDIC SURGERY

## 2023-02-27 PROCEDURE — 73030 X-RAY EXAM OF SHOULDER: CPT

## 2023-02-27 PROCEDURE — G8484 FLU IMMUNIZE NO ADMIN: HCPCS | Performed by: ORTHOPAEDIC SURGERY

## 2023-02-27 PROCEDURE — 3017F COLORECTAL CA SCREEN DOC REV: CPT | Performed by: ORTHOPAEDIC SURGERY

## 2023-02-27 PROCEDURE — G8400 PT W/DXA NO RESULTS DOC: HCPCS | Performed by: ORTHOPAEDIC SURGERY

## 2023-02-27 PROCEDURE — 1036F TOBACCO NON-USER: CPT | Performed by: ORTHOPAEDIC SURGERY

## 2023-02-27 PROCEDURE — 3079F DIAST BP 80-89 MM HG: CPT | Performed by: ORTHOPAEDIC SURGERY

## 2023-02-27 PROCEDURE — G8428 CUR MEDS NOT DOCUMENT: HCPCS | Performed by: ORTHOPAEDIC SURGERY

## 2023-02-27 PROCEDURE — 1123F ACP DISCUSS/DSCN MKR DOCD: CPT | Performed by: ORTHOPAEDIC SURGERY

## 2023-02-27 PROCEDURE — 3075F SYST BP GE 130 - 139MM HG: CPT | Performed by: ORTHOPAEDIC SURGERY

## 2023-02-27 PROCEDURE — G8417 CALC BMI ABV UP PARAM F/U: HCPCS | Performed by: ORTHOPAEDIC SURGERY

## 2023-02-27 PROCEDURE — 99214 OFFICE O/P EST MOD 30 MIN: CPT | Performed by: ORTHOPAEDIC SURGERY

## 2023-02-27 RX ORDER — OXYCODONE HYDROCHLORIDE AND ACETAMINOPHEN 5; 325 MG/1; MG/1
1 TABLET ORAL EVERY 6 HOURS PRN
Qty: 120 TABLET | Refills: 0 | Status: SHIPPED | OUTPATIENT
Start: 2023-02-27 | End: 2023-03-29

## 2023-02-27 NOTE — TELEPHONE ENCOUNTER
Please call patient regarding her Vit C and Vit D3. She is confused as to why she is not getting more than 30 day supply?   Wants to clarify with you [phone]

## 2023-02-27 NOTE — PROGRESS NOTES
Orthopedic Office Note  [address], [address]  [facility] [address]      CHIEF COMPLAINT:    Chief Complaint   Patient presents with    Shoulder Pain     [facility]  Right shoulder pain       HISTORY OF PRESENT ILLNESS:      The patient is a 74 y.o. female  who presents today for evaluation of both shoulders. She reports chronic pain in both shoulders. Many years ago she reports a left shoulder replacement by Dr. Farrell Early in [facility]. She reports this was a very prolonged recovery and it took several years to recover from this surgery. She has had chronic left shoulder pain and had a CT scan obtained 2 years ago and was told nothing was wrong with her shoulder. She will get catching and clunking in the shoulder that is associated with pain. Right shoulder is also painful with activities of daily living.     Past Medical History:    Past Medical History:   Diagnosis Date    Arthritis     Asthma     Bell's palsy     CAD (coronary artery disease) 1989    Cancer ([facility] [address] 1971    cervical    Carotid artery disease (HCC)     Cataracts, bilateral     CHF (congestive heart failure) (HCC)     COPD (chronic obstructive pulmonary disease) (HCC)     Coronary artery disease     Eczema     Emphysema lung (HCC)     Fibromyalgia     Glaucoma     H/O blood clots     Headache     Heart attack (HCC)     Hx of Bell's palsy     Mild right facial paralysis    Hypertension     Liver disease     Mild dementia     Neuropathy     Osteoporosis     Sleep apnea 11/07/2014    sleep study done in [place]    Tremor     Type 2 diabetes mellitus with hyperglycemia [facility])        Past Surgical History:    Past Surgical History:   Procedure Laterality Date    ANESTHESIA NERVE BLOCK Right 06/28/2019    Right L2 L3 L4 L5 Diagnostic Medial BB performed by Bailey Knight MD at [address]  [facility] Right 07/26/2019    Right L2 L3 L4 L5 Confirmatory Medial BB performed by Xin Quick MD at [place] [address]    CHOLECYSTECTOMY      COLONOSCOPY  09/2012    COLONOSCOPY N/A 09/30/2019    COLONOSCOPY performed by Jose Angel Terry MD at [facility]  internal hemorrhoids and few diverticuli    COLONOSCOPY  12/21/2022    diagnostic    COLONOSCOPY N/A 12/21/2022    COLONOSCOPY WITH BIOPSY performed by Tyler Baptiste MD at [facility], COLON, DIAGNOSTIC      ESOPHAGOGASTRODUODENOSCOPY  12/21/2022    HYSTERECTOMY (CERVIX STATUS UNKNOWN)      [facility] [address] Right 02/08/2019    Right L2 L3 TRANSFORAMINAL performed by Xin Quick MD at [address] [place] [address] Dr Right 05/07/2019    Right L2 L3 TRANSFORAMINAL performed by Xin Quick MD at [address] [place] [address] Dr Right 06/04/2019    Right L3 & L4 TRANSFORAMINAL performed by Xin Quick MD at [address] [place] [address] Dr Right 12/12/2019    Right L4 L5 TRANSFORAMINAL performed by Xin Quick MD at [address] [place] [address] Right 01/20/2020    RIGHT L4 L5  TRANSFORAMINAL performed by Xin Quick MD at [address] [place] [address] Right 02/17/2020    Right L4 L5 TRANSFORAMINAL performed by Xin Quick MD at [address] [place] [address] TG F/E/E/N/L/M 1.1-2.0CM Left 02/07/2018    Excision Cyst Left Chest, Middle Back performed by Jose Angel Terry MD at [address] AA&/STRD TFRML EPI LUMBAR/SACRAL EA ADDL Right 10/05/2018    Right L2 & L3 TRANSFORAMINAL performed by Xin Quick MD at [address] AA&/STRD TFRML EPI LUMBAR/SACRAL EA ADDL Right 10/26/2018    Right L2 L3 TRANSFORAMINAL performed by Xin Quick MD at [place] [address] Right 08/01/2019    Right  L2 L3 L4 L5 RADIOFREQUENCY performed by Xin [facility] MD at [facility]    HAILE AND BSO (CERVIX REMOVED)  1987    Cervical cancer. Had XRT    TONSILLECTOMY  1983    UPPER GASTROINTESTINAL ENDOSCOPY N/A 12/21/2022    EGD ESOPHAGOGASTRODUODENOSCOPY performed by Harry Perez MD at [place] [address]       Medications Prior to Admission:   Current Outpatient Medications   Medication Sig Dispense Refill    oxyCODONE-acetaminophen (PERCOCET) 5-325 MG per tablet Take 1 tablet by mouth every 6 hours as needed for Pain for up to 30 days. Take lowest dose possible to manage pain Max Daily Amount: 4 tablets 120 tablet 0    clotrimazole-betamethasone (LOTRISONE) 1-0.05 % cream Apply topically 2 times daily. 8 g 3    Diabetic Shoe MISC by Does not apply route 1 each 0    gabapentin (NEURONTIN) 600 MG tablet Take 1 tablet by mouth 3 times daily for 14 days.  42 tablet 0    alendronate (FOSAMAX) 70 MG tablet TAKE 1 TABLET EVERY WEEK 12 tablet 3    ascorbic acid (VITAMIN C) 500 MG tablet TAKE 1 TABLET BY MOUTH TWICE DAILY 180 tablet 3    docusate sodium (COLACE, DULCOLAX) 100 MG CAPS Take 100 mg by mouth 2 times daily 60 capsule 1    potassium chloride (MICRO-K) 10 MEQ extended release capsule TAKE 1 CAPSULE TWICE DAILY 180 capsule 3    spironolactone (ALDACTONE) 50 MG tablet Take 1 tablet by mouth daily 90 tablet 1    STIOLTO RESPIMAT 2.5-2.5 MCG/ACT AERS INHALE 2 PUFFS INTO THE LUNGS DAILY 3 each 3    fluticasone (FLONASE) 50 MCG/ACT nasal spray USE 2 SPRAYS NASALLY EVERY DAY 48 g 3    Insulin Syringe-Needle U-100 (DROPLET INSULIN SYRINGE) 31G X 5/16\" 0.5 ML MISC USE TO INJECT INTO THE SKIN TWO TIMES DAILY 200 each 3    DROPLET PEN NEEDLES 31G X 8 MM MISC USE  FOR  INJECTIONS TWICE DAILY 200 each 3    insulin glargine (LANTUS) 100 UNIT/ML injection vial INJECT 15 UNITS INTO THE SKIN TWO TIMES DAILY (DISCARD AND BEGIN A NEW VIAL AFTER 28 DAYS) 90 mL 3    vitamin D3 (CHOLECALCIFEROL) 25 MCG (1000 UT) TABS tablet TAKE 3 TABLETS BY MOUTH TWICE DAILY 540 tablet 3    calcium carbonate-vitamin D3 (CALCIUM 600+D3) 600-400 MG-UNIT TABS per tab TAKE 1 TABLET BY MOUTH TWICE DAILY 180 tablet 3    Microlet Lancets MISC USE TO TEST 4 TIMES A DAY. 400 each 3    magnesium oxide (MAG-OX) 400 MG tablet Take 1 tablet by mouth daily 90 tablet 3    atorvastatin (LIPITOR) 40 MG tablet TAKE 1 TABLET EVERY DAY. 90 tablet 3    NOVOLOG FLEXPEN 100 UNIT/ML injection pen INJECT 4 UNITS AT LUNCH AND  6 UNITS AT SUPPER (EACH PEN EXPIRES 28 DAYS AFTER 1ST USE) 15 mL 3    pantoprazole (PROTONIX) 40 MG tablet TAKE 1 TABLET EVERY DAY 90 tablet 3    furosemide (LASIX) 40 MG tablet TAKE 1 TABLET EVERY DAY 90 tablet 3    amitriptyline (ELAVIL) 25 MG tablet TAKE 1 TABLET AT BEDTIME 90 tablet 3    citalopram (CELEXA) 10 MG tablet TAKE 1 TABLET EVERY DAY 90 tablet 3    buPROPion (WELLBUTRIN) 75 MG tablet TAKE 1 TABLET TWICE DAILY 180 tablet 3    clopidogrel (PLAVIX) 75 MG tablet TAKE 1 TABLET EVERY DAY 90 tablet 3    albuterol sulfate HFA (PROVENTIL;VENTOLIN;PROAIR) 108 (90 Base) MCG/ACT inhaler Inhale 2 puffs into the lungs 4 times daily 54 g 3    albuterol (PROVENTIL) (2.5 MG/3ML) 0.083% nebulizer solution Take 3 mLs by nebulization every 6 hours as needed for Wheezing 120 each 3    triamcinolone (KENALOG) 0.1 % cream APPLY TOPICALLY TWICE DAILY 15 g 5    empagliflozin (JARDIANCE) 10 MG tablet Take 1 tablet by mouth daily 90 tablet 3    metoprolol succinate (TOPROL XL) 25 MG extended release tablet TAKE 1/2 TABLET EVERY DAY 45 tablet 3    senna (SENOKOT) 8.6 MG TABS tablet TAKE 1 TABLET BY MOUTH TWICE DAILY 180 tablet 3    CONTOUR NEXT TEST strip USE TO TEST 4 TIMES A DAY. 400 strip 3    magnesium oxide (MAGNESIUM-OXIDE) 400 (241.3 Mg) MG TABS tablet Take 1 tablet by mouth 2 times daily 180 tablet 3    Misc. Devices MISC It is in my medical opinion that Marcyita Feeling be allowed to have a kitten in her apartment at all times for companionship to help with her Depression, Anxiety, Fibromyalgia and Osteoarthritis.  1 Device 0    dorzolamide (TRUSOPT) 2 % ophthalmic solution Apply to eye 3 times daily      pyridoxine (B-6) 100 MG tablet Take 50 mg by mouth daily      Biotin 74614 MCG TABS Take by mouth      Respiratory Therapy Supplies MISC CPAP supplies 1 each 0    nitroGLYCERIN (NITROSTAT) 0.4 MG SL tablet PLACE 1 TABLET UNER THE TONGUE EVERY 5 MINUTES AS NEEDED FOR CHEST PAIN AT FIRST SIGN OF CHEST PAIN. 25 tablet 1    VITAMIN A PO Take 2,400 mcg by mouth daily      vitamin E 400 UNIT capsule Take 400 Units by mouth daily      COMBIGAN 0.2-0.5 % ophthalmic solution PLACE 1 DROP INTO BOTH EYES DAILY 15 mL 3    Compression Stockings MISC by Does not apply route 15-20mmHg  Knee high  Open tow  With assist device to help put them on 1 each 0    Zinc 50 MG TABS Take 50 mg by mouth daily      niacin 500 MG extended release capsule Take 500 mg by mouth daily      folic acid (FOLVITE) 445 MCG tablet Take 800 mcg by mouth daily      Cyanocobalamin (VITAMIN B12 PO) Take 2,500 mcg by mouth daily      B Complex-C (SUPER B COMPLEX PO) Take 1 tablet by mouth daily       travoprost, benzalkonium, (TRAVATAN) 0.004 % ophthalmic solution Place 1 drop into both eyes daily 3 Bottle 3    CPAP Machine MISC by Does not apply route Pressure of 13 (AirSense 10)  [employer].      Multiple Vitamin (MULTI VITAMIN DAILY) TABS Take by mouth daily       No current facility-administered medications for this visit. Allergies:  Patient has no known allergies.     Social History:   Social History     Tobacco Use   Smoking Status Former    Packs/day: 1.00    Years: 35.00    Pack years: 35.00    Types: Cigarettes    Quit date: 2001    Years since quittin.4   Smokeless Tobacco Never     Social History     Substance and Sexual Activity   Alcohol Use No     Social History     Substance and Sexual Activity   Drug Use No       Family History:  Family History   Problem Relation Age of Onset    Heart Disease Mother     High Blood Pressure Mother     Stroke Mother     Glaucoma Mother Early Death Father     Cancer Father         stomach    Miscarriages / Stillbirths Maternal Uncle          REVIEW OF SYSTEMS:  Please see the ROS form attached to today's encounter.  I have reviewed it with the patient during the visit. All other systems were reviewed and are negative.    PHYSICAL EXAM:  Examination today she is in no obvious distress.  Left shoulder has limited painful forward flexion but then with applying a anteriorly directed force to the shoulder a clunk is felt consistent with relocation of the glenohumeral joint.  After this pain is significantly diminished and motion is significantly improved.  Right shoulder has full active range of motion with pain.  No gross weakness with resisted rotator cuff testing.    Radiology:  X-rays of the left shoulder show a total shoulder arthroplasty that is subluxed posteriorly on the axillary view.  Right shoulder has a small inferior humeral head osteophyte and joint space narrowing.    ASSESSMENT/PLAN:  1. Chronic pain of both shoulders    2. History of left shoulder replacement    3. Arthritis of shoulder        I have discussed left shoulder findings with the patient of instability.  I have discussed the referral to Dr. Sánchez.  For her right shoulder osteoarthritis we have discussed treatment options as well.  She feels steroids did not provide any pain relief in the past.  I have discussed the option of a total shoulder arthroplasty on the right.  At this time she would like to think about her options and plan to follow-up accordingly.    No orders of the defined types were placed in this encounter.       HIMANSHU REYEZ MD

## 2023-02-27 NOTE — TELEPHONE ENCOUNTER
Patient is due for a refill of her pain medication tomorrow but she is leaving to head out of town today. Is it okay to fill today?     Script pended if approved

## 2023-02-27 NOTE — TELEPHONE ENCOUNTER
Patient notified that she will need to talk to the pharmacy on why they are only giving her a 30 day supply. Cause when we sent in the scripts for both of them we have sent them in for a 90 day supply.  Each script was for 180 tablets since she takes both 2 x daily

## 2023-03-08 ENCOUNTER — CARE COORDINATION (OUTPATIENT)
Dept: CARE COORDINATION | Age: 73
End: 2023-03-08

## 2023-03-08 NOTE — CARE COORDINATION
3/8/2023- 1:09 pm  Left HIPAA compliant voice message requesting return call @ [phone] re: ACM F/U call.

## 2023-03-10 RX ORDER — PSEUDOEPHEDRINE HCL 30 MG
100 TABLET ORAL 2 TIMES DAILY
Qty: 90 CAPSULE | Refills: 1 | Status: SHIPPED | OUTPATIENT
Start: 2023-03-10

## 2023-03-10 RX ORDER — MAGNESIUM OXIDE 400 MG/1
400 TABLET ORAL DAILY
Qty: 90 TABLET | Refills: 3 | Status: SHIPPED | OUTPATIENT
Start: 2023-03-10

## 2023-03-10 NOTE — TELEPHONE ENCOUNTER
Penelope called requesting a refill of the below medication which has been pended for you:     Requested Prescriptions     Pending Prescriptions Disp Refills    docusate (COLACE, DULCOLAX) 100 MG CAPS 90 capsule 1     Sig: Take 100 mg by mouth 2 times daily    magnesium oxide (MAG-OX) 400 MG tablet 90 tablet 3     Sig: Take 1 tablet by mouth daily       Last Appointment Date: 2/13/2023  Next Appointment Date: 8/15/2023    No Known Allergies

## 2023-03-13 NOTE — TELEPHONE ENCOUNTER
Radha Montanez called requesting a refill of the below medication which has been pended for you:     Requested Prescriptions     Pending Prescriptions Disp Refills    CONTOUR NEXT TEST strip [Pharmacy Med Name: CONTOUR NEXT TEST STRIPS 100S] 400 strip 3     Sig: USE TO TEST FOUR TIMES DAILY       Last Appointment Date: 2/13/2023  Next Appointment Date: 8/15/2023    No Known Allergies

## 2023-03-14 ENCOUNTER — CARE COORDINATION (OUTPATIENT)
Dept: CARE COORDINATION | Age: 73
End: 2023-03-14

## 2023-03-14 RX ORDER — LANCETS
EACH MISCELLANEOUS
Qty: 400 EACH | Refills: 3 | Status: SHIPPED | OUTPATIENT
Start: 2023-03-14

## 2023-03-14 RX ORDER — PERPHENAZINE 16 MG/1
TABLET, FILM COATED ORAL
Qty: 400 STRIP | Refills: 3 | Status: SHIPPED | OUTPATIENT
Start: 2023-03-14

## 2023-03-14 NOTE — CARE COORDINATION
Ambulatory Care Coordination Note  3/14/2023    Patient Current Location:  Home: [address] [address]     ACM contacted the patient by telephone. Challenges to be reviewed by the provider   Additional needs identified to be addressed with provider: No  none               Method of communication with provider: none. ACM: GIOVANNY Lindquist Iraj was referred to [facility] from report. She has:          COPD, LEANDRO- CPAP, on medications and follows with pulmonology                          CHF, CAD, HTN, Hyperlipidemia- on medications and follows with cardiology                          DM- Hgb A1c on 2/8/2023 was 6.4- on medications and follows with PCP                          Colon polyp- following with surgeon     Plan of Care : Continue assessments, education and support                          SDOH completed                          Declined RPM                          Medications- declined reviewed                          COPD, DM, and CHF [facility]. decision maker is up to date and ACP on file                          Reviewed clinic, Urgent care and does not use My Chart                          Care Gaps                          Nutrition                          Apt surgeon 2/13/2023 completed- Colonoscopy 4/4/2023                          Apt ortho- chele shoulder pain 2/27/2023. H/O left shoulder replacement                          Apt PCP 8/15/2023                          Apt cardiology 5/24/2023                          Apt pulmonology 11/8/2023                          F/U on BS, wts, and BP                          F/U on dental apt- she has list of providers from [facility] to call apt.                           3/14/2023- 3:18 pm spoke with Beck Galo. She has a nurse that stops in daily 6 days a week. No concerns with vitals. She sets up medications. She is scheduled for colonoscopy.                           Offered patient enrollment in the Remote Patient Monitoring (RPM) program for in-home monitoring: Patient declined. Lab Results       None            Care Coordination Interventions    Referral from Primary Care Provider: No  Suggested Interventions and Community Resources  Home Health Services: Completed  Social Work: Completed  Transportation Support: Completed  Zone Management Tools: In Process (Comment: CHF, COPD, DM)  Other Services or Interventions: Works with [facility]          Goals Addressed                   This Visit's Progress     Conditions and Symptoms   On track     I will schedule office visits, as directed by my provider. I will keep my appointment or reschedule if I have to cancel. I will notify my provider of any barriers to my plan of care. I will follow my Zone Management tool to seek urgent or emergent care. I will notify my provider of any symptoms that indicate a worsening of my condition.     Barriers: lack of support and lack of education  Plan for overcoming my barriers: Care Coordination Intervention   Confidence: 10/10  Anticipated Goal Completion Date: 4/30/2023                Future Appointments   Date Time Provider Portia Nascimento   5/24/2023  1:30 PM DAVID Bland - CNP DCARDIO [facility]   8/15/2023  3:00 PM MD AMAIRANI Hernandez [facility]   11/8/2023  1:15 PM Charmayne Nodal, DO DPULM [facility]

## 2023-03-14 NOTE — TELEPHONE ENCOUNTER
Patient called in requesting a refill on her lancets sent to [facility] in [address] [place] [address] approved

## 2023-03-16 ENCOUNTER — TELEPHONE (OUTPATIENT)
Dept: INTERNAL MEDICINE | Age: 73
End: 2023-03-16

## 2023-03-16 DIAGNOSIS — G89.29 CHRONIC BILATERAL LOW BACK PAIN WITHOUT SCIATICA: Primary | ICD-10-CM

## 2023-03-16 DIAGNOSIS — M54.50 CHRONIC BILATERAL LOW BACK PAIN WITHOUT SCIATICA: Primary | ICD-10-CM

## 2023-03-16 RX ORDER — OXYCODONE AND ACETAMINOPHEN 10; 325 MG/1; MG/1
1 TABLET ORAL EVERY 8 HOURS PRN
Qty: 90 TABLET | Refills: 0 | Status: SHIPPED | OUTPATIENT
Start: 2023-03-16 | End: 2023-04-15

## 2023-03-16 NOTE — TELEPHONE ENCOUNTER
Oaars checked and it shows she received Percocet 5/325mg #120 tablets on 2/27/2023. We told her to take 2 tablets 3 x daily to use up what was sent in. Today is day 25 and she will be out of it on Saturday. .    Script is pended for the 10/325mg 1 tablet every 8 hours. Med list updated.     Script pended

## 2023-03-21 ENCOUNTER — TELEPHONE (OUTPATIENT)
Dept: INTERNAL MEDICINE | Age: 73
End: 2023-03-21

## 2023-03-21 NOTE — TELEPHONE ENCOUNTER
Patient would like to speak to [employer] [place] regarding her medications.   Please call her at your convenience [phone]

## 2023-04-04 ENCOUNTER — ANESTHESIA EVENT (OUTPATIENT)
Dept: OPERATING ROOM | Age: 73
End: 2023-04-04
Payer: MEDICARE

## 2023-04-04 ENCOUNTER — HOSPITAL ENCOUNTER (OUTPATIENT)
Age: 73
Setting detail: OUTPATIENT SURGERY
Discharge: HOME | End: 2023-04-04
Attending: SURGERY | Admitting: SURGERY
Payer: MEDICARE

## 2023-04-04 ENCOUNTER — ANESTHESIA (OUTPATIENT)
Dept: OPERATING ROOM | Age: 73
End: 2023-04-04
Payer: MEDICARE

## 2023-04-04 VITALS
BODY MASS INDEX: 42.17 KG/M2 | SYSTOLIC BLOOD PRESSURE: 114 MMHG | DIASTOLIC BLOOD PRESSURE: 60 MMHG | WEIGHT: 247 LBS | HEIGHT: 64 IN | RESPIRATION RATE: 16 BRPM | HEART RATE: 55 BPM | TEMPERATURE: 97.7 F | OXYGEN SATURATION: 100 %

## 2023-04-04 DIAGNOSIS — K63.5 POLYP OF DESCENDING COLON, UNSPECIFIED TYPE: ICD-10-CM

## 2023-04-04 LAB — GLUCOSE BLD-MCNC: 90 MG/DL (ref 65–105)

## 2023-04-04 PROCEDURE — 3700000000 HC ANESTHESIA ATTENDED CARE: Performed by: SURGERY

## 2023-04-04 PROCEDURE — 6360000002 HC RX W HCPCS: Performed by: NURSE ANESTHETIST, CERTIFIED REGISTERED

## 2023-04-04 PROCEDURE — 88305 TISSUE EXAM BY PATHOLOGIST: CPT

## 2023-04-04 PROCEDURE — 2580000003 HC RX 258: Performed by: SURGERY

## 2023-04-04 PROCEDURE — 3609010600 HC COLONOSCOPY POLYPECTOMY SNARE/COLD BIOPSY: Performed by: SURGERY

## 2023-04-04 PROCEDURE — 2709999900 HC NON-CHARGEABLE SUPPLY: Performed by: SURGERY

## 2023-04-04 PROCEDURE — 7100000010 HC PHASE II RECOVERY - FIRST 15 MIN: Performed by: SURGERY

## 2023-04-04 PROCEDURE — 82947 ASSAY GLUCOSE BLOOD QUANT: CPT

## 2023-04-04 PROCEDURE — 7100000011 HC PHASE II RECOVERY - ADDTL 15 MIN: Performed by: SURGERY

## 2023-04-04 PROCEDURE — 3700000001 HC ADD 15 MINUTES (ANESTHESIA): Performed by: SURGERY

## 2023-04-04 RX ORDER — SODIUM CHLORIDE, SODIUM LACTATE, POTASSIUM CHLORIDE, CALCIUM CHLORIDE 600; 310; 30; 20 MG/100ML; MG/100ML; MG/100ML; MG/100ML
INJECTION, SOLUTION INTRAVENOUS CONTINUOUS
Status: DISCONTINUED | OUTPATIENT
Start: 2023-04-04 | End: 2023-04-04 | Stop reason: HOSPADM

## 2023-04-04 RX ORDER — PROPOFOL 10 MG/ML
INJECTION, EMULSION INTRAVENOUS PRN
Status: DISCONTINUED | OUTPATIENT
Start: 2023-04-04 | End: 2023-04-04 | Stop reason: SDUPTHER

## 2023-04-04 RX ADMIN — PROPOFOL 250 MG: 10 INJECTION, EMULSION INTRAVENOUS at 12:58

## 2023-04-04 RX ADMIN — SODIUM CHLORIDE, POTASSIUM CHLORIDE, SODIUM LACTATE AND CALCIUM CHLORIDE: 600; 310; 30; 20 INJECTION, SOLUTION INTRAVENOUS at 12:51

## 2023-04-04 ASSESSMENT — COPD QUESTIONNAIRES: CAT_SEVERITY: MODERATE

## 2023-04-04 ASSESSMENT — PAIN SCALES - GENERAL
PAINLEVEL_OUTOF10: 0

## 2023-04-04 ASSESSMENT — PAIN - FUNCTIONAL ASSESSMENT: PAIN_FUNCTIONAL_ASSESSMENT: 0-10

## 2023-04-04 NOTE — DISCHARGE INSTRUCTIONS
1. Dr. Yvonne Randhawa office will call you in 7 - 10 days with results and further recommendations. DISCHARGE INSTRUCTIONS FOLLOWING COLONOSCOPY    Activity  You have received sedation. Your judgement and coordination may be impaired. Do not drive or operate any machinery today. Do not make personal, medical, legal or business decisions today. Do not consume alcohol, tranquilizers or sleeping medications today unless otherwise instructed by your physician. Rest today. You may resume normal activity tomorrow. Because air is put into your colon during this procedure, it is normal to pass large amounts of air from your rectum. Feelings of bloating, gas or cramping may persist for 24 hours. You may not have a bowel movement for 1-3 days after this procedure. Diet  Begin with sips of clear liquids and if no nausea, you may progress to your normal diet. Medications  You may resume your usual medications, unless otherwise instructed. Follow-Up  As instructed. CALL YOUR PHYSICIAN if you experience any of the following:  Bleeding from your rectum (a teaspoonful or more)      - If you had a biopsy taken today, a small amount of bleeding may occur. Severe abdominal pain/cramping and/or distention that is not relieved by passing gas. Persistent nausea or vomiting. Signs of infection including fever, chills, redness or swelling @ the IV site.       If you have questions or problems call [phone] [facility]) or after business hours call [phone] [facility])

## 2023-04-04 NOTE — OP NOTE
COLONOSCOPY PROCEDURE NOTE:      Pre op diagnosis:         Hx of colon polyp on CS (at [facility]- had rectal bleeding and had CS. Had polyp   that    could not be removed due to pt being on plavix dec 2022,    Operative Procedure:    1. Colonoscopy with cold forceps and hot snare    Surgeon:    Dr. Marsha Mcdermott     Anesthesia:    IV sedation per CRNA    EBL:  minimal    Procedure:    Patient was taken to the endoscopy suite and placed in a left lateral decubitus position. They were given IV sedation as mentioned above. A digital rectal exam was performed. There were no masses and anal tone was normal.  The colonoscope was inserted into the rectum and carefully manipulated up through the sigmoid colon, transverse colon, right colon and into the cecum. The cecum was identified by the ileal cecal valve and transabdominal illumination. Then the scope was slowly withdrawn. The scope was retroflexed in the rectum and the dentate line was examined. The scope was removed. The patient tolerated the procedure well. The following findings were noted.         Final Diagnosis:    1.5 cm sessile polyp at 55 cm , bx with cold forceps and removed with hot snare    Plan:    Await bx

## 2023-04-04 NOTE — H&P
each 3    fluticasone (FLONASE) 50 MCG/ACT nasal spray USE 2 SPRAYS NASALLY EVERY DAY 48 g 3    Insulin Syringe-Needle U-100 (DROPLET INSULIN SYRINGE) 31G X 5/16\" 0.5 ML MISC USE TO INJECT INTO THE SKIN TWO TIMES DAILY 200 each 3    DROPLET PEN NEEDLES 31G X 8 MM MISC USE  FOR  INJECTIONS TWICE DAILY 200 each 3    insulin glargine (LANTUS) 100 UNIT/ML injection vial INJECT 15 UNITS INTO THE SKIN TWO TIMES DAILY (DISCARD AND BEGIN A NEW VIAL AFTER 28 DAYS) 90 mL 3    vitamin D3 (CHOLECALCIFEROL) 25 MCG (1000 UT) TABS tablet TAKE 3 TABLETS BY MOUTH TWICE DAILY 540 tablet 3    calcium carbonate-vitamin D3 (CALCIUM 600+D3) 600-400 MG-UNIT TABS per tab TAKE 1 TABLET BY MOUTH TWICE DAILY 180 tablet 3    Microlet Lancets MISC USE TO TEST 4 TIMES A DAY. 400 each 3    magnesium oxide (MAG-OX) 400 MG tablet Take 1 tablet by mouth daily 90 tablet 3    atorvastatin (LIPITOR) 40 MG tablet TAKE 1 TABLET EVERY DAY.  90 tablet 3    NOVOLOG FLEXPEN 100 UNIT/ML injection pen INJECT 4 UNITS AT LUNCH AND  6 UNITS AT SUPPER (EACH PEN EXPIRES 28 DAYS AFTER 1ST USE) 15 mL 3    pantoprazole (PROTONIX) 40 MG tablet TAKE 1 TABLET EVERY DAY 90 tablet 3    furosemide (LASIX) 40 MG tablet TAKE 1 TABLET EVERY DAY 90 tablet 3    amitriptyline (ELAVIL) 25 MG tablet TAKE 1 TABLET AT BEDTIME 90 tablet 3    citalopram (CELEXA) 10 MG tablet TAKE 1 TABLET EVERY DAY 90 tablet 3    buPROPion (WELLBUTRIN) 75 MG tablet TAKE 1 TABLET TWICE DAILY 180 tablet 3    clopidogrel (PLAVIX) 75 MG tablet TAKE 1 TABLET EVERY DAY 90 tablet 3    albuterol sulfate HFA (PROVENTIL;VENTOLIN;PROAIR) 108 (90 Base) MCG/ACT inhaler Inhale 2 puffs into the lungs 4 times daily 54 g 3    albuterol (PROVENTIL) (2.5 MG/3ML) 0.083% nebulizer solution Take 3 mLs by nebulization every 6 hours as needed for Wheezing 120 each 3    triamcinolone (KENALOG) 0.1 % cream APPLY TOPICALLY TWICE DAILY 15 g 5    empagliflozin (JARDIANCE) 10 MG tablet Take 1 tablet by mouth daily 90 tablet 3

## 2023-04-04 NOTE — ANESTHESIA PRE PROCEDURE
mouth daily    Historical Provider, MD   B Complex-C (SUPER B COMPLEX PO) Take 1 tablet by mouth daily     Historical Provider, MD   travoprost, benzalkonium, (TRAVATAN) 0.004 % ophthalmic solution Place 1 drop into both eyes daily 10/7/16   Lance Bess MD   CPAP Machine MISC by Does not apply route Pressure of 13 (AirSense 10)  [facility].    Historical Provider, MD   Multiple Vitamin (MULTI VITAMIN DAILY) TABS Take by mouth daily    Historical Provider, MD       Current medications:    No current facility-administered medications for this encounter. Allergies:  No Known Allergies    Problem List:    Patient Active Problem List   Diagnosis Code    Obstructive sleep apnea G47.33    Chronic fatigue R53.82    Coronary artery disease due to lipid rich plaque I25.10, I25.83    Vitamin D deficiency E55.9    Type 2 diabetes mellitus with diabetic polyneuropathy, with long-term current use of insulin ([facility]) E11.42, Z79.4    Peripheral polyneuropathy G62.9    Bilateral leg edema R60.0    Right hip pain M25.551    Muscle ache M79.10    Hx of Bell's palsy Z86.69    Memory problem R41.3    Gait abnormality R26.9    Balance problem R26.89    H/O falling Z91.81    Dizziness R42    Intractable episodic tension-type headache G44. 211    Essential tremor G25.0    Anxiety and depression F41.9, F32. A    Elevated hemoglobin A1c R73.09    VBI (vertebrobasilar insufficiency) G45.0    Chronic cerebral ischemia I67.82    TIA (transient ischemic attack) G45.9    Chronic tension headache G44.229    Morbid obesity with BMI of 45.0-49.9, adult ([facility]) E66.01, Z68.42    Chronic right-sided low back pain with right-sided sciatica M54.41, G89.29    Lumbar radiculopathy M54.16    Encounter for chronic pain management G89.29    Carotid stenosis, asymptomatic, bilateral I65.23    Chronic tension-type headache, not intractable G44.229    Lumbosacral spondylosis without myelopathy M47.817    DDD (degenerative

## 2023-04-04 NOTE — ANESTHESIA POSTPROCEDURE EVALUATION
Department of Anesthesiology  Postprocedure Note    Patient: Carissa Guido  MRN: [id number]  [place]: 1950  Date of evaluation: 4/4/2023      Procedure Summary     Date: 04/04/23 Room / Location: [address]    Anesthesia Start: 0229 Anesthesia Stop:     Procedure: COLONOSCOPY WITH POLYPECTOMY Diagnosis:       Polyp of descending colon, unspecified type      (Polyp of descending colon, unspecified type [K63.5])    Surgeons: Estelle Post MD Responsible Provider: DAVID Jerez CRNA    Anesthesia Type: MAC ASA Status: 3          Anesthesia Type: No value filed.     Vicky Phase I: Vicky Score: 10    Vicky Phase II:        Anesthesia Post Evaluation    Patient location during evaluation: bedside  Patient participation: waiting for patient participation  Level of consciousness: obtunded/minimal responses  Airway patency: patent  Nausea & Vomiting: no nausea and no vomiting  Complications: no  Cardiovascular status: blood pressure returned to baseline  Respiratory status: acceptable  Hydration status: euvolemic

## 2023-04-05 ENCOUNTER — CARE COORDINATION (OUTPATIENT)
Dept: CARE COORDINATION | Age: 73
End: 2023-04-05

## 2023-04-05 NOTE — CARE COORDINATION
medications          Goals Addressed    None         Future Appointments   Date Time Provider Portia Nascimento   5/24/2023  1:30 PM [facility]   8/15/2023  3:00 PM MD AMAIRANI Pineda [facility]   11/8/2023  1:15 PM [facility]

## 2023-04-06 LAB — SURGICAL PATHOLOGY REPORT: NORMAL

## 2023-04-07 ENCOUNTER — CARE COORDINATION (OUTPATIENT)
Dept: CARE COORDINATION | Age: 73
End: 2023-04-07

## 2023-04-07 NOTE — CARE COORDINATION
Ambulatory Care Coordination Note  4/7/2023    Patient Current Location:  Home: [address] [address]     ACM contacted the patient by telephone. ACM: Ernie Recio RN    Julienkeyona Dotson was referred to [employer] [facility] from report. She has:          COPD, LEANDRO- CPAP, on medications and follows with pulmonology                          CHF, CAD, HTN, Hyperlipidemia- on medications and follows with cardiology                          DM- Hgb A1c on 2/8/2023 was 6.4- on medications and follows with PCP                          Colon polyp- following with surgeon     Plan of Care : Continue assessments, education and support                          SDOH completed                          Declined RPM                          Medications- declined reviewed                          COPD, DM, and CHF [facility]. decision maker is up to date and ACP on file                          Reviewed clinic, Urgent care and does not use My Chart                          Care Gaps                          Nutrition                          Apt surgeon 2/13/2023 completed- Colonoscopy 4/4/2023 with biopsy                          Apt ortho- chele shoulder pain 2/27/2023. H/O left shoulder replacement                          Apt PCP 8/15/2023                          Apt cardiology 5/24/2023                          Apt pulmonology 11/8/2023                          F/U on BS, wts, and BP                          F/U on dental apt- she has list of providers from [facility] to call apt.     4/7/2023- 11:24 am spoke with Haylie Dotson. She is doing well after colonoscopy and biopsy. She will await biopsy results. Offered patient enrollment in the Remote Patient Monitoring (RPM) program for in-home monitoring: Patient declined.     Lab Results       None            Care Coordination Interventions    Referral from Primary Care Provider: No  Suggested Interventions and [facility]

## 2023-04-10 ENCOUNTER — TELEPHONE (OUTPATIENT)
Dept: SURGERY | Age: 73
End: 2023-04-10

## 2023-04-19 NOTE — TELEPHONE ENCOUNTER
Left message on patient's voicemail requesting a call back to review results from . [facility] clinic and [address] clinic numbers provided on voicemail for patient to call back.

## 2023-04-21 ENCOUNTER — CARE COORDINATION (OUTPATIENT)
Dept: CARE COORDINATION | Age: 73
End: 2023-04-21

## 2023-04-21 NOTE — CARE COORDINATION
4/21/2023- 2:07 pm  Left HIPAA compliant voice message requesting return call @ [phone] re: ACM F/U call.      Future Appointments   Date Time Provider Portia Nascimento   5/24/2023  1:30 PM [facility]   8/15/2023  3:00 PM MD AMAIRANI Spicer [facility]   11/8/2023  1:15 PM DO JUSTICE Garcia [facility]

## 2023-04-26 ENCOUNTER — CARE COORDINATION (OUTPATIENT)
Dept: CARE COORDINATION | Age: 73
End: 2023-04-26

## 2023-04-26 DIAGNOSIS — M79.671 BILATERAL FOOT PAIN: ICD-10-CM

## 2023-04-26 DIAGNOSIS — Z79.4 TYPE 2 DIABETES MELLITUS WITH DIABETIC POLYNEUROPATHY, WITH LONG-TERM CURRENT USE OF INSULIN (HCC): ICD-10-CM

## 2023-04-26 DIAGNOSIS — E11.42 TYPE 2 DIABETES MELLITUS WITH DIABETIC POLYNEUROPATHY, WITH LONG-TERM CURRENT USE OF INSULIN (HCC): ICD-10-CM

## 2023-04-26 DIAGNOSIS — M79.672 BILATERAL FOOT PAIN: ICD-10-CM

## 2023-04-26 NOTE — CARE COORDINATION
4/26/2023- 11:30 am. Left HIPAA compliant voice message requesting return call @ [phone] re: ACM F/U call.      Future Appointments   Date Time Provider Portia Nascimento   5/24/2023  1:30 PM DAVID Dewey - CNP [facility]   8/15/2023  3:00 PM MD AMAIRANI Pineda [facility]   11/8/2023  1:15 PM [facility]

## 2023-04-27 RX ORDER — GABAPENTIN 600 MG/1
600 TABLET ORAL 3 TIMES DAILY
Qty: 270 TABLET | Refills: 1 | Status: SHIPPED | OUTPATIENT
Start: 2023-04-27 | End: 2023-05-27

## 2023-05-03 ENCOUNTER — CARE COORDINATION (OUTPATIENT)
Dept: CARE COORDINATION | Age: 73
End: 2023-05-03

## 2023-05-03 NOTE — CARE COORDINATION
5/3/2023- 9:48 am  Left HIPAA compliant voice message requesting return call @ [phone] re: ACM F/U call.      Future Appointments   Date Time Provider Portia Pily   5/24/2023  1:30 PM DAVID Feliz - CNP [facility]   8/15/2023  3:00 PM MD AMAIRANI Unger [facility]   11/8/2023  1:15 PM [facility]

## 2023-05-08 ENCOUNTER — CARE COORDINATION (OUTPATIENT)
Dept: CARE COORDINATION | Age: 73
End: 2023-05-08

## 2023-05-08 NOTE — CARE COORDINATION
5/8/2023- 11:10 am  Left HIPAA compliant voice message requesting return call @ [phone] re: ACM F/U call.      Future Appointments   Date Time Provider Portia Pily   5/24/2023  1:30 PM DAVID Lee - CNP [facility]   8/15/2023  3:00 PM MD AMAIRANI Limon [facility]   11/8/2023  1:15 PM DO JUSTICE Lopez [facility]

## 2023-05-10 ENCOUNTER — CARE COORDINATION (OUTPATIENT)
Dept: CARE COORDINATION | Age: 73
End: 2023-05-10

## 2023-05-10 NOTE — CARE COORDINATION
Ambulatory Care Coordination Note  5/11/2023    Patient Current Location:  Home: [address] [address]     ACM contacted the patient by telephone. ACM: Cecy Rodriguez RN      [facility] was [facility] from report. She has:          COPD, LEANDRO- CPAP, on medications and follows with pulmonology                          CHF, CAD, HTN, Hyperlipidemia- on medications and follows with cardiology                          DM- Hgb A1c on 2/8/2023 was 6.4- on medications and follows with PCP                          Colon polyp- following with surgeon     Plan of Care : F/U in one month- if appropriate graduate from ACM   Continue assessments, education and support                          SDOH completed                          Declined RPM                          Medications- declined reviewed                          COPD, DM, and CHF [facility]. decision maker is up to date and ACP on file                          Reviewed clinic, Urgent care and does not use My Chart                          Care Gaps                          Nutrition                          Apt surgeon 2/13/2023 completed- Colonoscopy 4/4/2023                          Apt ortho- chele shoulder pain 2/27/2023. H/O left shoulder replacement                          Apt PCP 8/15/2023                          Apt cardiology 5/24/2023                          Apt pulmonology 11/8/2023                          F/U on BS, wts, and BP                          F/U on literature mailed on CHF Initiative    5/10/2023- 4:04 pm  Left HIPAA compliant voice message requesting return call @ [phone] re: ACM and CHF Initiative call. [facility] returned call and LM that she is doing well and was at her daughter's today. 5/11/2023- 9:16 am  Left HIPAA compliant voice message requesting return call @ [phone] re: ACM and CHF Initiative call. 11:04 am spoke with [facility].  She has been helping out with

## 2023-05-12 DIAGNOSIS — E55.9 VITAMIN D DEFICIENCY: ICD-10-CM

## 2023-05-12 DIAGNOSIS — E11.42 TYPE 2 DIABETES MELLITUS WITH DIABETIC POLYNEUROPATHY, WITH LONG-TERM CURRENT USE OF INSULIN (HCC): ICD-10-CM

## 2023-05-12 DIAGNOSIS — Z79.4 TYPE 2 DIABETES MELLITUS WITH DIABETIC POLYNEUROPATHY, WITH LONG-TERM CURRENT USE OF INSULIN (HCC): ICD-10-CM

## 2023-05-12 DIAGNOSIS — G89.29 CHRONIC BILATERAL LOW BACK PAIN WITHOUT SCIATICA: ICD-10-CM

## 2023-05-12 DIAGNOSIS — M54.50 CHRONIC BILATERAL LOW BACK PAIN WITHOUT SCIATICA: ICD-10-CM

## 2023-05-12 RX ORDER — MELATONIN
Qty: 540 TABLET | Refills: 3 | Status: SHIPPED | OUTPATIENT
Start: 2023-05-12

## 2023-05-12 RX ORDER — MAGNESIUM OXIDE 400 MG/1
400 TABLET ORAL DAILY
Qty: 90 TABLET | Refills: 3 | Status: SHIPPED | OUTPATIENT
Start: 2023-05-12

## 2023-05-12 RX ORDER — OXYCODONE AND ACETAMINOPHEN 10; 325 MG/1; MG/1
1 TABLET ORAL EVERY 8 HOURS PRN
Qty: 90 TABLET | Refills: 0 | Status: SHIPPED | OUTPATIENT
Start: 2023-05-12 | End: 2023-06-11

## 2023-05-12 RX ORDER — GINGER ROOT/GINGER ROOT EXT 262.5 MG
1 CAPSULE ORAL DAILY
Qty: 60 TABLET | Refills: 5 | Status: SHIPPED | OUTPATIENT
Start: 2023-05-12

## 2023-05-12 RX ORDER — PEN NEEDLE, DIABETIC 31 GX5/16"
NEEDLE, DISPOSABLE MISCELLANEOUS
Qty: 200 EACH | Refills: 3 | Status: SHIPPED | OUTPATIENT
Start: 2023-05-12

## 2023-05-12 NOTE — TELEPHONE ENCOUNTER
Refill request     Medication pended if agreeable    Last Appt:  2/13/2023  Next Appt:   8/15/2023  Med verified in Epic

## 2023-05-12 NOTE — TELEPHONE ENCOUNTER
Last appt: 2/13/2023  Next appt: 8/15/2023    Patient is needing these three scripts sent to [facility] in [place] as she is not happy with [facility]. She is also wanting to know if she should be taking one or two tablets of her magnesium?    She is also needing a refill on the calcium carbonate vitamin D3 .

## 2023-05-15 DIAGNOSIS — M54.50 CHRONIC BILATERAL LOW BACK PAIN WITHOUT SCIATICA: ICD-10-CM

## 2023-05-15 DIAGNOSIS — G89.29 CHRONIC BILATERAL LOW BACK PAIN WITHOUT SCIATICA: ICD-10-CM

## 2023-05-15 RX ORDER — OXYCODONE AND ACETAMINOPHEN 10; 325 MG/1; MG/1
1 TABLET ORAL EVERY 8 HOURS PRN
Qty: 90 TABLET | Refills: 0 | Status: SHIPPED | OUTPATIENT
Start: 2023-05-15 | End: 2023-06-14

## 2023-05-15 NOTE — TELEPHONE ENCOUNTER
[facility] . Patient is asking for us to [facility] .

## 2023-05-18 DIAGNOSIS — E11.42 TYPE 2 DIABETES MELLITUS WITH DIABETIC POLYNEUROPATHY, WITH LONG-TERM CURRENT USE OF INSULIN (HCC): Primary | ICD-10-CM

## 2023-05-18 DIAGNOSIS — Z79.4 TYPE 2 DIABETES MELLITUS WITH DIABETIC POLYNEUROPATHY, WITH LONG-TERM CURRENT USE OF INSULIN (HCC): Primary | ICD-10-CM

## 2023-05-18 RX ORDER — BLOOD-GLUCOSE METER
1 KIT MISCELLANEOUS DAILY
Qty: 1 KIT | Refills: 0 | Status: SHIPPED | OUTPATIENT
Start: 2023-05-18

## 2023-05-18 RX ORDER — LANCETS
EACH MISCELLANEOUS
Qty: 400 EACH | Refills: 3 | Status: SHIPPED | OUTPATIENT
Start: 2023-05-18

## 2023-05-18 RX ORDER — PERPHENAZINE 16 MG/1
TABLET, FILM COATED ORAL
Qty: 400 STRIP | Refills: 3 | Status: SHIPPED | OUTPATIENT
Start: 2023-05-18 | End: 2023-05-18 | Stop reason: ALTCHOICE

## 2023-05-18 NOTE — TELEPHONE ENCOUNTER
Received fax from [facility] that the Contour Next Test Strips are no longer covered under her insurance. LM for patient to return call to let her know that we will need to order a new glucometer and test strips that her insurance will pay for.

## 2023-05-24 ENCOUNTER — OFFICE VISIT (OUTPATIENT)
Dept: CARDIOLOGY | Age: 73
End: 2023-05-24
Payer: MEDICARE

## 2023-05-24 VITALS
DIASTOLIC BLOOD PRESSURE: 62 MMHG | WEIGHT: 244 LBS | BODY MASS INDEX: 41.88 KG/M2 | SYSTOLIC BLOOD PRESSURE: 110 MMHG | HEART RATE: 58 BPM

## 2023-05-24 DIAGNOSIS — I50.32 CHRONIC DIASTOLIC CONGESTIVE HEART FAILURE (HCC): ICD-10-CM

## 2023-05-24 DIAGNOSIS — R94.39 ABNORMAL STRESS TEST: Primary | ICD-10-CM

## 2023-05-24 DIAGNOSIS — I25.118 CORONARY ARTERY DISEASE INVOLVING NATIVE HEART WITH OTHER FORM OF ANGINA PECTORIS, UNSPECIFIED VESSEL OR LESION TYPE (HCC): ICD-10-CM

## 2023-05-24 DIAGNOSIS — R06.09 DYSPNEA ON EXERTION: ICD-10-CM

## 2023-05-24 PROCEDURE — 99214 OFFICE O/P EST MOD 30 MIN: CPT | Performed by: NURSE PRACTITIONER

## 2023-05-24 PROCEDURE — G8427 DOCREV CUR MEDS BY ELIG CLIN: HCPCS | Performed by: NURSE PRACTITIONER

## 2023-05-24 PROCEDURE — 93010 ELECTROCARDIOGRAM REPORT: CPT | Performed by: NURSE PRACTITIONER

## 2023-05-24 PROCEDURE — G8400 PT W/DXA NO RESULTS DOC: HCPCS | Performed by: NURSE PRACTITIONER

## 2023-05-24 PROCEDURE — 3078F DIAST BP <80 MM HG: CPT | Performed by: NURSE PRACTITIONER

## 2023-05-24 PROCEDURE — 1036F TOBACCO NON-USER: CPT | Performed by: NURSE PRACTITIONER

## 2023-05-24 PROCEDURE — 3017F COLORECTAL CA SCREEN DOC REV: CPT | Performed by: NURSE PRACTITIONER

## 2023-05-24 PROCEDURE — 1090F PRES/ABSN URINE INCON ASSESS: CPT | Performed by: NURSE PRACTITIONER

## 2023-05-24 PROCEDURE — G8417 CALC BMI ABV UP PARAM F/U: HCPCS | Performed by: NURSE PRACTITIONER

## 2023-05-24 PROCEDURE — 1123F ACP DISCUSS/DSCN MKR DOCD: CPT | Performed by: NURSE PRACTITIONER

## 2023-05-24 PROCEDURE — 3074F SYST BP LT 130 MM HG: CPT | Performed by: NURSE PRACTITIONER

## 2023-05-24 PROCEDURE — 93005 ELECTROCARDIOGRAM TRACING: CPT | Performed by: NURSE PRACTITIONER

## 2023-05-24 NOTE — PROGRESS NOTES
TABLET EVERY DAY 8/30/22   Mariajose Armas MD   albuterol sulfate HFA (PROVENTIL;VENTOLIN;PROAIR) 108 (90 Base) MCG/ACT inhaler Inhale 2 puffs into the lungs 4 times daily 8/30/22   Barbara Padron MD   albuterol (PROVENTIL) (2.5 MG/3ML) 0.083% nebulizer solution Take 3 mLs by nebulization every 6 hours as needed for Wheezing 6/24/22   Mariajose Armas MD   triamcinolone (KENALOG) 0.1 % cream APPLY TOPICALLY TWICE DAILY 6/24/22   Mariajose Armas MD   metoprolol succinate (TOPROL XL) 25 MG extended release tablet TAKE 1/2 TABLET EVERY DAY 5/6/22   Mariajose Armas MD   senna (SENOKOT) 8.6 MG TABS tablet TAKE 1 TABLET BY MOUTH TWICE DAILY 2/17/22   Mariajose Armas MD   magnesium oxide (MAGNESIUM-OXIDE) 400 (241.3 Mg) MG TABS tablet Take 1 tablet by mouth 2 times daily 10/15/21   Mariajose Armas MD   Misc. Devices MISC It is in my medical opinion that Del Adam be allowed to have a kitten in her apartment at all times for companionship to help with her Depression, Anxiety, Fibromyalgia and Osteoarthritis.  8/17/21   Mariajose Armas MD   dorzolamide (TRUSOPT) 2 % ophthalmic solution Apply to eye 3 times daily 7/19/21   Historical Provider, MD   pyridoxine (B-6) 100 MG tablet Take 0.5 tablets by mouth daily    Historical Provider, MD   Biotin 02843 MCG TABS Take by mouth    Historical Provider, MD   Respiratory Therapy Supplies [facility] CPAP supplies 8/13/20   Mariajose Armas MD   nitroGLYCERIN (NITROSTAT) 0.4 MG SL tablet PLACE 1 TABLET UNER THE TONGUE EVERY 5 MINUTES AS NEEDED FOR CHEST PAIN AT FIRST SIGN OF CHEST PAIN. 5/14/20   Mariajose Armas MD   VITAMIN A PO Take 2,400 mcg by mouth daily    Historical Provider, MD   vitamin E 400 UNIT capsule Take 400 Units by mouth daily    Historical Provider, MD   COMBIGAN 0.2-0.5 % ophthalmic solution PLACE 1 DROP INTO BOTH EYES DAILY 1/2/18   Mariajose Armas MD   Compression Stockings MISC by Does not apply route 15-20mmHg  Knee high  Open tow  With assist

## 2023-05-28 DIAGNOSIS — G89.29 CHRONIC BILATERAL LOW BACK PAIN WITHOUT SCIATICA: ICD-10-CM

## 2023-05-28 DIAGNOSIS — B36.9 FUNGAL SKIN INFECTION: ICD-10-CM

## 2023-05-28 DIAGNOSIS — M54.50 CHRONIC BILATERAL LOW BACK PAIN WITHOUT SCIATICA: ICD-10-CM

## 2023-05-30 ENCOUNTER — TELEPHONE (OUTPATIENT)
Dept: INTERNAL MEDICINE | Age: 73
End: 2023-05-30

## 2023-05-30 DIAGNOSIS — M79.671 BILATERAL FOOT PAIN: ICD-10-CM

## 2023-05-30 DIAGNOSIS — E11.42 TYPE 2 DIABETES MELLITUS WITH DIABETIC POLYNEUROPATHY, WITH LONG-TERM CURRENT USE OF INSULIN (HCC): ICD-10-CM

## 2023-05-30 DIAGNOSIS — M79.672 BILATERAL FOOT PAIN: ICD-10-CM

## 2023-05-30 DIAGNOSIS — Z79.4 TYPE 2 DIABETES MELLITUS WITH DIABETIC POLYNEUROPATHY, WITH LONG-TERM CURRENT USE OF INSULIN (HCC): ICD-10-CM

## 2023-05-30 RX ORDER — GABAPENTIN 600 MG/1
600 TABLET ORAL 3 TIMES DAILY
Qty: 270 TABLET | Refills: 1 | Status: SHIPPED | OUTPATIENT
Start: 2023-05-30 | End: 2023-06-29

## 2023-05-30 RX ORDER — OXYCODONE AND ACETAMINOPHEN 10; 325 MG/1; MG/1
TABLET ORAL
Qty: 90 TABLET | Refills: 0 | Status: SHIPPED | OUTPATIENT
Start: 2023-05-30 | End: 2023-06-29

## 2023-05-30 RX ORDER — CLOTRIMAZOLE AND BETAMETHASONE DIPROPIONATE 10; .64 MG/G; MG/G
CREAM TOPICAL
Qty: 15 G | Refills: 5 | Status: SHIPPED | OUTPATIENT
Start: 2023-05-30

## 2023-05-30 NOTE — TELEPHONE ENCOUNTER
Rosary Lesches called requesting a refill of the below medication which has been pended for you:     Requested Prescriptions     Pending Prescriptions Disp Refills    gabapentin (NEURONTIN) 600 MG tablet 270 tablet 1     Sig: Take 1 tablet by mouth 3 times daily for 30 days.        Last Appointment Date: 2/13/2023  Next Appointment Date: 8/15/2023    No Known Allergies

## 2023-05-30 NOTE — TELEPHONE ENCOUNTER
Patient called regarding alcohol prep pads she received and a letter from [facility] [employer] [facility] that says they will no longer cover meter/supplies. See encounter from 5-8-23 which I told patient about and she states she never called requesting any of that. Would like Dewey to call her at her convenience. I told her it probably would not be today.

## 2023-05-30 NOTE — TELEPHONE ENCOUNTER
Gildardo Ramirez called requesting a refill of the below medication which has been pended for you:     Requested Prescriptions     Pending Prescriptions Disp Refills    oxyCODONE-acetaminophen (PERCOCET)  MG per tablet [Pharmacy Med Name: OXYCODONE W/APAP 10/325 TAB] 90 tablet      Sig: take 1 tablet by mouth every 8 hours AS NEEDED FOR PAIN MAXIMUM DAILY DOSE OF 3 tablets    clotrimazole-betamethasone (Jaciel Rommel) 1-0.05 % cream [Pharmacy Med Name: Wendy Deal 15 g      Sig: apply to affected area twice a day       Last Appointment Date: 2/13/2023  Next Appointment Date: 8/15/2023    No Known Allergies

## 2023-05-31 ENCOUNTER — TELEPHONE (OUTPATIENT)
Dept: INTERNAL MEDICINE | Age: 73
End: 2023-05-31

## 2023-05-31 NOTE — TELEPHONE ENCOUNTER
[facility] Mckeon called. They received script for percocet but she is not due for another 15 days. They will need a new script for the percocet dated 15 days out if possible.   Ay questions call [phone]

## 2023-06-01 NOTE — TELEPHONE ENCOUNTER
Patient did not ask for the free style testing supplies. She uses the contour testing supplies. Along with she did not ask for alcohol wipes. I advised her that I will cancel the freestyle diabetic supplies on our end and I also advised her to contact her local pharmacy along with mail order to make sure they do not fill anything freestyle.     Patient voiced understanding

## 2023-06-05 ENCOUNTER — TELEPHONE (OUTPATIENT)
Dept: INTERNAL MEDICINE | Age: 73
End: 2023-06-05

## 2023-06-05 RX ORDER — AMOXICILLIN 500 MG/1
CAPSULE ORAL
COMMUNITY
Start: 2023-05-27 | End: 2023-06-06 | Stop reason: SDUPTHER

## 2023-06-05 NOTE — TELEPHONE ENCOUNTER
Patient was in [place] ER last weekend. Was given Amoxicillin for infection in [facility]. See 06-01-23 verbal order from Jody. Patient thinks she needs a refill as she still is not completely better. Can you send a refill for it to [facility]?   Let her know [phone]

## 2023-06-05 NOTE — TELEPHONE ENCOUNTER
This is a 89-year-old female patient accompanied by her daughter    Complaining of dysuria    She was able to give a urine sample for urine test and possible culture    Also she complains of itching worse at night especially along her legs    I advised her to use a moisturizer in her legs    And also suspect she may be having dryness in the vaginal area to use moisturizer in private area also to help with the dryness and the dysuria    I prescribed her hydroxyzine 10 mg tablets advised her to start taking 5 mg before going to bed to help with the itching and uncomfortable sleep    Continue with the rest of the medication   claire faxed requesting a refill on her Anora Ellipta inhaler.     Script pended if approved

## 2023-06-06 RX ORDER — AMOXICILLIN 500 MG/1
CAPSULE ORAL
Qty: 14 CAPSULE | Refills: 0 | Status: SHIPPED | OUTPATIENT
Start: 2023-06-06 | End: 2023-06-07 | Stop reason: ALTCHOICE

## 2023-06-07 ENCOUNTER — TELEPHONE (OUTPATIENT)
Dept: INTERNAL MEDICINE | Age: 73
End: 2023-06-07
Payer: MEDICARE

## 2023-06-07 DIAGNOSIS — K74.60 HEPATIC CIRRHOSIS, UNSPECIFIED HEPATIC CIRRHOSIS TYPE, UNSPECIFIED WHETHER ASCITES PRESENT (HCC): ICD-10-CM

## 2023-06-07 DIAGNOSIS — I70.90 ATHEROSCLEROSIS: ICD-10-CM

## 2023-06-07 DIAGNOSIS — I49.9 CARDIAC ARRHYTHMIA, UNSPECIFIED CARDIAC ARRHYTHMIA TYPE: Primary | ICD-10-CM

## 2023-06-07 DIAGNOSIS — I35.0 NONRHEUMATIC AORTIC VALVE STENOSIS: ICD-10-CM

## 2023-06-07 PROCEDURE — G0180 MD CERTIFICATION HHA PATIENT: HCPCS | Performed by: INTERNAL MEDICINE

## 2023-06-07 RX ORDER — CYANOCOBALAMIN (VITAMIN B-12) 2500 MCG
TABLET, SUBLINGUAL SUBLINGUAL
COMMUNITY

## 2023-06-07 NOTE — TELEPHONE ENCOUNTER
Last appt: 2/13/2023  Next appt: 8/15/2023    [facility] Care certification plan of care   [facility] [birth year]70 to 00/86/50

## 2023-06-08 ENCOUNTER — CARE COORDINATION (OUTPATIENT)
Dept: CARE COORDINATION | Age: 73
End: 2023-06-08

## 2023-06-08 RX ORDER — OXYCODONE AND ACETAMINOPHEN 10; 325 MG/1; MG/1
1 TABLET ORAL EVERY 8 HOURS PRN
Qty: 90 TABLET | Refills: 0 | OUTPATIENT
Start: 2023-06-08 | End: 2023-07-08

## 2023-06-08 NOTE — TELEPHONE ENCOUNTER
[address] faxed requesting a refill on patients medication.   Script is pended if approved    Next appt 8/15/23

## 2023-06-08 NOTE — CARE COORDINATION
Time Provider Portia Nascimento   8/15/2023  3:00 PM Yarelis Llamas MD [facility]   11/8/2023  1:15 PM Rachael Gould DO [facility]   12/13/2023  2:00 PM Sherri Reilly MD [facility]

## 2023-06-20 DIAGNOSIS — E11.42 TYPE 2 DIABETES MELLITUS WITH DIABETIC POLYNEUROPATHY, WITH LONG-TERM CURRENT USE OF INSULIN (HCC): Primary | ICD-10-CM

## 2023-06-20 DIAGNOSIS — Z79.4 TYPE 2 DIABETES MELLITUS WITH DIABETIC POLYNEUROPATHY, WITH LONG-TERM CURRENT USE OF INSULIN (HCC): Primary | ICD-10-CM

## 2023-06-20 RX ORDER — LANCETS
EACH MISCELLANEOUS
Qty: 400 EACH | Refills: 3 | Status: SHIPPED | OUTPATIENT
Start: 2023-06-20 | End: 2023-06-20 | Stop reason: SDUPTHER

## 2023-06-20 RX ORDER — PERPHENAZINE 16 MG/1
TABLET, FILM COATED ORAL
Qty: 400 STRIP | Refills: 3 | Status: SHIPPED | OUTPATIENT
Start: 2023-06-20 | End: 2023-06-20 | Stop reason: SDUPTHER

## 2023-06-20 RX ORDER — PERPHENAZINE 16 MG/1
TABLET, FILM COATED ORAL
Qty: 400 STRIP | Refills: 3 | Status: SHIPPED | OUTPATIENT
Start: 2023-06-20 | End: 2023-06-23 | Stop reason: SDUPTHER

## 2023-06-20 RX ORDER — LANCETS
EACH MISCELLANEOUS
Qty: 400 EACH | Refills: 3 | Status: SHIPPED | OUTPATIENT
Start: 2023-06-20 | End: 2023-06-23 | Stop reason: SDUPTHER

## 2023-06-20 NOTE — TELEPHONE ENCOUNTER
[facility] and said [facility] from our office for test strips and lancets stating she tests 4 time a day. Medicare/medicaid won't pay until they have this. She said they told her they were faxing something to you regarding this.

## 2023-06-23 ENCOUNTER — TELEPHONE (OUTPATIENT)
Dept: INTERNAL MEDICINE | Age: 73
End: 2023-06-23

## 2023-06-23 DIAGNOSIS — I48.92 ATRIAL FLUTTER, UNSPECIFIED TYPE (HCC): ICD-10-CM

## 2023-06-23 DIAGNOSIS — E11.42 TYPE 2 DIABETES MELLITUS WITH DIABETIC POLYNEUROPATHY, WITH LONG-TERM CURRENT USE OF INSULIN (HCC): ICD-10-CM

## 2023-06-23 DIAGNOSIS — I65.23 CAROTID STENOSIS, ASYMPTOMATIC, BILATERAL: ICD-10-CM

## 2023-06-23 DIAGNOSIS — F32.A ANXIETY AND DEPRESSION: ICD-10-CM

## 2023-06-23 DIAGNOSIS — F41.9 ANXIETY AND DEPRESSION: ICD-10-CM

## 2023-06-23 DIAGNOSIS — K74.60 LIVER CIRRHOSIS SECONDARY TO NASH (HCC): ICD-10-CM

## 2023-06-23 DIAGNOSIS — K74.60 HEPATIC CIRRHOSIS, UNSPECIFIED HEPATIC CIRRHOSIS TYPE, UNSPECIFIED WHETHER ASCITES PRESENT (HCC): ICD-10-CM

## 2023-06-23 DIAGNOSIS — I25.10 CORONARY ARTERY DISEASE DUE TO LIPID RICH PLAQUE: ICD-10-CM

## 2023-06-23 DIAGNOSIS — K75.81 LIVER CIRRHOSIS SECONDARY TO NASH (HCC): ICD-10-CM

## 2023-06-23 DIAGNOSIS — Z79.4 TYPE 2 DIABETES MELLITUS WITH DIABETIC POLYNEUROPATHY, WITH LONG-TERM CURRENT USE OF INSULIN (HCC): ICD-10-CM

## 2023-06-23 DIAGNOSIS — M81.0 OSTEOPOROSIS, UNSPECIFIED OSTEOPOROSIS TYPE, UNSPECIFIED PATHOLOGICAL FRACTURE PRESENCE: ICD-10-CM

## 2023-06-23 DIAGNOSIS — I49.9 CARDIAC ARRHYTHMIA, UNSPECIFIED CARDIAC ARRHYTHMIA TYPE: Primary | ICD-10-CM

## 2023-06-23 DIAGNOSIS — I25.83 CORONARY ARTERY DISEASE DUE TO LIPID RICH PLAQUE: ICD-10-CM

## 2023-06-23 DIAGNOSIS — R53.82 CHRONIC FATIGUE: ICD-10-CM

## 2023-06-23 DIAGNOSIS — K59.00 CONSTIPATION, UNSPECIFIED CONSTIPATION TYPE: ICD-10-CM

## 2023-06-23 DIAGNOSIS — E55.9 VITAMIN D DEFICIENCY: ICD-10-CM

## 2023-06-23 RX ORDER — DOCUSATE SODIUM 100 MG/1
100 CAPSULE, LIQUID FILLED ORAL 2 TIMES DAILY
Qty: 180 CAPSULE | Refills: 3 | Status: SHIPPED | OUTPATIENT
Start: 2023-06-23 | End: 2024-06-17

## 2023-06-23 RX ORDER — LANCETS
EACH MISCELLANEOUS
Qty: 400 EACH | Refills: 3 | Status: SHIPPED | OUTPATIENT
Start: 2023-06-23

## 2023-06-23 RX ORDER — PERPHENAZINE 16 MG/1
TABLET, FILM COATED ORAL
Qty: 400 STRIP | Refills: 3 | Status: SHIPPED | OUTPATIENT
Start: 2023-06-23

## 2023-06-23 RX ORDER — PERPHENAZINE 16 MG/1
TABLET, FILM COATED ORAL
Qty: 400 STRIP | Refills: 3 | Status: CANCELLED | OUTPATIENT
Start: 2023-06-23

## 2023-06-23 RX ORDER — ASCORBIC ACID 500 MG
TABLET ORAL
Qty: 180 TABLET | Refills: 3 | Status: SHIPPED | OUTPATIENT
Start: 2023-06-23

## 2023-06-23 RX ORDER — MAGNESIUM OXIDE 400 MG/1
400 TABLET ORAL DAILY
Qty: 90 TABLET | Refills: 3 | Status: SHIPPED | OUTPATIENT
Start: 2023-06-23

## 2023-06-23 RX ORDER — MELATONIN
Qty: 540 TABLET | Refills: 3 | Status: SHIPPED | OUTPATIENT
Start: 2023-06-23

## 2023-06-23 RX ORDER — PHENOL 1.4 %
1 AEROSOL, SPRAY (ML) MUCOUS MEMBRANE DAILY
Qty: 90 TABLET | Refills: 3 | Status: SHIPPED | OUTPATIENT
Start: 2023-06-23

## 2023-06-23 RX ORDER — SENNOSIDES 8.6 MG
TABLET ORAL
Qty: 180 TABLET | Refills: 3 | Status: SHIPPED | OUTPATIENT
Start: 2023-06-23

## 2023-06-23 NOTE — TELEPHONE ENCOUNTER
Patient doesn't want this refilled now .  She will call us if she want us to send in to a different pharmacy

## 2023-06-23 NOTE — TELEPHONE ENCOUNTER
[facility] but they are not able to fill her test strips for 4 x daily due to [facility] guidelines. Patient would like all of her local scripts to go to [facility] in [place].     All scripts have been pended for your approval.

## 2023-07-10 DIAGNOSIS — M54.50 CHRONIC BILATERAL LOW BACK PAIN WITHOUT SCIATICA: ICD-10-CM

## 2023-07-10 DIAGNOSIS — G89.29 CHRONIC BILATERAL LOW BACK PAIN WITHOUT SCIATICA: ICD-10-CM

## 2023-07-10 RX ORDER — OXYCODONE AND ACETAMINOPHEN 10; 325 MG/1; MG/1
1 TABLET ORAL EVERY 8 HOURS PRN
Qty: 90 TABLET | Refills: 0 | Status: SHIPPED | OUTPATIENT
Start: 2023-07-10 | End: 2023-07-10

## 2023-07-10 RX ORDER — OXYCODONE AND ACETAMINOPHEN 10; 325 MG/1; MG/1
1 TABLET ORAL EVERY 8 HOURS PRN
Qty: 90 TABLET | Refills: 0 | Status: SHIPPED | OUTPATIENT
Start: 2023-07-10 | End: 2023-08-09

## 2023-07-10 NOTE — TELEPHONE ENCOUNTER
[address] [facility] called and said they received the request for the patients pain medication but the pharmacy said they are out of stock and don't know when it will be back in. Script needs to go to [facility]. Script has been cancelled from [employer]. Also removed [facility] from patients list of pharmacys. Advised patient we will no longer send them to [facility].

## 2023-07-24 ENCOUNTER — OFFICE VISIT (OUTPATIENT)
Dept: INTERNAL MEDICINE | Age: 73
End: 2023-07-24
Payer: MEDICARE

## 2023-07-24 ENCOUNTER — HOSPITAL ENCOUNTER (OUTPATIENT)
Dept: INTERVENTIONAL RADIOLOGY/VASCULAR | Age: 73
Discharge: HOME | End: 2023-07-26
Attending: INTERNAL MEDICINE
Payer: MEDICARE

## 2023-07-24 ENCOUNTER — TELEPHONE (OUTPATIENT)
Dept: INTERNAL MEDICINE | Age: 73
End: 2023-07-24

## 2023-07-24 ENCOUNTER — HOSPITAL ENCOUNTER (OUTPATIENT)
Dept: GENERAL RADIOLOGY | Age: 73
Discharge: HOME | End: 2023-07-26
Payer: MEDICARE

## 2023-07-24 VITALS
BODY MASS INDEX: 40.97 KG/M2 | OXYGEN SATURATION: 96 % | HEIGHT: 64 IN | SYSTOLIC BLOOD PRESSURE: 116 MMHG | RESPIRATION RATE: 16 BRPM | WEIGHT: 240 LBS | DIASTOLIC BLOOD PRESSURE: 78 MMHG | HEART RATE: 82 BPM

## 2023-07-24 DIAGNOSIS — M54.2 NECK PAIN: ICD-10-CM

## 2023-07-24 DIAGNOSIS — K59.00 CONSTIPATION, UNSPECIFIED CONSTIPATION TYPE: ICD-10-CM

## 2023-07-24 DIAGNOSIS — R00.2 PALPITATIONS: ICD-10-CM

## 2023-07-24 DIAGNOSIS — R60.0 LEG EDEMA, RIGHT: ICD-10-CM

## 2023-07-24 DIAGNOSIS — M25.511 ACUTE PAIN OF RIGHT SHOULDER: ICD-10-CM

## 2023-07-24 DIAGNOSIS — M79.672 BILATERAL FOOT PAIN: ICD-10-CM

## 2023-07-24 DIAGNOSIS — S90.31XD CONTUSION OF RIGHT FOOT, SUBSEQUENT ENCOUNTER: ICD-10-CM

## 2023-07-24 DIAGNOSIS — E83.42 HYPOMAGNESEMIA: ICD-10-CM
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
## 2023-07-24 DIAGNOSIS — M79.671 BILATERAL FOOT PAIN: ICD-10-CM

## 2023-07-24 DIAGNOSIS — E11.42 TYPE 2 DIABETES MELLITUS WITH DIABETIC POLYNEUROPATHY, WITH LONG-TERM CURRENT USE OF INSULIN (HCC): ICD-10-CM

## 2023-07-24 DIAGNOSIS — Z79.4 TYPE 2 DIABETES MELLITUS WITH DIABETIC POLYNEUROPATHY, WITH LONG-TERM CURRENT USE OF INSULIN (HCC): ICD-10-CM

## 2023-07-24 PROCEDURE — 73030 X-RAY EXAM OF SHOULDER: CPT

## 2023-07-24 PROCEDURE — 1090F PRES/ABSN URINE INCON ASSESS: CPT | Performed by: INTERNAL MEDICINE

## 2023-07-24 PROCEDURE — 73000 X-RAY EXAM OF COLLAR BONE: CPT

## 2023-07-24 PROCEDURE — 99214 OFFICE O/P EST MOD 30 MIN: CPT | Performed by: INTERNAL MEDICINE

## 2023-07-24 PROCEDURE — 1036F TOBACCO NON-USER: CPT | Performed by: INTERNAL MEDICINE

## 2023-07-24 PROCEDURE — G8417 CALC BMI ABV UP PARAM F/U: HCPCS | Performed by: INTERNAL MEDICINE

## 2023-07-24 PROCEDURE — 3078F DIAST BP <80 MM HG: CPT | Performed by: INTERNAL MEDICINE

## 2023-07-24 PROCEDURE — 3017F COLORECTAL CA SCREEN DOC REV: CPT | Performed by: INTERNAL MEDICINE

## 2023-07-24 PROCEDURE — 3044F HG A1C LEVEL LT 7.0%: CPT | Performed by: INTERNAL MEDICINE

## 2023-07-24 PROCEDURE — G8400 PT W/DXA NO RESULTS DOC: HCPCS | Performed by: INTERNAL MEDICINE

## 2023-07-24 PROCEDURE — G8427 DOCREV CUR MEDS BY ELIG CLIN: HCPCS | Performed by: INTERNAL MEDICINE

## 2023-07-24 PROCEDURE — 3074F SYST BP LT 130 MM HG: CPT | Performed by: INTERNAL MEDICINE

## 2023-07-24 PROCEDURE — 2022F DILAT RTA XM EVC RTNOPTHY: CPT | Performed by: INTERNAL MEDICINE

## 2023-07-24 PROCEDURE — 1123F ACP DISCUSS/DSCN MKR DOCD: CPT | Performed by: INTERNAL MEDICINE

## 2023-07-24 PROCEDURE — 93971 EXTREMITY STUDY: CPT

## 2023-07-24 RX ORDER — GABAPENTIN 600 MG/1
600 TABLET ORAL 3 TIMES DAILY
Qty: 270 TABLET | Refills: 1 | Status: SHIPPED | OUTPATIENT
Start: 2023-07-24 | End: 2024-01-20

## 2023-07-24 RX ORDER — SENNOSIDES 8.6 MG
TABLET ORAL
Qty: 180 TABLET | Refills: 3 | Status: SHIPPED | OUTPATIENT
Start: 2023-07-24

## 2023-07-24 RX ORDER — DOCUSATE SODIUM 100 MG/1
100 CAPSULE, LIQUID FILLED ORAL 2 TIMES DAILY
Qty: 180 CAPSULE | Refills: 3 | Status: SHIPPED | OUTPATIENT
Start: 2023-07-24 | End: 2024-07-18

## 2023-07-24 RX ORDER — METOPROLOL SUCCINATE 25 MG/1
TABLET, EXTENDED RELEASE ORAL
Qty: 45 TABLET | Refills: 3 | Status: SHIPPED | OUTPATIENT
Start: 2023-07-24

## 2023-07-24 RX ORDER — MAGNESIUM OXIDE 400 MG/1
400 TABLET ORAL DAILY
Qty: 90 TABLET | Refills: 3 | Status: SHIPPED | OUTPATIENT
Start: 2023-07-24

## 2023-07-24 ASSESSMENT — ENCOUNTER SYMPTOMS
EYE PAIN: 0
BLOOD IN STOOL: 0
NAUSEA: 0
COUGH: 0
SHORTNESS OF BREATH: 0
BACK PAIN: 0
DIARRHEA: 0
ABDOMINAL PAIN: 0
VOMITING: 0
CONSTIPATION: 0

## 2023-07-24 NOTE — TELEPHONE ENCOUNTER
Ultrasound Room called and said patients DVT on her right leg was negative.     Patient has been notified and voiced understanding

## 2023-07-24 NOTE — PROGRESS NOTES
[facility]  [address] [place] [id number]  Dept: [phone]  Dept Fax: [phone]  Loc: [phone]     Adriana Horton is a 74 y.o. female who presents today for her medical conditions/complaintsas noted below. Adriana Horton is c/o of   Chief Complaint   Patient presents with    Fall     Er follow up. Fell d/t dizziness  Contusion to Right Lower Ext. Vera Shaver on 7/18 and 7/20. Did not go to the ER on 7/20 after she fell d/t not having any pain but then the next day or so she started having right shoulder pain. Diabetes         HPI:     Fall  The accident occurred 5 to 7 days ago. The fall occurred while standing. She landed on Grass. There was no blood loss. The point of impact was the right foot. Pertinent negatives include no abdominal pain, fever, headaches, hematuria, nausea, numbness or vomiting. Diabetes  She presents for her follow-up diabetic visit. She has type 2 diabetes mellitus. The initial diagnosis of diabetes was made 11 years ago. Her disease course has been fluctuating. Pertinent negatives for hypoglycemia include no confusion, dizziness, headaches, nervousness/anxiousness or pallor. Pertinent negatives for diabetes include no chest pain, no polydipsia, no polyuria and no weakness. Other  This is a new (3- contusion, right foot) problem. The current episode started in the past 7 days. The problem occurs constantly. The problem has been gradually improving. Pertinent negatives include no abdominal pain, arthralgias, chest pain, chills, coughing, fever, headaches, nausea, neck pain, numbness, rash, vomiting or weakness.      Hemoglobin A1C (%)   Date Value   02/08/2023 6.4   08/15/2022 6.4 (H)   02/10/2022 6.9 (H)            No components found for: LABMICR  LDL Cholesterol (mg/dL)   Date Value   12/17/2022 30   02/10/2022 56   02/18/2021 44     LDL Calculated (mg/dL)   Date Value

## 2023-07-26 ENCOUNTER — TELEPHONE (OUTPATIENT)
Dept: INTERNAL MEDICINE | Age: 73
End: 2023-07-26
Payer: MEDICARE

## 2023-07-26 DIAGNOSIS — I25.10 CORONARY ARTERY DISEASE DUE TO LIPID RICH PLAQUE: ICD-10-CM

## 2023-07-26 DIAGNOSIS — F32.A ANXIETY AND DEPRESSION: ICD-10-CM

## 2023-07-26 DIAGNOSIS — I49.9 CARDIAC ARRHYTHMIA, UNSPECIFIED CARDIAC ARRHYTHMIA TYPE: Primary | ICD-10-CM

## 2023-07-26 DIAGNOSIS — I10 PRIMARY HYPERTENSION: ICD-10-CM

## 2023-07-26 DIAGNOSIS — F41.9 ANXIETY AND DEPRESSION: ICD-10-CM

## 2023-07-26 DIAGNOSIS — I25.83 CORONARY ARTERY DISEASE DUE TO LIPID RICH PLAQUE: ICD-10-CM

## 2023-07-26 DIAGNOSIS — K74.60 HEPATIC CIRRHOSIS, UNSPECIFIED HEPATIC CIRRHOSIS TYPE, UNSPECIFIED WHETHER ASCITES PRESENT (HCC): ICD-10-CM

## 2023-07-26 DIAGNOSIS — I70.90 ATHEROSCLEROSIS: ICD-10-CM

## 2023-07-26 PROCEDURE — G0179 MD RECERTIFICATION HHA PT: HCPCS | Performed by: INTERNAL MEDICINE

## 2023-07-28 ENCOUNTER — TELEPHONE (OUTPATIENT)
Dept: INTERNAL MEDICINE | Age: 73
End: 2023-07-28

## 2023-07-28 RX ORDER — METHYLPREDNISOLONE 4 MG/1
TABLET ORAL
Qty: 1 KIT | Refills: 0 | Status: SHIPPED | OUTPATIENT
Start: 2023-07-28 | End: 2023-08-03

## 2023-07-28 NOTE — TELEPHONE ENCOUNTER
Patient fell last week and is still having pain and she is requesting a sling for her shoulder, and a stronger pain medication she also is requesting a muscle relaxer. Patient fell a second time on 07/20/2023 she is having a hard time since her second fall and having a hard time sleeping. She did not go to the hospital for a recheck she fells most of the pain in her collarbone.

## 2023-07-28 NOTE — TELEPHONE ENCOUNTER
Patient states she can call ems for a ride to the hospital however she has not way home. Her daughter is also at work until 6 pm tonight. What would you suggest.    Gerhardt Childs, MA   Spoke with Dr. Liseth Diaz and states that patient needs to go to the Urgent care, that way they can get the right size of sling and they are able to teach her what the best position is for her to have the sling at. Patient notified by phone.      Gerhardt Childs, MA

## 2023-07-28 NOTE — TELEPHONE ENCOUNTER
Patient is going to urgent care now. She would like for us to sent in the medication in to [facility].  Script pended

## 2023-07-28 NOTE — TELEPHONE ENCOUNTER
Dosepak as additional pain medicine. Normally when we put a sling on that is done in person so we can make sure we have the right size sling and make sure it fits appropriately and can show the patient how to adjust/wear it. Especially, with a second fall I would strongly encourage her to come into urgent care for evaluation because they have the slings right there. they could get 1 for her rather than's ordering a random sling that might not be the right size etc. for her.

## 2023-08-01 ENCOUNTER — HOSPITAL ENCOUNTER (OUTPATIENT)
Dept: PHYSICAL THERAPY | Age: 73
Setting detail: THERAPIES SERIES
Discharge: HOME | End: 2023-08-01
Attending: INTERNAL MEDICINE
Payer: MEDICARE

## 2023-08-01 DIAGNOSIS — M25.511 ACUTE PAIN OF RIGHT SHOULDER: Primary | ICD-10-CM

## 2023-08-01 DIAGNOSIS — M54.2 NECK PAIN: ICD-10-CM

## 2023-08-01 PROCEDURE — 97162 PT EVAL MOD COMPLEX 30 MIN: CPT

## 2023-08-01 ASSESSMENT — PAIN DESCRIPTION - LOCATION: LOCATION: SHOULDER;NECK;CHEST

## 2023-08-01 ASSESSMENT — PAIN SCALES - GENERAL: PAINLEVEL_OUTOF10: 9

## 2023-08-01 ASSESSMENT — PAIN DESCRIPTION - DESCRIPTORS: DESCRIPTORS: STABBING

## 2023-08-01 ASSESSMENT — PAIN DESCRIPTION - ORIENTATION: ORIENTATION: RIGHT

## 2023-08-01 ASSESSMENT — PAIN DESCRIPTION - PAIN TYPE: TYPE: ACUTE PAIN

## 2023-08-01 NOTE — FLOWSHEET NOTE
Physical Therapy Daily Treatment Note    Date:  2023    Patient Name:  Nhan Jeffrey    :  1950  MRN: [id number]  Restrictions/Precautions:     Medical/Treatment Diagnosis Information:   Diagnosis: Acute pain of right shoulder (M25.511), Neck pain (M54.2)     Insurance/Certification information:  PT Insurance Information: Medicare, Medicaid  Physician Information:  Clement Floyd MD   Plan of care signed (Y/N):  N  Visit# / total visits:  1/10  Pain level: 9/10       Time In: 345  Time Out: 420    Progress Note: [x]  Yes  []  No  Next due by: Visit #10  or by 23    Subjective:       Objective:   Observation:   Test measurements:      Exercises:   Exercise/Equipment Resistance/Repetitions Other comments        Pulley's      Cervical retraction     Scapular retraction     Shoulder ext stretch      4 way shoulder     Finger ladder      Wall slides          Supine wand chest press     Shoulder wand AAROM      SA punches/ABC     SL ER/abd     Prone cuff series               PROM        [] Provided verbal/tactile cueing for activities related to strengthening, flexibility, endurance, ROM. (55787)  [] Provided verbal/tactile cueing for activities related to improving balance, coordination, kinesthetic sense, posture, motor skill, proprioception. (16422)    Therapeutic Activities:     [] Therapeutic activities, direct (one-on-one) patient contact (use of dynamic activities to improve functional performance). (19632)    Gait:   [] Provided training and instruction to the patient for ambulation re-education. (19610)    Self-Care/ADL's  [] Self-care/home management training and compensatory training, meal preparation, safety procedures, and instructions in use of assistive technology devices/adaptive equipment, direct one-on-one contact.  (08348)    Home Exercise Program:     [] Reviewed/Progressed HEP activities related to strengthening, flexibility, endurance, ROM. (64472)  [] Reviewed/Progressed HEP

## 2023-08-01 NOTE — PLAN OF CARE
[facility]    [x] [place]  Phone: [phone]  Fax: [phone]      [] [place]  Phone: [phone]  Fax: [phone]        To:   Yani Hubbard MD     Patient: Timur Leong  : 1950   MRN: [id number]  Evaluation Date: 2023      Diagnosis Information:  Diagnosis: Acute pain of right shoulder (M25.511), Neck pain (M54.2)         Physical Therapy Certification    Dear Vicente Weiner MD  The following patient has been evaluated for physical therapy services and for therapy to continue, Medicare requires monthly physician review of the treatment plan. Please review the attached evaluation and/or summary of the patient's plan of care, and verify that you agree therapy should continue by signing the attached document and sending it back to our office. Plan of Care/Treatment to date:  [] Therapeutic Exercise    [] Modalities:  [] Therapeutic Activity     [] Ultrasound  [] Electrical Stimulation  [] Gait Training      [] Cervical Traction [] Lumbar Traction  [] Neuromuscular Re-education    [] Cold/hotpack [] Iontophoresis   [] Instruction in HEP     Other:  [] Manual Therapy      []             [] Aquatic Therapy      []                 Goals:  Short Term Goals  Time Frame for Short Term Goals: 2 weeks  Short Term Goal 1: Initiate HEP    Long Term Goals  Time Frame for Long Term Goals : 5 weeks  Long Term Goal 1: Patient will report no greater than 4/10 right shoulder pain to allow patient to get a better nights rest.  Long Term Goal 2: Patient will demonstrate right shoulder ROM to 150 degrees F/ABD, IR to L1 and ER to T1 to allow patient to reach into her cupboards with greater ease. Long Term Goal 3: Patient will demonstrate 4/5 right shoulder strength to allow patient to lift and carry items with greater ease.   Long Term Goal 4: Patient will score > or = to 56/80 on chastity UEFI to allow patient to complete her daily ADLs with

## 2023-08-01 NOTE — PROGRESS NOTES
Physical Therapy  Initial Assessment  Date: 2023  Patient Name: Kb Gutierrez  MRN: [id number]  : 1950    Referring Physician: MD Germania Carr MD   PCP: Germania Ragland MD     Medical Diagnosis: Acute pain of right shoulder [M25.511]  Neck pain [M54.2] Acute pain of right shoulder (M25.511), Neck pain (M54.2)  No data recorded    Insurance: Payor: Alea Spears / Plan: MEDICARE PART A AND B / Product Type: *No Product type* /   Insurance ID: [id number] - (Medicare)      Restrictions:       Subjective:   General  Chart Reviewed: Yes  Patient Assessed for Rehabilitation Services: Yes  History obtained from[de-identified] Chart Review, Patient  Family/Caregiver Present: No  Diagnosis: Acute pain of right shoulder (M25.511), Neck pain (M54.2)  Referring Provider (secondary): Germania Ragland MD  Follows Commands: Within Functional Limits  PT Visit Information  PT Insurance Information: Medicare, Medicaid  Referring Provider (secondary): Germania Ragland MD  Subjective  Subjective: Patient arrives to the clinic reporting that she has pain in her right shoulder, neck, and in her collarbone, Reporting that he pain is a sharp pain in her right collarbone as well as will radiate posteriorly and into her neck. Pain started after her fall on 23. Reporting that she fell on the 17th as well and messed up her leg - did see UC that day and didnt find anything wrong. Reporting that she was tending to her plant and when she stood up she fell. Unsure how she fell at that time. Did not get seen after this most recent fall. Diffulty with standing for long periods of time - about 30 minutes at most and difficulty with reaching this date. X-rays were after both of her falls and no acute fracture noted. Perorting that she has been alternating between percocet and ibuprofen for pain relief. Also taking MEDROL DOSEPACK without any relief noted.   Prior diagnostic testing[de-identified] X-ray  Pain Screening  Patient

## 2023-08-07 ENCOUNTER — TELEPHONE (OUTPATIENT)
Dept: INTERNAL MEDICINE | Age: 73
End: 2023-08-07

## 2023-08-07 DIAGNOSIS — E83.42 HYPOMAGNESEMIA: ICD-10-CM

## 2023-08-07 DIAGNOSIS — E55.9 VITAMIN D DEFICIENCY: ICD-10-CM

## 2023-08-07 RX ORDER — PHENOL 1.4 %
1 AEROSOL, SPRAY (ML) MUCOUS MEMBRANE DAILY
Qty: 90 TABLET | Refills: 3 | Status: SHIPPED | OUTPATIENT
Start: 2023-08-07

## 2023-08-07 RX ORDER — MELATONIN
Qty: 540 TABLET | Refills: 3 | Status: SHIPPED | OUTPATIENT
Start: 2023-08-07

## 2023-08-07 RX ORDER — MAGNESIUM OXIDE 400 MG/1
400 TABLET ORAL DAILY
Qty: 90 TABLET | Refills: 3 | Status: SHIPPED | OUTPATIENT
Start: 2023-08-07

## 2023-08-07 NOTE — TELEPHONE ENCOUNTER
Patient is having issues with [facility] pharmacy on getting them to get a 90 day supply of her calcium, vit d3 and magnesium oxide. Scripts are pended to go to her mail order pharmacy.

## 2023-08-07 NOTE — TELEPHONE ENCOUNTER
Patient requested a call back from you she would not tell me anything other than it was about a prescription.

## 2023-08-08 ENCOUNTER — HOSPITAL ENCOUNTER (OUTPATIENT)
Dept: PHYSICAL THERAPY | Age: 73
Setting detail: THERAPIES SERIES
Discharge: HOME | End: 2023-08-08
Attending: INTERNAL MEDICINE
Payer: MEDICARE

## 2023-08-08 DIAGNOSIS — M54.50 CHRONIC BILATERAL LOW BACK PAIN WITHOUT SCIATICA: ICD-10-CM

## 2023-08-08 DIAGNOSIS — G89.29 CHRONIC BILATERAL LOW BACK PAIN WITHOUT SCIATICA: ICD-10-CM

## 2023-08-08 PROCEDURE — 97110 THERAPEUTIC EXERCISES: CPT

## 2023-08-08 RX ORDER — OXYCODONE AND ACETAMINOPHEN 10; 325 MG/1; MG/1
1 TABLET ORAL EVERY 6 HOURS PRN
Qty: 90 TABLET | Refills: 0 | Status: SHIPPED | OUTPATIENT
Start: 2023-08-08 | End: 2023-09-07

## 2023-08-08 NOTE — TELEPHONE ENCOUNTER
Patient called in requesting a refill on her pain medication. Last script sent in on 7/10/2023. Her daughter is no longer picking her medications up. She is having her home health nurse pick them up. She would like her to be able to pick everything up from [facility] at the same time when so does the home health visit with the patient tomorrow. And we are out of the office tomorrow. Script pended if approved.

## 2023-08-08 NOTE — FLOWSHEET NOTE
Physical Therapy Daily Treatment Note    Date:  2023    Patient Name:  Ana Maria Paredes    :  1950  MRN: [id number]  Restrictions/Precautions:     Medical/Treatment Diagnosis Information:   Diagnosis: Acute pain of right shoulder (M25.511), Neck pain (M54.2)     Insurance/Certification information:  PT Insurance Information: Medicare, Medicaid  Physician Information:  Alvin Crawford MD   Plan of care signed (Y/N):  Y  Visit# / total visits:  2/10  Pain level: 9/10       Time In: 2:55 PM  Time Out: 3:25 PM    Progress Note: []  Yes  [x]  No  Next due by: Visit #10  or by 23    Subjective:  Pt arrives 10 min late for apt today. Pain continues. Took Percocet this morning, forgot to take Ibuprofen before therapy today. Pt complains of R shoulder, cervical, clavicle pain today. Objective: Therex per flow sheet for increased R shoulder/scap/UE/cervical stg, stab and ROM for increased ease with ADLs, reach, func tasks, self care. [address] mobs and PROM for increased mobility. HEP provided as follows with pt demo good understanding:  - Supine Shoulder Press with Dowel  - 1 x daily - 7 x weekly - 1 sets - 10 reps - 5 hold  - Supine Shoulder Flexion Extension AAROM with Dowel  - 1 x daily - 7 x weekly - 1 sets - 10 reps - 5 hold  - Seated Cervical Retraction  - 1 x daily - 7 x weekly - 1 sets - 10 reps - 5 hold  - Seated Scapular Retraction  - 1 x daily - 7 x weekly - 1 sets - 10 reps - 5 hold    Observation: Increased difficulty with sit to supine tfer and bed mobility. PROM WNL. Improved pain at exit.           Test measurements:      Exercises:   Exercise/Equipment Resistance/Repetitions Other comments        Pulley's  1 min each  Flexion and scap   Cervical retraction 10x    Scapular retraction 10x    Shoulder ext stretch      4 way shoulder     Finger ladder      Wall slides     Cervical SB and rotation 10x each     Supine wand chest press 10x     Shoulder wand AAROM  Flexion 10x     SA

## 2023-08-10 ENCOUNTER — HOSPITAL ENCOUNTER (OUTPATIENT)
Dept: PHYSICAL THERAPY | Age: 73
Setting detail: THERAPIES SERIES
Discharge: HOME | End: 2023-08-10
Attending: INTERNAL MEDICINE
Payer: MEDICARE

## 2023-08-10 PROCEDURE — 97110 THERAPEUTIC EXERCISES: CPT | Performed by: PHYSICAL THERAPIST

## 2023-08-15 ENCOUNTER — HOSPITAL ENCOUNTER (OUTPATIENT)
Dept: PHYSICAL THERAPY | Age: 73
Setting detail: THERAPIES SERIES
Discharge: HOME | End: 2023-08-15
Attending: INTERNAL MEDICINE
Payer: MEDICARE

## 2023-08-15 ENCOUNTER — OFFICE VISIT (OUTPATIENT)
Dept: INTERNAL MEDICINE | Age: 73
End: 2023-08-15
Payer: MEDICARE

## 2023-08-15 ENCOUNTER — HOSPITAL ENCOUNTER (OUTPATIENT)
Age: 73
Discharge: HOME | End: 2023-08-15
Payer: MEDICARE

## 2023-08-15 VITALS
OXYGEN SATURATION: 96 % | HEIGHT: 64 IN | WEIGHT: 243 LBS | SYSTOLIC BLOOD PRESSURE: 116 MMHG | DIASTOLIC BLOOD PRESSURE: 72 MMHG | RESPIRATION RATE: 16 BRPM | BODY MASS INDEX: 41.48 KG/M2 | HEART RATE: 51 BPM

## 2023-08-15 DIAGNOSIS — E11.42 TYPE 2 DIABETES MELLITUS WITH DIABETIC POLYNEUROPATHY, WITH LONG-TERM CURRENT USE OF INSULIN (HCC): Primary | ICD-10-CM

## 2023-08-15 DIAGNOSIS — E03.9 HYPOTHYROIDISM (ACQUIRED): ICD-10-CM

## 2023-08-15 DIAGNOSIS — D50.9 IRON DEFICIENCY ANEMIA, UNSPECIFIED IRON DEFICIENCY ANEMIA TYPE: ICD-10-CM

## 2023-08-15 DIAGNOSIS — E11.42 TYPE 2 DIABETES MELLITUS WITH DIABETIC POLYNEUROPATHY, WITH LONG-TERM CURRENT USE OF INSULIN (HCC): ICD-10-CM

## 2023-08-15 DIAGNOSIS — M62.838 MUSCLE SPASM: ICD-10-CM

## 2023-08-15 DIAGNOSIS — I25.10 CORONARY ARTERY DISEASE DUE TO LIPID RICH PLAQUE: ICD-10-CM

## 2023-08-15 DIAGNOSIS — M53.86 SCIATICA OF LEFT SIDE ASSOCIATED WITH DISORDER OF LUMBAR SPINE: ICD-10-CM

## 2023-08-15 DIAGNOSIS — I25.83 CORONARY ARTERY DISEASE DUE TO LIPID RICH PLAQUE: ICD-10-CM

## 2023-08-15 DIAGNOSIS — Z12.31 OTHER SCREENING MAMMOGRAM: ICD-10-CM

## 2023-08-15 DIAGNOSIS — I50.32 CHF (CONGESTIVE HEART FAILURE), NYHA CLASS I, CHRONIC, DIASTOLIC (HCC): ICD-10-CM

## 2023-08-15 DIAGNOSIS — E55.9 VITAMIN D DEFICIENCY: ICD-10-CM

## 2023-08-15 DIAGNOSIS — Z79.4 TYPE 2 DIABETES MELLITUS WITH DIABETIC POLYNEUROPATHY, WITH LONG-TERM CURRENT USE OF INSULIN (HCC): ICD-10-CM

## 2023-08-15 DIAGNOSIS — E53.8 VITAMIN B 12 DEFICIENCY: ICD-10-CM

## 2023-08-15 DIAGNOSIS — Z79.4 TYPE 2 DIABETES MELLITUS WITH DIABETIC POLYNEUROPATHY, WITH LONG-TERM CURRENT USE OF INSULIN (HCC): Primary | ICD-10-CM

## 2023-08-15 LAB
ANION GAP SERPL CALCULATED.3IONS-SCNC: 9 MMOL/L (ref 9–17)
BUN SERPL-MCNC: 18 MG/DL (ref 8–23)
BUN/CREAT SERPL: 26 (ref 9–20)
CALCIUM SERPL-MCNC: 9.8 MG/DL (ref 8.6–10.4)
CHLORIDE SERPL-SCNC: 108 MMOL/L (ref 98–107)
CO2 SERPL-SCNC: 23 MMOL/L (ref 20–31)
CREAT SERPL-MCNC: 0.7 MG/DL (ref 0.5–0.9)
GFR SERPL CREATININE-BSD FRML MDRD: >60 ML/MIN/1.73M2
GLUCOSE SERPL-MCNC: 62 MG/DL (ref 70–99)
HGB BLD-MCNC: 11.9 G/DL (ref 11.9–15.1)
POTASSIUM SERPL-SCNC: 4.5 MMOL/L (ref 3.7–5.3)
SODIUM SERPL-SCNC: 140 MMOL/L (ref 135–144)

## 2023-08-15 PROCEDURE — 83540 ASSAY OF IRON: CPT

## 2023-08-15 PROCEDURE — 3074F SYST BP LT 130 MM HG: CPT | Performed by: INTERNAL MEDICINE

## 2023-08-15 PROCEDURE — 83036 HEMOGLOBIN GLYCOSYLATED A1C: CPT

## 2023-08-15 PROCEDURE — 97110 THERAPEUTIC EXERCISES: CPT

## 2023-08-15 PROCEDURE — 3078F DIAST BP <80 MM HG: CPT | Performed by: INTERNAL MEDICINE

## 2023-08-15 PROCEDURE — G8400 PT W/DXA NO RESULTS DOC: HCPCS | Performed by: INTERNAL MEDICINE

## 2023-08-15 PROCEDURE — G8427 DOCREV CUR MEDS BY ELIG CLIN: HCPCS | Performed by: INTERNAL MEDICINE

## 2023-08-15 PROCEDURE — 1123F ACP DISCUSS/DSCN MKR DOCD: CPT | Performed by: INTERNAL MEDICINE

## 2023-08-15 PROCEDURE — 1090F PRES/ABSN URINE INCON ASSESS: CPT | Performed by: INTERNAL MEDICINE

## 2023-08-15 PROCEDURE — 36415 COLL VENOUS BLD VENIPUNCTURE: CPT

## 2023-08-15 PROCEDURE — 1036F TOBACCO NON-USER: CPT | Performed by: INTERNAL MEDICINE

## 2023-08-15 PROCEDURE — G8417 CALC BMI ABV UP PARAM F/U: HCPCS | Performed by: INTERNAL MEDICINE

## 2023-08-15 PROCEDURE — 3017F COLORECTAL CA SCREEN DOC REV: CPT | Performed by: INTERNAL MEDICINE

## 2023-08-15 PROCEDURE — 3044F HG A1C LEVEL LT 7.0%: CPT | Performed by: INTERNAL MEDICINE

## 2023-08-15 PROCEDURE — 83550 IRON BINDING TEST: CPT

## 2023-08-15 PROCEDURE — 80048 BASIC METABOLIC PNL TOTAL CA: CPT

## 2023-08-15 PROCEDURE — 99214 OFFICE O/P EST MOD 30 MIN: CPT | Performed by: INTERNAL MEDICINE

## 2023-08-15 PROCEDURE — 99212 OFFICE O/P EST SF 10 MIN: CPT | Performed by: INTERNAL MEDICINE

## 2023-08-15 PROCEDURE — 2022F DILAT RTA XM EVC RTNOPTHY: CPT | Performed by: INTERNAL MEDICINE

## 2023-08-15 PROCEDURE — 85018 HEMOGLOBIN: CPT

## 2023-08-15 RX ORDER — FUROSEMIDE 20 MG/1
20 TABLET ORAL DAILY
Qty: 90 TABLET | Refills: 3 | Status: SHIPPED | OUTPATIENT
Start: 2023-08-15

## 2023-08-15 RX ORDER — TIZANIDINE 4 MG/1
4 TABLET ORAL 3 TIMES DAILY PRN
Qty: 30 TABLET | Refills: 2 | Status: SHIPPED | OUTPATIENT
Start: 2023-08-15

## 2023-08-15 ASSESSMENT — ENCOUNTER SYMPTOMS
ABDOMINAL PAIN: 0
COUGH: 0
BLOOD IN STOOL: 0
BACK PAIN: 0
EYE PAIN: 0
VOMITING: 0
DIARRHEA: 0
SHORTNESS OF BREATH: 0
NAUSEA: 0
CONSTIPATION: 0

## 2023-08-15 NOTE — PROGRESS NOTES
[address] [address]  Dept: [phone]  Dept Fax: [phone]  Loc: [phone]     Lupe Jenkins is a 74 y.o. female who presents today for her medical conditions/complaintsas noted below. Lupe Jenkins is c/o of   Chief Complaint   Patient presents with    Diabetes     6 month    Coronary Artery Disease    Congestive Heart Failure         HPI:     Diabetes  She presents for her follow-up diabetic visit. She has type 2 diabetes mellitus. The initial diagnosis of diabetes was made 11 years ago. Her disease course has been fluctuating. Pertinent negatives for hypoglycemia include no confusion, dizziness, headaches, nervousness/anxiousness or pallor. Pertinent negatives for diabetes include no chest pain, no polydipsia, no polyuria and no weakness. Coronary Artery Disease  Presents for follow-up visit. Pertinent negatives include no chest pain, chest pressure, dizziness, leg swelling, palpitations or shortness of breath. Her past medical history is significant for CHF. The symptoms have been stable. Compliance with diet is good. Compliance with exercise is good. Compliance with medications is good. Congestive Heart Failure  Presents for follow-up visit. Pertinent negatives include no abdominal pain, chest pain, chest pressure, palpitations or shortness of breath. The symptoms have been stable. Her past medical history is significant for CAD. Compliance with total regimen is %. Compliance with diet is %. Compliance with exercise is %. Compliance with medications is %.      Hemoglobin A1C (%)   Date Value   02/08/2023 6.4   08/15/2022 6.4 (H)   02/10/2022 6.9 (H)            No components found for: LABMICR  LDL Cholesterol (mg/dL)   Date Value   12/17/2022 30   02/10/2022 56   02/18/2021 44     LDL Calculated (mg/dL)   Date Value   02/08/2023 44   05/19/2016 80

## 2023-08-15 NOTE — FLOWSHEET NOTE
Physical Therapy Daily Treatment Note    Date:  8/15/2023    Patient Name:  Hazel Lang    :  1950  MRN: [id number]  Restrictions/Precautions:     Medical/Treatment Diagnosis Information:   Diagnosis: Acute pain of right shoulder (M25.511), Neck pain (M54.2)     Insurance/Certification information:  PT Insurance Information: Medicare, Medicaid  Physician Information:  Fawad Barr MD   Plan of care signed (Y/N):  Y  Visit# / total visits:  4/10  Pain level: 8/10       Time In: 2:10 PM  Time Out: 2:48 PM    Progress Note: []  Yes  [x]  No  Next due by: Visit #10  or by 23    Subjective: Pt 10 min late to apt today. Sees MD today at 3:00. Shoulder pain continues. Pt feels Percocet is not controlling pain and hoping MD changes meds. Also taking Ibuprofen. Objective: Therex per flow sheet for increased R shoulder/scap/UE stg, stab and ROM for increased ease with ADLs, reach, household tasks, comm tasks. [address] mobs and PROM to R shoulder for increased mobility. Observation:  Increased difficulty with bed mobility, requires assist with supine to sit tfer. Cues to stay on task. Decreased Rx today secondary to pt late for therapy and MD apt at 3:00. Test measurements:      Exercises:   Exercise/Equipment Resistance/Repetitions Other comments   Pendulum  x4     Pulley's  2 min each  Flexion and scap   Cervical retraction 10x    Scapular retraction 10x    Shoulder ext stretch      4 way shoulder     Finger ladder      Wall slides     Cervical SB and rotation 10x each     Supine wand chest press      wand AAROM  10x  Cane flexion, ext    SA punches/ABC     SL ER/abd                    PROM 10'       [x] Provided verbal/tactile cueing for activities related to strengthening, flexibility, endurance, ROM. (99535)  [] Provided verbal/tactile cueing for activities related to improving balance, coordination, kinesthetic sense, posture, motor skill, proprioception.

## 2023-08-16 LAB
EST. AVERAGE GLUCOSE BLD GHB EST-MCNC: 128 MG/DL
HBA1C MFR BLD: 6.1 % (ref 4–6)
IRON SATN MFR SERPL: 32 % (ref 20–55)
IRON SERPL-MCNC: 109 UG/DL (ref 37–145)
TIBC SERPL-MCNC: 344 UG/DL (ref 250–450)
UNSATURATED IRON BINDING CAPACITY: 235 UG/DL (ref 112–347)

## 2023-08-18 ENCOUNTER — APPOINTMENT (OUTPATIENT)
Dept: PHYSICAL THERAPY | Age: 73
End: 2023-08-18
Attending: INTERNAL MEDICINE
Payer: MEDICARE

## 2023-08-25 DIAGNOSIS — E11.42 TYPE 2 DIABETES MELLITUS WITH DIABETIC POLYNEUROPATHY, WITH LONG-TERM CURRENT USE OF INSULIN (HCC): ICD-10-CM

## 2023-08-25 DIAGNOSIS — Z79.4 TYPE 2 DIABETES MELLITUS WITH DIABETIC POLYNEUROPATHY, WITH LONG-TERM CURRENT USE OF INSULIN (HCC): ICD-10-CM

## 2023-08-25 RX ORDER — PERPHENAZINE 16 MG/1
TABLET, FILM COATED ORAL
Qty: 400 STRIP | Refills: 3 | Status: SHIPPED | OUTPATIENT
Start: 2023-08-25

## 2023-08-25 RX ORDER — LANCETS
EACH MISCELLANEOUS
Qty: 400 EACH | Refills: 3 | Status: SHIPPED | OUTPATIENT
Start: 2023-08-25

## 2023-08-25 NOTE — TELEPHONE ENCOUNTER
Patient needs new scripts for her test strips and lancets sent to [facility] in [place].     Scripts pended

## 2023-08-25 NOTE — TELEPHONE ENCOUNTER
Patient needs a call back from nurse Jamie as soon as she can . Patient refused to talk to anyone else but Colleen.  Patient only said that it is urgent  and it about a medication

## 2023-08-29 ENCOUNTER — HOSPITAL ENCOUNTER (OUTPATIENT)
Dept: PHYSICAL THERAPY | Age: 73
Setting detail: THERAPIES SERIES
Discharge: HOME | End: 2023-08-29
Attending: INTERNAL MEDICINE
Payer: MEDICARE

## 2023-08-29 PROCEDURE — 97161 PT EVAL LOW COMPLEX 20 MIN: CPT | Performed by: PHYSICAL THERAPIST

## 2023-08-29 ASSESSMENT — PAIN SCALES - GENERAL: PAINLEVEL_OUTOF10: 7

## 2023-08-29 ASSESSMENT — PAIN DESCRIPTION - PAIN TYPE: TYPE: ACUTE PAIN

## 2023-08-29 ASSESSMENT — PAIN DESCRIPTION - ORIENTATION: ORIENTATION: LEFT

## 2023-08-29 ASSESSMENT — PAIN DESCRIPTION - DESCRIPTORS: DESCRIPTORS: ACHING;SHARP;SHOOTING

## 2023-08-29 ASSESSMENT — PAIN DESCRIPTION - LOCATION: LOCATION: BACK;BUTTOCKS;LEG

## 2023-08-29 NOTE — PLAN OF CARE
[facility]    [] [place]  Phone: [phone]  Fax: [phone]      [] [place]  Phone: [phone]  Fax: [phone]        To:        Patient: Barbara Morocho  : 1950   MRN: [id number]  Evaluation Date: 2023      Diagnosis Information:  Diagnosis: M53.86 L Sciatica   Treatment Diagnosis: M53.86 L sciatica     Physical Therapy Certification Form  Dear Yohana Rainey  The following patient has been evaluated for physical therapy services and for therapy to continue, Medicare requires monthly physician review of the treatment plan. Please review the attached evaluation and/or summary of the patient's plan of care, and verify that you agree therapy should continue by signing the attached document and sending it back to our office.     Plan of Care/Treatment to date:  [x] Therapeutic Exercise    [] Modalities:  [] Therapeutic Activity     [] Ultrasound  [] Electrical Stimulation  [] Gait Training      [] Cervical Traction [] Lumbar Traction  [] Neuromuscular Re-education    [] Cold/hotpack [] Iontophoresis   [x] Instruction in HEP     Other:  [x] Manual Therapy      []             [] Aquatic Therapy      []                 Goals:  Short Term Goals  Time Frame for Short Term Goals: 1 week  Short Term Goal 1: Start low back HEP    Long Term Goals  Time Frame for Long Term Goals : 4 weeks  Long Term Goal 1: LBP controlled at 2-3/10  Long Term Goal 2: L sciatica resolved 75% to allow regular ADL  Long Term Goal 3: Able to bend sufficiently to put on shoes  Long Term Goal 4: Able to wlk 600 ft before becomes a problem    Frequency/Duration:23 - 23  # Days per week: [] 1 day # Weeks: [] 1 week [] 5 weeks     [x] 2 days   [] 2 weeks [] 6 weeks     [] 3 days   [] 3 weeks [] 7 weeks     [] 4 days   [x] 4 weeks [] 8 weeks    Rehab Potential: [] Excellent [x] Good [] Fair  [] Poor     Electronically signed by:  Roro oMser, PT      If you have any

## 2023-08-29 NOTE — PROGRESS NOTES
Physical Therapy  Initial Assessment  Date: 2023  Patient Name: Augustine Monsivais  MRN: [id number]  : 1950    Referring Physician: MD Macie Johnston MD   PCP: Macie Hua MD     Medical Diagnosis: Sciatica of left side associated with disorder of lumbar spine [M53.86] M53.86 L Sciatica  Treatment Diagnosis: M53.86 L sciatica      Insurance: Payor: MEDICARE / Plan: MEDICARE PART A AND B / Product Type: *No Product type* /   Insurance ID: [id number] - (Medicare)      Restrictions:       Subjective:   General  Chart Reviewed: Yes  Patient Assessed for Rehabilitation Services: Yes  History obtained from[de-identified] Chart Review, Patient  Family/Caregiver Present: No  Diagnosis: M53.86 L Sciatica  Referring Provider (secondary): Macie Hua MD  Follows Commands: Within Functional Limits  PT Visit Information  Onset Date: 23  PT Insurance Information: Medicare, Medicaid  Referring Provider (secondary): Macie Hua MD  Subjective  Subjective: LBP and L sciatica started about 1 week ago. Pain with bending. Has had it intermittently for over 40 yrs.  Does a lot of gardening and required bending  Pain Screening  Pain Level: 7  Best Pain Level: 7  Worst Pain Level: 10  Pain Type: Acute pain  Pain Location: Back, Buttocks, Leg  Pain Orientation: Left  Pain Descriptors: Aching, Sharp, Shooting       Vision/Hearing:  Vision  Vision: Within Functional Limits  Hearing  Hearing: Within functional limits    Orientation:  Orientation  Overall Orientation Status: Within Normal Limits  Follows Commands: Within Functional Limits    Social History:  Social History  Lives With: Alone  Type of Home: Apartment  Home Layout: One level  Home Equipment: Cane    Functional Status:  Functional Status  Prior level of function: Independent  Occupation: Retired  ADL Assistance: [address] Independent  Ambulation Assistance: Independent  Transfer Assistance:

## 2023-08-29 NOTE — FLOWSHEET NOTE
Physical Therapy Daily Treatment Note    Date:  2023    Patient Name:  Charis Finney    :  1950  MRN: [id number]  Restrictions/Precautions:     Medical/Treatment Diagnosis Information:   Diagnosis: M53.86 L Sciatica  Treatment Diagnosis: M53.86 L sciatica  Insurance/Certification information:  PT Insurance Information: Medicare, Medicaid  Physician Information:   Stephany  Plan of care signed (Y/N):    Visit# / total visits: 1 /10  Pain level: 9/10       Time In:2:35   Time Out:3:05    Progress Note: [x]  Yes  []  No  Next due by: Visit #10 , or 23     Subjective:   See eval    Objective: See eval  Observation:   Test measurements:      Exercises: (Low tolerance to laying down, getting up)  Exercise/Equipment Resistance/Repetitions Other comments   Modified extension 10x    Back Bend 5x Against counter for balance   Hip abd/ ext          Lumbar roll                                                   [x] Provided verbal/tactile cueing for activities related to strengthening, flexibility, endurance, ROM. (09945)  [] Provided verbal/tactile cueing for activities related to improving balance, coordination, kinesthetic sense, posture, motor skill, proprioception. (57510)    Therapeutic Activities:     [] Therapeutic activities, direct (one-on-one) patient contact (use of dynamic activities to improve functional performance). (64055)    Gait:   [] Provided training and instruction to the patient for ambulation re-education. (30531)    Self-Care/ADL's  [] Self-care/home management training and compensatory training, meal preparation, safety procedures, and instructions in use of assistive technology devices/adaptive equipment, direct one-on-one contact.  (06251)    Home Exercise Program:   Lumbar modified extension program for post derangement  [x] Reviewed/Progressed HEP activities related to strengthening, flexibility, endurance, ROM. (17709)  [] Reviewed/Progressed HEP activities related to improving

## 2023-08-31 ENCOUNTER — HOSPITAL ENCOUNTER (OUTPATIENT)
Dept: PHYSICAL THERAPY | Age: 73
Setting detail: THERAPIES SERIES
Discharge: HOME | End: 2023-08-31
Attending: INTERNAL MEDICINE
Payer: MEDICARE

## 2023-08-31 PROCEDURE — 97110 THERAPEUTIC EXERCISES: CPT | Performed by: PHYSICAL THERAPIST

## 2023-08-31 NOTE — FLOWSHEET NOTE
Physical Therapy Daily Treatment Note    Date:  2023    Patient Name:  Martha Brizuela    :  1950  MRN: [id number]  Restrictions/Precautions:     Medical/Treatment Diagnosis Information:   Diagnosis: M53.86 L Sciatica  Treatment Diagnosis: M53.86 L sciatica  Insurance/Certification information:  PT Insurance Information: Medicare, Medicaid  Physician Information:   Stephany  Plan of care signed (Y/N):  y  Visit# / total visits: 2 /10  Pain level: 6/10 In back      Time In:12:30  Time Out:1:05    Progress Note: []  Yes  [x]  No  Next due by: Visit #10 , or 23     Subjective:  Mostly has back pain. Objective:   Observation:   Does a lot of gardening. Due to arthritic knees, must bend at the waist. Therefore stresses the lumbar spine. Has significant leg swelling, inflammation , R>L    Test measurements:   Primarily following standing extension ex . Difficulty getting into prone position     Exercises: (Low tolerance to laying down, getting up)  Exercise/Equipment Resistance/Repetitions Other comments   Modified extension 10x, x3    Back Bend 10 x2 Against counter for balance   Hip abd/ ext 15x         Lumbar roll                                                   [x] Provided verbal/tactile cueing for activities related to strengthening, flexibility, endurance, ROM. (51064)  [] Provided verbal/tactile cueing for activities related to improving balance, coordination, kinesthetic sense, posture, motor skill, proprioception. (15965)    Therapeutic Activities:     [] Therapeutic activities, direct (one-on-one) patient contact (use of dynamic activities to improve functional performance). (50878)    Gait:   [] Provided training and instruction to the patient for ambulation re-education.  (58222)    Self-Care/ADL's  [] Self-care/home management training and compensatory training, meal preparation, safety procedures, and instructions in use of assistive technology devices/adaptive equipment, direct

## 2023-09-05 DIAGNOSIS — M54.50 CHRONIC BILATERAL LOW BACK PAIN WITHOUT SCIATICA: ICD-10-CM

## 2023-09-05 DIAGNOSIS — G89.29 CHRONIC BILATERAL LOW BACK PAIN WITHOUT SCIATICA: ICD-10-CM

## 2023-09-05 RX ORDER — OXYCODONE AND ACETAMINOPHEN 10; 325 MG/1; MG/1
1 TABLET ORAL EVERY 6 HOURS PRN
Qty: 90 TABLET | Refills: 0 | Status: SHIPPED | OUTPATIENT
Start: 2023-09-05 | End: 2023-10-05

## 2023-09-05 NOTE — TELEPHONE ENCOUNTER
Patient called in requesting a refill on her pain medication. So her home health nurse can pick it up and bring to her visit.     Script pended if approved

## 2023-09-06 ENCOUNTER — HOSPITAL ENCOUNTER (OUTPATIENT)
Dept: PHYSICAL THERAPY | Age: 73
Setting detail: THERAPIES SERIES
Discharge: HOME | End: 2023-09-06
Attending: INTERNAL MEDICINE
Payer: MEDICARE

## 2023-09-06 PROCEDURE — 97110 THERAPEUTIC EXERCISES: CPT

## 2023-09-06 NOTE — FLOWSHEET NOTE
Physical Therapy Daily Treatment Note    Date:  2023    Patient Name:  Sheryle Donate    :  1950  MRN: [id number]  Restrictions/Precautions:     Medical/Treatment Diagnosis Information:   Diagnosis: M53.86 L Sciatica  Treatment Diagnosis: M53.86 L sciatica  Insurance/Certification information:  PT Insurance Information: Medicare, Medicaid  Physician Information:   Stephany  Plan of care signed (Y/N):  y  Visit# / total visits:  3/10  Pain level: 6/10 In back      Time In: 2:30  Time Out: 3:15    Progress Note: []  Yes  [x]  No  Next due by: Visit #10 , or 23     Subjective:  Notes the exercises help to reduce the pain for approx 30 mins and returns quickly after. Objective: Continued with standing exercises adding in squats this date with fair tolerance. Frequent redirection to task at hand. Observation:  Test measurements:   Primarily following standing extension ex . Exercises: (Low tolerance to laying down, getting up)  Exercise/Equipment Resistance/Repetitions Other comments   Modified extension 10x, x3    Back Bend 10 x2 Against counter for balance   Hip abd/ ext 15x    squats 10x mini   Lumbar roll                                                   [x] Provided verbal/tactile cueing for activities related to strengthening, flexibility, endurance, ROM. (23275)  [] Provided verbal/tactile cueing for activities related to improving balance, coordination, kinesthetic sense, posture, motor skill, proprioception. (36796)    Therapeutic Activities:     [] Therapeutic activities, direct (one-on-one) patient contact (use of dynamic activities to improve functional performance). (83968)    Gait:   [] Provided training and instruction to the patient for ambulation re-education.  (52117)    Self-Care/ADL's  [] Self-care/home management training and compensatory training, meal preparation, safety procedures, and instructions in use of assistive technology devices/adaptive equipment, direct

## 2023-09-07 NOTE — PROGRESS NOTES
I have reviewed and agree to the content of the note written by the PTA.   Electronically signed by Adriana Liu PT [id number]

## 2023-09-08 ENCOUNTER — HOSPITAL ENCOUNTER (OUTPATIENT)
Dept: PHYSICAL THERAPY | Age: 73
Setting detail: THERAPIES SERIES
Discharge: HOME | End: 2023-09-08
Attending: INTERNAL MEDICINE
Payer: MEDICARE

## 2023-09-08 PROCEDURE — 97110 THERAPEUTIC EXERCISES: CPT

## 2023-09-08 NOTE — FLOWSHEET NOTE
Physical Therapy Daily Treatment Note    Date:  2023    Patient Name:  Alexis Aguila    :  1950  MRN: [id number]  Restrictions/Precautions:     Medical/Treatment Diagnosis Information:   Diagnosis: M53.86 L Sciatica  Treatment Diagnosis: M53.86 L sciatica  Insurance/Certification information:  PT Insurance Information: Medicare, Medicaid  Physician Information:   Stephany  Plan of care signed (Y/N):  y  Visit# / total visits:  4/10  Pain level: 6/10 In back      Time In: 2:23  Time Out: 3:01    Progress Note: []  Yes  [x]  No  Next due by: Visit #10 , or 23     Subjective:  Notes the exercises help but not for long. Objective: Continued with standing exercises adding in STS this date with fair tolerance. Frequent redirection to task at hand. Observation:  Test measurements:   Primarily following standing extension ex . Exercises: (Low tolerance to laying down, getting up)  Exercise/Equipment Resistance/Repetitions Other comments   Modified extension 10x, x3    Back Bend 10 x2 Against counter for balance   Hip abd/ ext 15x    squats 10x mini   Lumbar roll educated    STS 5x BUE                                            [x] Provided verbal/tactile cueing for activities related to strengthening, flexibility, endurance, ROM. (68440)  [] Provided verbal/tactile cueing for activities related to improving balance, coordination, kinesthetic sense, posture, motor skill, proprioception. (51667)    Therapeutic Activities:     [] Therapeutic activities, direct (one-on-one) patient contact (use of dynamic activities to improve functional performance). (36809)    Gait:   [] Provided training and instruction to the patient for ambulation re-education. (99645)    Self-Care/ADL's  [] Self-care/home management training and compensatory training, meal preparation, safety procedures, and instructions in use of assistive technology devices/adaptive equipment, direct one-on-one contact.  (48339)    Home

## 2023-09-11 ENCOUNTER — HOSPITAL ENCOUNTER (OUTPATIENT)
Dept: PHYSICAL THERAPY | Age: 73
Setting detail: THERAPIES SERIES
Discharge: HOME | End: 2023-09-11
Attending: INTERNAL MEDICINE
Payer: MEDICARE

## 2023-09-11 NOTE — PROGRESS NOTES
Physical Therapy  Outpatient Physical Therapy    [x] [place]  Phone: [phone]  Fax: [phone]      [] [place]  Phone: [phone]  Fax: [phone]    Physical Therapy  Cancellation/No-show Note  Patient Name:  Katey Gonzalez  :  1950   Date:  2023  Cancelled visits to date: 0  No-shows to date: 1    For today's appointment patient:  []  Cancelled  []  Rescheduled appointment  [x]  No-show     Reason given by patient:  []  Patient ill  []  Conflicting appointment  []  No transportation    []  Conflict with work  [x]  No reason given  []  Other:     Comments:      Electronically signed by: Debra Painting PTA

## 2023-09-13 ENCOUNTER — HOSPITAL ENCOUNTER (OUTPATIENT)
Dept: PHYSICAL THERAPY | Age: 73
Setting detail: THERAPIES SERIES
Discharge: HOME | End: 2023-09-13
Attending: INTERNAL MEDICINE
Payer: MEDICARE

## 2023-09-13 PROCEDURE — 97110 THERAPEUTIC EXERCISES: CPT | Performed by: PHYSICAL THERAPY ASSISTANT

## 2023-09-15 ENCOUNTER — HOSPITAL ENCOUNTER (OUTPATIENT)
Dept: PHYSICAL THERAPY | Age: 73
Setting detail: THERAPIES SERIES
Discharge: HOME | End: 2023-09-15
Attending: INTERNAL MEDICINE
Payer: MEDICARE

## 2023-09-15 PROCEDURE — 97110 THERAPEUTIC EXERCISES: CPT

## 2023-09-19 ENCOUNTER — HOSPITAL ENCOUNTER (OUTPATIENT)
Dept: PHYSICAL THERAPY | Age: 73
Setting detail: THERAPIES SERIES
Discharge: HOME | End: 2023-09-19
Attending: INTERNAL MEDICINE
Payer: MEDICARE

## 2023-09-19 PROCEDURE — 97110 THERAPEUTIC EXERCISES: CPT

## 2023-09-19 NOTE — FLOWSHEET NOTE
Physical Therapy Daily Treatment Note    Date:  2023    Patient Name:  Curly Molina    :  1950  MRN: [id number]  Restrictions/Precautions:     Medical/Treatment Diagnosis Information:   Diagnosis: M53.86 L Sciatica  Treatment Diagnosis: M53.86 L sciatica  Insurance/Certification information:  PT Insurance Information: Medicare, Medicaid  Physician Information:   Stephany  Plan of care signed (Y/N):  y  Visit# / total visits:  7/10  Pain level: 8/10 In back      Time In: 1:41  Time Out: 2:25    Progress Note: []  Yes  [x]  No  Next due by: Visit #10 , or 23     Subjective:  Has been sore since starting therapy. Her symptoms are getting a lot better than when she started but still there. Objective:  ERIK complete per flow chart to facilitate strength, motion and stability to allow ease with daily activities. Verbal instructions provided throughout session. Initiated new exercises with increased pain going from supine to sit. Observation:  Test measurements: lacking 50% lumbar ext  . Exercises: (Low tolerance to laying down, getting up)  Exercise/Equipment Resistance/Repetitions Other comments   Modified extension 10x, x3    Back Bend 10 x2 Against counter for balance   Hip abd/ ext/ HS curls 20x    squats 10x2 mini   Lumbar roll educated    STS 10x No UE. 4'' booster   LTR 10x3\"    Supine Bridge 10x    Hip abd/add in supine 15x ea Ball, RED         Rows, Ext 15x RED                   [x] Provided verbal/tactile cueing for activities related to strengthening, flexibility, endurance, ROM. (40219)  [] Provided verbal/tactile cueing for activities related to improving balance, coordination, kinesthetic sense, posture, motor skill, proprioception. (63230)    Therapeutic Activities:     [] Therapeutic activities, direct (one-on-one) patient contact (use of dynamic activities to improve functional performance).  (56664)    Gait:   [] Provided training and instruction to the patient for

## 2023-09-20 NOTE — PROGRESS NOTES
I have reviewed and agree to the content of the note written by the PTA.   Electronically signed by Juan J Lewis PT [id number]

## 2023-09-21 ENCOUNTER — HOSPITAL ENCOUNTER (OUTPATIENT)
Dept: PHYSICAL THERAPY | Age: 73
Setting detail: THERAPIES SERIES
Discharge: HOME | End: 2023-09-21
Attending: INTERNAL MEDICINE
Payer: MEDICARE

## 2023-09-21 PROCEDURE — 97110 THERAPEUTIC EXERCISES: CPT

## 2023-09-21 NOTE — FLOWSHEET NOTE
Physical Therapy Daily Treatment Note    Date:  2023    Patient Name:  Yuly Lane    :  1950  MRN: [id number]  Restrictions/Precautions:     Medical/Treatment Diagnosis Information:   Diagnosis: M53.86 L Sciatica  Treatment Diagnosis: M53.86 L sciatica  Insurance/Certification information:  PT Insurance Information: Medicare, Medicaid  Physician Information:   Stephany  Plan of care signed (Y/N):  y  Visit# / total visits:  8/10  Pain level: 8/10 In back      Time In: 12:26  Time Out: 1:05    Progress Note: []  Yes  [x]  No  Next due by: Visit #10 , or 23     Subjective:  Has been sore since starting therapy. Her symptoms are getting a lot better than when she started but still there. Objective:  ERIK complete per flow chart to facilitate strength, motion and stability to allow ease with daily activities. Verbal instructions provided throughout session. Initiated new exercises with increased pain going from supine to sit. Observation:  Test measurements:   . Exercises: (Low tolerance to laying down, getting up)  Exercise/Equipment Resistance/Repetitions Other comments   Modified extension 10x, x3    Back Bend 10 x2 Against counter for balance   Hip abd/ ext/ HS curls/hip flex 20x    squats 10x2 mini   Lumbar roll educated    STS 10x No UE. 4'' booster   LTR 10x3\"    Supine Bridge 10x    Hip abd/add in supine 15x ea Ball, RED         Rows, Ext 10x green                    [x] Provided verbal/tactile cueing for activities related to strengthening, flexibility, endurance, ROM. (25535)  [] Provided verbal/tactile cueing for activities related to improving balance, coordination, kinesthetic sense, posture, motor skill, proprioception. (10103)    Therapeutic Activities:     [] Therapeutic activities, direct (one-on-one) patient contact (use of dynamic activities to improve functional performance).  (81642)    Gait:   [] Provided training and instruction to the patient for ambulation

## 2023-09-25 ENCOUNTER — HOSPITAL ENCOUNTER (OUTPATIENT)
Dept: PHYSICAL THERAPY | Age: 73
Setting detail: THERAPIES SERIES
Discharge: HOME | End: 2023-09-25
Attending: INTERNAL MEDICINE
Payer: MEDICARE
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
## 2023-09-27 ENCOUNTER — HOSPITAL ENCOUNTER (OUTPATIENT)
Dept: PHYSICAL THERAPY | Age: 73
Setting detail: THERAPIES SERIES
Discharge: HOME | End: 2023-09-27
Attending: INTERNAL MEDICINE
Payer: MEDICARE

## 2023-09-27 PROCEDURE — 97110 THERAPEUTIC EXERCISES: CPT

## 2023-09-27 RX ORDER — ALBUTEROL SULFATE 90 UG/1
2 AEROSOL, METERED RESPIRATORY (INHALATION) EVERY 6 HOURS PRN
Qty: 3 EACH | Refills: 3 | Status: SHIPPED | OUTPATIENT
Start: 2023-09-27 | End: 2024-09-26

## 2023-09-27 NOTE — FLOWSHEET NOTE
Physical Therapy Daily Treatment Note    Date:  2023    Patient Name:  Juan Francisco Benton    :  1950  MRN: [id number]  Restrictions/Precautions:     Medical/Treatment Diagnosis Information:   Diagnosis: M53.86 L Sciatica  Treatment Diagnosis: M53.86 L sciatica  Insurance/Certification information:  PT Insurance Information: Medicare, Medicaid  Physician Information:   Stephany  Plan of care signed (Y/N):  y  Visit# / total visits:  9/10  Pain level: 8/10 In back      Time In: 2:02  Time Out: 2:40    Progress Note: [x]  Yes  []  No  Next due by: Visit #10 , or 23     Subjective:  Pt reports has been having tummy issues and is tired. Has issues vacuuming. \"Therapy has helped somewhat however not much. \" Does not want to do pain management. Objective:  ERIK complete per flow chart to facilitate strength, motion and stability to allow ease with daily activities. Verbal instructions provided throughout session. Observation:  Test measurements:   . Exercises: (Low tolerance to laying down, getting up)  Exercise/Equipment Resistance/Repetitions Other comments   Modified extension 10x, x3    Back Bend 10 x2 Against counter for balance   Hip abd/ ext/ HS curls/hip flex 20x    squats 10x2 mini   Lumbar roll educated    STS 10x No UE. 4'' booster   LTR 10x3\"    Supine Bridge 10x    Hip abd/add in supine 15x ea Ball, RED         Rows, Ext 10x green                    [x] Provided verbal/tactile cueing for activities related to strengthening, flexibility, endurance, ROM. (87249)  [] Provided verbal/tactile cueing for activities related to improving balance, coordination, kinesthetic sense, posture, motor skill, proprioception. (62148)    Therapeutic Activities:     [] Therapeutic activities, direct (one-on-one) patient contact (use of dynamic activities to improve functional performance). (36231)    Gait:   [] Provided training and instruction to the patient for ambulation re-education.

## 2023-10-03 ENCOUNTER — HOSPITAL ENCOUNTER (OUTPATIENT)
Dept: PHYSICAL THERAPY | Age: 73
Setting detail: THERAPIES SERIES
Discharge: HOME | End: 2023-10-03
Attending: INTERNAL MEDICINE
Payer: MEDICARE

## 2023-10-03 ENCOUNTER — APPOINTMENT (OUTPATIENT)
Dept: PHYSICAL THERAPY | Age: 73
End: 2023-10-03
Attending: INTERNAL MEDICINE
Payer: MEDICARE

## 2023-10-03 DIAGNOSIS — G89.29 CHRONIC BILATERAL LOW BACK PAIN WITHOUT SCIATICA: ICD-10-CM

## 2023-10-03 DIAGNOSIS — M54.50 CHRONIC BILATERAL LOW BACK PAIN WITHOUT SCIATICA: ICD-10-CM

## 2023-10-03 PROCEDURE — 97110 THERAPEUTIC EXERCISES: CPT

## 2023-10-03 RX ORDER — OXYCODONE AND ACETAMINOPHEN 10; 325 MG/1; MG/1
1 TABLET ORAL EVERY 6 HOURS PRN
Qty: 90 TABLET | Refills: 0 | Status: SHIPPED | OUTPATIENT
Start: 2023-10-03 | End: 2023-11-02

## 2023-10-03 NOTE — FLOWSHEET NOTE
training, meal preparation, safety procedures, and instructions in use of assistive technology devices/adaptive equipment, direct one-on-one contact. (38349)    Home Exercise Program:   Lumbar modified extension program for post derangement  [x] Reviewed/Progressed HEP activities related to strengthening, flexibility, endurance, ROM. (95738)  [] Reviewed/Progressed HEP activities related to improving balance, coordination, kinesthetic sense, posture, motor skill, proprioception.  (25981)    Manual Treatments:    [] Provided manual therapy to mobilize soft tissue/joints for the purpose of modulating pain, promoting relaxation,  increasing ROM, reducing/eliminating soft tissue swelling/inflammation/restriction, improving soft tissue extensibility.  (68482)    Service Based Modalities:      Timed Code Treatment Minutes:   43' ERIK    Total Treatment Minutes:   43'     Treatment/Activity Tolerance:  [x] Patient tolerated treatment well [] Patient limited by fatique  [] Patient limited by pain  [] Patient limited by other medical complications  [] Other:     Prognosis: [x] Good [] Fair  [] Poor    Patient Requires Follow-up: [x] Yes  [] No      Goals:  Short Term Goals  Time Frame for Short Term Goals: 1 week  Short Term Goal 1: Start low back HEP (Reviewed this date)    Long Term Goals  Time Frame for Long Term Goals : 4 weeks  Long Term Goal 1: LBP controlled at 2-3/10 (8/10)  Long Term Goal 2: L sciatica resolved 75% to allow regular ADL (25% Improvement)  Long Term Goal 3: Able to bend sufficiently to put on shoes (able)  Long Term Goal 4: Able to wlk 600 ft before becomes a problem (500')    Plan:   [x] Continue per plan of care [] Alter current plan (see comments)  [] Plan of care initiated [] Hold pending MD visit [] Discharge    Plan for Next Session:  Try 2 more weeks then discharge    Electronically signed by:  Cammy Felix PTA

## 2023-10-03 NOTE — TELEPHONE ENCOUNTER
Patient called in requesting a refill of her pain medication so her home health nurse can pick it up before her visit.     Script is pended if approved

## 2023-10-04 NOTE — PROGRESS NOTES
I have reviewed and agree to the content of the note written by the PTA.   Electronically signed by Sharri Ahumada PT [id number]

## 2023-10-05 ENCOUNTER — HOSPITAL ENCOUNTER (OUTPATIENT)
Dept: PHYSICAL THERAPY | Age: 73
Setting detail: THERAPIES SERIES
Discharge: HOME | End: 2023-10-05
Attending: INTERNAL MEDICINE
Payer: MEDICARE

## 2023-10-05 PROCEDURE — 97110 THERAPEUTIC EXERCISES: CPT | Performed by: PHYSICAL THERAPIST

## 2023-10-05 NOTE — FLOWSHEET NOTE
assistive technology devices/adaptive equipment, direct one-on-one contact. (11216)    Home Exercise Program:   Lumbar modified extension program for post derangement  [x] Reviewed/Progressed HEP activities related to strengthening, flexibility, endurance, ROM. (64710)  [] Reviewed/Progressed HEP activities related to improving balance, coordination, kinesthetic sense, posture, motor skill, proprioception.  (37455)    Manual Treatments:    [] Provided manual therapy to mobilize soft tissue/joints for the purpose of modulating pain, promoting relaxation,  increasing ROM, reducing/eliminating soft tissue swelling/inflammation/restriction, improving soft tissue extensibility.  (11783)    Service Based Modalities:      Timed Code Treatment Minutes:   36' ERIK    Total Treatment Minutes:   36'     Treatment/Activity Tolerance:  [x] Patient tolerated treatment well [] Patient limited by fatique  [] Patient limited by pain  [] Patient limited by other medical complications  [] Other:     Prognosis: [x] Good [] Fair  [] Poor    Patient Requires Follow-up: [x] Yes  [] No      Goals:  Short Term Goals  Time Frame for Short Term Goals: 1 week  Short Term Goal 1: Start low back HEP - met    Long Term Goals  Time Frame for Long Term Goals : 4 weeks  Long Term Goal 1: LBP controlled at 2-3/10   Long Term Goal 2: L sciatica resolved 75% to allow regular ADL (25% Improvement)  Long Term Goal 3: Able to bend sufficiently to put on shoes - met  Long Term Goal 4: Able to wlk 600 ft before becomes a problem (500')    Plan:   [x] Continue per plan of care [] Alter current plan (see comments)  [] Plan of care initiated [] Hold pending MD visit [] Discharge    Plan for Next Session:  Try 2 more weeks then discharge    Electronically signed by:  Adriana Liu PT

## 2023-10-10 ENCOUNTER — APPOINTMENT (OUTPATIENT)
Dept: PHYSICAL THERAPY | Age: 73
End: 2023-10-10
Attending: INTERNAL MEDICINE
Payer: MEDICARE

## 2023-10-11 ENCOUNTER — HOSPITAL ENCOUNTER (OUTPATIENT)
Dept: PHYSICAL THERAPY | Age: 73
Setting detail: THERAPIES SERIES
Discharge: HOME | End: 2023-10-11
Attending: INTERNAL MEDICINE
Payer: MEDICARE

## 2023-10-11 PROCEDURE — 97110 THERAPEUTIC EXERCISES: CPT

## 2023-10-11 NOTE — FLOWSHEET NOTE
Physical Therapy Daily Treatment Note    Date:  10/11/2023    Patient Name:  Nhan Jeffrey    :  1950  MRN: [id number]  Restrictions/Precautions:     Medical/Treatment Diagnosis Information:   Diagnosis: M53.86 L Sciatica  Treatment Diagnosis: M53.86 L sciatica  Insurance/Certification information:  PT Insurance Information: Medicare, Medicaid  Physician Information:   Stephany  Plan of care signed (Y/N):  y  Visit# / total visits:  2/10 of 2nd POC, Total 12  Pain level: 8/10 In back      Time In: 2:02 pm   Time Out: 2:33 pm     Progress Note: []  Yes  [x]  No  Next due by: Visit #10 , or 10/26/23     Subjective: Upon arrival pt reports fatigue and increased back pain this date. Denies any L leg pain. Objective:   ERIK complete per flow chart to facilitate strength, motion and stability to allow ease with daily activities. Verbal instructions provided throughout session. Rest breaks needed throughout session due to fatigue. Observation:  Test measurements:   Pain in central low back with extension. .   Exercises: (Low tolerance to laying down, getting up)  Exercise/Equipment Resistance/Repetitions Other comments   Modified extension 10x, x3    Back Bend 10 x2 Against counter for balance   Hip abd/ ext/ HS curls/hip flex 20x    squats 10x2 mini   Lumbar roll educated    STS 10x No UE. 4'' booster   LTR    Supine Bridge Narrow, wide   Hip abd/add  10x ea green     HS curl seated      Rows, Ext    Step ups  15x each              [x] Provided verbal/tactile cueing for activities related to strengthening, flexibility, endurance, ROM. (53601)  [] Provided verbal/tactile cueing for activities related to improving balance, coordination, kinesthetic sense, posture, motor skill, proprioception. (59403)    Therapeutic Activities:     [] Therapeutic activities, direct (one-on-one) patient contact (use of dynamic activities to improve functional performance).  (40671)    Gait:   [] Provided training and

## 2023-10-12 ENCOUNTER — HOSPITAL ENCOUNTER (OUTPATIENT)
Dept: PHYSICAL THERAPY | Age: 73
Setting detail: THERAPIES SERIES
Discharge: HOME | End: 2023-10-12
Attending: INTERNAL MEDICINE
Payer: MEDICARE

## 2023-10-12 PROCEDURE — 97110 THERAPEUTIC EXERCISES: CPT | Performed by: PHYSICAL THERAPIST

## 2023-10-12 NOTE — PROGRESS NOTES
I have reviewed and agree to the content of the note written by the PTA.   Electronically signed by Raj Corado PT [id number] Subjective:       Patient ID: Maggi Jackson is a 52 y.o. female.    Chief Complaint:  Annual Exam      History of Present Illness  HPI  Annual Exam-Postmenopausal  Patient presents for annual exam. The patient has no complaints today--wants std testing. The patient is sexually active--usual partner; denies pelvic pain GYN screening history: last pap: approximate date 2021; pap neg; +hpv and was normal. The patient is not currently taking hormone replacement therapy. Patient denies post-menopausal vaginal bleeding. The patient wears seatbelts: yes. The patient participates in regular exercise: no. Has the patient ever been transfused or tattooed?: +tatooes. The patient reports that there is not domestic violence in her life.    Denies hot flushes, night sweats  No problems sleeping  Denies leak of urine    GYN & OB History  No LMP recorded. Patient is perimenopausal. 2018  Date of Last Pap: 2022    OB History    Para Term  AB Living   2 2 2     2   SAB IAB Ectopic Multiple Live Births           2      # Outcome Date GA Lbr Sherman/2nd Weight Sex Delivery Anes PTL Lv   2 Term      CS-Unspec   GISSEL   1 Term      Vag-Spont   GISSEL       Review of Systems  Review of Systems   All other systems reviewed and are negative.          Objective:      Physical Exam:   Constitutional: She is oriented to person, place, and time. She appears well-developed and well-nourished.     Eyes: Pupils are equal, round, and reactive to light. Conjunctivae and EOM are normal.      Pulmonary/Chest: Effort normal. Right breast exhibits no mass, no nipple discharge, no skin change and no tenderness. Left breast exhibits no mass, no nipple discharge, no skin change and no tenderness. Breasts are symmetrical.        Abdominal: Soft.     Genitourinary:    Vagina, uterus, right adnexa, left adnexa and rectum normal.      Pelvic exam was performed with patient supine.   The external female genitalia was normal.   Labial  bartholins normal.Cervix is normal. Right adnexum displays no mass and no tenderness. Left adnexum displays no mass and no tenderness. No erythema,  no vaginal discharge, bleeding, rectocele, cystocele or unspecified prolapse of vaginal walls in the vagina. Vagina was moist.Cervix exhibits no motion tenderness and no friability. Also,  pap results normal  (collected)  Uerus contour normal  Normal urethral meatus.Urethra findings: no urethral massBladder findings: no bladder distention and no bladder tenderness          Musculoskeletal: Normal range of motion and moves all extremeties.       Neurological: She is alert and oriented to person, place, and time.    Skin: Skin is warm.    Psychiatric: She has a normal mood and affect. Her behavior is normal.           Assessment:        1. Encounter for gynecological examination (general) (routine) without abnormal findings    2. Screening for cervical cancer    3. Screening mammogram, encounter for    4. High risk heterosexual behavior               Plan:      Continue annual well woman exam.  Pap today; Reviewed updated recommendations for pap smears (every 3 years) in low risk patients.   Recommend annual pelvic exams.  Reviewed recommendations for annual CBE.  mammo ordered, continue yearly until age 73  Gc/ct per pt request  Rpr, hep c per pt request  Continue diet, exercise, weight loss  Colonoscopy current, 2018 due in 2028

## 2023-10-12 NOTE — FLOWSHEET NOTE
Physical Therapy Daily Treatment Note    Date:  10/12/2023    Patient Name:  John Martinez    :  1950  MRN: [id number]  Restrictions/Precautions:     Medical/Treatment Diagnosis Information:   Diagnosis: M53.86 L Sciatica  Treatment Diagnosis: M53.86 L sciatica  Insurance/Certification information:  PT Insurance Information: Medicare, Medicaid  Physician Information:   Stephany  Plan of care signed (Y/N):  y  Visit# / total visits:  3/10 of 2nd POC, Total 13  Pain level: 8/10 In back      Time In: 3:04  Time Out: 3:41    Progress Note: []  Yes  [x]  No  Next due by: Visit #10 , or 10/26/23     Subjective:     Objective:     Observation:  Test measurements:   Flexion in standing to mid shin , no pain. L sciatica has been resolved for a couple weeks. Can now safely add flexion to her HEP to address the central LBP that persists. Will start with modified extension motion. Do flexion in sitting. Then finish with modified extension. .   Exercises: (Low tolerance to laying down, getting up)  Exercise/Equipment Resistance/Repetitions Other comments   Modified extension 10x, x3    Back Bend 10 x2 Against counter for balance   Flexion in sitting 5x x2    Hip abd/ ext/ HS curls/hip flex 20x    squats 10 mini        STS 10x No UE. 4'' booster   LTR    Supine Bridge Narrow, wide   Hip abd/add  Grn 20x     HS curl seated  Red 20x    Rows, Ext     Step ups  15x each              [x] Provided verbal/tactile cueing for activities related to strengthening, flexibility, endurance, ROM. (26615)  [] Provided verbal/tactile cueing for activities related to improving balance, coordination, kinesthetic sense, posture, motor skill, proprioception. (72173)    Therapeutic Activities:     [] Therapeutic activities, direct (one-on-one) patient contact (use of dynamic activities to improve functional performance). (71997)    Gait:   [] Provided training and instruction to the patient for ambulation re-education.

## 2023-10-17 ENCOUNTER — HOSPITAL ENCOUNTER (OUTPATIENT)
Dept: PHYSICAL THERAPY | Age: 73
Setting detail: THERAPIES SERIES
Discharge: HOME | End: 2023-10-17
Attending: INTERNAL MEDICINE
Payer: MEDICARE

## 2023-10-17 PROCEDURE — 97110 THERAPEUTIC EXERCISES: CPT

## 2023-10-17 NOTE — FLOWSHEET NOTE
Physical Therapy Daily Treatment Note    Date:  10/17/2023    Patient Name:  Tricia Stevenson    :  1950  MRN: [id number]  Restrictions/Precautions:     Medical/Treatment Diagnosis Information:   Diagnosis: M53.86 L Sciatica  Treatment Diagnosis: M53.86 L sciatica  Insurance/Certification information:  PT Insurance Information: Medicare, Medicaid  Physician Information:   Stephany  Plan of care signed (Y/N):  y  Visit# / total visits:  3/10 of 2nd POC, Total 13  Pain level: /10 In back      Time In: 3:30  Time Out: 4:00    Progress Note: []  Yes  [x]  No  Next due by: Visit #10 , or 10/26/23     Subjective: Patient 15 minutes late to session. Objective:   Working on extension, flexion in sitting and then back to extension. Observation:  Test measurements:     Can now safely add flexion to her HEP to address the central LBP that persists. Will start with modified extension motion. Do flexion in sitting. Then finish with modified extension. .   Exercises: (Low tolerance to laying down, getting up)  Exercise/Equipment Resistance/Repetitions Other comments   Modified extension 10x, x3    Back Bend 10 x2 Against counter for balance   Flexion in sitting 5x x2    Hip abd/ ext/ HS curls/hip flex 20x    squats mini       STS No UE. 4'' booster   LTR    Supine Bridge Narrow, wide   Hip abd/add      HS curl seated     Rows, Ext    Step ups               [x] Provided verbal/tactile cueing for activities related to strengthening, flexibility, endurance, ROM. (43570)  [] Provided verbal/tactile cueing for activities related to improving balance, coordination, kinesthetic sense, posture, motor skill, proprioception. (62051)    Therapeutic Activities:     [] Therapeutic activities, direct (one-on-one) patient contact (use of dynamic activities to improve functional performance). (76178)    Gait:   [] Provided training and instruction to the patient for ambulation re-education.  (38111)    Self-Care/ADL's  []

## 2023-10-18 NOTE — PROGRESS NOTES
I have reviewed and agree to the content of the note written by the PTA.   Electronically signed by Dionisio Velez PT [id number]

## 2023-10-19 ENCOUNTER — HOSPITAL ENCOUNTER (OUTPATIENT)
Dept: PHYSICAL THERAPY | Age: 73
Setting detail: THERAPIES SERIES
Discharge: HOME | End: 2023-10-19
Attending: INTERNAL MEDICINE
Payer: MEDICARE

## 2023-10-19 PROCEDURE — 97110 THERAPEUTIC EXERCISES: CPT

## 2023-10-19 NOTE — FLOWSHEET NOTE
contact (use of dynamic activities to improve functional performance). (66867)    Gait:   [] Provided training and instruction to the patient for ambulation re-education. (02457)    Self-Care/ADL's  [] Self-care/home management training and compensatory training, meal preparation, safety procedures, and instructions in use of assistive technology devices/adaptive equipment, direct one-on-one contact. (49816)    Home Exercise Program:   Lumbar modified extension program for post derangement  [x] Reviewed/Progressed HEP activities related to strengthening, flexibility, endurance, ROM. (05661)  [] Reviewed/Progressed HEP activities related to improving balance, coordination, kinesthetic sense, posture, motor skill, proprioception.  (49483)    Manual Treatments:    [] Provided manual therapy to mobilize soft tissue/joints for the purpose of modulating pain, promoting relaxation,  increasing ROM, reducing/eliminating soft tissue swelling/inflammation/restriction, improving soft tissue extensibility.  (77846)    Service Based Modalities:      Timed Code Treatment Minutes:   28' ERIK    Total Treatment Minutes:   28'     Treatment/Activity Tolerance:  [x] Patient tolerated treatment well [] Patient limited by fatique  [] Patient limited by pain  [] Patient limited by other medical complications  [] Other:     Prognosis: [x] Good [] Fair  [] Poor    Patient Requires Follow-up: [x] Yes  [] No      Goals:  Short Term Goals  Time Frame for Short Term Goals: 1 week  Short Term Goal 1: Start low back HEP - met    Long Term Goals  Time Frame for Long Term Goals : 4 weeks  Long Term Goal 1: LBP controlled at 2-3/10   Long Term Goal 2: L sciatica resolved 75% to allow regular ADL (25% Improvement)  Long Term Goal 3: Able to bend sufficiently to put on shoes - met  Long Term Goal 4: Able to wlk 600 ft before becomes a problem (500')    Plan:   [x] Continue per plan of care [] Alter current plan (see comments)  [] Plan of care

## 2023-10-24 ENCOUNTER — HOSPITAL ENCOUNTER (OUTPATIENT)
Dept: PHYSICAL THERAPY | Age: 73
Setting detail: THERAPIES SERIES
Discharge: HOME | End: 2023-10-24
Attending: INTERNAL MEDICINE
Payer: MEDICARE

## 2023-10-24 PROCEDURE — 97110 THERAPEUTIC EXERCISES: CPT

## 2023-10-24 NOTE — FLOWSHEET NOTE
Physical Therapy Daily Treatment Note    Date:  10/24/2023    Patient Name:  El Jimenez    :  1950  MRN: [id number]  Restrictions/Precautions:     Medical/Treatment Diagnosis Information:   Diagnosis: M53.86 L Sciatica  Treatment Diagnosis: M53.86 L sciatica  Insurance/Certification information:  PT Insurance Information: Medicare, Medicaid  Physician Information:   Stephany  Plan of care signed (Y/N):  y  Visit# / total visits:  5/10 of 2nd POC, Total 15  Pain level: 4/10 In back      Time In: 3:31 pm  Time Out: 4:09 pm     Progress Note: []  Yes  [x]  No  Next due by: Visit #10 , or 10/26/23     Subjective: Upon arrival pt reports sharp, back pain this date. Took pain meds before therapy to reduce pain. No changes in pain after session. Objective:   ERIK complete per flow chart to work on extension, flexion in sitting and then back to extension. Verbal cuing and instructions provided throughout session to stay on task. Observation:  Test measurements:   Can now safely continue flexion to her HEP to address the central LBP that persists. Will start with modified extension motion. Do flexion in sitting. Then finish with modified extension. .   Exercises: (Low tolerance to laying down, getting up)  Exercise/Equipment Resistance/Repetitions Other comments   Modified extension 10x, x3    Back Bend 10 x2 Against counter for balance   Flexion in sitting 10x x2    Hip abd/ ext/ HS curls/hip flex 20x    Squat matrix  10x3 mini       STS 10x No UE. 4'' booster   LTR 10xL, 10x R    Supine Bridge 10x x2 Narrow, wide   Hip abd/add  Grn 20x     HS curl seated  Red 20x    Rows, Ext Red 10x     Step ups  15x each F, L             [x] Provided verbal/tactile cueing for activities related to strengthening, flexibility, endurance, ROM. (07509)  [] Provided verbal/tactile cueing for activities related to improving balance, coordination, kinesthetic sense, posture, motor skill, proprioception.

## 2023-10-25 DIAGNOSIS — F32.A DEPRESSION, UNSPECIFIED DEPRESSION TYPE: ICD-10-CM

## 2023-10-25 DIAGNOSIS — K21.9 GASTROESOPHAGEAL REFLUX DISEASE WITHOUT ESOPHAGITIS: ICD-10-CM

## 2023-10-26 ENCOUNTER — APPOINTMENT (OUTPATIENT)
Dept: PHYSICAL THERAPY | Age: 73
End: 2023-10-26
Attending: INTERNAL MEDICINE
Payer: MEDICARE

## 2023-10-26 RX ORDER — CITALOPRAM HYDROBROMIDE 10 MG/1
TABLET ORAL
Qty: 90 TABLET | Refills: 3 | Status: SHIPPED | OUTPATIENT
Start: 2023-10-26

## 2023-10-26 RX ORDER — PANTOPRAZOLE SODIUM 40 MG/1
TABLET, DELAYED RELEASE ORAL
Qty: 90 TABLET | Refills: 3 | Status: SHIPPED | OUTPATIENT
Start: 2023-10-26

## 2023-10-26 RX ORDER — AMITRIPTYLINE HYDROCHLORIDE 25 MG/1
TABLET, FILM COATED ORAL
Qty: 90 TABLET | Refills: 3 | Status: SHIPPED | OUTPATIENT
Start: 2023-10-26

## 2023-10-30 DIAGNOSIS — G45.9 TIA (TRANSIENT ISCHEMIC ATTACK): ICD-10-CM

## 2023-10-30 DIAGNOSIS — I67.82 CHRONIC CEREBRAL ISCHEMIA: ICD-10-CM

## 2023-10-31 DIAGNOSIS — G89.29 CHRONIC BILATERAL LOW BACK PAIN WITHOUT SCIATICA: ICD-10-CM

## 2023-10-31 DIAGNOSIS — M54.50 CHRONIC BILATERAL LOW BACK PAIN WITHOUT SCIATICA: ICD-10-CM

## 2023-10-31 RX ORDER — CLOPIDOGREL BISULFATE 75 MG/1
TABLET ORAL
Qty: 90 TABLET | Refills: 3 | Status: SHIPPED | OUTPATIENT
Start: 2023-10-31

## 2023-10-31 RX ORDER — OXYCODONE AND ACETAMINOPHEN 10; 325 MG/1; MG/1
1 TABLET ORAL EVERY 6 HOURS PRN
Qty: 90 TABLET | Refills: 0 | Status: SHIPPED | OUTPATIENT
Start: 2023-10-31 | End: 2023-11-30

## 2023-10-31 NOTE — TELEPHONE ENCOUNTER
Patient called in requesting a refill on her pain medication so her home health nurse can  and bring it tomorrow when she does her visit.     Script pended if approved

## 2023-11-03 ENCOUNTER — TELEPHONE (OUTPATIENT)
Dept: GASTROENTEROLOGY | Age: 73
End: 2023-11-03

## 2023-11-03 ENCOUNTER — TELEPHONE (OUTPATIENT)
Dept: INTERNAL MEDICINE | Age: 73
End: 2023-11-03
Payer: MEDICARE

## 2023-11-03 DIAGNOSIS — F41.9 ANXIETY AND DEPRESSION: ICD-10-CM

## 2023-11-03 DIAGNOSIS — K75.81 LIVER CIRRHOSIS SECONDARY TO NASH (HCC): ICD-10-CM

## 2023-11-03 DIAGNOSIS — F32.A ANXIETY AND DEPRESSION: ICD-10-CM

## 2023-11-03 DIAGNOSIS — I49.9 CARDIAC ARRHYTHMIA, UNSPECIFIED CARDIAC ARRHYTHMIA TYPE: Primary | ICD-10-CM

## 2023-11-03 DIAGNOSIS — I70.90 ARTERIOSCLEROTIC VASCULAR DISEASE: ICD-10-CM

## 2023-11-03 DIAGNOSIS — I35.0 NONRHEUMATIC AORTIC VALVE STENOSIS: ICD-10-CM

## 2023-11-03 DIAGNOSIS — K74.60 LIVER CIRRHOSIS SECONDARY TO NASH (HCC): ICD-10-CM

## 2023-11-03 PROCEDURE — G0179 MD RECERTIFICATION HHA PT: HCPCS | Performed by: INTERNAL MEDICINE

## 2023-11-08 ENCOUNTER — OFFICE VISIT (OUTPATIENT)
Dept: PULMONOLOGY | Age: 73
End: 2023-11-08
Payer: MEDICARE

## 2023-11-08 VITALS
TEMPERATURE: 96.8 F | OXYGEN SATURATION: 95 % | HEIGHT: 64 IN | WEIGHT: 234.4 LBS | SYSTOLIC BLOOD PRESSURE: 102 MMHG | BODY MASS INDEX: 40.02 KG/M2 | HEART RATE: 52 BPM | DIASTOLIC BLOOD PRESSURE: 62 MMHG

## 2023-11-08 DIAGNOSIS — I50.32 CHRONIC DIASTOLIC CONGESTIVE HEART FAILURE (HCC): ICD-10-CM

## 2023-11-08 DIAGNOSIS — I10 PRIMARY HYPERTENSION: ICD-10-CM

## 2023-11-08 DIAGNOSIS — K74.60 LIVER CIRRHOSIS SECONDARY TO NASH (HCC): ICD-10-CM

## 2023-11-08 DIAGNOSIS — E66.01 OBESITY, CLASS III, BMI 40-49.9 (MORBID OBESITY) (HCC): ICD-10-CM

## 2023-11-08 DIAGNOSIS — K75.81 LIVER CIRRHOSIS SECONDARY TO NASH (HCC): ICD-10-CM

## 2023-11-08 DIAGNOSIS — G47.33 OSA ON CPAP: Primary | ICD-10-CM

## 2023-11-08 PROCEDURE — 1123F ACP DISCUSS/DSCN MKR DOCD: CPT | Performed by: INTERNAL MEDICINE

## 2023-11-08 PROCEDURE — G8400 PT W/DXA NO RESULTS DOC: HCPCS | Performed by: INTERNAL MEDICINE

## 2023-11-08 PROCEDURE — 1090F PRES/ABSN URINE INCON ASSESS: CPT | Performed by: INTERNAL MEDICINE

## 2023-11-08 PROCEDURE — G8484 FLU IMMUNIZE NO ADMIN: HCPCS | Performed by: INTERNAL MEDICINE

## 2023-11-08 PROCEDURE — G8417 CALC BMI ABV UP PARAM F/U: HCPCS | Performed by: INTERNAL MEDICINE

## 2023-11-08 PROCEDURE — 3017F COLORECTAL CA SCREEN DOC REV: CPT | Performed by: INTERNAL MEDICINE

## 2023-11-08 PROCEDURE — G8427 DOCREV CUR MEDS BY ELIG CLIN: HCPCS | Performed by: INTERNAL MEDICINE

## 2023-11-08 PROCEDURE — 1036F TOBACCO NON-USER: CPT | Performed by: INTERNAL MEDICINE

## 2023-11-08 PROCEDURE — 99213 OFFICE O/P EST LOW 20 MIN: CPT | Performed by: INTERNAL MEDICINE

## 2023-11-08 PROCEDURE — 3074F SYST BP LT 130 MM HG: CPT | Performed by: INTERNAL MEDICINE

## 2023-11-08 PROCEDURE — 3078F DIAST BP <80 MM HG: CPT | Performed by: INTERNAL MEDICINE

## 2023-11-08 ASSESSMENT — ENCOUNTER SYMPTOMS
EYES NEGATIVE: 1
RESPIRATORY NEGATIVE: 1
BACK PAIN: 1
GASTROINTESTINAL NEGATIVE: 1

## 2023-12-04 NOTE — PRE SEDATION
daily (with breakfast)   Yes Historical Provider, MD   Multiple Vitamin (MULTI VITAMIN DAILY) TABS Take by mouth daily   Yes Historical Provider, MD   ibuprofen (ADVIL;MOTRIN) 400 MG tablet TAKE 1 TABLET EVERY 6 HOURS AS NEEDED FOR PAIN 10/10/18   Joann Mcgill MD   triamcinolone (KENALOG) 0.1 % cream APPLY TOPICALLY TWICE DAILY 9/18/18   Joann Mcgill MD   clopidogrel (PLAVIX) 75 MG tablet TAKE 1 TABLET BY MOUTH EVERY DAY 9/12/15   Yoli Benites MD   gabapentin (NEURONTIN) 300 MG capsule TAKE 2 CAPSULES TWICE DAILY. 9/15/18 9/22/18  Christiano Parra,    loratadine (CLARITIN) 10 MG tablet Take 10 mg by mouth daily as needed    Historical Provider, MD   nystatin (MYCOSTATIN) 122957 UNIT/GM powder Apply 3 times daily. 7/11/18   Consuelo Jones, APRN - CNP   SENNA LAX 8.6 MG tablet TAKE 1 TABLET BY MOUTH TWICE DAILY 10/13/17   Joann Mcgill MD   ondansetron (ZOFRAN) 4 MG tablet Take 1 tablet by mouth every 8 hours as needed for Nausea or Vomiting 5/16/17   Joann Mcgill MD   nitroGLYCERIN (NITROSTAT) 0.4 MG SL tablet Place 0.4 mg under the tongue every 5 minutes as needed for Chest pain Dissolve 1 tab under tongue at first sign of chest pain. May repeat every 5 minutes until relief is obtained. If pain persists after taking 3 tabs in a 15-minute period, or the pain is different than is typically experienced, call 9-1-1 immediately. Historical Provider, MD     Coumadin Use Last 7 Days:  no  Antiplatelet drug therapy use last 7 days: no  Other anticoagulant use last 7 days: no  Additional Medication Information:        Pre-Sedation Documentation and Exam:   I have personally completed a history, physical exam & review of systems for this patient (see notes).     Mallampati Airway Assessment:  normal, Mallampati Class I - (soft palate, fauces, uvula & anterior/posterior tonsillar pillars are visible)    Prior History of Anesthesia Complications:   none    ASA Classification:  Class 2 - A normal None

## 2023-12-13 ENCOUNTER — OFFICE VISIT (OUTPATIENT)
Dept: CARDIOLOGY | Age: 73
End: 2023-12-13
Payer: MEDICARE

## 2023-12-13 VITALS
SYSTOLIC BLOOD PRESSURE: 108 MMHG | DIASTOLIC BLOOD PRESSURE: 56 MMHG | BODY MASS INDEX: 39.95 KG/M2 | WEIGHT: 234 LBS | HEART RATE: 48 BPM | HEIGHT: 64 IN

## 2023-12-13 DIAGNOSIS — E78.5 HYPERLIPIDEMIA, UNSPECIFIED HYPERLIPIDEMIA TYPE: ICD-10-CM

## 2023-12-13 DIAGNOSIS — I10 ESSENTIAL HYPERTENSION: ICD-10-CM

## 2023-12-13 DIAGNOSIS — I25.118 CORONARY ARTERY DISEASE INVOLVING NATIVE HEART WITH OTHER FORM OF ANGINA PECTORIS, UNSPECIFIED VESSEL OR LESION TYPE (HCC): Primary | ICD-10-CM

## 2023-12-13 DIAGNOSIS — I50.32 CHRONIC DIASTOLIC CONGESTIVE HEART FAILURE (HCC): ICD-10-CM

## 2023-12-13 PROCEDURE — G8484 FLU IMMUNIZE NO ADMIN: HCPCS | Performed by: INTERNAL MEDICINE

## 2023-12-13 PROCEDURE — G8417 CALC BMI ABV UP PARAM F/U: HCPCS | Performed by: INTERNAL MEDICINE

## 2023-12-13 PROCEDURE — 3078F DIAST BP <80 MM HG: CPT | Performed by: INTERNAL MEDICINE

## 2023-12-13 PROCEDURE — 99214 OFFICE O/P EST MOD 30 MIN: CPT | Performed by: INTERNAL MEDICINE

## 2023-12-13 PROCEDURE — 3074F SYST BP LT 130 MM HG: CPT | Performed by: INTERNAL MEDICINE

## 2023-12-13 PROCEDURE — 93010 ELECTROCARDIOGRAM REPORT: CPT | Performed by: INTERNAL MEDICINE

## 2023-12-13 PROCEDURE — 93005 ELECTROCARDIOGRAM TRACING: CPT | Performed by: INTERNAL MEDICINE

## 2023-12-13 PROCEDURE — G8400 PT W/DXA NO RESULTS DOC: HCPCS | Performed by: INTERNAL MEDICINE

## 2023-12-13 PROCEDURE — G8427 DOCREV CUR MEDS BY ELIG CLIN: HCPCS | Performed by: INTERNAL MEDICINE

## 2023-12-13 PROCEDURE — 1123F ACP DISCUSS/DSCN MKR DOCD: CPT | Performed by: INTERNAL MEDICINE

## 2023-12-13 PROCEDURE — 3017F COLORECTAL CA SCREEN DOC REV: CPT | Performed by: INTERNAL MEDICINE

## 2023-12-13 PROCEDURE — 1036F TOBACCO NON-USER: CPT | Performed by: INTERNAL MEDICINE

## 2023-12-13 PROCEDURE — 1090F PRES/ABSN URINE INCON ASSESS: CPT | Performed by: INTERNAL MEDICINE

## 2023-12-13 NOTE — PROGRESS NOTES
gabapentin (NEURONTIN) 600 MG tablet Take 1 tablet by mouth 3 times daily for 180 days. 7/24/23 1/20/24  Danyell Thorne MD   metoprolol succinate (TOPROL XL) 25 MG extended release tablet TAKE 1/2 TABLET EVERY DAY 7/24/23   Danyell Thorne MD   ascorbic acid (VITAMIN C) 500 MG tablet TAKE 1 TABLET BY MOUTH TWICE DAILY 6/23/23   Danyell Thorne MD   Biotin 5000 MCG SUBL Place under the tongue daily    Provider, MD Marce   clotrimazole-betamethasone (Carylon Aspen) 1-0.05 % cream apply to affected area twice a day 5/30/23   Danyell Thorne MD   Insulin Pen Needle (DROPLET PEN NEEDLES) 31G X 8 MM MISC USE  FOR  INJECTIONS TWICE DAILY 5/12/23   Danyell Thorne MD   alendronate (FOSAMAX) 70 MG tablet TAKE 1 TABLET EVERY WEEK 1/13/23   Danyell Thorne MD   potassium chloride (MICRO-K) 10 MEQ extended release capsule TAKE 1 CAPSULE TWICE DAILY 12/5/22   Danyell Thorne MD   spironolactone (ALDACTONE) 50 MG tablet Take 1 tablet by mouth daily 11/23/22   Isaias Bains DO   STIOLTO RESPIMAT 2.5-2.5 MCG/ACT AERS INHALE 2 PUFFS INTO THE LUNGS DAILY 11/14/22   Maliha Diop DO   fluticasone (FLONASE) 50 MCG/ACT nasal spray USE 2 SPRAYS NASALLY EVERY DAY 11/11/22   Danyell Thorne MD   Insulin Syringe-Needle U-100 (DROPLET INSULIN SYRINGE) 31G X 5/16\" 0.5 ML MISC USE TO INJECT INTO THE SKIN TWO TIMES DAILY 11/11/22   Danyell Thorne MD   insulin glargine (LANTUS) 100 UNIT/ML injection vial INJECT 15 UNITS INTO THE SKIN TWO TIMES DAILY (DISCARD AND BEGIN A NEW VIAL AFTER 28 DAYS) 11/3/22   Danyell Thorne MD   atorvastatin (LIPITOR) 40 MG tablet TAKE 1 TABLET EVERY DAY.  10/3/22   Danyell Thorne MD   NOVOLOG FLEXPEN 100 UNIT/ML injection pen INJECT 4 UNITS AT LUNCH AND  6 UNITS AT SUPPER [facility] PEN EXPIRES [address]) 9/1/22   Danyell Thorne MD   furosemide (LASIX) 40 MG tablet TAKE 1 TABLET EVERY DAY  Patient not taking: Reported on 11/8/2023 8/30/22   Danyell Thorne MD

## 2023-12-28 ENCOUNTER — HOSPITAL ENCOUNTER (OUTPATIENT)
Dept: MAMMOGRAPHY | Age: 73
Discharge: HOME | End: 2023-12-30
Attending: INTERNAL MEDICINE
Payer: MEDICARE

## 2023-12-28 VITALS — HEIGHT: 64 IN | WEIGHT: 234 LBS | BODY MASS INDEX: 39.95 KG/M2

## 2023-12-28 DIAGNOSIS — Z12.31 OTHER SCREENING MAMMOGRAM: ICD-10-CM

## 2023-12-28 DIAGNOSIS — M54.50 CHRONIC BILATERAL LOW BACK PAIN WITHOUT SCIATICA: ICD-10-CM

## 2023-12-28 DIAGNOSIS — G89.29 CHRONIC BILATERAL LOW BACK PAIN WITHOUT SCIATICA: ICD-10-CM

## 2023-12-28 PROCEDURE — 77063 BREAST TOMOSYNTHESIS BI: CPT

## 2023-12-29 RX ORDER — OXYCODONE AND ACETAMINOPHEN 10; 325 MG/1; MG/1
1 TABLET ORAL EVERY 6 HOURS PRN
Qty: 90 TABLET | Refills: 0 | Status: SHIPPED | OUTPATIENT
Start: 2023-12-29 | End: 2024-01-28

## 2023-12-30 DIAGNOSIS — E11.42 TYPE 2 DIABETES MELLITUS WITH DIABETIC POLYNEUROPATHY, WITH LONG-TERM CURRENT USE OF INSULIN (HCC): ICD-10-CM

## 2023-12-30 DIAGNOSIS — Z79.4 TYPE 2 DIABETES MELLITUS WITH DIABETIC POLYNEUROPATHY, WITH LONG-TERM CURRENT USE OF INSULIN (HCC): ICD-10-CM

## 2023-12-30 RX ORDER — INSULIN GLARGINE 100 [IU]/ML
INJECTION, SOLUTION SUBCUTANEOUS
Qty: 90 ML | Refills: 3 | Status: SHIPPED | OUTPATIENT
Start: 2023-12-30

## 2024-01-02 RX ORDER — INSULIN GLARGINE 100 [IU]/ML
INJECTION, SOLUTION SUBCUTANEOUS
Qty: 30 ML | Refills: 3 | Status: SHIPPED | OUTPATIENT
Start: 2024-01-02

## 2024-01-02 RX ORDER — PEN NEEDLE, DIABETIC 31 GX5/16"
NEEDLE, DISPOSABLE MISCELLANEOUS
Qty: 200 EACH | Refills: 3 | Status: SHIPPED | OUTPATIENT
Start: 2024-01-02

## 2024-01-02 NOTE — TELEPHONE ENCOUNTER
Penelope called requesting a refill of the below medication which has been pended for you:     Requested Prescriptions     Pending Prescriptions Disp Refills    insulin glargine (LANTUS) 100 UNIT/ML injection vial [Pharmacy Med Name: LANTUS 100 UNIT/ML Solution] 30 mL 3     Sig: INJECT 15 UNITS INTO THE SKIN TWO TIMES DAILY (DISCARD AND BEGIN A NEW VIAL AFTER 28 DAYS)    Insulin Pen Needle (DROPLET PEN NEEDLES) 31G X 8 MM MISC [Pharmacy Med Name: DROPLET PEN NEEDLES [id number] 31G X 8 MM] 200 each 3     Sig: USE FOR INJECTIONS TWO TIMES DAILY       Last Appointment Date: 8/15/2023  Next Appointment Date: 2/16/2024    No Known Allergies

## 2024-01-05 RX ORDER — ATORVASTATIN CALCIUM 40 MG/1
TABLET, FILM COATED ORAL
Qty: 90 TABLET | Refills: 3 | Status: SHIPPED | OUTPATIENT
Start: 2024-01-05

## 2024-01-05 RX ORDER — POTASSIUM CHLORIDE 750 MG/1
CAPSULE, EXTENDED RELEASE ORAL
Qty: 180 CAPSULE | Refills: 3 | Status: SHIPPED | OUTPATIENT
Start: 2024-01-05

## 2024-01-05 RX ORDER — IBUPROFEN 400 MG/1
TABLET ORAL
Qty: 360 TABLET | Refills: 3 | Status: SHIPPED | OUTPATIENT
Start: 2024-01-05

## 2024-01-05 RX ORDER — TIOTROPIUM BROMIDE AND OLODATEROL 3.124; 2.736 UG/1; UG/1
SPRAY, METERED RESPIRATORY (INHALATION)
Qty: 12 G | Refills: 3 | Status: SHIPPED | OUTPATIENT
Start: 2024-01-05

## 2024-01-05 RX ORDER — BUPROPION HYDROCHLORIDE 75 MG/1
TABLET ORAL
Qty: 180 TABLET | Refills: 3 | Status: SHIPPED | OUTPATIENT
Start: 2024-01-05

## 2024-01-05 NOTE — TELEPHONE ENCOUNTER
Penelope called requesting a refill of the below medication which has been pended for you:     Requested Prescriptions     Pending Prescriptions Disp Refills    potassium chloride (MICRO-K) 10 MEQ extended release capsule [Pharmacy Med Name: POTASSIUM CHLORIDE ER 10 MEQ Capsule Extended Release] 180 capsule 3     Sig: TAKE 1 CAPSULE TWICE DAILY    buPROPion (WELLBUTRIN) 75 MG tablet [Pharmacy Med Name: BUPROPION HYDROCHLORIDE 75 MG Tablet] 180 tablet 3     Sig: TAKE 1 TABLET TWICE DAILY    atorvastatin (LIPITOR) 40 MG tablet [Pharmacy Med Name: ATORVASTATIN CALCIUM 40 MG Tablet] 90 tablet 3     Sig: TAKE 1 TABLET EVERY DAY     MG tablet [Pharmacy Med Name:  MG Tablet] 360 tablet 3     Sig: TAKE 1 TABLET EVERY 6 HOURS AS NEEDED FOR PAIN       Last Appointment Date: 8/15/2023  Next Appointment Date: 2/16/2024    No Known Allergies

## 2024-01-16 RX ORDER — ALENDRONATE SODIUM 70 MG/1
TABLET ORAL
Qty: 12 TABLET | Refills: 3 | Status: SHIPPED | OUTPATIENT
Start: 2024-01-16

## 2024-01-16 NOTE — TELEPHONE ENCOUNTER
Penelope called requesting a refill of the below medication which has been pended for you:     Requested Prescriptions     Pending Prescriptions Disp Refills    alendronate (FOSAMAX) 70 MG tablet [Pharmacy Med Name: ALENDRONATE SODIUM 70 MG Tablet] 12 tablet 3     Sig: TAKE 1 TABLET EVERY WEEK       Last Appointment Date: 8/15/2023  Next Appointment Date: 1/22/2024    No Known Allergies

## 2024-01-22 ENCOUNTER — OFFICE VISIT (OUTPATIENT)
Dept: INTERNAL MEDICINE | Age: 74
End: 2024-01-22
Payer: MEDICARE

## 2024-01-22 VITALS
OXYGEN SATURATION: 96 % | RESPIRATION RATE: 16 BRPM | BODY MASS INDEX: 39.61 KG/M2 | DIASTOLIC BLOOD PRESSURE: 84 MMHG | WEIGHT: 232 LBS | HEIGHT: 64 IN | SYSTOLIC BLOOD PRESSURE: 136 MMHG | HEART RATE: 68 BPM

## 2024-01-22 DIAGNOSIS — E11.42 TYPE 2 DIABETES MELLITUS WITH DIABETIC POLYNEUROPATHY, WITH LONG-TERM CURRENT USE OF INSULIN (HCC): ICD-10-CM

## 2024-01-22 DIAGNOSIS — I25.83 CORONARY ARTERY DISEASE DUE TO LIPID RICH PLAQUE: ICD-10-CM

## 2024-01-22 DIAGNOSIS — I25.10 CORONARY ARTERY DISEASE DUE TO LIPID RICH PLAQUE: ICD-10-CM

## 2024-01-22 DIAGNOSIS — R42 DIZZINESS: Primary | ICD-10-CM

## 2024-01-22 DIAGNOSIS — Z79.4 TYPE 2 DIABETES MELLITUS WITH DIABETIC POLYNEUROPATHY, WITH LONG-TERM CURRENT USE OF INSULIN (HCC): ICD-10-CM

## 2024-01-22 DIAGNOSIS — R42 VERTIGO: ICD-10-CM

## 2024-01-22 PROCEDURE — 3046F HEMOGLOBIN A1C LEVEL >9.0%: CPT | Performed by: INTERNAL MEDICINE

## 2024-01-22 PROCEDURE — 1123F ACP DISCUSS/DSCN MKR DOCD: CPT | Performed by: INTERNAL MEDICINE

## 2024-01-22 PROCEDURE — 3079F DIAST BP 80-89 MM HG: CPT | Performed by: INTERNAL MEDICINE

## 2024-01-22 PROCEDURE — G8484 FLU IMMUNIZE NO ADMIN: HCPCS | Performed by: INTERNAL MEDICINE

## 2024-01-22 PROCEDURE — 3017F COLORECTAL CA SCREEN DOC REV: CPT | Performed by: INTERNAL MEDICINE

## 2024-01-22 PROCEDURE — 99212 OFFICE O/P EST SF 10 MIN: CPT | Performed by: INTERNAL MEDICINE

## 2024-01-22 PROCEDURE — 99214 OFFICE O/P EST MOD 30 MIN: CPT | Performed by: INTERNAL MEDICINE

## 2024-01-22 PROCEDURE — G8417 CALC BMI ABV UP PARAM F/U: HCPCS | Performed by: INTERNAL MEDICINE

## 2024-01-22 PROCEDURE — 3075F SYST BP GE 130 - 139MM HG: CPT | Performed by: INTERNAL MEDICINE

## 2024-01-22 PROCEDURE — G8400 PT W/DXA NO RESULTS DOC: HCPCS | Performed by: INTERNAL MEDICINE

## 2024-01-22 PROCEDURE — G8427 DOCREV CUR MEDS BY ELIG CLIN: HCPCS | Performed by: INTERNAL MEDICINE

## 2024-01-22 PROCEDURE — 2022F DILAT RTA XM EVC RTNOPTHY: CPT | Performed by: INTERNAL MEDICINE

## 2024-01-22 PROCEDURE — 1090F PRES/ABSN URINE INCON ASSESS: CPT | Performed by: INTERNAL MEDICINE

## 2024-01-22 PROCEDURE — 1036F TOBACCO NON-USER: CPT | Performed by: INTERNAL MEDICINE

## 2024-01-22 ASSESSMENT — PATIENT HEALTH QUESTIONNAIRE - PHQ9
SUM OF ALL RESPONSES TO PHQ QUESTIONS 1-9: 0
9. THOUGHTS THAT YOU WOULD BE BETTER OFF DEAD, OR OF HURTING YOURSELF: 0
2. FEELING DOWN, DEPRESSED OR HOPELESS: 0
7. TROUBLE CONCENTRATING ON THINGS, SUCH AS READING THE NEWSPAPER OR WATCHING TELEVISION: 0
3. TROUBLE FALLING OR STAYING ASLEEP: 0
10. IF YOU CHECKED OFF ANY PROBLEMS, HOW DIFFICULT HAVE THESE PROBLEMS MADE IT FOR YOU TO DO YOUR WORK, TAKE CARE OF THINGS AT HOME, OR GET ALONG WITH OTHER PEOPLE: 0
8. MOVING OR SPEAKING SO SLOWLY THAT OTHER PEOPLE COULD HAVE NOTICED. OR THE OPPOSITE, BEING SO FIGETY OR RESTLESS THAT YOU HAVE BEEN MOVING AROUND A LOT MORE THAN USUAL: 0
6. FEELING BAD ABOUT YOURSELF - OR THAT YOU ARE A FAILURE OR HAVE LET YOURSELF OR YOUR FAMILY DOWN: 0
SUM OF ALL RESPONSES TO PHQ QUESTIONS 1-9: 0
1. LITTLE INTEREST OR PLEASURE IN DOING THINGS: 0
SUM OF ALL RESPONSES TO PHQ QUESTIONS 1-9: 0
5. POOR APPETITE OR OVEREATING: 0
4. FEELING TIRED OR HAVING LITTLE ENERGY: 0
SUM OF ALL RESPONSES TO PHQ QUESTIONS 1-9: 0
SUM OF ALL RESPONSES TO PHQ9 QUESTIONS 1 & 2: 0

## 2024-01-22 ASSESSMENT — ENCOUNTER SYMPTOMS
SHORTNESS OF BREATH: 0
COUGH: 0

## 2024-01-22 NOTE — PROGRESS NOTES
[facility]  [address] [facility] [address]  Dept: [phone]  Dept Fax: [phone]  Loc: [phone]     Penelope Holland is a 73 y.o. female who presents today for her medical conditions/complaintsas noted below.  Penelope Holland is c/o of   Chief Complaint   Patient presents with    Dizziness     6 month    Coronary Artery Disease    Diabetes    Congestive Heart Failure         HPI:     Dizziness  This is a recurrent problem. The current episode started more than 1 month ago. The problem occurs intermittently. The problem has been waxing and waning. Pertinent negatives include no coughing.   Coronary Artery Disease  Presents for follow-up visit. Pertinent negatives include no shortness of breath. The symptoms have been stable. Compliance with diet is good. Compliance with exercise is good. Compliance with medications is good.   Diabetes  She presents for her follow-up diabetic visit. She has type 2 diabetes mellitus. The initial diagnosis of diabetes was made 13 years ago. Her disease course has been fluctuating.       Hemoglobin A1C (%)   Date Value   08/15/2023 6.1 (H)   02/08/2023 6.4   08/15/2022 6.4 (H)            No components found for: \"LABMICR\"  LDL Cholesterol (mg/dL)   Date Value   12/17/2022 30   02/10/2022 56   02/18/2021 44     LDL Calculated (mg/dL)   Date Value   02/08/2023 44   05/19/2016 80   09/23/2014 80         AST (U/L)   Date Value   02/08/2023 44     ALT (U/L)   Date Value   02/08/2023 30     BUN (mg/dL)   Date Value   08/15/2023 18     BP Readings from Last 3 Encounters:   01/22/24 136/84   12/13/23 (!) 108/56   11/08/23 102/62              Past Medical History:   Diagnosis Date    Arthritis     Asthma     Bell's palsy     CAD (coronary artery disease) 1989    Cancer (HCC) 1971    cervical    Carotid artery disease (HCC)     Cataracts, bilateral     CHF (congestive heart failure) ([facility])

## 2024-01-30 ENCOUNTER — TELEPHONE (OUTPATIENT)
Dept: INTERNAL MEDICINE | Age: 74
End: 2024-01-30
Payer: MEDICARE

## 2024-01-30 DIAGNOSIS — G89.29 CHRONIC BILATERAL LOW BACK PAIN WITHOUT SCIATICA: ICD-10-CM

## 2024-01-30 DIAGNOSIS — K74.60 HEPATIC CIRRHOSIS, UNSPECIFIED HEPATIC CIRRHOSIS TYPE, UNSPECIFIED WHETHER ASCITES PRESENT (HCC): ICD-10-CM

## 2024-01-30 DIAGNOSIS — I25.10 ATHEROSCLEROSIS OF NATIVE CORONARY ARTERY OF NATIVE HEART WITHOUT ANGINA PECTORIS: ICD-10-CM

## 2024-01-30 DIAGNOSIS — E11.42 TYPE 2 DIABETES MELLITUS WITH DIABETIC POLYNEUROPATHY, WITH LONG-TERM CURRENT USE OF INSULIN (HCC): Primary | ICD-10-CM

## 2024-01-30 DIAGNOSIS — Z79.4 TYPE 2 DIABETES MELLITUS WITH DIABETIC POLYNEUROPATHY, WITH LONG-TERM CURRENT USE OF INSULIN (HCC): Primary | ICD-10-CM

## 2024-01-30 DIAGNOSIS — M54.50 CHRONIC BILATERAL LOW BACK PAIN WITHOUT SCIATICA: ICD-10-CM

## 2024-01-30 DIAGNOSIS — I25.83 CORONARY ATHEROSCLEROSIS DUE TO LIPID RICH PLAQUE: ICD-10-CM

## 2024-01-30 DIAGNOSIS — I49.9 CARDIAC ARRHYTHMIA, UNSPECIFIED CARDIAC ARRHYTHMIA TYPE: ICD-10-CM

## 2024-01-30 DIAGNOSIS — I25.10 CORONARY ATHEROSCLEROSIS DUE TO LIPID RICH PLAQUE: ICD-10-CM

## 2024-01-30 DIAGNOSIS — I35.0 NONRHEUMATIC AORTIC VALVE STENOSIS: ICD-10-CM

## 2024-01-30 PROCEDURE — G0179 MD RECERTIFICATION HHA PT: HCPCS | Performed by: INTERNAL MEDICINE

## 2024-01-30 RX ORDER — OXYCODONE AND ACETAMINOPHEN 10; 325 MG/1; MG/1
1 TABLET ORAL EVERY 6 HOURS PRN
Qty: 90 TABLET | Refills: 0 | Status: SHIPPED | OUTPATIENT
Start: 2024-01-30 | End: 2024-02-29

## 2024-01-30 NOTE — TELEPHONE ENCOUNTER
Penelope called requesting a refill of the below medication which has been pended for you:     Requested Prescriptions     Pending Prescriptions Disp Refills    oxyCODONE-acetaminophen (PERCOCET)  MG per tablet 90 tablet 0     Sig: Take 1 tablet by mouth every 6 hours as needed for Pain for up to 30 days. Max Daily Amount: 4 tablets       Last Appointment Date: 1/22/2024  Next Appointment Date: 7/29/2024    No Known Allergies

## 2024-01-30 NOTE — TELEPHONE ENCOUNTER
Home health care plan:  [facility].  Certification Period 01/01/24-02/29/24.  Medications, allergies and associating diagnoses also reviewed. Updated Medication list being faxed to [facility]

## 2024-01-31 ENCOUNTER — HOSPITAL ENCOUNTER (OUTPATIENT)
Dept: PHYSICAL THERAPY | Age: 74
Setting detail: THERAPIES SERIES
Discharge: HOME | End: 2024-01-31
Attending: INTERNAL MEDICINE
Payer: MEDICARE

## 2024-01-31 PROCEDURE — 97162 PT EVAL MOD COMPLEX 30 MIN: CPT

## 2024-01-31 NOTE — PROGRESS NOTES
education & training, Neuromuscular re-education        OutComes Score:  Balance Score: 6 (01/31/24 1529)  Gait Score: 8 (01/31/24 1529)        Tinetti Total Score: 14 (01/31/24 1529)                           Total DHI Score: 26 (01/31/24 1451)        Goals:  Short Term Goals  Time Frame for Short Term Goals: 3 weeks  Short Term Goal 1: Initiate HEP  Long Term Goals  Time Frame for Long Term Goals : 6 weeks  Long Term Goal 1: Independent in HEP  LTG Goal 1 Status:: New  Long Term Goal 2: Improve LE strength to 5/5  LTG Goal 2 Status:: New  Long Term Goal 3: Improve Tinetti to 18/28 or greater to decrease risk for falls.  LTG Goal 3 Status:: New  LTG 4 Current Status:: Reduce imbalance with Sit to Stands by 50%  Long Term Goal 5: Timed Stands 8x in 30sec  LTG Goal 5 Status:: New  Patient Goals   Patient Goals : To get well       Therapy Time:   Individual Concurrent Group Co-treatment   Time In 1540         Time Out 1625         Minutes 45                 Ashli Haddad, PT

## 2024-01-31 NOTE — FLOWSHEET NOTE
Physical Therapy Daily Treatment Note    Date:  2024    Patient Name:  Penelope Holland    :  1950  MRN: [id number]  Restrictions/Precautions:     Medical/Treatment Diagnosis Information:     Dizziness [R42]      Insurance/Certification information:   Medicare/Medicaid  Physician Information:   Stephany   Plan of care signed (Y/N):  n  Visit# / total visits:    Pain level: /10       Time In: 1440  Time Out:1525    Progress Note: [x]  Yes  []  No  Next due by: Visit #10      Subjective:   See Eval     Objective: See Eval   Observation:   Test measurements:      Exercises:   Exercise/Equipment Resistance/Repetitions Other comments        Nustep     Counter Exs     Steps      Mini Squats      STS           Standing Balance      Foam Exs           Walking with Head Turns      Tandem walking      Side stepping           [] Provided verbal/tactile cueing for activities related to strengthening, flexibility, endurance, ROM. (47949)  [] Provided verbal/tactile cueing for activities related to improving balance, coordination, kinesthetic sense, posture, motor skill, proprioception. (90247)    Therapeutic Activities:     [] Therapeutic activities, direct (one-on-one) patient contact (use of dynamic activities to improve functional performance). (75853)    Gait:   [] Provided training and instruction to the patient for ambulation re-education. (71578)    Self-Care/ADL's  [] Self-care/home management training and compensatory training, meal preparation, safety procedures, and instructions in use of assistive technology devices/adaptive equipment, direct one-on-one contact. (98176)    Home Exercise Program:     [] Reviewed/Progressed HEP activities related to strengthening, flexibility, endurance, ROM. (85099)  [] Reviewed/Progressed HEP activities related to improving balance, coordination, kinesthetic sense, posture, motor skill, proprioception.  (53492)    Manual Treatments:    [] Provided manual therapy to

## 2024-01-31 NOTE — PLAN OF CARE
hesitate to call.  Thank you for your referral.      Physician Signature:________________________________Date:__________________  By signing above, therapist’s plan is approved by physician

## 2024-02-05 ENCOUNTER — HOSPITAL ENCOUNTER (OUTPATIENT)
Dept: PHYSICAL THERAPY | Age: 74
Setting detail: THERAPIES SERIES
Discharge: HOME | End: 2024-02-05
Attending: INTERNAL MEDICINE
Payer: MEDICARE

## 2024-02-05 NOTE — PROGRESS NOTES
Physical Therapy  Outpatient Physical Therapy    [x] [place]  Phone: [phone]  Fax: [phone]      [] [place]  Phone: [phone]  Fax: [phone]    Physical Therapy  Cancellation/No-show Note  Patient Name:  Penelope Holland  :  1950   Date:  2024  Cancelled visits to date: 1  No-shows to date: 0    For today's appointment patient:  [x]  Cancelled  []  Rescheduled appointment  []  No-show     Reason given by patient:  []  Patient ill  []  Conflicting appointment  [x]  No transportation    []  Conflict with work  []  No reason given  []  Other:     Comments:      Electronically signed by: Kristyn Nolasco PTA

## 2024-02-07 ENCOUNTER — HOSPITAL ENCOUNTER (OUTPATIENT)
Dept: PHYSICAL THERAPY | Age: 74
Setting detail: THERAPIES SERIES
Discharge: HOME | End: 2024-02-07
Attending: INTERNAL MEDICINE
Payer: MEDICARE

## 2024-02-07 PROCEDURE — 97110 THERAPEUTIC EXERCISES: CPT

## 2024-02-07 NOTE — FLOWSHEET NOTE
Physical Therapy Daily Treatment Note    Date:  2024    Patient Name:  Penelope Holland    :  1950  MRN: [id number]  Restrictions/Precautions:     Medical/Treatment Diagnosis Information:     Dizziness [R42]      Insurance/Certification information:   Medicare/Medicaid  Physician Information:   Stephany   Plan of care signed (Y/N):  n  Visit# / total visits:    Pain level: 8/10       Time In: 1:50  Time Out: 2:35    Progress Note: [x]  Yes  []  No  Next due by: Visit #10      Subjective:   Patient 5 minutes late. Rates pain at 8/10.     Objective: Performed all exercises below with fair tolerance. Constant cues to return to task at hand. Will initiate HEP next session, ran out of time this session.   Observation:   Test measurements:      Exercises:   Exercise/Equipment Resistance/Repetitions Other comments        Nustep 6'    Counter Exs 10x    Steps  4 inch x10    Mini Squats  10x    STS  10x         Seated exercises 10x         Standing Balance  30\"x3 HEATHER, FAEC   Foam Exs           Walking with Head Turns  2 laps    Tandem walking  2 laps    Side stepping  2 laps         [] Provided verbal/tactile cueing for activities related to strengthening, flexibility, endurance, ROM. (73401)  [] Provided verbal/tactile cueing for activities related to improving balance, coordination, kinesthetic sense, posture, motor skill, proprioception. (15958)    Therapeutic Activities:     [] Therapeutic activities, direct (one-on-one) patient contact (use of dynamic activities to improve functional performance). (83723)    Gait:   [] Provided training and instruction to the patient for ambulation re-education. (75805)    Self-Care/ADL's  [] Self-care/home management training and compensatory training, meal preparation, safety procedures, and instructions in use of assistive technology devices/adaptive equipment, direct one-on-one contact. (66863)    Home Exercise Program:     [] Reviewed/Progressed HEP activities

## 2024-02-08 DIAGNOSIS — Z79.4 TYPE 2 DIABETES MELLITUS WITH DIABETIC POLYNEUROPATHY, WITH LONG-TERM CURRENT USE OF INSULIN (HCC): ICD-10-CM

## 2024-02-08 DIAGNOSIS — E11.42 TYPE 2 DIABETES MELLITUS WITH DIABETIC POLYNEUROPATHY, WITH LONG-TERM CURRENT USE OF INSULIN (HCC): ICD-10-CM

## 2024-02-09 RX ORDER — SYRINGE-NEEDLE,INSULIN,0.5 ML 27GX1/2"
SYRINGE, EMPTY DISPOSABLE MISCELLANEOUS
Qty: 200 EACH | Refills: 3 | Status: SHIPPED | OUTPATIENT
Start: 2024-02-09

## 2024-02-09 NOTE — TELEPHONE ENCOUNTER
Penelope called requesting a refill of the below medication which has been pended for you:     Requested Prescriptions     Pending Prescriptions Disp Refills    Insulin Syringe-Needle U-100 (DROPLET INSULIN SYRINGE) 31G X 5/16\" 0.5 ML MISC [Pharmacy Med Name: DROPLET INSULIN SYRINGE U-100/0.5ML/31G X 5/16\" 31G X 5/16\" 0.5 ML] 200 each 3     Sig: USE TO INJECT INTO THE SKIN TWO TIMES DAILY       Last Appointment Date: 1/22/2024  Next Appointment Date: 7/29/2024    No Known Allergies

## 2024-02-12 ENCOUNTER — HOSPITAL ENCOUNTER (OUTPATIENT)
Dept: PHYSICAL THERAPY | Age: 74
Setting detail: THERAPIES SERIES
Discharge: HOME | End: 2024-02-12
Attending: INTERNAL MEDICINE
Payer: MEDICARE

## 2024-02-12 PROCEDURE — 97110 THERAPEUTIC EXERCISES: CPT

## 2024-02-12 NOTE — FLOWSHEET NOTE
contact. (34422)    Home Exercise Program:   counter hep  [x] Reviewed/Progressed HEP activities related to strengthening, flexibility, endurance, ROM. (64046)  [] Reviewed/Progressed HEP activities related to improving balance, coordination, kinesthetic sense, posture, motor skill, proprioception.  (30899)    Manual Treatments:    [] Provided manual therapy to mobilize soft tissue/joints for the purpose of modulating pain, promoting relaxation,  increasing ROM, reducing/eliminating soft tissue swelling/inflammation/restriction, improving soft tissue extensibility. (39913)    Service Based Modalities:      Timed Code Treatment Minutes:  42' therex     Total Treatment Minutes:   42'    Treatment/Activity Tolerance:  [x] Patient tolerated treatment well [] Patient limited by fatique  [] Patient limited by pain  [] Patient limited by other medical complications  [] Other:     Prognosis: [] Good [x] Fair  [] Poor    Patient Requires Follow-up: [x] Yes  [] No      Goals:  Short Term Goals  Time Frame for Short Term Goals: 3 weeks  Short Term Goal 1: Initiate HEP (provided)    Long Term Goals  Time Frame for Long Term Goals : 6 weeks  Long Term Goal 1: Independent in HEP  Long Term Goal 2: Improve LE strength to 5/5  Long Term Goal 3: Improve Tinetti to 18/28 or greater to decrease risk for falls.  Long Term Goal 5: Timed Stands 8x in 30sec      Plan:   [x] Continue per plan of care [] Alter current plan (see comments)  [] Plan of care initiated [] Hold pending MD visit [] Discharge    Plan for Next Session:      Electronically signed by:  Kristyn Nolasco PTA

## 2024-02-14 ENCOUNTER — HOSPITAL ENCOUNTER (OUTPATIENT)
Dept: PHYSICAL THERAPY | Age: 74
Setting detail: THERAPIES SERIES
Discharge: HOME | End: 2024-02-14
Attending: INTERNAL MEDICINE
Payer: MEDICARE

## 2024-02-14 ENCOUNTER — OFFICE VISIT (OUTPATIENT)
Dept: OBGYN | Age: 74
End: 2024-02-14
Payer: MEDICARE

## 2024-02-14 ENCOUNTER — HOSPITAL ENCOUNTER (OUTPATIENT)
Age: 74
Setting detail: SPECIMEN
Discharge: HOME | End: 2024-02-14
Payer: MEDICARE

## 2024-02-14 VITALS
HEART RATE: 59 BPM | OXYGEN SATURATION: 96 % | WEIGHT: 225.8 LBS | SYSTOLIC BLOOD PRESSURE: 130 MMHG | DIASTOLIC BLOOD PRESSURE: 82 MMHG | RESPIRATION RATE: 18 BRPM | BODY MASS INDEX: 38.55 KG/M2 | HEIGHT: 64 IN

## 2024-02-14 DIAGNOSIS — Z85.41 HISTORY OF CERVICAL CANCER IN ADULTHOOD: ICD-10-CM

## 2024-02-14 DIAGNOSIS — Z90.79 S/P TAH-BSO: ICD-10-CM

## 2024-02-14 DIAGNOSIS — Z90.722 S/P TAH-BSO: ICD-10-CM

## 2024-02-14 DIAGNOSIS — Z90.710 S/P TAH-BSO: ICD-10-CM

## 2024-02-14 DIAGNOSIS — N95.1 MENOPAUSAL STATE: ICD-10-CM

## 2024-02-14 DIAGNOSIS — Z78.0 ENCOUNTER FOR OSTEOPOROSIS SCREENING IN ASYMPTOMATIC POSTMENOPAUSAL PATIENT: ICD-10-CM

## 2024-02-14 DIAGNOSIS — Z13.820 ENCOUNTER FOR OSTEOPOROSIS SCREENING IN ASYMPTOMATIC POSTMENOPAUSAL PATIENT: ICD-10-CM

## 2024-02-14 DIAGNOSIS — Z12.4 ENCOUNTER FOR SCREENING FOR CERVICAL CANCER: ICD-10-CM

## 2024-02-14 DIAGNOSIS — Z91.89 GYN EXAM FOR HIGH-RISK MEDICARE PATIENT: Primary | ICD-10-CM

## 2024-02-14 PROCEDURE — 97110 THERAPEUTIC EXERCISES: CPT

## 2024-02-14 PROCEDURE — 99214 OFFICE O/P EST MOD 30 MIN: CPT | Performed by: OBSTETRICS & GYNECOLOGY

## 2024-02-14 PROCEDURE — 88175 CYTOPATH C/V AUTO FLUID REDO: CPT

## 2024-02-14 PROCEDURE — G8417 CALC BMI ABV UP PARAM F/U: HCPCS | Performed by: OBSTETRICS & GYNECOLOGY

## 2024-02-14 PROCEDURE — G0101 CA SCREEN;PELVIC/BREAST EXAM: HCPCS | Performed by: OBSTETRICS & GYNECOLOGY

## 2024-02-14 PROCEDURE — 87624 HPV HI-RISK TYP POOLED RSLT: CPT

## 2024-02-14 PROCEDURE — G8427 DOCREV CUR MEDS BY ELIG CLIN: HCPCS | Performed by: OBSTETRICS & GYNECOLOGY

## 2024-02-14 NOTE — FLOWSHEET NOTE
Physical Therapy Daily Treatment Note    Date:  2024    Patient Name:  Penelope Holland    :  1950  MRN: [id number]  Restrictions/Precautions:     Medical/Treatment Diagnosis Information:     Dizziness [R42]      Insurance/Certification information:   Medicare/Medicaid  Physician Information:   Stephany   Plan of care signed (Y/N):  n  Visit# / total visits:    Pain level: 8/10       Time In: 2:26  Time Out: 3:10    Progress Note: []  Yes  [x]  No  Next due by: Visit #10      Subjective:  Pt states doing okay. Still walking with straight cane    Objective: Performed all exercises below with fair tolerance. Constant cues to return to task at hand. Pt requires redirection throughout. Cues to slow down with head turns.    Observation:   Test measurements:  hip flexion: 4+/5 B    Exercises:   Exercise/Equipment Resistance/Repetitions Other comments        Nustep 6'    Counter Exs 10x    Steps  4 inch x10 F, L   Mini Squats  15x    STS  10x         Seated exercises 10x         Standing Balance  30\"x3 FTEO, FAEC   Foam Exs           Walking with Head Turns  2 laps    Tandem walking  2 laps    Side stepping  2 laps         [] Provided verbal/tactile cueing for activities related to strengthening, flexibility, endurance, ROM. (08344)  [] Provided verbal/tactile cueing for activities related to improving balance, coordination, kinesthetic sense, posture, motor skill, proprioception. (52444)    Therapeutic Activities:     [] Therapeutic activities, direct (one-on-one) patient contact (use of dynamic activities to improve functional performance). (77099)    Gait:   [] Provided training and instruction to the patient for ambulation re-education. (72868)    Self-Care/ADL's  [] Self-care/home management training and compensatory training, meal preparation, safety procedures, and instructions in use of assistive technology devices/adaptive equipment, direct one-on-one contact. (45414)    Home Exercise Program:

## 2024-02-14 NOTE — PROGRESS NOTES
Breast & Pelvic (Q 2 years)  [facility]  [facility]  [address] [facility] [address]  Phone: [phone]  Fax: [phone]   Penelope Holland  2024              73 y.o.  Chief Complaint   Patient presents with    New Patient     States hasn't had PAP smear for a long time.       Patient's last menstrual period was 1995 (approximate).             Primary Care Physician: Claudy Hammond MD    The patient was seen and examined. She has no chief complaint today and is here for   Chief Complaint   Patient presents with    New Patient     States hasn't had PAP smear for a long time.    .  Her bowels are regular. There are no voiding complaints. She denies any bloating.  She denies vaginal discharge and was counseled on STD's and the need for barrier contraception.     HPI : Penelope Holland is a 73 y.o. female     Annual No CC    yes Bloating  yes Early Satiety  yes Unexplained weight change of more than 15 lbs  yes  PMB  N/A  PCB  ________________________________________________________________________  OB History    Para Term  AB Living   1 1 1 0 0 1   SAB IAB Ectopic Molar Multiple Live Births   0 0 0 0 0 1      # Outcome Date GA Lbr Rashard/2nd Weight Sex Delivery Anes PTL Lv   1 Term  40w0d   F    LEA     Past Medical History:   Diagnosis Date    Arthritis     Asthma     Bell's palsy     CAD (coronary artery disease)     Cancer (HCC) 1971    cervical    Carotid artery disease (HCC)     Cataracts, bilateral     CHF (congestive heart failure) (HCC)     Chlamydia     COPD (chronic obstructive pulmonary disease) (HCC)     Coronary artery disease     Eczema     Emphysema lung (HCC)     Fibromyalgia     Glaucoma     H/O blood clots     Headache     Heart attack (HCC)     Hx of Bell's palsy     Mild right facial paralysis    Hypertension     Liver disease     Mild dementia (HCC)     Neuropathy     Osteoporosis     Sleep apnea 2014

## 2024-02-15 RX ORDER — SPIRONOLACTONE 50 MG/1
50 TABLET, FILM COATED ORAL DAILY
Qty: 90 TABLET | Refills: 3 | Status: SHIPPED | OUTPATIENT
Start: 2024-02-15

## 2024-02-16 LAB
HPV I/H RISK 4 DNA CVX QL NAA+PROBE: NOT DETECTED
HPV SAMPLE: NORMAL
HPV, INTERPRETATION: NORMAL
HPV16 DNA CVX QL NAA+PROBE: NOT DETECTED
HPV18 DNA CVX QL NAA+PROBE: NOT DETECTED
SPECIMEN DESCRIPTION: NORMAL

## 2024-02-19 ENCOUNTER — HOSPITAL ENCOUNTER (OUTPATIENT)
Dept: PHYSICAL THERAPY | Age: 74
Setting detail: THERAPIES SERIES
Discharge: HOME | End: 2024-02-19
Attending: INTERNAL MEDICINE
Payer: MEDICARE

## 2024-02-19 PROCEDURE — 97110 THERAPEUTIC EXERCISES: CPT | Performed by: PHYSICAL THERAPY ASSISTANT

## 2024-02-19 NOTE — FLOWSHEET NOTE
Physical Therapy Daily Treatment Note    Date:  2024    Patient Name:  Penelope Holland    :  1950  MRN: [id number]  Restrictions/Precautions:     Medical/Treatment Diagnosis Information:     Dizziness [R42]      Insurance/Certification information:   Medicare/Medicaid  Physician Information:   Stephany   Plan of care signed (Y/N):  Y  Visit# / total visits:    Pain level: 8/10       Time In: 136  Time Out:  225    Progress Note: []  Yes  [x]  No  Next due by: Visit #10      Subjective:  No new c/o this date. Notes dizziness intermittent. Lightheadedness typically accompanied by ringing in ears.     Objective: Pt. Instructed through completion of ERIK per flow chart to facilitate strength, motion and stability to allow ease with daily activities and ambulation. Initiated and advanced several exercises to improve motion, mobility and balance. Dizziness noted with walking with head turns/nod exercises. Rated 3.4-4/5. No changes in gait observed. Performed VOR exercises in sitting with patient instructed to follow object with eyes fixed, eyes moving and head and eyes moving. No dizziness reported, unable to reproduce dizziness. Will monitor.    Observation:   Test measurements:  TU' without AD    Exercises:   Exercise/Equipment Resistance/Repetitions Other comments        Nustep 6'    Counter Exs 15x    Steps  4 inch x10 F, L   Mini Squats  10x 3-way. No UE   STS  10x         Seated exercises          Standing Balance  30\"x3 FTEO, FAEC   Modified tandem 3x10'' Initiated   Step taps 10x 4'' Initiated         Walking with Head Turns  2 laps    Tandem walking  2 laps    Side stepping  2 laps         [x] Provided verbal/tactile cueing for activities related to strengthening, flexibility, endurance, ROM. (73828)  [] Provided verbal/tactile cueing for activities related to improving balance, coordination, kinesthetic sense, posture, motor skill, proprioception. (82291)    Therapeutic Activities:     []

## 2024-02-20 ENCOUNTER — TELEPHONE (OUTPATIENT)
Dept: INTERNAL MEDICINE | Age: 74
End: 2024-02-20

## 2024-02-20 NOTE — TELEPHONE ENCOUNTER
Left messages for both the patient and daughter to call back on Thursday when I am back in the office.

## 2024-02-20 NOTE — TELEPHONE ENCOUNTER
Daughter called and states that she is concerned with patients mental status, patient has been behind on her bills. She also has been freezing things and filling her freezer  even if its full she continues to pile things on top until they settle then refreeze these items. She is also  food that she is consuming from 2017 she refuses to get rid of  foods. She even has been consuming potatoes that have turned into full plants. She is authorizing payments to come out of her account without remembering that she authorized payments. She is also borrowing money from her daughter because her bills are not being paid. She is very concerned about her mothers wellbeing. She is requesting that the patient be evaluated for these problems. Please call daughter at [phone].

## 2024-02-21 ENCOUNTER — HOSPITAL ENCOUNTER (OUTPATIENT)
Dept: PHYSICAL THERAPY | Age: 74
Setting detail: THERAPIES SERIES
Discharge: HOME | End: 2024-02-21
Attending: INTERNAL MEDICINE
Payer: MEDICARE

## 2024-02-21 PROCEDURE — 97110 THERAPEUTIC EXERCISES: CPT

## 2024-02-21 NOTE — FLOWSHEET NOTE
Physical Therapy Daily Treatment Note    Date:  2024    Patient Name:  Penelope Holland    :  1950  MRN: [id number]  Restrictions/Precautions:     Medical/Treatment Diagnosis Information:     Dizziness [R42]      Insurance/Certification information:   Medicare/Medicaid  Physician Information:   Stephany   Plan of care signed (Y/N):  Y  Visit# / total visits:    Pain level: 10       Time In: 2:48  Time Out: 3:15    Progress Note: []  Yes  [x]  No  Next due by: Visit #10      Subjective:  18 minutes late this date.     Objective: Pt. Instructed through completion of ERIK per flow chart to facilitate strength, motion and stability to allow ease with daily activities and ambulation. Shortened session because of time constraint.       Observation:   Test measurements:  TU' without AD    Exercises:   Exercise/Equipment Resistance/Repetitions Other comments        Nustep 8'    Counter Exs 15x    Steps  4 inch x10 F, L   Mini Squats  10x 3-way   STS  10x         Seated exercises          Standing Balance  FTEO, FAEC   Modified tandem    Step taps         Walking with Head Turns     Tandem walking     Side stepping          [x] Provided verbal/tactile cueing for activities related to strengthening, flexibility, endurance, ROM. (24553)  [] Provided verbal/tactile cueing for activities related to improving balance, coordination, kinesthetic sense, posture, motor skill, proprioception. (23035)    Therapeutic Activities:     [] Therapeutic activities, direct (one-on-one) patient contact (use of dynamic activities to improve functional performance). (05804)    Gait:   [] Provided training and instruction to the patient for ambulation re-education. (39726)    Self-Care/ADL's  [] Self-care/home management training and compensatory training, meal preparation, safety procedures, and instructions in use of assistive technology devices/adaptive equipment, direct one-on-one contact. (91763)    Home Exercise

## 2024-02-22 DIAGNOSIS — G89.29 CHRONIC BILATERAL LOW BACK PAIN WITHOUT SCIATICA: ICD-10-CM

## 2024-02-22 DIAGNOSIS — M54.50 CHRONIC BILATERAL LOW BACK PAIN WITHOUT SCIATICA: ICD-10-CM

## 2024-02-22 RX ORDER — OXYCODONE AND ACETAMINOPHEN 10; 325 MG/1; MG/1
1 TABLET ORAL EVERY 6 HOURS PRN
Qty: 90 TABLET | Refills: 0 | OUTPATIENT
Start: 2024-02-22 | End: 2024-03-23

## 2024-02-22 NOTE — TELEPHONE ENCOUNTER
Daughter called back in to speak to you however you were not here she will try calling back tomorrow.

## 2024-02-22 NOTE — TELEPHONE ENCOUNTER
Attempted to call patient. No answer at this time. Left  with office phone number and hours for tomorrow and requested a return call.

## 2024-02-23 NOTE — TELEPHONE ENCOUNTER
Spoke to pt about med not jarrod filled until 2/27/24. And the directions will be to take 3 times a day so 90 pills will last 30 days. Pt verbalized understanding and stated will  on Wednesday on her way through town

## 2024-02-23 NOTE — TELEPHONE ENCOUNTER
Dr Hammond will not fill the prescription yet bc it is too soon. I know he has it prescribed as up to 4 x daily but the 90 tablets have to last a month. Let her know that her next script we will do only 3 times x daily that way it will last her a full 30 days.

## 2024-02-26 ENCOUNTER — HOSPITAL ENCOUNTER (OUTPATIENT)
Dept: PHYSICAL THERAPY | Age: 74
Setting detail: THERAPIES SERIES
Discharge: HOME | End: 2024-02-26
Attending: INTERNAL MEDICINE
Payer: MEDICARE

## 2024-02-26 PROCEDURE — 97110 THERAPEUTIC EXERCISES: CPT

## 2024-02-26 NOTE — TELEPHONE ENCOUNTER
Lyndon, noted.  Agree with extra assistance from visiting nurses, as well as, daughter and/or other family members

## 2024-02-26 NOTE — TELEPHONE ENCOUNTER
Patients daughter bonny called in attempting to reach [employer]. Bonny states ben[employer]z can call her back at [phone]

## 2024-02-26 NOTE — FLOWSHEET NOTE
Physical Therapy Daily Treatment Note    Date:  2024    Patient Name:  Penelope Holland    :  1950  MRN: [id number]  Restrictions/Precautions:     Medical/Treatment Diagnosis Information:     Dizziness [R42]      Insurance/Certification information:   Medicare/Medicaid  Physician Information:   Stephany   Plan of care signed (Y/N):  Y  Visit# / total visits:    Pain level: 8/10       Time In: 1:51  Time Out: 2:30    Progress Note: []  Yes  [x]  No  Next due by: Visit #10      Subjective:  Late to session. Patient is complaining of a lot of increased ankle pain, advised to see her physician. States she is having dizziness a lot less since starting therapy.     Objective: Pt. Instructed through completion of ERIK per flow chart to facilitate strength, motion and stability to allow ease with daily activities and ambulation.       Observation:   Test measurements:  Unable to perform STS without UE support.     Exercises:   Exercise/Equipment Resistance/Repetitions Other comments        Nustep 8'    Counter Exs 15x    Steps  4 inch x10 F, L   Mini Squats  10x 3-way   STS  10x         Seated exercises 10x         Standing Balance  FTEO, FAEC   Modified tandem    Step taps         Walking with Head Turns     Tandem walking  2 laps    Side stepping  2 laps         [x] Provided verbal/tactile cueing for activities related to strengthening, flexibility, endurance, ROM. (29327)  [] Provided verbal/tactile cueing for activities related to improving balance, coordination, kinesthetic sense, posture, motor skill, proprioception. (60156)    Therapeutic Activities:     [] Therapeutic activities, direct (one-on-one) patient contact (use of dynamic activities to improve functional performance). (25066)    Gait:   [] Provided training and instruction to the patient for ambulation re-education. (78170)    Self-Care/ADL's  [] Self-care/home management training and compensatory training, meal preparation, safety

## 2024-02-26 NOTE — TELEPHONE ENCOUNTER
Spoke with patients daughter bonny and she filled me in on the things her mom has been doing. It sounds like the majority of it it hoarding. She declined an appt for a mental exam bc she does not want her clinically dx with dementia or anything until her daughter can get POA.    However I did advice for her to reach out to patients home health agency to see if they can help with setting up her medications on a weekly basis along with reaching out to her  through the area office on [facility] to see if they can help her in the process of possibly moving her to an assisted living.    The daughter did say she was finally able to be on her bank account so she can watch her moms spending a little more closely.    Please advise if you have any further recommendations

## 2024-02-28 ENCOUNTER — OFFICE VISIT (OUTPATIENT)
Dept: INTERNAL MEDICINE | Age: 74
End: 2024-02-28
Payer: MEDICARE

## 2024-02-28 ENCOUNTER — HOSPITAL ENCOUNTER (OUTPATIENT)
Dept: GENERAL RADIOLOGY | Age: 74
Discharge: HOME | End: 2024-03-01
Payer: MEDICARE

## 2024-02-28 ENCOUNTER — HOSPITAL ENCOUNTER (OUTPATIENT)
Dept: PHYSICAL THERAPY | Age: 74
Setting detail: THERAPIES SERIES
Discharge: HOME | End: 2024-02-28
Attending: INTERNAL MEDICINE
Payer: MEDICARE

## 2024-02-28 VITALS
HEIGHT: 64 IN | RESPIRATION RATE: 14 BRPM | HEART RATE: 60 BPM | BODY MASS INDEX: 38.51 KG/M2 | DIASTOLIC BLOOD PRESSURE: 60 MMHG | SYSTOLIC BLOOD PRESSURE: 112 MMHG | OXYGEN SATURATION: 96 % | WEIGHT: 225.6 LBS

## 2024-02-28 DIAGNOSIS — M25.571 ACUTE RIGHT ANKLE PAIN: ICD-10-CM

## 2024-02-28 DIAGNOSIS — M25.571 ACUTE RIGHT ANKLE PAIN: Primary | ICD-10-CM

## 2024-02-28 DIAGNOSIS — E66.01 SEVERE OBESITY (BMI 35.0-39.9) WITH COMORBIDITY (HCC): ICD-10-CM

## 2024-02-28 PROCEDURE — 73610 X-RAY EXAM OF ANKLE: CPT

## 2024-02-28 PROCEDURE — 3017F COLORECTAL CA SCREEN DOC REV: CPT | Performed by: NURSE PRACTITIONER

## 2024-02-28 PROCEDURE — 99213 OFFICE O/P EST LOW 20 MIN: CPT | Performed by: NURSE PRACTITIONER

## 2024-02-28 PROCEDURE — 1123F ACP DISCUSS/DSCN MKR DOCD: CPT | Performed by: NURSE PRACTITIONER

## 2024-02-28 PROCEDURE — G8400 PT W/DXA NO RESULTS DOC: HCPCS | Performed by: NURSE PRACTITIONER

## 2024-02-28 PROCEDURE — 1036F TOBACCO NON-USER: CPT | Performed by: NURSE PRACTITIONER

## 2024-02-28 PROCEDURE — 97110 THERAPEUTIC EXERCISES: CPT

## 2024-02-28 PROCEDURE — G8427 DOCREV CUR MEDS BY ELIG CLIN: HCPCS | Performed by: NURSE PRACTITIONER

## 2024-02-28 PROCEDURE — 1090F PRES/ABSN URINE INCON ASSESS: CPT | Performed by: NURSE PRACTITIONER

## 2024-02-28 PROCEDURE — 3078F DIAST BP <80 MM HG: CPT | Performed by: NURSE PRACTITIONER

## 2024-02-28 PROCEDURE — G8484 FLU IMMUNIZE NO ADMIN: HCPCS | Performed by: NURSE PRACTITIONER

## 2024-02-28 PROCEDURE — 3074F SYST BP LT 130 MM HG: CPT | Performed by: NURSE PRACTITIONER

## 2024-02-28 PROCEDURE — G8417 CALC BMI ABV UP PARAM F/U: HCPCS | Performed by: NURSE PRACTITIONER

## 2024-02-28 PROCEDURE — 99214 OFFICE O/P EST MOD 30 MIN: CPT | Performed by: NURSE PRACTITIONER

## 2024-02-28 NOTE — DISCHARGE SUMMARY
[facility] [facility]  Rehabilitation and Sports Medicine    [x] [place]  Phone: [phone]  Fax: [phone]      [] [place]  Phone: [phone]  Fax: [phone]    Physical Therapy Discharge Note  Date: 2024        Patient Name:  Penelope Holland    :  1950  MRN: [id number]  Restrictions/Precautions:     Medical/Treatment Diagnosis Information:     Dizziness [R42]     Insurance/Certification information:   Medicare/Medicaid  Physician Information:   Stephany   Plan of care signed (Y/N):  Y  Visit# / total visits:    Pain level:      /10           Time Period for Report:  24-24  Cancels/No-shows to date:  0    Plan of Care/Treatment to date:  [x] Therapeutic Exercise    [] Modalities:  [x] Therapeutic Activity     [] Ultrasound  [] Electrical Stimulation  [x] Gait Training      [] Cervical Traction    [] Lumbar Traction  [x] Neuromuscular Re-education  [] Cold/hotpack [] Iontophoresis  [x] Instruction in HEP      Other:  [] Manual Therapy       []    [] Aquatic Therapy       []                              Subjective:       Late to session. C/o ankle pain.  Has an xray scheduled for her foot.  Patient c/o lightheadedness occasionally.         Objective: Pt. Instructed through completion of ERIK per flow chart to facilitate strength, motion and stability to allow ease with daily activities and ambulation.    Tinetti 27/28  4/5 hip flexion, knee flexion/ext 4+/5  Timed Stands 9x in 30sec   Test measurements:  Unable to perform STS without UE support.         Assessment:   Patient has nearly met all goals.  Patient demonstrates improvement in balance, hip strength continues to be weak.  Patient is independent in HEP.      Plan:   DC PT to HEP     Goals:   Short Term Goals  Time Frame for Short Term Goals: 3 weeks  Short Term Goal 1: Initiate HEP (met)     Long Term Goals  Time Frame for Long Term Goals : 6 weeks  Long Term Goal 1: Independent in HEP (met)   Long Term Goal 2:

## 2024-02-28 NOTE — FLOWSHEET NOTE
Physical Therapy Daily Treatment Note    Date:  2024    Patient Name:  Penelope Holland    :  1950  MRN: [id number]  Restrictions/Precautions:     Medical/Treatment Diagnosis Information:     Dizziness [R42]      Insurance/Certification information:   Medicare/Medicaid  Physician Information:   Stephany   Plan of care signed (Y/N):  Y  Visit# / total visits:  8  Pain level: /10       Time In: 2:40  Time Out: 304    Progress Note: []  Yes  [x]  No  Next due by: Visit #10      Subjective:  Late to session. C/o ankle pain.  Has an xray scheduled for her foot.  Patient c/o lightheadedness occasionally.        Objective: Pt. Instructed through completion of ERIK per flow chart to facilitate strength, motion and stability to allow ease with daily activities and ambulation.    Tinetti 27/28  4/5 hip flexion, knee flexion/ext 4+/5  Timed Stands 9x in 30sec   Test measurements:  Unable to perform STS without UE support.     Exercises:   Exercise/Equipment Resistance/Repetitions Other comments        Nustep 8'    Counter Exs 15x    Steps  4 inch x10 F, L   Mini Squats  10x 3-way   STS  10x         Seated exercises 10x         Standing Balance  FTEO, FAEC   Modified tandem    Step taps         Walking with Head Turns     Tandem walking  2 laps    Side stepping  2 laps         [x] Provided verbal/tactile cueing for activities related to strengthening, flexibility, endurance, ROM. (21662)  [] Provided verbal/tactile cueing for activities related to improving balance, coordination, kinesthetic sense, posture, motor skill, proprioception. (66636)    Therapeutic Activities:     [] Therapeutic activities, direct (one-on-one) patient contact (use of dynamic activities to improve functional performance). (70549)    Gait:   [] Provided training and instruction to the patient for ambulation re-education. (17813)    Self-Care/ADL's  [] Self-care/home management training and compensatory training, meal preparation, safety

## 2024-02-28 NOTE — PROGRESS NOTES
24  Penelopegeovanna Holland  1950      Chief Complaint:   1. Acute right ankle pain    2. Severe obesity (BMI 35.0-39.9) with comorbidity (HCC)        HPI:  73-year-old patient in with complaints of right ankle pain onset 5 days ago.  States she started noticed right ankle pain after she had been on her feet several hours.  States her daughter came over and made her clean out her freezer as she had a lot of  food.  States due to her weight her ankles have more stress on them.  States she also has a bad right knee states she has been working with physical therapy has an appointment with them later today and they wanted her to have an x-ray before she completed physical therapy.  States its about 50% better than where it was yesterday.  Has pain meds in the home setting.  Denies any known trauma    No Known Allergies    Past Medical History:   Diagnosis Date    Arthritis     Asthma     Bell's palsy     CAD (coronary artery disease)     Cancer (HCC)     cervical    Carotid artery disease (HCC)     Cataracts, bilateral     CHF (congestive heart failure) (HCC)     Chlamydia     COPD (chronic obstructive pulmonary disease) (HCC)     Coronary artery disease     Eczema     Emphysema lung (HCC)     Fibromyalgia     Glaucoma     H/O blood clots     Headache     Heart attack (HCC)     Hx of Bell's palsy     Mild right facial paralysis    Hypertension     Liver disease     Mild dementia (HCC)     Neuropathy     Osteoporosis     Sleep apnea 2014    sleep study done in [facility]     Type 2 diabetes mellitus with hyperglycemia (HCC)        Past Surgical History:   Procedure Laterality Date    ANESTHESIA NERVE BLOCK Right 2019    Right L2 L3 L4 L5 Diagnostic Medial BB performed by Alfredo Burk MD at [facility] OR    ANESTHESIA NERVE BLOCK Right 2019    Right L2 L3 L4 L5 Confirmatory Medial BB performed by Alfredo Burk MD at [facility] OR    APPENDECTOMY  1968    BARIATRIC SURGERY

## 2024-03-01 DIAGNOSIS — M54.50 CHRONIC BILATERAL LOW BACK PAIN WITHOUT SCIATICA: ICD-10-CM

## 2024-03-01 DIAGNOSIS — G89.29 CHRONIC BILATERAL LOW BACK PAIN WITHOUT SCIATICA: ICD-10-CM

## 2024-03-01 RX ORDER — OXYCODONE AND ACETAMINOPHEN 10; 325 MG/1; MG/1
1 TABLET ORAL EVERY 8 HOURS PRN
Qty: 90 TABLET | Refills: 0 | Status: SHIPPED | OUTPATIENT
Start: 2024-03-01 | End: 2024-03-31

## 2024-03-01 NOTE — TELEPHONE ENCOUNTER
Patient called in requesting a refill on percocet sent to [facility].     Medication pended if agreeable    Last Appt:  2/28/2024  Next Appt:   7/29/2024  Med verified in Epic

## 2024-03-04 ENCOUNTER — TELEPHONE (OUTPATIENT)
Dept: INTERNAL MEDICINE | Age: 74
End: 2024-03-04
Payer: MEDICARE

## 2024-03-04 DIAGNOSIS — Z79.4 TYPE 2 DIABETES MELLITUS WITH DIABETIC POLYNEUROPATHY, WITH LONG-TERM CURRENT USE OF INSULIN (HCC): Primary | ICD-10-CM

## 2024-03-04 DIAGNOSIS — E11.42 TYPE 2 DIABETES MELLITUS WITH DIABETIC POLYNEUROPATHY, WITH LONG-TERM CURRENT USE OF INSULIN (HCC): Primary | ICD-10-CM

## 2024-03-04 DIAGNOSIS — I35.0 NONRHEUMATIC AORTIC VALVE STENOSIS: ICD-10-CM

## 2024-03-04 DIAGNOSIS — I25.10 CORONARY ARTERY DISEASE DUE TO LIPID RICH PLAQUE: ICD-10-CM

## 2024-03-04 DIAGNOSIS — I50.32 CHF (CONGESTIVE HEART FAILURE), NYHA CLASS I, CHRONIC, DIASTOLIC (HCC): ICD-10-CM

## 2024-03-04 DIAGNOSIS — I25.83 CORONARY ARTERY DISEASE DUE TO LIPID RICH PLAQUE: ICD-10-CM

## 2024-03-04 DIAGNOSIS — I49.9 CARDIAC ARRHYTHMIA, UNSPECIFIED CARDIAC ARRHYTHMIA TYPE: ICD-10-CM

## 2024-03-04 PROCEDURE — G0179 MD RECERTIFICATION HHA PT: HCPCS | Performed by: INTERNAL MEDICINE

## 2024-03-11 LAB — CYTOLOGY REPORT: NORMAL

## 2024-03-15 ENCOUNTER — TELEPHONE (OUTPATIENT)
Dept: INTERNAL MEDICINE | Age: 74
End: 2024-03-15

## 2024-03-15 NOTE — TELEPHONE ENCOUNTER
Patient called in expressing concern with daughter, Mine, trying to get legal guardianship. She does not want her daughter to be legal guardian of her. She feels her daughter is trying to black mail her, as she added her name to her bank account and has removed some food from her house (frozen, canned goods).     Patient is concerned that her daughter is going to become her legal guardian. Patient is under the impression that you are the decision maker of this and wanted you to be aware that she does not want this.     Patient would like a call to ensure you got her message and if you provide any additional comments. I notified patient that we would contact her on Monday. Patient verbalized understanding and had no further questions at this time.

## 2024-03-15 NOTE — TELEPHONE ENCOUNTER
Tell the patient of the courts decide the issues of legal guardianship etc.   The physicians do not pick a family member etc.    I am not sure if next of kin rules apply in [place] etc., but again these are legal issues and not medical issues.  Patient may need to contact her own  to ask questions about how she can name/pick which family member may be her legal guardian.  Let the patient know.

## 2024-03-18 DIAGNOSIS — M79.672 BILATERAL FOOT PAIN: ICD-10-CM

## 2024-03-18 DIAGNOSIS — E11.42 TYPE 2 DIABETES MELLITUS WITH DIABETIC POLYNEUROPATHY, WITH LONG-TERM CURRENT USE OF INSULIN (HCC): ICD-10-CM

## 2024-03-18 DIAGNOSIS — Z79.4 TYPE 2 DIABETES MELLITUS WITH DIABETIC POLYNEUROPATHY, WITH LONG-TERM CURRENT USE OF INSULIN (HCC): ICD-10-CM

## 2024-03-18 DIAGNOSIS — M79.671 BILATERAL FOOT PAIN: ICD-10-CM

## 2024-03-18 RX ORDER — GABAPENTIN 600 MG/1
600 TABLET ORAL 3 TIMES DAILY
Qty: 270 TABLET | Refills: 2 | Status: SHIPPED | OUTPATIENT
Start: 2024-03-18 | End: 2024-12-13

## 2024-03-18 RX ORDER — NITROGLYCERIN 0.4 MG/1
TABLET SUBLINGUAL
Qty: 25 TABLET | Refills: 1 | Status: SHIPPED | OUTPATIENT
Start: 2024-03-18

## 2024-03-18 NOTE — TELEPHONE ENCOUNTER
I spoke with the patient and advised her of Dr Piña recommendations, she was not happy that he did not recommend or say this person should not be her POA and Guardianship.  However I did let Penelope know that her daughter is not doing this to take over her life, that she is doing this so that way if something where to happen Mine can be her voice for her. I also advised her that the guardianship is so she can not get made at her daughter and just call the courts to cancel the POA like she did the last time.  Which the last time Penelope called and cancelled it bc she had called her daughter as she is her primary contact and left her a couple of messages and Mine did not return her call when Penelope thought she should have so she got mad and she cancelled the POA. So I explained again on that is why she wants the guardianship to go with the POA so that way Penelope can not get mad and call the courts to cancel it this time.    I believe once I explained everything to her she felt a little better about the situation.

## 2024-03-18 NOTE — TELEPHONE ENCOUNTER
Refill request nitro 0.4mg sent to [facility] in [place]     Medication pended if agreeable    Last Appt:  2/28/2024  Next Appt:   7/29/2024  Med verified in Epic

## 2024-03-18 NOTE — TELEPHONE ENCOUNTER
Penelope called requesting a refill of the below medication which has been pended for you:     Requested Prescriptions     Pending Prescriptions Disp Refills    gabapentin (NEURONTIN) 600 MG tablet [Pharmacy Med Name: GABAPENTIN 600 MG Tablet] 270 tablet 3     Sig: Take 1 tablet by mouth 3 times daily.       Last Appointment Date: 1/22/2024  Next Appointment Date: 7/29/2024    No Known Allergies

## 2024-03-20 ENCOUNTER — TELEPHONE (OUTPATIENT)
Dept: INTERNAL MEDICINE | Age: 74
End: 2024-03-20

## 2024-03-20 DIAGNOSIS — M25.571 ACUTE RIGHT ANKLE PAIN: Primary | ICD-10-CM

## 2024-03-20 DIAGNOSIS — M79.671 BILATERAL FOOT PAIN: ICD-10-CM

## 2024-03-20 DIAGNOSIS — M79.672 BILATERAL FOOT PAIN: ICD-10-CM

## 2024-03-20 NOTE — TELEPHONE ENCOUNTER
Confirmed with Amaris at [facility] that patient is scheduled here.

## 2024-03-20 NOTE — TELEPHONE ENCOUNTER
Spoke with Dayanara with Dr. Means's office and she states that patient requested to see Dr. Esparza instead.

## 2024-03-20 NOTE — TELEPHONE ENCOUNTER
Patient wants to be seen by Ortho Dr. Esparza not [facility]. Patient is aware of ortho contacting them after the referral is changed.

## 2024-03-20 NOTE — TELEPHONE ENCOUNTER
You sent a referral to wound care for Dr Esparza earlier today.  Dr Esparza is now affiliated with [facility] so you will need to do an external referral and fax to him there.

## 2024-03-26 ENCOUNTER — HOSPITAL ENCOUNTER (OUTPATIENT)
Dept: BONE DENSITY | Age: 74
Discharge: HOME | End: 2024-03-28
Attending: OBSTETRICS & GYNECOLOGY
Payer: MEDICARE

## 2024-03-26 DIAGNOSIS — N95.1 MENOPAUSAL STATE: ICD-10-CM

## 2024-03-26 DIAGNOSIS — Z78.0 ENCOUNTER FOR OSTEOPOROSIS SCREENING IN ASYMPTOMATIC POSTMENOPAUSAL PATIENT: ICD-10-CM

## 2024-03-26 DIAGNOSIS — Z13.820 ENCOUNTER FOR OSTEOPOROSIS SCREENING IN ASYMPTOMATIC POSTMENOPAUSAL PATIENT: ICD-10-CM

## 2024-03-26 PROCEDURE — 77080 DXA BONE DENSITY AXIAL: CPT

## 2024-03-28 ENCOUNTER — TELEPHONE (OUTPATIENT)
Dept: OBGYN | Age: 74
End: 2024-03-28

## 2024-03-28 RX ORDER — PHENOL 1.4 %
1 AEROSOL, SPRAY (ML) MUCOUS MEMBRANE DAILY
Qty: 90 TABLET | Refills: 3 | Status: SHIPPED | OUTPATIENT
Start: 2024-03-28

## 2024-03-29 DIAGNOSIS — G89.29 CHRONIC BILATERAL LOW BACK PAIN WITHOUT SCIATICA: ICD-10-CM

## 2024-03-29 DIAGNOSIS — M54.50 CHRONIC BILATERAL LOW BACK PAIN WITHOUT SCIATICA: ICD-10-CM

## 2024-03-29 RX ORDER — OXYCODONE AND ACETAMINOPHEN 10; 325 MG/1; MG/1
1 TABLET ORAL EVERY 8 HOURS PRN
Qty: 90 TABLET | Refills: 0 | Status: SHIPPED | OUTPATIENT
Start: 2024-03-29 | End: 2024-04-28

## 2024-03-29 NOTE — TELEPHONE ENCOUNTER
Refill request percocet sent to [facility]    Medication pended if agreeable     Last Appt:  2/28/2024  Next Appt:   7/29/2024  Med verified in Epic

## 2024-04-01 RX ORDER — SWAB
800 SWAB, NON-MEDICATED MISCELLANEOUS DAILY
COMMUNITY

## 2024-04-01 RX ORDER — CALCIUM CARBONATE 500(1250)
500 TABLET ORAL 3 TIMES DAILY
COMMUNITY

## 2024-04-11 RX ORDER — ALBUTEROL SULFATE 90 UG/1
2 AEROSOL, METERED RESPIRATORY (INHALATION) EVERY 6 HOURS PRN
Qty: 3 EACH | Refills: 3 | Status: SHIPPED | OUTPATIENT
Start: 2024-04-11 | End: 2025-04-11

## 2024-04-11 NOTE — TELEPHONE ENCOUNTER
Refill request albuterol inhaler sent to [facility]     Medication pended if agreeable     Last Appt:  2/28/2024  Next Appt:   7/29/2024  Med verified in Epic

## 2024-04-12 DIAGNOSIS — J47.9 BRONCHIECTASIS WITHOUT COMPLICATION (HCC): ICD-10-CM

## 2024-04-12 RX ORDER — ALBUTEROL SULFATE 2.5 MG/3ML
2.5 SOLUTION RESPIRATORY (INHALATION) EVERY 6 HOURS PRN
Qty: 120 EACH | Refills: 3 | Status: SHIPPED | OUTPATIENT
Start: 2024-04-12

## 2024-04-12 NOTE — TELEPHONE ENCOUNTER
Patient called in stating wrong albuterol was sent in. Patient is requesting albuterol solution into [facility] in [place]    Medication pended if agreeable     Last Appt:  2/28/2024  Next Appt:   7/29/2024  Med verified in Epic      Patient is requesting wilson to reach, stating that she is worried about wilson and would like to hear her voice. Advised patient I would ask Wilson to reach out to her.

## 2024-04-15 DIAGNOSIS — M79.671 BILATERAL FOOT PAIN: Primary | ICD-10-CM

## 2024-04-15 DIAGNOSIS — M79.672 BILATERAL FOOT PAIN: Primary | ICD-10-CM

## 2024-04-22 ENCOUNTER — OFFICE VISIT (OUTPATIENT)
Dept: ORTHOPEDIC SURGERY | Age: 74
End: 2024-04-22
Attending: PODIATRIST
Payer: MEDICARE

## 2024-04-22 ENCOUNTER — HOSPITAL ENCOUNTER (OUTPATIENT)
Dept: GENERAL RADIOLOGY | Age: 74
Discharge: HOME | End: 2024-04-24
Attending: PODIATRIST
Payer: MEDICARE

## 2024-04-22 VITALS — WEIGHT: 225 LBS | HEIGHT: 64 IN | BODY MASS INDEX: 38.41 KG/M2

## 2024-04-22 DIAGNOSIS — M79.672 BILATERAL FOOT PAIN: ICD-10-CM

## 2024-04-22 DIAGNOSIS — M79.671 BILATERAL FOOT PAIN: Primary | ICD-10-CM

## 2024-04-22 DIAGNOSIS — M67.02 SHORT ACHILLES TENDON OF LEFT LOWER EXTREMITY: ICD-10-CM

## 2024-04-22 DIAGNOSIS — M72.2 PLANTAR FASCIITIS, BILATERAL: ICD-10-CM

## 2024-04-22 DIAGNOSIS — M67.01 SHORT ACHILLES TENDON OF RIGHT LOWER EXTREMITY: ICD-10-CM

## 2024-04-22 DIAGNOSIS — M79.671 BILATERAL FOOT PAIN: ICD-10-CM

## 2024-04-22 DIAGNOSIS — M79.672 BILATERAL FOOT PAIN: Primary | ICD-10-CM

## 2024-04-22 PROCEDURE — 99214 OFFICE O/P EST MOD 30 MIN: CPT | Performed by: NURSE PRACTITIONER

## 2024-04-22 PROCEDURE — G8427 DOCREV CUR MEDS BY ELIG CLIN: HCPCS | Performed by: NURSE PRACTITIONER

## 2024-04-22 PROCEDURE — 1123F ACP DISCUSS/DSCN MKR DOCD: CPT | Performed by: NURSE PRACTITIONER

## 2024-04-22 PROCEDURE — 73630 X-RAY EXAM OF FOOT: CPT

## 2024-04-22 PROCEDURE — 1090F PRES/ABSN URINE INCON ASSESS: CPT | Performed by: NURSE PRACTITIONER

## 2024-04-22 PROCEDURE — G8417 CALC BMI ABV UP PARAM F/U: HCPCS | Performed by: NURSE PRACTITIONER

## 2024-04-22 PROCEDURE — 1036F TOBACCO NON-USER: CPT | Performed by: NURSE PRACTITIONER

## 2024-04-22 PROCEDURE — 3017F COLORECTAL CA SCREEN DOC REV: CPT | Performed by: NURSE PRACTITIONER

## 2024-04-22 PROCEDURE — G8399 PT W/DXA RESULTS DOCUMENT: HCPCS | Performed by: NURSE PRACTITIONER

## 2024-04-22 PROCEDURE — 99203 OFFICE O/P NEW LOW 30 MIN: CPT | Performed by: NURSE PRACTITIONER

## 2024-04-22 RX ORDER — KETOROLAC TROMETHAMINE 10 MG/1
10 TABLET, FILM COATED ORAL 3 TIMES DAILY PRN
Qty: 15 TABLET | Refills: 0 | Status: SHIPPED | OUTPATIENT
Start: 2024-04-22 | End: 2025-04-22

## 2024-04-26 NOTE — PROGRESS NOTES
Acquired spondylolisthesis    Lumbar facet joint syndrome    Chronic diastolic congestive heart failure (HCC)    Bronchiectasis without complication (HCC)    CHF (congestive heart failure), NYHA class I, chronic, diastolic (HCC)    Colitis    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 (HCC)    Bandemia    Primary hypertension    S/P HAILE-BSO 1987 [facility] HAD radiation       Procedure Note  N/A    Plan:     Patient examined and evaluated on behalf of Dr. Esparza.  Patient is found to have bilateral plantar fasciitis covered with bilateral short Achilles.  Patient was prescribed a 5-day course of Toradol.  Patient will look into better supportive tennis shoes moving forward.  Patient to avoid barefoot walking.  Patient was provided with a plantar fascial stretching handout.  We did discuss potential plantar fascial steroid injection at next visit.  Patient will follow back up with us again in 2 months for reevaluation.      DAVID Newton CNP   Orthopa[facility]     Electronically signed by DAVID Newton CNP on 4/26/2024 at 1:27 PM

## 2024-05-02 DIAGNOSIS — G89.29 CHRONIC BILATERAL LOW BACK PAIN WITHOUT SCIATICA: ICD-10-CM

## 2024-05-02 DIAGNOSIS — M54.50 CHRONIC BILATERAL LOW BACK PAIN WITHOUT SCIATICA: ICD-10-CM

## 2024-05-02 RX ORDER — OXYCODONE AND ACETAMINOPHEN 10; 325 MG/1; MG/1
1 TABLET ORAL EVERY 8 HOURS PRN
Qty: 90 TABLET | Refills: 0 | Status: SHIPPED | OUTPATIENT
Start: 2024-05-02 | End: 2024-06-01

## 2024-05-02 NOTE — TELEPHONE ENCOUNTER
Refill request percocet sent to [facility]         Last Appt:  2/28/2024  Next Appt:   7/29/2024  Med verified in Epic

## 2024-05-07 ENCOUNTER — TELEPHONE (OUTPATIENT)
Dept: INTERNAL MEDICINE | Age: 74
End: 2024-05-07

## 2024-05-07 DIAGNOSIS — I49.9 CARDIAC ARRHYTHMIA, UNSPECIFIED CARDIAC ARRHYTHMIA TYPE: ICD-10-CM

## 2024-05-07 DIAGNOSIS — I25.10 ATHEROSCLEROSIS OF NATIVE CORONARY ARTERY OF NATIVE HEART WITHOUT ANGINA PECTORIS: ICD-10-CM

## 2024-05-07 DIAGNOSIS — I35.0 NONRHEUMATIC AORTIC VALVE STENOSIS: ICD-10-CM

## 2024-05-07 DIAGNOSIS — I25.83 CORONARY ARTERY DISEASE DUE TO LIPID RICH PLAQUE: ICD-10-CM

## 2024-05-07 DIAGNOSIS — E11.42 TYPE 2 DIABETES MELLITUS WITH DIABETIC POLYNEUROPATHY, WITH LONG-TERM CURRENT USE OF INSULIN (HCC): Primary | ICD-10-CM

## 2024-05-07 DIAGNOSIS — Z79.4 TYPE 2 DIABETES MELLITUS WITH DIABETIC POLYNEUROPATHY, WITH LONG-TERM CURRENT USE OF INSULIN (HCC): Primary | ICD-10-CM

## 2024-05-07 DIAGNOSIS — I50.32 CHF (CONGESTIVE HEART FAILURE), NYHA CLASS I, CHRONIC, DIASTOLIC (HCC): ICD-10-CM

## 2024-05-07 DIAGNOSIS — I25.10 CORONARY ARTERY DISEASE DUE TO LIPID RICH PLAQUE: ICD-10-CM

## 2024-05-20 ENCOUNTER — OFFICE VISIT (OUTPATIENT)
Dept: ORTHOPEDIC SURGERY | Age: 74
End: 2024-05-20
Payer: MEDICARE

## 2024-05-20 VITALS
HEART RATE: 55 BPM | SYSTOLIC BLOOD PRESSURE: 120 MMHG | OXYGEN SATURATION: 97 % | WEIGHT: 225 LBS | BODY MASS INDEX: 38.41 KG/M2 | HEIGHT: 64 IN | DIASTOLIC BLOOD PRESSURE: 72 MMHG | RESPIRATION RATE: 18 BRPM

## 2024-05-20 DIAGNOSIS — M79.672 BILATERAL FOOT PAIN: Primary | ICD-10-CM

## 2024-05-20 DIAGNOSIS — M79.671 BILATERAL FOOT PAIN: Primary | ICD-10-CM

## 2024-05-20 PROCEDURE — G8399 PT W/DXA RESULTS DOCUMENT: HCPCS | Performed by: PODIATRIST

## 2024-05-20 PROCEDURE — 99213 OFFICE O/P EST LOW 20 MIN: CPT | Performed by: PODIATRIST

## 2024-05-20 PROCEDURE — 3074F SYST BP LT 130 MM HG: CPT | Performed by: PODIATRIST

## 2024-05-20 PROCEDURE — 3078F DIAST BP <80 MM HG: CPT | Performed by: PODIATRIST

## 2024-05-20 PROCEDURE — 1123F ACP DISCUSS/DSCN MKR DOCD: CPT | Performed by: PODIATRIST

## 2024-05-20 PROCEDURE — G8427 DOCREV CUR MEDS BY ELIG CLIN: HCPCS | Performed by: PODIATRIST

## 2024-05-20 PROCEDURE — 1036F TOBACCO NON-USER: CPT | Performed by: PODIATRIST

## 2024-05-20 PROCEDURE — G8417 CALC BMI ABV UP PARAM F/U: HCPCS | Performed by: PODIATRIST

## 2024-05-20 PROCEDURE — 3017F COLORECTAL CA SCREEN DOC REV: CPT | Performed by: PODIATRIST

## 2024-05-20 PROCEDURE — 1090F PRES/ABSN URINE INCON ASSESS: CPT | Performed by: PODIATRIST

## 2024-05-20 NOTE — PROGRESS NOTES
worse than left due to active plantar fasciitis.  Patient also has pain with bilateral knees fully extended due to short Achilles.      Imaging:    LABS       CBC:   Lab Results   Component Value Date/Time    WBC 7.9 12/21/2022 06:12 AM    HGB 11.9 08/15/2023 04:07 PM    HCT 38.7 12/21/2022 06:12 AM    MCV 93.9 12/21/2022 06:12 AM     12/21/2022 06:12 AM     BMP:   Lab Results   Component Value Date/Time     08/15/2023 04:07 PM    K 4.5 08/15/2023 04:07 PM     08/15/2023 04:07 PM    CO2 23 08/15/2023 04:07 PM    BUN 18 08/15/2023 04:07 PM    CREATININE 0.7 08/15/2023 04:07 PM     PT/INR:   Lab Results   Component Value Date/Time    PROTIME 11.3 01/15/2019 09:23 AM    INR 1.1 01/15/2019 09:23 AM     Prealbumin: No results found for: \"PREALBUMIN\"  Albumin:No results found for: \"LABALBU\"  Sed Rate:  Lab Results   Component Value Date/Time    SEDRATE 43 12/16/2022 10:53 PM     CRP: No results found for: \"CRP\"  Micro: No results found for: \"BC\"   Hemoglobin A1C:   Lab Results   Component Value Date/Time    LABA1C 6.1 08/15/2023 04:07 PM       Assessment:      Diagnosis Orders   1. Bilateral foot pain        PVD  Type II diabetes  Primary Osteoarthritis, bilateral foot/ankle    Patient Active Problem List   Diagnosis    Obstructive sleep apnea    Chronic fatigue    Coronary artery disease due to lipid rich plaque    Vitamin D deficiency    Type 2 diabetes mellitus with diabetic polyneuropathy, with long-term current use of insulin (HCC)    Peripheral polyneuropathy    Bilateral leg edema    Right hip pain    Muscle ache    Hx of Bell's palsy    Memory problem    Gait abnormality    Balance problem    H/O falling    Dizziness    Intractable episodic tension-type headache    Essential tremor    Anxiety and depression    Elevated hemoglobin A1c    VBI (vertebrobasilar insufficiency)    Chronic cerebral ischemia    TIA (transient ischemic attack)    Chronic tension headache    Morbid obesity with BMI of

## 2024-05-30 DIAGNOSIS — R00.2 PALPITATIONS: ICD-10-CM

## 2024-05-30 DIAGNOSIS — E55.9 VITAMIN D DEFICIENCY: ICD-10-CM

## 2024-05-30 RX ORDER — MELATONIN
Qty: 540 TABLET | Refills: 3 | Status: SHIPPED | OUTPATIENT
Start: 2024-05-30

## 2024-05-30 RX ORDER — METOPROLOL SUCCINATE 25 MG/1
TABLET, EXTENDED RELEASE ORAL
Qty: 45 TABLET | Refills: 3 | Status: SHIPPED | OUTPATIENT
Start: 2024-05-30

## 2024-05-30 NOTE — TELEPHONE ENCOUNTER
Both medications were on her med list they had just fallen off and were in the history. Both medications pended.

## 2024-05-30 NOTE — TELEPHONE ENCOUNTER
Penelope calling to say she needs refills on two meds. Metoprolol ER Succ 25mg  1/2 tablet daily (Dr Bains had her stop for a month and then restart and states Dr Hammond's name was on the bottle) and also Vit D3 25mcg 3 tablets BID. I did not see either on her list. Would like 90 dy sent to [facility] mail order    Also, she is going to drop off a form for diabetic shoes for Dr Hammond to sign. She is going to ortho foot Dr in [place] for diabetic shoes and her plantar fascitis

## 2024-06-06 DIAGNOSIS — G89.29 CHRONIC BILATERAL LOW BACK PAIN WITHOUT SCIATICA: ICD-10-CM

## 2024-06-06 DIAGNOSIS — M54.50 CHRONIC BILATERAL LOW BACK PAIN WITHOUT SCIATICA: ICD-10-CM

## 2024-06-06 RX ORDER — OXYCODONE AND ACETAMINOPHEN 10; 325 MG/1; MG/1
1 TABLET ORAL EVERY 8 HOURS PRN
Qty: 90 TABLET | Refills: 0 | Status: SHIPPED | OUTPATIENT
Start: 2024-06-06 | End: 2024-07-06

## 2024-06-06 NOTE — TELEPHONE ENCOUNTER
Refill request percocet sent to [facility]    Medication pended if agreeable     Last Appt:  2/28/2024  Next Appt:   7/29/2024  Med verified in Epic

## 2024-06-12 ENCOUNTER — OFFICE VISIT (OUTPATIENT)
Dept: CARDIOLOGY | Age: 74
End: 2024-06-12
Payer: MEDICARE

## 2024-06-12 VITALS
SYSTOLIC BLOOD PRESSURE: 118 MMHG | DIASTOLIC BLOOD PRESSURE: 64 MMHG | HEART RATE: 50 BPM | BODY MASS INDEX: 38.38 KG/M2 | WEIGHT: 223.6 LBS

## 2024-06-12 DIAGNOSIS — I50.32 CHRONIC DIASTOLIC CONGESTIVE HEART FAILURE (HCC): ICD-10-CM

## 2024-06-12 DIAGNOSIS — I25.118 CORONARY ARTERY DISEASE INVOLVING NATIVE HEART WITH OTHER FORM OF ANGINA PECTORIS, UNSPECIFIED VESSEL OR LESION TYPE (HCC): Primary | ICD-10-CM

## 2024-06-12 PROCEDURE — 99213 OFFICE O/P EST LOW 20 MIN: CPT | Performed by: NURSE PRACTITIONER

## 2024-06-12 PROCEDURE — 93005 ELECTROCARDIOGRAM TRACING: CPT | Performed by: NURSE PRACTITIONER

## 2024-06-12 NOTE — PROGRESS NOTES
Cholecystectomy; Endoscopy, colon, diagnostic; Colonoscopy (12/21/2022); Esophagogastroduodenoscopy (12/21/2022); Colonoscopy (N/A, 12/21/2022); Upper gastrointestinal endoscopy (N/A, 12/21/2022); and Colonoscopy (N/A, 04/04/2023).    Home Medications:  Prior to Admission medications    Medication Sig Start Date End Date Taking? Authorizing Provider   oxyCODONE-acetaminophen (PERCOCET)  MG per tablet Take 1 tablet by mouth every 8 hours as needed for Pain for up to 30 days. Max Daily Amount: 3 tablets 6/6/24 7/6/24 Yes Claudy Hammond MD   metoprolol succinate (TOPROL XL) 25 MG extended release tablet TAKE 1/2 TABLET EVERY DAY 5/30/24  Yes Claudy Hammond MD   vitamin D3 (CHOLECALCIFEROL) 25 MCG (1000 UT) TABS tablet TAKE 3 TABLETS BY MOUTH TWICE DAILY 5/30/24  Yes Claudy Hammond MD   ketorolac (TORADOL) 10 MG tablet Take 1 tablet by mouth 3 times daily as needed for Pain 4/22/24 4/22/25 Yes Ang Feilx APRN - CNP   albuterol (PROVENTIL) (2.5 MG/3ML) 0.083% nebulizer solution Take 3 mLs by nebulization every 6 hours as needed for Wheezing 4/12/24  Yes Claudy Hammond MD   albuterol sulfate HFA (PROVENTIL;VENTOLIN;PROAIR) 108 (90 Base) MCG/ACT inhaler Inhale 2 puffs into the lungs every 6 hours as needed for Wheezing 4/11/24 4/11/25 Yes Claudy Hammond MD   calcium carbonate (OSCAL) 500 MG TABS tablet Take 1 tablet by mouth 3 times daily   Yes Marce Barrios MD   Vitamin D, Cholecalciferol, 10 MCG (400 UNIT) CAPS Take 800 Units by mouth daily   Yes Marce Barrios MD   gabapentin (NEURONTIN) 600 MG tablet Take 1 tablet by mouth 3 times daily for 270 days. 3/18/24 12/13/24 Yes Claudy Hammond MD   spironolactone (ALDACTONE) 50 MG tablet Take 1 tablet by mouth daily 2/15/24  Yes Jhon Chahal MD   Coenzyme Q10 (CO Q-10 PO) Take 1 mg by mouth daily   Yes Provider, MD Marce   Insulin Syringe-Needle U-100 (DROPLET INSULIN SYRINGE) 31G X 5/16\" 0.5 ML MISC USE TO INJECT

## 2024-06-17 ENCOUNTER — TELEPHONE (OUTPATIENT)
Dept: INTERNAL MEDICINE | Age: 74
End: 2024-06-17

## 2024-06-17 NOTE — TELEPHONE ENCOUNTER
Penelope calling to check on status for diabetic shoes to be sent to [facility]. After speaking with Wilson, she said the paperwork will be signed at the time of patient's upcoming appt. I relayed this to Penelope and she was upset she has to wait that long. \"I never had to come in when he sent this to [facility] store\" \"now they are not going to be able to order them if I wait til July  because the order will be no good\". I told her to maybe call them and tell them that is when her appt with Dr Hammond is. \"She said that will do no good because it will be too late\".

## 2024-06-17 NOTE — TELEPHONE ENCOUNTER
I will call patient and offer her a sooner appt. But with out a sooner appt I can not send in a script or fill out the form cause I need an office note with in the last 3 months from Dr Hammond and we only see the patient every 6 months and have not seen her since 1/22/2024

## 2024-06-18 NOTE — TELEPHONE ENCOUNTER
Left message for patient advising that per the facility she is choosing to go through we have to have an office visit note with in the last 3 months, and we can change her appt to get her in sooner. I advised if she would like to get in sooner just to give the office a call back and someone will get her appt changed for her.

## 2024-06-21 ENCOUNTER — OFFICE VISIT (OUTPATIENT)
Dept: INTERNAL MEDICINE | Age: 74
End: 2024-06-21
Payer: MEDICARE

## 2024-06-21 VITALS
HEIGHT: 64 IN | DIASTOLIC BLOOD PRESSURE: 68 MMHG | RESPIRATION RATE: 16 BRPM | OXYGEN SATURATION: 96 % | WEIGHT: 232 LBS | HEART RATE: 51 BPM | SYSTOLIC BLOOD PRESSURE: 110 MMHG | BODY MASS INDEX: 39.61 KG/M2

## 2024-06-21 DIAGNOSIS — E11.42 TYPE 2 DIABETES MELLITUS WITH DIABETIC POLYNEUROPATHY, WITH LONG-TERM CURRENT USE OF INSULIN (HCC): Primary | ICD-10-CM

## 2024-06-21 DIAGNOSIS — I25.83 CORONARY ARTERY DISEASE DUE TO LIPID RICH PLAQUE: ICD-10-CM

## 2024-06-21 DIAGNOSIS — I25.10 CORONARY ARTERY DISEASE DUE TO LIPID RICH PLAQUE: ICD-10-CM

## 2024-06-21 DIAGNOSIS — I50.32 CHF (CONGESTIVE HEART FAILURE), NYHA CLASS I, CHRONIC, DIASTOLIC (HCC): ICD-10-CM

## 2024-06-21 DIAGNOSIS — Z79.4 TYPE 2 DIABETES MELLITUS WITH DIABETIC POLYNEUROPATHY, WITH LONG-TERM CURRENT USE OF INSULIN (HCC): Primary | ICD-10-CM

## 2024-06-21 PROCEDURE — 99212 OFFICE O/P EST SF 10 MIN: CPT | Performed by: INTERNAL MEDICINE

## 2024-06-21 ASSESSMENT — ENCOUNTER SYMPTOMS
SHORTNESS OF BREATH: 0
DIARRHEA: 0
EYE PAIN: 0
NAUSEA: 0
COUGH: 0
CONSTIPATION: 0
BACK PAIN: 0
ABDOMINAL PAIN: 0
BLOOD IN STOOL: 0
VOMITING: 0

## 2024-06-21 NOTE — PROGRESS NOTES
08/15/2023 18     BP Readings from Last 3 Encounters:   24 110/68   24 118/64   24 120/72              Past Medical History:   Diagnosis Date    Arthritis     Asthma     Bell's palsy     CAD (coronary artery disease)     Cancer (HCC) 1971    cervical    Carotid artery disease (HCC)     Cataracts, bilateral     CHF (congestive heart failure) (HCC)     Chlamydia     COPD (chronic obstructive pulmonary disease) (HCC)     Coronary artery disease     Eczema     Emphysema lung (HCC)     Fibromyalgia     Glaucoma     H/O blood clots     Headache     Heart attack (HCC)     Hx of Bell's palsy     Mild right facial paralysis    Hypertension     Liver disease     Mild dementia (HCC)     Neuropathy     Osteoporosis     Sleep apnea 2014    sleep study done in [facility]     Type 2 diabetes mellitus with hyperglycemia (HCC)       Past Surgical History:   Procedure Laterality Date    ANESTHESIA NERVE BLOCK Right 2019    Right L2 L3 L4 L5 Diagnostic Medial BB performed by Alfredo Burk MD at [facility] OR    ANESTHESIA NERVE BLOCK Right 2019    Right L2 L3 L4 L5 Confirmatory Medial BB performed by Alfredo Burk MD at [facility] OR    APPENDECTOMY      BARIATRIC SURGERY      CHOLECYSTECTOMY      COLONOSCOPY  2012    COLONOSCOPY N/A 2019    COLONOSCOPY performed by Cole Lacy MD at [facility] OR  internal hemorrhoids and few diverticuli    COLONOSCOPY  2022    diagnostic    COLONOSCOPY N/A 2022    COLONOSCOPY WITH BIOPSY performed by Caroline Lacey MD at [facility] OR    COLONOSCOPY N/A 2023    juvenile polyp x1, tubular adenoma x1; Dr. Lacy at [facility]    CORONARY ANGIOPLASTY WITH STENT PLACEMENT      ENDOSCOPY, COLON, DIAGNOSTIC      ESOPHAGOGASTRODUODENOSCOPY  2022    HYSTERECTOMY (CERVIX STATUS UNKNOWN)      Dr. Cole Barnett [facility] - per patient d/t cancer    INDUCED   1970    LUMBAR SPINE SURGERY Right 2019    Right L2 L3 TRANSFORAMINAL

## 2024-06-24 ENCOUNTER — TELEPHONE (OUTPATIENT)
Dept: INTERNAL MEDICINE | Age: 74
End: 2024-06-24

## 2024-06-24 NOTE — TELEPHONE ENCOUNTER
Tell Patient to check her heart rate and blood pressure before each Toprol XL dose.  Remind her she should only be taking half a pill of the Toprol daily.  Again, for now hold it, and do not take it unless the heart rate is greater than 60.  Remind her to report any dizziness etc.

## 2024-06-24 NOTE — TELEPHONE ENCOUNTER
Patient and her home health nurse called in stating that patients heart rate is very low again and today is running at 44. Patient declines symptoms.     Please advise.

## 2024-06-25 DIAGNOSIS — Z79.4 TYPE 2 DIABETES MELLITUS WITH DIABETIC POLYNEUROPATHY, WITH LONG-TERM CURRENT USE OF INSULIN (HCC): ICD-10-CM

## 2024-06-25 DIAGNOSIS — E11.42 TYPE 2 DIABETES MELLITUS WITH DIABETIC POLYNEUROPATHY, WITH LONG-TERM CURRENT USE OF INSULIN (HCC): ICD-10-CM

## 2024-06-25 RX ORDER — EMPAGLIFLOZIN 10 MG/1
10 TABLET, FILM COATED ORAL DAILY
Qty: 90 TABLET | Refills: 3 | Status: SHIPPED | OUTPATIENT
Start: 2024-06-25

## 2024-06-25 NOTE — TELEPHONE ENCOUNTER
Penelope called requesting a refill of the below medication which has been pended for you:     Requested Prescriptions     Pending Prescriptions Disp Refills    JARDIANCE 10 MG tablet [Pharmacy Med Name: JARDIANCE 10 MG Tablet] 90 tablet 3     Sig: TAKE 1 TABLET EVERY DAY       Last Appointment Date: 6/21/2024  Next Appointment Date: 7/29/2024    No Known Allergies

## 2024-06-25 NOTE — TELEPHONE ENCOUNTER
Patient was notified of Dr Piña recommendations and patient voiced understanding. And she would report any new symtoms.

## 2024-07-08 DIAGNOSIS — M54.50 CHRONIC BILATERAL LOW BACK PAIN WITHOUT SCIATICA: ICD-10-CM

## 2024-07-08 DIAGNOSIS — G89.29 CHRONIC BILATERAL LOW BACK PAIN WITHOUT SCIATICA: ICD-10-CM

## 2024-07-08 RX ORDER — OXYCODONE AND ACETAMINOPHEN 10; 325 MG/1; MG/1
1 TABLET ORAL EVERY 8 HOURS PRN
Qty: 90 TABLET | Refills: 0 | Status: SHIPPED | OUTPATIENT
Start: 2024-07-08 | End: 2024-08-07

## 2024-07-08 NOTE — TELEPHONE ENCOUNTER
Penelope called requesting a refill of the below medication which has been pended for you:     Requested Prescriptions     Pending Prescriptions Disp Refills    oxyCODONE-acetaminophen (PERCOCET)  MG per tablet 90 tablet 0     Sig: Take 1 tablet by mouth every 8 hours as needed for Pain for up to 30 days. Max Daily Amount: 3 tablets       Last Appointment Date: 6/21/2024  Next Appointment Date: 7/29/2024    No Known Allergies       Patient states this is Urgent.

## 2024-07-11 ENCOUNTER — TELEPHONE (OUTPATIENT)
Dept: INTERNAL MEDICINE | Age: 74
End: 2024-07-11
Payer: MEDICARE

## 2024-07-11 DIAGNOSIS — I25.83 CORONARY ARTERY DISEASE DUE TO LIPID RICH PLAQUE: ICD-10-CM

## 2024-07-11 DIAGNOSIS — E11.42 TYPE 2 DIABETES MELLITUS WITH DIABETIC POLYNEUROPATHY, WITH LONG-TERM CURRENT USE OF INSULIN (HCC): Primary | ICD-10-CM

## 2024-07-11 DIAGNOSIS — I35.0 NONRHEUMATIC AORTIC VALVE STENOSIS: ICD-10-CM

## 2024-07-11 DIAGNOSIS — I49.9 CARDIAC ARRHYTHMIA, UNSPECIFIED CARDIAC ARRHYTHMIA TYPE: ICD-10-CM

## 2024-07-11 DIAGNOSIS — Z79.4 TYPE 2 DIABETES MELLITUS WITH DIABETIC POLYNEUROPATHY, WITH LONG-TERM CURRENT USE OF INSULIN (HCC): Primary | ICD-10-CM

## 2024-07-11 DIAGNOSIS — I50.32 CHF (CONGESTIVE HEART FAILURE), NYHA CLASS I, CHRONIC, DIASTOLIC (HCC): ICD-10-CM

## 2024-07-11 DIAGNOSIS — I25.10 CORONARY ARTERY DISEASE DUE TO LIPID RICH PLAQUE: ICD-10-CM

## 2024-07-11 PROCEDURE — G0180 MD CERTIFICATION HHA PATIENT: HCPCS | Performed by: INTERNAL MEDICINE

## 2024-07-21 DIAGNOSIS — Z79.4 TYPE 2 DIABETES MELLITUS WITH DIABETIC POLYNEUROPATHY, WITH LONG-TERM CURRENT USE OF INSULIN (HCC): ICD-10-CM

## 2024-07-21 DIAGNOSIS — E11.42 TYPE 2 DIABETES MELLITUS WITH DIABETIC POLYNEUROPATHY, WITH LONG-TERM CURRENT USE OF INSULIN (HCC): ICD-10-CM

## 2024-07-22 DIAGNOSIS — Z79.4 TYPE 2 DIABETES MELLITUS WITH DIABETIC POLYNEUROPATHY, WITH LONG-TERM CURRENT USE OF INSULIN (HCC): ICD-10-CM

## 2024-07-22 DIAGNOSIS — E11.42 TYPE 2 DIABETES MELLITUS WITH DIABETIC POLYNEUROPATHY, WITH LONG-TERM CURRENT USE OF INSULIN (HCC): ICD-10-CM

## 2024-07-22 NOTE — TELEPHONE ENCOUNTER
Penelope [place] for refill on her Contour Next Test strips  Testing 4x/day  #100  Tang /Mike  Next OV 7/29/24

## 2024-07-22 NOTE — TELEPHONE ENCOUNTER
Penelope called requesting a refill of the below medication which has been pended for you:     Requested Prescriptions     Pending Prescriptions Disp Refills    blood glucose test strips (CONTOUR NEXT TEST) strip [Pharmacy Med Name: CONTOUR NEXT TEST STRIPS] 400 strip 3     Sig: USE TO TEST BLOOD SUGAR FOUR TIMES DAILY       Last Appointment Date: 6/21/2024  Next Appointment Date: 7/29/2024    No Known Allergies

## 2024-07-29 ENCOUNTER — HOSPITAL ENCOUNTER (OUTPATIENT)
Age: 74
Discharge: HOME | End: 2024-07-29
Payer: MEDICARE

## 2024-07-29 ENCOUNTER — OFFICE VISIT (OUTPATIENT)
Dept: INTERNAL MEDICINE | Age: 74
End: 2024-07-29
Payer: MEDICARE

## 2024-07-29 VITALS
HEART RATE: 52 BPM | RESPIRATION RATE: 16 BRPM | SYSTOLIC BLOOD PRESSURE: 126 MMHG | BODY MASS INDEX: 37.56 KG/M2 | WEIGHT: 220 LBS | OXYGEN SATURATION: 96 % | HEIGHT: 64 IN | DIASTOLIC BLOOD PRESSURE: 78 MMHG

## 2024-07-29 DIAGNOSIS — Z79.4 TYPE 2 DIABETES MELLITUS WITH DIABETIC POLYNEUROPATHY, WITH LONG-TERM CURRENT USE OF INSULIN (HCC): ICD-10-CM

## 2024-07-29 DIAGNOSIS — E03.9 HYPOTHYROIDISM (ACQUIRED): ICD-10-CM

## 2024-07-29 DIAGNOSIS — Z00.00 GENERAL MEDICAL EXAM: Primary | ICD-10-CM

## 2024-07-29 DIAGNOSIS — I25.10 CORONARY ARTERY DISEASE DUE TO LIPID RICH PLAQUE: ICD-10-CM

## 2024-07-29 DIAGNOSIS — I50.32 CHF (CONGESTIVE HEART FAILURE), NYHA CLASS I, CHRONIC, DIASTOLIC (HCC): ICD-10-CM

## 2024-07-29 DIAGNOSIS — I25.83 CORONARY ARTERY DISEASE DUE TO LIPID RICH PLAQUE: ICD-10-CM

## 2024-07-29 DIAGNOSIS — K85.90 ACUTE PANCREATITIS, UNSPECIFIED COMPLICATION STATUS, UNSPECIFIED PANCREATITIS TYPE: ICD-10-CM

## 2024-07-29 DIAGNOSIS — E53.8 VITAMIN B 12 DEFICIENCY: ICD-10-CM

## 2024-07-29 DIAGNOSIS — Z00.00 MEDICARE ANNUAL WELLNESS VISIT, SUBSEQUENT: ICD-10-CM

## 2024-07-29 DIAGNOSIS — E11.42 TYPE 2 DIABETES MELLITUS WITH DIABETIC POLYNEUROPATHY, WITH LONG-TERM CURRENT USE OF INSULIN (HCC): ICD-10-CM

## 2024-07-29 DIAGNOSIS — E55.9 VITAMIN D DEFICIENCY: ICD-10-CM

## 2024-07-29 DIAGNOSIS — Z12.31 ENCOUNTER FOR SCREENING MAMMOGRAM FOR MALIGNANT NEOPLASM OF BREAST: ICD-10-CM

## 2024-07-29 DIAGNOSIS — D50.9 IRON DEFICIENCY ANEMIA, UNSPECIFIED IRON DEFICIENCY ANEMIA TYPE: ICD-10-CM

## 2024-07-29 LAB
25(OH)D3 SERPL-MCNC: 56.3 NG/ML (ref 30–100)
ALBUMIN SERPL-MCNC: 4.2 G/DL (ref 3.5–5.2)
ALBUMIN/GLOB SERPL: 1.1 {RATIO} (ref 1–2.5)
ALP SERPL-CCNC: 117 U/L (ref 35–104)
ALT SERPL-CCNC: 25 U/L (ref 5–33)
ANION GAP SERPL CALCULATED.3IONS-SCNC: 12 MMOL/L (ref 9–17)
AST SERPL-CCNC: 45 U/L
BILIRUB SERPL-MCNC: 0.7 MG/DL (ref 0.3–1.2)
BUN SERPL-MCNC: 24 MG/DL (ref 8–23)
BUN/CREAT SERPL: 27 (ref 9–20)
CALCIUM SERPL-MCNC: 10.2 MG/DL (ref 8.6–10.4)
CHLORIDE SERPL-SCNC: 101 MMOL/L (ref 98–107)
CO2 SERPL-SCNC: 27 MMOL/L (ref 20–31)
CREAT SERPL-MCNC: 0.9 MG/DL (ref 0.5–0.9)
CREAT UR-MCNC: 19.7 MG/DL (ref 28–217)
EST. AVERAGE GLUCOSE BLD GHB EST-MCNC: 100 MG/DL
GFR, ESTIMATED: 67 ML/MIN/1.73M2
GLUCOSE SERPL-MCNC: 80 MG/DL (ref 70–99)
HBA1C MFR BLD: 5.1 % (ref 4–6)
HGB BLD-MCNC: 12.8 G/DL (ref 11.9–15.1)
IRON SATN MFR SERPL: 20 % (ref 20–55)
IRON SERPL-MCNC: 78 UG/DL (ref 37–145)
LIPASE SERPL-CCNC: 29 U/L (ref 13–60)
MICROALBUMIN UR-MCNC: <12 MG/L (ref 0–20)
MICROALBUMIN/CREAT UR-RTO: ABNORMAL MCG/MG CREAT (ref 0–25)
POTASSIUM SERPL-SCNC: 4.6 MMOL/L (ref 3.7–5.3)
PROT SERPL-MCNC: 7.9 G/DL (ref 6.4–8.3)
SODIUM SERPL-SCNC: 140 MMOL/L (ref 135–144)
T4 FREE SERPL-MCNC: 1.1 NG/DL (ref 0.92–1.68)
TIBC SERPL-MCNC: 383 UG/DL (ref 250–450)
TSH SERPL DL<=0.05 MIU/L-ACNC: 1.29 UIU/ML (ref 0.3–5)
UNSATURATED IRON BINDING CAPACITY: 305 UG/DL (ref 112–347)
VIT B12 SERPL-MCNC: >2000 PG/ML (ref 232–1245)

## 2024-07-29 PROCEDURE — 83550 IRON BINDING TEST: CPT

## 2024-07-29 PROCEDURE — 80053 COMPREHEN METABOLIC PANEL: CPT

## 2024-07-29 PROCEDURE — 83690 ASSAY OF LIPASE: CPT

## 2024-07-29 PROCEDURE — 3074F SYST BP LT 130 MM HG: CPT | Performed by: INTERNAL MEDICINE

## 2024-07-29 PROCEDURE — 99212 OFFICE O/P EST SF 10 MIN: CPT | Performed by: INTERNAL MEDICINE

## 2024-07-29 PROCEDURE — 82570 ASSAY OF URINE CREATININE: CPT

## 2024-07-29 PROCEDURE — 1036F TOBACCO NON-USER: CPT | Performed by: INTERNAL MEDICINE

## 2024-07-29 PROCEDURE — 2022F DILAT RTA XM EVC RTNOPTHY: CPT | Performed by: INTERNAL MEDICINE

## 2024-07-29 PROCEDURE — 84443 ASSAY THYROID STIM HORMONE: CPT

## 2024-07-29 PROCEDURE — 3078F DIAST BP <80 MM HG: CPT | Performed by: INTERNAL MEDICINE

## 2024-07-29 PROCEDURE — G8427 DOCREV CUR MEDS BY ELIG CLIN: HCPCS | Performed by: INTERNAL MEDICINE

## 2024-07-29 PROCEDURE — 84439 ASSAY OF FREE THYROXINE: CPT

## 2024-07-29 PROCEDURE — 3046F HEMOGLOBIN A1C LEVEL >9.0%: CPT | Performed by: INTERNAL MEDICINE

## 2024-07-29 PROCEDURE — 99214 OFFICE O/P EST MOD 30 MIN: CPT | Performed by: INTERNAL MEDICINE

## 2024-07-29 PROCEDURE — G8417 CALC BMI ABV UP PARAM F/U: HCPCS | Performed by: INTERNAL MEDICINE

## 2024-07-29 PROCEDURE — 3017F COLORECTAL CA SCREEN DOC REV: CPT | Performed by: INTERNAL MEDICINE

## 2024-07-29 PROCEDURE — 83036 HEMOGLOBIN GLYCOSYLATED A1C: CPT

## 2024-07-29 PROCEDURE — 85018 HEMOGLOBIN: CPT

## 2024-07-29 PROCEDURE — 82043 UR ALBUMIN QUANTITATIVE: CPT

## 2024-07-29 PROCEDURE — G0439 PPPS, SUBSEQ VISIT: HCPCS | Performed by: INTERNAL MEDICINE

## 2024-07-29 PROCEDURE — 82306 VITAMIN D 25 HYDROXY: CPT

## 2024-07-29 PROCEDURE — 1123F ACP DISCUSS/DSCN MKR DOCD: CPT | Performed by: INTERNAL MEDICINE

## 2024-07-29 PROCEDURE — 82607 VITAMIN B-12: CPT

## 2024-07-29 PROCEDURE — G8399 PT W/DXA RESULTS DOCUMENT: HCPCS | Performed by: INTERNAL MEDICINE

## 2024-07-29 PROCEDURE — 36415 COLL VENOUS BLD VENIPUNCTURE: CPT

## 2024-07-29 PROCEDURE — 1090F PRES/ABSN URINE INCON ASSESS: CPT | Performed by: INTERNAL MEDICINE

## 2024-07-29 PROCEDURE — 83540 ASSAY OF IRON: CPT

## 2024-07-29 ASSESSMENT — PATIENT HEALTH QUESTIONNAIRE - PHQ9
SUM OF ALL RESPONSES TO PHQ QUESTIONS 1-9: 1
9. THOUGHTS THAT YOU WOULD BE BETTER OFF DEAD, OR OF HURTING YOURSELF: NOT AT ALL
SUM OF ALL RESPONSES TO PHQ QUESTIONS 1-9: 1
5. POOR APPETITE OR OVEREATING: NOT AT ALL
7. TROUBLE CONCENTRATING ON THINGS, SUCH AS READING THE NEWSPAPER OR WATCHING TELEVISION: NOT AT ALL
1. LITTLE INTEREST OR PLEASURE IN DOING THINGS: SEVERAL DAYS
6. FEELING BAD ABOUT YOURSELF - OR THAT YOU ARE A FAILURE OR HAVE LET YOURSELF OR YOUR FAMILY DOWN: NOT AT ALL
SUM OF ALL RESPONSES TO PHQ9 QUESTIONS 1 & 2: 1
10. IF YOU CHECKED OFF ANY PROBLEMS, HOW DIFFICULT HAVE THESE PROBLEMS MADE IT FOR YOU TO DO YOUR WORK, TAKE CARE OF THINGS AT HOME, OR GET ALONG WITH OTHER PEOPLE: NOT DIFFICULT AT ALL
SUM OF ALL RESPONSES TO PHQ QUESTIONS 1-9: 1
8. MOVING OR SPEAKING SO SLOWLY THAT OTHER PEOPLE COULD HAVE NOTICED. OR THE OPPOSITE, BEING SO FIGETY OR RESTLESS THAT YOU HAVE BEEN MOVING AROUND A LOT MORE THAN USUAL: NOT AT ALL
3. TROUBLE FALLING OR STAYING ASLEEP: NOT AT ALL
2. FEELING DOWN, DEPRESSED OR HOPELESS: NOT AT ALL
SUM OF ALL RESPONSES TO PHQ QUESTIONS 1-9: 1
4. FEELING TIRED OR HAVING LITTLE ENERGY: NOT AT ALL

## 2024-07-29 ASSESSMENT — LIFESTYLE VARIABLES
HOW MANY STANDARD DRINKS CONTAINING ALCOHOL DO YOU HAVE ON A TYPICAL DAY: PATIENT DOES NOT DRINK
HOW OFTEN DO YOU HAVE A DRINK CONTAINING ALCOHOL: NEVER

## 2024-07-29 ASSESSMENT — ENCOUNTER SYMPTOMS
VOMITING: 0
EYE PAIN: 0
COUGH: 0
SHORTNESS OF BREATH: 0
ABDOMINAL PAIN: 0
NAUSEA: 0
BLOOD IN STOOL: 0
DIARRHEA: 0
BACK PAIN: 0
CONSTIPATION: 0

## 2024-07-29 NOTE — PROGRESS NOTES
allowed to have a kitten in her apartment at all times for companionship to help with her Depression, Anxiety, Fibromyalgia and Osteoarthritis.  Claudy Hammond MD   dorzolamide (TRUSOPT) 2 % ophthalmic solution Apply to eye 3 times daily  Marce Barrios MD   pyridoxine (B-6) 100 MG tablet Take 1 tablet by mouth daily  Marce Barrios MD   Respiratory Therapy Supplies MISC CPAP supplies  Claudy Hammond MD   VITAMIN A PO Take 2,400 mcg by mouth daily  Marce Barrios MD   vitamin E 400 UNIT capsule Take 1 capsule by mouth daily  Marce Barrios MD   COMBIGAN 0.2-0.5 % ophthalmic solution PLACE 1 DROP INTO BOTH EYES DAILY  Claudy Hammond MD   Compression Stockings MISC by Does not apply route 15-20mmHg  Knee high  Open tow  With assist device to help put them on  Gray Bains DO   Zinc 50 MG TABS Take 1 tablet by mouth daily  Marce Barrios MD   niacin 500 MG extended release capsule Take 1 capsule by mouth daily  Marce Barrios MD   folic acid (FOLVITE) 800 MCG tablet Take 1 tablet by mouth daily  Marce Barrios MD   Cyanocobalamin (VITAMIN B12 PO) Take 1,000 mcg by mouth daily  Marce Barrios MD   B Complex-C (SUPER B COMPLEX PO) Take 1 tablet by mouth daily   Marce Barrios MD   travoprost, benzalkonium, (TRAVATAN) 0.004 % ophthalmic solution Place 1 drop into both eyes daily  Claudy Hammond MD   CPAP Machine MISC by Does not apply route Pressure of 13 (AirSense 10)  [facility] [facility]  ProviderMarce MD   Multiple Vitamin (MULTI VITAMIN DAILY) TABS Take by mouth daily  Marce Barrios MD       CareTeam (Including outside providers/suppliers regularly involved in providing care):   Patient Care Team:  Claudy Hammond MD as PCP - General (Internal Medicine)  Claudy Hammond MD as PCP - Empaneled Provider  Tomer Briones MD as Neurologist (Neurology)  Marcus Clark DO as Consulting Physician (Pulmonary Disease)  [place]

## 2024-07-29 NOTE — PROGRESS NOTES
(Patient not taking: Reported on 6/12/2024) 25 tablet 1    gabapentin (NEURONTIN) 600 MG tablet Take 1 tablet by mouth 3 times daily for 270 days. 270 tablet 2    spironolactone (ALDACTONE) 50 MG tablet Take 1 tablet by mouth daily 90 tablet 3    Coenzyme Q10 (CO Q-10 PO) Take 1 mg by mouth daily      Insulin Syringe-Needle U-100 (DROPLET INSULIN SYRINGE) 31G X 5/16\" 0.5 ML MISC USE TO INJECT INTO THE SKIN TWO TIMES DAILY 200 each 3    alendronate (FOSAMAX) 70 MG tablet TAKE 1 TABLET EVERY WEEK 12 tablet 3    STIOLTO RESPIMAT 2.5-2.5 MCG/ACT AERS INHALE 2 PUFFS INTO THE LUNGS DAILY 12 g 3    potassium chloride (MICRO-K) 10 MEQ extended release capsule TAKE 1 CAPSULE TWICE DAILY 180 capsule 3    buPROPion (WELLBUTRIN) 75 MG tablet TAKE 1 TABLET TWICE DAILY 180 tablet 3    atorvastatin (LIPITOR) 40 MG tablet TAKE 1 TABLET EVERY DAY 90 tablet 3     MG tablet TAKE 1 TABLET EVERY 6 HOURS AS NEEDED FOR PAIN 360 tablet 3    insulin glargine (LANTUS) 100 UNIT/ML injection vial INJECT 15 UNITS INTO THE SKIN TWO TIMES DAILY (DISCARD AND BEGIN A NEW VIAL AFTER 28 DAYS) 30 mL 3    Insulin Pen Needle (DROPLET PEN NEEDLES) 31G X 8 MM MISC USE FOR INJECTIONS TWO TIMES DAILY 200 each 3    TURMERIC-GINGER PO Take 500 mg by mouth Daily      APPLE CIDER VINEGAR PO Take 450 mg by mouth daily      Coenzyme Q10-Vitamin E (QUNOL ULTRA COQ10 PO) Take by mouth Daily      UNABLE TO FIND Take by mouth daily Liver Clenz /  Milk Thistle and Turmeric  takes 2 tabs daily      clopidogrel (PLAVIX) 75 MG tablet TAKE 1 TABLET EVERY DAY 90 tablet 3    citalopram (CELEXA) 10 MG tablet TAKE 1 TABLET EVERY DAY 90 tablet 3    pantoprazole (PROTONIX) 40 MG tablet TAKE 1 TABLET EVERY DAY 90 tablet 3    Microlet Lancets MISC Patient is to test her blood sugar 4 x daily. 400 each 3    furosemide (LASIX) 20 MG tablet Take 1 tablet by mouth daily 90 tablet 3    magnesium oxide (MAG-OX) 400 MG tablet Take 1 tablet by mouth daily 90 tablet 3    senna

## 2024-08-01 DIAGNOSIS — G89.29 CHRONIC BILATERAL LOW BACK PAIN WITHOUT SCIATICA: ICD-10-CM

## 2024-08-01 DIAGNOSIS — M54.50 CHRONIC BILATERAL LOW BACK PAIN WITHOUT SCIATICA: ICD-10-CM

## 2024-08-01 RX ORDER — OXYCODONE AND ACETAMINOPHEN 10; 325 MG/1; MG/1
1 TABLET ORAL EVERY 8 HOURS PRN
Qty: 90 TABLET | Refills: 0 | OUTPATIENT
Start: 2024-08-01 | End: 2024-08-31

## 2024-08-06 DIAGNOSIS — G89.29 CHRONIC BILATERAL LOW BACK PAIN WITHOUT SCIATICA: ICD-10-CM

## 2024-08-06 DIAGNOSIS — M54.50 CHRONIC BILATERAL LOW BACK PAIN WITHOUT SCIATICA: ICD-10-CM

## 2024-08-06 RX ORDER — OXYCODONE AND ACETAMINOPHEN 10; 325 MG/1; MG/1
1 TABLET ORAL EVERY 8 HOURS PRN
Qty: 90 TABLET | Refills: 0 | Status: SHIPPED | OUTPATIENT
Start: 2024-08-06 | End: 2024-09-05

## 2024-08-06 NOTE — TELEPHONE ENCOUNTER
Refill request percocet sent to [facility] in [place]     Medication pended if agreeable     Last Appt:  7/29/2024  Next Appt:   1/30/2025  Med verified in Epic

## 2024-08-09 ENCOUNTER — HOSPITAL ENCOUNTER (OUTPATIENT)
Age: 74
Discharge: HOME | End: 2024-08-09
Payer: MEDICARE

## 2024-08-09 DIAGNOSIS — I25.83 CORONARY ARTERY DISEASE DUE TO LIPID RICH PLAQUE: ICD-10-CM

## 2024-08-09 DIAGNOSIS — I25.10 CORONARY ARTERY DISEASE DUE TO LIPID RICH PLAQUE: ICD-10-CM

## 2024-08-09 LAB
CHOLEST SERPL-MCNC: 108 MG/DL (ref 0–199)
CHOLESTEROL/HDL RATIO: 3
HDLC SERPL-MCNC: 43 MG/DL
LDLC SERPL CALC-MCNC: 39 MG/DL (ref 0–100)
TRIGL SERPL-MCNC: 133 MG/DL
VLDLC SERPL CALC-MCNC: 27 MG/DL

## 2024-08-09 PROCEDURE — 80061 LIPID PANEL: CPT

## 2024-08-09 PROCEDURE — 36415 COLL VENOUS BLD VENIPUNCTURE: CPT

## 2024-08-14 RX ORDER — ALBUTEROL SULFATE 90 UG/1
2 AEROSOL, METERED RESPIRATORY (INHALATION) EVERY 6 HOURS PRN
Qty: 3 EACH | Refills: 3 | Status: SHIPPED | OUTPATIENT
Start: 2024-08-14 | End: 2025-08-14

## 2024-08-26 ENCOUNTER — OFFICE VISIT (OUTPATIENT)
Dept: ORTHOPEDIC SURGERY | Age: 74
End: 2024-08-26
Payer: MEDICARE

## 2024-08-26 VITALS
HEART RATE: 53 BPM | DIASTOLIC BLOOD PRESSURE: 83 MMHG | WEIGHT: 220 LBS | HEIGHT: 64 IN | SYSTOLIC BLOOD PRESSURE: 136 MMHG | BODY MASS INDEX: 37.56 KG/M2

## 2024-08-26 DIAGNOSIS — E11.42 TYPE 2 DIABETES MELLITUS WITH DIABETIC POLYNEUROPATHY, WITH LONG-TERM CURRENT USE OF INSULIN (HCC): ICD-10-CM

## 2024-08-26 DIAGNOSIS — M79.671 BILATERAL FOOT PAIN: Primary | ICD-10-CM

## 2024-08-26 DIAGNOSIS — M79.672 BILATERAL FOOT PAIN: Primary | ICD-10-CM

## 2024-08-26 DIAGNOSIS — Z79.4 TYPE 2 DIABETES MELLITUS WITH DIABETIC POLYNEUROPATHY, WITH LONG-TERM CURRENT USE OF INSULIN (HCC): ICD-10-CM

## 2024-08-26 PROCEDURE — G8399 PT W/DXA RESULTS DOCUMENT: HCPCS | Performed by: NURSE PRACTITIONER

## 2024-08-26 PROCEDURE — 2022F DILAT RTA XM EVC RTNOPTHY: CPT | Performed by: NURSE PRACTITIONER

## 2024-08-26 PROCEDURE — 3075F SYST BP GE 130 - 139MM HG: CPT | Performed by: NURSE PRACTITIONER

## 2024-08-26 PROCEDURE — 99213 OFFICE O/P EST LOW 20 MIN: CPT | Performed by: NURSE PRACTITIONER

## 2024-08-26 PROCEDURE — 99214 OFFICE O/P EST MOD 30 MIN: CPT | Performed by: NURSE PRACTITIONER

## 2024-08-26 PROCEDURE — G8417 CALC BMI ABV UP PARAM F/U: HCPCS | Performed by: NURSE PRACTITIONER

## 2024-08-26 PROCEDURE — 3044F HG A1C LEVEL LT 7.0%: CPT | Performed by: NURSE PRACTITIONER

## 2024-08-26 PROCEDURE — 3017F COLORECTAL CA SCREEN DOC REV: CPT | Performed by: NURSE PRACTITIONER

## 2024-08-26 PROCEDURE — 1123F ACP DISCUSS/DSCN MKR DOCD: CPT | Performed by: NURSE PRACTITIONER

## 2024-08-26 PROCEDURE — 3079F DIAST BP 80-89 MM HG: CPT | Performed by: NURSE PRACTITIONER

## 2024-08-26 PROCEDURE — 1090F PRES/ABSN URINE INCON ASSESS: CPT | Performed by: NURSE PRACTITIONER

## 2024-08-26 PROCEDURE — G8427 DOCREV CUR MEDS BY ELIG CLIN: HCPCS | Performed by: NURSE PRACTITIONER

## 2024-08-26 PROCEDURE — 1036F TOBACCO NON-USER: CPT | Performed by: NURSE PRACTITIONER

## 2024-08-26 NOTE — PROGRESS NOTES
Memory problem    Gait abnormality    Balance problem    H/O falling    Dizziness    Intractable episodic tension-type headache    Essential tremor    Anxiety and depression    Elevated hemoglobin A1c    VBI (vertebrobasilar insufficiency)    Chronic cerebral ischemia    TIA (transient ischemic attack)    Chronic tension headache    Morbid obesity with BMI of 45.0-49.9, adult (HCC)    Chronic right-sided low back pain with right-sided sciatica    Lumbar radiculopathy    Encounter for chronic pain management    Carotid stenosis, asymptomatic, bilateral    Chronic tension-type headache, not intractable    Lumbosacral spondylosis without myelopathy    DDD (degenerative disc disease), lumbar    Acquired spondylolisthesis    Lumbar facet joint syndrome    Chronic diastolic congestive heart failure (HCC)    Bronchiectasis without complication (HCC)    CHF (congestive heart failure), NYHA class I, chronic, diastolic (HCC)    Colitis    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 (HCC)    Bandemia    Primary hypertension    S/P HAILE-BSO 1987 [facility] HAD radiation       Procedure Note  N/A    Plan:     Patient examined and evaluated.  Patient doing well with her new Plastizote insoles and extra-depth diabetic shoes.  Patient will continue with daily foot checks.  Patient will follow back up with us again in 6 months for reevaluation.      DAVID Newton CNP   Orthopaedic [facility]     Electronically signed by DAVID Newton CNP on 8/26/2024 at 4:21 PM

## 2024-08-27 DIAGNOSIS — J01.00 ACUTE NON-RECURRENT MAXILLARY SINUSITIS: ICD-10-CM

## 2024-08-27 RX ORDER — FLUTICASONE PROPIONATE 50 MCG
SPRAY, SUSPENSION (ML) NASAL
Qty: 48 G | Refills: 3 | Status: SHIPPED | OUTPATIENT
Start: 2024-08-27

## 2024-08-27 NOTE — TELEPHONE ENCOUNTER
Refill request Flonase sent to [facility] in [place]     Medication pended if agreeable     Last Appt:  7/29/2024  Next Appt:   1/30/2025  Med verified in Epic

## 2024-09-05 DIAGNOSIS — G89.29 CHRONIC BILATERAL LOW BACK PAIN WITHOUT SCIATICA: ICD-10-CM

## 2024-09-05 DIAGNOSIS — M54.50 CHRONIC BILATERAL LOW BACK PAIN WITHOUT SCIATICA: ICD-10-CM

## 2024-09-05 RX ORDER — OXYCODONE AND ACETAMINOPHEN 10; 325 MG/1; MG/1
1 TABLET ORAL EVERY 8 HOURS PRN
Qty: 90 TABLET | Refills: 0 | Status: SHIPPED | OUTPATIENT
Start: 2024-09-05 | End: 2024-10-05

## 2024-09-05 NOTE — TELEPHONE ENCOUNTER
Refill request percocet sent to [facility] in [place]     Medication pended if agreeable     Last Appt:  7/29/2024  Next Appt:   1/30/2025  Med verified in Epic

## 2024-09-10 ENCOUNTER — TELEPHONE (OUTPATIENT)
Dept: INTERNAL MEDICINE | Age: 74
End: 2024-09-10

## 2024-09-10 DIAGNOSIS — F41.9 ANXIETY: Primary | ICD-10-CM

## 2024-09-10 DIAGNOSIS — F32.A DEPRESSION, UNSPECIFIED DEPRESSION TYPE: ICD-10-CM

## 2024-09-10 RX ORDER — VIT C/B6/B5/MAGNESIUM/HERB 173 50-5-6-5MG
2000 CAPSULE ORAL DAILY
COMMUNITY

## 2024-09-10 RX ORDER — LATANOPROST 50 UG/ML
1 SOLUTION/ DROPS OPHTHALMIC NIGHTLY
COMMUNITY

## 2024-09-12 ENCOUNTER — TELEPHONE (OUTPATIENT)
Dept: INTERNAL MEDICINE | Age: 74
End: 2024-09-12

## 2024-09-19 DIAGNOSIS — K59.00 CONSTIPATION, UNSPECIFIED CONSTIPATION TYPE: ICD-10-CM

## 2024-09-19 RX ORDER — SENNOSIDES 8.6 MG
TABLET ORAL
Qty: 180 TABLET | Refills: 3 | Status: SHIPPED | OUTPATIENT
Start: 2024-09-19

## 2024-09-19 RX ORDER — DOCUSATE SODIUM 100 MG/1
100 CAPSULE, LIQUID FILLED ORAL 2 TIMES DAILY
Qty: 180 CAPSULE | Refills: 3 | Status: SHIPPED | OUTPATIENT
Start: 2024-09-19

## 2024-10-04 DIAGNOSIS — G89.29 CHRONIC BILATERAL LOW BACK PAIN WITHOUT SCIATICA: ICD-10-CM

## 2024-10-04 DIAGNOSIS — M54.50 CHRONIC BILATERAL LOW BACK PAIN WITHOUT SCIATICA: ICD-10-CM

## 2024-10-04 RX ORDER — OXYCODONE AND ACETAMINOPHEN 10; 325 MG/1; MG/1
1 TABLET ORAL EVERY 8 HOURS PRN
Qty: 90 TABLET | Refills: 0 | Status: SHIPPED | OUTPATIENT
Start: 2024-10-04 | End: 2024-11-03

## 2024-10-04 NOTE — TELEPHONE ENCOUNTER
Penelope called requesting a refill of the below medication which has been pended for you:     Requested Prescriptions     Pending Prescriptions Disp Refills    oxyCODONE-acetaminophen (PERCOCET)  MG per tablet 90 tablet 0     Sig: Take 1 tablet by mouth every 8 hours as needed for Pain for up to 30 days. Max Daily Amount: 3 tablets       Last Appointment Date: 7/29/2024  Next Appointment Date: 1/30/2025    No Known Allergies

## 2024-10-07 RX ORDER — LANOLIN ALCOHOL/MO/W.PET/CERES
400 CREAM (GRAM) TOPICAL DAILY
Qty: 90 TABLET | Refills: 3 | Status: SHIPPED | OUTPATIENT
Start: 2024-10-07

## 2024-10-07 NOTE — TELEPHONE ENCOUNTER
Penelope called requesting a refill of the below medication which has been pended for you:     Requested Prescriptions     Pending Prescriptions Disp Refills    magnesium oxide (MAG-OX) 400 (240 Mg) MG tablet [Pharmacy Med Name: Magnesium Oxide -Mg Supplement Oral Tablet 400 (240 Mg) MG] 90 tablet 3     Sig: TAKE 1 TABLET EVERY DAY       Last Appointment Date: 7/29/2024  Next Appointment Date: 1/30/2025    No Known Allergies

## 2024-10-17 DIAGNOSIS — K21.9 GASTROESOPHAGEAL REFLUX DISEASE WITHOUT ESOPHAGITIS: ICD-10-CM

## 2024-10-17 DIAGNOSIS — F32.A DEPRESSION, UNSPECIFIED DEPRESSION TYPE: ICD-10-CM

## 2024-10-17 RX ORDER — TIOTROPIUM BROMIDE AND OLODATEROL 3.124; 2.736 UG/1; UG/1
SPRAY, METERED RESPIRATORY (INHALATION)
Qty: 12 G | Refills: 3 | Status: SHIPPED | OUTPATIENT
Start: 2024-10-17

## 2024-10-17 RX ORDER — CITALOPRAM HYDROBROMIDE 10 MG/1
TABLET ORAL
Qty: 90 TABLET | Refills: 3 | Status: SHIPPED | OUTPATIENT
Start: 2024-10-17

## 2024-10-17 RX ORDER — PANTOPRAZOLE SODIUM 40 MG/1
TABLET, DELAYED RELEASE ORAL
Qty: 90 TABLET | Refills: 3 | Status: SHIPPED | OUTPATIENT
Start: 2024-10-17

## 2024-10-17 RX ORDER — ALENDRONATE SODIUM 70 MG/1
TABLET ORAL
Qty: 12 TABLET | Refills: 3 | Status: SHIPPED | OUTPATIENT
Start: 2024-10-17

## 2024-10-17 NOTE — TELEPHONE ENCOUNTER
Penelope called requesting a refill of the below medication which has been pended for you:     Requested Prescriptions     Pending Prescriptions Disp Refills    alendronate (FOSAMAX) 70 MG tablet [Pharmacy Med Name: Alendronate Sodium Oral Tablet 70 MG] 12 tablet 3     Sig: TAKE 1 TABLET EVERY WEEK    citalopram (CELEXA) 10 MG tablet [Pharmacy Med Name: Citalopram Hydrobromide Oral Tablet 10 MG] 90 tablet 3     Sig: TAKE 1 TABLET EVERY DAY    pantoprazole (PROTONIX) 40 MG tablet [Pharmacy Med Name: Pantoprazole Sodium Oral Tablet Delayed Release 40 MG] 90 tablet 3     Sig: TAKE 1 TABLET EVERY DAY       Last Appointment Date: 7/29/2024  Next Appointment Date: 1/30/2025    No Known Allergies

## 2024-10-19 DIAGNOSIS — Z79.4 TYPE 2 DIABETES MELLITUS WITH DIABETIC POLYNEUROPATHY, WITH LONG-TERM CURRENT USE OF INSULIN (HCC): ICD-10-CM

## 2024-10-19 DIAGNOSIS — E11.42 TYPE 2 DIABETES MELLITUS WITH DIABETIC POLYNEUROPATHY, WITH LONG-TERM CURRENT USE OF INSULIN (HCC): ICD-10-CM

## 2024-10-21 RX ORDER — LANCETS
EACH MISCELLANEOUS
Qty: 400 EACH | Refills: 3 | Status: SHIPPED | OUTPATIENT
Start: 2024-10-21

## 2024-11-05 DIAGNOSIS — M54.50 CHRONIC BILATERAL LOW BACK PAIN WITHOUT SCIATICA: ICD-10-CM

## 2024-11-05 DIAGNOSIS — G89.29 CHRONIC BILATERAL LOW BACK PAIN WITHOUT SCIATICA: ICD-10-CM

## 2024-11-05 RX ORDER — OXYCODONE AND ACETAMINOPHEN 10; 325 MG/1; MG/1
1 TABLET ORAL EVERY 8 HOURS PRN
Qty: 90 TABLET | Refills: 0 | Status: SHIPPED | OUTPATIENT
Start: 2024-11-05 | End: 2024-12-05

## 2024-11-18 DIAGNOSIS — Z79.4 TYPE 2 DIABETES MELLITUS WITH DIABETIC POLYNEUROPATHY, WITH LONG-TERM CURRENT USE OF INSULIN (HCC): ICD-10-CM

## 2024-11-18 DIAGNOSIS — E11.42 TYPE 2 DIABETES MELLITUS WITH DIABETIC POLYNEUROPATHY, WITH LONG-TERM CURRENT USE OF INSULIN (HCC): ICD-10-CM

## 2024-11-18 RX ORDER — SYRINGE-NEEDLE,INSULIN,0.5 ML 27GX1/2"
SYRINGE, EMPTY DISPOSABLE MISCELLANEOUS
Qty: 200 EACH | Refills: 3 | Status: SHIPPED | OUTPATIENT
Start: 2024-11-18

## 2024-11-18 NOTE — TELEPHONE ENCOUNTER
Penelope called requesting a refill of the below medication which has been pended for you:     Requested Prescriptions     Pending Prescriptions Disp Refills    Insulin Syringe-Needle U-100 (DROPLET INSULIN SYRINGE) 31G X 5/16\" 0.5 ML MISC [Pharmacy Med Name: Droplet Insulin Syringe Miscellaneous 31G X 5/16\" 0.5 ML] 200 each 3     Sig: USE TO INJECT INTO THE SKIN TWO TIMES DAILY       Last Appointment Date: 7/29/2024  Next Appointment Date: 1/30/2025    No Known Allergies

## 2024-11-19 ENCOUNTER — OFFICE VISIT (OUTPATIENT)
Dept: PULMONOLOGY | Age: 74
End: 2024-11-19
Payer: MEDICARE

## 2024-11-19 VITALS
HEART RATE: 62 BPM | BODY MASS INDEX: 36.91 KG/M2 | DIASTOLIC BLOOD PRESSURE: 62 MMHG | HEIGHT: 64 IN | SYSTOLIC BLOOD PRESSURE: 120 MMHG | WEIGHT: 216.2 LBS | RESPIRATION RATE: 18 BRPM | OXYGEN SATURATION: 95 %

## 2024-11-19 DIAGNOSIS — J41.1 CHRONIC BRONCHITIS WITH PRODUCTIVE MUCOPURULENT COUGH (HCC): ICD-10-CM

## 2024-11-19 DIAGNOSIS — G47.33 OSA ON CPAP: Primary | ICD-10-CM

## 2024-11-19 PROCEDURE — 1159F MED LIST DOCD IN RCRD: CPT | Performed by: INTERNAL MEDICINE

## 2024-11-19 PROCEDURE — G8427 DOCREV CUR MEDS BY ELIG CLIN: HCPCS | Performed by: INTERNAL MEDICINE

## 2024-11-19 PROCEDURE — 1090F PRES/ABSN URINE INCON ASSESS: CPT | Performed by: INTERNAL MEDICINE

## 2024-11-19 PROCEDURE — 3023F SPIROM DOC REV: CPT | Performed by: INTERNAL MEDICINE

## 2024-11-19 PROCEDURE — G8417 CALC BMI ABV UP PARAM F/U: HCPCS | Performed by: INTERNAL MEDICINE

## 2024-11-19 PROCEDURE — G8399 PT W/DXA RESULTS DOCUMENT: HCPCS | Performed by: INTERNAL MEDICINE

## 2024-11-19 PROCEDURE — 1123F ACP DISCUSS/DSCN MKR DOCD: CPT | Performed by: INTERNAL MEDICINE

## 2024-11-19 PROCEDURE — 99214 OFFICE O/P EST MOD 30 MIN: CPT | Performed by: INTERNAL MEDICINE

## 2024-11-19 PROCEDURE — 3074F SYST BP LT 130 MM HG: CPT | Performed by: INTERNAL MEDICINE

## 2024-11-19 PROCEDURE — 3078F DIAST BP <80 MM HG: CPT | Performed by: INTERNAL MEDICINE

## 2024-11-19 PROCEDURE — 1036F TOBACCO NON-USER: CPT | Performed by: INTERNAL MEDICINE

## 2024-11-19 PROCEDURE — G8484 FLU IMMUNIZE NO ADMIN: HCPCS | Performed by: INTERNAL MEDICINE

## 2024-11-19 NOTE — PROGRESS NOTES
AMIODARONE USAGE [] [x]   PULMONARY HYPERTENSION [] [x]   VENOUS THROMBOEMBOLISM [] [x]   END STAGE RENAL DISEASE [] [x]   CHRONIC LIVER DISEASE/CIRRHOSIS [] [x]   CONNECTIVE TISSUE DISORDER [] [x]   INTERSTITIAL LUNG DISEASE [] [x]   CHEST WALL RESTRICTION/DIAPHRAGMATIC DISORDER [] [x]   LUNG CANCER HISTORY [] [x]   MORBID OBESITY [] [x]   AMBULATORY OXYGEN USE [] [x]   NOCTURNAL CPAP USE [x] []   NOCTURNAL BIPAP/NIV USE [] [x]     PAST MEDICAL HISTORY:      Diagnosis Date    Arthritis     Asthma     Bell's palsy     CAD (coronary artery disease) 1989    Cancer (HCC) 1971    cervical    Carotid artery disease (HCC)     Cataracts, bilateral     CHF (congestive heart failure) (HCC)     Chlamydia     COPD (chronic obstructive pulmonary disease) (HCC)     Coronary artery disease     Eczema     Emphysema lung (HCC)     Fibromyalgia     Glaucoma     H/O blood clots     Headache     Heart attack (HCC)     Hx of Bell's palsy     Mild right facial paralysis    Hypertension     Liver disease     Mild dementia (HCC)     Neuropathy     Osteoporosis     Sleep apnea 11/07/2014    sleep study done in [facility]     Type 2 diabetes mellitus with hyperglycemia (HCC)      PAST SURGICAL HISTORY:      Procedure Laterality Date    ANESTHESIA NERVE BLOCK Right 06/28/2019    Right L2 L3 L4 L5 Diagnostic Medial BB performed by Alfredo Burk MD at [facility] OR    ANESTHESIA NERVE BLOCK Right 07/26/2019    Right L2 L3 L4 L5 Confirmatory Medial BB performed by Alfredo Burk MD at [facility] OR    APPENDECTOMY  1968    BARIATRIC SURGERY  2002    CHOLECYSTECTOMY      COLONOSCOPY  09/2012    COLONOSCOPY N/A 09/30/2019    COLONOSCOPY performed by Cole Lacy MD at [facility] OR  internal hemorrhoids and few diverticuli    COLONOSCOPY  12/21/2022    diagnostic    COLONOSCOPY N/A 12/21/2022    COLONOSCOPY WITH BIOPSY performed by Caroline Lacey MD at [facility] OR    COLONOSCOPY N/A 04/04/2023    juvenile polyp x1, tubular adenoma x1; Dr. Lacy at [facility]

## 2024-11-25 ENCOUNTER — TELEPHONE (OUTPATIENT)
Age: 74
End: 2024-11-25

## 2024-11-25 RX ORDER — SPIRONOLACTONE 50 MG/1
50 TABLET, FILM COATED ORAL DAILY
Qty: 90 TABLET | Refills: 3 | OUTPATIENT
Start: 2024-11-25

## 2024-11-25 NOTE — TELEPHONE ENCOUNTER
Called patient to confirm if patient wanted to continue seeing Dr. Berman with [facility] or switch to Dr. Chahal office. Patient stated she wants to stay with Dr. Berman's office and that she liked him.     Our office received medication request for Spironolactone. I faxed a note to [facility] to take care of the refill on patient's behalf.     Patient voiced understanding.

## 2024-11-26 RX ORDER — SPIRONOLACTONE 50 MG/1
50 TABLET, FILM COATED ORAL DAILY
Qty: 90 TABLET | Refills: 3 | Status: SHIPPED | OUTPATIENT
Start: 2024-11-26

## 2024-11-27 ENCOUNTER — TELEPHONE (OUTPATIENT)
Dept: INTERNAL MEDICINE | Age: 74
End: 2024-11-27

## 2024-11-27 DIAGNOSIS — H93.8X3 SENSATION OF PLUGGED EAR ON BOTH SIDES: Primary | ICD-10-CM

## 2024-11-27 NOTE — TELEPHONE ENCOUNTER
Patient is requesting a referral to ENT for clogged ears. She would like to see Dr. Hair. In [place].

## 2024-12-09 DIAGNOSIS — M54.50 CHRONIC BILATERAL LOW BACK PAIN WITHOUT SCIATICA: ICD-10-CM

## 2024-12-09 DIAGNOSIS — G89.29 CHRONIC BILATERAL LOW BACK PAIN WITHOUT SCIATICA: ICD-10-CM

## 2024-12-09 RX ORDER — OXYCODONE AND ACETAMINOPHEN 10; 325 MG/1; MG/1
1 TABLET ORAL EVERY 8 HOURS PRN
Qty: 90 TABLET | Refills: 0 | Status: SHIPPED | OUTPATIENT
Start: 2024-12-09 | End: 2025-01-08

## 2024-12-09 NOTE — TELEPHONE ENCOUNTER
Patient states she is out and needs today.      Penelope called requesting a refill of the below medication which has been pended for you:     Requested Prescriptions     Pending Prescriptions Disp Refills    oxyCODONE-acetaminophen (PERCOCET)  MG per tablet 90 tablet 0     Sig: Take 1 tablet by mouth every 8 hours as needed for Pain for up to 30 days. Max Daily Amount: 3 tablets       Last Appointment Date: 7/29/2024  Next Appointment Date: 1/30/2025    No Known Allergies

## 2024-12-15 PROBLEM — J41.1 CHRONIC BRONCHITIS WITH PRODUCTIVE MUCOPURULENT COUGH (HCC): Status: ACTIVE | Noted: 2024-12-15

## 2024-12-23 ENCOUNTER — TELEPHONE (OUTPATIENT)
Dept: INTERNAL MEDICINE | Age: 74
End: 2024-12-23

## 2024-12-23 DIAGNOSIS — I25.10 CORONARY ARTERY DISEASE DUE TO LIPID RICH PLAQUE: ICD-10-CM

## 2024-12-23 DIAGNOSIS — I35.0 NONRHEUMATIC AORTIC VALVE STENOSIS: ICD-10-CM

## 2024-12-23 DIAGNOSIS — I25.83 CORONARY ARTERY DISEASE DUE TO LIPID RICH PLAQUE: ICD-10-CM

## 2024-12-23 DIAGNOSIS — I50.32 CHF (CONGESTIVE HEART FAILURE), NYHA CLASS I, CHRONIC, DIASTOLIC (HCC): ICD-10-CM

## 2024-12-23 DIAGNOSIS — Z79.4 TYPE 2 DIABETES MELLITUS WITH DIABETIC POLYNEUROPATHY, WITH LONG-TERM CURRENT USE OF INSULIN (HCC): Primary | ICD-10-CM

## 2024-12-23 DIAGNOSIS — I49.9 CARDIAC ARRHYTHMIA, UNSPECIFIED CARDIAC ARRHYTHMIA TYPE: ICD-10-CM

## 2024-12-23 DIAGNOSIS — E11.42 TYPE 2 DIABETES MELLITUS WITH DIABETIC POLYNEUROPATHY, WITH LONG-TERM CURRENT USE OF INSULIN (HCC): Primary | ICD-10-CM

## 2024-12-23 DIAGNOSIS — I25.10 ATHEROSCLEROSIS OF NATIVE CORONARY ARTERY OF NATIVE HEART WITHOUT ANGINA PECTORIS: ICD-10-CM

## 2024-12-26 ENCOUNTER — TELEPHONE (OUTPATIENT)
Dept: INTERNAL MEDICINE | Age: 74
End: 2024-12-26

## 2024-12-26 DIAGNOSIS — Z79.4 TYPE 2 DIABETES MELLITUS WITH DIABETIC POLYNEUROPATHY, WITH LONG-TERM CURRENT USE OF INSULIN (HCC): Primary | ICD-10-CM

## 2024-12-26 DIAGNOSIS — I25.83 CORONARY ARTERY DISEASE DUE TO LIPID RICH PLAQUE: ICD-10-CM

## 2024-12-26 DIAGNOSIS — I50.32 CHF (CONGESTIVE HEART FAILURE), NYHA CLASS I, CHRONIC, DIASTOLIC (HCC): ICD-10-CM

## 2024-12-26 DIAGNOSIS — E11.42 TYPE 2 DIABETES MELLITUS WITH DIABETIC POLYNEUROPATHY, WITH LONG-TERM CURRENT USE OF INSULIN (HCC): Primary | ICD-10-CM

## 2024-12-26 DIAGNOSIS — I25.10 CORONARY ARTERY DISEASE DUE TO LIPID RICH PLAQUE: ICD-10-CM

## 2024-12-26 DIAGNOSIS — I35.0 NONRHEUMATIC AORTIC VALVE STENOSIS: ICD-10-CM

## 2024-12-26 DIAGNOSIS — I49.9 CARDIAC ARRHYTHMIA, UNSPECIFIED CARDIAC ARRHYTHMIA TYPE: ICD-10-CM

## 2024-12-30 ENCOUNTER — HOSPITAL ENCOUNTER (OUTPATIENT)
Dept: MAMMOGRAPHY | Age: 74
Discharge: HOME | End: 2025-01-01
Attending: INTERNAL MEDICINE
Payer: MEDICARE

## 2024-12-30 DIAGNOSIS — Z12.31 ENCOUNTER FOR SCREENING MAMMOGRAM FOR MALIGNANT NEOPLASM OF BREAST: ICD-10-CM

## 2024-12-30 PROCEDURE — 77063 BREAST TOMOSYNTHESIS BI: CPT

## 2025-01-09 DIAGNOSIS — M79.671 BILATERAL FOOT PAIN: ICD-10-CM

## 2025-01-09 DIAGNOSIS — M54.50 CHRONIC BILATERAL LOW BACK PAIN WITHOUT SCIATICA: ICD-10-CM

## 2025-01-09 DIAGNOSIS — Z79.4 TYPE 2 DIABETES MELLITUS WITH DIABETIC POLYNEUROPATHY, WITH LONG-TERM CURRENT USE OF INSULIN (HCC): ICD-10-CM

## 2025-01-09 DIAGNOSIS — G89.29 CHRONIC BILATERAL LOW BACK PAIN WITHOUT SCIATICA: ICD-10-CM

## 2025-01-09 DIAGNOSIS — M79.672 BILATERAL FOOT PAIN: ICD-10-CM

## 2025-01-09 DIAGNOSIS — E11.42 TYPE 2 DIABETES MELLITUS WITH DIABETIC POLYNEUROPATHY, WITH LONG-TERM CURRENT USE OF INSULIN (HCC): ICD-10-CM

## 2025-01-09 DIAGNOSIS — I50.32 CHF (CONGESTIVE HEART FAILURE), NYHA CLASS I, CHRONIC, DIASTOLIC (HCC): ICD-10-CM

## 2025-01-09 DIAGNOSIS — I67.82 CHRONIC CEREBRAL ISCHEMIA: ICD-10-CM

## 2025-01-09 DIAGNOSIS — G45.9 TIA (TRANSIENT ISCHEMIC ATTACK): ICD-10-CM

## 2025-01-09 RX ORDER — GABAPENTIN 600 MG/1
600 TABLET ORAL 3 TIMES DAILY
Qty: 270 TABLET | Refills: 3 | Status: SHIPPED | OUTPATIENT
Start: 2025-01-09 | End: 2026-01-09

## 2025-01-09 RX ORDER — ATORVASTATIN CALCIUM 40 MG/1
TABLET, FILM COATED ORAL
Qty: 90 TABLET | Refills: 3 | Status: SHIPPED | OUTPATIENT
Start: 2025-01-09

## 2025-01-09 RX ORDER — POTASSIUM CHLORIDE 750 MG/1
CAPSULE, EXTENDED RELEASE ORAL
Qty: 180 CAPSULE | Refills: 3 | Status: SHIPPED | OUTPATIENT
Start: 2025-01-09

## 2025-01-09 RX ORDER — CLOPIDOGREL BISULFATE 75 MG/1
TABLET ORAL
Qty: 90 TABLET | Refills: 3 | Status: SHIPPED | OUTPATIENT
Start: 2025-01-09

## 2025-01-09 RX ORDER — FUROSEMIDE 20 MG/1
20 TABLET ORAL DAILY
Qty: 90 TABLET | Refills: 3 | Status: SHIPPED | OUTPATIENT
Start: 2025-01-09

## 2025-01-09 RX ORDER — OXYCODONE AND ACETAMINOPHEN 10; 325 MG/1; MG/1
1 TABLET ORAL EVERY 8 HOURS PRN
Qty: 90 TABLET | Refills: 0 | Status: SHIPPED | OUTPATIENT
Start: 2025-01-09 | End: 2025-02-08

## 2025-01-09 RX ORDER — PEN NEEDLE, DIABETIC 31 GX5/16"
NEEDLE, DISPOSABLE MISCELLANEOUS
Qty: 200 EACH | Refills: 3 | Status: SHIPPED | OUTPATIENT
Start: 2025-01-09

## 2025-01-09 NOTE — TELEPHONE ENCOUNTER
Penelope called requesting a refill of the below medication which has been pended for you:     Requested Prescriptions     Pending Prescriptions Disp Refills    atorvastatin (LIPITOR) 40 MG tablet [Pharmacy Med Name: Atorvastatin Calcium Oral Tablet 40 MG] 90 tablet 3     Sig: TAKE 1 TABLET EVERY DAY    clopidogrel (PLAVIX) 75 MG tablet [Pharmacy Med Name: Clopidogrel Bisulfate Oral Tablet 75 MG] 90 tablet 3     Sig: TAKE 1 TABLET EVERY DAY    DROPLET PEN NEEDLES 31G X 8 MM MISC [Pharmacy Med Name: Droplet Pen Needles Miscellaneous 31G X 8 MM] 200 each 3     Sig: USE FOR INJECTIONS TWO TIMES DAILY    potassium chloride (MICRO-K) 10 MEQ extended release capsule [Pharmacy Med Name: Potassium Chloride ER Oral Capsule Extended Release 10 MEQ] 180 capsule 3     Sig: TAKE 1 CAPSULE TWICE DAILY    gabapentin (NEURONTIN) 600 MG tablet [Pharmacy Med Name: Gabapentin Oral Tablet 600 MG] 270 tablet 3     Sig: Take 1 tablet by mouth 3 times daily.    furosemide (LASIX) 20 MG tablet [Pharmacy Med Name: Furosemide Oral Tablet 20 MG] 90 tablet 3     Sig: TAKE 1 TABLET EVERY DAY       Last Appointment Date: 7/29/2024  Next Appointment Date: 1/30/2025    No Known Allergies

## 2025-01-13 DIAGNOSIS — M54.50 CHRONIC BILATERAL LOW BACK PAIN WITHOUT SCIATICA: ICD-10-CM

## 2025-01-13 DIAGNOSIS — G89.29 CHRONIC BILATERAL LOW BACK PAIN WITHOUT SCIATICA: ICD-10-CM

## 2025-01-13 RX ORDER — OXYCODONE AND ACETAMINOPHEN 10; 325 MG/1; MG/1
1 TABLET ORAL EVERY 8 HOURS PRN
Qty: 90 TABLET | Refills: 0 | Status: SHIPPED | OUTPATIENT
Start: 2025-01-13 | End: 2025-02-12

## 2025-01-13 NOTE — TELEPHONE ENCOUNTER
Patient called in requesting refill for her Percocet to [facility] in [place], but prescription was sent to [facility]. Please advise.

## 2025-01-22 ENCOUNTER — OFFICE VISIT (OUTPATIENT)
Dept: CARDIOLOGY | Age: 75
End: 2025-01-22
Payer: MEDICARE

## 2025-01-22 VITALS
BODY MASS INDEX: 37.39 KG/M2 | HEIGHT: 64 IN | OXYGEN SATURATION: 95 % | SYSTOLIC BLOOD PRESSURE: 128 MMHG | DIASTOLIC BLOOD PRESSURE: 64 MMHG | RESPIRATION RATE: 16 BRPM | WEIGHT: 219 LBS | HEART RATE: 51 BPM

## 2025-01-22 DIAGNOSIS — I25.118 CORONARY ARTERY DISEASE INVOLVING NATIVE HEART WITH OTHER FORM OF ANGINA PECTORIS, UNSPECIFIED VESSEL OR LESION TYPE (HCC): Primary | ICD-10-CM

## 2025-01-22 DIAGNOSIS — I10 ESSENTIAL HYPERTENSION: ICD-10-CM

## 2025-01-22 DIAGNOSIS — I50.32 CHRONIC DIASTOLIC CONGESTIVE HEART FAILURE (HCC): ICD-10-CM

## 2025-01-22 DIAGNOSIS — R06.09 DYSPNEA ON EXERTION: ICD-10-CM

## 2025-01-22 DIAGNOSIS — E78.5 HYPERLIPIDEMIA, UNSPECIFIED HYPERLIPIDEMIA TYPE: ICD-10-CM

## 2025-01-22 PROCEDURE — 93005 ELECTROCARDIOGRAM TRACING: CPT | Performed by: INTERNAL MEDICINE

## 2025-01-22 PROCEDURE — 93010 ELECTROCARDIOGRAM REPORT: CPT | Performed by: INTERNAL MEDICINE

## 2025-01-22 PROCEDURE — G8417 CALC BMI ABV UP PARAM F/U: HCPCS | Performed by: INTERNAL MEDICINE

## 2025-01-22 PROCEDURE — 1159F MED LIST DOCD IN RCRD: CPT | Performed by: INTERNAL MEDICINE

## 2025-01-22 PROCEDURE — G8399 PT W/DXA RESULTS DOCUMENT: HCPCS | Performed by: INTERNAL MEDICINE

## 2025-01-22 PROCEDURE — 1123F ACP DISCUSS/DSCN MKR DOCD: CPT | Performed by: INTERNAL MEDICINE

## 2025-01-22 PROCEDURE — 99214 OFFICE O/P EST MOD 30 MIN: CPT | Performed by: INTERNAL MEDICINE

## 2025-01-22 PROCEDURE — 3074F SYST BP LT 130 MM HG: CPT | Performed by: INTERNAL MEDICINE

## 2025-01-22 PROCEDURE — 1090F PRES/ABSN URINE INCON ASSESS: CPT | Performed by: INTERNAL MEDICINE

## 2025-01-22 PROCEDURE — 1036F TOBACCO NON-USER: CPT | Performed by: INTERNAL MEDICINE

## 2025-01-22 PROCEDURE — 3078F DIAST BP <80 MM HG: CPT | Performed by: INTERNAL MEDICINE

## 2025-01-22 PROCEDURE — G8427 DOCREV CUR MEDS BY ELIG CLIN: HCPCS | Performed by: INTERNAL MEDICINE

## 2025-01-22 RX ORDER — BRINZOLAMIDE/BRIMONIDINE TARTRATE 10; 2 MG/ML; MG/ML
SUSPENSION/ DROPS OPHTHALMIC
COMMUNITY
Start: 2025-01-14

## 2025-01-22 RX ORDER — TIMOLOL MALEATE 5 MG/ML
SOLUTION/ DROPS OPHTHALMIC
COMMUNITY
Start: 2025-01-15

## 2025-01-22 NOTE — PROGRESS NOTES
1/9/18  CONCLUSIONS:  1.  Normal LV size and function with ejection fraction of 65%.  2.  Grade 1 diastolic dysfunction.  3.  Mild concentric LVH.  4.  Mild biatrial enlargement.  5.  No significant valvulopathies.  6.  No effusion.  7.  Mildly dilated RV with normal function.     Nuclear Stress 5/2019:  IMPRESSION:  1. No ischemia or infarction.  2. Normal LV systolic function. EF 81%.      TTE 12/7/2022  Summary  Normal left ventricular diameter.  Mild left ventricular hypertrophy.  Hyperdynamic left ventricular function.  Left ventricular ejection fraction 70 %.  Grade I (mild) left ventricular diastolic dysfunction.  Left atrium is mildly dilated.  Right atrial dilatation.  Right ventricular dilatation with normal systolic function.  Aortic valve leaflet calcification with adequate opening.  Mild mitral valve thickening with significant annular calcification.  Normal tricuspid valve leaflets.  Trivial tricuspid regurgitation.  Estimated right ventricular systolic pressure is 31 mmHg.  No significant pericardial effusion is seen.     Holter 4/2021- PAT     CARDIAC CATH 12/22      Cardiac Arteries and Lesion Findings     LMCA: Normal 0% stenosis.     LAD: Mild irregularities 20-30%.     LCx: Mild irregularities 10-20%.     RCA: Patent mid stent  Distal 30-40% stenosis      Non obstructive minimal CAD.   Patent RCA stent   Preserved LV function     Assessment and plan:        Dyspnea. H/o CAD. Obtain Lexiscan stress test and TTE  CAD with Hx of BMS to RCA in 2002, Cath in 2022- non obstructive CAD. Continue plavix with statin.  HFpEF- stable. On metoprolol, Jardiance, lasix and aldactone. Elevated leg, compression stocking.  Palpitations- PAT. Stable. Continue low dose metoprolol  H/o Orthostatic hypotension. Wear compression stockings  LEANDRO/COPD- on CPAP- follows with pulmonary  Frequent falls- Follows with PCP and neurology.   HTN- Continue metoprolol and aldactone. Monitor BP at home  DL- LDL 39 on 8/9/24.

## 2025-01-27 ENCOUNTER — TELEPHONE (OUTPATIENT)
Dept: INTERNAL MEDICINE | Age: 75
End: 2025-01-27

## 2025-01-27 RX ORDER — BUPROPION HYDROCHLORIDE 75 MG/1
TABLET ORAL
Qty: 180 TABLET | Refills: 3 | Status: SHIPPED | OUTPATIENT
Start: 2025-01-27

## 2025-01-27 RX ORDER — IBUPROFEN 400 MG/1
TABLET, FILM COATED ORAL
Qty: 360 TABLET | Refills: 3 | Status: SHIPPED | OUTPATIENT
Start: 2025-01-27

## 2025-01-27 NOTE — TELEPHONE ENCOUNTER
Penelope is having a stress test here @[facility] tomorrow ordered by Dr Berman. She states she was told to call here to get instructions on how to take her Insulin. Please advise and call back @[phone]

## 2025-01-27 NOTE — TELEPHONE ENCOUNTER
Tell her to take only 8 units instead of 15 units of the Lantus/glargine insulin  - both the night before and morning of testing

## 2025-01-27 NOTE — TELEPHONE ENCOUNTER
If Test is in a.m., then she does not need to do anything --since she takes the NovoLog at lunch and dinner.      If test is in the afternoon, then skip the lunchtime short acting insulin NovoLog

## 2025-01-27 NOTE — TELEPHONE ENCOUNTER
When speaking to pt about her Lantus dosing for tomorrow's stress test,(@1:00pm), she asked if anything needs done with her Novolog, also. She takes 4 units @ lunch & 6 units @ supper.

## 2025-01-28 ENCOUNTER — HOSPITAL ENCOUNTER (OUTPATIENT)
Age: 75
Discharge: HOME | End: 2025-01-30
Attending: INTERNAL MEDICINE
Payer: MEDICARE

## 2025-01-28 ENCOUNTER — HOSPITAL ENCOUNTER (OUTPATIENT)
Dept: NUCLEAR MEDICINE | Age: 75
Discharge: HOME | End: 2025-01-30
Attending: INTERNAL MEDICINE
Payer: MEDICARE

## 2025-01-28 DIAGNOSIS — I25.118 CORONARY ARTERY DISEASE INVOLVING NATIVE HEART WITH OTHER FORM OF ANGINA PECTORIS, UNSPECIFIED VESSEL OR LESION TYPE (HCC): ICD-10-CM

## 2025-01-28 DIAGNOSIS — R06.09 DYSPNEA ON EXERTION: ICD-10-CM

## 2025-01-28 LAB
NUC STRESS EJECTION FRACTION: 75 %
STRESS BASELINE DIAS BP: 72 MMHG
STRESS BASELINE HR: 56 BPM
STRESS BASELINE SYS BP: 144 MMHG
STRESS ESTIMATED WORKLOAD: 1 METS
STRESS PEAK DIAS BP: 77 MMHG
STRESS PEAK SYS BP: 157 MMHG
STRESS PERCENT HR ACHIEVED: 47 %
STRESS POST PEAK HR: 68 BPM
STRESS RATE PRESSURE PRODUCT: NORMAL BPM*MMHG
STRESS TARGET HR: 144 BPM
TID: 1.07

## 2025-01-28 PROCEDURE — A9500 TC99M SESTAMIBI: HCPCS | Performed by: INTERNAL MEDICINE

## 2025-01-28 PROCEDURE — 6360000002 HC RX W HCPCS: Performed by: INTERNAL MEDICINE

## 2025-01-28 PROCEDURE — 93017 CV STRESS TEST TRACING ONLY: CPT

## 2025-01-28 PROCEDURE — 93018 CV STRESS TEST I&R ONLY: CPT | Performed by: INTERNAL MEDICINE

## 2025-01-28 PROCEDURE — 78452 HT MUSCLE IMAGE SPECT MULT: CPT

## 2025-01-28 PROCEDURE — 3430000000 HC RX DIAGNOSTIC RADIOPHARMACEUTICAL: Performed by: INTERNAL MEDICINE

## 2025-01-28 RX ORDER — TETRAKIS(2-METHOXYISOBUTYLISOCYANIDE)COPPER(I) TETRAFLUOROBORATE 1 MG/ML
10 INJECTION, POWDER, LYOPHILIZED, FOR SOLUTION INTRAVENOUS
Status: COMPLETED | OUTPATIENT
Start: 2025-01-28 | End: 2025-01-28

## 2025-01-28 RX ORDER — TETRAKIS(2-METHOXYISOBUTYLISOCYANIDE)COPPER(I) TETRAFLUOROBORATE 1 MG/ML
30 INJECTION, POWDER, LYOPHILIZED, FOR SOLUTION INTRAVENOUS
Status: COMPLETED | OUTPATIENT
Start: 2025-01-28 | End: 2025-01-28

## 2025-01-28 RX ORDER — REGADENOSON 0.08 MG/ML
0.4 INJECTION, SOLUTION INTRAVENOUS ONCE
Status: COMPLETED | OUTPATIENT
Start: 2025-01-28 | End: 2025-01-28

## 2025-01-28 RX ADMIN — REGADENOSON 0.4 MG: 0.08 INJECTION, SOLUTION INTRAVENOUS at 14:23

## 2025-01-28 RX ADMIN — Medication 10 MILLICURIE: at 13:25

## 2025-01-28 RX ADMIN — Medication 30 MILLICURIE: at 14:20

## 2025-01-29 ENCOUNTER — TELEPHONE (OUTPATIENT)
Dept: CARDIOLOGY | Age: 75
End: 2025-01-29

## 2025-01-29 ENCOUNTER — HOSPITAL ENCOUNTER (OUTPATIENT)
Age: 75
Discharge: HOME | End: 2025-01-31
Attending: INTERNAL MEDICINE
Payer: MEDICARE

## 2025-01-29 VITALS — BODY MASS INDEX: 37.39 KG/M2 | HEIGHT: 64 IN | WEIGHT: 219 LBS

## 2025-01-29 DIAGNOSIS — I25.118 CORONARY ARTERY DISEASE INVOLVING NATIVE HEART WITH OTHER FORM OF ANGINA PECTORIS, UNSPECIFIED VESSEL OR LESION TYPE (HCC): ICD-10-CM

## 2025-01-29 DIAGNOSIS — R06.09 DYSPNEA ON EXERTION: ICD-10-CM

## 2025-01-29 DIAGNOSIS — I50.32 CHRONIC DIASTOLIC CONGESTIVE HEART FAILURE (HCC): ICD-10-CM

## 2025-01-29 LAB
ECHO AO ROOT DIAM: 3 CM
ECHO AO ROOT INDEX: 1.48 CM/M2
ECHO AV AREA PEAK VELOCITY: 1.8 CM2
ECHO AV AREA/BSA PEAK VELOCITY: 0.9 CM2/M2
ECHO AV PEAK GRADIENT: 8 MMHG
ECHO AV PEAK VELOCITY: 1.4 M/S
ECHO AV VELOCITY RATIO: 0.71
ECHO BSA: 2.12 M2
ECHO EST RA PRESSURE: 3 MMHG
ECHO LA AREA 2C: 20.8 CM2
ECHO LA AREA 4C: 18.5 CM2
ECHO LA DIAMETER INDEX: 1.63 CM/M2
ECHO LA DIAMETER: 3.3 CM
ECHO LA MAJOR AXIS: 5.8 CM
ECHO LA MINOR AXIS: 6.3 CM
ECHO LA TO AORTIC ROOT RATIO: 1.1
ECHO LA VOL BP: 54 ML (ref 22–52)
ECHO LA VOL MOD A2C: 56 ML (ref 22–52)
ECHO LA VOL MOD A4C: 48 ML (ref 22–52)
ECHO LA VOL/BSA BIPLANE: 27 ML/M2 (ref 16–34)
ECHO LA VOLUME INDEX MOD A2C: 28 ML/M2 (ref 16–34)
ECHO LA VOLUME INDEX MOD A4C: 24 ML/M2 (ref 16–34)
ECHO LV E' LATERAL VELOCITY: 6.42 CM/S
ECHO LV E' SEPTAL VELOCITY: 3.81 CM/S
ECHO LV EDV A2C: 85 ML
ECHO LV EDV A4C: 89 ML
ECHO LV EDV INDEX A4C: 44 ML/M2
ECHO LV EDV NDEX A2C: 42 ML/M2
ECHO LV EJECTION FRACTION A2C: 65 %
ECHO LV EJECTION FRACTION A4C: 54 %
ECHO LV EJECTION FRACTION BIPLANE: 60 % (ref 55–100)
ECHO LV ESV A2C: 29 ML
ECHO LV ESV A4C: 41 ML
ECHO LV ESV INDEX A2C: 14 ML/M2
ECHO LV ESV INDEX A4C: 20 ML/M2
ECHO LV FRACTIONAL SHORTENING: 55 % (ref 28–44)
ECHO LV INTERNAL DIMENSION DIASTOLE INDEX: 2.07 CM/M2
ECHO LV INTERNAL DIMENSION DIASTOLIC: 4.2 CM (ref 3.9–5.3)
ECHO LV INTERNAL DIMENSION SYSTOLIC INDEX: 0.94 CM/M2
ECHO LV INTERNAL DIMENSION SYSTOLIC: 1.9 CM
ECHO LV IVSD: 1 CM (ref 0.6–0.9)
ECHO LV MASS 2D: 127.8 G (ref 67–162)
ECHO LV MASS INDEX 2D: 63 G/M2 (ref 43–95)
ECHO LV POSTERIOR WALL DIASTOLIC: 0.9 CM (ref 0.6–0.9)
ECHO LV RELATIVE WALL THICKNESS RATIO: 0.43
ECHO LVOT AREA: 2.5 CM2
ECHO LVOT DIAM: 1.8 CM
ECHO LVOT MEAN GRADIENT: 2 MMHG
ECHO LVOT PEAK GRADIENT: 4 MMHG
ECHO LVOT PEAK VELOCITY: 1 M/S
ECHO LVOT STROKE VOLUME INDEX: 33 ML/M2
ECHO LVOT SV: 66.9 ML
ECHO LVOT VTI: 26.3 CM
ECHO MV A VELOCITY: 1.26 M/S
ECHO MV E DECELERATION TIME (DT): 356 MS
ECHO MV E VELOCITY: 0.6 M/S
ECHO MV E/A RATIO: 0.48
ECHO MV E/E' LATERAL: 9.35
ECHO MV E/E' RATIO (AVERAGED): 12.55
ECHO MV E/E' SEPTAL: 15.75
ECHO RIGHT VENTRICULAR SYSTOLIC PRESSURE (RVSP): 21 MMHG
ECHO RV TAPSE: 2.3 CM (ref 1.7–?)
ECHO TV REGURGITANT MAX VELOCITY: 2.12 M/S
ECHO TV REGURGITANT PEAK GRADIENT: 18 MMHG

## 2025-01-29 PROCEDURE — 93306 TTE W/DOPPLER COMPLETE: CPT

## 2025-01-29 NOTE — TELEPHONE ENCOUNTER
LM for pt to call office.     Antelmo Berman MD [facility] Clinical [facility]  Normal stress reported          Nuclear stress test with myocardial perfusion  Order: [id number]  Status: Final result       Visible to patient: No (not released)       Dx: Dyspnea on exertion; Coronary artery ...    1 Result Note  Details    Reading Physician Reading Date Result Priority   Isaias Bains DO  [phone] 1/28/2025 Routine     Result Text       Stress Combined Conclusion: The study is negative for myocardial ischemia and infarction. Findings suggest a low risk of cardiac events.    Stress Function: Left ventricular function post-stress is normal. Post-stress ejection fraction is 75%.    Perfusion Comments: Prone images were obtained. Prone imaging was helpful in correcting soft tissue attenuation. LV perfusion is normal. There is no evidence of inducible ischemia.    Perfusion Conclusion: TID ratio is 1.07.    ECG: Resting ECG demonstrates normal sinus rhythm.    ECG: The stress ECG was negative for ischemia.    Stress Test: A pharmacological stress test was performed using regadenoson (Lexiscan).        Antelmo Berman MD [facility] Clinical [facility]  Stable           Contains abnormal data Echo (TTE) complete (PRN contrast/bubble/strain/3D)  Order: [id number]  Status: Final result       Visible to patient: No (not released)       Dx: Dyspnea on exertion; Chronic diastoli...    1 Result Note  Details    Reading Physician Reading Date Result Priority   Isaias Bains   [phone] 1/29/2025 Routine     Result Text       Left Ventricle: Normal left ventricular systolic function. EF by 2D Simpsons Biplane is 60%. Left ventricle size is normal. Normal wall thickness. Normal wall motion. Normal diastolic function.    Aortic Valve: Mild sclerosis of the aortic valve cusps.    Mitral Valve: Severe annular calcification at the posterior leaflet. Severely thickened, at the posterior leaflet. Trace regurgitation. No

## 2025-01-29 NOTE — TELEPHONE ENCOUNTER
Normal stress test below.  Echo results are in now as well.      Left Ventricle: Normal left ventricular systolic function. EF by 2D Simpsons Biplane is 60%. Left ventricle size is normal. Normal wall thickness. Normal wall motion. Normal diastolic function.    Aortic Valve: Mild sclerosis of the aortic valve cusps.    Mitral Valve: Severe annular calcification at the posterior leaflet. Severely thickened, at the posterior leaflet. Trace regurgitation. No stenosis noted.    Tricuspid Valve: Trace regurgitation. Normal RVSP. The estimated RVSP is 21 mmHg.    Image quality is adequate.

## 2025-01-30 ENCOUNTER — HOSPITAL ENCOUNTER (OUTPATIENT)
Age: 75
Discharge: HOME | End: 2025-01-30
Payer: MEDICARE

## 2025-01-30 ENCOUNTER — OFFICE VISIT (OUTPATIENT)
Dept: INTERNAL MEDICINE | Age: 75
End: 2025-01-30
Payer: MEDICARE

## 2025-01-30 VITALS
HEART RATE: 52 BPM | HEIGHT: 64 IN | DIASTOLIC BLOOD PRESSURE: 76 MMHG | SYSTOLIC BLOOD PRESSURE: 118 MMHG | BODY MASS INDEX: 36.37 KG/M2 | WEIGHT: 213 LBS | OXYGEN SATURATION: 96 % | RESPIRATION RATE: 16 BRPM

## 2025-01-30 DIAGNOSIS — Z79.4 TYPE 2 DIABETES MELLITUS WITH DIABETIC POLYNEUROPATHY, WITH LONG-TERM CURRENT USE OF INSULIN (HCC): ICD-10-CM

## 2025-01-30 DIAGNOSIS — E03.9 HYPOTHYROIDISM (ACQUIRED): ICD-10-CM

## 2025-01-30 DIAGNOSIS — I25.83 CORONARY ARTERY DISEASE DUE TO LIPID RICH PLAQUE: ICD-10-CM

## 2025-01-30 DIAGNOSIS — I50.32 CHRONIC DIASTOLIC CONGESTIVE HEART FAILURE (HCC): ICD-10-CM

## 2025-01-30 DIAGNOSIS — I25.10 CORONARY ARTERY DISEASE DUE TO LIPID RICH PLAQUE: ICD-10-CM

## 2025-01-30 DIAGNOSIS — E11.42 TYPE 2 DIABETES MELLITUS WITH DIABETIC POLYNEUROPATHY, WITH LONG-TERM CURRENT USE OF INSULIN (HCC): Primary | ICD-10-CM

## 2025-01-30 DIAGNOSIS — E11.42 TYPE 2 DIABETES MELLITUS WITH DIABETIC POLYNEUROPATHY, WITH LONG-TERM CURRENT USE OF INSULIN (HCC): ICD-10-CM

## 2025-01-30 DIAGNOSIS — D50.9 IRON DEFICIENCY ANEMIA, UNSPECIFIED IRON DEFICIENCY ANEMIA TYPE: ICD-10-CM

## 2025-01-30 DIAGNOSIS — Z79.4 TYPE 2 DIABETES MELLITUS WITH DIABETIC POLYNEUROPATHY, WITH LONG-TERM CURRENT USE OF INSULIN (HCC): Primary | ICD-10-CM

## 2025-01-30 DIAGNOSIS — E53.8 B12 DEFICIENCY: ICD-10-CM

## 2025-01-30 DIAGNOSIS — E55.9 VITAMIN D DEFICIENCY: ICD-10-CM

## 2025-01-30 LAB
EST. AVERAGE GLUCOSE BLD GHB EST-MCNC: 111 MG/DL
HBA1C MFR BLD: 5.5 % (ref 4–6)

## 2025-01-30 PROCEDURE — 83036 HEMOGLOBIN GLYCOSYLATED A1C: CPT

## 2025-01-30 PROCEDURE — 36415 COLL VENOUS BLD VENIPUNCTURE: CPT

## 2025-01-30 PROCEDURE — 99212 OFFICE O/P EST SF 10 MIN: CPT | Performed by: INTERNAL MEDICINE

## 2025-01-30 SDOH — ECONOMIC STABILITY: FOOD INSECURITY: WITHIN THE PAST 12 MONTHS, THE FOOD YOU BOUGHT JUST DIDN'T LAST AND YOU DIDN'T HAVE MONEY TO GET MORE.: NEVER TRUE

## 2025-01-30 SDOH — ECONOMIC STABILITY: FOOD INSECURITY: WITHIN THE PAST 12 MONTHS, YOU WORRIED THAT YOUR FOOD WOULD RUN OUT BEFORE YOU GOT MONEY TO BUY MORE.: NEVER TRUE

## 2025-01-30 ASSESSMENT — PATIENT HEALTH QUESTIONNAIRE - PHQ9
5. POOR APPETITE OR OVEREATING: NOT AT ALL
SUM OF ALL RESPONSES TO PHQ9 QUESTIONS 1 & 2: 0
8. MOVING OR SPEAKING SO SLOWLY THAT OTHER PEOPLE COULD HAVE NOTICED. OR THE OPPOSITE, BEING SO FIGETY OR RESTLESS THAT YOU HAVE BEEN MOVING AROUND A LOT MORE THAN USUAL: NOT AT ALL
2. FEELING DOWN, DEPRESSED OR HOPELESS: NOT AT ALL
SUM OF ALL RESPONSES TO PHQ QUESTIONS 1-9: 0
10. IF YOU CHECKED OFF ANY PROBLEMS, HOW DIFFICULT HAVE THESE PROBLEMS MADE IT FOR YOU TO DO YOUR WORK, TAKE CARE OF THINGS AT HOME, OR GET ALONG WITH OTHER PEOPLE: NOT DIFFICULT AT ALL
4. FEELING TIRED OR HAVING LITTLE ENERGY: NOT AT ALL
SUM OF ALL RESPONSES TO PHQ QUESTIONS 1-9: 0
3. TROUBLE FALLING OR STAYING ASLEEP: NOT AT ALL
SUM OF ALL RESPONSES TO PHQ QUESTIONS 1-9: 0
1. LITTLE INTEREST OR PLEASURE IN DOING THINGS: NOT AT ALL
SUM OF ALL RESPONSES TO PHQ QUESTIONS 1-9: 0
6. FEELING BAD ABOUT YOURSELF - OR THAT YOU ARE A FAILURE OR HAVE LET YOURSELF OR YOUR FAMILY DOWN: NOT AT ALL
7. TROUBLE CONCENTRATING ON THINGS, SUCH AS READING THE NEWSPAPER OR WATCHING TELEVISION: NOT AT ALL
9. THOUGHTS THAT YOU WOULD BE BETTER OFF DEAD, OR OF HURTING YOURSELF: NOT AT ALL

## 2025-01-30 ASSESSMENT — ENCOUNTER SYMPTOMS
BACK PAIN: 0
SHORTNESS OF BREATH: 0
ABDOMINAL PAIN: 0
DIARRHEA: 0
EYE PAIN: 0
VOMITING: 0
CONSTIPATION: 0
COUGH: 0
NAUSEA: 0
BLOOD IN STOOL: 0

## 2025-01-30 NOTE — PROGRESS NOTES
(COLACE) 100 MG capsule TAKE 1 CAPSULE BY MOUTH TWICE DAILY 180 capsule 3    senna (SENOKOT) 8.6 MG TABS tablet TAKE 1 TABLET BY MOUTH TWICE DAILY 180 tablet 3    latanoprost (XALATAN) 0.005 % ophthalmic solution Place 1 drop into both eyes nightly      turmeric 500 MG CAPS Take 4 capsules by mouth daily      ELDERBERRY PO Take 50 mg by mouth daily      fluticasone (FLONASE) 50 MCG/ACT nasal spray USE 2 SPRAYS NASALLY EVERY DAY 48 g 3    albuterol sulfate HFA (PROVENTIL;VENTOLIN;PROAIR) 108 (90 Base) MCG/ACT inhaler INHALE 2 PUFFS INTO THE LUNGS EVERY 6 HOURS AS NEEDED FOR WHEEZING 3 each 3    blood glucose test strips (CONTOUR NEXT TEST) strip USE TO TEST BLOOD SUGAR FOUR TIMES DAILY 400 strip 3    JARDIANCE 10 MG tablet TAKE 1 TABLET EVERY DAY 90 tablet 3    Misc. Devices MISC Diabetic Shoes with Inserts 1 each 0    metoprolol succinate (TOPROL XL) 25 MG extended release tablet TAKE 1/2 TABLET EVERY DAY 45 tablet 3    vitamin D3 (CHOLECALCIFEROL) 25 MCG (1000 UT) TABS tablet TAKE 3 TABLETS BY MOUTH TWICE DAILY 540 tablet 3    ketorolac (TORADOL) 10 MG tablet Take 1 tablet by mouth 3 times daily as needed for Pain 15 tablet 0    albuterol (PROVENTIL) (2.5 MG/3ML) 0.083% nebulizer solution Take 3 mLs by nebulization every 6 hours as needed for Wheezing 120 each 3    calcium carbonate (OSCAL) 500 MG TABS tablet Take 1 tablet by mouth daily      nitroGLYCERIN (NITROSTAT) 0.4 MG SL tablet PLACE 1 TABLET UNER THE TONGUE EVERY 5 MINUTES AS NEEDED FOR CHEST PAIN AT FIRST SIGN OF CHEST PAIN. (Patient not taking: Reported on 1/22/2025) 25 tablet 1    Coenzyme Q10 (CO Q-10 PO) Take 1 mg by mouth daily      insulin glargine (LANTUS) 100 UNIT/ML injection vial INJECT 15 UNITS INTO THE SKIN TWO TIMES DAILY (DISCARD AND BEGIN A NEW VIAL AFTER 28 DAYS) 30 mL 3    APPLE CIDER VINEGAR PO Take 450 mg by mouth daily      UNABLE TO FIND Take by mouth daily Liver Clenz /  Milk Thistle and Turmeric  takes 2 tabs daily      ascorbic acid

## 2025-01-31 ENCOUNTER — TELEPHONE (OUTPATIENT)
Dept: INTERNAL MEDICINE | Age: 75
End: 2025-01-31

## 2025-01-31 RX ORDER — PRIMIDONE 50 MG/1
50 TABLET ORAL 2 TIMES DAILY
Qty: 180 TABLET | Refills: 1 | Status: SHIPPED | OUTPATIENT
Start: 2025-01-31

## 2025-01-31 NOTE — TELEPHONE ENCOUNTER
Patient called in asking  for some medication for her hand tremors . She said it is so bad that she can't really write. Patient uses [facility] in [place] for pharmacy.

## 2025-01-31 NOTE — TELEPHONE ENCOUNTER
ANTICOAGULATION FOLLOW-UP CLINIC VISIT    Patient Name:  Nelsy Cota  Date:  3/23/2021  Contact Type:  Telephone/ discussed with patient    SUBJECTIVE:  Patient Findings     Comments:  The patient was assessed for diet, medication, and activity level changes, missed or extra doses, bruising or bleeding, with no problem findings.  Continues not to drink alcohol or have salt in her diet.  Still having salad once weekly.        Clinical Outcomes     Negatives:  Major bleeding event, Thromboembolic event, Anticoagulation-related hospital admission, Anticoagulation-related ED visit, Anticoagulation-related fatality    Comments:  The patient was assessed for diet, medication, and activity level changes, missed or extra doses, bruising or bleeding, with no problem findings.  Continues not to drink alcohol or have salt in her diet.  Still having salad once weekly.           OBJECTIVE    Recent labs: (last 7 days)     21  1336   INR 2.50*       ASSESSMENT / PLAN  INR assessment THER    Recheck INR In: 4 WEEKS    INR Location Clinic      Anticoagulation Summary  As of 3/23/2021    INR goal:  2.0-3.0   TTR:  41.6 % (11.7 mo)   INR used for dosin.50 (3/23/2021)   Warfarin maintenance plan:  10 mg (2 mg x 5) every Wed; 8 mg (2 mg x 4) all other days   Full warfarin instructions:  10 mg every Wed; 8 mg all other days   Weekly warfarin total:  58 mg   Plan last modified:  Vania Morse RN (2021)   Next INR check:  2021   Priority:  Maintenance   Target end date:  Indefinite    Indications    Long-term (current) use of anticoagulants [Z79.01] [Z79.01]  PE (pulmonary thromboembolism) (H) [I26.99]  Recurrent deep venous thrombosis (H) [I82.409]             Anticoagulation Episode Summary     INR check location:      Preferred lab:      Send INR reminders to:  ULISES SMITH    Comments:  INR Referral renewed, see 20 telephone encounter.      Anticoagulation Care Providers     Provider Role  Pend Mysoline 50 mg twice daily   Specialty Phone number    Alyse Faye MD Referring Internal Medicine [phone]    Hamzah Thomas MD Responsible Internal Medicine [phone]            See the Encounter Report to view Anticoagulation Flowsheet and Dosing Calendar (Go to Encounters tab in chart review, and find the Anticoagulation Therapy Visit)    Dosage adjustment made based on physician directed care plan.    Vania Morse RN

## 2025-02-14 DIAGNOSIS — G89.29 CHRONIC BILATERAL LOW BACK PAIN WITHOUT SCIATICA: ICD-10-CM

## 2025-02-14 DIAGNOSIS — M54.50 CHRONIC BILATERAL LOW BACK PAIN WITHOUT SCIATICA: ICD-10-CM

## 2025-02-14 RX ORDER — OXYCODONE AND ACETAMINOPHEN 5; 325 MG/1; MG/1
1 TABLET ORAL EVERY 6 HOURS PRN
Qty: 120 TABLET | Refills: 0 | Status: SHIPPED | OUTPATIENT
Start: 2025-02-14 | End: 2025-03-16

## 2025-02-14 NOTE — TELEPHONE ENCOUNTER
Penelope called requesting a refill of the below medication which has been pended for you:     Requested Prescriptions     Pending Prescriptions Disp Refills    oxyCODONE-acetaminophen (PERCOCET) 5-325 MG per tablet 120 tablet 0     Sig: Take 1 tablet by mouth every 6 hours as needed for Pain for up to 30 days. Take lowest dose possible to manage pain Max Daily Amount: 4 tablets       Last Appointment Date: 1/30/2025  Next Appointment Date: 8/8/2025    No Known Allergies

## 2025-02-16 DIAGNOSIS — E11.42 TYPE 2 DIABETES MELLITUS WITH DIABETIC POLYNEUROPATHY, WITH LONG-TERM CURRENT USE OF INSULIN (HCC): ICD-10-CM

## 2025-02-16 DIAGNOSIS — Z79.4 TYPE 2 DIABETES MELLITUS WITH DIABETIC POLYNEUROPATHY, WITH LONG-TERM CURRENT USE OF INSULIN (HCC): ICD-10-CM

## 2025-02-17 NOTE — TELEPHONE ENCOUNTER
Penelope called requesting a refill of the below medication which has been pended for you:     Requested Prescriptions     Pending Prescriptions Disp Refills    blood glucose test strips (CONTOUR NEXT TEST) strip [Pharmacy Med Name: CONTOUR NEXT TEST STRIPS 100S] 400 strip 3     Sig: PATIENT TO TEST BLOOD SUGAR FOUR TIMES DAILY       Last Appointment Date: 1/30/2025  Next Appointment Date: 8/8/2025    No Known Allergies

## 2025-03-03 ENCOUNTER — TELEPHONE (OUTPATIENT)
Dept: INTERNAL MEDICINE | Age: 75
End: 2025-03-03
Payer: MEDICARE

## 2025-03-03 DIAGNOSIS — I25.83 CORONARY ARTERY DISEASE DUE TO LIPID RICH PLAQUE: ICD-10-CM

## 2025-03-03 DIAGNOSIS — I35.0 NONRHEUMATIC AORTIC VALVE STENOSIS: ICD-10-CM

## 2025-03-03 DIAGNOSIS — I25.10 ATHEROSCLEROSIS OF NATIVE CORONARY ARTERY OF NATIVE HEART WITHOUT ANGINA PECTORIS: ICD-10-CM

## 2025-03-03 DIAGNOSIS — Z79.4 TYPE 2 DIABETES MELLITUS WITH DIABETIC POLYNEUROPATHY, WITH LONG-TERM CURRENT USE OF INSULIN (HCC): ICD-10-CM

## 2025-03-03 DIAGNOSIS — K74.60 LIVER CIRRHOSIS SECONDARY TO NASH (HCC): ICD-10-CM

## 2025-03-03 DIAGNOSIS — K75.81 LIVER CIRRHOSIS SECONDARY TO NASH (HCC): ICD-10-CM

## 2025-03-03 DIAGNOSIS — I49.40 CARDIAC ARRHYTHMIA DUE TO PREMATURE DEPOLARIZATION, UNSPECIFIED TYPE: Primary | ICD-10-CM

## 2025-03-03 DIAGNOSIS — I25.10 CORONARY ARTERY DISEASE DUE TO LIPID RICH PLAQUE: ICD-10-CM

## 2025-03-03 DIAGNOSIS — E11.42 TYPE 2 DIABETES MELLITUS WITH DIABETIC POLYNEUROPATHY, WITH LONG-TERM CURRENT USE OF INSULIN (HCC): ICD-10-CM

## 2025-03-03 PROCEDURE — G0179 MD RECERTIFICATION HHA PT: HCPCS | Performed by: INTERNAL MEDICINE

## 2025-03-03 NOTE — TELEPHONE ENCOUNTER
[facility] Re Certification completed on 3/3/2025  This patient is currently under my care and considered medically homebound, requiring skilled Home Health services. I have reviewed the patient's status and plan of care.     Has the patient been seen within the last 90 days?  yes, 1/30/2025    Time spent completing forms?  10

## 2025-03-08 RX ORDER — INSULIN GLARGINE 100 [IU]/ML
INJECTION, SOLUTION SUBCUTANEOUS
Qty: 30 ML | Refills: 3 | Status: SHIPPED | OUTPATIENT
Start: 2025-03-08

## 2025-03-10 RX ORDER — INSULIN GLARGINE 100 [IU]/ML
INJECTION, SOLUTION SUBCUTANEOUS
Qty: 10 ML | Refills: 0 | OUTPATIENT
Start: 2025-03-10

## 2025-03-10 RX ORDER — INSULIN GLARGINE 100 [IU]/ML
INJECTION, SOLUTION SUBCUTANEOUS
Qty: 30 ML | Refills: 5 | Status: SHIPPED | OUTPATIENT
Start: 2025-03-10

## 2025-03-20 ENCOUNTER — HOSPITAL ENCOUNTER (OUTPATIENT)
Age: 75
Setting detail: SPECIMEN
Discharge: HOME | End: 2025-03-20
Payer: MEDICARE

## 2025-03-20 ENCOUNTER — OFFICE VISIT (OUTPATIENT)
Dept: ORTHOPEDIC SURGERY | Age: 75
End: 2025-03-20
Payer: MEDICARE

## 2025-03-20 ENCOUNTER — OFFICE VISIT (OUTPATIENT)
Dept: OBGYN | Age: 75
End: 2025-03-20
Payer: MEDICARE

## 2025-03-20 VITALS
WEIGHT: 213 LBS | RESPIRATION RATE: 18 BRPM | BODY MASS INDEX: 36.37 KG/M2 | HEIGHT: 64 IN | DIASTOLIC BLOOD PRESSURE: 64 MMHG | HEART RATE: 62 BPM | SYSTOLIC BLOOD PRESSURE: 116 MMHG

## 2025-03-20 VITALS
WEIGHT: 213 LBS | SYSTOLIC BLOOD PRESSURE: 116 MMHG | DIASTOLIC BLOOD PRESSURE: 64 MMHG | HEIGHT: 64 IN | BODY MASS INDEX: 36.37 KG/M2

## 2025-03-20 DIAGNOSIS — Z85.41 HISTORY OF CERVICAL CANCER IN ADULTHOOD: ICD-10-CM

## 2025-03-20 DIAGNOSIS — Z91.89 ENCOUNTER FOR GYNECOLOGIC EXAMINATION FOR HIGH-RISK PATIENT COVERED BY MEDICARE: Primary | ICD-10-CM

## 2025-03-20 DIAGNOSIS — Z12.31 ENCOUNTER FOR SCREENING MAMMOGRAM FOR BREAST CANCER: ICD-10-CM

## 2025-03-20 DIAGNOSIS — Z12.4 SCREENING FOR CERVICAL CANCER: ICD-10-CM

## 2025-03-20 DIAGNOSIS — E11.42 TYPE 2 DIABETES MELLITUS WITH DIABETIC POLYNEUROPATHY, WITH LONG-TERM CURRENT USE OF INSULIN (HCC): ICD-10-CM

## 2025-03-20 DIAGNOSIS — N95.1 MENOPAUSAL STATE: ICD-10-CM

## 2025-03-20 DIAGNOSIS — Z90.79 S/P TAH-BSO: ICD-10-CM

## 2025-03-20 DIAGNOSIS — Z90.710 S/P TAH-BSO: ICD-10-CM

## 2025-03-20 DIAGNOSIS — Z90.722 S/P TAH-BSO: ICD-10-CM

## 2025-03-20 DIAGNOSIS — M81.0 OSTEOPOROSIS, UNSPECIFIED OSTEOPOROSIS TYPE, UNSPECIFIED PATHOLOGICAL FRACTURE PRESENCE: ICD-10-CM

## 2025-03-20 DIAGNOSIS — Z79.4 TYPE 2 DIABETES MELLITUS WITH DIABETIC POLYNEUROPATHY, WITH LONG-TERM CURRENT USE OF INSULIN (HCC): ICD-10-CM

## 2025-03-20 DIAGNOSIS — M67.01 SHORT ACHILLES TENDON OF RIGHT LOWER EXTREMITY: Primary | ICD-10-CM

## 2025-03-20 DIAGNOSIS — M67.02 SHORT ACHILLES TENDON OF LEFT LOWER EXTREMITY: ICD-10-CM

## 2025-03-20 PROCEDURE — G0101 CA SCREEN;PELVIC/BREAST EXAM: HCPCS | Performed by: OBSTETRICS & GYNECOLOGY

## 2025-03-20 PROCEDURE — G8417 CALC BMI ABV UP PARAM F/U: HCPCS | Performed by: OBSTETRICS & GYNECOLOGY

## 2025-03-20 PROCEDURE — 99214 OFFICE O/P EST MOD 30 MIN: CPT | Performed by: PODIATRIST

## 2025-03-20 PROCEDURE — 1036F TOBACCO NON-USER: CPT | Performed by: PODIATRIST

## 2025-03-20 PROCEDURE — 88175 CYTOPATH C/V AUTO FLUID REDO: CPT

## 2025-03-20 PROCEDURE — 3078F DIAST BP <80 MM HG: CPT | Performed by: PODIATRIST

## 2025-03-20 PROCEDURE — 87624 HPV HI-RISK TYP POOLED RSLT: CPT

## 2025-03-20 PROCEDURE — G8427 DOCREV CUR MEDS BY ELIG CLIN: HCPCS | Performed by: OBSTETRICS & GYNECOLOGY

## 2025-03-20 PROCEDURE — G8417 CALC BMI ABV UP PARAM F/U: HCPCS | Performed by: PODIATRIST

## 2025-03-20 PROCEDURE — 3074F SYST BP LT 130 MM HG: CPT | Performed by: PODIATRIST

## 2025-03-20 PROCEDURE — 1159F MED LIST DOCD IN RCRD: CPT | Performed by: PODIATRIST

## 2025-03-20 PROCEDURE — 11721 DEBRIDE NAIL 6 OR MORE: CPT | Performed by: PODIATRIST

## 2025-03-20 PROCEDURE — 3044F HG A1C LEVEL LT 7.0%: CPT | Performed by: PODIATRIST

## 2025-03-20 PROCEDURE — G8427 DOCREV CUR MEDS BY ELIG CLIN: HCPCS | Performed by: PODIATRIST

## 2025-03-20 PROCEDURE — 1090F PRES/ABSN URINE INCON ASSESS: CPT | Performed by: PODIATRIST

## 2025-03-20 PROCEDURE — G8399 PT W/DXA RESULTS DOCUMENT: HCPCS | Performed by: PODIATRIST

## 2025-03-20 PROCEDURE — 1123F ACP DISCUSS/DSCN MKR DOCD: CPT | Performed by: PODIATRIST

## 2025-03-20 PROCEDURE — 99213 OFFICE O/P EST LOW 20 MIN: CPT | Performed by: PODIATRIST

## 2025-03-20 NOTE — PROGRESS NOTES
Right L3 & L4 TRANSFORAMINAL performed by Alfredo Bukr MD at [facility] OR  2019: LUMBAR SPINE SURGERY; Right      Comment:  Right L4 L5 TRANSFORAMINAL performed by Alfredo Burk MD at [facility] OR  2020: PAIN MANAGEMENT PROCEDURE; Right      Comment:  RIGHT L4 L5  TRANSFORAMINAL performed by Alfredo Burk MD at [facility] OR  2020: PAIN MANAGEMENT PROCEDURE; Right      Comment:  Right L4 L5 TRANSFORAMINAL performed by Alfredo Burk MD at [facility] OR  2018: WV EXC B9 LES MRGN XCP SK TG F/E/E/N/L/M 1.1-2.0CM; Left      Comment:  Excision Cyst Left Chest, Middle Back performed by Cole Lacy MD at [facility] OR  10/05/2018: [place] NJX AA&/STRD TFRML EPI LUMBAR/SACRAL EA ADDL; Right      Comment:  Right L2 & L3 TRANSFORAMINAL performed by Alfredo Burk MD at [facility] OR  10/26/2018: [place] NJX AA&/STRD TFRML EPI LUMBAR/SACRAL EA ADDL; Right      Comment:  Right L2 L3 TRANSFORAMINAL performed by Alfredo Burk MD at [facility] OR  2019: RADIOFREQUENCY ABLATION NERVES; Right      Comment:  Right  L2 L3 L4 L5 RADIOFREQUENCY performed by Alfredo Burk MD at [facility] OR  : HAILE AND BSO (CERVIX REMOVED)      Comment:  Cervical cancer.  Had XRT  1983: TONSILLECTOMY  2022: UPPER GASTROINTESTINAL ENDOSCOPY; N/A      Comment:  EGD ESOPHAGOGASTRODUODENOSCOPY performed by Caroline Lacey MD at [facility] OR    Problem List         Diagnosed       Edg Problems Affecting Cytology    S/P HAILE-BSO    Social History    Tobacco Use      Smoking status: Former        Packs/day: 0.00        Years: 1 pack/day for 32.0 years (32.0 ttl pk-yrs)        Types: Cigarettes        Start date: 1966        Quit date: 1998        Years since quittin.4        Passive exposure: Past      Smokeless tobacco: Never       Standing Status:   Future     Expected Date:   3/20/2025     Expiration Date:   3/20/2026

## 2025-04-01 DIAGNOSIS — K59.00 CONSTIPATION, UNSPECIFIED CONSTIPATION TYPE: ICD-10-CM

## 2025-04-01 DIAGNOSIS — R00.2 PALPITATIONS: ICD-10-CM

## 2025-04-01 DIAGNOSIS — J01.00 ACUTE NON-RECURRENT MAXILLARY SINUSITIS: ICD-10-CM

## 2025-04-01 RX ORDER — DOCUSATE SODIUM 100 MG/1
100 CAPSULE, LIQUID FILLED ORAL 2 TIMES DAILY
Qty: 180 CAPSULE | Refills: 3 | Status: SHIPPED | OUTPATIENT
Start: 2025-04-01

## 2025-04-01 RX ORDER — SENNOSIDES 8.6 MG
TABLET ORAL
Qty: 180 TABLET | Refills: 3 | Status: SHIPPED | OUTPATIENT
Start: 2025-04-01

## 2025-04-01 RX ORDER — FLUTICASONE PROPIONATE 50 MCG
2 SPRAY, SUSPENSION (ML) NASAL DAILY
Qty: 48 EACH | Refills: 3 | Status: SHIPPED | OUTPATIENT
Start: 2025-04-01

## 2025-04-01 RX ORDER — METOPROLOL SUCCINATE 25 MG/1
TABLET, EXTENDED RELEASE ORAL
Qty: 45 TABLET | Refills: 3 | Status: SHIPPED | OUTPATIENT
Start: 2025-04-01

## 2025-04-01 NOTE — PROGRESS NOTES
[facility]         Progress and Procedure Note      Penelope Holland  MEDICAL RECORD NUMBER:  [id number]  AGE: 74 y.o.   GENDER: female  : 1950  EPISODE DATE:  3/20/2025    Subjective:     Chief Complaint   Patient presents with    Foot Pain      6 month - Allan foot pain           HISTORY of PRESENT ILLNESS HPI     Penelope Holland is a 74 y.o. female  presenting for follow up regarding DM2 with onychomycosis. PVD and neuropathy. Patient required debridement of nails 1-5 bilateral, tolerated well. Currently ambulating in extra depth diabetic shoes with plastazote insoles. Patient educated on need for knee high compression garmants. Will follow up in (3) months. All further questions/concerns were addressed.     PAST MEDICAL HISTORY        Diagnosis Date    Arthritis     Asthma     Bell's palsy     CAD (coronary artery disease)     Cancer (HCC)     cervical    Carotid artery disease     Cataracts, bilateral     CHF (congestive heart failure) (HCC)     Chlamydia     COPD (chronic obstructive pulmonary disease) (HCC)     Coronary artery disease     Eczema     Emphysema lung (HCC)     Fibromyalgia     Glaucoma     H/O blood clots     Headache     Heart attack (HCC)     Hx of Bell's palsy     Mild right facial paralysis    Hypertension     Liver disease     Mild dementia (HCC)     Neuropathy     Osteoporosis     Sleep apnea 2014    sleep study done in [facility]     Type 2 diabetes mellitus with hyperglycemia (HCC)      PAST SURGICAL HISTORY    Past Surgical History:   Procedure Laterality Date    ANESTHESIA NERVE BLOCK Right 2019    Right L2 L3 L4 L5 Diagnostic Medial BB performed by Alfredo Burk MD at [facility] OR    ANESTHESIA NERVE BLOCK Right 2019    Right L2 L3 L4 L5 Confirmatory Medial BB performed by Alfredo Burk MD at [facility] OR    APPENDECTOMY  1968    BARIATRIC SURGERY      CHOLECYSTECTOMY      COLONOSCOPY  2012    COLONOSCOPY N/A

## 2025-04-02 ENCOUNTER — RESULTS FOLLOW-UP (OUTPATIENT)
Dept: OBGYN CLINIC | Age: 75
End: 2025-04-02

## 2025-04-02 LAB — CYTOLOGY REPORT: NORMAL

## 2025-04-14 DIAGNOSIS — Z79.4 TYPE 2 DIABETES MELLITUS WITH DIABETIC POLYNEUROPATHY, WITH LONG-TERM CURRENT USE OF INSULIN (HCC): ICD-10-CM

## 2025-04-14 DIAGNOSIS — E11.42 TYPE 2 DIABETES MELLITUS WITH DIABETIC POLYNEUROPATHY, WITH LONG-TERM CURRENT USE OF INSULIN (HCC): ICD-10-CM

## 2025-04-14 RX ORDER — EMPAGLIFLOZIN 10 MG/1
10 TABLET, FILM COATED ORAL DAILY
Qty: 90 TABLET | Refills: 3 | Status: SHIPPED | OUTPATIENT
Start: 2025-04-14

## 2025-04-14 NOTE — TELEPHONE ENCOUNTER
Penelope called requesting a refill of the below medication which has been pended for you:     Requested Prescriptions     Pending Prescriptions Disp Refills    JARDIANCE 10 MG tablet [Pharmacy Med Name: Jardiance Oral Tablet 10 MG] 90 tablet 3     Sig: TAKE 1 TABLET EVERY DAY       Last Appointment Date: 1/30/2025  Next Appointment Date: 8/8/2025    No Known Allergies

## 2025-04-25 ENCOUNTER — TELEPHONE (OUTPATIENT)
Dept: INTERNAL MEDICINE | Age: 75
End: 2025-04-25
Payer: MEDICARE

## 2025-04-25 DIAGNOSIS — G89.29 CHRONIC BILATERAL LOW BACK PAIN WITHOUT SCIATICA: ICD-10-CM

## 2025-04-25 DIAGNOSIS — E11.42 TYPE 2 DIABETES MELLITUS WITH DIABETIC POLYNEUROPATHY, WITH LONG-TERM CURRENT USE OF INSULIN (HCC): ICD-10-CM

## 2025-04-25 DIAGNOSIS — I25.10 ATHEROSCLEROSIS OF NATIVE CORONARY ARTERY OF NATIVE HEART WITHOUT ANGINA PECTORIS: ICD-10-CM

## 2025-04-25 DIAGNOSIS — I25.10 CORONARY ARTERY DISEASE DUE TO LIPID RICH PLAQUE: ICD-10-CM

## 2025-04-25 DIAGNOSIS — M54.50 CHRONIC BILATERAL LOW BACK PAIN WITHOUT SCIATICA: ICD-10-CM

## 2025-04-25 DIAGNOSIS — I25.83 CORONARY ARTERY DISEASE DUE TO LIPID RICH PLAQUE: ICD-10-CM

## 2025-04-25 DIAGNOSIS — Z79.4 TYPE 2 DIABETES MELLITUS WITH DIABETIC POLYNEUROPATHY, WITH LONG-TERM CURRENT USE OF INSULIN (HCC): ICD-10-CM

## 2025-04-25 DIAGNOSIS — I48.91 ATRIAL FIBRILLATION, UNSPECIFIED TYPE (HCC): Primary | ICD-10-CM

## 2025-04-25 DIAGNOSIS — K74.60 HEPATIC CIRRHOSIS, UNSPECIFIED HEPATIC CIRRHOSIS TYPE, UNSPECIFIED WHETHER ASCITES PRESENT (HCC): ICD-10-CM

## 2025-04-25 DIAGNOSIS — I35.0 NONRHEUMATIC AORTIC VALVE STENOSIS: ICD-10-CM

## 2025-04-25 PROCEDURE — G0179 MD RECERTIFICATION HHA PT: HCPCS | Performed by: INTERNAL MEDICINE

## 2025-04-25 RX ORDER — ALBUTEROL SULFATE 90 UG/1
2 INHALANT RESPIRATORY (INHALATION) EVERY 6 HOURS PRN
Qty: 3 EACH | Refills: 11 | Status: SHIPPED | OUTPATIENT
Start: 2025-04-25 | End: 2026-04-25

## 2025-04-25 RX ORDER — OXYCODONE AND ACETAMINOPHEN 5; 325 MG/1; MG/1
1 TABLET ORAL EVERY 6 HOURS PRN
Qty: 120 TABLET | Refills: 0 | Status: SHIPPED | OUTPATIENT
Start: 2025-04-25 | End: 2025-05-25

## 2025-04-25 NOTE — TELEPHONE ENCOUNTER
[facility] Re Certification Completed on 4/25/2025    This patient is currently under my care and considered medically homebound, requiring skilled Home Health services. I have reviewed the patient's status and plan of care.     Has the patient been seen within the last 90 days?  yes, 1/30/2025    Time spent completing forms?  10 min

## 2025-04-28 ENCOUNTER — TELEPHONE (OUTPATIENT)
Dept: INTERNAL MEDICINE | Age: 75
End: 2025-04-28

## 2025-04-28 NOTE — TELEPHONE ENCOUNTER
Spoke to Joycelyn at [facility] to confirm how many sutures pt had placed as I did not see in chart documentation from [facility]. Joycelyn @ [facility] stated pt had 5 sutures placed. Notified VENANCIO Rachel. She intends to contact pt to r/s f/u.

## 2025-05-01 DIAGNOSIS — R00.2 PALPITATIONS: ICD-10-CM

## 2025-05-01 RX ORDER — INSULIN ASPART 100 [IU]/ML
INJECTION, SOLUTION INTRAVENOUS; SUBCUTANEOUS
Qty: 45 ML | Refills: 3 | Status: SHIPPED | OUTPATIENT
Start: 2025-05-01

## 2025-05-01 RX ORDER — METOPROLOL SUCCINATE 25 MG/1
TABLET, EXTENDED RELEASE ORAL
Qty: 45 TABLET | Refills: 3 | Status: SHIPPED | OUTPATIENT
Start: 2025-05-01

## 2025-05-08 DIAGNOSIS — Z79.4 TYPE 2 DIABETES MELLITUS WITH DIABETIC POLYNEUROPATHY, WITH LONG-TERM CURRENT USE OF INSULIN (HCC): ICD-10-CM

## 2025-05-08 DIAGNOSIS — E11.42 TYPE 2 DIABETES MELLITUS WITH DIABETIC POLYNEUROPATHY, WITH LONG-TERM CURRENT USE OF INSULIN (HCC): ICD-10-CM

## 2025-05-08 RX ORDER — INSULIN ASPART 100 [IU]/ML
INJECTION, SOLUTION INTRAVENOUS; SUBCUTANEOUS
Qty: 45 ML | Refills: 0 | OUTPATIENT
Start: 2025-05-08

## 2025-05-13 ENCOUNTER — OFFICE VISIT (OUTPATIENT)
Dept: PULMONOLOGY | Age: 75
End: 2025-05-13
Payer: MEDICARE

## 2025-05-13 VITALS
BODY MASS INDEX: 37.52 KG/M2 | OXYGEN SATURATION: 97 % | DIASTOLIC BLOOD PRESSURE: 72 MMHG | HEIGHT: 64 IN | SYSTOLIC BLOOD PRESSURE: 114 MMHG | WEIGHT: 219.8 LBS | TEMPERATURE: 97.5 F | HEART RATE: 60 BPM

## 2025-05-13 DIAGNOSIS — G47.33 OSA ON CPAP: ICD-10-CM

## 2025-05-13 DIAGNOSIS — J41.1 CHRONIC BRONCHITIS WITH PRODUCTIVE MUCOPURULENT COUGH (HCC): Primary | ICD-10-CM

## 2025-05-13 PROCEDURE — 1036F TOBACCO NON-USER: CPT | Performed by: INTERNAL MEDICINE

## 2025-05-13 PROCEDURE — 3074F SYST BP LT 130 MM HG: CPT | Performed by: INTERNAL MEDICINE

## 2025-05-13 PROCEDURE — 3023F SPIROM DOC REV: CPT | Performed by: INTERNAL MEDICINE

## 2025-05-13 PROCEDURE — 3078F DIAST BP <80 MM HG: CPT | Performed by: INTERNAL MEDICINE

## 2025-05-13 PROCEDURE — 1123F ACP DISCUSS/DSCN MKR DOCD: CPT | Performed by: INTERNAL MEDICINE

## 2025-05-13 PROCEDURE — G8417 CALC BMI ABV UP PARAM F/U: HCPCS | Performed by: INTERNAL MEDICINE

## 2025-05-13 PROCEDURE — G8399 PT W/DXA RESULTS DOCUMENT: HCPCS | Performed by: INTERNAL MEDICINE

## 2025-05-13 PROCEDURE — 99214 OFFICE O/P EST MOD 30 MIN: CPT | Performed by: INTERNAL MEDICINE

## 2025-05-13 PROCEDURE — 1090F PRES/ABSN URINE INCON ASSESS: CPT | Performed by: INTERNAL MEDICINE

## 2025-05-13 PROCEDURE — G8427 DOCREV CUR MEDS BY ELIG CLIN: HCPCS | Performed by: INTERNAL MEDICINE

## 2025-05-13 NOTE — PROGRESS NOTES
PULMONARY MEDICINE OUTPATIENT NOTE     PATIENT:Penelope Holland  YOB: 1950  MRN:[id number]     REFERRED BY:Claudy Hammond MD        HISTORY     Penelope Holland is a 74 y.o. years old female, here for Established patient evaluation.  Patient was previously seen by Dr. Clark and Chencho Collazo CNP    CHIEF COMPLIANT:Sleep Apnea (Annual follow up )    INITIAL VISIT: 11/19/2024  LAST VISIT: 11/19/2024  DATE OF VISIT:5/13/2025    PFT (12/7/2016) showed normal spirometry, lung volumes and diffusion capacity  CT chest (12/18/2022) was negative for PE, shows emphysematous changes with atelectasis and small focus of plaque-like consolidation in the right lower lobe.    Study date (11/7/2014, [facility]) showed mild obstructive apnea, AHI was 9.1, O2 eren 87%  Study date (4/21/2015) split-night study, recommended CPAP at 13 cm water        History of Present Illness  11/19/2024  The patient presents for evaluation of chronic bronchitis and obstructive sleep apnea.    She experiences shortness of breath, which she attributes to her bronchiectasis. She is currently using three inhalers and a nebulizer to manage this condition. Additionally, she has a heart condition that contributes to her breathlessness. She also reports frequent dizziness, which has led to several falls both at home and in public places like [employer]. She uses a CPAP machine nightly without any issues. She has requested a copy of her sleep study report.    5/13/2025  The patient presents for evaluation of sleep apnea.    She has been utilizing a CPAP machine as part of her treatment regimen. She reports an incident where she experienced a sensation of breathlessness upon awakening, which she attributes to a nightmare. This experience has instilled a fear of sleeping without the aid of the CPAP machine. She receives her supplies from a  at a new company, [employer], who has been providing her with everything she

## 2025-05-14 ENCOUNTER — TELEPHONE (OUTPATIENT)
Dept: PULMONOLOGY | Age: 75
End: 2025-05-14

## 2025-05-14 NOTE — TELEPHONE ENCOUNTER
Pt called in with information in regards to her cpap machine. Name of machine is Dream Station 2 auto cpap advanced  Ref# [id number]  SN# [id number]  Company: [employer]    [address] [place] [id number]  Shipping date 02/13/2025  Ph# [phone]  Pts  with [employer] is Demetra Niño  Ph# [phone] ext [phone]

## 2025-05-16 ENCOUNTER — OFFICE VISIT (OUTPATIENT)
Dept: INTERNAL MEDICINE | Age: 75
End: 2025-05-16
Payer: MEDICARE

## 2025-05-16 VITALS
DIASTOLIC BLOOD PRESSURE: 60 MMHG | HEART RATE: 54 BPM | HEIGHT: 64 IN | SYSTOLIC BLOOD PRESSURE: 102 MMHG | BODY MASS INDEX: 37.05 KG/M2 | RESPIRATION RATE: 16 BRPM | OXYGEN SATURATION: 96 % | WEIGHT: 217 LBS

## 2025-05-16 DIAGNOSIS — Z48.02 VISIT FOR SUTURE REMOVAL: Primary | ICD-10-CM

## 2025-05-16 DIAGNOSIS — L98.9 SKIN LESION OF LEFT LOWER EXTREMITY: ICD-10-CM

## 2025-05-16 DIAGNOSIS — G89.29 CHRONIC PAIN OF RIGHT KNEE: ICD-10-CM

## 2025-05-16 DIAGNOSIS — M25.561 CHRONIC PAIN OF RIGHT KNEE: ICD-10-CM

## 2025-05-16 DIAGNOSIS — I25.10 CORONARY ARTERY DISEASE DUE TO LIPID RICH PLAQUE: ICD-10-CM

## 2025-05-16 DIAGNOSIS — L30.8 OTHER ECZEMA: ICD-10-CM

## 2025-05-16 DIAGNOSIS — E11.42 TYPE 2 DIABETES MELLITUS WITH DIABETIC POLYNEUROPATHY, WITH LONG-TERM CURRENT USE OF INSULIN (HCC): ICD-10-CM

## 2025-05-16 DIAGNOSIS — I25.83 CORONARY ARTERY DISEASE DUE TO LIPID RICH PLAQUE: ICD-10-CM

## 2025-05-16 DIAGNOSIS — Z79.4 TYPE 2 DIABETES MELLITUS WITH DIABETIC POLYNEUROPATHY, WITH LONG-TERM CURRENT USE OF INSULIN (HCC): ICD-10-CM

## 2025-05-16 PROCEDURE — 99212 OFFICE O/P EST SF 10 MIN: CPT | Performed by: INTERNAL MEDICINE

## 2025-05-16 ASSESSMENT — ENCOUNTER SYMPTOMS
DIARRHEA: 0
EYE PAIN: 0
VOMITING: 0
NAUSEA: 0
ABDOMINAL PAIN: 0
CONSTIPATION: 0
COUGH: 0
BLOOD IN STOOL: 0
BACK PAIN: 0
SHORTNESS OF BREATH: 0

## 2025-05-16 NOTE — PROGRESS NOTES
[facility]  [facility] INTERNAL MED A DEPARTMENT OF [facility]  [address] [facility] [address]  Dept: [phone]  Dept Fax: [phone]  Loc: [phone]     Penelope Holland is a 74 y.o. female who presents today for her medical conditions/complaintsas noted below.  Penelope Holland is c/o of   Chief Complaint   Patient presents with    Suture / Staple Removal    Coronary Artery Disease    Diabetes         HPI:     Suture / Staple Removal  The sutures were placed More than 14 days ago. She tried nothing since the wound repair. Her temperature was unmeasured prior to arrival. There has been no drainage (Wound is near top of left ankle.  Area is dry and clean with 5 sutures) from the wound. There is no redness present.   Coronary Artery Disease  Presents for follow-up visit. Pertinent negatives include no chest pain, chest pressure, dizziness, leg swelling, palpitations or shortness of breath. The symptoms have been stable. Compliance with diet is good. Compliance with exercise is good. Compliance with medications is good.   Diabetes  She presents for her follow-up diabetic visit. She has type 2 diabetes mellitus. The initial diagnosis of diabetes was made 14 years ago. Her disease course has been fluctuating. Pertinent negatives for hypoglycemia include no confusion, dizziness, headaches, nervousness/anxiousness or pallor. Pertinent negatives for diabetes include no chest pain, no polydipsia, no polyuria and no weakness.       Hemoglobin A1C (%)   Date Value   01/30/2025 5.5   07/29/2024 5.1   08/15/2023 6.1 (H)            No components found for: \"LABMICR\"  No components found for: \"LDLCHOLESTEROL\", \"LDLCALC\"      AST (U/L)   Date Value   07/29/2024 45 (H)     ALT (U/L)   Date Value   07/29/2024 25     BUN (mg/dL)   Date Value   07/29/2024 24 (H)     BP Readings from Last 3 Encounters:   05/16/25 102/60   05/13/25 114/72   03/20/25 116/64              Past Medical

## 2025-05-23 ENCOUNTER — TELEPHONE (OUTPATIENT)
Dept: INTERNAL MEDICINE | Age: 75
End: 2025-05-23

## 2025-05-23 NOTE — TELEPHONE ENCOUNTER
Patient called in stating that she had sutures removed from her leg on 5/16. When she woke up today there was a yellowish/bloody ooze coming from her leg and is red and swollen. She is requesting an antibiotic or evaluation, whatever Dr. Hammond prefers. If agreeable to medication, patient uses [facility] in [place].

## 2025-05-23 NOTE — TELEPHONE ENCOUNTER
Pend Rx for Augmentin 875/125 mg twice daily x 10 days.  Tell her to check the area twice daily and if it gets worse or does not improve, then she may need to go on into local urgent care.

## 2025-05-27 DIAGNOSIS — E55.9 VITAMIN D DEFICIENCY: ICD-10-CM

## 2025-05-27 RX ORDER — CHOLECALCIFEROL (VITAMIN D3) 25 MCG
TABLET ORAL
Qty: 540 TABLET | Refills: 3 | Status: SHIPPED | OUTPATIENT
Start: 2025-05-27

## 2025-05-27 NOTE — TELEPHONE ENCOUNTER
Penelope called requesting a refill of the below medication which has been pended for you:     Requested Prescriptions     Pending Prescriptions Disp Refills    vitamin D3 (CHOLECALCIFEROL) 25 MCG (1000 UT) TABS tablet [Pharmacy Med Name: Vitamin D3 Oral Tablet 25 MCG (1000 UT)] 540 tablet 3     Sig: TAKE 3 TABLETS BY MOUTH TWICE DAILY       Last Appointment Date: 5/16/2025  Next Appointment Date: 8/8/2025    No Known Allergies

## 2025-06-06 ENCOUNTER — TELEPHONE (OUTPATIENT)
Dept: INTERNAL MEDICINE | Age: 75
End: 2025-06-06

## 2025-06-06 RX ORDER — AZITHROMYCIN 250 MG/1
TABLET, FILM COATED ORAL
Qty: 8 TABLET | Refills: 0 | Status: SHIPPED | OUTPATIENT
Start: 2025-06-06

## 2025-06-06 RX ORDER — CEFDINIR 300 MG/1
300 CAPSULE ORAL 2 TIMES DAILY
Qty: 20 CAPSULE | Refills: 0 | Status: SHIPPED | OUTPATIENT
Start: 2025-06-06 | End: 2025-06-16

## 2025-06-06 NOTE — TELEPHONE ENCOUNTER
Pend Omnicef 300 mg p.o. twice daily x 10 days.    Also pend Zithromax 250 mg take 2 pills on the first day, then 1-a-day for the next 6 days for total of 7 days of Zithromax    Tell her to take both antibiotics.    Still recommend she go into urgent care for them to assess and decide if they think she needs to have a Doppler done.  Since the visiting nurses there and I assume she drove herself to the patient's house can she drive the patient to an urgent care?  Alternatively whoever the patient was going to get to drive to the pharmacy to  the antibiotics, can they drive her to urgent care as well?

## 2025-06-06 NOTE — TELEPHONE ENCOUNTER
Ila stated they are allowed to  medications for the patient but they are not allowed to have the patient in the vehicle to take them to places including UC or ER.

## 2025-06-06 NOTE — TELEPHONE ENCOUNTER
Patient is can't drive. Patient is wanting a stronger Antibiotic to be sent in to [facility] in [place]. Patient was told that she need to go to local urgent care or ER per Dr Hammond last note on  5/23/25. Patient stated her leg is worse in a deep red. Patient refused to go to local urgent care or ED. Patient home health nurse is on her way to see her now.  She is still having the swelling in her leg.

## 2025-06-06 NOTE — TELEPHONE ENCOUNTER
Ila from [facility] called stating that she is with patient right now for home visit. She states that area on patient's leg is red and warm, there is drainage, and stating that patient states it stings. She stated patient that she doesn't not have transportation to go to ED or Urgent Care. Ila would like a call back at [phone].

## 2025-06-16 ENCOUNTER — OFFICE VISIT (OUTPATIENT)
Dept: INTERNAL MEDICINE | Age: 75
End: 2025-06-16

## 2025-06-16 ENCOUNTER — APPOINTMENT (OUTPATIENT)
Dept: GENERAL RADIOLOGY | Age: 75
DRG: 638 | End: 2025-06-16
Attending: INTERNAL MEDICINE
Payer: MEDICARE

## 2025-06-16 ENCOUNTER — TELEPHONE (OUTPATIENT)
Dept: PODIATRY | Age: 75
End: 2025-06-16

## 2025-06-16 ENCOUNTER — HOSPITAL ENCOUNTER (INPATIENT)
Age: 75
LOS: 2 days | Discharge: HOME | DRG: 638 | End: 2025-06-18
Attending: INTERNAL MEDICINE | Admitting: INTERNAL MEDICINE
Payer: MEDICARE

## 2025-06-16 VITALS
DIASTOLIC BLOOD PRESSURE: 62 MMHG | WEIGHT: 220 LBS | TEMPERATURE: 98.7 F | OXYGEN SATURATION: 95 % | HEART RATE: 51 BPM | HEIGHT: 64 IN | RESPIRATION RATE: 16 BRPM | SYSTOLIC BLOOD PRESSURE: 116 MMHG | BODY MASS INDEX: 37.56 KG/M2

## 2025-06-16 DIAGNOSIS — Z79.4 TYPE 2 DIABETES MELLITUS WITH DIABETIC POLYNEUROPATHY, WITH LONG-TERM CURRENT USE OF INSULIN (HCC): ICD-10-CM

## 2025-06-16 DIAGNOSIS — T81.30XS DEHISCENCE OF WOUND OF SKIN, SEQUELA: ICD-10-CM

## 2025-06-16 DIAGNOSIS — L03.116 LEFT LEG CELLULITIS: Primary | ICD-10-CM

## 2025-06-16 DIAGNOSIS — E11.42 TYPE 2 DIABETES MELLITUS WITH DIABETIC POLYNEUROPATHY, WITH LONG-TERM CURRENT USE OF INSULIN (HCC): ICD-10-CM

## 2025-06-16 DIAGNOSIS — L03.116 CELLULITIS OF LEFT LOWER EXTREMITY: Primary | ICD-10-CM

## 2025-06-16 LAB
ALBUMIN SERPL-MCNC: 4.1 G/DL (ref 3.5–5.2)
ALBUMIN/GLOB SERPL: 1.4 {RATIO} (ref 1–2.5)
ALP SERPL-CCNC: 80 U/L (ref 35–104)
ALT SERPL-CCNC: 61 U/L (ref 10–35)
ANION GAP SERPL CALCULATED.3IONS-SCNC: 9 MMOL/L (ref 9–16)
AST SERPL-CCNC: 84 U/L (ref 10–35)
BASOPHILS # BLD: 0.06 K/UL (ref 0–0.2)
BASOPHILS NFR BLD: 1 % (ref 0–2)
BILIRUB SERPL-MCNC: 0.3 MG/DL (ref 0–1.2)
BUN SERPL-MCNC: 15 MG/DL (ref 8–23)
BUN/CREAT SERPL: 17 (ref 9–20)
CALCIUM SERPL-MCNC: 9.5 MG/DL (ref 8.6–10.4)
CHLORIDE SERPL-SCNC: 104 MMOL/L (ref 98–107)
CO2 SERPL-SCNC: 27 MMOL/L (ref 20–31)
CREAT SERPL-MCNC: 0.9 MG/DL (ref 0.6–0.9)
EOSINOPHIL # BLD: 0.19 K/UL (ref 0–0.44)
EOSINOPHILS RELATIVE PERCENT: 4 % (ref 1–4)
ERYTHROCYTE [DISTWIDTH] IN BLOOD BY AUTOMATED COUNT: 16 % (ref 11.8–14.4)
GFR, ESTIMATED: 66 ML/MIN/1.73M2
GLUCOSE BLD-MCNC: 162 MG/DL (ref 65–105)
GLUCOSE BLD-MCNC: 64 MG/DL (ref 65–105)
GLUCOSE BLD-MCNC: 93 MG/DL (ref 65–105)
GLUCOSE SERPL-MCNC: 50 MG/DL (ref 74–99)
HCT VFR BLD AUTO: 38.1 % (ref 36.3–47.1)
HGB BLD-MCNC: 12 G/DL (ref 11.9–15.1)
IMM GRANULOCYTES # BLD AUTO: <0.03 K/UL (ref 0–0.3)
IMM GRANULOCYTES NFR BLD: 0 %
INR PPP: 1.1
LYMPHOCYTES NFR BLD: 1.1 K/UL (ref 1.1–3.7)
LYMPHOCYTES RELATIVE PERCENT: 23 % (ref 24–43)
MAGNESIUM SERPL-MCNC: 2.5 MG/DL (ref 1.6–2.4)
MCH RBC QN AUTO: 28.5 PG (ref 25.2–33.5)
MCHC RBC AUTO-ENTMCNC: 31.5 G/DL (ref 25.2–33.5)
MCV RBC AUTO: 90.5 FL (ref 82.6–102.9)
MONOCYTES NFR BLD: 0.62 K/UL (ref 0.1–1.2)
MONOCYTES NFR BLD: 13 % (ref 3–12)
NEUTROPHILS NFR BLD: 60 % (ref 36–65)
NEUTS SEG NFR BLD: 2.89 K/UL (ref 1.5–8.1)
NRBC BLD-RTO: 0 PER 100 WBC
PARTIAL THROMBOPLASTIN TIME: 32.6 SEC (ref 23.9–33.8)
PLATELET # BLD AUTO: ABNORMAL K/UL (ref 138–453)
PLATELET, FLUORESCENCE: 107 K/UL (ref 138–453)
PLATELETS.RETICULATED NFR BLD AUTO: 3.6 % (ref 1.1–10.3)
POTASSIUM SERPL-SCNC: 5 MMOL/L (ref 3.7–5.3)
PROT SERPL-MCNC: 7 G/DL (ref 6.6–8.7)
PROTHROMBIN TIME: 14.4 SEC (ref 11.5–14.2)
RBC # BLD AUTO: 4.21 M/UL (ref 3.95–5.11)
SODIUM SERPL-SCNC: 140 MMOL/L (ref 136–145)
WBC OTHER # BLD: 4.9 K/UL (ref 3.5–11.3)

## 2025-06-16 PROCEDURE — 36415 COLL VENOUS BLD VENIPUNCTURE: CPT

## 2025-06-16 PROCEDURE — 99222 1ST HOSP IP/OBS MODERATE 55: CPT | Performed by: INTERNAL MEDICINE

## 2025-06-16 PROCEDURE — 71045 X-RAY EXAM CHEST 1 VIEW: CPT

## 2025-06-16 PROCEDURE — 80053 COMPREHEN METABOLIC PANEL: CPT

## 2025-06-16 PROCEDURE — 93005 ELECTROCARDIOGRAM TRACING: CPT | Performed by: INTERNAL MEDICINE

## 2025-06-16 PROCEDURE — 85610 PROTHROMBIN TIME: CPT

## 2025-06-16 PROCEDURE — 85025 COMPLETE CBC W/AUTO DIFF WBC: CPT

## 2025-06-16 PROCEDURE — 6360000002 HC RX W HCPCS: Performed by: INTERNAL MEDICINE

## 2025-06-16 PROCEDURE — 6370000000 HC RX 637 (ALT 250 FOR IP): Performed by: NURSE PRACTITIONER

## 2025-06-16 PROCEDURE — 2500000003 HC RX 250 WO HCPCS: Performed by: INTERNAL MEDICINE

## 2025-06-16 PROCEDURE — 2060000000 HC ICU INTERMEDIATE R&B

## 2025-06-16 PROCEDURE — 82947 ASSAY GLUCOSE BLOOD QUANT: CPT

## 2025-06-16 PROCEDURE — 2580000003 HC RX 258: Performed by: INTERNAL MEDICINE

## 2025-06-16 PROCEDURE — 85730 THROMBOPLASTIN TIME PARTIAL: CPT

## 2025-06-16 PROCEDURE — 83735 ASSAY OF MAGNESIUM: CPT

## 2025-06-16 RX ORDER — SODIUM CHLORIDE 0.9 % (FLUSH) 0.9 %
5-40 SYRINGE (ML) INJECTION PRN
Status: DISCONTINUED | OUTPATIENT
Start: 2025-06-16 | End: 2025-06-18 | Stop reason: HOSPADM

## 2025-06-16 RX ORDER — DEXTROSE MONOHYDRATE 100 MG/ML
INJECTION, SOLUTION INTRAVENOUS CONTINUOUS PRN
Status: DISCONTINUED | OUTPATIENT
Start: 2025-06-16 | End: 2025-06-18 | Stop reason: HOSPADM

## 2025-06-16 RX ORDER — SPIRONOLACTONE 25 MG/1
50 TABLET ORAL DAILY
Status: DISCONTINUED | OUTPATIENT
Start: 2025-06-17 | End: 2025-06-18 | Stop reason: HOSPADM

## 2025-06-16 RX ORDER — ENOXAPARIN SODIUM 100 MG/ML
40 INJECTION SUBCUTANEOUS DAILY
Status: DISCONTINUED | OUTPATIENT
Start: 2025-06-16 | End: 2025-06-18

## 2025-06-16 RX ORDER — DORZOLAMIDE HCL 20 MG/ML
1 SOLUTION/ DROPS OPHTHALMIC 3 TIMES DAILY
Status: DISCONTINUED | OUTPATIENT
Start: 2025-06-16 | End: 2025-06-18 | Stop reason: HOSPADM

## 2025-06-16 RX ORDER — FLUTICASONE PROPIONATE 50 MCG
2 SPRAY, SUSPENSION (ML) NASAL DAILY PRN
Status: DISCONTINUED | OUTPATIENT
Start: 2025-06-16 | End: 2025-06-18 | Stop reason: HOSPADM

## 2025-06-16 RX ORDER — BRIMONIDINE TARTRATE AND TIMOLOL MALEATE 2; 5 MG/ML; MG/ML
1 SOLUTION OPHTHALMIC DAILY
Status: DISCONTINUED | OUTPATIENT
Start: 2025-06-17 | End: 2025-06-16 | Stop reason: RX

## 2025-06-16 RX ORDER — CLOPIDOGREL BISULFATE 75 MG/1
75 TABLET ORAL DAILY
Status: DISCONTINUED | OUTPATIENT
Start: 2025-06-17 | End: 2025-06-18 | Stop reason: HOSPADM

## 2025-06-16 RX ORDER — CALCIUM CARBONATE 500 MG/1
1 TABLET, CHEWABLE ORAL DAILY
Status: DISCONTINUED | OUTPATIENT
Start: 2025-06-17 | End: 2025-06-18 | Stop reason: HOSPADM

## 2025-06-16 RX ORDER — SODIUM CHLORIDE 0.9 % (FLUSH) 0.9 %
10 SYRINGE (ML) INJECTION EVERY 12 HOURS SCHEDULED
Status: DISCONTINUED | OUTPATIENT
Start: 2025-06-16 | End: 2025-06-18 | Stop reason: HOSPADM

## 2025-06-16 RX ORDER — MUPIROCIN 2 %
1 OINTMENT (GRAM) TOPICAL 3 TIMES DAILY
COMMUNITY
Start: 2025-04-27

## 2025-06-16 RX ORDER — FOLIC ACID 1 MG/1
1000 TABLET ORAL DAILY
Status: DISCONTINUED | OUTPATIENT
Start: 2025-06-17 | End: 2025-06-18 | Stop reason: HOSPADM

## 2025-06-16 RX ORDER — METOPROLOL SUCCINATE 25 MG/1
12.5 TABLET, EXTENDED RELEASE ORAL DAILY
Status: DISCONTINUED | OUTPATIENT
Start: 2025-06-17 | End: 2025-06-18 | Stop reason: HOSPADM

## 2025-06-16 RX ORDER — ACETAMINOPHEN 325 MG/1
650 TABLET ORAL EVERY 4 HOURS PRN
Status: DISCONTINUED | OUTPATIENT
Start: 2025-06-16 | End: 2025-06-18

## 2025-06-16 RX ORDER — SODIUM CHLORIDE 9 MG/ML
INJECTION, SOLUTION INTRAVENOUS PRN
Status: DISCONTINUED | OUTPATIENT
Start: 2025-06-16 | End: 2025-06-18 | Stop reason: HOSPADM

## 2025-06-16 RX ORDER — BUPROPION HYDROCHLORIDE 75 MG/1
75 TABLET ORAL 2 TIMES DAILY
Status: DISCONTINUED | OUTPATIENT
Start: 2025-06-16 | End: 2025-06-18 | Stop reason: HOSPADM

## 2025-06-16 RX ORDER — GLUCAGON 1 MG/ML
1 KIT INJECTION PRN
Status: DISCONTINUED | OUTPATIENT
Start: 2025-06-16 | End: 2025-06-18 | Stop reason: HOSPADM

## 2025-06-16 RX ORDER — IBUPROFEN 200 MG
400 TABLET ORAL EVERY 8 HOURS PRN
Status: DISCONTINUED | OUTPATIENT
Start: 2025-06-16 | End: 2025-06-18 | Stop reason: HOSPADM

## 2025-06-16 RX ORDER — FUROSEMIDE 20 MG/1
20 TABLET ORAL DAILY
Status: DISCONTINUED | OUTPATIENT
Start: 2025-06-17 | End: 2025-06-18 | Stop reason: HOSPADM

## 2025-06-16 RX ORDER — DOCUSATE SODIUM 100 MG/1
100 CAPSULE, LIQUID FILLED ORAL 2 TIMES DAILY
Status: DISCONTINUED | OUTPATIENT
Start: 2025-06-16 | End: 2025-06-18 | Stop reason: HOSPADM

## 2025-06-16 RX ORDER — ONDANSETRON 2 MG/ML
4 INJECTION INTRAMUSCULAR; INTRAVENOUS EVERY 6 HOURS PRN
Status: DISCONTINUED | OUTPATIENT
Start: 2025-06-16 | End: 2025-06-18 | Stop reason: HOSPADM

## 2025-06-16 RX ORDER — OXYCODONE AND ACETAMINOPHEN 5; 325 MG/1; MG/1
1 TABLET ORAL EVERY 6 HOURS PRN
Refills: 0 | Status: DISCONTINUED | OUTPATIENT
Start: 2025-06-16 | End: 2025-06-18 | Stop reason: HOSPADM

## 2025-06-16 RX ORDER — ASCORBIC ACID 500 MG
500 TABLET ORAL 2 TIMES DAILY
Status: DISCONTINUED | OUTPATIENT
Start: 2025-06-16 | End: 2025-06-18 | Stop reason: HOSPADM

## 2025-06-16 RX ORDER — CITALOPRAM HYDROBROMIDE 20 MG/1
10 TABLET ORAL DAILY
Status: DISCONTINUED | OUTPATIENT
Start: 2025-06-17 | End: 2025-06-18 | Stop reason: HOSPADM

## 2025-06-16 RX ORDER — TIMOLOL MALEATE 5 MG/ML
1 SOLUTION/ DROPS OPHTHALMIC DAILY
Status: DISCONTINUED | OUTPATIENT
Start: 2025-06-17 | End: 2025-06-18 | Stop reason: HOSPADM

## 2025-06-16 RX ORDER — GABAPENTIN 600 MG/1
600 TABLET ORAL 3 TIMES DAILY
Status: DISCONTINUED | OUTPATIENT
Start: 2025-06-16 | End: 2025-06-18 | Stop reason: HOSPADM

## 2025-06-16 RX ORDER — SENNOSIDES 8.6 MG/1
1 TABLET ORAL 2 TIMES DAILY
Status: DISCONTINUED | OUTPATIENT
Start: 2025-06-16 | End: 2025-06-18 | Stop reason: HOSPADM

## 2025-06-16 RX ORDER — INSULIN GLARGINE 100 [IU]/ML
7 INJECTION, SOLUTION SUBCUTANEOUS 2 TIMES DAILY
Status: DISCONTINUED | OUTPATIENT
Start: 2025-06-17 | End: 2025-06-18 | Stop reason: HOSPADM

## 2025-06-16 RX ORDER — PANTOPRAZOLE SODIUM 40 MG/1
40 TABLET, DELAYED RELEASE ORAL
Status: DISCONTINUED | OUTPATIENT
Start: 2025-06-17 | End: 2025-06-18 | Stop reason: HOSPADM

## 2025-06-16 RX ORDER — OXYCODONE AND ACETAMINOPHEN 5; 325 MG/1; MG/1
1 TABLET ORAL EVERY 6 HOURS PRN
COMMUNITY

## 2025-06-16 RX ORDER — ALBUTEROL SULFATE 0.83 MG/ML
2.5 SOLUTION RESPIRATORY (INHALATION) EVERY 4 HOURS PRN
Status: DISCONTINUED | OUTPATIENT
Start: 2025-06-16 | End: 2025-06-18 | Stop reason: HOSPADM

## 2025-06-16 RX ORDER — LATANOPROST 50 UG/ML
1 SOLUTION/ DROPS OPHTHALMIC NIGHTLY
Status: DISCONTINUED | OUTPATIENT
Start: 2025-06-16 | End: 2025-06-18 | Stop reason: HOSPADM

## 2025-06-16 RX ORDER — SODIUM CHLORIDE 0.9 % (FLUSH) 0.9 %
10 SYRINGE (ML) INJECTION PRN
Status: DISCONTINUED | OUTPATIENT
Start: 2025-06-16 | End: 2025-06-18 | Stop reason: HOSPADM

## 2025-06-16 RX ORDER — INSULIN LISPRO 100 [IU]/ML
0-4 INJECTION, SOLUTION INTRAVENOUS; SUBCUTANEOUS
Status: DISCONTINUED | OUTPATIENT
Start: 2025-06-16 | End: 2025-06-18 | Stop reason: HOSPADM

## 2025-06-16 RX ORDER — BRIMONIDINE TARTRATE 2 MG/ML
1 SOLUTION/ DROPS OPHTHALMIC 3 TIMES DAILY
Status: DISCONTINUED | OUTPATIENT
Start: 2025-06-16 | End: 2025-06-18 | Stop reason: HOSPADM

## 2025-06-16 RX ORDER — PRIMIDONE 50 MG/1
50 TABLET ORAL 2 TIMES DAILY
Status: DISCONTINUED | OUTPATIENT
Start: 2025-06-16 | End: 2025-06-18 | Stop reason: HOSPADM

## 2025-06-16 RX ORDER — SODIUM CHLORIDE 0.9 % (FLUSH) 0.9 %
5-40 SYRINGE (ML) INJECTION EVERY 12 HOURS SCHEDULED
Status: DISCONTINUED | OUTPATIENT
Start: 2025-06-16 | End: 2025-06-18 | Stop reason: HOSPADM

## 2025-06-16 RX ORDER — ATORVASTATIN CALCIUM 40 MG/1
40 TABLET, FILM COATED ORAL DAILY
Status: DISCONTINUED | OUTPATIENT
Start: 2025-06-17 | End: 2025-06-18 | Stop reason: HOSPADM

## 2025-06-16 RX ADMIN — GABAPENTIN 600 MG: 600 TABLET, FILM COATED ORAL at 21:11

## 2025-06-16 RX ADMIN — SODIUM CHLORIDE: 0.9 INJECTION, SOLUTION INTRAVENOUS at 18:17

## 2025-06-16 RX ADMIN — ENOXAPARIN SODIUM 40 MG: 100 INJECTION SUBCUTANEOUS at 18:39

## 2025-06-16 RX ADMIN — PRIMIDONE 50 MG: 50 TABLET ORAL at 21:11

## 2025-06-16 RX ADMIN — SENNOSIDES 8.6 MG: 8.6 TABLET, FILM COATED ORAL at 21:12

## 2025-06-16 RX ADMIN — DOCUSATE SODIUM 100 MG: 100 CAPSULE, LIQUID FILLED ORAL at 21:12

## 2025-06-16 RX ADMIN — SODIUM CHLORIDE 1500 MG: 0.9 INJECTION, SOLUTION INTRAVENOUS at 18:34

## 2025-06-16 RX ADMIN — BUPROPION HYDROCHLORIDE 75 MG: 75 TABLET, FILM COATED ORAL at 21:11

## 2025-06-16 RX ADMIN — SODIUM CHLORIDE, PRESERVATIVE FREE 10 ML: 5 INJECTION INTRAVENOUS at 21:12

## 2025-06-16 RX ADMIN — OXYCODONE HYDROCHLORIDE AND ACETAMINOPHEN 1 TABLET: 5; 325 TABLET ORAL at 23:10

## 2025-06-16 RX ADMIN — OXYCODONE HYDROCHLORIDE AND ACETAMINOPHEN 500 MG: 500 TABLET ORAL at 21:12

## 2025-06-16 RX ADMIN — LATANOPROST 1 DROP: 50 SOLUTION OPHTHALMIC at 21:11

## 2025-06-16 RX ADMIN — IBUPROFEN 400 MG: 200 TABLET, FILM COATED ORAL at 21:12

## 2025-06-16 RX ADMIN — AMITRIPTYLINE HYDROCHLORIDE 25 MG: 25 TABLET ORAL at 21:12

## 2025-06-16 RX ADMIN — PIPERACILLIN AND TAZOBACTAM 3375 MG: 3; .375 INJECTION, POWDER, LYOPHILIZED, FOR SOLUTION INTRAVENOUS at 18:31

## 2025-06-16 ASSESSMENT — ENCOUNTER SYMPTOMS
EYE PAIN: 0
CONSTIPATION: 0
ABDOMINAL PAIN: 0
BLOOD IN STOOL: 0
COUGH: 0
SHORTNESS OF BREATH: 0
NAUSEA: 0
BACK PAIN: 0
DIARRHEA: 0
VOMITING: 0

## 2025-06-16 ASSESSMENT — PAIN - FUNCTIONAL ASSESSMENT: PAIN_FUNCTIONAL_ASSESSMENT: ACTIVITIES ARE NOT PREVENTED

## 2025-06-16 ASSESSMENT — PAIN DESCRIPTION - DESCRIPTORS: DESCRIPTORS: BURNING;ACHING

## 2025-06-16 ASSESSMENT — PAIN SCALES - GENERAL: PAINLEVEL_OUTOF10: 8

## 2025-06-16 ASSESSMENT — PAIN DESCRIPTION - LOCATION: LOCATION: BACK;LEG

## 2025-06-16 NOTE — PROGRESS NOTES
[facility]  [facility] INTERNAL MED A DEPARTMENT OF [facility]  [address] [facility] [address]  Dept: [phone]  Dept Fax: [phone]  Loc: [phone]     Penelope Holland is a 74 y.o. female who presents today for her medical conditions/complaintsas noted below.  Penelope Holland is c/o of   Chief Complaint   Patient presents with    Lower Leg Wound     With yellow drainage, the area is red & warm with edema, that is failing to heal after multiple oral antibiotics.    Cellulitis     Left lower leg    Diabetes         HPI:     Diabetes  She presents for her follow-up diabetic visit. She has type 2 diabetes mellitus. The initial diagnosis of diabetes was made 14 years ago. Her disease course has been fluctuating. Pertinent negatives for hypoglycemia include no confusion, dizziness, headaches, nervousness/anxiousness or pallor. Pertinent negatives for diabetes include no chest pain, no polydipsia, no polyuria and no weakness.   Other  This is a new (2-left lower leg skin wound, at site of previous sutures,  3-cellulitis of the left lower leg skin (around the wound)) problem. The current episode started 1 to 4 weeks ago. The problem occurs constantly. The problem has been waxing and waning (Patient felt to improve after 10 days of Augmentin which started May 23, 2025 patient was then started on Omnicef 300 mg twice daily on 6/6/2025 as well as starting Zithromax 250 mg on 6/6/2025). Pertinent negatives include no abdominal pain, arthralgias, chest pain, chills, coughing, fever, headaches, nausea, neck pain, numbness, rash, vomiting or weakness.       Hemoglobin A1C (%)   Date Value   01/30/2025 5.5   07/29/2024 5.1   08/15/2023 6.1 (H)            No components found for: \"LABMICR\"  No components found for: \"LDLCHOLESTEROL\", \"LDLCALC\"      AST (U/L)   Date Value   07/29/2024 45 (H)     ALT (U/L)   Date Value   07/29/2024 25     BUN (mg/dL)   Date Value   07/29/2024 24

## 2025-06-16 NOTE — PROGRESS NOTES
[facility]   Pharmacy Pharmacokinetic Monitoring Service - Vancomycin     Penelope Holland is a 74 y.o. female starting on vancomycin therapy for SSTI. Pharmacy consulted by LAURO Gibson for monitoring and adjustment.    Target Concentration: Goal AUC/MAGNO 400-600 mg*hr/L    Additional Antimicrobials: pip/tazo    Pertinent Laboratory Values:   Wt Readings from Last 1 Encounters:   06/16/25 101 kg (222 lb 11.2 oz)     Temp Readings from Last 1 Encounters:   06/16/25 97.9 °F (36.6 °C) (Tympanic)     Estimated Creatinine Clearance: 61 mL/min (based on SCr of 0.9 mg/dL).  Recent Labs     06/16/25  1624   CREATININE 0.9   BUN 15   WBC 4.9     Procalcitonin:      Pertinent Cultures:  Culture Date Source Results           MRSA Nasal Swab: N/A. Non-respiratory infection.    Plan:  Dosing recommendations based on Bayesian software  Start vancomycin 1500 mg IVPB q 24h  Anticipated AUC of 556 and trough concentration of 15.6 at steady state  Renal labs as indicated   Vancomycin concentration ordered for 6/17/25 @ 0500   Pharmacy will continue to monitor patient and adjust therapy as indicated    Thank you for the consult,  Fadumo Rowe [facility]  6/16/2025 5:23 PM

## 2025-06-16 NOTE — H&P
HOSPITALIST ADMISSION H&P      REASON FOR ADMISSION:  non-healing wound left lower leg--cellulitis abscess--POA---failed attempts of outpatient treatment  ESTIMATED LENGTH OF STAY:  > 2 midnights----2-4 days    ATTENDING/ADMITTING PHYSICIAN: Cole Gibson MD MD  PCP: Claudy Hammond MD    HISTORY OF PRESENT ILLNESS:      The patient is a 74 y.o. female patient of Claudy Hammond MD who presents with LLE abscess--blood--pus drainage---failed outpatient therapy with Augmentin---Omnief---Zithromax--approximately 4 weeks ago the patient suffered a laceration on the posterior LLE above the malleolus---this was sutured in OSH--patient has had sutures removed in the office, but has not been able to be consistent in follow up [transportation]---presented in office of Dr. Mcarthur, , 6.16.2026--with swelling--tenderness--redness----and had had pus and blood expressed at home---patient is being admitted for IV antibiotics and wound care.  All the above superposed on lymphedema and     ASCVD---RCA--stent 3.14.2002---MI history    CHF---chronic diastolic--2D ECHO---1.29.2025--NLVSF--severe calcification posterior leaflet MV--trace MR--RVSP ~ 21 mm Hg--LVEF ~ 60%    HTN    PVD---carotid stenosis    COPD--emphysema---former tobacco smoker----asthma     Diabetes Mellitus Type 2---low blood glucose level at admission    LEANDRO--CPAP    Morbid obesity--with a prior history of \"bariatric surgery\"----2002    History of \"blood clots\"--patient not specific    Pain management---prior L2-3-4 PM injections     Dementia---especially STM---word-searching--difficulty in formulating complete sentences at times    See below for additional PMH.    Patient jddb-dsxfsajhgf-lhelesgh-available records reviewed, including, but not limited to, office records--notes--patient poor historian       Past Medical History:   Diagnosis Date    Arthritis     Asthma     Bell's palsy     CAD (coronary artery disease) 1989    Cancer (HCC) 1971    cervical

## 2025-06-17 ENCOUNTER — TELEPHONE (OUTPATIENT)
Dept: INTERNAL MEDICINE | Age: 75
End: 2025-06-17

## 2025-06-17 PROBLEM — L97.921 ULCER OF LEFT LOWER LEG, LIMITED TO BREAKDOWN OF SKIN (HCC): Status: ACTIVE | Noted: 2025-06-17

## 2025-06-17 PROBLEM — I87.2 CHRONIC VENOUS INSUFFICIENCY: Status: ACTIVE | Noted: 2025-06-17

## 2025-06-17 LAB
ANION GAP SERPL CALCULATED.3IONS-SCNC: 7 MMOL/L (ref 9–16)
BASOPHILS # BLD: 0.05 K/UL (ref 0–0.2)
BASOPHILS NFR BLD: 2 % (ref 0–2)
BUN SERPL-MCNC: 16 MG/DL (ref 8–23)
BUN/CREAT SERPL: 18 (ref 9–20)
CALCIUM SERPL-MCNC: 8.6 MG/DL (ref 8.6–10.4)
CHLORIDE SERPL-SCNC: 105 MMOL/L (ref 98–107)
CO2 SERPL-SCNC: 27 MMOL/L (ref 20–31)
CREAT SERPL-MCNC: 0.9 MG/DL (ref 0.6–0.9)
EOSINOPHIL # BLD: 0.15 K/UL (ref 0–0.44)
EOSINOPHILS RELATIVE PERCENT: 5 % (ref 1–4)
ERYTHROCYTE [DISTWIDTH] IN BLOOD BY AUTOMATED COUNT: 15.9 % (ref 11.8–14.4)
GFR, ESTIMATED: 66 ML/MIN/1.73M2
GLUCOSE BLD-MCNC: 136 MG/DL (ref 65–105)
GLUCOSE BLD-MCNC: 150 MG/DL (ref 65–105)
GLUCOSE BLD-MCNC: 164 MG/DL (ref 65–105)
GLUCOSE BLD-MCNC: 53 MG/DL (ref 65–105)
GLUCOSE BLD-MCNC: 69 MG/DL (ref 65–105)
GLUCOSE BLD-MCNC: 93 MG/DL (ref 65–105)
GLUCOSE SERPL-MCNC: 145 MG/DL (ref 74–99)
HCT VFR BLD AUTO: 35.3 % (ref 36.3–47.1)
HGB BLD-MCNC: 11.1 G/DL (ref 11.9–15.1)
IMM GRANULOCYTES # BLD AUTO: <0.03 K/UL (ref 0–0.3)
IMM GRANULOCYTES NFR BLD: 0 %
LYMPHOCYTES NFR BLD: 1.08 K/UL (ref 1.1–3.7)
LYMPHOCYTES RELATIVE PERCENT: 33 % (ref 24–43)
MCH RBC QN AUTO: 28.5 PG (ref 25.2–33.5)
MCHC RBC AUTO-ENTMCNC: 31.4 G/DL (ref 25.2–33.5)
MCV RBC AUTO: 90.7 FL (ref 82.6–102.9)
MONOCYTES NFR BLD: 0.45 K/UL (ref 0.1–1.2)
MONOCYTES NFR BLD: 14 % (ref 3–12)
NEUTROPHILS NFR BLD: 48 % (ref 36–65)
NEUTS SEG NFR BLD: 1.58 K/UL (ref 1.5–8.1)
NRBC BLD-RTO: 0 PER 100 WBC
PLATELET # BLD AUTO: ABNORMAL K/UL (ref 138–453)
PLATELET, FLUORESCENCE: 89 K/UL (ref 138–453)
PLATELETS.RETICULATED NFR BLD AUTO: 2 % (ref 1.1–10.3)
POTASSIUM SERPL-SCNC: 4.6 MMOL/L (ref 3.7–5.3)
RBC # BLD AUTO: 3.89 M/UL (ref 3.95–5.11)
SODIUM SERPL-SCNC: 139 MMOL/L (ref 136–145)
VANCOMYCIN SERPL-MCNC: 12.1 UG/ML (ref 5–40)
WBC OTHER # BLD: 3.3 K/UL (ref 3.5–11.3)

## 2025-06-17 PROCEDURE — 97597 DBRDMT OPN WND 1ST 20 CM/<: CPT | Performed by: PODIATRIST

## 2025-06-17 PROCEDURE — 94760 N-INVAS EAR/PLS OXIMETRY 1: CPT

## 2025-06-17 PROCEDURE — 2580000003 HC RX 258: Performed by: NURSE PRACTITIONER

## 2025-06-17 PROCEDURE — 80202 ASSAY OF VANCOMYCIN: CPT

## 2025-06-17 PROCEDURE — 2500000003 HC RX 250 WO HCPCS: Performed by: INTERNAL MEDICINE

## 2025-06-17 PROCEDURE — 2060000000 HC ICU INTERMEDIATE R&B

## 2025-06-17 PROCEDURE — 80048 BASIC METABOLIC PNL TOTAL CA: CPT

## 2025-06-17 PROCEDURE — 6370000000 HC RX 637 (ALT 250 FOR IP): Performed by: NURSE PRACTITIONER

## 2025-06-17 PROCEDURE — 99221 1ST HOSP IP/OBS SF/LOW 40: CPT | Performed by: PODIATRIST

## 2025-06-17 PROCEDURE — 99231 SBSQ HOSP IP/OBS SF/LOW 25: CPT | Performed by: INTERNAL MEDICINE

## 2025-06-17 PROCEDURE — 94640 AIRWAY INHALATION TREATMENT: CPT

## 2025-06-17 PROCEDURE — 0HDLXZZ EXTRACTION OF LEFT LOWER LEG SKIN, EXTERNAL APPROACH: ICD-10-PCS | Performed by: PODIATRIST

## 2025-06-17 PROCEDURE — 2580000003 HC RX 258: Performed by: INTERNAL MEDICINE

## 2025-06-17 PROCEDURE — 82947 ASSAY GLUCOSE BLOOD QUANT: CPT

## 2025-06-17 PROCEDURE — 6360000002 HC RX W HCPCS: Performed by: INTERNAL MEDICINE

## 2025-06-17 PROCEDURE — 36415 COLL VENOUS BLD VENIPUNCTURE: CPT

## 2025-06-17 PROCEDURE — 85025 COMPLETE CBC W/AUTO DIFF WBC: CPT

## 2025-06-17 RX ORDER — LACTULOSE 10 G/15ML
20 SOLUTION ORAL 3 TIMES DAILY
Status: DISCONTINUED | OUTPATIENT
Start: 2025-06-17 | End: 2025-06-18 | Stop reason: HOSPADM

## 2025-06-17 RX ADMIN — IBUPROFEN 400 MG: 200 TABLET, FILM COATED ORAL at 22:09

## 2025-06-17 RX ADMIN — SODIUM CHLORIDE 30 ML/HR: 0.9 INJECTION, SOLUTION INTRAVENOUS at 10:37

## 2025-06-17 RX ADMIN — SENNOSIDES 8.6 MG: 8.6 TABLET, FILM COATED ORAL at 10:08

## 2025-06-17 RX ADMIN — PIPERACILLIN AND TAZOBACTAM 3375 MG: 3; .375 INJECTION, POWDER, LYOPHILIZED, FOR SOLUTION INTRAVENOUS at 10:38

## 2025-06-17 RX ADMIN — DOCUSATE SODIUM 100 MG: 100 CAPSULE, LIQUID FILLED ORAL at 10:08

## 2025-06-17 RX ADMIN — DOCUSATE SODIUM 100 MG: 100 CAPSULE, LIQUID FILLED ORAL at 20:38

## 2025-06-17 RX ADMIN — BUPROPION HYDROCHLORIDE 75 MG: 75 TABLET, FILM COATED ORAL at 20:38

## 2025-06-17 RX ADMIN — ATORVASTATIN CALCIUM 40 MG: 40 TABLET, FILM COATED ORAL at 10:08

## 2025-06-17 RX ADMIN — CITALOPRAM HYDROBROMIDE 10 MG: 20 TABLET ORAL at 10:08

## 2025-06-17 RX ADMIN — BRIMONIDINE TARTRATE 1 DROP: 2 SOLUTION OPHTHALMIC at 20:44

## 2025-06-17 RX ADMIN — TIMOLOL MALEATE 1 DROP: 5 SOLUTION OPHTHALMIC at 10:09

## 2025-06-17 RX ADMIN — SODIUM CHLORIDE, PRESERVATIVE FREE 10 ML: 5 INJECTION INTRAVENOUS at 20:42

## 2025-06-17 RX ADMIN — DORZOLAMIDE HYDROCHLORIDE 1 DROP: 20 SOLUTION/ DROPS OPHTHALMIC at 20:32

## 2025-06-17 RX ADMIN — DORZOLAMIDE HYDROCHLORIDE 1 DROP: 20 SOLUTION/ DROPS OPHTHALMIC at 14:44

## 2025-06-17 RX ADMIN — ANTACID TABLETS 500 MG: 500 TABLET, CHEWABLE ORAL at 10:09

## 2025-06-17 RX ADMIN — PIPERACILLIN AND TAZOBACTAM 3375 MG: 3; .375 INJECTION, POWDER, LYOPHILIZED, FOR SOLUTION INTRAVENOUS at 17:20

## 2025-06-17 RX ADMIN — TIOTROPIUM BROMIDE AND OLODATEROL 2 PUFF: 3.124; 2.736 SPRAY, METERED RESPIRATORY (INHALATION) at 09:01

## 2025-06-17 RX ADMIN — SPIRONOLACTONE 50 MG: 25 TABLET ORAL at 10:08

## 2025-06-17 RX ADMIN — GABAPENTIN 600 MG: 600 TABLET, FILM COATED ORAL at 14:43

## 2025-06-17 RX ADMIN — ENOXAPARIN SODIUM 40 MG: 100 INJECTION SUBCUTANEOUS at 10:09

## 2025-06-17 RX ADMIN — PRIMIDONE 50 MG: 50 TABLET ORAL at 10:09

## 2025-06-17 RX ADMIN — PIPERACILLIN AND TAZOBACTAM 3375 MG: 3; .375 INJECTION, POWDER, LYOPHILIZED, FOR SOLUTION INTRAVENOUS at 00:54

## 2025-06-17 RX ADMIN — SODIUM CHLORIDE 1500 MG: 0.9 INJECTION, SOLUTION INTRAVENOUS at 17:33

## 2025-06-17 RX ADMIN — GABAPENTIN 600 MG: 600 TABLET, FILM COATED ORAL at 10:08

## 2025-06-17 RX ADMIN — BRIMONIDINE TARTRATE 1 DROP: 2 SOLUTION OPHTHALMIC at 10:09

## 2025-06-17 RX ADMIN — SENNOSIDES 8.6 MG: 8.6 TABLET, FILM COATED ORAL at 20:38

## 2025-06-17 RX ADMIN — OXYCODONE HYDROCHLORIDE AND ACETAMINOPHEN 500 MG: 500 TABLET ORAL at 10:08

## 2025-06-17 RX ADMIN — SODIUM CHLORIDE, PRESERVATIVE FREE 10 ML: 5 INJECTION INTRAVENOUS at 10:09

## 2025-06-17 RX ADMIN — SODIUM CHLORIDE 20 ML/HR: 0.9 INJECTION, SOLUTION INTRAVENOUS at 17:19

## 2025-06-17 RX ADMIN — AMITRIPTYLINE HYDROCHLORIDE 25 MG: 25 TABLET ORAL at 20:38

## 2025-06-17 RX ADMIN — DORZOLAMIDE HYDROCHLORIDE 1 DROP: 20 SOLUTION/ DROPS OPHTHALMIC at 10:09

## 2025-06-17 RX ADMIN — FUROSEMIDE 20 MG: 20 TABLET ORAL at 10:07

## 2025-06-17 RX ADMIN — BUPROPION HYDROCHLORIDE 75 MG: 75 TABLET, FILM COATED ORAL at 10:08

## 2025-06-17 RX ADMIN — OXYCODONE HYDROCHLORIDE AND ACETAMINOPHEN 1 TABLET: 5; 325 TABLET ORAL at 16:20

## 2025-06-17 RX ADMIN — CLOPIDOGREL BISULFATE 75 MG: 75 TABLET, FILM COATED ORAL at 10:08

## 2025-06-17 RX ADMIN — GABAPENTIN 600 MG: 600 TABLET, FILM COATED ORAL at 20:38

## 2025-06-17 RX ADMIN — PRIMIDONE 50 MG: 50 TABLET ORAL at 20:38

## 2025-06-17 RX ADMIN — BRIMONIDINE TARTRATE 1 DROP: 2 SOLUTION OPHTHALMIC at 14:43

## 2025-06-17 RX ADMIN — OXYCODONE HYDROCHLORIDE AND ACETAMINOPHEN 500 MG: 500 TABLET ORAL at 20:38

## 2025-06-17 RX ADMIN — LATANOPROST 1 DROP: 50 SOLUTION OPHTHALMIC at 22:08

## 2025-06-17 RX ADMIN — PANTOPRAZOLE SODIUM 40 MG: 40 TABLET, DELAYED RELEASE ORAL at 05:51

## 2025-06-17 RX ADMIN — FOLIC ACID 1000 MCG: 1 TABLET ORAL at 10:09

## 2025-06-17 ASSESSMENT — PAIN DESCRIPTION - DESCRIPTORS
DESCRIPTORS: ACHING
DESCRIPTORS: ACHING;DULL

## 2025-06-17 ASSESSMENT — PAIN SCALES - WONG BAKER
WONGBAKER_NUMERICALRESPONSE: NO HURT
WONGBAKER_NUMERICALRESPONSE: NO HURT

## 2025-06-17 ASSESSMENT — PAIN DESCRIPTION - LOCATION
LOCATION: BACK
LOCATION: LEG

## 2025-06-17 ASSESSMENT — PAIN SCALES - GENERAL
PAINLEVEL_OUTOF10: 3
PAINLEVEL_OUTOF10: 9
PAINLEVEL_OUTOF10: 10

## 2025-06-17 ASSESSMENT — PAIN DESCRIPTION - ORIENTATION: ORIENTATION: LEFT

## 2025-06-17 NOTE — PROGRESS NOTES
Patient blood sugar 53.  Patient request orange juice and peanut butter crackers.  Blood sugar at 15 minute re check 69.  Patient requesting more orange juice.  Patient blood sugar at 15 minute re check 164.

## 2025-06-17 NOTE — CONSULTS
of 13 ([employer] 10)  [facility].    Provider, MD Marce       Social History     Tobacco Use    Smoking status: Former     Current packs/day: 0.00     Average packs/day: 1 pack/day for 32.0 years (32.0 ttl pk-yrs)     Types: Cigarettes     Start date: 1966     Quit date: 1998     Years since quittin.7     Passive exposure: Past    Smokeless tobacco: Never   Substance Use Topics    Alcohol use: No       Review of Systems: All 12 systems reviewed and pertinent positives noted above.    Lower Extremity Physical Examination:     Vitals:   Vitals:    25 0419   BP: (!) 89/61   Pulse: 51   Resp: 18   Temp: 97.3 °F (36.3 °C)   SpO2: 91%     General: AAO x 3 in NAD.     Vascular: DP and PT pulses palpable 2/4, bilateral.  CFT <3 seconds, bilateral.  Hair growth absent to the level of the digits, bilateral.  Edema present, bilateral.  Varicosities present, bilateral. Erythema present left leg.  Slight warmth to the area also.. Distal Rubor absent bilateral.   Hyperpigmentation present bilateral. Atrophic skin yes.     Neurological: Sensation intact to light touch to level of digits, bilateral.  Protective sensation intact 10/10 sites via 5.07/10g Dysart-Moose Monofilament, bilateral.  negative Tinel's, bilateral.  negative Valleix sign, bilateral.  Vibratory intact bilateral.  Reflexes Decreased bilateral.  Paresthesias negative.  Dysthesias negative.  Sharp/dull intact bilateral.    Musculoskeletal: Muscle strength 5/5, bilateral.  Pain absent upon palpation bilateral. Normal medial longitudinal arch, bilateral.  Ankle ROM within normal limits,bilateral.  1st MPJ ROM within normal limits, bilateral.  Dorsally contracted digits absent. No other foot deformities.    Integument:   Open lesion present, Left.  Ulcer posterior distal left leg 2.2 x 1.8 x 0.2 cm positive fibrin healthier base no undermining no malodor no active drainage.  There is CINDY ulcer edema and CINDY ulcer erythema.

## 2025-06-17 NOTE — PLAN OF CARE
Problem: Discharge Planning  Goal: Discharge to home or other facility with appropriate resources  Outcome: Progressing  Flowsheets (Taken 6/17/2025 1007)  Discharge to home or other facility with appropriate resources: Identify barriers to discharge with patient and caregiver     Problem: Chronic Conditions and Co-morbidities  Goal: Patient's chronic conditions and co-morbidity symptoms are monitored and maintained or improved  Outcome: Progressing     Problem: Safety - Adult  Goal: Free from fall injury  Outcome: Progressing     Problem: Pain  Goal: Verbalizes/displays adequate comfort level or baseline comfort level  Outcome: Progressing

## 2025-06-17 NOTE — CARE COORDINATION
DISCHARGE BARRIERS       Reason for Referral:  SW completed a Psychosocial Assessment for evaluation of patient's mental health, social status, and functional capacity within the community. Penelope Holland is a 74 y.o. female admitted due to Cellulitis of left lower extremity. Patient alone. SW provided supportive listening while patient discussed past medical history and events leading up to hospitalization.       Mental Status:  Alert, oriented, and engaging during assessment.     Decision Making:  Makes own decisions.     Family/Social/Home Environment: lives alone    Employment status: Retired     Health Insurance:     Active Insurance as of 6/16/2025       Primary Coverage       Payor Plan Insurance Group Employer/Plan Group    MEDICARE MEDICARE PART A AND B        Payor Address Payor Phone Number Payor Fax Number Effective Dates    [address] [address] [phone]  4/1/2006 - None Entered    [facility] [id number]         Subscriber Name Subscriber Birth Date Member ID       PENELOPE HOLLAND 1950 [id number]               Secondary Coverage       Payor Plan Insurance Group Employer/Plan Group    [place] [employer] [facility]        Payor Plan Address Payor Plan Phone Number Payor Plan Fax Number Effective Dates    [address]   1/1/2018 - None Entered    [facility] [id number]         Subscriber Name Subscriber Birth Date Member ID       PENELOPE HOLLAND 1950 [id number]                     Support: Discussed a good social support network     Current Services:   SNAP (food stamps) and Medicaid (medical assistance), and [employer]- [place] Medicaid Program.  [facility].     Current DMEs: Cane, Shower chair, Grab bars, and CPAP      PCP: Claudy Hammond MD and repots no issues affording medication.      status:   No     ADLs and means of transportation: Independent in ADLs/IADLs in ADLs prior to hospitalization and unable to transport self.    Food

## 2025-06-17 NOTE — PROGRESS NOTES
[facility]   Pharmacy Pharmacokinetic Monitoring Service - Vancomycin    Consulting Provider: LAURO Gibson   Indication: SSTI  Target Concentration: Goal AUC/MAGNO 400-600 mg*hr/L  Day of Therapy: 2  Additional Antimicrobials: pip/tazo    Pertinent Laboratory Values:   Wt Readings from Last 1 Encounters:   06/17/25 100.5 kg (221 lb 8 oz)     Temp Readings from Last 1 Encounters:   06/17/25 97.3 °F (36.3 °C) (Temporal)     Estimated Creatinine Clearance: 61 mL/min (based on SCr of 0.9 mg/dL).  Recent Labs     06/16/25  1624 06/17/25  0511   CREATININE 0.9 0.9   BUN 15 16   WBC 4.9 3.3*     Procalcitonin:      Pertinent Cultures:  Culture Date Source Results           MRSA Nasal Swab: N/A. Non-respiratory infection.    Recent vancomycin administrations                     vancomycin (VANCOCIN) 1,500 mg in sodium chloride 0.9 % 250 mL IVPB ([id number]) (mg) 1,500 mg New Bag 06/16/25 8249                    Assessment:  Date/Time Current Dose Concentration Timing of Concentration (h) AUC   6/17/25 1500 mg IVPB q 24 h 12.1 10 h 36 m 587   Note: Serum concentrations collected for AUC dosing may appear elevated if collected in close proximity to the dose administered, this is not necessarily an indication of toxicity    Plan:  Current dosing regimen is therapeutic  Continue current dose  Repeat vancomycin concentration ordered for 6/18/25 @ 0500   Pharmacy will continue to monitor patient and adjust therapy as indicated    Thank you for the consult,  Fadumo Rowe RPH  6/17/2025 7:04 AM

## 2025-06-17 NOTE — PROGRESS NOTES
Hospitalist Progress Note    Patient:  Penelope Holland     YOB: 1950    MRN: [id number]   Admit date: 6/16/2025     Acct: [id number]     PCP: Claudy Hammond MD    CC--Interval History:   LLE cellulitis--non-healing wound---sp debridement---6.17.2025--on Vancomycin--Zosyn---seen by Wound Care    BLE lymphedema---chronic    Lower BG---regimen adjusted this AM    See orders    All other ROS negative except noted in HPI    Diet:  ADULT DIET; Regular; 4 carb choices (60 gm/meal); Low Fat/Low Chol/High Fiber/BRADLEY; 1800 ml    Medications:  Scheduled Meds:   sodium chloride flush  10 mL IntraVENous 2 times per day    enoxaparin  40 mg SubCUTAneous Daily    piperacillin-tazobactam  3,375 mg IntraVENous Q8H    vancomycin  1,500 mg IntraVENous Q24H    vancomycin (VANCOCIN) intermittent dosing (placeholder)   Other RX Placeholder    sodium chloride flush  5-40 mL IntraVENous 2 times per day    amitriptyline  25 mg Oral Nightly    ascorbic acid  500 mg Oral BID    atorvastatin  40 mg Oral Daily    buPROPion  75 mg Oral BID    citalopram  10 mg Oral Daily    clopidogrel  75 mg Oral Daily    docusate sodium  100 mg Oral BID    folic acid  1,000 mcg Oral Daily    furosemide  20 mg Oral Daily    gabapentin  600 mg Oral TID    latanoprost  1 drop Both Eyes Nightly    metoprolol succinate  12.5 mg Oral Daily    pantoprazole  40 mg Oral QAM AC    primidone  50 mg Oral BID    senna  1 tablet Oral BID    spironolactone  50 mg Oral Daily    tiotropium-olodaterol  2 puff Inhalation Daily    calcium carbonate  1 tablet Oral Daily    [Held by provider] empagliflozin  10 mg Oral Daily    insulin lispro  0-4 Units SubCUTAneous 4x Daily AC & HS    [Held by provider] insulin glargine  7 Units SubCUTAneous BID    timolol  1 drop Both Eyes Daily    dorzolamide  1 drop Both Eyes TID    And    brimonidine  1 drop Both Eyes TID     Continuous Infusions:   sodium chloride Stopped (06/16/25 2044)    sodium chloride      dextrose

## 2025-06-17 NOTE — DISCHARGE INSTR - DIET
Good nutrition is important when healing from an illness, injury, or surgery.  Follow any nutrition recommendations given to you during your hospital stay.   If you were given an oral nutrition supplement while in the hospital, continue to take this supplement at home.  You can take it with meals, in-between meals, and/or before bedtime. These supplements can be purchased at most local grocery stores, pharmacies, and chain [employer]-stores.   If you have any questions about your diet or nutrition, call the hospital and ask for the dietitian.  Regular Cardiac, Diabetic Diet. 1800ml fluid restriction daily.

## 2025-06-17 NOTE — TELEPHONE ENCOUNTER
Dona from [facility] calling to say someone called and said they were faxing hospital d/c paperwork to them (she did not get a name), but they are faxing to phone line. Needs faxed to [phone]  Any questions, call her back @[phone]

## 2025-06-17 NOTE — DISCHARGE INSTRUCTIONS
Follow up with Dr. Hammond in 1 week.     Follow up with [facility] in 1 week.    Dressing to Left Lower Extremity:  Foam to wound. Unna boot to leg.   Keep dry, no tub baths.

## 2025-06-17 NOTE — PROGRESS NOTES
Patient declined PT services, stating they are at their baseline level of function. Patient notes they are independent with functional mobility and transfers.  Patient discharged per their request. Patient was advised to inform staff if there is a decline in function or if they have any questions, and therapy can return at that time.  Patient verbalized understanding.     Ashli Haddad PT

## 2025-06-17 NOTE — CARE COORDINATION
Case Management Assessment  Initial Evaluation    Date/Time of Evaluation: 6/17/2025 11:51 AM  Assessment Completed by: Zenobia Hung RN    If patient is discharged prior to next notation, then this note serves as note for discharge by case management.    Patient Name: Penelope Holland                   YOB: 1950  Diagnosis: Cellulitis of left leg [L03.116]  Cellulitis of left lower extremity [L03.116]                   Date / Time: 6/16/2025  3:49 PM    Patient Admission Status: Inpatient   Readmission Risk (Low < 19, Mod (19-27), High > 27): Readmission Risk Score: 13    Current PCP: Claudy Hammond MD  PCP verified by CM? Yes    Chart Reviewed: Yes      History Provided by: Patient  Patient Orientation: Alert and Oriented    Patient Cognition: Alert    Hospitalization in the last 30 days (Readmission):  No    If yes, Readmission Assessment in CM Navigator will be completed.    Advance Directives:      Code Status: Full Code   Patient's Primary Decision Maker is:      Primary Decision Maker: Mine Juarez - Ariel - [phone]    Discharge Planning:    Patient lives with: Alone Type of Home: Apartment  Primary Care Giver: Self  Patient Support Systems include: Children   Current Financial resources: Medicare, Medicaid  Current community resources: ECF/Home Care  Current services prior to admission: Durable Medical Equipment, C-pap, Home Care            Current DME: Walker, Cpap            Type of Home Care services:  Skilled Therapy    ADLS  Prior functional level: Independent in ADLs/IADLs  Current functional level: Independent in ADLs/IADLs    PT AM-PAC:   /24  OT AM-PAC:   /24    Family can provide assistance at DC: Yes  Would you like Case Management to discuss the discharge plan with any other family members/significant others, and if so, who? No  Plans to Return to Present Housing: Yes  Other Identified Issues/Barriers to RETURNING to current housing: yes  Potential Assistance needed

## 2025-06-17 NOTE — PROGRESS NOTES
Occupational Therapy      Patient declined OT services, stating they are at their baseline level of function. Patient notes they are independent with functional mobility, toileting and simple ADLs.  Patient discharged per their request. Patient was advised to inform staff if there is a decline in function or if they have any questions, and therapy can return at that time.  Patient verbalized understanding.

## 2025-06-17 NOTE — PLAN OF CARE
Problem: Discharge Planning  Goal: Discharge to home or other facility with appropriate resources  Outcome: Progressing  Flowsheets  Taken 6/16/2025 1647 by Dali Castellon RN  Discharge to home or other facility with appropriate resources:   Identify barriers to discharge with patient and caregiver   Arrange for needed discharge resources and transportation as appropriate   Identify discharge learning needs (meds, wound care, etc)   Refer to discharge planning if patient needs post-hospital services based on physician order or complex needs related to functional status, cognitive ability or social support system  Taken 6/16/2025 1640 by Dali Castellon RN  Discharge to home or other facility with appropriate resources:   Identify barriers to discharge with patient and caregiver   Arrange for needed discharge resources and transportation as appropriate   Identify discharge learning needs (meds, wound care, etc)   Refer to discharge planning if patient needs post-hospital services based on physician order or complex needs related to functional status, cognitive ability or social support system     Problem: Chronic Conditions and Co-morbidities  Goal: Patient's chronic conditions and co-morbidity symptoms are monitored and maintained or improved  Outcome: Progressing  Flowsheets (Taken 6/16/2025 1640 by Dali Castellon RN)  Care Plan - Patient's Chronic Conditions and Co-Morbidity Symptoms are Monitored and Maintained or Improved:   Monitor and assess patient's chronic conditions and comorbid symptoms for stability, deterioration, or improvement   Collaborate with multidisciplinary team to address chronic and comorbid conditions and prevent exacerbation or deterioration   Update acute care plan with appropriate goals if chronic or comorbid symptoms are exacerbated and prevent overall improvement and discharge     Problem: Safety - Adult  Goal: Free from fall injury  Outcome: Progressing

## 2025-06-18 VITALS
WEIGHT: 225 LBS | DIASTOLIC BLOOD PRESSURE: 74 MMHG | BODY MASS INDEX: 38.41 KG/M2 | RESPIRATION RATE: 16 BRPM | HEART RATE: 52 BPM | OXYGEN SATURATION: 98 % | SYSTOLIC BLOOD PRESSURE: 136 MMHG | HEIGHT: 64 IN | TEMPERATURE: 97.7 F

## 2025-06-18 LAB
ANION GAP SERPL CALCULATED.3IONS-SCNC: 8 MMOL/L (ref 9–16)
BASOPHILS # BLD: 0.03 K/UL (ref 0–0.2)
BASOPHILS NFR BLD: 1 % (ref 0–2)
BUN SERPL-MCNC: 18 MG/DL (ref 8–23)
BUN/CREAT SERPL: 18 (ref 9–20)
CALCIUM SERPL-MCNC: 8.9 MG/DL (ref 8.6–10.4)
CHLORIDE SERPL-SCNC: 104 MMOL/L (ref 98–107)
CO2 SERPL-SCNC: 29 MMOL/L (ref 20–31)
CREAT SERPL-MCNC: 1 MG/DL (ref 0.6–0.9)
EKG ATRIAL RATE: 43 BPM
EKG P AXIS: 46 DEGREES
EKG P-R INTERVAL: 192 MS
EKG Q-T INTERVAL: 460 MS
EKG QRS DURATION: 78 MS
EKG QTC CALCULATION (BAZETT): 388 MS
EKG R AXIS: -23 DEGREES
EKG T AXIS: 20 DEGREES
EKG VENTRICULAR RATE: 43 BPM
EOSINOPHIL # BLD: 0.1 K/UL (ref 0–0.44)
EOSINOPHILS RELATIVE PERCENT: 4 % (ref 1–4)
ERYTHROCYTE [DISTWIDTH] IN BLOOD BY AUTOMATED COUNT: 15.8 % (ref 11.8–14.4)
GFR, ESTIMATED: 58 ML/MIN/1.73M2
GLUCOSE BLD-MCNC: 95 MG/DL (ref 65–105)
GLUCOSE SERPL-MCNC: 91 MG/DL (ref 74–99)
HCT VFR BLD AUTO: 36.8 % (ref 36.3–47.1)
HGB BLD-MCNC: 11.3 G/DL (ref 11.9–15.1)
IMM GRANULOCYTES # BLD AUTO: <0.03 K/UL (ref 0–0.3)
IMM GRANULOCYTES NFR BLD: 0 %
LYMPHOCYTES NFR BLD: 0.94 K/UL (ref 1.1–3.7)
LYMPHOCYTES RELATIVE PERCENT: 39 % (ref 24–43)
MCH RBC QN AUTO: 28.2 PG (ref 25.2–33.5)
MCHC RBC AUTO-ENTMCNC: 30.7 G/DL (ref 25.2–33.5)
MCV RBC AUTO: 91.8 FL (ref 82.6–102.9)
MONOCYTES NFR BLD: 0.34 K/UL (ref 0.1–1.2)
MONOCYTES NFR BLD: 14 % (ref 3–12)
NEUTROPHILS NFR BLD: 42 % (ref 36–65)
NEUTS SEG NFR BLD: 1 K/UL (ref 1.5–8.1)
PLATELET # BLD AUTO: 85 K/UL (ref 138–453)
PMV BLD AUTO: 11.1 FL (ref 8.1–13.5)
POTASSIUM SERPL-SCNC: 4.5 MMOL/L (ref 3.7–5.3)
RBC # BLD AUTO: 4.01 M/UL (ref 3.95–5.11)
SODIUM SERPL-SCNC: 141 MMOL/L (ref 136–145)
VANCOMYCIN SERPL-MCNC: 16.9 UG/ML (ref 5–40)
WBC OTHER # BLD: 2.4 K/UL (ref 3.5–11.3)

## 2025-06-18 PROCEDURE — 36415 COLL VENOUS BLD VENIPUNCTURE: CPT

## 2025-06-18 PROCEDURE — 2500000003 HC RX 250 WO HCPCS: Performed by: NURSE PRACTITIONER

## 2025-06-18 PROCEDURE — 2500000003 HC RX 250 WO HCPCS: Performed by: INTERNAL MEDICINE

## 2025-06-18 PROCEDURE — 94640 AIRWAY INHALATION TREATMENT: CPT

## 2025-06-18 PROCEDURE — 80048 BASIC METABOLIC PNL TOTAL CA: CPT

## 2025-06-18 PROCEDURE — 6360000002 HC RX W HCPCS: Performed by: INTERNAL MEDICINE

## 2025-06-18 PROCEDURE — 99238 HOSP IP/OBS DSCHRG MGMT 30/<: CPT | Performed by: INTERNAL MEDICINE

## 2025-06-18 PROCEDURE — 82947 ASSAY GLUCOSE BLOOD QUANT: CPT

## 2025-06-18 PROCEDURE — 94760 N-INVAS EAR/PLS OXIMETRY 1: CPT

## 2025-06-18 PROCEDURE — 85025 COMPLETE CBC W/AUTO DIFF WBC: CPT

## 2025-06-18 PROCEDURE — 2580000003 HC RX 258: Performed by: NURSE PRACTITIONER

## 2025-06-18 PROCEDURE — 80202 ASSAY OF VANCOMYCIN: CPT

## 2025-06-18 PROCEDURE — 2580000003 HC RX 258: Performed by: INTERNAL MEDICINE

## 2025-06-18 PROCEDURE — 6370000000 HC RX 637 (ALT 250 FOR IP): Performed by: NURSE PRACTITIONER

## 2025-06-18 RX ORDER — ACETAMINOPHEN 325 MG/1
650 TABLET ORAL EVERY 4 HOURS PRN
Status: DISCONTINUED | OUTPATIENT
Start: 2025-06-18 | End: 2025-06-18 | Stop reason: HOSPADM

## 2025-06-18 RX ORDER — ACETAMINOPHEN 500 MG
1 TABLET ORAL
COMMUNITY
Start: 2025-06-18 | End: 2025-06-28

## 2025-06-18 RX ORDER — CEFDINIR 300 MG/1
300 CAPSULE ORAL 2 TIMES DAILY
Qty: 14 CAPSULE | Refills: 0 | Status: SHIPPED | OUTPATIENT
Start: 2025-06-18 | End: 2025-06-25

## 2025-06-18 RX ADMIN — OXYCODONE HYDROCHLORIDE AND ACETAMINOPHEN 500 MG: 500 TABLET ORAL at 10:12

## 2025-06-18 RX ADMIN — FOLIC ACID 1000 MCG: 1 TABLET ORAL at 10:12

## 2025-06-18 RX ADMIN — CITALOPRAM HYDROBROMIDE 10 MG: 20 TABLET ORAL at 10:12

## 2025-06-18 RX ADMIN — ATORVASTATIN CALCIUM 40 MG: 40 TABLET, FILM COATED ORAL at 10:12

## 2025-06-18 RX ADMIN — DORZOLAMIDE HYDROCHLORIDE 1 DROP: 20 SOLUTION/ DROPS OPHTHALMIC at 10:13

## 2025-06-18 RX ADMIN — TIMOLOL MALEATE 1 DROP: 5 SOLUTION OPHTHALMIC at 10:13

## 2025-06-18 RX ADMIN — CLOPIDOGREL BISULFATE 75 MG: 75 TABLET, FILM COATED ORAL at 10:12

## 2025-06-18 RX ADMIN — PIPERACILLIN AND TAZOBACTAM 3375 MG: 3; .375 INJECTION, POWDER, LYOPHILIZED, FOR SOLUTION INTRAVENOUS at 10:30

## 2025-06-18 RX ADMIN — TIOTROPIUM BROMIDE AND OLODATEROL 2 PUFF: 3.124; 2.736 SPRAY, METERED RESPIRATORY (INHALATION) at 08:31

## 2025-06-18 RX ADMIN — FUROSEMIDE 20 MG: 20 TABLET ORAL at 10:12

## 2025-06-18 RX ADMIN — GABAPENTIN 600 MG: 600 TABLET, FILM COATED ORAL at 14:35

## 2025-06-18 RX ADMIN — BRIMONIDINE TARTRATE 1 DROP: 2 SOLUTION OPHTHALMIC at 10:13

## 2025-06-18 RX ADMIN — PRIMIDONE 50 MG: 50 TABLET ORAL at 10:12

## 2025-06-18 RX ADMIN — SENNOSIDES 8.6 MG: 8.6 TABLET, FILM COATED ORAL at 10:12

## 2025-06-18 RX ADMIN — SODIUM CHLORIDE, PRESERVATIVE FREE 10 ML: 5 INJECTION INTRAVENOUS at 10:36

## 2025-06-18 RX ADMIN — SODIUM CHLORIDE, PRESERVATIVE FREE 10 ML: 5 INJECTION INTRAVENOUS at 10:17

## 2025-06-18 RX ADMIN — SODIUM CHLORIDE 20 ML/HR: 0.9 INJECTION, SOLUTION INTRAVENOUS at 10:27

## 2025-06-18 RX ADMIN — PANTOPRAZOLE SODIUM 40 MG: 40 TABLET, DELAYED RELEASE ORAL at 06:14

## 2025-06-18 RX ADMIN — PIPERACILLIN AND TAZOBACTAM 3375 MG: 3; .375 INJECTION, POWDER, LYOPHILIZED, FOR SOLUTION INTRAVENOUS at 00:33

## 2025-06-18 RX ADMIN — SPIRONOLACTONE 50 MG: 25 TABLET ORAL at 10:12

## 2025-06-18 RX ADMIN — BUPROPION HYDROCHLORIDE 75 MG: 75 TABLET, FILM COATED ORAL at 10:12

## 2025-06-18 RX ADMIN — DORZOLAMIDE HYDROCHLORIDE 1 DROP: 20 SOLUTION/ DROPS OPHTHALMIC at 14:37

## 2025-06-18 RX ADMIN — BRIMONIDINE TARTRATE 1 DROP: 2 SOLUTION OPHTHALMIC at 14:37

## 2025-06-18 RX ADMIN — GABAPENTIN 600 MG: 600 TABLET, FILM COATED ORAL at 10:12

## 2025-06-18 RX ADMIN — DOCUSATE SODIUM 100 MG: 100 CAPSULE, LIQUID FILLED ORAL at 10:12

## 2025-06-18 NOTE — PLAN OF CARE
Problem: Discharge Planning  Goal: Discharge to home or other facility with appropriate resources  Outcome: Completed  Flowsheets (Taken 6/18/2025 1034)  Discharge to home or other facility with appropriate resources: Identify barriers to discharge with patient and caregiver     Problem: Chronic Conditions and Co-morbidities  Goal: Patient's chronic conditions and co-morbidity symptoms are monitored and maintained or improved  Outcome: Completed  Flowsheets (Taken 6/18/2025 1034)  Care Plan - Patient's Chronic Conditions and Co-Morbidity Symptoms are Monitored and Maintained or Improved: Monitor and assess patient's chronic conditions and comorbid symptoms for stability, deterioration, or improvement     Problem: Safety - Adult  Goal: Free from fall injury  Outcome: Completed     Problem: Pain  Goal: Verbalizes/displays adequate comfort level or baseline comfort level  Outcome: Completed

## 2025-06-18 NOTE — PROGRESS NOTES
Physician Progress Note      PATIENT:               FRANCIA GONZALEZ  [facility] #:                  [id number]  :                       1950  ADMIT DATE:       2025 3:49 PM  DISCH DATE:  RESPONDING  PROVIDER #:        Cole NDIAYE MD          QUERY TEXT:    Please document the relationship, if any, between the cellulitis and diabetes:    The clinical indicators include:  -per H&P  \"HPI: presents with LLE abscess--blood--pus drainage---failed   outpatient therapy with Augmentin---Omnief---Zithromax--approximately 4 weeks   ago the patient suffered a laceration on the posterior LLE above the   malleolus---this was sutured in OSH--patient has had sutures removed in the   office, but has not been able to be consistent in follow up\", \"Diabetes   Mellitus Type 2\"    -per H&P  \"LLE abscess--no-healing wound----Vancomycin---Zosyn IV----DPM   wound care for likely I&D, Diabetes Mellitus\"  -per Podiatry consult  \"Cellulitis of left lower extremity\"    -per Podiatry consult  \"non excisional with the use of  curette.  The   entire ulcer, 100%, was actively debrided.  All non viable tissue (including   epidermis and/or dermis) and bio burden was removed to promote healing.\"  -per MAR IV Zosyn, IV Vancomycin, sliding scale insulin, Lantus  Options provided:  -- LLE cellulitis related to Diabetes  -- LLE cellulitis unrelated to Diabetes  -- Other - I will add my own diagnosis  -- Disagree - Not applicable / Not valid  -- Disagree - Clinically unable to determine / Unknown  -- Refer to Clinical Documentation Reviewer    PROVIDER RESPONSE TEXT:    LLE cellulitis related to Diabetes.    Query created by: Fabien Torres on 2025 9:24 AM      Electronically signed by:  Cole NDIAYE MD 2025 7:45 AM

## 2025-06-18 NOTE — PROGRESS NOTES
[facility]   Pharmacy Pharmacokinetic Monitoring Service - Vancomycin    Consulting Provider: LAURO Gibson   Indication: SSTI  Target Concentration: Goal AUC/MAGNO 400-600 mg*hr/L  Day of Therapy: 3  Additional Antimicrobials: zosyn    Pertinent Laboratory Values:   Wt Readings from Last 1 Encounters:   06/18/25 102.1 kg (225 lb)     Temp Readings from Last 1 Encounters:   06/18/25 97.5 °F (36.4 °C) (Temporal)     Estimated Creatinine Clearance: 56 mL/min (A) (based on SCr of 1 mg/dL (H)).  Recent Labs     06/17/25  0511 06/18/25  0521   CREATININE 0.9 1.0*   BUN 16 18   WBC 3.3* 2.4*     Procalcitonin:      Pertinent Cultures:  Culture Date Source Results           MRSA Nasal Swab: N/A. Non-respiratory infection.    Recent vancomycin administrations                     vancomycin (VANCOCIN) 1,500 mg in sodium chloride 0.9 % 250 mL IVPB (Amkg1Rrf) (mg) 1,500 mg New Bag 06/17/25 1733     1,500 mg New Bag 06/16/25 1834                    Assessment:  Date/Time Current Dose Concentration Timing of Concentration (h) AUC   06/18/25 1500 mg q24h 16.9 11.75 627   Note: Serum concentrations collected for AUC dosing may appear elevated if collected in close proximity to the dose administered, this is not necessarily an indication of toxicity    Plan:  Current dosing regimen is supra-therapeutic  \"Decrease dose to 1250 mg q24h. AUC = 530, trough = 15.2  Repeat vancomycin concentration ordered for 06/20/25 @ 0600   Pharmacy will continue to monitor patient and adjust therapy as indicated    Thank you for the consult,  Dhruv Gay [facility]  6/18/2025 7:03 AM

## 2025-06-18 NOTE — CARE COORDINATION
06/18/25 0944   IMM Letter   IMM Letter given to Patient/Family/Significant other/Guardian/POA/by: Second IMM given to pt by JULIANA Hung RN   IMM Letter date given: 06/18/25   IMM Letter time given: 0944     IMM letter provided to patient.  Patient offered four hours to make informed decision regarding appeal process; patient agreeable to discharge.

## 2025-06-18 NOTE — PROGRESS NOTES
Hospitalist Progress Note    Patient:  Penelope Holland     YOB: 1950    MRN: [id number]   Admit date: 6/16/2025     Acct: [id number]     PCP: Claudy Hammond MD    CC--Interval History: LLE distal posterior abscess--debridement---6.17.2025---on Vancomycin--Zosyn---LLE foam and UNNA boot----home on Omnicef and Foam-UNNA boot----6.18.2025    BLE lymphedema    Hypokalemia----corrected    ASCVD---chronic diastolic CHF---PVD stable    Diabetes Mellitus Type 2----BG = 91    LEANDRO---CPAP    Dementia----especially STM    See note below    All other ROS negative except noted in HPI    Diet:  ADULT DIET; Regular; 4 carb choices (60 gm/meal); Low Fat/Low Chol/High Fiber/BRADLEY; 1800 ml    Medications:  Scheduled Meds:   lactulose  20 g Oral TID    sodium chloride flush  10 mL IntraVENous 2 times per day    piperacillin-tazobactam  3,375 mg IntraVENous Q8H    vancomycin  1,500 mg IntraVENous Q24H    vancomycin (VANCOCIN) intermittent dosing (placeholder)   Other RX Placeholder    sodium chloride flush  5-40 mL IntraVENous 2 times per day    amitriptyline  25 mg Oral Nightly    ascorbic acid  500 mg Oral BID    atorvastatin  40 mg Oral Daily    buPROPion  75 mg Oral BID    citalopram  10 mg Oral Daily    clopidogrel  75 mg Oral Daily    docusate sodium  100 mg Oral BID    folic acid  1,000 mcg Oral Daily    furosemide  20 mg Oral Daily    gabapentin  600 mg Oral TID    latanoprost  1 drop Both Eyes Nightly    [Held by provider] metoprolol succinate  12.5 mg Oral Daily    pantoprazole  40 mg Oral QAM AC    primidone  50 mg Oral BID    senna  1 tablet Oral BID    spironolactone  50 mg Oral Daily    tiotropium-olodaterol  2 puff Inhalation Daily    calcium carbonate  1 tablet Oral Daily    [Held by provider] empagliflozin  10 mg Oral Daily    insulin lispro  0-4 Units SubCUTAneous 4x Daily AC & HS    [Held by provider] insulin glargine  7 Units SubCUTAneous BID    timolol  1 drop Both Eyes Daily    dorzolamide  1

## 2025-06-18 NOTE — PLAN OF CARE
Problem: Discharge Planning  Goal: Discharge to home or other facility with appropriate resources  6/17/2025 2049 by Rebeca De Leon RN  Outcome: Progressing  6/17/2025 1505 by Ayana Eckert RN  Outcome: Progressing  Flowsheets (Taken 6/17/2025 1007)  Discharge to home or other facility with appropriate resources: Identify barriers to discharge with patient and caregiver     Problem: Chronic Conditions and Co-morbidities  Goal: Patient's chronic conditions and co-morbidity symptoms are monitored and maintained or improved  6/17/2025 2049 by Rebeca De Leon RN  Outcome: Progressing  6/17/2025 1505 by Ayana Eckert RN  Outcome: Progressing     Problem: Safety - Adult  Goal: Free from fall injury  6/17/2025 2049 by Rebeca De Leon RN  Outcome: Progressing  6/17/2025 1505 by Ayana Eckert RN  Outcome: Progressing     Problem: Pain  Goal: Verbalizes/displays adequate comfort level or baseline comfort level  6/17/2025 2049 by Rebeca De Leon RN  Outcome: Progressing  6/17/2025 1505 by Ayana Eckert RN  Outcome: Progressing

## 2025-06-19 ENCOUNTER — TELEPHONE (OUTPATIENT)
Dept: WOUND CARE | Age: 75
End: 2025-06-19

## 2025-06-19 ENCOUNTER — CARE COORDINATION (OUTPATIENT)
Dept: CARE COORDINATION | Age: 75
End: 2025-06-19

## 2025-06-19 ENCOUNTER — TELEPHONE (OUTPATIENT)
Dept: INTERNAL MEDICINE | Age: 75
End: 2025-06-19

## 2025-06-19 ENCOUNTER — FOLLOWUP TELEPHONE ENCOUNTER (OUTPATIENT)
Dept: INPATIENT UNIT | Age: 75
End: 2025-06-19

## 2025-06-19 NOTE — TELEPHONE ENCOUNTER
Writer spoke with Dona with [facility] and she stated she needs clarification on if the would needs to be washed with soap and water and then patted dry, then apply foam dressing with león boot?    Along with how often is the dressing supposed to be changed, so Dona can get her dressing supplies ordered.    Writer documented that on the Physicians Order form and faxed up to Dr Armstrong office to have him clarify those orders and Dr Hammond did not feel comfortable with answering them since Penelope is following with wound care.    Writer advised Dona she would get the clarification and make sure the orders get faxed back to her.

## 2025-06-19 NOTE — DISCHARGE SUMMARY
[address] [place] [address]                            DISCHARGE SUMMARY      PATIENT NAME: FRANCIA GONZALEZ          : 1950  MED REC NO: [id number]                         ROOM: 0214  ACCOUNT NO: [id number]                       ADMIT DATE: 2025  PROVIDER: Cole Gibson MD      ATTENDING PHYSICIAN OF HOSPITALIZATION:  DAJA Gibson MD.    PERSONAL PHYSICIANS:  Claudy Hammond MD, Internal Medicine [facility].    The patient was seen by Harry Means DPM, for Wound Care.  The patient to be seen by Dr. Lacy in the outpatient setting for continued wound care.    DIAGNOSES:    1. Nonhealing wound, left lower extremity cellulitis and abscess with failed outpatient therapy variously with Augmentin, Omnicef, and Zithromax, status post bedside debridement and curettage of left lower extremity wound on 2025 by Dr. Means.  Bilateral lower extremity edema, chronic post reactive skin changes in anterior shins bilaterally.  2. Hypokalemia, corrected.  3. Coronary artery disease.  Cardiac catheterization on 2002, right coronary artery stent, myocardial infarction history.  4. Congestive heart failure, chronic diastolic, stable.  5. Chest EKG on 2025, sinus bradycardia, sinus arrhythmia, rate 45, old inferior anterolateral infarctions.  6. 2D echo on 2025, normal left ventricular systolic function.  Severe calcification in posterior leaflet, trace mitral regurgitation.  Right ventricular systolic pressure 21 mmHg.  Left ventricular ejection fraction 60%.  7. Cardiac catheterization 2022, patent right coronary artery stent.  Minimal nonobstructive coronary artery disease.  8. Peripheral vascular disease, bilateral carotid stenosis.  9. Cerebrovascular disease.  Transient ischemic attack history with chronic cerebral insufficiency.  10. Chronic obstructive pulmonary disease, chronic bronchitis,

## 2025-06-19 NOTE — TELEPHONE ENCOUNTER
Dona from [facility] called stating they need clarification on an wound care order from 6/20. She states they received a follow up note, but it did not state anything about cleansing the wound, etc., only changing. States page was cut off when faxed; refaxed.

## 2025-06-19 NOTE — CARE COORDINATION
Care Transitions Note    Initial Call - Call within 2 business days of discharge: Yes    Attempted to reach patient for transitions of care follow up. Unable to reach patient.    Outreach Attempts:   HIPAA compliant voicemail left for patient.     Patient: Penelope Holland    Patient : 1950   MRN: [id number]    Reason for Admission: Cellulitis of lower extremity.  Discharge Date: 25  RURS: Readmission Risk Score: 12.4    Last Discharge Facility       Date Complaint Diagnosis Description Type Department Provider    25  Cellulitis of left lower extremity Admission (Discharged) Cole Daniels MD            Was this an external facility discharge? No    Follow Up Appointment:   Patient has hospital follow up appointment scheduled within 7 days of discharge.    Future Appointments         Provider Specialty Dept Phone    2025 1:00 PM Claudy Hammond MD Internal Medicine [phone]    2025 2:30 PM Penelope Wright APRN - CNP Cardiology [phone]    2025 10:00 AM Raúl Pool PA-C Wound Ostomy [phone]    2025 3:00 PM Claudy Hammond MD Internal Medicine [phone]    2026 1:00 PM (Arrive by 12:45 PM) [facility] DEXA ROOM Radiology [phone]    2026 1:30 PM [facility] MAMMO ROOM Radiology [phone]    2026 1:30 PM Danny Erazo PA-C Dermatology [phone]    3/25/2026 1:30 PM Rahul Galvez DO Obstetrics and Gynecology [phone]    2026 2:15 PM Lee Disla MD Pulmonology [phone]            Plan for follow-up on next business day.      Tess Díaz RN

## 2025-06-19 NOTE — TELEPHONE ENCOUNTER
Care Transitions Initial Follow Up Call    Outreach made within 2 business days of discharge: Yes    Patient: Penelope Holland Patient : 1950   MRN: [id number]  Reason for Admission: cellulitis of left lower extremity      Discharge Date: 25       Spoke with: patient     Discharge department/facility: [facility] Patient Contact:  Was patient able to fill all prescriptions: Yes  Was patient instructed to bring all medications to the follow-up visit: Yes  Is patient taking all medications as directed in the discharge summary? Yes  Does patient understand their discharge instructions: Yes  Does patient have questions or concerns that need addressed prior to 7-14 day follow up office visit: no    Additional needs identified to be addressed with provider  No needs identified             Scheduled appointment with PCP within 7-14 days    Follow Up  Future Appointments   Date Time Provider Department Center   2025  1:00 PM Claudy Hammond MD [facility] ECC DEP   2025 10:45 AM Harry Means DPM MD WOUND MD   2025  2:30 PM Penelope Wright APRN - CNP DCARDIO [facility]   2025  3:00 PM Claudy Hammond MD [facility] ECC DEP   2026  1:00 PM [facility] DEXA ROOM MD DEXA [facility] [place]   2026  1:30 PM [facility] MAMMO ROOM MD MAMMO [facility] [place]   2026  1:30 PM Danny Erazo PA-C DDERMSK [facility]   3/25/2026  1:30 PM Rahul Galvez DO DOB [facility]   2026  2:15 PM Lee Disla MD DPM [facility]       Laney Ken MA

## 2025-06-19 NOTE — TELEPHONE ENCOUNTER
Rocio  from( home health) phoned with questions regarding the care of Anya left leg cellulitis with wound who was discharged from [facility] yesterday with I believe a unnaboot. Kath phone number is [phone].  Dr. Means did a hospital consult on Penelope and she had a followup appt made for June 28th 2025 to see Dr. Means in wound care but patient called highly upset and cancelled that appointment said she would not see Dr. Means she was told at the hospital Dr. Lacy would be who she was going to see.  There is a referral placed for Dr. Lacy but I talked with Antionette Mathew nurse who stated Raúl Pool could see her in wound care that Dr. Lacy is not scheduling wound care at this time.  Antionette checked Faraz schedule and his first available appt time for her was July 31st at 10:00am for which she accepted and wrote down at the time of the call. The home health nurse was at her house this am asking questions regarding the unaboot , supplies and follow up for Penelope.

## 2025-06-20 ENCOUNTER — CARE COORDINATION (OUTPATIENT)
Dept: CARE COORDINATION | Age: 75
End: 2025-06-20

## 2025-06-20 NOTE — CARE COORDINATION
Care Transitions Note    Initial Call - Call within 2 business days of discharge: Yes    Attempted to reach patient for transitions of care follow up. Unable to reach patient.    Outreach Attempts:   Multiple attempts to contact patient at phone numbers on file.   HIPAA compliant voicemail left for patient.     Patient: Penelope Holland    Patient : 1950   MRN: [id number]    Reason for Admission: Cellulitis of LLE  Discharge Date: 25  RURS: Readmission Risk Score: 12.4    Last Discharge Facility       Date Complaint Diagnosis Description Type Department Provider    25  Cellulitis of left lower extremity Admission (Discharged) Cole Daniels MD            Was this an external facility discharge? No    Follow Up Appointment:   Patient has hospital follow up appointment scheduled within 7 days of discharge.    Future Appointments         Provider Specialty Dept Phone    2025 1:00 PM Claudy Hammond MD Internal Medicine [phone]    2025 2:30 PM Penelope Wright APRN - CNP Cardiology [phone]    2025 10:00 AM Raúl Pool PA-C Wound Ostomy [phone]    2025 3:00 PM Claudy Hammond MD Internal Medicine [phone]    2026 1:00 PM (Arrive by 12:45 PM) [facility] DEXA ROOM Radiology [phone]    2026 1:30 PM [facility] MAMMO ROOM Radiology [phone]    2026 1:30 PM Danyn Erazo PA-C Dermatology [phone]    3/25/2026 1:30 PM Rahul Galvez DO Obstetrics and Gynecology [phone]    2026 2:15 PM Lee Disla MD Pulmonology [phone]            No further follow-up call indicated     Tess Díaz RN

## 2025-06-23 ENCOUNTER — TELEPHONE (OUTPATIENT)
Dept: INTERNAL MEDICINE | Age: 75
End: 2025-06-23

## 2025-06-23 NOTE — TELEPHONE ENCOUNTER
Dayana from [facility] is calling to get Wound Care notes faxed to them so they can get supplies. Fax to:[phone]  If any questions, call [phone]

## 2025-06-23 NOTE — TELEPHONE ENCOUNTER
Inpatient hospital notes from when Dr Means saw patient and did the I&D was faxed to [phone].    Will fax Dr Hammond's OV notes on Friday 6/27/25 after we see the patient for her hospital f/u and have Dr Hammond document the orders in her OV note for her wound care.

## 2025-06-26 ENCOUNTER — TELEPHONE (OUTPATIENT)
Dept: INTERNAL MEDICINE | Age: 75
End: 2025-06-26
Payer: MEDICARE

## 2025-06-26 ENCOUNTER — TELEPHONE (OUTPATIENT)
Dept: INTERNAL MEDICINE | Age: 75
End: 2025-06-26

## 2025-06-26 DIAGNOSIS — I49.9 CARDIAC ARRHYTHMIA, UNSPECIFIED CARDIAC ARRHYTHMIA TYPE: Primary | ICD-10-CM

## 2025-06-26 DIAGNOSIS — I25.83 CORONARY ARTERY DISEASE DUE TO LIPID RICH PLAQUE: ICD-10-CM

## 2025-06-26 DIAGNOSIS — T81.30XS DEHISCENCE OF WOUND OF SKIN, SEQUELA: ICD-10-CM

## 2025-06-26 DIAGNOSIS — E11.42 TYPE 2 DIABETES MELLITUS WITH DIABETIC POLYNEUROPATHY, WITH LONG-TERM CURRENT USE OF INSULIN (HCC): ICD-10-CM

## 2025-06-26 DIAGNOSIS — I25.10 ATHEROSCLEROSIS OF NATIVE CORONARY ARTERY OF NATIVE HEART WITHOUT ANGINA PECTORIS: ICD-10-CM

## 2025-06-26 DIAGNOSIS — Z79.4 TYPE 2 DIABETES MELLITUS WITH DIABETIC POLYNEUROPATHY, WITH LONG-TERM CURRENT USE OF INSULIN (HCC): ICD-10-CM

## 2025-06-26 DIAGNOSIS — K74.60 LIVER CIRRHOSIS SECONDARY TO NASH (HCC): ICD-10-CM

## 2025-06-26 DIAGNOSIS — I25.10 CORONARY ARTERY DISEASE DUE TO LIPID RICH PLAQUE: ICD-10-CM

## 2025-06-26 DIAGNOSIS — L03.116 LEFT LEG CELLULITIS: Primary | ICD-10-CM

## 2025-06-26 DIAGNOSIS — I35.0 NONRHEUMATIC AORTIC VALVE STENOSIS: ICD-10-CM

## 2025-06-26 DIAGNOSIS — K75.81 LIVER CIRRHOSIS SECONDARY TO NASH (HCC): ICD-10-CM

## 2025-06-26 PROCEDURE — G0179 MD RECERTIFICATION HHA PT: HCPCS | Performed by: INTERNAL MEDICINE

## 2025-06-26 NOTE — TELEPHONE ENCOUNTER
[place] Re Certification completed on 6/26/2025  This patient is currently under my care and considered medically homebound, requiring skilled Home Health services. I have reviewed the patient's status and plan of care.     Has the patient been seen within the last 90 days?  yes, 6/16/2025    Time spent completing forms?  10 min

## 2025-06-26 NOTE — TELEPHONE ENCOUNTER
[facility] called stating that they cannot do the foam dressing due to patient only having light drainage, stating the foam dressing is for moderate drainage. They would like a alternate dressing choice faxed over.

## 2025-06-27 ENCOUNTER — RESULTS FOLLOW-UP (OUTPATIENT)
Dept: INTERNAL MEDICINE | Age: 75
End: 2025-06-27

## 2025-06-27 ENCOUNTER — HOSPITAL ENCOUNTER (OUTPATIENT)
Age: 75
Discharge: HOME | End: 2025-06-27
Payer: MEDICARE

## 2025-06-27 ENCOUNTER — OFFICE VISIT (OUTPATIENT)
Dept: INTERNAL MEDICINE | Age: 75
End: 2025-06-27

## 2025-06-27 VITALS
RESPIRATION RATE: 16 BRPM | SYSTOLIC BLOOD PRESSURE: 110 MMHG | BODY MASS INDEX: 38.41 KG/M2 | WEIGHT: 225 LBS | OXYGEN SATURATION: 98 % | HEIGHT: 64 IN | DIASTOLIC BLOOD PRESSURE: 60 MMHG | HEART RATE: 51 BPM

## 2025-06-27 DIAGNOSIS — D64.9 ANEMIA, UNSPECIFIED TYPE: ICD-10-CM

## 2025-06-27 DIAGNOSIS — R74.01 ELEVATED ALT MEASUREMENT: ICD-10-CM

## 2025-06-27 DIAGNOSIS — R74.01 ELEVATED AST (SGOT): ICD-10-CM

## 2025-06-27 DIAGNOSIS — I50.32 CHRONIC DIASTOLIC CONGESTIVE HEART FAILURE (HCC): ICD-10-CM

## 2025-06-27 DIAGNOSIS — G89.29 CHRONIC MIDLINE LOW BACK PAIN WITHOUT SCIATICA: ICD-10-CM

## 2025-06-27 DIAGNOSIS — E11.42 TYPE 2 DIABETES MELLITUS WITH DIABETIC POLYNEUROPATHY, WITH LONG-TERM CURRENT USE OF INSULIN (HCC): ICD-10-CM

## 2025-06-27 DIAGNOSIS — S81.802D TRAUMATIC OPEN WOUND OF LEFT LOWER LEG WITH DELAYED HEALING: Primary | ICD-10-CM

## 2025-06-27 DIAGNOSIS — M54.50 CHRONIC MIDLINE LOW BACK PAIN WITHOUT SCIATICA: ICD-10-CM

## 2025-06-27 DIAGNOSIS — Z79.4 TYPE 2 DIABETES MELLITUS WITH DIABETIC POLYNEUROPATHY, WITH LONG-TERM CURRENT USE OF INSULIN (HCC): ICD-10-CM

## 2025-06-27 LAB
ALT SERPL-CCNC: 53 U/L (ref 10–35)
AST SERPL-CCNC: 62 U/L (ref 10–35)
BASOPHILS # BLD: 0.09 K/UL (ref 0–0.2)
BASOPHILS NFR BLD: 1 % (ref 0–2)
EOSINOPHIL # BLD: 0.24 K/UL (ref 0–0.44)
EOSINOPHILS RELATIVE PERCENT: 4 % (ref 1–4)
ERYTHROCYTE [DISTWIDTH] IN BLOOD BY AUTOMATED COUNT: 16.1 % (ref 11.8–14.4)
HCT VFR BLD AUTO: 40.7 % (ref 36.3–47.1)
HGB BLD-MCNC: 13.2 G/DL (ref 11.9–15.1)
IMM GRANULOCYTES # BLD AUTO: 0.04 K/UL (ref 0–0.3)
IMM GRANULOCYTES NFR BLD: 1 %
LYMPHOCYTES NFR BLD: 1.25 K/UL (ref 1.1–3.7)
LYMPHOCYTES RELATIVE PERCENT: 19 % (ref 24–43)
MCH RBC QN AUTO: 28.9 PG (ref 25.2–33.5)
MCHC RBC AUTO-ENTMCNC: 32.4 G/DL (ref 25.2–33.5)
MCV RBC AUTO: 89.1 FL (ref 82.6–102.9)
MONOCYTES NFR BLD: 0.81 K/UL (ref 0.1–1.2)
MONOCYTES NFR BLD: 12 % (ref 3–12)
NEUTROPHILS NFR BLD: 63 % (ref 36–65)
NEUTS SEG NFR BLD: 4.29 K/UL (ref 1.5–8.1)
NRBC BLD-RTO: 0 PER 100 WBC
PLATELET # BLD AUTO: 162 K/UL (ref 138–453)
PMV BLD AUTO: 10.7 FL (ref 8.1–13.5)
RBC # BLD AUTO: 4.57 M/UL (ref 3.95–5.11)
RBC # BLD: ABNORMAL 10*6/UL
WBC OTHER # BLD: 6.7 K/UL (ref 3.5–11.3)

## 2025-06-27 PROCEDURE — 84460 ALANINE AMINO (ALT) (SGPT): CPT

## 2025-06-27 PROCEDURE — 84450 TRANSFERASE (AST) (SGOT): CPT

## 2025-06-27 PROCEDURE — 85025 COMPLETE CBC W/AUTO DIFF WBC: CPT

## 2025-06-27 PROCEDURE — 36415 COLL VENOUS BLD VENIPUNCTURE: CPT

## 2025-06-27 RX ORDER — OXYCODONE AND ACETAMINOPHEN 5; 325 MG/1; MG/1
1 TABLET ORAL EVERY 6 HOURS PRN
Qty: 120 TABLET | Refills: 0 | Status: SHIPPED | OUTPATIENT
Start: 2025-06-27 | End: 2025-07-27

## 2025-06-27 ASSESSMENT — ENCOUNTER SYMPTOMS
BACK PAIN: 0
DIARRHEA: 0
EYE PAIN: 0
SHORTNESS OF BREATH: 0
VOMITING: 0
BLOOD IN STOOL: 0
COUGH: 0
NAUSEA: 0
ABDOMINAL PAIN: 0
CONSTIPATION: 0

## 2025-06-27 NOTE — PROGRESS NOTES
by mouth daily      Misc. Devices MISC Diabetic Shoes with Inserts 1 each 0    albuterol (PROVENTIL) (2.5 MG/3ML) 0.083% nebulizer solution Take 3 mLs by nebulization every 6 hours as needed for Wheezing 120 each 3    calcium carbonate (OSCAL) 500 MG TABS tablet Take 1 tablet by mouth daily      nitroGLYCERIN (NITROSTAT) 0.4 MG SL tablet PLACE 1 TABLET UNER THE TONGUE EVERY 5 MINUTES AS NEEDED FOR CHEST PAIN AT FIRST SIGN OF CHEST PAIN. 25 tablet 1    Coenzyme Q10 (CO Q-10 PO) Take 1 mg by mouth daily      APPLE CIDER VINEGAR PO Take 450 mg by mouth daily      UNABLE TO FIND Take by mouth daily Liver Clenz /  Milk Thistle and Turmeric  takes 2 tabs daily      ascorbic acid (VITAMIN C) 500 MG tablet TAKE 1 TABLET BY MOUTH TWICE DAILY 180 tablet 3    Biotin 5000 MCG SUBL Place under the tongue daily      clotrimazole-betamethasone (LOTRISONE) 1-0.05 % cream apply to affected area twice a day 15 g 5    NOVOLOG FLEXPEN 100 UNIT/ML injection pen INJECT 4 UNITS AT LUNCH AND  6 UNITS AT SUPPER (EACH PEN EXPIRES 28 DAYS AFTER 1ST USE) 15 mL 3    triamcinolone (KENALOG) 0.1 % cream APPLY TOPICALLY TWICE DAILY 15 g 5    pyridoxine (B-6) 100 MG tablet Take 1 tablet by mouth daily      Respiratory Therapy Supplies MISC CPAP supplies 1 each 0    vitamin E 400 UNIT capsule Take 1 capsule by mouth daily      COMBIGAN 0.2-0.5 % ophthalmic solution PLACE 1 DROP INTO BOTH EYES DAILY 15 mL 3    Compression Stockings MISC by Does not apply route 15-20mmHg  Knee high  Open tow  With assist device to help put them on 1 each 0    Zinc 50 MG TABS Take 1 tablet by mouth daily      niacin 500 MG extended release capsule Take 1 capsule by mouth daily      folic acid (FOLVITE) 800 MCG tablet Take 1 tablet by mouth daily      Cyanocobalamin (VITAMIN B12 PO) Take 1,000 mcg by mouth daily      CPAP Machine MISC by Does not apply route Pressure of 13 (AirSense 10)  [employer].       No current facility-administered medications for this visit.

## 2025-06-30 ENCOUNTER — TELEPHONE (OUTPATIENT)
Dept: INTERNAL MEDICINE | Age: 75
End: 2025-06-30

## 2025-06-30 DIAGNOSIS — I87.332 IDIOPATHIC CHRONIC VENOUS HYPERTENSION OF LEFT LOWER EXTREMITY WITH ULCER AND INFLAMMATION (HCC): Primary | ICD-10-CM

## 2025-06-30 DIAGNOSIS — T81.30XS DEHISCENCE OF WOUND OF SKIN, SEQUELA: ICD-10-CM

## 2025-06-30 DIAGNOSIS — L97.929 IDIOPATHIC CHRONIC VENOUS HYPERTENSION OF LEFT LOWER EXTREMITY WITH ULCER (HCC): ICD-10-CM

## 2025-06-30 DIAGNOSIS — L03.116 LEFT LEG CELLULITIS: ICD-10-CM

## 2025-06-30 DIAGNOSIS — I87.312 IDIOPATHIC CHRONIC VENOUS HYPERTENSION OF LEFT LOWER EXTREMITY WITH ULCER (HCC): ICD-10-CM

## 2025-06-30 NOTE — TELEPHONE ENCOUNTER
Please send a new order saying it is a venous wound.  We need to change anything else or do anything else?

## 2025-06-30 NOTE — TELEPHONE ENCOUNTER
[facility] called questioning patient's dx, stating they previously got paperwork stating it was a venous wound and now is stating that she got paperwork listing it as a Traumatic wound. [facility] states if it is a traumatic wound, they will no longer be able to cover the Dorinda Boot. Also, if the traumatic wound dx is correct, they have no gauze available for that type of wound. They would like a call back at [phone].

## 2025-06-30 NOTE — TELEPHONE ENCOUNTER
Writer spoke with home care [facility] and she stated for her dressing the Mepilex dressing is not going to be covered, but its still considered a foam dressing. They did say they would cover 4x4 bordered guaze. Along with they also need to know for the Dorinda boot if we want that with Calamine or Zinc? Cause apparently that comes inside the Dorinda Boot?     Please advise on the Dorinda boot, then I will pended a new script with both the bordered guaze and dorinda boot all on 1 script per there request with the correct dx.

## 2025-07-09 NOTE — TELEPHONE ENCOUNTER
Refill request mupirocin sent to [facility]     Medication pended if agreeable       Last Appt:  6/27/2025  Next Appt:   8/8/2025  Med verified in Epic

## 2025-07-10 RX ORDER — MUPIROCIN 2 %
1 OINTMENT (GRAM) TOPICAL 3 TIMES DAILY
Qty: 30 G | Refills: 3 | Status: SHIPPED | OUTPATIENT
Start: 2025-07-10

## 2025-07-23 ENCOUNTER — OFFICE VISIT (OUTPATIENT)
Dept: CARDIOLOGY | Age: 75
End: 2025-07-23
Payer: MEDICARE

## 2025-07-23 VITALS
HEART RATE: 51 BPM | SYSTOLIC BLOOD PRESSURE: 102 MMHG | WEIGHT: 218 LBS | HEIGHT: 64 IN | OXYGEN SATURATION: 96 % | BODY MASS INDEX: 37.22 KG/M2 | DIASTOLIC BLOOD PRESSURE: 60 MMHG

## 2025-07-23 DIAGNOSIS — I25.118 CORONARY ARTERY DISEASE INVOLVING NATIVE HEART WITH OTHER FORM OF ANGINA PECTORIS, UNSPECIFIED VESSEL OR LESION TYPE: Primary | ICD-10-CM

## 2025-07-23 PROCEDURE — 93005 ELECTROCARDIOGRAM TRACING: CPT | Performed by: NURSE PRACTITIONER

## 2025-07-23 PROCEDURE — 1090F PRES/ABSN URINE INCON ASSESS: CPT | Performed by: NURSE PRACTITIONER

## 2025-07-23 PROCEDURE — 3074F SYST BP LT 130 MM HG: CPT | Performed by: NURSE PRACTITIONER

## 2025-07-23 PROCEDURE — 93010 ELECTROCARDIOGRAM REPORT: CPT | Performed by: NURSE PRACTITIONER

## 2025-07-23 PROCEDURE — 99214 OFFICE O/P EST MOD 30 MIN: CPT | Performed by: NURSE PRACTITIONER

## 2025-07-23 PROCEDURE — G8417 CALC BMI ABV UP PARAM F/U: HCPCS | Performed by: NURSE PRACTITIONER

## 2025-07-23 PROCEDURE — 1123F ACP DISCUSS/DSCN MKR DOCD: CPT | Performed by: NURSE PRACTITIONER

## 2025-07-23 PROCEDURE — G8427 DOCREV CUR MEDS BY ELIG CLIN: HCPCS | Performed by: NURSE PRACTITIONER

## 2025-07-23 PROCEDURE — 1159F MED LIST DOCD IN RCRD: CPT | Performed by: NURSE PRACTITIONER

## 2025-07-23 PROCEDURE — 3078F DIAST BP <80 MM HG: CPT | Performed by: NURSE PRACTITIONER

## 2025-07-23 PROCEDURE — G8399 PT W/DXA RESULTS DOCUMENT: HCPCS | Performed by: NURSE PRACTITIONER

## 2025-07-23 PROCEDURE — 1126F AMNT PAIN NOTED NONE PRSNT: CPT | Performed by: NURSE PRACTITIONER

## 2025-07-23 PROCEDURE — 1036F TOBACCO NON-USER: CPT | Performed by: NURSE PRACTITIONER

## 2025-07-23 NOTE — PROGRESS NOTES
Today's Date: 2025  Patient's Name: Penelope Holland  Patient's age: 74 y.o., 1950    Subjective:  Penelope Holland is being seen in clinic today regarding   HTN, HL      she is doing well from a cardiac standpoint. She reports mild dyspnea on exertion only  No chest pain, no PND, no syncope, no orthopnea She denies any recent  falls. BP log reviewed. BP stable. HR 40-70's, is now off BB per Pt due to low Hr.   Reports hand tremors. She states that her BS has been dropping at home         Past Medical History:   has a past medical history of Arthritis, Asthma, Bell's palsy, CAD (coronary artery disease), Cancer (HCC), Carotid artery disease, Cataracts, bilateral, CHF (congestive heart failure) ([facility]), Chlamydia, COPD (chronic obstructive pulmonary disease) ([facility]), Coronary artery disease, Eczema, Emphysema lung ([facility]), Fibromyalgia, Glaucoma, H/O blood clots, Headache, Heart attack ([facility]), Hx of Bell's palsy, Hypertension, Liver disease, Mild dementia ([facility]), Neuropathy, Osteoporosis, Sleep apnea, Tremor, and Type 2 diabetes mellitus with hyperglycemia ([facility]).    Past Surgical History:   has a past surgical history that includes Appendectomy (); Induced  (); Tonsillectomy (); Bariatric Surgery (); Colonoscopy (2012); pr exc b9 les mrgn xcp sk tg f/e/e/n/l/m 1.1-2.0cm (Left, 2018); pr njx aa&/strd tfrml epi lumbar/sacral ea addl (Right, 10/05/2018); pr njx aa&/strd tfrml epi lumbar/sacral ea addl (Right, 10/26/2018); Lumbar spine surgery (Right, 2019); Coronary angioplasty with stent; Lumbar spine surgery (Right, 2019); Lumbar spine surgery (Right, 2019); Anesthesia Nerve Block (Right, 2019); Anesthesia Nerve Block (Right, 2019); RADIOFREQUENCY ABLATION NERVES (Right, 2019); Colonoscopy (N/A, 2019); Lumbar spine surgery (Right, 2019); Pain management procedure (Right, 2020); Pain management procedure (Right,

## 2025-07-24 ENCOUNTER — TELEPHONE (OUTPATIENT)
Dept: INTERNAL MEDICINE | Age: 75
End: 2025-07-24

## 2025-07-24 NOTE — TELEPHONE ENCOUNTER
Rocio with [facility] called and wanted us to be aware that the patient has a cut to her posterior left leg so she wrapped it with plastic wrap and she was using a pair of scissors to cut it off and knicked herself in the left interior lower leg and one on the medial left lower leg no redness but has fluid draining due to her Diabetes but nothing terrible. She states you can leave a message on her cell.

## 2025-07-24 NOTE — TELEPHONE ENCOUNTER
Writer spoke with Rocio and made her aware that Dr Berman was deferring this request to pcp. Advised her to just have patient keep an eye on the incision and if she thinks its starting to look like its becoming infected then she needs to go to UC or Er right away so it does not develop into an open area like the last laceration she had.  Then writer advised Rocio she would call her back on Tuesday with a new recommendations from Dr Hammond.  Rocio voiced understanding.

## 2025-07-28 RX ORDER — PRIMIDONE 50 MG/1
50 TABLET ORAL 2 TIMES DAILY
Qty: 180 TABLET | Refills: 1 | Status: SHIPPED | OUTPATIENT
Start: 2025-07-28

## 2025-07-29 NOTE — TELEPHONE ENCOUNTER
Call and tell patient to make sure to keep the new patient wound care clinic consult in a couple of days on 7/31/2025.  Tell her to point out the new wounds, as well as, the old leg wound  to the wound care clinic provider

## 2025-07-31 ENCOUNTER — HOSPITAL ENCOUNTER (OUTPATIENT)
Dept: GENERAL RADIOLOGY | Age: 75
Discharge: HOME | End: 2025-08-02
Attending: INTERNAL MEDICINE
Payer: MEDICARE

## 2025-07-31 ENCOUNTER — TELEPHONE (OUTPATIENT)
Dept: SURGERY | Age: 75
End: 2025-07-31

## 2025-07-31 ENCOUNTER — HOSPITAL ENCOUNTER (OUTPATIENT)
Dept: WOUND CARE | Age: 75
Discharge: HOME | End: 2025-08-01
Attending: PHYSICIAN ASSISTANT | Admitting: PHYSICIAN ASSISTANT
Payer: MEDICARE

## 2025-07-31 VITALS
HEART RATE: 60 BPM | WEIGHT: 220 LBS | TEMPERATURE: 97.8 F | HEIGHT: 64 IN | RESPIRATION RATE: 18 BRPM | DIASTOLIC BLOOD PRESSURE: 80 MMHG | BODY MASS INDEX: 37.56 KG/M2 | SYSTOLIC BLOOD PRESSURE: 128 MMHG

## 2025-07-31 DIAGNOSIS — K21.9 GASTROESOPHAGEAL REFLUX DISEASE WITHOUT ESOPHAGITIS: ICD-10-CM

## 2025-07-31 DIAGNOSIS — F32.A DEPRESSION, UNSPECIFIED DEPRESSION TYPE: ICD-10-CM

## 2025-07-31 DIAGNOSIS — M25.561 CHRONIC PAIN OF RIGHT KNEE: ICD-10-CM

## 2025-07-31 DIAGNOSIS — S81.802D TRAUMATIC OPEN WOUND OF LEFT LOWER LEG WITH DELAYED HEALING: Primary | ICD-10-CM

## 2025-07-31 DIAGNOSIS — G89.29 CHRONIC PAIN OF RIGHT KNEE: ICD-10-CM

## 2025-07-31 PROCEDURE — 73562 X-RAY EXAM OF KNEE 3: CPT

## 2025-07-31 PROCEDURE — 11042 DBRDMT SUBQ TIS 1ST 20SQCM/<: CPT

## 2025-07-31 RX ORDER — LIDOCAINE 50 MG/G
OINTMENT TOPICAL PRN
OUTPATIENT
Start: 2025-07-31

## 2025-07-31 RX ORDER — CLOBETASOL PROPIONATE 0.5 MG/G
OINTMENT TOPICAL PRN
OUTPATIENT
Start: 2025-07-31

## 2025-07-31 RX ORDER — LIDOCAINE HYDROCHLORIDE 40 MG/ML
SOLUTION TOPICAL PRN
OUTPATIENT
Start: 2025-07-31

## 2025-07-31 RX ORDER — LIDOCAINE HYDROCHLORIDE 20 MG/ML
JELLY TOPICAL PRN
OUTPATIENT
Start: 2025-07-31

## 2025-07-31 RX ORDER — BACITRACIN ZINC AND POLYMYXIN B SULFATE 500; 1000 [USP'U]/G; [USP'U]/G
OINTMENT TOPICAL PRN
OUTPATIENT
Start: 2025-07-31

## 2025-07-31 RX ORDER — BETAMETHASONE DIPROPIONATE 0.5 MG/G
CREAM TOPICAL PRN
OUTPATIENT
Start: 2025-07-31

## 2025-07-31 RX ORDER — CITALOPRAM HYDROBROMIDE 10 MG/1
10 TABLET ORAL DAILY
Qty: 90 TABLET | Refills: 3 | Status: SHIPPED | OUTPATIENT
Start: 2025-07-31

## 2025-07-31 RX ORDER — MUPIROCIN 2 %
OINTMENT (GRAM) TOPICAL PRN
OUTPATIENT
Start: 2025-07-31

## 2025-07-31 RX ORDER — TRIAMCINOLONE ACETONIDE 1 MG/G
OINTMENT TOPICAL PRN
OUTPATIENT
Start: 2025-07-31

## 2025-07-31 RX ORDER — SODIUM CHLOR/HYPOCHLOROUS ACID 0.033 %
SOLUTION, IRRIGATION IRRIGATION PRN
OUTPATIENT
Start: 2025-07-31

## 2025-07-31 RX ORDER — SILVER SULFADIAZINE 10 MG/G
CREAM TOPICAL PRN
OUTPATIENT
Start: 2025-07-31

## 2025-07-31 RX ORDER — LIDOCAINE 40 MG/G
CREAM TOPICAL PRN
OUTPATIENT
Start: 2025-07-31

## 2025-07-31 RX ORDER — GINSENG 100 MG
CAPSULE ORAL PRN
OUTPATIENT
Start: 2025-07-31

## 2025-07-31 RX ORDER — PANTOPRAZOLE SODIUM 40 MG/1
40 TABLET, DELAYED RELEASE ORAL DAILY
Qty: 90 TABLET | Refills: 3 | Status: SHIPPED | OUTPATIENT
Start: 2025-07-31

## 2025-07-31 RX ORDER — NEOMYCIN/BACITRACIN/POLYMYXINB 3.5-400-5K
OINTMENT (GRAM) TOPICAL PRN
OUTPATIENT
Start: 2025-07-31

## 2025-07-31 RX ORDER — GENTAMICIN SULFATE 1 MG/G
OINTMENT TOPICAL PRN
OUTPATIENT
Start: 2025-07-31

## 2025-07-31 NOTE — PROGRESS NOTES
questions or concerns- [phone]  Follow up as scheduled d        Electronically signed by Raúl Pool PA-C on 7/31/2025 at 10:50 AM          (Please note that portions of this note were completed with a voice recognition program.  Efforts were made to edit the dictations but occasionally words are mis-transcribed.)

## 2025-07-31 NOTE — ASSESSMENT & PLAN NOTE
Wound is relatively small, did have a lot of sloth and fibrin buildup over the top which debrided off easily, I do get blood return, does have that lower extremity edema, I am going to start Santyl followed by foam and then 2 layer wraps have her see nursing visits weekly and I will see her back in 2 to 3 weeks.  She does shower and is difficult for her to keep this leg dry and I think is reasonable that we take off the 2 layer wraps every 3 days let her shower without the wraps and then reapply after the shower.

## 2025-07-31 NOTE — PLAN OF CARE
Problem: Pain  Goal: Verbalizes/displays adequate comfort level or baseline comfort level  Outcome: Progressing     Problem: Cognitive:  Goal: Knowledge of wound care  Description: Knowledge of wound care  Outcome: Progressing  Goal: Understands risk factors for wounds  Description: Understands risk factors for wounds  Outcome: Progressing     Problem: Wound:  Goal: Will show signs of wound healing; wound closure and no evidence of infection  Description: Will show signs of wound healing; wound closure and no evidence of infection  Outcome: Progressing     Problem: Pressure Ulcer:  Goal: Signs of wound healing will improve  Description: Signs of wound healing will improve  Outcome: Progressing  Goal: Absence of new pressure ulcer  Description: Absence of new pressure ulcer  Outcome: Progressing  Goal: Will show no infection signs and symptoms  Description: Will show no infection signs and symptoms  Outcome: Progressing     Problem: Venous:  Goal: Signs of wound healing will improve  Description: Signs of wound healing will improve  Outcome: Progressing     Problem: Smoking cessation:  Goal: Ability to formulate a plan to maintain a tobacco-free life will be supported  Description: Ability to formulate a plan to maintain a tobacco-free life will be supported  Outcome: Progressing     Problem: Weight control:  Goal: Ability to maintain an optimal weight for height and age will be supported  Description: Ability to maintain an optimal weight for height and age will be supported  Outcome: Progressing     Problem: Blood Glucose:  Goal: Ability to maintain appropriate glucose levels will improve  Description: Ability to maintain appropriate glucose levels will improve  Outcome: Progressing

## 2025-07-31 NOTE — TELEPHONE ENCOUNTER
[facility] CALLED.  THEY CAN'T FILL THE SANTYL PRESCRIPTION.  THEY CAN'T BILL [place] MEDICAID. THEY'RE LOCATED IN ANOTHER STATE.

## 2025-07-31 NOTE — DISCHARGE INSTRUCTIONS
Today a compression dressing has been applied to your lower leg. Its purpose is to reduce swelling and help treat your wound. It will stay on until your next appointment.  1. It must stay dry. You can shower with a bag covering it or purchase a shower boot at  a medical supply store.  2. If the dressing falls more than 2 inches or gets wet, call us ([phone]) to come in  to have it changed.  3. If the top or bottom rolls, you can snip through it to relieve the constriction.  4. To help reduce swelling, when sitting elevate your leg, preferably to heart level.   Avoid standing in one place for long periods of time.  5.  A rare complication is a decrease of arterial blood flow to your toes. If your   leg becomes more painful with numbness or tingling or a purple color to your toes,  carefully remove the dressing by cutting it off or unwrapping it. If normal sensation does not return to the limb, seek medical help.   SIGNS OF INFECTION  - Redness, swelling, skin hot  - Wound bed turns black or stringy yellow  - Foul odor  - Increased drainage or pus  - Increased pain  - Fever greater than 100F    CALL YOUR DOCTOR OR SEEK MEDICAL ATTENTION IF SIGNS OF INFECTION.  DO NOT WAIT UNTIL YOUR NEXT APPOINTMENT    Call the Wound Care Nurse with any other questions or concerns- [phone]  Follow up as scheduled d

## 2025-08-01 RX ORDER — TIOTROPIUM BROMIDE AND OLODATEROL 3.124; 2.736 UG/1; UG/1
SPRAY, METERED RESPIRATORY (INHALATION)
Qty: 12 G | Refills: 3 | Status: SHIPPED | OUTPATIENT
Start: 2025-08-01

## 2025-08-07 ENCOUNTER — HOSPITAL ENCOUNTER (OUTPATIENT)
Dept: WOUND CARE | Age: 75
Discharge: HOME | End: 2025-08-08
Attending: INTERNAL MEDICINE
Payer: MEDICARE

## 2025-08-07 VITALS
WEIGHT: 220 LBS | BODY MASS INDEX: 37.56 KG/M2 | HEIGHT: 64 IN | HEART RATE: 66 BPM | RESPIRATION RATE: 16 BRPM | DIASTOLIC BLOOD PRESSURE: 84 MMHG | TEMPERATURE: 98.4 F | SYSTOLIC BLOOD PRESSURE: 130 MMHG

## 2025-08-07 DIAGNOSIS — S81.802D TRAUMATIC OPEN WOUND OF LEFT LOWER LEG WITH DELAYED HEALING: Primary | ICD-10-CM

## 2025-08-07 PROCEDURE — 29581 APPL MULTLAYER CMPRN SYS LEG: CPT

## 2025-08-07 PROCEDURE — 99214 OFFICE O/P EST MOD 30 MIN: CPT

## 2025-08-07 RX ORDER — CLOBETASOL PROPIONATE 0.5 MG/G
OINTMENT TOPICAL PRN
OUTPATIENT
Start: 2025-08-07

## 2025-08-07 RX ORDER — LIDOCAINE HYDROCHLORIDE 40 MG/ML
SOLUTION TOPICAL PRN
OUTPATIENT
Start: 2025-08-07

## 2025-08-07 RX ORDER — BETAMETHASONE DIPROPIONATE 0.5 MG/G
CREAM TOPICAL PRN
OUTPATIENT
Start: 2025-08-07

## 2025-08-07 RX ORDER — LIDOCAINE HYDROCHLORIDE 20 MG/ML
JELLY TOPICAL PRN
OUTPATIENT
Start: 2025-08-07

## 2025-08-07 RX ORDER — MUPIROCIN 2 %
OINTMENT (GRAM) TOPICAL PRN
OUTPATIENT
Start: 2025-08-07

## 2025-08-07 RX ORDER — GENTAMICIN SULFATE 1 MG/G
OINTMENT TOPICAL PRN
OUTPATIENT
Start: 2025-08-07

## 2025-08-07 RX ORDER — LIDOCAINE 50 MG/G
OINTMENT TOPICAL PRN
OUTPATIENT
Start: 2025-08-07

## 2025-08-07 RX ORDER — GINSENG 100 MG
CAPSULE ORAL PRN
OUTPATIENT
Start: 2025-08-07

## 2025-08-07 RX ORDER — TRIAMCINOLONE ACETONIDE 1 MG/G
OINTMENT TOPICAL PRN
OUTPATIENT
Start: 2025-08-07

## 2025-08-07 RX ORDER — LIDOCAINE 40 MG/G
CREAM TOPICAL PRN
OUTPATIENT
Start: 2025-08-07

## 2025-08-07 RX ORDER — NEOMYCIN/BACITRACIN/POLYMYXINB 3.5-400-5K
OINTMENT (GRAM) TOPICAL PRN
OUTPATIENT
Start: 2025-08-07

## 2025-08-07 RX ORDER — SILVER SULFADIAZINE 10 MG/G
CREAM TOPICAL PRN
OUTPATIENT
Start: 2025-08-07

## 2025-08-07 RX ORDER — SODIUM CHLOR/HYPOCHLOROUS ACID 0.033 %
SOLUTION, IRRIGATION IRRIGATION PRN
OUTPATIENT
Start: 2025-08-07

## 2025-08-07 RX ORDER — BACITRACIN ZINC AND POLYMYXIN B SULFATE 500; 1000 [USP'U]/G; [USP'U]/G
OINTMENT TOPICAL PRN
OUTPATIENT
Start: 2025-08-07

## 2025-08-08 ENCOUNTER — HOSPITAL ENCOUNTER (OUTPATIENT)
Dept: LAB | Age: 75
Discharge: HOME | End: 2025-08-08
Payer: MEDICARE

## 2025-08-08 ENCOUNTER — OFFICE VISIT (OUTPATIENT)
Dept: INTERNAL MEDICINE | Age: 75
End: 2025-08-08
Payer: MEDICARE

## 2025-08-08 VITALS
HEART RATE: 60 BPM | DIASTOLIC BLOOD PRESSURE: 60 MMHG | OXYGEN SATURATION: 96 % | SYSTOLIC BLOOD PRESSURE: 104 MMHG | BODY MASS INDEX: 37.05 KG/M2 | RESPIRATION RATE: 16 BRPM | WEIGHT: 217 LBS | HEIGHT: 64 IN

## 2025-08-08 DIAGNOSIS — E03.9 HYPOTHYROIDISM (ACQUIRED): ICD-10-CM

## 2025-08-08 DIAGNOSIS — E11.42 TYPE 2 DIABETES MELLITUS WITH DIABETIC POLYNEUROPATHY, WITH LONG-TERM CURRENT USE OF INSULIN (HCC): ICD-10-CM

## 2025-08-08 DIAGNOSIS — D50.9 IRON DEFICIENCY ANEMIA, UNSPECIFIED IRON DEFICIENCY ANEMIA TYPE: ICD-10-CM

## 2025-08-08 DIAGNOSIS — Z00.00 MEDICARE ANNUAL WELLNESS VISIT, SUBSEQUENT: Primary | ICD-10-CM

## 2025-08-08 DIAGNOSIS — E55.9 VITAMIN D DEFICIENCY: ICD-10-CM

## 2025-08-08 DIAGNOSIS — E53.8 B12 DEFICIENCY: ICD-10-CM

## 2025-08-08 DIAGNOSIS — I50.32 CHF (CONGESTIVE HEART FAILURE), NYHA CLASS I, CHRONIC, DIASTOLIC (HCC): ICD-10-CM

## 2025-08-08 DIAGNOSIS — I25.10 CORONARY ARTERY DISEASE DUE TO LIPID RICH PLAQUE: ICD-10-CM

## 2025-08-08 DIAGNOSIS — I50.32 CHRONIC DIASTOLIC CONGESTIVE HEART FAILURE (HCC): ICD-10-CM

## 2025-08-08 DIAGNOSIS — Z79.4 TYPE 2 DIABETES MELLITUS WITH DIABETIC POLYNEUROPATHY, WITH LONG-TERM CURRENT USE OF INSULIN (HCC): ICD-10-CM

## 2025-08-08 DIAGNOSIS — I25.83 CORONARY ARTERY DISEASE DUE TO LIPID RICH PLAQUE: ICD-10-CM

## 2025-08-08 DIAGNOSIS — Z00.00 GENERAL MEDICAL EXAM: ICD-10-CM

## 2025-08-08 DIAGNOSIS — I10 PRIMARY HYPERTENSION: ICD-10-CM

## 2025-08-08 LAB
ALBUMIN SERPL-MCNC: 3.9 G/DL (ref 3.5–5.2)
ALBUMIN/GLOB SERPL: 1.2 {RATIO} (ref 1–2.5)
ALP SERPL-CCNC: 71 U/L (ref 35–104)
ALT SERPL-CCNC: 31 U/L (ref 10–35)
ANION GAP SERPL CALCULATED.3IONS-SCNC: 11 MMOL/L (ref 9–16)
AST SERPL-CCNC: 51 U/L (ref 10–35)
BILIRUB SERPL-MCNC: 0.4 MG/DL (ref 0–1.2)
BUN SERPL-MCNC: 20 MG/DL (ref 8–23)
BUN/CREAT SERPL: 29 (ref 9–20)
CALCIUM SERPL-MCNC: 9.1 MG/DL (ref 8.6–10.4)
CHLORIDE SERPL-SCNC: 103 MMOL/L (ref 98–107)
CO2 SERPL-SCNC: 24 MMOL/L (ref 20–31)
CREAT SERPL-MCNC: 0.7 MG/DL (ref 0.6–0.9)
CREAT UR-MCNC: 34 MG/DL (ref 28–217)
EST. AVERAGE GLUCOSE BLD GHB EST-MCNC: 111 MG/DL
GFR, ESTIMATED: 90 ML/MIN/1.73M2
GLUCOSE SERPL-MCNC: 62 MG/DL (ref 74–99)
HBA1C MFR BLD: 5.5 % (ref 4–6)
HGB BLD-MCNC: 12.1 G/DL (ref 11.9–15.1)
MICROALBUMIN UR-MCNC: <12 MG/L (ref 0–20)
MICROALBUMIN/CREAT UR-RTO: NORMAL MCG/MG CREAT (ref 0–25)
POTASSIUM SERPL-SCNC: 4.3 MMOL/L (ref 3.7–5.3)
PROT SERPL-MCNC: 7.1 G/DL (ref 6.6–8.7)
SODIUM SERPL-SCNC: 138 MMOL/L (ref 136–145)
TSH SERPL DL<=0.05 MIU/L-ACNC: 1 UIU/ML (ref 0.27–4.2)

## 2025-08-08 PROCEDURE — 85018 HEMOGLOBIN: CPT

## 2025-08-08 PROCEDURE — 82306 VITAMIN D 25 HYDROXY: CPT

## 2025-08-08 PROCEDURE — 83550 IRON BINDING TEST: CPT

## 2025-08-08 PROCEDURE — 36415 COLL VENOUS BLD VENIPUNCTURE: CPT

## 2025-08-08 PROCEDURE — 80061 LIPID PANEL: CPT

## 2025-08-08 PROCEDURE — 82570 ASSAY OF URINE CREATININE: CPT

## 2025-08-08 PROCEDURE — 83540 ASSAY OF IRON: CPT

## 2025-08-08 PROCEDURE — 84443 ASSAY THYROID STIM HORMONE: CPT

## 2025-08-08 PROCEDURE — 82607 VITAMIN B-12: CPT

## 2025-08-08 PROCEDURE — 80053 COMPREHEN METABOLIC PANEL: CPT

## 2025-08-08 PROCEDURE — 83036 HEMOGLOBIN GLYCOSYLATED A1C: CPT

## 2025-08-08 PROCEDURE — 84439 ASSAY OF FREE THYROXINE: CPT

## 2025-08-08 PROCEDURE — 82043 UR ALBUMIN QUANTITATIVE: CPT

## 2025-08-08 ASSESSMENT — ENCOUNTER SYMPTOMS
CONSTIPATION: 0
VOMITING: 0
BLOOD IN STOOL: 0
BACK PAIN: 0
DIARRHEA: 0
SHORTNESS OF BREATH: 0
NAUSEA: 0
ABDOMINAL PAIN: 0
EYE PAIN: 0
COUGH: 0

## 2025-08-08 ASSESSMENT — PATIENT HEALTH QUESTIONNAIRE - PHQ9
8. MOVING OR SPEAKING SO SLOWLY THAT OTHER PEOPLE COULD HAVE NOTICED. OR THE OPPOSITE, BEING SO FIGETY OR RESTLESS THAT YOU HAVE BEEN MOVING AROUND A LOT MORE THAN USUAL: NOT AT ALL
2. FEELING DOWN, DEPRESSED OR HOPELESS: NOT AT ALL
SUM OF ALL RESPONSES TO PHQ QUESTIONS 1-9: 0
SUM OF ALL RESPONSES TO PHQ QUESTIONS 1-9: 0
7. TROUBLE CONCENTRATING ON THINGS, SUCH AS READING THE NEWSPAPER OR WATCHING TELEVISION: NOT AT ALL
1. LITTLE INTEREST OR PLEASURE IN DOING THINGS: NOT AT ALL
SUM OF ALL RESPONSES TO PHQ QUESTIONS 1-9: 0
6. FEELING BAD ABOUT YOURSELF - OR THAT YOU ARE A FAILURE OR HAVE LET YOURSELF OR YOUR FAMILY DOWN: NOT AT ALL
SUM OF ALL RESPONSES TO PHQ QUESTIONS 1-9: 0
10. IF YOU CHECKED OFF ANY PROBLEMS, HOW DIFFICULT HAVE THESE PROBLEMS MADE IT FOR YOU TO DO YOUR WORK, TAKE CARE OF THINGS AT HOME, OR GET ALONG WITH OTHER PEOPLE: NOT DIFFICULT AT ALL
5. POOR APPETITE OR OVEREATING: NOT AT ALL
4. FEELING TIRED OR HAVING LITTLE ENERGY: NOT AT ALL
3. TROUBLE FALLING OR STAYING ASLEEP: NOT AT ALL
9. THOUGHTS THAT YOU WOULD BE BETTER OFF DEAD, OR OF HURTING YOURSELF: NOT AT ALL

## 2025-08-08 ASSESSMENT — LIFESTYLE VARIABLES
HOW OFTEN DO YOU HAVE A DRINK CONTAINING ALCOHOL: NEVER
HOW MANY STANDARD DRINKS CONTAINING ALCOHOL DO YOU HAVE ON A TYPICAL DAY: PATIENT DOES NOT DRINK

## 2025-08-09 LAB
25(OH)D3 SERPL-MCNC: 53.7 NG/ML (ref 30–100)
CHOLEST SERPL-MCNC: 127 MG/DL (ref 0–199)
CHOLESTEROL/HDL RATIO: 2.4
HDLC SERPL-MCNC: 52 MG/DL
IRON SATN MFR SERPL: 12 % (ref 20–55)
IRON SERPL-MCNC: 41 UG/DL (ref 37–145)
LDLC SERPL CALC-MCNC: 52 MG/DL (ref 0–100)
T4 FREE SERPL-MCNC: 0.9 NG/DL (ref 0.92–1.68)
TIBC SERPL-MCNC: 347 UG/DL (ref 250–450)
TRIGL SERPL-MCNC: 115 MG/DL (ref 0–149)
UNSATURATED IRON BINDING CAPACITY: 306 UG/DL (ref 112–347)
VIT B12 SERPL-MCNC: 1359 PG/ML (ref 232–1245)
VLDLC SERPL CALC-MCNC: 23 MG/DL (ref 1–30)

## 2025-08-13 ENCOUNTER — HOSPITAL ENCOUNTER (OUTPATIENT)
Dept: WOUND CARE | Age: 75
Discharge: HOME | End: 2025-08-14
Attending: INTERNAL MEDICINE
Payer: MEDICARE

## 2025-08-13 DIAGNOSIS — S81.802D TRAUMATIC OPEN WOUND OF LEFT LOWER LEG WITH DELAYED HEALING: Primary | ICD-10-CM

## 2025-08-13 PROCEDURE — 29581 APPL MULTLAYER CMPRN SYS LEG: CPT

## 2025-08-13 PROCEDURE — 99213 OFFICE O/P EST LOW 20 MIN: CPT

## 2025-08-13 RX ORDER — LIDOCAINE HYDROCHLORIDE 40 MG/ML
SOLUTION TOPICAL PRN
OUTPATIENT
Start: 2025-08-13

## 2025-08-13 RX ORDER — GENTAMICIN SULFATE 1 MG/G
OINTMENT TOPICAL PRN
OUTPATIENT
Start: 2025-08-13

## 2025-08-13 RX ORDER — SILVER SULFADIAZINE 10 MG/G
CREAM TOPICAL PRN
OUTPATIENT
Start: 2025-08-13

## 2025-08-13 RX ORDER — BACITRACIN ZINC AND POLYMYXIN B SULFATE 500; 1000 [USP'U]/G; [USP'U]/G
OINTMENT TOPICAL PRN
OUTPATIENT
Start: 2025-08-13

## 2025-08-13 RX ORDER — LIDOCAINE 40 MG/G
CREAM TOPICAL PRN
OUTPATIENT
Start: 2025-08-13

## 2025-08-13 RX ORDER — NEOMYCIN/BACITRACIN/POLYMYXINB 3.5-400-5K
OINTMENT (GRAM) TOPICAL PRN
OUTPATIENT
Start: 2025-08-13

## 2025-08-13 RX ORDER — LIDOCAINE HYDROCHLORIDE 20 MG/ML
JELLY TOPICAL PRN
OUTPATIENT
Start: 2025-08-13

## 2025-08-13 RX ORDER — CLOBETASOL PROPIONATE 0.5 MG/G
OINTMENT TOPICAL PRN
OUTPATIENT
Start: 2025-08-13

## 2025-08-13 RX ORDER — MUPIROCIN 2 %
OINTMENT (GRAM) TOPICAL PRN
OUTPATIENT
Start: 2025-08-13

## 2025-08-13 RX ORDER — LIDOCAINE 50 MG/G
OINTMENT TOPICAL PRN
OUTPATIENT
Start: 2025-08-13

## 2025-08-13 RX ORDER — BETAMETHASONE DIPROPIONATE 0.5 MG/G
CREAM TOPICAL PRN
OUTPATIENT
Start: 2025-08-13

## 2025-08-13 RX ORDER — TRIAMCINOLONE ACETONIDE 1 MG/G
OINTMENT TOPICAL PRN
OUTPATIENT
Start: 2025-08-13

## 2025-08-13 RX ORDER — GINSENG 100 MG
CAPSULE ORAL PRN
OUTPATIENT
Start: 2025-08-13

## 2025-08-13 RX ORDER — SODIUM CHLOR/HYPOCHLOROUS ACID 0.033 %
SOLUTION, IRRIGATION IRRIGATION PRN
OUTPATIENT
Start: 2025-08-13

## 2025-08-21 ENCOUNTER — HOSPITAL ENCOUNTER (OUTPATIENT)
Dept: WOUND CARE | Age: 75
Discharge: HOME | End: 2025-08-22
Payer: MEDICARE

## 2025-08-21 VITALS
HEIGHT: 64 IN | SYSTOLIC BLOOD PRESSURE: 106 MMHG | HEART RATE: 66 BPM | TEMPERATURE: 98.4 F | BODY MASS INDEX: 37.25 KG/M2 | DIASTOLIC BLOOD PRESSURE: 72 MMHG | RESPIRATION RATE: 16 BRPM

## 2025-08-21 PROCEDURE — 99214 OFFICE O/P EST MOD 30 MIN: CPT

## 2025-08-21 ASSESSMENT — PAIN SCALES - GENERAL: PAINLEVEL_OUTOF10: 0

## 2025-08-26 ENCOUNTER — TELEPHONE (OUTPATIENT)
Dept: INTERNAL MEDICINE | Age: 75
End: 2025-08-26
Payer: MEDICARE

## 2025-08-26 DIAGNOSIS — I35.0 NONRHEUMATIC AORTIC VALVE STENOSIS: ICD-10-CM

## 2025-08-26 DIAGNOSIS — E55.9 VITAMIN D DEFICIENCY: ICD-10-CM

## 2025-08-26 DIAGNOSIS — I25.83 CORONARY ARTERY DISEASE DUE TO LIPID RICH PLAQUE: ICD-10-CM

## 2025-08-26 DIAGNOSIS — I25.10 ATHEROSCLEROSIS OF NATIVE CORONARY ARTERY OF NATIVE HEART WITHOUT ANGINA PECTORIS: ICD-10-CM

## 2025-08-26 DIAGNOSIS — I49.9 CARDIAC ARRHYTHMIA, UNSPECIFIED CARDIAC ARRHYTHMIA TYPE: ICD-10-CM

## 2025-08-26 DIAGNOSIS — I25.10 CORONARY ARTERY DISEASE DUE TO LIPID RICH PLAQUE: ICD-10-CM

## 2025-08-26 DIAGNOSIS — M54.50 CHRONIC MIDLINE LOW BACK PAIN WITHOUT SCIATICA: ICD-10-CM

## 2025-08-26 DIAGNOSIS — G89.29 CHRONIC MIDLINE LOW BACK PAIN WITHOUT SCIATICA: ICD-10-CM

## 2025-08-26 DIAGNOSIS — E11.42 TYPE 2 DIABETES MELLITUS WITH DIABETIC POLYNEUROPATHY, WITH LONG-TERM CURRENT USE OF INSULIN (HCC): Primary | ICD-10-CM

## 2025-08-26 DIAGNOSIS — Z79.4 TYPE 2 DIABETES MELLITUS WITH DIABETIC POLYNEUROPATHY, WITH LONG-TERM CURRENT USE OF INSULIN (HCC): Primary | ICD-10-CM

## 2025-08-26 PROCEDURE — G0179 MD RECERTIFICATION HHA PT: HCPCS | Performed by: INTERNAL MEDICINE

## 2025-08-26 RX ORDER — OXYCODONE AND ACETAMINOPHEN 5; 325 MG/1; MG/1
1 TABLET ORAL EVERY 6 HOURS PRN
Qty: 120 TABLET | Refills: 0 | Status: SHIPPED | OUTPATIENT
Start: 2025-08-26 | End: 2025-09-25

## 2025-09-02 ENCOUNTER — TELEPHONE (OUTPATIENT)
Dept: INTERNAL MEDICINE | Age: 75
End: 2025-09-02

## (undated) DEVICE — GLOVE SURG SZ 65 THK91MIL LTX FREE SYN POLYISOPRENE

## (undated) DEVICE — TRAY PAIN CUST

## (undated) DEVICE — LINE SAMP O2 6.5FT W/FEMALE CONN F/ADULT CAPNOLINE PLUS

## (undated) DEVICE — CANNULA NSL AD L2IN ETCO2 SAMP SFT CRUSH RESIST FEM AIRLFE

## (undated) DEVICE — SOLUTION IV IRRIG POUR BRL 0.9% SODIUM CHL 2F7124

## (undated) DEVICE — CHLORAPREP 26ML CLEAR

## (undated) DEVICE — BLADE CLIPPER GEN PURP NS

## (undated) DEVICE — 60 ML SYRINGE REGULAR TIP: Brand: MONOJECT

## (undated) DEVICE — Z DISCONTINUED APPLICATOR SURG PREP 0.35OZ 2% CHG 70% ISO ALC W/ HI LT

## (undated) DEVICE — BASIN SET: Brand: MEDLINE INDUSTRIES, INC.

## (undated) DEVICE — 1200CC GUARDIAN II: Brand: GUARDIAN

## (undated) DEVICE — GLOVE ORANGE PI 8   MSG9080

## (undated) DEVICE — GLOVE SURG SZ 6 THK91MIL LTX FREE SYN POLYISOPRENE ANTI

## (undated) DEVICE — GLOVE ORANGE PI 7   MSG9070

## (undated) DEVICE — Z DISCONTINUED USE 2624853 GLOVE SURG SZ 75 L12IN THK91MIL BRN LTX FREE

## (undated) DEVICE — NEEDLE, QUINCKE, 22GX5": Brand: MEDLINE

## (undated) DEVICE — 3M™ TEGADERM™ TRANSPARENT FILM DRESSING FRAME STYLE, 1626W, 4 IN X 4-3/4 IN (10 CM X 12 CM), 50/CT 4CT/CASE: Brand: 3M™ TEGADERM™

## (undated) DEVICE — PACK SURG PROC REMINDER N WVN DISPOSABLE BEAC TIME OUT

## (undated) DEVICE — C-ARMOR C-ARM EQUIPMENT COVERS CLEAR STERILE UNIVERSAL FIT 12 PER CASE: Brand: C-ARMOR

## (undated) DEVICE — PENCIL ES L3M BTTN SWCH HOLSTER W/ BLDE ELECTRD EDGE

## (undated) DEVICE — BANDAGE ADH DIA7/8IN NAT FLEX-FABRIC WVN FOR WND PROTCT

## (undated) DEVICE — TRAP SURG QUAD PARABOLA SLOT DSGN SFTY SCRN TRAPEASE

## (undated) DEVICE — SYRINGE,EAR/ULCER, 2 OZ, STERILE: Brand: MEDLINE

## (undated) DEVICE — PAD ELECSURG GRND DISP

## (undated) DEVICE — MARKER W/PRE PRINTED CUSTOM LABEL

## (undated) DEVICE — FORCEPS BX L240CM JAW DIA2.2MM RAD JAW 4 HOT DISP

## (undated) DEVICE — STAPLER SKIN L39MM DIA0.53MM CRWN 5.7MM S STL FIX HD PROX

## (undated) DEVICE — GLOVE ORANGE PI 7 1/2   MSG9075

## (undated) DEVICE — GOWN,AURORA,NONRNF,XL,30/CS: Brand: MEDLINE

## (undated) DEVICE — GLOVE SURG SZ 8 L12IN THK91MIL BRN LTX FREE POLYCHLOROPRENE

## (undated) DEVICE — Device: Brand: SPOT EX ENDOSCOPIC TATTOO

## (undated) DEVICE — SHEET, T, LAPAROTOMY, STERILE: Brand: MEDLINE

## (undated) DEVICE — MERCY DEFIANCE ENDO KIT: Brand: MEDLINE INDUSTRIES, INC.

## (undated) DEVICE — TOWEL,OR,DSP,ST,BLUE,STD,4/PK,20PK/CS: Brand: MEDLINE

## (undated) DEVICE — COVER LT HNDL BLU PLAS

## (undated) DEVICE — DRAPE,UTILITY,TAPE,15X26,STERILE: Brand: MEDLINE

## (undated) DEVICE — DRAPE THYROID

## (undated) DEVICE — CANNULA 15CM, 5MM TIP, 18G: Brand: CANNULARFK

## (undated) DEVICE — GOWN,AURORA,NONREINFORCED,LARGE: Brand: MEDLINE

## (undated) DEVICE — MASTISOL ADHESIVE LIQ 2/3ML

## (undated) DEVICE — 9165 UNIVERSAL PATIENT PLATE: Brand: 3M™

## (undated) DEVICE — SUTURE VCRL SZ 3-0 L27IN ABSRB UD L26MM CT-2 1/2 CIR J232H

## (undated) DEVICE — GAUZE,SPONGE,2"X2",8PLY,STERILE,LF,2'S: Brand: MEDLINE

## (undated) DEVICE — FORCEPS BX L240CM WRK CHN 2.8MM STD CAP W/ NDL MIC MESH

## (undated) DEVICE — Z INACTIVE USE 2653177 SPONGE GZ W2XL2IN NONWOVEN 4 PLY FASTER WICKING ABIL AVANT

## (undated) DEVICE — FORCEPS BX L240CM JAW DIA2.4MM ORNG L CAP W/ NDL DISP RAD

## (undated) DEVICE — TUBING, SUCTION, 3/16" X 20', STRAIGHT: Brand: MEDLINE

## (undated) DEVICE — PACK SET UP

## (undated) DEVICE — NEEDLE FLTR 18GA L1.5IN MEM THK5UM BLNT DISP

## (undated) DEVICE — SUTURE NONABSORBABLE MONOFILAMENT 3-0 PS-1 18 IN BLK ETHILON 1663H

## (undated) DEVICE — GAUZE,SPONGE,4"X4",12PLY,STERILE,LF,2'S: Brand: MEDLINE

## (undated) DEVICE — ELECTRODE,ECG,STRESS,FOAM,5PK: Brand: MEDLINE

## (undated) DEVICE — Z DISCONTINUED USE 2651424 COVER EQUIP D18IN CNTOUR ELAS BND SNUG CLSR

## (undated) DEVICE — 3M™ TEGADERM™ TRANSPARENT FILM DRESSING FRAME STYLE, 1624W, 2-3/8 IN X 2-3/4 IN (6 CM X 7 CM), 100/CT 4CT/CASE: Brand: 3M™ TEGADERM™

## (undated) DEVICE — CONNECTOR TBNG AUX H2O JET DISP FOR OLY 160/180 SER

## (undated) DEVICE — MEDI-VAC YANKAUER SUCTION HANDLE W/BULBOUS TIP: Brand: CARDINAL HEALTH

## (undated) DEVICE — COVER,TABLE,77X90,STERILE: Brand: MEDLINE